# Patient Record
Sex: MALE | Race: WHITE | Employment: OTHER | URBAN - METROPOLITAN AREA
[De-identification: names, ages, dates, MRNs, and addresses within clinical notes are randomized per-mention and may not be internally consistent; named-entity substitution may affect disease eponyms.]

---

## 2017-01-06 ENCOUNTER — TELEPHONE (OUTPATIENT)
Dept: CARDIOLOGY | Facility: CLINIC | Age: 82
End: 2017-01-06

## 2017-01-06 NOTE — TELEPHONE ENCOUNTER
Pt called to get his echo results. It looks like  already reviewed results but no further recommendations were made. I reviewed results with pt. He would like to  paper copy at [place] office today. Message sent to BERLIN Braun in [place]. Pt would also like OV note and results sent to PMD clinic. Will send update to HIM. Alyce SLADE

## 2017-06-22 ENCOUNTER — OFFICE VISIT (OUTPATIENT)
Dept: OTHER | Facility: CLINIC | Age: 82
End: 2017-06-22
Attending: SURGERY
Payer: MEDICARE

## 2017-06-22 ENCOUNTER — HOSPITAL ENCOUNTER (OUTPATIENT)
Dept: ULTRASOUND IMAGING | Facility: CLINIC | Age: 82
Discharge: HOME | End: 2017-06-22
Attending: SURGERY | Admitting: SURGERY
Payer: MEDICARE

## 2017-06-22 VITALS — DIASTOLIC BLOOD PRESSURE: 73 MMHG | HEART RATE: 58 BPM | SYSTOLIC BLOOD PRESSURE: 143 MMHG

## 2017-06-22 DIAGNOSIS — I65.22 CAROTID STENOSIS, LEFT: Primary | ICD-10-CM

## 2017-06-22 DIAGNOSIS — I65.22 RECURRENT STENOSIS OF LEFT CAROTID ARTERY: Primary | ICD-10-CM

## 2017-06-22 DIAGNOSIS — I73.9 PAD (PERIPHERAL ARTERY DISEASE) (H): ICD-10-CM

## 2017-06-22 DIAGNOSIS — I65.23 CAROTID STENOSIS, BILATERAL: ICD-10-CM

## 2017-06-22 DIAGNOSIS — I65.22 LEFT CAROTID STENOSIS: ICD-10-CM

## 2017-06-22 DIAGNOSIS — I65.22 LEFT CAROTID STENOSIS: Primary | ICD-10-CM

## 2017-06-22 PROCEDURE — 93882 EXTRACRANIAL UNI/LTD STUDY: CPT | Mod: LT

## 2017-06-22 PROCEDURE — 99214 OFFICE O/P EST MOD 30 MIN: CPT | Mod: ZP | Performed by: SURGERY

## 2017-06-22 PROCEDURE — 99211 OFF/OP EST MAY X REQ PHY/QHP: CPT

## 2017-06-22 NOTE — PROGRESS NOTES
José Aguirre Return for routine follow-up.  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  I performed a left carotid enterectomy 1997 with a vein patch.  He has had no symptoms related to this.    In 2012 he had elevated velocity in the distal ICA.  We performed a CTA which revealed some dilatation of the vein patch for particularly near the distal endpoint but no stenosis.  Velocities normalized the following year but were again elevated last year.  Repeat CTA on 6/21/2016 again revealed a dilated vein patch but no critical stenosis.    He's had no problems over the last year.  He also has known PAD but is completely asymptomatic.  He is able to do all his normal activities.  He remains independent and active.      PMH: Medications: Plavix, aspirin, furosemide, Crestor, carvedilol            Medical: Hypertension well-controlled medications                           Mixed hyperlipidemia on Crestor.  2016 LDL= 38                           PAD                           Carotid stenosis.     Nonsmoker.  Lives independently.    Exam: Alert and appropriate.  Blood pressure 143/73.  Pulse 58  Normal affect.              Well-healed left carotid incision.  Left lower than right carotid bruit.              Hearing aid.              Chest= clear   Cardiovascular=RR              No palpable pedal pulses which is unchanged.  No ischemic changes nor                Ulcerations.                  Bedside Doppler reveals a posterior monophasic signal in the right dorsalis pedis artery-left anterior tibial artery-left posterior tibial artery which is  Unchanged from previous exams.        We reviewed the carotid duplex ultrasound performed today.  This reveals a worsening stenosis at the end of the vein patch with velocities now 468 cm/s with an elevated diastolic of 107 cm/s and a ratio of 6.24.  Also concerns is a thrombus at the ICA origin and carotid bulb that appears to have some motion on the  surface. The external carotid artery is occluded several centimeters from its origin but has a large collateral branch that is patent.        I also reviewed the last two CTAs showing the dilated patch.      Impression:  #1  I'm very concerned about the change in his carotid duplex ultrasound knowing that he has a dilated vein patch with some aneurysmal changes near the distal endpoint.  The stenosis to the IC appears to be worsening.  Also of concern is the  Thrombus/plaque at the carotid bulb and proximal ICA with some motion artifact on the surface.  With the higher outflow obstruction he's more likely developed thrombus at this area and now has an increased risk of embolization causing a stroke.  This is also his only patent internal carotid artery.  Due to these findings I would recommend that we perform a redo left CEA with intraoperative shunting and EEG monitoring.  Possible vein patch from the right greater saphenous vein would be necessary.  We'll have him hold his Plavix for several days prior to the procedure  And schedule this at his convenience in the relatively near future.                         #2  PAD.  No palpable pedal pulses but fairly adequate Doppler signals and no symptoms.  No intervention is indicated at this time      Khurram Bishop MD   Please route or send letter to:  Primary Care Provider (PCP)

## 2017-06-22 NOTE — NURSING NOTE
"Chief Complaint   Patient presents with     RECHECK     Left carotid US(9:45 VHC; 10:30 O) History of chronic occlusion of his right internal carotid artery.  I performed a left carotid endarterectomy in 1997; 1 year follow up to 6-16-16 appointment with Dr. Bishop       Initial /73 (BP Location: Left arm, Patient Position: Chair, Cuff Size: Adult Large)  Pulse 58 Estimated body mass index is 28.27 kg/(m^2) as calculated from the following:    Height as of 12/13/16: 5' 10\" (1.778 m).    Weight as of 12/13/16: 197 lb (89.4 kg).  Medication Reconciliation: complete     Face to face nursing time: 8 minutes    Jo Caballero MA     "

## 2017-06-22 NOTE — LETTER
[facility] [place] [address]  [place] [address]  Phone: [phone]  Fax: [phone]        2017    RE:  José Aguirre-:  35    José Aguirre returns for routine follow-up.  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  I performed a left carotid enterectomy  with a vein patch.  He has had no symptoms related to this.     In  he had elevated velocity in the distal ICA.  We performed a CTA which revealed some dilatation of the vein patch for particularly near the distal endpoint but no stenosis.  Velocities normalized the following year but were again elevated last year.  Repeat CTA on 2016 again revealed a dilated vein patch but no critical stenosis.     He's had no problems over the last year.  He also has known PAD but is completely asymptomatic.  He is able to do all his normal activities.  He remains independent and active.     PMH: Medications: Plavix, aspirin, furosemide, Crestor, carvedilol  Medical: Hypertension well-controlled medications  Mixed hyperlipidemia on Crestor.  2016 LDL= 38  PAD  Carotid stenosis.     Nonsmoker.  Lives independently.     Exam: Alert and appropriate.  Blood pressure 143/73.  Pulse 58  Normal affect.  Well-healed left carotid incision.  Left lower than right carotid bruit.  Hearing aid.  Chest= clear   Cardiovascular=RR  No palpable pedal pulses which is unchanged.  No ischemic changes nor ulcerations.      Bedside Doppler reveals a posterior monophasic signal in the right dorsalis pedis artery-left anterior tibial artery-left posterior tibial artery which is  Unchanged from previous exams.     We reviewed the carotid duplex ultrasound performed today.  This reveals a worsening stenosis at the end of the vein patch with velocities now 468 cm/s with an elevated diastolic of 107 cm/s and a ratio of 6.24.  Also concerns is a thrombus at the ICA origin and carotid bulb that appears to  have some motion on the surface. The external carotid artery is occluded several centimeters from its origin but has a large collateral branch that is patent.    I also reviewed the last two CTAs showing the dilated patch.     Impression:  #1  I'm very concerned about the change in his carotid duplex ultrasound knowing that he has a dilated vein patch with some aneurysmal changes near the distal endpoint.  The stenosis to the IC appears to be worsening.  Also of concern is the  Thrombus/plaque at the carotid bulb and proximal ICA with some motion artifact on the surface.  With the higher outflow obstruction he's more likely developed thrombus at this area and now has an increased risk of embolization causing a stroke.  This is also his only patent internal carotid artery.  Due to these findings I would recommend that we perform a redo left CEA with intraoperative shunting and EEG monitoring.  Possible vein patch from the right greater saphenous vein would be necessary.  We'll have him hold his Plavix for several days prior to the procedure  And schedule this at his convenience in the relatively near future.     #2  PAD.  No palpable pedal pulses but fairly adequate Doppler signals and no symptoms.  No intervention is indicated at this time     Khurram Bishop MD

## 2017-06-22 NOTE — MR AVS SNAPSHOT
"              After Visit Summary   6/22/2017    José Aguirre    MRN: [id number]           Patient Information     Date Of Birth          1935        Visit Information        Provider Department      6/22/2017 10:30 AM Khurram Bishop MD [facility]      Today's Diagnoses     Carotid stenosis, left    -  1    PAD (peripheral artery disease) (H)           Follow-ups after your visit        Your next 10 appointments already scheduled     Jul 11, 2017   Procedure with Khurram Bishop MD   [facility] (--)    [address]  [facility] [id number] [phone]              Future tests that were ordered for you today     Open Future Orders        Priority Expected Expires Ordered    US Carotid Left Routine 6/22/2017 6/23/2018 6/22/2017            Who to contact     If you have questions or need follow up information about today's clinic visit or your schedule please contact [facility] directly at [phone].  Normal or non-critical lab and imaging results will be communicated to you by MyChart, letter or phone within 4 business days after the clinic has received the results. If you do not hear from us within 7 days, please contact the clinic through [employer] or phone. If you have a critical or abnormal lab result, we will notify you by phone as soon as possible.  Submit refill requests through [employer] or call your pharmacy and they will forward the refill request to us. Please allow 3 business days for your refill to be completed.          Additional Information About Your Visit        [employer] Information     [employer] lets you send messages to your doctor, view your test results, renew your prescriptions, schedule appointments and more. To sign up, go to [email or web address] . Click on \"Log in\" on the left side of the screen, which will take you to the Welcome page. Then " "click on \"Sign up Now\" on the right side of the page.     You will be asked to enter the access code listed below, as well as some personal information. Please follow the directions to create your username and password.     Your access code is: [email or web address]  Expires: 2017  9:34 AM     Your access code will  in 90 days. If you need help or a new code, please call your [place] clinic or [phone].        Care EveryWhere ID     This is your Care EveryWhere ID. This could be used by other organizations to access your [place] medical records  [id number]        Your Vitals Were     Pulse                   58            Blood Pressure from Last 3 Encounters:   17 143/73   16 132/60   16 110/65    Weight from Last 3 Encounters:   16 197 lb (89.4 kg)   12/23/15 194 lb (88 kg)   05/15/15 196 lb (88.9 kg)              Today, you had the following     No orders found for display       Primary Care Provider Office Phone # Fax #    Inderjit Osbaldo Kenny [phone]       [facility] [address] [place] [address]        Equal Access to Services     FOREIGN SALDANA AH: Hadii aad ku hadasho Soomaali, waaxda luqadaha, qaybta kaalmada adeegyada, vito rubioin haykendalln richard herrmann . So [facility] [phone].    ATENCIÓN: Si habla español, tiene a spencer disposición servicios gratuitos de asistencia lingüística. Llame al [phone].    We comply with applicable federal civil rights laws and [place] laws. We do not discriminate on the basis of race, color, national origin, age, disability sex, sexual orientation or gender identity.            Thank you!     Thank you for choosing [facility] [place]  for your care. Our goal is always to provide you with excellent care. Hearing back from our patients is one way we can continue to improve our services. Please take a few minutes to complete the written survey that you may receive in the mail after your visit " with us. Thank you!             Your Updated Medication List - Protect others around you: Learn how to safely use, store and throw away your medicines at www.disposemymeds.org.          This list is accurate as of: 6/22/17  5:12 PM.  Always use your most recent med list.                   Brand Name Dispense Instructions for use Diagnosis    ASPIRIN PO      Take 325 mg by mouth daily.        CARVEDILOL PO      Take 25 mg by mouth 2 times daily (with meals).        ferrous sulfate 325 (65 FE) MG tablet    IRON     Take 325 mg by mouth At Bedtime.        FUROSEMIDE PO      Take 20 mg by mouth daily        PLAVIX PO      Take 75 mg by mouth daily        rosuvastatin 40 MG tablet    CRESTOR    30 tablet    Take 1 tablet (40 mg) by mouth daily    CAD (coronary artery disease)       triamcinolone 0.1 % cream    KENALOG     Apply topically 2 times daily        UNKNOWN TO PATIENT      Cream used to treat rashes he gets not sure of the name of the cream        vitamin D 2000 UNITS tablet     30 tablet    Take 1 tablet by mouth 2 times daily

## 2017-06-30 ENCOUNTER — TELEPHONE (OUTPATIENT)
Dept: CARDIOLOGY | Facility: CLINIC | Age: 82
End: 2017-06-30

## 2017-06-30 NOTE — TELEPHONE ENCOUNTER
I received a call from pt's PMD, . He said pt is scheduled for carotid endarterectomy surgery on 7/11 with  and he would like  to determine if pt safe to proceed from cardiac standpoint and whether any stress testing is needed prior to surgery. He said he feels pt is mild to moderate risk for surgery.  I told him that I could get pt in for an OV with  on 7/6 @ 1:15 for cardiac clearance and PMD was ok with that. I called and verified appointment time with pt's wife.

## 2017-07-06 ENCOUNTER — OFFICE VISIT (OUTPATIENT)
Dept: CARDIOLOGY | Facility: CLINIC | Age: 82
End: 2017-07-06
Payer: COMMERCIAL

## 2017-07-06 VITALS
HEIGHT: 68 IN | DIASTOLIC BLOOD PRESSURE: 62 MMHG | SYSTOLIC BLOOD PRESSURE: 132 MMHG | WEIGHT: 193.5 LBS | HEART RATE: 60 BPM | BODY MASS INDEX: 29.33 KG/M2

## 2017-07-06 DIAGNOSIS — I70.1 RENAL ARTERY STENOSIS, NATIVE (H): ICD-10-CM

## 2017-07-06 DIAGNOSIS — I25.10 CORONARY ARTERY DISEASE INVOLVING NATIVE CORONARY ARTERY OF NATIVE HEART WITHOUT ANGINA PECTORIS: Primary | ICD-10-CM

## 2017-07-06 DIAGNOSIS — Z95.1 HX OF CABG: ICD-10-CM

## 2017-07-06 DIAGNOSIS — I65.23 CAROTID STENOSIS, BILATERAL: ICD-10-CM

## 2017-07-06 DIAGNOSIS — I73.9 PERIPHERAL VASCULAR DISEASE (H): ICD-10-CM

## 2017-07-06 DIAGNOSIS — E78.5 HYPERLIPIDEMIA LDL GOAL <70: ICD-10-CM

## 2017-07-06 DIAGNOSIS — I10 ESSENTIAL HYPERTENSION: ICD-10-CM

## 2017-07-06 PROCEDURE — 99214 OFFICE O/P EST MOD 30 MIN: CPT | Mod: 25 | Performed by: INTERNAL MEDICINE

## 2017-07-06 PROCEDURE — 93000 ELECTROCARDIOGRAM COMPLETE: CPT | Performed by: INTERNAL MEDICINE

## 2017-07-06 RX ORDER — CARVEDILOL 25 MG/1
25 TABLET ORAL 2 TIMES DAILY WITH MEALS
COMMUNITY
End: 2019-09-22

## 2017-07-06 RX ORDER — CLOPIDOGREL BISULFATE 75 MG/1
75 TABLET ORAL DAILY
COMMUNITY
End: 2019-09-22

## 2017-07-06 RX ORDER — FUROSEMIDE 20 MG
80 TABLET ORAL 2 TIMES DAILY
COMMUNITY
End: 2018-07-13 | Stop reason: DRUGHIGH

## 2017-07-06 NOTE — PROGRESS NOTES
HPI and Plan:   This nice 81-year-old gentleman seen for preoperative cardiology management and evaluation prior to redo left carotid endarterectomy.  He has a cardiovascular history  for history of coronary artery disease requiring CABG around 2003, peripheral vascular disease with left lower extremity claudication, carotid artery disease with occluded right carotid and status post left CEA, renal artery atherosclerotic stenosis, status post renal stenting, dyslipidemia, and mixed hypertension including renal vascular hypertension, which was improved following renal artery stenting in 2013, and other peripheral vascular disease.      He now has recurrent left carotid severe stenosis and a chronically occluded right internal carotid and left external carotid arteries.    He has been stable last 6 months.  He has a steady moderate activity level and if he paces himself has no symptoms.  He cannot however, keep up with people due to bilateral calf claudication, left greater than right.  This occurs at about one block.  If he stops for 30 seconds he can then resume activity and this has not changed.     His symptoms prior to CABG or exertional dyspnea, dizziness, some chest discomfort.  He states he is not having these kinds of symptoms currently.  Echocardiogram in December 2016 showed normal ejection fraction and wall motion.  No significant valvular disease.  Normal RV size and function.    He gets occasional dyspnea on exertion but is more limited by claudication.  He denies chest discomfort with activity or other times, orthopnea, edema, palpitations, dizziness or syncope.  He has no focal neurologic symptoms, denies myalgias or muscle weakness.  We could not achieve adequate LDL lowering target with a total atorvastatin.  [facility] and this has significantly improved his LDL from above 100 down to 62 and significantly improve his HDL from the low 30s up to 42 in 2015.    however is trending back up last year.  [facility]     Exam-full exam below.  In summary:  Blood pressure today is 132/62.  Pulse is regular.    Lungs have diffusely decreased air movement with a few coarse crackles at both bases, right greater than left, unchanged from last visit.   No wheeze or increased effort.    Cardiac-regular rhythm,There is a soft systolic ejection murmur , difficult to sort out from a loud likely subclavian artery bruit on the left.  There is a left carotid bruit . Left radial pulses 1+ and delayed compared to the 2+ right radial pulse. No abdominal bruits.  Femoral pulses deep one plus left, 1-2+ right with right femoral bruit..  Left and right posterior tibial pulses 1+, dorsalis pedis not felt.  No edema.  No significant lower extremity skin changes.    Remainder of exam noted below     Impression/plan  1-coronary artery disease.  Currently without ischemic, heart failure, or arrhythmia symptoms.  Stress study 2011 showed no ischemia.  Ejection fraction 62%.  No significant valvular disease.   No history of recent acute coronary syndrome.    He can proceed with this particular surgery without further cardiology workup as his cardiac risk I think is as low as can be obtained at this time, and is only mildly increased due to his cardiac history.  He is stable from a cardiovascular standpoint.  Blood pressure currently well-controlled.  Recent renal function stable.  .  2-hypertension, essential plus renovascular-controlled  .  3-cerebrovascular disease.  Recurrent severe left internal carotid stenosis, chronic right internal carotid occlusion.  For left CEA as above    4-right renal artery stenosis history of stenting, recent ultrasounds showing increasing renal blood flow velocities on that side.   however, currently blood pressure  controlled.  Has atrophic left kidney.  This should be followed carefully for further progression and impact on blood pressure control and on  his renal function.  I discussed this with him previously.  He he will discuss this further with Dr. Kenny.    5- left subclavian stenosis.  He does not appear to have any subclavian steal or arm claudication with this however.  He has an LIMA graft that , but is not experiencing ischemic sounding symptoms at this time.  We'll continue to follow this clinically.  We'll reconsider stress study again next year given how this might impact his LIMA graft.        Orders Placed This Encounter   Procedures     EKG 12-lead complete w/read - Clinics (performed today)       Orders Placed This Encounter   Medications     clopidogrel (PLAVIX) 75 MG tablet     Sig: Take by mouth daily On hold starting 7/7/17     carvedilol (COREG) 25 MG tablet     Sig: Take 25 mg by mouth 2 times daily (with meals)     furosemide (LASIX) 20 MG tablet     Sig: Take 20 mg by mouth daily       Medications Discontinued During This Encounter   Medication Reason     CARVEDILOL PO Medication Reconciliation Clean Up     Clopidogrel Bisulfate (PLAVIX PO) Medication Reconciliation Clean Up     FUROSEMIDE PO Medication Reconciliation Clean Up     UNKNOWN TO PATIENT Medication Reconciliation Clean Up         Encounter Diagnoses   Name Primary?     Coronary artery disease involving native coronary artery of native heart without angina pectoris Yes     Hx of CABG      Peripheral vascular disease (H)      Carotid stenosis, bilateral      Hyperlipidemia LDL goal <70      Essential hypertension      Renal artery stenosis, native (H)        CURRENT MEDICATIONS:  Current Outpatient Prescriptions   Medication Sig Dispense Refill     clopidogrel (PLAVIX) 75 MG tablet Take by mouth daily On hold starting 7/7/17       carvedilol (COREG) 25 MG tablet Take 25 mg by mouth 2 times daily (with meals)       furosemide (LASIX) 20 MG tablet Take 20 mg by mouth daily       triamcinolone (KENALOG) 0.1 % cream Apply topically 2 times daily PRN       rosuvastatin (CRESTOR)  40 MG tablet Take 1 tablet (40 mg) by mouth daily 30 tablet 3     Cholecalciferol (VITAMIN D) 2000 UNITS tablet Take 1 tablet by mouth 2 times daily 30 tablet      ferrous sulfate 325 (65 FE) MG tablet Take 325 mg by mouth At Bedtime.       ASPIRIN PO Take 325 mg by mouth daily.       [DISCONTINUED] FUROSEMIDE PO Take 20 mg by mouth daily       [DISCONTINUED] Clopidogrel Bisulfate (PLAVIX PO) Take 75 mg by mouth daily         ALLERGIES   No Known Allergies    PAST MEDICAL HISTORY:  Past Medical History:   Diagnosis Date     Anemia      Atrophy of left kidney      CKD (chronic kidney disease), stage III      Claudication, intermittent (H)      Coronary artery disease 2003    CAB x 4     History of GI bleed 2012    Hemorrhagic gastritis     Hypercholesterolemia      Hypertension      Left carotid artery stenosis     S/P Carotid Endarterectomy left      PVD (peripheral vascular disease) (H)      Renal artery stenosis (H)     stent R       PAST SURGICAL HISTORY:  Past Surgical History:   Procedure Laterality Date     BYPASS GRAFT ARTERY CORONARY  06/2003    LIMA=LAD, SVG=Diag, SVG=OM2, SVG=PDA     CAROTID ENDARTERECTOMY Left      EYE SURGERY       HEART CATH LEFT HEART CATH  06/2003    Severe multivessel disease     RENAL ARTERY ANGIOGRAM Right 05/2013    with stenting     TONSILLECTOMY         FAMILY HISTORY:  Family History   Problem Relation Age of Onset     CEREBROVASCULAR DISEASE Mother 90     unknown type     CANCER Brother 70     Brain cancer        SOCIAL HISTORY:  Social History     Social History     Marital status:      Spouse name: N/A     Number of children: N/A     Years of education: N/A     Social History Main Topics     Smoking status: Former Smoker     Packs/day: 1.00     Years: 50.00     Types: Cigarettes     Quit date: 1/1/2001     Smokeless tobacco: Never Used     Alcohol use Yes      Comment: 4 drinks week     Drug use: No     Sexual activity: Not Asked     Other Topics Concern     Caffeine  "Concern Yes     4 - 7 cups (trying to cut down)     Special Diet Yes     eats healthy     Exercise Yes     walking     Social History Narrative       Review of Systems:  Skin:  Negative       Eyes:  Positive for glasses    ENT:  Positive for hearing loss    Respiratory:  Positive for dyspnea on exertion;cough     Cardiovascular:  Negative;palpitations;chest pain;cyanosis;syncope or near-syncope;fatigue;edema;exercise intolerance Positive for;lightheadedness;dizziness    Gastroenterology: Negative      Genitourinary:  Negative      Musculoskeletal:  Negative      Neurologic:  Negative      Psychiatric:  Negative      Heme/Lymph/Imm:  Negative      Endocrine:  Negative        Physical Exam:  Vitals: /62 (BP Location: Left arm, Cuff Size: Adult Large)  Pulse 60  Ht 1.727 m (5' 8\")  Wt 87.8 kg (193 lb 8 oz)  BMI 29.42 kg/m2    Constitutional:  cooperative, alert and oriented, well developed, well nourished, in no acute distress        Skin:  warm and dry to the touch, no apparent skin lesions or masses noted        Head:  normocephalic, no masses or lesions        Eyes:  pupils equal and round;sclera white;EOMS intact        ENT:  no pallor or cyanosis        Neck:  JVP normal bilateral carotid bruit;transmitted murmur (Left bruit >right, Left CEA scar, diminished right carotid pulse)      Chest:  healed median sternotomy scar diminished breath sounds bilaterally;coarse rales;basal rales (Left subclavian prominant bruit)        Cardiac: regular rhythm;normal S1 and S2;no S3 or S4   distant heart sounds     systolic ejection murmur;grade 1        Abdomen:  abdomen soft;BS normoactive;non-tender;no bruits obese      Vascular:     2+ 2+       0 1+ 1+ 1+       0 1+ right femoral bruit (+) left femoral bruit (-)  (left radial pulse delayed compared to right, 1+ left, 2+ right radial pulse)    Extremities and Back:  no edema;no deformities, clubbing, cyanosis, erythema observed              Neurological:  affect " appropriate, oriented to time, person and place;no gross motor deficits              CC  No referring provider defined for this encounter.

## 2017-07-06 NOTE — MR AVS SNAPSHOT
After Visit Summary   7/6/2017    José Aguirre    MRN: [id number]           Patient Information     Date Of Birth          1935        Visit Information        Provider Department      7/6/2017 1:15 PM Radhames Vargas MD [facility]        Today's Diagnoses     Coronary artery disease involving native coronary artery of native heart without angina pectoris    -  1    Hx of CABG        Peripheral vascular disease (H)        Carotid stenosis, bilateral        Hyperlipidemia LDL goal <70        Essential hypertension        Renal artery stenosis, native (H)           Follow-ups after your visit        Your next 10 appointments already scheduled     Jul 11, 2017   Procedure with Khurram Bishop MD   [facility] (--)    [address]  [facility] [id number] [phone]            Jul 11, 2017 11:00 AM CDT   [facility] OR with Khurram Bishop MD   [facility] ([facility])    [facility] ([facility])   [phone]              Who to contact     If you have questions or need follow up information about today's clinic visit or your schedule please contact [facility] directly at [phone].  Normal or non-critical lab and imaging results will be communicated to you by MyChart, letter or phone within 4 business days after the clinic has received the results. If you do not hear from us within 7 days, please contact the clinic through MyChart or phone. If you have a critical or abnormal lab result, we will notify you by phone as soon as possible.  Submit refill requests through [employer] or call your pharmacy and they will forward the refill request to us. Please allow 3 business days for your refill to be completed.          Additional Information About Your Visit       "  MyChart Information     [employer] lets you send messages to your doctor, view your test results, renew your prescriptions, schedule appointments and more. To sign up, go to [email or web address] . Click on \"Log in\" on the left side of the screen, which will take you to the Welcome page. Then click on \"Sign up Now\" on the right side of the page.     You will be asked to enter the access code listed below, as well as some personal information. Please follow the directions to create your username and password.     Your access code is: [email or web address]  Expires: 2017  9:34 AM     Your access code will  in 90 days. If you need help or a new code, please call your [facility] clinic or [phone].        Care EveryWhere ID     This is your Care EveryWhere ID. This could be used by other organizations to access your [facility] medical records  [id number]        Your Vitals Were     Pulse Height BMI (Body Mass Index)             60 1.727 m (5' 8\") 29.42 kg/m2          Blood Pressure from Last 3 Encounters:   17 132/62   17 143/73   16 132/60    Weight from Last 3 Encounters:   17 87.8 kg (193 lb 8 oz)   16 89.4 kg (197 lb)   12/23/15 88 kg (194 lb)              We Performed the Following     EKG 12-lead complete w/read - Clinics (performed today)        Primary Care Provider Office Phone # Fax #    Inderjit Osbaldo Kenny [phone]       [facility] [address] [place] [address]        Equal Access to Services     [facility]: Hadii tom anderson Sofrederic, waaxda luqadaha, qaybta kaalmada carissa, vito herrmann . So [facility] [phone].    ATENCIÓN: Si habla español, tiene a spencer disposición servicios gratuitos de asistencia lingüística. Llame al [phone].    We comply with applicable federal civil rights laws and [place] laws. We do not discriminate on the basis of race, color, national origin, age, disability sex, sexual " orientation or gender identity.            Thank you!     Thank you for choosing [facility] [place]  for your care. Our goal is always to provide you with excellent care. Hearing back from our patients is one way we can continue to improve our services. Please take a few minutes to complete the written survey that you may receive in the mail after your visit with us. Thank you!             Your Updated Medication List - Protect others around you: Learn how to safely use, store and throw away your medicines at www.disposemymeds.org.          This list is accurate as of: 7/6/17  2:00 PM.  Always use your most recent med list.                   Brand Name Dispense Instructions for use Diagnosis    ASPIRIN PO      Take 325 mg by mouth daily.        carvedilol 25 MG tablet    COREG     Take 25 mg by mouth 2 times daily (with meals)        clopidogrel 75 MG tablet    PLAVIX     Take by mouth daily On hold starting 7/7/17        ferrous sulfate 325 (65 FE) MG tablet    IRON     Take 325 mg by mouth At Bedtime.        furosemide 20 MG tablet    LASIX     Take 20 mg by mouth daily        rosuvastatin 40 MG tablet    CRESTOR    30 tablet    Take 1 tablet (40 mg) by mouth daily    CAD (coronary artery disease)       triamcinolone 0.1 % cream    KENALOG     Apply topically 2 times daily PRN        vitamin D 2000 UNITS tablet     30 tablet    Take 1 tablet by mouth 2 times daily

## 2017-07-06 NOTE — LETTER
7/6/2017    Inderjit Wang MD  [facility]   [address] [address]    RE: José Aguirre       Dear Colleague,    I had the pleasure of seeing José COLON Carla in the [facility].    HPI and Plan:   This nice 81-year-old gentleman seen for preoperative cardiology management and evaluation prior to redo left carotid endarterectomy.  He has a cardiovascular history  for history of coronary artery disease requiring CABG around 2003, peripheral vascular disease with left lower extremity claudication, carotid artery disease with occluded right carotid and status post left CEA, renal artery atherosclerotic stenosis, status post renal stenting, dyslipidemia, and mixed hypertension including renal vascular hypertension, which was improved following renal artery stenting in 2013, and other peripheral vascular disease.      He now has recurrent left carotid severe stenosis and a chronically occluded right internal carotid and left external carotid arteries.    He has been stable last 6 months.  He has a steady moderate activity level and if he paces himself has no symptoms.  He cannot however, keep up with people due to bilateral calf claudication, left greater than right.  This occurs at about one block.  If he stops for 30 seconds he can then resume activity and this has not changed.     His symptoms prior to CABG or exertional dyspnea, dizziness, some chest discomfort.  He states he is not having these kinds of symptoms currently.  Echocardiogram in December 2016 showed normal ejection fraction and wall motion.  No significant valvular disease.  Normal RV size and function.    He gets occasional dyspnea on exertion but is more limited by claudication.  He denies chest discomfort with activity or other times, orthopnea, edema, palpitations, dizziness or syncope.  He has no focal neurologic symptoms, denies myalgias or muscle weakness.  We could not achieve adequate LDL  lowering target with a total atorvastatin.  [facility] and this has significantly improved his LDL from above 100 down to 62 and significantly improve his HDL from the low 30s up to 42 in 2015.    however is trending back up last year.  [facility]     Exam-full exam below.  In summary:  Blood pressure today is 132/62.  Pulse is regular.    Lungs have diffusely decreased air movement with a few coarse crackles at both bases, right greater than left, unchanged from last visit.   No wheeze or increased effort.    Cardiac-regular rhythm,There is a soft systolic ejection murmur , difficult to sort out from a loud likely subclavian artery bruit on the left.  There is a left carotid bruit . Left radial pulses 1+ and delayed compared to the 2+ right radial pulse. No abdominal bruits.  Femoral pulses deep one plus left, 1-2+ right with right femoral bruit..  Left and right posterior tibial pulses 1+, dorsalis pedis not felt.  No edema.  No significant lower extremity skin changes.    Remainder of exam noted below     Impression/plan  1-coronary artery disease.  Currently without ischemic, heart failure, or arrhythmia symptoms.  Stress study 2011 showed no ischemia.  Ejection fraction 62%.  No significant valvular disease.   No history of recent acute coronary syndrome.    He can proceed with this particular surgery without further cardiology workup as his cardiac risk I think is as low as can be obtained at this time, and is only mildly increased due to his cardiac history.  He is stable from a cardiovascular standpoint.  Blood pressure currently well-controlled.  Recent renal function stable.  .  2-hypertension, essential plus renovascular-controlled  .  3-cerebrovascular disease.  Recurrent severe left internal carotid stenosis, chronic right internal carotid occlusion.  For left CEA as above    4-right renal artery stenosis history of stenting, recent ultrasounds  showing increasing renal blood flow velocities on that side.   however, currently blood pressure  controlled.  Has atrophic left kidney.  This should be followed carefully for further progression and impact on blood pressure control and on his renal function.  I discussed this with him previously.  He he will discuss this further with Dr. Kenny.    5- left subclavian stenosis.  He does not appear to have any subclavian steal or arm claudication with this however.  He has an LIMA graft that , but is not experiencing ischemic sounding symptoms at this time.  We'll continue to follow this clinically.  We'll reconsider stress study again next year given how this might impact his LIMA graft.        Orders Placed This Encounter   Procedures     EKG 12-lead complete w/read - Clinics (performed today)       Orders Placed This Encounter   Medications     clopidogrel (PLAVIX) 75 MG tablet     Sig: Take by mouth daily On hold starting 7/7/17     carvedilol (COREG) 25 MG tablet     Sig: Take 25 mg by mouth 2 times daily (with meals)     furosemide (LASIX) 20 MG tablet     Sig: Take 20 mg by mouth daily       Medications Discontinued During This Encounter   Medication Reason     CARVEDILOL PO Medication Reconciliation Clean Up     Clopidogrel Bisulfate (PLAVIX PO) Medication Reconciliation Clean Up     FUROSEMIDE PO Medication Reconciliation Clean Up     UNKNOWN TO PATIENT Medication Reconciliation Clean Up         Encounter Diagnoses   Name Primary?     Coronary artery disease involving native coronary artery of native heart without angina pectoris Yes     Hx of CABG      Peripheral vascular disease (H)      Carotid stenosis, bilateral      Hyperlipidemia LDL goal <70      Essential hypertension      Renal artery stenosis, native (H)        CURRENT MEDICATIONS:  Current Outpatient Prescriptions   Medication Sig Dispense Refill     clopidogrel (PLAVIX) 75 MG tablet Take by mouth daily On hold starting 7/7/17        carvedilol (COREG) 25 MG tablet Take 25 mg by mouth 2 times daily (with meals)       furosemide (LASIX) 20 MG tablet Take 20 mg by mouth daily       triamcinolone (KENALOG) 0.1 % cream Apply topically 2 times daily PRN       rosuvastatin (CRESTOR) 40 MG tablet Take 1 tablet (40 mg) by mouth daily 30 tablet 3     Cholecalciferol (VITAMIN D) 2000 UNITS tablet Take 1 tablet by mouth 2 times daily 30 tablet      ferrous sulfate 325 (65 FE) MG tablet Take 325 mg by mouth At Bedtime.       ASPIRIN PO Take 325 mg by mouth daily.       [DISCONTINUED] FUROSEMIDE PO Take 20 mg by mouth daily       [DISCONTINUED] Clopidogrel Bisulfate (PLAVIX PO) Take 75 mg by mouth daily         ALLERGIES   No Known Allergies    PAST MEDICAL HISTORY:  Past Medical History:   Diagnosis Date     Anemia      Atrophy of left kidney      CKD (chronic kidney disease), stage III      Claudication, intermittent (H)      Coronary artery disease 2003    CAB x 4     History of GI bleed 2012    Hemorrhagic gastritis     Hypercholesterolemia      Hypertension      Left carotid artery stenosis     S/P Carotid Endarterectomy left      PVD (peripheral vascular disease) (H)      Renal artery stenosis (H)     stent R       PAST SURGICAL HISTORY:  Past Surgical History:   Procedure Laterality Date     BYPASS GRAFT ARTERY CORONARY  06/2003    LIMA=LAD, SVG=Diag, SVG=OM2, SVG=PDA     CAROTID ENDARTERECTOMY Left      EYE SURGERY       HEART CATH LEFT HEART CATH  06/2003    Severe multivessel disease     RENAL ARTERY ANGIOGRAM Right 05/2013    with stenting     TONSILLECTOMY         FAMILY HISTORY:  Family History   Problem Relation Age of Onset     CEREBROVASCULAR DISEASE Mother 90     unknown type     CANCER Brother 70     Brain cancer        SOCIAL HISTORY:  Social History     Social History     Marital status:      Spouse name: N/A     Number of children: N/A     Years of education: N/A     Social History Main Topics     Smoking status: Former Smoker     " Packs/day: 1.00     Years: 50.00     Types: Cigarettes     Quit date: 1/1/2001     Smokeless tobacco: Never Used     Alcohol use Yes      Comment: 4 drinks week     Drug use: No     Sexual activity: Not Asked     Other Topics Concern     Caffeine Concern Yes     4 - 7 cups (trying to cut down)     Special Diet Yes     eats healthy     Exercise Yes     walking     Social History Narrative       Review of Systems:  Skin:  Negative       Eyes:  Positive for glasses    ENT:  Positive for hearing loss    Respiratory:  Positive for dyspnea on exertion;cough     Cardiovascular:  Negative;palpitations;chest pain;cyanosis;syncope or near-syncope;fatigue;edema;exercise intolerance Positive for;lightheadedness;dizziness    Gastroenterology: Negative      Genitourinary:  Negative      Musculoskeletal:  Negative      Neurologic:  Negative      Psychiatric:  Negative      Heme/Lymph/Imm:  Negative      Endocrine:  Negative        Physical Exam:  Vitals: /62 (BP Location: Left arm, Cuff Size: Adult Large)  Pulse 60  Ht 1.727 m (5' 8\")  Wt 87.8 kg (193 lb 8 oz)  BMI 29.42 kg/m2    Constitutional:  cooperative, alert and oriented, well developed, well nourished, in no acute distress        Skin:  warm and dry to the touch, no apparent skin lesions or masses noted        Head:  normocephalic, no masses or lesions        Eyes:  pupils equal and round;sclera white;EOMS intact        ENT:  no pallor or cyanosis        Neck:  JVP normal bilateral carotid bruit;transmitted murmur (Left bruit >right, Left CEA scar, diminished right carotid pulse)      Chest:  healed median sternotomy scar diminished breath sounds bilaterally;coarse rales;basal rales (Left subclavian prominant bruit)        Cardiac: regular rhythm;normal S1 and S2;no S3 or S4   distant heart sounds     systolic ejection murmur;grade 1        Abdomen:  abdomen soft;BS normoactive;non-tender;no bruits obese      Vascular:     2+ 2+       0 1+ 1+ 1+       0 1+ right " femoral bruit (+) left femoral bruit (-)  (left radial pulse delayed compared to right, 1+ left, 2+ right radial pulse)    Extremities and Back:  no edema;no deformities, clubbing, cyanosis, erythema observed              Neurological:  affect appropriate, oriented to time, person and place;no gross motor deficits        Thank you for allowing me to participate in the care of your patient.    Sincerely,     Radhames Vargas MD     [facility]

## 2017-07-10 ENCOUNTER — TELEPHONE (OUTPATIENT)
Dept: WOUND CARE | Facility: CLINIC | Age: 82
End: 2017-07-10

## 2017-07-10 NOTE — TELEPHONE ENCOUNTER
I called José Aguirre About his redo left CEA scheduled for tomorrow.  This was performed on 10/27/1999 with a vein patch.  He has a known right ICA asymptomatic occlusion.    We've noticed dilatation of his vein patch especially at the endpoint going into his ICA with elevated velocities and worsening stenosis.  I'm very concerned about this due to his contralateral occlusion.  Fortunately on review of his CTA from 2016 he has a relatively low  Carotid bifurcation  Making access and distal control somewhat easier.   Intraoperative shunting and EEG monitoring will be performed.    I called him today and he had no further questions about the upcoming surgery.  He did undergo a history and physical with his primary care physician and laboratory tests were drawn that are not yet available to me.  We will check these tomorrow to make sure were not repeating any previously done tests.  EKG has been forwarded to the hospital.      Khurram Bishop MD

## 2017-07-11 ENCOUNTER — HOSPITAL ENCOUNTER (INPATIENT)
Facility: CLINIC | Age: 82
LOS: 1 days | Discharge: HOME | DRG: 254 | End: 2017-07-12
Attending: SURGERY | Admitting: SURGERY
Payer: MEDICARE

## 2017-07-11 ENCOUNTER — ANESTHESIA EVENT (OUTPATIENT)
Dept: SURGERY | Facility: CLINIC | Age: 82
DRG: 254 | End: 2017-07-11
Payer: MEDICARE

## 2017-07-11 ENCOUNTER — SURGERY (OUTPATIENT)
Age: 82
End: 2017-07-11

## 2017-07-11 ENCOUNTER — APPOINTMENT (OUTPATIENT)
Dept: SURGERY | Facility: PHYSICIAN GROUP | Age: 82
End: 2017-07-11
Payer: COMMERCIAL

## 2017-07-11 ENCOUNTER — ANESTHESIA (OUTPATIENT)
Dept: SURGERY | Facility: CLINIC | Age: 82
DRG: 254 | End: 2017-07-11
Payer: MEDICARE

## 2017-07-11 DIAGNOSIS — I65.22 STENOSIS OF LEFT CAROTID ARTERY: Primary | ICD-10-CM

## 2017-07-11 PROBLEM — I65.29 CAROTID ARTERY STENOSIS: Status: ACTIVE | Noted: 2017-07-11

## 2017-07-11 LAB
ABO + RH BLD: NORMAL
ABO + RH BLD: NORMAL
ANION GAP SERPL CALCULATED.3IONS-SCNC: 8 MMOL/L (ref 3–14)
BLD GP AB SCN SERPL QL: NORMAL
BLD PROD TYP BPU: NORMAL
BLOOD BANK CMNT PATIENT-IMP: NORMAL
BUN SERPL-MCNC: 24 MG/DL (ref 7–30)
CALCIUM SERPL-MCNC: 9.1 MG/DL (ref 8.5–10.1)
CHLORIDE SERPL-SCNC: 106 MMOL/L (ref 94–109)
CHOLEST SERPL-MCNC: 140 MG/DL
CO2 SERPL-SCNC: 24 MMOL/L (ref 20–32)
CREAT SERPL-MCNC: 1.49 MG/DL (ref 0.66–1.25)
ERYTHROCYTE [DISTWIDTH] IN BLOOD BY AUTOMATED COUNT: 16.1 % (ref 10–15)
GFR SERPL CREATININE-BSD FRML MDRD: 45 ML/MIN/1.7M2
GLUCOSE BLDC GLUCOMTR-MCNC: 181 MG/DL (ref 70–99)
GLUCOSE SERPL-MCNC: 118 MG/DL (ref 70–99)
HBA1C MFR BLD: 6.6 % (ref 4.3–6)
HCT VFR BLD AUTO: 39.3 % (ref 40–53)
HDLC SERPL-MCNC: 41 MG/DL
HGB BLD-MCNC: 12.5 G/DL (ref 13.3–17.7)
LDLC SERPL CALC-MCNC: 71 MG/DL
MCH RBC QN AUTO: 29.3 PG (ref 26.5–33)
MCHC RBC AUTO-ENTMCNC: 31.8 G/DL (ref 31.5–36.5)
MCV RBC AUTO: 92 FL (ref 78–100)
NONHDLC SERPL-MCNC: 99 MG/DL
NUM BPU REQUESTED: 2
PLATELET # BLD AUTO: 143 10E9/L (ref 150–450)
POTASSIUM SERPL-SCNC: 4.3 MMOL/L (ref 3.4–5.3)
RBC # BLD AUTO: 4.27 10E12/L (ref 4.4–5.9)
SODIUM SERPL-SCNC: 138 MMOL/L (ref 133–144)
SPECIMEN EXP DATE BLD: NORMAL
TRIGL SERPL-MCNC: 140 MG/DL
WBC # BLD AUTO: 6.6 10E9/L (ref 4–11)

## 2017-07-11 PROCEDURE — S0020 INJECTION, BUPIVICAINE HYDRO: HCPCS | Performed by: SURGERY

## 2017-07-11 PROCEDURE — A9270 NON-COVERED ITEM OR SERVICE: HCPCS | Mod: GY | Performed by: SURGERY

## 2017-07-11 PROCEDURE — 83036 HEMOGLOBIN GLYCOSYLATED A1C: CPT | Performed by: SURGERY

## 2017-07-11 PROCEDURE — 35301 RECHANNELING OF ARTERY: CPT | Mod: LT | Performed by: SURGERY

## 2017-07-11 PROCEDURE — 86850 RBC ANTIBODY SCREEN: CPT | Performed by: ANESTHESIOLOGY

## 2017-07-11 PROCEDURE — 00000146 ZZHCL STATISTIC GLUCOSE BY METER IP

## 2017-07-11 PROCEDURE — 25000128 H RX IP 250 OP 636: Performed by: NURSE ANESTHETIST, CERTIFIED REGISTERED

## 2017-07-11 PROCEDURE — 27210794 ZZH OR GENERAL SUPPLY STERILE: Performed by: SURGERY

## 2017-07-11 PROCEDURE — 03UL07Z SUPPLEMENT LEFT INTERNAL CAROTID ARTERY WITH AUTOLOGOUS TISSUE SUBSTITUTE, OPEN APPROACH: ICD-10-PCS | Performed by: SURGERY

## 2017-07-11 PROCEDURE — 25000125 ZZHC RX 250: Performed by: NURSE ANESTHETIST, CERTIFIED REGISTERED

## 2017-07-11 PROCEDURE — 25000128 H RX IP 250 OP 636: Performed by: ANESTHESIOLOGY

## 2017-07-11 PROCEDURE — 40000061 ZZH STATISTIC EEG TIME EA 10 MIN

## 2017-07-11 PROCEDURE — 25000132 ZZH RX MED GY IP 250 OP 250 PS 637: Mod: GY | Performed by: SURGERY

## 2017-07-11 PROCEDURE — 71000015 ZZH RECOVERY PHASE 1 LEVEL 2 EA ADDTL HR: Performed by: SURGERY

## 2017-07-11 PROCEDURE — 85027 COMPLETE CBC AUTOMATED: CPT | Performed by: SURGERY

## 2017-07-11 PROCEDURE — 80048 BASIC METABOLIC PNL TOTAL CA: CPT | Performed by: SURGERY

## 2017-07-11 PROCEDURE — 86900 BLOOD TYPING SEROLOGIC ABO: CPT | Performed by: ANESTHESIOLOGY

## 2017-07-11 PROCEDURE — 25000132 ZZH RX MED GY IP 250 OP 250 PS 637: Mod: GY | Performed by: PHYSICIAN ASSISTANT

## 2017-07-11 PROCEDURE — 03PY07Z REMOVAL OF AUTOLOGOUS TISSUE SUBSTITUTE FROM UPPER ARTERY, OPEN APPROACH: ICD-10-PCS | Performed by: SURGERY

## 2017-07-11 PROCEDURE — 37000008 ZZH ANESTHESIA TECHNICAL FEE, 1ST 30 MIN: Performed by: SURGERY

## 2017-07-11 PROCEDURE — 0W9600Z DRAINAGE OF NECK WITH DRAINAGE DEVICE, OPEN APPROACH: ICD-10-PCS | Performed by: SURGERY

## 2017-07-11 PROCEDURE — 99223 1ST HOSP IP/OBS HIGH 75: CPT | Performed by: INTERNAL MEDICINE

## 2017-07-11 PROCEDURE — 36415 COLL VENOUS BLD VENIPUNCTURE: CPT | Performed by: SURGERY

## 2017-07-11 PROCEDURE — 40000885 ZZH STATISTIC STEP DOWN HRS EVENING

## 2017-07-11 PROCEDURE — 80061 LIPID PANEL: CPT | Performed by: SURGERY

## 2017-07-11 PROCEDURE — 36000093 ZZH SURGERY LEVEL 4 1ST 30 MIN: Performed by: SURGERY

## 2017-07-11 PROCEDURE — 25000125 ZZHC RX 250: Performed by: ANESTHESIOLOGY

## 2017-07-11 PROCEDURE — 71000014 ZZH RECOVERY PHASE 1 LEVEL 2 FIRST HR: Performed by: SURGERY

## 2017-07-11 PROCEDURE — 25000125 ZZHC RX 250: Performed by: SURGERY

## 2017-07-11 PROCEDURE — 99207 ZZC NON-BILLABLE SERV PER CHARTING: CPT | Performed by: PHYSICIAN ASSISTANT

## 2017-07-11 PROCEDURE — 37000009 ZZH ANESTHESIA TECHNICAL FEE, EACH ADDTL 15 MIN: Performed by: SURGERY

## 2017-07-11 PROCEDURE — 03CL0ZZ EXTIRPATION OF MATTER FROM LEFT INTERNAL CAROTID ARTERY, OPEN APPROACH: ICD-10-PCS | Performed by: SURGERY

## 2017-07-11 PROCEDURE — 86922 COMPATIBILITY TEST ANTIGLOB: CPT | Performed by: ANESTHESIOLOGY

## 2017-07-11 PROCEDURE — 25000566 ZZH SEVOFLURANE, EA 15 MIN: Performed by: SURGERY

## 2017-07-11 PROCEDURE — 03CJ0ZZ EXTIRPATION OF MATTER FROM LEFT COMMON CAROTID ARTERY, OPEN APPROACH: ICD-10-PCS | Performed by: SURGERY

## 2017-07-11 PROCEDURE — 86901 BLOOD TYPING SEROLOGIC RH(D): CPT | Performed by: ANESTHESIOLOGY

## 2017-07-11 PROCEDURE — 21400002 ZZH R&B CCU CICU CRITICAL

## 2017-07-11 PROCEDURE — 25000128 H RX IP 250 OP 636: Performed by: SURGERY

## 2017-07-11 PROCEDURE — 36000063 ZZH SURGERY LEVEL 4 EA 15 ADDTL MIN: Performed by: SURGERY

## 2017-07-11 PROCEDURE — 95955 EEG DURING SURGERY: CPT

## 2017-07-11 PROCEDURE — 35390 REOPERATION CAROTID ADD-ON: CPT | Performed by: SURGERY

## 2017-07-11 PROCEDURE — A9270 NON-COVERED ITEM OR SERVICE: HCPCS | Mod: GY | Performed by: PHYSICIAN ASSISTANT

## 2017-07-11 PROCEDURE — 03CN0ZZ EXTIRPATION OF MATTER FROM LEFT EXTERNAL CAROTID ARTERY, OPEN APPROACH: ICD-10-PCS | Performed by: SURGERY

## 2017-07-11 PROCEDURE — 40000169 ZZH STATISTIC PRE-PROCEDURE ASSESSMENT I: Performed by: SURGERY

## 2017-07-11 PROCEDURE — 03UJ07Z SUPPLEMENT LEFT COMMON CAROTID ARTERY WITH AUTOLOGOUS TISSUE SUBSTITUTE, OPEN APPROACH: ICD-10-PCS | Performed by: SURGERY

## 2017-07-11 PROCEDURE — 06BP0ZZ EXCISION OF RIGHT SAPHENOUS VEIN, OPEN APPROACH: ICD-10-PCS | Performed by: SURGERY

## 2017-07-11 RX ORDER — FERROUS SULFATE 325(65) MG
325 TABLET ORAL 2 TIMES DAILY
Status: DISCONTINUED | OUTPATIENT
Start: 2017-07-11 | End: 2017-07-12 | Stop reason: HOSPADM

## 2017-07-11 RX ORDER — HEPARIN SODIUM 5000 [USP'U]/.5ML
5000 INJECTION, SOLUTION INTRAVENOUS; SUBCUTANEOUS EVERY 8 HOURS
Status: DISCONTINUED | OUTPATIENT
Start: 2017-07-12 | End: 2017-07-12 | Stop reason: HOSPADM

## 2017-07-11 RX ORDER — PROPOFOL 10 MG/ML
INJECTION, EMULSION INTRAVENOUS PRN
Status: DISCONTINUED | OUTPATIENT
Start: 2017-07-11 | End: 2017-07-11

## 2017-07-11 RX ORDER — FENTANYL CITRATE 50 UG/ML
50-100 INJECTION, SOLUTION INTRAMUSCULAR; INTRAVENOUS EVERY 5 MIN PRN
Status: DISCONTINUED | OUTPATIENT
Start: 2017-07-11 | End: 2017-07-11 | Stop reason: HOSPADM

## 2017-07-11 RX ORDER — ONDANSETRON 2 MG/ML
INJECTION INTRAMUSCULAR; INTRAVENOUS PRN
Status: DISCONTINUED | OUTPATIENT
Start: 2017-07-11 | End: 2017-07-11

## 2017-07-11 RX ORDER — NEOSTIGMINE METHYLSULFATE 1 MG/ML
VIAL (ML) INJECTION PRN
Status: DISCONTINUED | OUTPATIENT
Start: 2017-07-11 | End: 2017-07-11

## 2017-07-11 RX ORDER — DEXAMETHASONE SODIUM PHOSPHATE 4 MG/ML
INJECTION, SOLUTION INTRA-ARTICULAR; INTRALESIONAL; INTRAMUSCULAR; INTRAVENOUS; SOFT TISSUE PRN
Status: DISCONTINUED | OUTPATIENT
Start: 2017-07-11 | End: 2017-07-11

## 2017-07-11 RX ORDER — CLOPIDOGREL BISULFATE 75 MG/1
75 TABLET ORAL DAILY
Status: DISCONTINUED | OUTPATIENT
Start: 2017-07-12 | End: 2017-07-12 | Stop reason: HOSPADM

## 2017-07-11 RX ORDER — SODIUM CHLORIDE, SODIUM LACTATE, POTASSIUM CHLORIDE, CALCIUM CHLORIDE 600; 310; 30; 20 MG/100ML; MG/100ML; MG/100ML; MG/100ML
INJECTION, SOLUTION INTRAVENOUS CONTINUOUS
Status: DISCONTINUED | OUTPATIENT
Start: 2017-07-11 | End: 2017-07-11 | Stop reason: HOSPADM

## 2017-07-11 RX ORDER — ALBUTEROL SULFATE 0.83 MG/ML
2.5 SOLUTION RESPIRATORY (INHALATION) EVERY 4 HOURS PRN
Status: DISCONTINUED | OUTPATIENT
Start: 2017-07-11 | End: 2017-07-11 | Stop reason: HOSPADM

## 2017-07-11 RX ORDER — FENTANYL CITRATE 50 UG/ML
INJECTION, SOLUTION INTRAMUSCULAR; INTRAVENOUS PRN
Status: DISCONTINUED | OUTPATIENT
Start: 2017-07-11 | End: 2017-07-11

## 2017-07-11 RX ORDER — CARVEDILOL 25 MG/1
25 TABLET ORAL 2 TIMES DAILY WITH MEALS
Status: DISCONTINUED | OUTPATIENT
Start: 2017-07-11 | End: 2017-07-12 | Stop reason: HOSPADM

## 2017-07-11 RX ORDER — CEFAZOLIN SODIUM 1 G/3ML
INJECTION, POWDER, FOR SOLUTION INTRAMUSCULAR; INTRAVENOUS PRN
Status: DISCONTINUED | OUTPATIENT
Start: 2017-07-11 | End: 2017-07-11

## 2017-07-11 RX ORDER — EPHEDRINE SULFATE 50 MG/ML
INJECTION, SOLUTION INTRAMUSCULAR; INTRAVENOUS; SUBCUTANEOUS PRN
Status: DISCONTINUED | OUTPATIENT
Start: 2017-07-11 | End: 2017-07-11

## 2017-07-11 RX ORDER — ASPIRIN 81 MG/1
81 TABLET ORAL DAILY
Status: DISCONTINUED | OUTPATIENT
Start: 2017-07-12 | End: 2017-07-12 | Stop reason: HOSPADM

## 2017-07-11 RX ORDER — LIDOCAINE HYDROCHLORIDE 20 MG/ML
INJECTION, SOLUTION INFILTRATION; PERINEURAL PRN
Status: DISCONTINUED | OUTPATIENT
Start: 2017-07-11 | End: 2017-07-11

## 2017-07-11 RX ORDER — ONDANSETRON 2 MG/ML
4 INJECTION INTRAMUSCULAR; INTRAVENOUS EVERY 6 HOURS PRN
Status: DISCONTINUED | OUTPATIENT
Start: 2017-07-11 | End: 2017-07-12 | Stop reason: HOSPADM

## 2017-07-11 RX ORDER — NALOXONE HYDROCHLORIDE 0.4 MG/ML
.1-.4 INJECTION, SOLUTION INTRAMUSCULAR; INTRAVENOUS; SUBCUTANEOUS
Status: DISCONTINUED | OUTPATIENT
Start: 2017-07-11 | End: 2017-07-12 | Stop reason: HOSPADM

## 2017-07-11 RX ORDER — PROCHLORPERAZINE MALEATE 5 MG
5 TABLET ORAL EVERY 6 HOURS PRN
Status: DISCONTINUED | OUTPATIENT
Start: 2017-07-11 | End: 2017-07-12 | Stop reason: HOSPADM

## 2017-07-11 RX ORDER — LABETALOL HYDROCHLORIDE 5 MG/ML
INJECTION, SOLUTION INTRAVENOUS PRN
Status: DISCONTINUED | OUTPATIENT
Start: 2017-07-11 | End: 2017-07-11

## 2017-07-11 RX ORDER — VECURONIUM BROMIDE 1 MG/ML
INJECTION, POWDER, LYOPHILIZED, FOR SOLUTION INTRAVENOUS PRN
Status: DISCONTINUED | OUTPATIENT
Start: 2017-07-11 | End: 2017-07-11

## 2017-07-11 RX ORDER — ACETAMINOPHEN 325 MG/1
650 TABLET ORAL EVERY 4 HOURS PRN
Status: DISCONTINUED | OUTPATIENT
Start: 2017-07-14 | End: 2017-07-12 | Stop reason: HOSPADM

## 2017-07-11 RX ORDER — BUPIVACAINE HYDROCHLORIDE 5 MG/ML
INJECTION, SOLUTION PERINEURAL PRN
Status: DISCONTINUED | OUTPATIENT
Start: 2017-07-11 | End: 2017-07-11 | Stop reason: HOSPADM

## 2017-07-11 RX ORDER — LIDOCAINE 40 MG/G
CREAM TOPICAL
Status: DISCONTINUED | OUTPATIENT
Start: 2017-07-11 | End: 2017-07-12 | Stop reason: HOSPADM

## 2017-07-11 RX ORDER — ONDANSETRON 2 MG/ML
4 INJECTION INTRAMUSCULAR; INTRAVENOUS EVERY 30 MIN PRN
Status: DISCONTINUED | OUTPATIENT
Start: 2017-07-11 | End: 2017-07-11 | Stop reason: HOSPADM

## 2017-07-11 RX ORDER — NICOTINE POLACRILEX 4 MG
15-30 LOZENGE BUCCAL
Status: DISCONTINUED | OUTPATIENT
Start: 2017-07-11 | End: 2017-07-12 | Stop reason: HOSPADM

## 2017-07-11 RX ORDER — FENTANYL CITRATE 0.05 MG/ML
25-50 INJECTION, SOLUTION INTRAMUSCULAR; INTRAVENOUS
Status: DISCONTINUED | OUTPATIENT
Start: 2017-07-11 | End: 2017-07-11 | Stop reason: HOSPADM

## 2017-07-11 RX ORDER — ASPIRIN 325 MG
325 TABLET ORAL DAILY
Status: DISCONTINUED | OUTPATIENT
Start: 2017-07-11 | End: 2017-07-11

## 2017-07-11 RX ORDER — DEXTROSE MONOHYDRATE 25 G/50ML
25-50 INJECTION, SOLUTION INTRAVENOUS
Status: DISCONTINUED | OUTPATIENT
Start: 2017-07-11 | End: 2017-07-12 | Stop reason: HOSPADM

## 2017-07-11 RX ORDER — HEPARIN SODIUM 1000 [USP'U]/ML
INJECTION, SOLUTION INTRAVENOUS; SUBCUTANEOUS PRN
Status: DISCONTINUED | OUTPATIENT
Start: 2017-07-11 | End: 2017-07-11

## 2017-07-11 RX ORDER — HYDRALAZINE HYDROCHLORIDE 20 MG/ML
2.5-5 INJECTION INTRAMUSCULAR; INTRAVENOUS EVERY 10 MIN PRN
Status: DISCONTINUED | OUTPATIENT
Start: 2017-07-11 | End: 2017-07-11 | Stop reason: HOSPADM

## 2017-07-11 RX ORDER — CEFAZOLIN SODIUM 2 G/100ML
2 INJECTION, SOLUTION INTRAVENOUS
Status: COMPLETED | OUTPATIENT
Start: 2017-07-11 | End: 2017-07-11

## 2017-07-11 RX ORDER — ASPIRIN 600 MG/1
600 SUPPOSITORY RECTAL ONCE
Status: COMPLETED | OUTPATIENT
Start: 2017-07-11 | End: 2017-07-11

## 2017-07-11 RX ORDER — GLYCOPYRROLATE 0.2 MG/ML
INJECTION, SOLUTION INTRAMUSCULAR; INTRAVENOUS PRN
Status: DISCONTINUED | OUTPATIENT
Start: 2017-07-11 | End: 2017-07-11

## 2017-07-11 RX ORDER — ONDANSETRON 4 MG/1
4 TABLET, ORALLY DISINTEGRATING ORAL EVERY 30 MIN PRN
Status: DISCONTINUED | OUTPATIENT
Start: 2017-07-11 | End: 2017-07-11 | Stop reason: HOSPADM

## 2017-07-11 RX ORDER — CEFAZOLIN SODIUM 2 G/100ML
2 INJECTION, SOLUTION INTRAVENOUS EVERY 8 HOURS
Status: COMPLETED | OUTPATIENT
Start: 2017-07-12 | End: 2017-07-12

## 2017-07-11 RX ORDER — MEPERIDINE HYDROCHLORIDE 25 MG/ML
12.5 INJECTION INTRAMUSCULAR; INTRAVENOUS; SUBCUTANEOUS EVERY 5 MIN PRN
Status: DISCONTINUED | OUTPATIENT
Start: 2017-07-11 | End: 2017-07-11 | Stop reason: HOSPADM

## 2017-07-11 RX ORDER — FUROSEMIDE 20 MG
20 TABLET ORAL DAILY
Status: DISCONTINUED | OUTPATIENT
Start: 2017-07-12 | End: 2017-07-12 | Stop reason: HOSPADM

## 2017-07-11 RX ORDER — ONDANSETRON 4 MG/1
4 TABLET, ORALLY DISINTEGRATING ORAL EVERY 6 HOURS PRN
Status: DISCONTINUED | OUTPATIENT
Start: 2017-07-11 | End: 2017-07-12 | Stop reason: HOSPADM

## 2017-07-11 RX ORDER — OXYCODONE HYDROCHLORIDE 5 MG/1
5 TABLET ORAL
Status: DISCONTINUED | OUTPATIENT
Start: 2017-07-11 | End: 2017-07-12 | Stop reason: HOSPADM

## 2017-07-11 RX ORDER — ROSUVASTATIN CALCIUM 20 MG/1
40 TABLET, COATED ORAL DAILY
Status: DISCONTINUED | OUTPATIENT
Start: 2017-07-12 | End: 2017-07-12 | Stop reason: HOSPADM

## 2017-07-11 RX ORDER — ACETAMINOPHEN 325 MG/1
975 TABLET ORAL EVERY 8 HOURS
Status: DISCONTINUED | OUTPATIENT
Start: 2017-07-11 | End: 2017-07-12 | Stop reason: HOSPADM

## 2017-07-11 RX ORDER — SODIUM CHLORIDE 9 MG/ML
INJECTION, SOLUTION INTRAVENOUS CONTINUOUS
Status: DISCONTINUED | OUTPATIENT
Start: 2017-07-11 | End: 2017-07-12 | Stop reason: HOSPADM

## 2017-07-11 RX ORDER — AMOXICILLIN 250 MG
1-2 CAPSULE ORAL 2 TIMES DAILY
Status: DISCONTINUED | OUTPATIENT
Start: 2017-07-11 | End: 2017-07-12 | Stop reason: HOSPADM

## 2017-07-11 RX ADMIN — CEFAZOLIN SODIUM 2 G: 2 INJECTION, SOLUTION INTRAVENOUS at 23:54

## 2017-07-11 RX ADMIN — FENTANYL CITRATE 50 MCG: 50 INJECTION, SOLUTION INTRAMUSCULAR; INTRAVENOUS at 13:40

## 2017-07-11 RX ADMIN — CEFAZOLIN SODIUM 2 G: 2 INJECTION, SOLUTION INTRAVENOUS at 13:28

## 2017-07-11 RX ADMIN — PHENYLEPHRINE HYDROCHLORIDE 50 MCG: 10 INJECTION, SOLUTION INTRAMUSCULAR; INTRAVENOUS; SUBCUTANEOUS at 15:33

## 2017-07-11 RX ADMIN — DEXAMETHASONE SODIUM PHOSPHATE 4 MG: 4 INJECTION, SOLUTION INTRA-ARTICULAR; INTRALESIONAL; INTRAMUSCULAR; INTRAVENOUS; SOFT TISSUE at 13:52

## 2017-07-11 RX ADMIN — DEXMEDETOMIDINE HYDROCHLORIDE 4 MCG: 100 INJECTION, SOLUTION INTRAVENOUS at 17:14

## 2017-07-11 RX ADMIN — VITAMIN D, TAB 1000IU (100/BT) 2000 UNITS: 25 TAB at 20:18

## 2017-07-11 RX ADMIN — PROPOFOL 150 MG: 10 INJECTION, EMULSION INTRAVENOUS at 13:22

## 2017-07-11 RX ADMIN — LABETALOL HYDROCHLORIDE 5 MG: 5 INJECTION, SOLUTION INTRAVENOUS at 17:08

## 2017-07-11 RX ADMIN — SODIUM CHLORIDE, POTASSIUM CHLORIDE, SODIUM LACTATE AND CALCIUM CHLORIDE: 600; 310; 30; 20 INJECTION, SOLUTION INTRAVENOUS at 17:17

## 2017-07-11 RX ADMIN — ONDANSETRON 4 MG: 2 INJECTION INTRAMUSCULAR; INTRAVENOUS at 16:54

## 2017-07-11 RX ADMIN — BUPIVACAINE HYDROCHLORIDE 30 ML: 5 INJECTION, SOLUTION PERINEURAL at 16:54

## 2017-07-11 RX ADMIN — VECURONIUM BROMIDE 2 MG: 1 INJECTION, POWDER, LYOPHILIZED, FOR SOLUTION INTRAVENOUS at 14:24

## 2017-07-11 RX ADMIN — Medication 5 MG: at 13:30

## 2017-07-11 RX ADMIN — ROCURONIUM BROMIDE 50 MG: 10 INJECTION INTRAVENOUS at 13:22

## 2017-07-11 RX ADMIN — CEFAZOLIN 1 G: 1 INJECTION, POWDER, FOR SOLUTION INTRAMUSCULAR; INTRAVENOUS at 15:27

## 2017-07-11 RX ADMIN — ASPIRIN 600 MG: 600 SUPPOSITORY RECTAL at 18:37

## 2017-07-11 RX ADMIN — PHENYLEPHRINE HYDROCHLORIDE 0.4 MCG/KG/MIN: 10 INJECTION, SOLUTION INTRAMUSCULAR; INTRAVENOUS; SUBCUTANEOUS at 13:30

## 2017-07-11 RX ADMIN — FERROUS SULFATE TAB 325 MG (65 MG ELEMENTAL FE) 325 MG: 325 (65 FE) TAB at 20:18

## 2017-07-11 RX ADMIN — ACETAMINOPHEN 975 MG: 325 TABLET, FILM COATED ORAL at 21:53

## 2017-07-11 RX ADMIN — CARVEDILOL 25 MG: 25 TABLET, FILM COATED ORAL at 20:18

## 2017-07-11 RX ADMIN — Medication 10 MG: at 13:48

## 2017-07-11 RX ADMIN — SODIUM CHLORIDE, POTASSIUM CHLORIDE, SODIUM LACTATE AND CALCIUM CHLORIDE: 600; 310; 30; 20 INJECTION, SOLUTION INTRAVENOUS at 12:33

## 2017-07-11 RX ADMIN — GLYCOPYRROLATE 0.3 MG: 0.2 INJECTION, SOLUTION INTRAMUSCULAR; INTRAVENOUS at 17:01

## 2017-07-11 RX ADMIN — LIDOCAINE HYDROCHLORIDE 1 ML: 10 INJECTION, SOLUTION EPIDURAL; INFILTRATION; INTRACAUDAL; PERINEURAL at 12:33

## 2017-07-11 RX ADMIN — FENTANYL CITRATE 150 MCG: 50 INJECTION, SOLUTION INTRAMUSCULAR; INTRAVENOUS at 13:22

## 2017-07-11 RX ADMIN — SODIUM CHLORIDE, POTASSIUM CHLORIDE, SODIUM LACTATE AND CALCIUM CHLORIDE: 600; 310; 30; 20 INJECTION, SOLUTION INTRAVENOUS at 14:32

## 2017-07-11 RX ADMIN — HEPARIN SODIUM 5000 UNITS: 1000 INJECTION, SOLUTION INTRAVENOUS; SUBCUTANEOUS at 14:30

## 2017-07-11 RX ADMIN — NEOSTIGMINE METHYLSULFATE 2.5 MG: 1 INJECTION INTRAMUSCULAR; INTRAVENOUS; SUBCUTANEOUS at 17:01

## 2017-07-11 RX ADMIN — Medication 5 MG: at 14:06

## 2017-07-11 RX ADMIN — SENNOSIDES AND DOCUSATE SODIUM 1 TABLET: 8.6; 5 TABLET ORAL at 20:18

## 2017-07-11 RX ADMIN — PHENYLEPHRINE HYDROCHLORIDE 100 MCG: 10 INJECTION, SOLUTION INTRAMUSCULAR; INTRAVENOUS; SUBCUTANEOUS at 13:30

## 2017-07-11 RX ADMIN — DEXMEDETOMIDINE HYDROCHLORIDE 0.4 MCG/KG/HR: 100 INJECTION, SOLUTION INTRAVENOUS at 13:45

## 2017-07-11 RX ADMIN — VECURONIUM BROMIDE 1 MG: 1 INJECTION, POWDER, LYOPHILIZED, FOR SOLUTION INTRAVENOUS at 15:30

## 2017-07-11 RX ADMIN — LIDOCAINE HYDROCHLORIDE 100 MG: 20 INJECTION, SOLUTION INFILTRATION; PERINEURAL at 13:22

## 2017-07-11 RX ADMIN — FENTANYL CITRATE 50 MCG: 50 INJECTION, SOLUTION INTRAMUSCULAR; INTRAVENOUS at 16:39

## 2017-07-11 RX ADMIN — VECURONIUM BROMIDE 1 MG: 1 INJECTION, POWDER, LYOPHILIZED, FOR SOLUTION INTRAVENOUS at 15:00

## 2017-07-11 NOTE — BRIEF OP NOTE
[facility] Brief Operative Note    Pre-operative diagnosis: LEFT CAROTID ARTERY STENOSIS   Post-operative diagnosis Same   Procedure: Procedure(s):  REDO LEFT CAROTID ENDARTERECTOMY WITH RIGHT ANKLE GREATER SAPHENOUS VEIN   - Wound Class: I-Clean   Surgeon(s): Surgeon(s) and Role:     * Khurram Bishop MD - Primary     * Angel Cruz MD - Vascular Surgery Fellow      * Skyla Olson MD - General Surgery Resident   Estimated blood loss: 50 mL    Specimens: * No specimens in log *   Findings: Dense adhesions between internal jugular vein and carotid artery. Bulbous aneurysmal dilatation of distal patch with redundancy at the superior aspect of the old arterioplasty site in the form of an S. Atherosclerotic disease noted. Endarterectomy performed and patched with distal GSV.  Patient moved all 4 extremities postoperatively.         Angel Cruz MD   Vascular Surgery Fellow   Pager: [phone]

## 2017-07-11 NOTE — ANESTHESIA PREPROCEDURE EVALUATION
Anesthesia Evaluation     . Pt has had prior anesthetic.     No history of anesthetic complications          ROS/MED HX    ENT/Pulmonary:      (-) sleep apnea and recent URI   Neurologic:       Cardiovascular:     (+) Dyslipidemia, hypertension-Peripheral Vascular Disease-- Carotid Stenosis, CAD, --. : . . . :. .       METS/Exercise Tolerance:     Hematologic:         Musculoskeletal:         GI/Hepatic:        (-) GERD   Renal/Genitourinary:     (+) chronic renal disease, type: CRI,       Endo:         Psychiatric:         Infectious Disease:         Malignancy:         Other:                     Physical Exam  Normal systems: cardiovascular and pulmonary    Airway   Mallampati: II  TM distance: >3 FB  Neck ROM: full    Dental   (+) lower dentures and upper dentures    Cardiovascular       Pulmonary    breath sounds clear to auscultation                    Anesthesia Plan      History & Physical Review  History and physical reviewed and following examination; no interval change.    ASA Status:  3 .    NPO Status:  > 8 hours    Plan for General and ETT with Intravenous induction. Maintenance will be Balanced.    PONV prophylaxis:  Ondansetron (or other 5HT-3) and Dexamethasone or Solumedrol  Additional equipment: Arterial Line precedex gtt      Postoperative Care  Postoperative pain management:  IV analgesics and Oral pain medications.      Consents  Anesthetic plan, risks, benefits and alternatives discussed with:  Patient..                      Procedure: Procedure(s):  ENDARTERECTOMY CAROTID  Preop diagnosis: LEFT CARODID ARTERY STENOSIS    No Known Allergies  Past Medical History:   Diagnosis Date     Anemia      Atrophy of left kidney      CKD (chronic kidney disease), stage III      Claudication, intermittent (H)      Coronary artery disease 2003    CAB x 4     History of GI bleed 2012    Hemorrhagic gastritis     Hypercholesterolemia      Hypertension      Left carotid artery stenosis     S/P Carotid  Endarterectomy left      PVD (peripheral vascular disease) (H)      Renal artery stenosis (H)     stent R     Past Surgical History:   Procedure Laterality Date     BYPASS GRAFT ARTERY CORONARY  06/2003    LIMA=LAD, SVG=Diag, SVG=OM2, SVG=PDA     CAROTID ENDARTERECTOMY Left      EYE SURGERY       HEART CATH LEFT HEART CATH  06/2003    Severe multivessel disease     RENAL ARTERY ANGIOGRAM Right 05/2013    with stenting     TONSILLECTOMY       Prior to Admission medications    Medication Sig Start Date End Date Taking? Authorizing Provider   OMEPRAZOLE PO Take 20 mg by mouth daily   Yes Reported, Patient   carvedilol (COREG) 25 MG tablet Take 25 mg by mouth 2 times daily (with meals)   Yes Reported, Patient   furosemide (LASIX) 20 MG tablet Take 20 mg by mouth daily   Yes Reported, Patient   triamcinolone (KENALOG) 0.1 % cream Apply topically 2 times daily as needed for irritation    Yes Reported, Patient   rosuvastatin (CRESTOR) 40 MG tablet Take 1 tablet (40 mg) by mouth daily 5/27/15  Yes Radhames Vargas MD   Cholecalciferol (VITAMIN D) 2000 UNITS tablet Take 2,000 Units by mouth 2 times daily  5/15/15  Yes Abstract, Provider   ferrous sulfate 325 (65 FE) MG tablet Take 325 mg by mouth 2 times daily    Yes Reported, Patient   ASPIRIN PO Take 325 mg by mouth daily.   Yes Reported, Patient   clopidogrel (PLAVIX) 75 MG tablet Take 75 mg by mouth daily On hold starting 7/7/17     Reported, Patient     Current Facility-Administered Medications Ordered in Epic   Medication Dose Route Frequency Last Rate Last Dose     ceFAZolin sodium-dextrose (ANCEF) infusion 2 g  2 g Intravenous Pre-Op/Pre-procedure x 1 dose         lidocaine 1 % 1 mL  1 mL Other Q1H PRN         sodium chloride (PF) 0.9% PF flush 3 mL  3 mL Intracatheter Q8H         lactated ringers infusion   Intravenous Continuous         No current [facility]-ordered outpatient prescriptions on file.     Wt Readings from Last 1 Encounters:   07/11/17 87.7 kg (193 lb  5.5 oz)     Temp Readings from Last 1 Encounters:   07/11/17 36.1  C (96.9  F)     BP Readings from Last 6 Encounters:   07/11/17 158/70   07/06/17 132/62   06/22/17 143/73   12/13/16 132/60   06/16/16 110/65   12/23/15 118/62     Pulse Readings from Last 4 Encounters:   07/06/17 60   06/22/17 58   12/13/16 53   06/16/16 53     Resp Readings from Last 1 Encounters:   07/11/17 20     SpO2 Readings from Last 1 Encounters:   07/11/17 96%     Recent Labs   Lab Test 08/17/16 07/20/16 01/11/16   NA  148*  140  141   POTASSIUM  4.7  4.5  5.0   CHLORIDE  106  108  109   CO2  24.0  22  23*   ANIONGAP   --   10  9   GLC  121*  111*  127*   BUN  25*  21  23   CR  1.8  1.60*  1.59*   YOON  9.8  9.5  9.6     Recent Labs   Lab Test 10/14/14  03/12/13   2205   WBC  8.5  5.5   HGB  14.4  11.8*   PLT  161  158     No results for input(s): INR in the last 70226 hours.    Invalid input(s): APTT   RECENT LABS:   ECG:   ECHO:   CXR:

## 2017-07-11 NOTE — ANESTHESIA PROCEDURE NOTES
ARTERIAL LINE PROCEDURE NOTE:   Pre-Procedure  Performed by EYAL DUMAS  Location: pre-op      Pre-Anesthestic Checklist: patient identified, IV checked, risks and benefits discussed, informed consent, monitors and equipment checked and pre-op evaluation    Timeout  Correct Patient: Yes   Correct Procedure: Yes   Correct Site: Yes   Correct Laterality: N/A   Correct Position: Yes   Site Marked: N/A   .   Procedure Documentation      supine  .Skin infiltrated with mL of 1% lidocaine. Injection technique: Seldinger Technique  .  .  Patient Prep;all elements of maximal sterile barrier technique followed, mask, hat, sterile gown, sterile gloves, draped, hand hygiene, chlorhexidine gluconate and isopropyl alcohol    Assessment/Narrative    Catheter: 20 gauge, 1.75 in/4.5 cm quick cath (integral wire)          Arterial waveform: Yes IBP within 10% of NIBP: Yes

## 2017-07-11 NOTE — PROGRESS NOTES
Admission medication history interview status for the 7/11/2017  admission is complete. See EPIC admission navigator for prior to admission medications     Medication history source reliability:Good    Medication history interview source(s):Patient    Medication history resources (including written lists, pill bottles, clinic record):Patient mailed in his medication list prior to surgery    Primary pharmacy.[facility]    Additional medication history information not noted on PTA med list :None    Time spent in this activity: 40 minutes    Prior to Admission medications    Medication Sig Last Dose Taking? Auth Provider   OMEPRAZOLE PO Take 20 mg by mouth daily 7/11/2017 at 0630 Yes Reported, Patient   carvedilol (COREG) 25 MG tablet Take 25 mg by mouth 2 times daily (with meals) 7/11/2017 at 0630 Yes Reported, Patient   furosemide (LASIX) 20 MG tablet Take 20 mg by mouth daily 7/11/2017 at 0630 Yes Reported, Patient   triamcinolone (KENALOG) 0.1 % cream Apply topically 2 times daily as needed for irritation  Past Month at PRN Yes Reported, Patient   rosuvastatin (CRESTOR) 40 MG tablet Take 1 tablet (40 mg) by mouth daily 7/11/2017 at 0630 Yes Radhames Vargas MD   Cholecalciferol (VITAMIN D) 2000 UNITS tablet Take 2,000 Units by mouth 2 times daily  7/11/2017 at 0630 Yes Abstract, Provider   ferrous sulfate 325 (65 FE) MG tablet Take 325 mg by mouth 2 times daily  7/11/2017 at 0630 Yes Reported, Patient   ASPIRIN PO Take 325 mg by mouth daily. 7/11/2017 at 0630 Yes Reported, Patient   clopidogrel (PLAVIX) 75 MG tablet Take 75 mg by mouth daily On hold starting 7/7/17 7/6/2017 at AM  Reported, Patient

## 2017-07-11 NOTE — ANESTHESIA CARE TRANSFER NOTE
Patient: José Aguirre    Procedure(s):  REDO LEFT CAROTID ENDARTERECTOMY WITH RIGHT ANKLE GREATER SAPHENOUS VEIN   - Wound Class: I-Clean    Diagnosis: LEFT CARODID ARTERY STENOSIS  Diagnosis Additional Information: No value filed.    Anesthesia Type:   General, ETT     Note:  Airway :Face Mask  Patient transferred to:PACU  Comments: Neuromuscular blockade reversed after TOF 4/4, spontaneous respirations, adequate tidal volumes, followed commands to voice, oropharynx suctioned with soft flexible catheter, extubated atraumatically, extubated with suction, airway patent after extubation.  Oxygen via facemask at 6 liters per minute to PACU. Oxygen tubing connected to wall O2 in PACU, SpO2, NiBP, and EKG monitors and alarms on and functioning, report on patient's clinical status given to PACU RN , RN questions answered.       Vitals: (Last set prior to Anesthesia Care Transfer)    CRNA VITALS  7/11/2017 1645 - 7/11/2017 1727      7/11/2017             Pulse: 54    ART BP: (!)  111/32    ART Mean: 58    SpO2: 93 %    Resp Rate (set): 10                Electronically Signed By: MARKUS Cook CRNA  July 11, 2017  5:27 PM

## 2017-07-11 NOTE — PROGRESS NOTES
Left carotid endarterectomy with EEG monitoring.  With Dr. Bishop.  # [id number].  No changes with clamping.  Pt was shunted.  Ankle-graft put in.  Pt woke up and moved all extremities at end.

## 2017-07-11 NOTE — DISCHARGE INSTRUCTIONS
Carotid Endartectomy  Post-Operative Instructions    Typical length of stay in the hospital is 1-2 days.  On occasion, an evening in the Intensive Care Unit may be required for blood pressure regulation.    Activity    Most people are able to resume normal activities 1-2 weeks after surgery.  The key is to gradually increase your level of activity as you are able.  It is not uncommon to feel easily fatigued - this will improve with time.      You should avoid heavy lifting more than 30 lbs for 1 week.      You may return to work when you feel able - typically 1-2 weeks after surgery, depending on the type of work you do.      You should not drive a car for 1 week after surgery.  In addition,  you can not be taking prescription strength pain medication and you must feel strong enough to drive safely.    Incision Care    You can shower or bathe 2 days after surgery - avoid rubbing and soaking your incision.      You may have strips of white tape called  steri-strips  across your incision; they should stay on for 5-7 days or until the ends start to curl up.  You can then remove the steri-strips, or we will remove them at your post-operative appointment.      Avoid shaving directly over the incision for 2-3 weeks.    Common Concerns    You may notice mild skin numbness on the side of your surgery in your neck, chin, face, and earlobe.  This is due to nerve  irritation  and will gradually resolve over the next several months.  Call our office if you experience any short-lasting episode of numbness or weakness affecting one side of your body.      Some bruising and firmness around you incision can be expected.  This will soften and resolve over the next few weeks.  You may notice that some discoloration spreads to an area lower than the incision, such as the shoulder, neck or upper chest.  Likewise, swelling can occur near the incision or below the chin.  Call our office if the swelling or bruising worsens, or if you have  difficulty swallowing.    Diet    You may resume a regular diet before you leave the hospital.  You may find that it is best to try smaller, more frequent meals until your appetite returns.    Medications    You may be started on a blood thinner after surgery - typically Aspirin and / or Plavix.      You may be given a prescription for pain medication after surgery.  If you need to have this refilled, please call your pharmacy where you had it filled.  They will then call us for approval.  Please do not call after hours for a refill on pain medication.    Post-Operative Appointments    You need to see your surgeon in the clinic approximately 1-2 weeks after surgery.  Post-operative appointment:   Date: _____________________________  Time: ____________________________  Dr. ______________________________      You will have an ultrasound test and evaluation with your surgeon 90 days (3 months) after surgery.      We will notify you by mail when you are due to schedule further ultrasound testing and evaluation; typically this is done annually.     When You Should Call Our Office    Body aches, chills or temperature greater than 101      Incision redness or drainage      Loss of vision in one eye      Loss of strength / weakness in one side of your body      Severe headache      Difficulty with speech      If you will be having an invasive procedure, such as a colonoscopy or dental work within one year of your surgery, you may need to take an antibiotic prior to the procedure if you had a Dacron patch with your surgery; please call us so we can assist you with this.    Call our main number, [phone], for assistance with any concerns or questions.     Revised 5/2014

## 2017-07-11 NOTE — IP AVS SNAPSHOT
MRN:[id number]                      After Visit Summary   7/11/2017    José Aguirre    MRN: [id number]           Thank you!     Thank you for choosing [facility] for your care. Our goal is always to provide you with excellent care. Hearing back from our patients is one way we can continue to improve our services. Please take a few minutes to complete the written survey that you may receive in the mail after you visit with us. Thank you!        Patient Information     Date Of Birth          1935        Designated Caregiver       Most Recent Value    Caregiver    Will someone help with your care after discharge? yes    Name of designated caregiver Dorie    Phone number of caregiver [phone]    Caregiver address [address] [phone]      About your hospital stay     You were admitted on:  July 11, 2017 You last received care in the:  [place] [address] [facility]    You were discharged on:  July 12, 2017        Reason for your hospital stay       You were hospitalized for a redo left carotid endarterectomy                  Who to Call     For medical emergencies, please call 911.  For non-urgent questions about your medical care, please call your primary care provider or clinic, [phone]  For questions related to your surgery, please call your surgery clinic        Attending Provider     Provider Khurram Jacob MD Surgery       Primary Care Provider Office Phone # Fax #    Inderjit Kenny [phone]      After Care Instructions     Activity       Your activity upon discharge: activity as tolerated, try not to put strain on your incisions            Discharge Instructions       Continue your home plavix for at least 1 year following this procedure.  Continue to take a daily aspirin.            Wound care and dressings       Instructions to care for your wound at home:   - Remove your bandages over  your incisions today or tomorrow.  - Leave your incisions open to air after removing the bandage.  - You may shower and allow soapy water to run over the incisions. Pat dry afterwards.   - Do NOT submerge the incisions in water for at least 2 weeks or until they are fully healed.  - Your skin is covered with steri-strips which should fall off in 7-10 days. If they are still on at 2 weeks, peel them off.   - Your stitches are below your skin and are absorbable.                  Follow-up Appointments     Follow-up and recommended labs and tests        Follow up with Dr. Bishop in the vascular clinic within 2 weeks.  Call sooner if you have any questions or concerns.    Follow up with primary care provider in next 2 weeks regarding new diagnosis of diabetes and considering starting Metformin 500 mg twice daily.                  Further instructions from your care team       Carotid Endartectomy  Post-Operative Instructions    Typical length of stay in the hospital is 1-2 days.  On occasion, an evening in the Intensive Care Unit may be required for blood pressure regulation.    Activity    Most people are able to resume normal activities 1-2 weeks after surgery.  The key is to gradually increase your level of activity as you are able.  It is not uncommon to feel easily fatigued - this will improve with time.      You should avoid heavy lifting more than 30 lbs for 1 week.      You may return to work when you feel able - typically 1-2 weeks after surgery, depending on the type of work you do.      You should not drive a car for 1 week after surgery.  In addition,  you can not be taking prescription strength pain medication and you must feel strong enough to drive safely.    Incision Care    You can shower or bathe 2 days after surgery - avoid rubbing and soaking your incision.      You may have strips of white tape called  steri-strips  across your incision; they should stay on for 5-7 days or until the ends start to  curl up.  You can then remove the steri-strips, or we will remove them at your post-operative appointment.      Avoid shaving directly over the incision for 2-3 weeks.    Common Concerns    You may notice mild skin numbness on the side of your surgery in your neck, chin, face, and earlobe.  This is due to nerve  irritation  and will gradually resolve over the next several months.  Call our office if you experience any short-lasting episode of numbness or weakness affecting one side of your body.      Some bruising and firmness around you incision can be expected.  This will soften and resolve over the next few weeks.  You may notice that some discoloration spreads to an area lower than the incision, such as the shoulder, neck or upper chest.  Likewise, swelling can occur near the incision or below the chin.  Call our office if the swelling or bruising worsens, or if you have difficulty swallowing.    Diet    You may resume a regular diet before you leave the hospital.  You may find that it is best to try smaller, more frequent meals until your appetite returns.    Medications    You may be started on a blood thinner after surgery - typically Aspirin and / or Plavix.      You may be given a prescription for pain medication after surgery.  If you need to have this refilled, please call your pharmacy where you had it filled.  They will then call us for approval.  Please do not call after hours for a refill on pain medication.    Post-Operative Appointments    You need to see your surgeon in the clinic approximately 1-2 weeks after surgery.  Post-operative appointment:   Date: _____________________________  Time: ____________________________  Dr. ______________________________      You will have an ultrasound test and evaluation with your surgeon 90 days (3 months) after surgery.      We will notify you by mail when you are due to schedule further ultrasound testing and evaluation; typically this is done annually.  "    When You Should Call Our Office    Body aches, chills or temperature greater than 101      Incision redness or drainage      Loss of vision in one eye      Loss of strength / weakness in one side of your body      Severe headache      Difficulty with speech      If you will be having an invasive procedure, such as a colonoscopy or dental work within one year of your surgery, you may need to take an antibiotic prior to the procedure if you had a Dacron patch with your surgery; please call us so we can assist you with this.    Call our main number, [phone], for assistance with any concerns or questions.     Revised 5/2014    Pending Results     No orders found for last 3 day(s).            Statement of Approval     Ordered          07/12/17 0836  I have reviewed and agree with all the recommendations and orders detailed in this document.  EFFECTIVE NOW     Approved and electronically signed by:  Khurram Bishop MD             Admission Information     Date & Time Provider Department Dept. Phone    7/11/2017 Khurram Bishop MD [place] [address] [facility] [phone]      Your Vitals Were     Blood Pressure Temperature Respirations Height Weight Pulse Oximetry    128/62 (BP Location: Right arm) 97.2  F (36.2  C) (Oral) 16 1.727 m (5' 7.99\") 83.7 kg (184 lb 8.4 oz) 92%    BMI (Body Mass Index)                   28.06 kg/m2           [facility] Information     [employer] lets you send messages to your doctor, view your test results, renew your prescriptions, schedule appointments and more. To sign up, go to [email or web address] . Click on \"Log in\" on the left side of the screen, which will take you to the Welcome page. Then click on \"Sign up Now\" on the right side of the page.     You will be asked to enter the access code listed below, as well as some personal information. Please follow the directions to create your username and password.     Your access code is: " [email or web address]  Expires: 2017  9:34 AM     Your access code will  in 90 days. If you need help or a new code, please call your [facility] clinic or [phone].        Care EveryWhere ID     This is your Care EveryWhere ID. This could be used by other organizations to access your [facility] medical records  [id number]        Equal Access to Services     FOREIGN SALDANA : Hadii aad ku hadasho Soomaali, waaxda luqadaha, qaybta kaalmada adeegyada, waxay joannein haykendalln richard efra laroz . So [facility] [phone].    ATENCIÓN: Si miguel angel brandbruno, tiene a spencer disposición servicios gratuitos de asistencia lingüística. Llame al [phone].    We comply with applicable federal civil rights laws and [place] laws. We do not discriminate on the basis of race, color, national origin, age, disability sex, sexual orientation or gender identity.               Review of your medicines      START taking        Dose / Directions    oxyCODONE 5 MG IR tablet   Commonly known as:  ROXICODONE        Dose:  5 mg   Take 1 tablet (5 mg) by mouth every 4 hours as needed for moderate to severe pain   Quantity:  15 tablet   Refills:  0       senna-docusate 8.6-50 MG per tablet   Commonly known as:  SENOKOT-S;PERICOLACE        Dose:  1 tablet   Take 1 tablet by mouth 2 times daily   Quantity:  30 tablet   Refills:  0         CONTINUE these medicines which have NOT CHANGED        Dose / Directions    ASPIRIN PO        Dose:  325 mg   Take 325 mg by mouth daily.   Refills:  0       carvedilol 25 MG tablet   Commonly known as:  COREG        Dose:  25 mg   Take 25 mg by mouth 2 times daily (with meals)   Refills:  0       clopidogrel 75 MG tablet   Commonly known as:  PLAVIX        Dose:  75 mg   Take 75 mg by mouth daily On hold starting 17   Refills:  0       ferrous sulfate 325 (65 FE) MG tablet   Commonly known as:  IRON        Dose:  325 mg   Take 325 mg by mouth 2 times daily   Refills:  0       furosemide 20 MG tablet   Commonly known  as:  LASIX        Dose:  20 mg   Take 20 mg by mouth daily   Refills:  0       OMEPRAZOLE PO        Dose:  20 mg   Take 20 mg by mouth daily   Refills:  0       rosuvastatin 40 MG tablet   Commonly known as:  CRESTOR   Used for:  CAD (coronary artery disease)        Dose:  40 mg   Take 1 tablet (40 mg) by mouth daily   Quantity:  30 tablet   Refills:  3       triamcinolone 0.1 % cream   Commonly known as:  KENALOG        Apply topically 2 times daily as needed for irritation   Refills:  0       vitamin D 2000 UNITS tablet        Dose:  2000 Units   Take 2,000 Units by mouth 2 times daily   Quantity:  30 tablet   Refills:  0            Where to get your medicines      These medications were sent to [facility] [place]  [place] - [address]  [address], [place] [address]     Phone:  [phone]     senna-docusate 8.6-50 MG per tablet         Some of these will need a paper prescription and others can be bought over the counter. Ask your nurse if you have questions.     Bring a paper prescription for each of these medications     oxyCODONE 5 MG IR tablet                Protect others around you: Learn how to safely use, store and throw away your medicines at www.disposemymeds.org.             Medication List: This is a list of all your medications and when to take them. Check marks below indicate your daily home schedule. Keep this list as a reference.      Medications           Morning Afternoon Evening Bedtime As Needed    ASPIRIN PO   Take 325 mg by mouth daily.   Last time this was given:  81 mg on 7/12/2017  8:51 AM                                   carvedilol 25 MG tablet   Commonly known as:  COREG   Take 25 mg by mouth 2 times daily (with meals)   Last time this was given:  25 mg on 7/11/2017  8:18 PM                                      clopidogrel 75 MG tablet   Commonly known as:  PLAVIX   Take 75 mg by mouth daily On hold starting 7/7/17   Last time this was given:  75 mg on  7/12/2017  8:51 AM                                   ferrous sulfate 325 (65 FE) MG tablet   Commonly known as:  IRON   Take 325 mg by mouth 2 times daily   Last time this was given:  325 mg on 7/12/2017  8:50 AM                                      furosemide 20 MG tablet   Commonly known as:  LASIX   Take 20 mg by mouth daily   Last time this was given:  20 mg on 7/12/2017  8:51 AM                                   OMEPRAZOLE PO   Take 20 mg by mouth daily   Last time this was given:  20 mg on 7/12/2017  8:50 AM                                   oxyCODONE 5 MG IR tablet   Commonly known as:  ROXICODONE   Take 1 tablet (5 mg) by mouth every 4 hours as needed for moderate to severe pain                                   rosuvastatin 40 MG tablet   Commonly known as:  CRESTOR   Take 1 tablet (40 mg) by mouth daily   Last time this was given:  40 mg on 7/12/2017  8:50 AM                                   senna-docusate 8.6-50 MG per tablet   Commonly known as:  SENOKOT-S;PERICOLACE   Take 1 tablet by mouth 2 times daily   Last time this was given:  1 tablet on 7/12/2017  8:51 AM                                      triamcinolone 0.1 % cream   Commonly known as:  KENALOG   Apply topically 2 times daily as needed for irritation                                vitamin D 2000 UNITS tablet   Take 2,000 Units by mouth 2 times daily   Last time this was given:  2,000 Units on 7/12/2017  8:50 AM

## 2017-07-11 NOTE — CONSULTS
[facility]    Vascular Medicine Consultation       Attestation: I have examined the patient independently of Ewa Lion PA-C and agree with the examination and plan as delineated below.    Primo Mcclelland MD      Date of Admission:  7/11/2017  Date of Consult (When I saw the patient): 07/11/17    Assessment & Plan   1. Asymptomatic carotid disease with prior left carotid endarterectomy now with worsening stenosis and aneurysmal changes    He is undergoing a redo left carotid endarterectomy with possible vein patching from right greater saphenous vein by Dr. Bishop. Post-operative cares per Dr. Bishop. He was on Plavix and aspirin prior to surgery, which can be resumed post-operatively.     2. Hyperlipidemia    His lipids today show an LDL of 71, HDL 41, triglycerides 140, and total cholesterol 140 while on crestor 40 mg daily. His lipids are close to goal (LDL < 70), but he is having progressive vascular disease. It has been recommended to him in the past to add Zetia, but he refuses to do so due to the financial cost.     3. Type 2 diabetes    It appears that this is a new diagnosis for him. His A1C this admission was noted to be 6.6%. His sugars will be monitored and he will be covered with sliding scale insulin as needed while in the hospital. He would benefit from starting metformin 500 mg BID. He should follow-up with his primary care provider upon discharge for additional diabetes education.     4. Hypertension    His blood pressure will be monitored closely and he will be continued on his prior to admission carvedilol and lasix.       Reason for Consult   Reason for consult: Asked by Dr. Bishop to evaluate vascular risk factors and assist with medical management in this 81 year old male with a prior history of a left carotid endarterectomy now with recurrent stenosis and aneurysmal changes undergoing a redo left carotid endarterectomy.     Primary Care Physician   Inderjit Roblero  [facility], [place]      History of Present Illness   José Aguirre is a 81 year old male former smoker with a history of hypertension, hyperlipidemia, chronic kidney disease, anemia, and prior carotid disease with previous left carotid endarterectomy who now has been found on surveillance imaging to have aneurysmal changes and recurrent stenosis at his previous carotid endarterectomy site. He remains neurologically intact and denies any stroke or stroke-like symptoms. It has been recommended by Dr. Bishop that he undergo a redo left carotid endarterectomy, for which he presents today. He is followed closely by Dr. Vargas from a Cardiology perspective and [facility]     Past Medical History   Past Medical History:   Diagnosis Date     Anemia      Atrophy of left kidney      CKD (chronic kidney disease), stage III      Claudication, intermittent (H)      Coronary artery disease 2003    CAB x 4     History of GI bleed 2012    Hemorrhagic gastritis     Hypercholesterolemia      Hypertension      Left carotid artery stenosis     S/P Carotid Endarterectomy left      PVD (peripheral vascular disease) (H)      Renal artery stenosis (H)     stent R       Past Surgical History   Past Surgical History:   Procedure Laterality Date     BYPASS GRAFT ARTERY CORONARY  06/2003    LIMA=LAD, SVG=Diag, SVG=OM2, SVG=PDA     CAROTID ENDARTERECTOMY Left      EYE SURGERY       HEART CATH LEFT HEART CATH  06/2003    Severe multivessel disease     RENAL ARTERY ANGIOGRAM Right 05/2013    with stenting     TONSILLECTOMY         Prior to Admission Medications   Prior to Admission Medications   Prescriptions Last Dose Informant Patient Reported? Taking?   ASPIRIN PO 7/11/2017 at 0630 Self Yes Yes   Sig: Take 325 mg by mouth daily.   Cholecalciferol (VITAMIN D) 2000 UNITS tablet 7/11/2017 at 0630 Self Yes Yes   Sig: Take 2,000 Units by mouth 2 times daily    OMEPRAZOLE PO 7/11/2017 at 0630 Self Yes Yes   Sig: Take 20 mg by  mouth daily   carvedilol (COREG) 25 MG tablet 7/11/2017 at 0630 Self Yes Yes   Sig: Take 25 mg by mouth 2 times daily (with meals)   clopidogrel (PLAVIX) 75 MG tablet 7/6/2017 at AM Self Yes No   Sig: Take 75 mg by mouth daily On hold starting 7/7/17    ferrous sulfate 325 (65 FE) MG tablet 7/11/2017 at 0630 Self Yes Yes   Sig: Take 325 mg by mouth 2 times daily    furosemide (LASIX) 20 MG tablet 7/11/2017 at 0630 Self Yes Yes   Sig: Take 20 mg by mouth daily   rosuvastatin (CRESTOR) 40 MG tablet 7/11/2017 at 0630 Self No Yes   Sig: Take 1 tablet (40 mg) by mouth daily   triamcinolone (KENALOG) 0.1 % cream Past Month at PRN Self Yes Yes   Sig: Apply topically 2 times daily as needed for irritation       Facility-Administered Medications: None     Allergies   No Known Allergies    Social History   José Aguirre  reports that he quit smoking about 16 years ago. His smoking use included Cigarettes. He has a 50.00 pack-year smoking history. He has never used smokeless tobacco. He reports that he drinks alcohol. He reports that he does not use illicit drugs.    Family History   Family History   Problem Relation Age of Onset     CEREBROVASCULAR DISEASE Mother 90     unknown type     CANCER Brother 70     Brain cancer        Review of Systems   The 10 point Review of Systems is negative other than noted in the HPI or here.     Physical Exam   Temp: 96.9  F (36.1  C)   BP: 158/70   Heart Rate: 50 Resp: 20 SpO2: 96 % O2 Device: None (Room air)    Vital Signs with Ranges  Temp:  [96.9  F (36.1  C)] 96.9  F (36.1  C)  Heart Rate:  [50] 50  Resp:  [20] 20  BP: (158)/(70) 158/70  SpO2:  [96 %] 96 %  193 lbs 5.49 oz    Constitutional: awake, alert, cooperative, no apparent distress, and appears stated age  Eyes: Lids and lashes normal, pupils equal, round and reactive to light, extra ocular muscles intact, sclera clear, conjunctiva normal  ENT: normocepalic, without obvious abnormality, oropharynx pink and moist  Hematologic /  Lymphatic: no lymphadenopathy  Respiratory: No increased work of breathing, good air exchange, clear to auscultation bilaterally, no crackles or wheezing  Cardiovascular: regular rate and rhythm, normal S1 and S2 and no murmur noted  GI: Normal bowel sounds, soft, non-distended, non-tender  Skin: no redness, warmth, or swelling, no rashes and no lesions  Musculoskeletal: There is no redness, warmth, or swelling of the joints.  Full range of motion noted.  Motor strength is 5 out of 5 all extremities bilaterally.  Tone is normal.  Neurologic: Awake, alert, oriented to name, place and time.  Cranial nerves II-XII are grossly intact.  Motor is 5 out of 5 bilaterally.    Neuropsychiatric:  Normal affect, memory, insight.      Data   Most Recent 3 CBC's:  Recent Labs   Lab Test  07/11/17   1228 10/14/14  03/12/13   2205   WBC  6.6  8.5  5.5   HGB  12.5*  14.4  11.8*   MCV  92  95  92   PLT  143*  161  158     Most Recent 3 BMP's:  Recent Labs   Lab Test  07/11/17   1228 08/17/16 07/20/16 01/11/16   NA  138  148*  140  141   POTASSIUM  4.3  4.7  4.5  5.0   CHLORIDE  106  106  108  109   CO2  24  24.0  22  23*   BUN  24  25*  21  23   CR  1.49*  1.8  1.60*  1.59*   ANIONGAP  8   --   10  9   YOON  9.1  9.8  9.5  9.6   GLC  118*  121*  111*  127*     Most Recent Cholesterol Panel:  Recent Labs   Lab Test  07/11/17   1228   CHOL  140   LDL  71   HDL  41   TRIG  140     Most Recent Hemoglobin A1c:  Recent Labs   Lab Test  07/11/17   1228   A1C  6.6*

## 2017-07-11 NOTE — IP AVS SNAPSHOT
[facility]    [address] [place] [address]    Phone:  [phone]                                       After Visit Summary   7/11/2017    José Aguirre    MRN: [id number]           After Visit Summary Signature Page     I have received my discharge instructions, and my questions have been answered. I have discussed any challenges I see with this plan with the nurse or doctor.    ..........................................................................................................................................  Patient/Patient Representative Signature      ..........................................................................................................................................  Patient Representative Print Name and Relationship to Patient    ..................................................               ................................................  Date                                            Time    ..........................................................................................................................................  Reviewed by Signature/Title    ...................................................              ..............................................  Date                                                            Time

## 2017-07-12 VITALS
OXYGEN SATURATION: 92 % | SYSTOLIC BLOOD PRESSURE: 128 MMHG | RESPIRATION RATE: 16 BRPM | TEMPERATURE: 97.2 F | WEIGHT: 184.53 LBS | BODY MASS INDEX: 27.97 KG/M2 | DIASTOLIC BLOOD PRESSURE: 62 MMHG | HEIGHT: 68 IN

## 2017-07-12 LAB
BASOPHILS # BLD AUTO: 0 10E9/L (ref 0–0.2)
BASOPHILS NFR BLD AUTO: 0.1 %
DIFFERENTIAL METHOD BLD: ABNORMAL
EOSINOPHIL # BLD AUTO: 0 10E9/L (ref 0–0.7)
EOSINOPHIL NFR BLD AUTO: 0.1 %
ERYTHROCYTE [DISTWIDTH] IN BLOOD BY AUTOMATED COUNT: 15.6 % (ref 10–15)
GLUCOSE BLDC GLUCOMTR-MCNC: 126 MG/DL (ref 70–99)
GLUCOSE SERPL-MCNC: 115 MG/DL (ref 70–99)
HCT VFR BLD AUTO: 36.7 % (ref 40–53)
HGB BLD-MCNC: 11.8 G/DL (ref 13.3–17.7)
IMM GRANULOCYTES # BLD: 0 10E9/L (ref 0–0.4)
IMM GRANULOCYTES NFR BLD: 0.1 %
INR PPP: 1.25 (ref 0.86–1.14)
LYMPHOCYTES # BLD AUTO: 0.8 10E9/L (ref 0.8–5.3)
LYMPHOCYTES NFR BLD AUTO: 7.7 %
MCH RBC QN AUTO: 29.9 PG (ref 26.5–33)
MCHC RBC AUTO-ENTMCNC: 32.2 G/DL (ref 31.5–36.5)
MCV RBC AUTO: 93 FL (ref 78–100)
MONOCYTES # BLD AUTO: 1.1 10E9/L (ref 0–1.3)
MONOCYTES NFR BLD AUTO: 11.2 %
NEUTROPHILS # BLD AUTO: 7.9 10E9/L (ref 1.6–8.3)
NEUTROPHILS NFR BLD AUTO: 80.8 %
NRBC # BLD AUTO: 0 10*3/UL
NRBC BLD AUTO-RTO: 0 /100
PLATELET # BLD AUTO: 152 10E9/L (ref 150–450)
POTASSIUM SERPL-SCNC: 4.3 MMOL/L (ref 3.4–5.3)
RBC # BLD AUTO: 3.95 10E12/L (ref 4.4–5.9)
WBC # BLD AUTO: 9.8 10E9/L (ref 4–11)

## 2017-07-12 PROCEDURE — 36415 COLL VENOUS BLD VENIPUNCTURE: CPT | Performed by: SURGERY

## 2017-07-12 PROCEDURE — 40000884 ZZH STATISTIC STEP DOWN HRS NIGHT

## 2017-07-12 PROCEDURE — 85049 AUTOMATED PLATELET COUNT: CPT | Performed by: SURGERY

## 2017-07-12 PROCEDURE — 85610 PROTHROMBIN TIME: CPT | Performed by: SURGERY

## 2017-07-12 PROCEDURE — 85025 COMPLETE CBC W/AUTO DIFF WBC: CPT | Performed by: SURGERY

## 2017-07-12 PROCEDURE — 84132 ASSAY OF SERUM POTASSIUM: CPT | Performed by: SURGERY

## 2017-07-12 PROCEDURE — 82947 ASSAY GLUCOSE BLOOD QUANT: CPT | Performed by: SURGERY

## 2017-07-12 PROCEDURE — 25000132 ZZH RX MED GY IP 250 OP 250 PS 637: Mod: GY | Performed by: SURGERY

## 2017-07-12 PROCEDURE — 00000146 ZZHCL STATISTIC GLUCOSE BY METER IP

## 2017-07-12 PROCEDURE — A9270 NON-COVERED ITEM OR SERVICE: HCPCS | Mod: GY | Performed by: PHYSICIAN ASSISTANT

## 2017-07-12 PROCEDURE — A9270 NON-COVERED ITEM OR SERVICE: HCPCS | Mod: GY | Performed by: SURGERY

## 2017-07-12 PROCEDURE — 25000128 H RX IP 250 OP 636: Performed by: SURGERY

## 2017-07-12 PROCEDURE — 25000132 ZZH RX MED GY IP 250 OP 250 PS 637: Mod: GY | Performed by: PHYSICIAN ASSISTANT

## 2017-07-12 PROCEDURE — 99233 SBSQ HOSP IP/OBS HIGH 50: CPT | Performed by: INTERNAL MEDICINE

## 2017-07-12 RX ORDER — OXYCODONE HYDROCHLORIDE 5 MG/1
5 TABLET ORAL EVERY 4 HOURS PRN
Qty: 15 TABLET | Refills: 0 | Status: SHIPPED | OUTPATIENT
Start: 2017-07-12 | End: 2017-07-27

## 2017-07-12 RX ORDER — AMOXICILLIN 250 MG
1 CAPSULE ORAL 2 TIMES DAILY
Qty: 30 TABLET | Refills: 0 | Status: SHIPPED | OUTPATIENT
Start: 2017-07-12 | End: 2017-12-01

## 2017-07-12 RX ADMIN — ROSUVASTATIN CALCIUM 40 MG: 20 TABLET, FILM COATED ORAL at 08:50

## 2017-07-12 RX ADMIN — SENNOSIDES AND DOCUSATE SODIUM 1 TABLET: 8.6; 5 TABLET ORAL at 08:51

## 2017-07-12 RX ADMIN — FERROUS SULFATE TAB 325 MG (65 MG ELEMENTAL FE) 325 MG: 325 (65 FE) TAB at 08:50

## 2017-07-12 RX ADMIN — HEPARIN SODIUM 5000 UNITS: 5000 INJECTION, SOLUTION INTRAVENOUS; SUBCUTANEOUS at 12:29

## 2017-07-12 RX ADMIN — VITAMIN D, TAB 1000IU (100/BT) 2000 UNITS: 25 TAB at 08:50

## 2017-07-12 RX ADMIN — CLOPIDOGREL BISULFATE 75 MG: 75 TABLET, FILM COATED ORAL at 08:51

## 2017-07-12 RX ADMIN — FUROSEMIDE 20 MG: 20 TABLET ORAL at 08:51

## 2017-07-12 RX ADMIN — CEFAZOLIN SODIUM 2 G: 2 INJECTION, SOLUTION INTRAVENOUS at 08:50

## 2017-07-12 RX ADMIN — OMEPRAZOLE 20 MG: 20 CAPSULE, DELAYED RELEASE ORAL at 08:50

## 2017-07-12 RX ADMIN — ACETAMINOPHEN 975 MG: 325 TABLET, FILM COATED ORAL at 06:27

## 2017-07-12 RX ADMIN — ASPIRIN 81 MG: 81 TABLET, COATED ORAL at 08:51

## 2017-07-12 NOTE — CONSULTS
HOSPITALIST CONSULT CHART CHECK:    Hospitalist service was consulted for cross coverage only. We will peripherally follow and chart check throughout the week and assume care over the weekend for Vascular Medicine if they are not rounding.      This was discussed with Dr. Jung who is in agreement.  Please see ASSESSMENT AND PLAN below.      Assessment & Plan   José Aguirre is a 81 year old male who was admitted on 7/11/2017.    Asymptomatic carotid artery disease with prior left carotid endarterectomy now with worsening stenosis and aneurysmal changes:  Planned redo left carotid endarterectomy (7/11) with possible vein patching from right greater saphenous vein by Dr. Bishop.   -Post-operative cares per Dr. Bishop.   -PTA Plavix and aspirin, which can be resumed post-operatively.      CAD of native vessel and native heart s/p 4 vessel CABG with associated HTN and HLP:  Lipids with an LDL of 71, HDL 41, triglycerides 140, and total cholesterol 140. His lipids are close to goal (LDL < 70), but he is having progressive vascular disease. - -Resume PTA crestor 40 mg daily.    -It has been recommended to him in the past to add Zetia, but he refuses to do so due to the financial cost.   -Resume PTA carvedilol and lasix.      Type 2 diabetes:  New diagnosis with an A1c of 6.6%.   -Sliding scale insulin and Accuchecks QID.    -At discharge start metformin 500 mg BID.    -Follow up with PCP for DM education.    -Moderate Carb/Cardiac Diet.       CKD stage 3:  Creatinine baseline at 1.4-1.6.  -Monitor.      GERD:  -Resume PTA omeprazole.      DVT Prophylaxis: Anti-embolism stockings.    Code Status: Full Code      Zack Tracy PA-C  [phone]

## 2017-07-12 NOTE — ANESTHESIA POSTPROCEDURE EVALUATION
Patient: José Aguirre    Procedure(s):  REDO LEFT CAROTID ENDARTERECTOMY WITH RIGHT ANKLE GREATER SAPHENOUS VEIN   - Wound Class: I-Clean    Diagnosis:LEFT CARODID ARTERY STENOSIS  Diagnosis Additional Information: No value filed.    Anesthesia Type:  General, ETT    Note:  Anesthesia Post Evaluation    Patient location during evaluation: PACU  Patient participation: Able to fully participate in evaluation  Level of consciousness: awake and alert  Pain management: adequate  Airway patency: patent  Cardiovascular status: acceptable  Respiratory status: acceptable  Hydration status: acceptable  PONV: none and controlled     Anesthetic complications: None          Last vitals:  Vitals:    07/11/17 1840 07/11/17 1900 07/11/17 1910   BP:  124/53    Resp: 9 9 16   Temp:      SpO2: 90% 98% 98%         Electronically Signed By: Enrique Sarabia MD  July 11, 2017  7:31 PM

## 2017-07-12 NOTE — DISCHARGE SUMMARY
Physician Discharge Summary     Patient ID:  José Aguirre  [id number]  81 year old  1935    Admit date: 7/11/2017    Discharge date and time: 7/12/2017     Admitting Physician: Khurram Bishop MD     Discharge Physician: Dr. Khurram Bishop    Admission Diagnoses: LEFT CAROTID ARTERY STENOSIS    Discharge Diagnoses: Same    Admission Condition: good    Discharged Condition: good    Indication for Admission: Mr Aguirre was admitted following his redo left carotid endarterectomy with right saphenous vein patch for post op monitoring.    Hospital Course: The procedure went very well and the patient was moving all extremities and following commands at the end of the case. A drain was placed during the procedure and the output from this tapered off significantly overnight. On POD1, his drain was removed. His pain was well controlled, he was tolerating a regular diet, and he was ambulating independently. He was discharged to home.     Consults: none    Significant Diagnostic Studies: None    Treatments: surgery: left carotid endarterectomy with right saphenous vein patch    Discharge Exam:  General: Adult man in NAD  B/P: 152/63, T: 97.6, P: Data Unavailable, R: 18  HEENT: Left neck incision with bandage, c/d/i. Drain with minimal serosanguinous output.   CV: Extremities WWP, RRR  Resp: Breathing comfortably on RA  Abd: Nondistended.   Extremities: RLE bandage c/d/i  Neuro: A&O x 3. Moving all extremities to command. Strength 5/5 in bilateral upper and lower extremities. Face symmetric  Psych: Pleasant, affect appropriate    Disposition: home    Patient Instructions:   Current Discharge Medication List      START taking these medications    Details   oxyCODONE (ROXICODONE) 5 MG IR tablet Take 1 tablet (5 mg) by mouth every 4 hours as needed for moderate to severe pain  Qty: 15 tablet, Refills: 0    Associated Diagnoses: Stenosis of left carotid artery      senna-docusate (SENOKOT-S;PERICOLACE) 8.6-50 MG per tablet  Take 1 tablet by mouth 2 times daily  Qty: 30 tablet, Refills: 0    Comments: Use if constipated  Associated Diagnoses: Stenosis of left carotid artery         CONTINUE these medications which have NOT CHANGED    Details   OMEPRAZOLE PO Take 20 mg by mouth daily      carvedilol (COREG) 25 MG tablet Take 25 mg by mouth 2 times daily (with meals)      furosemide (LASIX) 20 MG tablet Take 20 mg by mouth daily      triamcinolone (KENALOG) 0.1 % cream Apply topically 2 times daily as needed for irritation       rosuvastatin (CRESTOR) 40 MG tablet Take 1 tablet (40 mg) by mouth daily  Qty: 30 tablet, Refills: 3    Associated Diagnoses: CAD (coronary artery disease)      Cholecalciferol (VITAMIN D) 2000 UNITS tablet Take 2,000 Units by mouth 2 times daily   Qty: 30 tablet      ferrous sulfate 325 (65 FE) MG tablet Take 325 mg by mouth 2 times daily       ASPIRIN PO Take 325 mg by mouth daily.      clopidogrel (PLAVIX) 75 MG tablet Take 75 mg by mouth daily On hold starting 7/7/17            Activity: activity as tolerated  Diet: cardiac diet  Wound Care: keep wound clean and dry    Follow-up with Dr. Bishop in 2 weeks.    Signed:  Skyla Olson  7/12/2017  7:22 AM    STAFF:  As above.  Doing very well.  Home this AM on ASA/Plavix   RTO 2 weeks.    Khurram Bishop MD

## 2017-07-12 NOTE — PLAN OF CARE
Problem: Goal Outcome Summary  Goal: Goal Outcome Summary  Outcome: Improving  VSS, denies pain, incisions WNL, ambulating and tolerating meals, neuro's intact, patient discharged home with wife, follow up instructions reviewed, rx given to the patient, verbalized understanding of teaching, UAD book given and reviewed.

## 2017-07-12 NOTE — PLAN OF CARE
Problem: Goal Outcome Summary  Goal: Goal Outcome Summary  Outcome: Improving  VSS, neuros intact, right ankle dressing CDI, pedal pulses +1 with doppler, pt pulses +2 with doppler, neck dressing scant amount of dried drainage-unchanged, plan to discharge tomorrow

## 2017-07-12 NOTE — PROGRESS NOTES
"Vascular Surgery Postoperative Note       Patient seen and evaluated at bedside. No complaints. Pain is well controlled. No perceived motor or sensory deficits.     /56  Temp 97.4  F (36.3  C) (Oral)  Resp 20  Ht 5' 7.99\"  Wt 193 lb 5.5 oz  SpO2 95%  BMI 29.4 kg/m2      Intake/Output Summary (Last 24 hours) at 07/11/17 2055  Last data filed at 07/11/17 2010   Gross per 24 hour   Intake             2500 ml   Output               70 ml   Net             2430 ml       General:  male resting supine NAD  HEENT: Dressing intact to the left neck. No strike through observed. VARGHESE drain is intact with ~20cc of serosanguinous drainge.   Cor : RRR  Pulm: Unlabored breathing  LE: Warm and well perfused. Dressing over the right ankle.   Neuro: CNII-XII intact. No deviation of tongue on protrusion. Symmetrical shoulder shrug. Strength 5/5. No gross motor or sensory deficits.     Assessment: Mr. Aguirre is an 80 yo male s/p Re-do Left carotid Endarterectomy for asymptomatic carotid stenosis    Plan:     1. Diet as tolerated  2. Please record drain output  3. Discontinue silva in AM  4. Possible discharge tomorrow.    Angel Cruz MD   Vascular Surgery Fellow  Pager: [phone]    "

## 2017-07-12 NOTE — PLAN OF CARE
Problem: Goal Outcome Summary  Goal: Goal Outcome Summary  Outcome: No Change  VSS, afebrile. O2 93% on 2L NC. Denies pain. LS clear, IS up to 1500. BS hypo and denies flatus. Dressings to left neck and right ankle CDI. VARGHESE with minimal bloody drainage. Pulses strong with doppler. Mcclelland patent with good uop, removed this morning and DTV. Patient self  caths at home. Neuro's intact. Tele-SB with 1 AVB and BBB.

## 2017-07-12 NOTE — PROGRESS NOTES
Vascular Surgery Progress Note:  POD#1 Re-Do Left CEA    S: Up in chair.  No pain.  Very comfortable    O:   Vitals:  BP  Min: 114/54  Max: 158/70  Temp  Av.5  F (36.4  C)  Min: 96.9  F (36.1  C)  Max: 98  F (36.7  C)  I/O last 3 completed shifts:  In: 3195 [P.O.:920; I.V.:2275]  Out:  [Urine:1950; Drains:42; Blood:50]    Physical Exam: Wd=A   CN XII and Neuro=A                   LDL=71 on statin (at goal)      Assessment/Plan: Doing very well.  Home today on Baby ASA/Plavix.  RTO 2 weeks.  Instructed.    Wm. Dario MD

## 2017-07-12 NOTE — PROCEDURES
CAROTID ENDARTERECTOMY ENCEPHALOGRAPHIC MONITORING       SURGEON:  Khurram Bishop MD       [facility] EEG # [id number] CAROTID      NAME: José Aguirre      Surgery  performed on 07/11/17 for left carotid stenosis.         The baseline wake recording and anesthetic patterns were symmetric.  There was no change with occlusion or following restoration of flow.   Shunt was used during the surgery. No EEG changes were seen.        IMPRESSION:  The study indicates that collateral flow was adequate during the acute period of occlusion.                 SAM VAZQUEZ MD, PHD, [facility]          VASCULAR:  AS per Dr Vazquez.     Khurram Bishop MD

## 2017-07-12 NOTE — PROGRESS NOTES
[facility]    Vascular Medicine Progress Note    Attestation: I have examined the patient independently of Ewa Lion PA-C and agree with the examination and plan as delineated below.    Primo Mcclelland MD      Date of Service (when I saw the patient): 07/12/2017     Assessment & Plan      1. Asymptomatic carotid disease with prior left carotid endarterectomy now with worsening stenosis and aneurysmal changes s/p redo left carotid endarterectomy 7/11/17     Assessment: Doing well. Pain controlled. Neurologically intact. Surgical c/d/i.   Plan: Continue ASA/Plavix. Discharge to home today. Follow up with Dr. Bishop in 2 weeks.      2. Hyperlipidemia, goal LDL < 70     Assessment:  LDL 71 this admission  Plan: Recommended in past and again that he initiate Zetia in addition to his crestor 40 mg daily, but he refuses to do so due to the financial cost.      3. Type 2 diabetes, newly diagnosed     Assessment: A1c 6.6% this admission. Sugars ok, as high as 181 last night.  Plan: SSI while in hospital. Advised to follow-up with PCP upon discharge to consider initiating metformin.      4. Hypertension    Assessment: BP decently controlled.   Plan: Continue prior to admission carvedilol and lasix.           Interval History   Doing well. Pain controlled. Stas removed. Straight caths at home.     Physical Exam   Temp: 97.6  F (36.4  C) Temp src: Oral BP: 152/63   Heart Rate: 64 Resp: 18 SpO2: 94 % O2 Device: Nasal cannula Oxygen Delivery: 2 LPM  Vitals:    07/11/17 1200 07/12/17 0600   Weight: 87.7 kg (193 lb 5.5 oz) 83.7 kg (184 lb 8.4 oz)     Vital Signs with Ranges  Temp:  [96.9  F (36.1  C)-98  F (36.7  C)] 97.6  F (36.4  C)  Heart Rate:  [46-67] 64  Resp:  [8-20] 18  BP: (114-158)/(52-70) 152/63  MAP:  [44 mmHg-73 mmHg] 73 mmHg  Arterial Line BP: ()/(27-49) 125/49  SpO2:  [89 %-99 %] 94 %  I/O last 3 completed shifts:  In: 3195 [P.O.:920; I.V.:2275]  Out: 2042 [Urine:1950; Drains:42;  Blood:50]    Constitutional: Awake, alert, cooperative, no apparent distress, and appears stated age.  Eyes: Lids and lashes normal, sclera clear, conjunctiva normal.  ENT: Normocephalic, without obvious abnormality, atraumatic, oral pharynx with moist mucus membranes  Respiratory: No increased work of breathing, good air exchange, clear to auscultation bilaterally, no crackles or wheezing.  Cardiovascular: Regular rate and rhythm, normal S1 and S2, no S3 or S4, and no murmur noted.  GI: Normal bowel sounds, soft, non-distended, non-tender  Skin: No bruising or bleeding, normal skin color, texture, turgor, no redness, warmth, or swelling, no rashes, surgical site c/d/i.   Musculoskeletal: There is no redness, warmth, or swelling of the joints.    Neurologic: Awake, alert, oriented to name, place and time.  Cranial nerves II-XII are grossly intact.    Neuropsychiatric: Calm, normal eye contact, alert, normal affect, oriented to self, place, time and situation, memory for past and recent events intact and thought process normal.    Medications     nitroPRUsside (NIPRIDE) IV infusion ADULT/PEDS GREATER than or EQUAL to 45 kg std conc       NaCl         carvedilol  25 mg Oral BID w/meals     cholecalciferol  2,000 Units Oral BID     ferrous sulfate  325 mg Oral BID     furosemide  20 mg Oral Daily     omeprazole (priLOSEC) CR capsule 20 mg  20 mg Oral Daily     rosuvastatin  40 mg Oral Daily     insulin aspart  1-7 Units Subcutaneous TID AC     insulin aspart  1-5 Units Subcutaneous At Bedtime     clopidogrel  75 mg Oral Daily     sodium chloride (PF)  3 mL Intracatheter Q8H     ceFAZolin  2 g Intravenous Q8H     aspirin EC  81 mg Oral Daily     heparin  5,000 Units Subcutaneous Q8H     acetaminophen  975 mg Oral Q8H     senna-docusate  1-2 tablet Oral BID       Data     Recent Labs  Lab 07/12/17  0708 07/11/17  1228   WBC 9.8 6.6   HGB 11.8* 12.5*   MCV 93 92    143*   INR 1.25*  --    NA  --  138   POTASSIUM  4.3 4.3   CHLORIDE  --  106   CO2  --  24   BUN  --  24   CR  --  1.49*   ANIONGAP  --  8   YOON  --  9.1   * 118*     No results found for this or any previous visit (from the past 24 hour(s)).

## 2017-07-15 LAB
BLD PROD TYP BPU: NORMAL
BLD PROD TYP BPU: NORMAL
BLD UNIT ID BPU: 0
BLD UNIT ID BPU: 0
BLOOD PRODUCT CODE: NORMAL
BLOOD PRODUCT CODE: NORMAL
BPU ID: NORMAL
BPU ID: NORMAL
TRANSFUSION STATUS PATIENT QL: NORMAL

## 2017-07-15 NOTE — OP NOTE
Surgeon / Clinician: Khurram Bishop MD    Preoperative diagnoses:  Recurrent stenosis left carotid status post carotid enterectomy with greater saphenous vein patch angioplasty.    Postoperative diagnoses:  Recurrent stenosis left carotid status post carotid enterectomy with greater saphenous vein patch angioplasty.    Procedure performed:    1.  Redo left carotid endarterectomy.     A.Excision of old dilated vein patch angioplasty.     b.Redo thromboendarterectomy.     c.Greater saphenous vein patch angioplasty.  2.  Anderson right ankle greater saphenous vein.  3.  Intraoperative shunting and EEG monitoring (D-10).    Operating surgeon:  Khurram Bishop MD    First assistant:  Angel Cruz DO (vascular fellow)    :  Dr. Skyla Olson ( [facility] Surgery Resident)    Anesthesia:  General.    Indications:  Ancef 2 grams IV.    An 81-year-old patient who had undergone a left carotid endarterectomy with greater saphenous vein patch angioplasty in 1999 with a known right internal carotid occlusion.  He has remained completely asymptomatic.  Over the last several years, we have noted dilatation of the distal portion of the vein patch with an increasing stenosis into the native distal ICA.  This has been documented by CTA last year.  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  Due to the worsening stenosis on recent duplex, we felt a redo carotid endarterectomy was indicated now.  The patient has an adequate right ankle greater saphenous vein.    Operative procedure:  The patient was brought to the operating room and induced under general anesthesia without difficulty.  Blood pressure was monitored via a radial arterial line.  Rolled towel was placed in shoulders.  The left neck area was prepped and draped in a sterile fashion as was the right ankle region for a vein patch.  Timeout was called and the sites were identified.    VASCULAR EXPOSURE:   The patient has a very  low bifurcation on CTA.  The previous incision was all opened and extended down toward the ear.  Dissection was carried out of the platysmas. We identified the axilla jugular vein and this was dissected free and preserved.  The sternocleidomastoid muscle was redissected free and retracted laterally without division.  We then dissected down to the carotid artery.  The external carotid artery had significant scar tissue laterally, and this was a mobilized preserving all branches including the superior thyroid artery. We dissected down to the common carotid and identified our vein patch.  As expected, the internal jugular vein was quite adherent to this and this was cautiously dissected free.  Several small branches were ligated with 6-0 Proline suture.  We then cautiously dissected distally onto the internal carotid artery.  This was slightly tortuous.  I remeasured at least 4 mm in diameter and it was completely free of disease distal to out vein patch and vessel loops were placed..  We could see that a vein patch had now become aneurysmal at a valve sinus distally near the end of the patch causing the bulbous changed noted on the CTA and explained the turbulent flow.  There appeared to be some well organized old thrombus intralumenally.  We dissected down in the common carotid artery before the patch and placed vessel loops.     CAROTID ENDARTERECTOMY:  5000 units of venous heparin given.  After 5 minutes, the vessels sequentially tightened with a stable EEG being noted.  An 11 blade was used to enter the patch and this was a standard carotid length.  We did find some very organized thrombus along with atheromatous debris on the wall.  This was well adherent.  The orifice going into the internal carotid artery was slightly stenotic but had no loose debris.  We extended our arteriotomy onto the distal ICA  for 1 cm at which point it was completely free of disease.  A pre heparinized Sundt shunt was inserted and  secured with the vessel loops proximally distal and stable EEG.      REDO ENDARTERECTOMY: We were able to reenter a deep medial plane.  An endarterectomy was performed all along the entire old endarterectomy patch area and extended slightly onto the native distal ICA where the end point was made with a Erin blade with an excellent transition which was tacked down with several 7-0 Prolene sutures.  He essentially had occlusion at the origin of the external carotid artery and this was recanalized with our deep endarterectomy with good backbleeding being noted.  All loose medial fibers were removed.    EXCISION OLD DILATED PATCH:  We then excised the entire dilated vein patch down to the original suture line.  We had a relatively smooth new endarterectomy plane on the common and internal carotid arteries.    GREATER SAPHENOUS VEIN PATCH: We previously prepped a right ankle and marked the vein with a duplex ultrasound.  Incision was made over this.  Dissection was carried down to identify a smaller approximately 3 mm greater saphenous vein but free of disease with no side branches.  This was ligated proximally distally with real silk suture and transected gently dilated with 0.5% Marcaine.  The vein was spatulated.  There were no valve leaflets.  This was left in the normal direction of flow.  This was then sewn to our arteriotomy of running 6-0 Proline suture and loop magnification.  Prior to complete closure, the shunt was removed and the vessels flushed and blood flow resumed to the ICA.   We had a strong distal pulse.  Good hemostasis was noted on the patch which measured 6 cm in length with a maximum of 5 mm.  We did have some oozing from the incision.  Surgicel was placed over the vein patch and artery.  The neck was closed in layers with interrupted running 3-0 Vicryl suture.  The mandibular continuous nerve which had not originally been transected but transected for a more proximal exposure.  This was  reanastomosis of a 7-0Prolene suture.  The skin was closed with 4-0 Monocryl in a subcuticular fashion.  Due to some mild generalized oozing, we did leave a # 15 round Kermit-Summers drain over the arteriotomy site and brought out distal neck incision secured with 3-0 silk suture.  The wound was infiltrated with 0.5 Marcaine. Toradol was not used due to his renal insufficiency.  Steri-Strips and gauze dressing applied.    The patient awoke upon the operating table, was extubated and returned to the recovery room in satisfactory condition.  Needle and sponge count correct x2.  Complications none.  Estimated blood loss 50 mL.  The operative procedure was more difficult due to the re-do nature and expected scar tissue and the necessity of completely redoing the endarterectomy excising the old vein patch that had thrombus on this and extending their distal end point.    Operative time has been 3 hours and 15 minutes.        Khurram Bishop MD    D:  07/11/2017 18:12 T:  07/14/2017 17:42  Document:  [id number] WO\CLOVER\KO

## 2017-07-27 ENCOUNTER — OFFICE VISIT (OUTPATIENT)
Dept: OTHER | Facility: CLINIC | Age: 82
End: 2017-07-27
Attending: SURGERY
Payer: MEDICARE

## 2017-07-27 VITALS
SYSTOLIC BLOOD PRESSURE: 107 MMHG | RESPIRATION RATE: 16 BRPM | WEIGHT: 190 LBS | BODY MASS INDEX: 28.9 KG/M2 | HEART RATE: 55 BPM | DIASTOLIC BLOOD PRESSURE: 62 MMHG | TEMPERATURE: 97.6 F | OXYGEN SATURATION: 93 %

## 2017-07-27 DIAGNOSIS — I73.9 PERIPHERAL VASCULAR DISEASE (H): ICD-10-CM

## 2017-07-27 DIAGNOSIS — Z98.890 STATUS POST CAROTID ENDARTERECTOMY: Primary | ICD-10-CM

## 2017-07-27 DIAGNOSIS — I65.22 LEFT CAROTID ARTERY STENOSIS: ICD-10-CM

## 2017-07-27 PROCEDURE — 99024 POSTOP FOLLOW-UP VISIT: CPT | Mod: ZP | Performed by: SURGERY

## 2017-07-27 PROCEDURE — 99211 OFF/OP EST MAY X REQ PHY/QHP: CPT

## 2017-07-27 NOTE — NURSING NOTE
"Chief Complaint   Patient presents with     RECHECK     1st Post op. s/p redo left carotid endarterectomy 7/11/17       Initial /62 (BP Location: Left arm, Patient Position: Sitting, Cuff Size: Adult Regular)  Pulse 55  Temp 97.6  F (36.4  C)  Resp 16  Wt 190 lb (86.2 kg)  SpO2 93%  BMI 28.9 kg/m2 Estimated body mass index is 28.9 kg/(m^2) as calculated from the following:    Height as of 7/11/17: 5' 7.99\" (1.727 m).    Weight as of this encounter: 190 lb (86.2 kg).  Medication Reconciliation: complete     Face to face nursing time: 5 minutes    Alyssia Mello MA        "

## 2017-07-27 NOTE — MR AVS SNAPSHOT
"              After Visit Summary   2017    José Aguirre    MRN: [id number]           Patient Information     Date Of Birth          1935        Visit Information        Provider Department      2017 2:30 PM Khurram Bishop MD [facility]      Today's Diagnoses     Status post carotid endarterectomy    -  1       Follow-ups after your visit        Who to contact     If you have questions or need follow up information about today's clinic visit or your schedule please contact [facility] directly at [phone].  Normal or non-critical lab and imaging results will be communicated to you by MyChart, letter or phone within 4 business days after the clinic has received the results. If you do not hear from us within 7 days, please contact the clinic through [employer] or phone. If you have a critical or abnormal lab result, we will notify you by phone as soon as possible.  Submit refill requests through [employer] or call your pharmacy and they will forward the refill request to us. Please allow 3 business days for your refill to be completed.          Additional Information About Your Visit        MyChart Information     [employer] lets you send messages to your doctor, view your test results, renew your prescriptions, schedule appointments and more. To sign up, go to [email or web address] . Click on \"Log in\" on the left side of the screen, which will take you to the Welcome page. Then click on \"Sign up Now\" on the right side of the page.     You will be asked to enter the access code listed below, as well as some personal information. Please follow the directions to create your username and password.     Your access code is: [email or web address]  Expires: 2017  9:34 AM     Your access code will  in 90 days. If you need help or a new code, please call your [place] clinic or [phone].        Care EveryWhere " ID     This is your Care EveryWhere ID. This could be used by other organizations to access your [place] medical records  [id number]        Your Vitals Were     Pulse Temperature Respirations Pulse Oximetry BMI (Body Mass Index)       55 97.6  F (36.4  C) 16 93% 28.9 kg/m2        Blood Pressure from Last 3 Encounters:   07/27/17 107/62   07/12/17 128/62   07/06/17 132/62    Weight from Last 3 Encounters:   07/27/17 190 lb (86.2 kg)   07/12/17 184 lb 8.4 oz (83.7 kg)   07/06/17 193 lb 8 oz (87.8 kg)              Today, you had the following     No orders found for display         Today's Medication Changes          These changes are accurate as of: 7/27/17  2:38 PM.  If you have any questions, ask your nurse or doctor.               Stop taking these medicines if you haven't already. Please contact your care team if you have questions.     oxyCODONE 5 MG IR tablet   Commonly known as:  ROXICODONE   Stopped by:  Khurram Bishop MD                    Primary Care Provider Office Phone # Fax #    Inderjit Osbaldo Kenny [phone]       [facility] [address] [place] [address]        Equal Access to Services     [facility] AH: Hadii tom ku hadasho Soomaali, waaxda luqadaha, qaybta kaalmada adeegyada, vito thrasher. So [facility] [phone].    ATENCIÓN: Si habla español, tiene a spencer disposición servicios gratuitos de asistencia lingüística. Llame al [phone].    We comply with applicable federal civil rights laws and [place] laws. We do not discriminate on the basis of race, color, national origin, age, disability sex, sexual orientation or gender identity.            Thank you!     Thank you for choosing [facility] [place]  for your care. Our goal is always to provide you with excellent care. Hearing back from our patients is one way we can continue to improve our services. Please take a few minutes to complete the written survey that  you may receive in the mail after your visit with us. Thank you!             Your Updated Medication List - Protect others around you: Learn how to safely use, store and throw away your medicines at www.disposemymeds.org.          This list is accurate as of: 7/27/17  2:38 PM.  Always use your most recent med list.                   Brand Name Dispense Instructions for use Diagnosis    ASPIRIN PO      Take 325 mg by mouth daily.        carvedilol 25 MG tablet    COREG     Take 25 mg by mouth 2 times daily (with meals)        clopidogrel 75 MG tablet    PLAVIX     Take 75 mg by mouth daily On hold starting 7/7/17        ferrous sulfate 325 (65 FE) MG tablet    IRON     Take 325 mg by mouth 2 times daily        furosemide 20 MG tablet    LASIX     Take 20 mg by mouth daily        OMEPRAZOLE PO      Take 20 mg by mouth daily        rosuvastatin 40 MG tablet    CRESTOR    30 tablet    Take 1 tablet (40 mg) by mouth daily    CAD (coronary artery disease)       senna-docusate 8.6-50 MG per tablet    SENOKOT-S;PERICOLACE    30 tablet    Take 1 tablet by mouth 2 times daily    Stenosis of left carotid artery       triamcinolone 0.1 % cream    KENALOG     Apply topically 2 times daily as needed for irritation        vitamin D 2000 UNITS tablet     30 tablet    Take 2,000 Units by mouth 2 times daily

## 2017-07-27 NOTE — LETTER
[facility]  [address]  [place] [address]  Phone: [phone]  Fax: [phone]        2017    [facility]    RE:  José Aguirre-: 35     José Aguirre and his wife return to see me in the office today.  He had undergone a left CEA with vein patch almost 20 years ago.  We are noted a progressive dilatation of the distal vein patch and narrowing of the outflow native internal carotid artery.  He has known occlusion of the right internal carotid artery and because of this we were much more concerned.     We decided for reexploration and redo of the left CEA which was performed on 2017.  He did indeed have dilatation of the distal vein patch most likely from a valve sinus.  We corrected this and extended the patch onto the native ICA for additional centimeter using the right ankle greater saphenous vein.  Did very well perioperatively with no neurological problems.  His surgical incision is healing well with some mild mandibular numbness.  He is having no complaints with the right ankle harvest site.     He has been diagnosed as being a prediabetic.  His hemoglobin A1c= 6.6 upon admission which is slightly elevated.  His highest blood sugar was medially postoperative 181 with the others being less than 120.  My vascular medicine associate Dr. Mcclelland had recommend that he consider starting metformin.     His LDL was at goal at 71 on his statin.   Nonsmoker.     He been on chronic aspirin and Plavix and this was started post operatively.  He was having some calf discomfort on the right side with walking and reports this is much better following surgery (Known right leg PAD with Doppler signals but no palpable pulses bilaterally).      Exam: Alert and appropriate.  Blood pressure 107/62.  Pulse 55.   Well-healing left carotid incision right ankle incision.   Normal neurological exam.     Impression:  Doing well following redo left  CEA.  Repeat duplex ultrasound will be performed in three months.     Dr. Kenny will decide whether metformin is appropriate for this patient.     Khurram Bishop MD

## 2017-07-27 NOTE — PROGRESS NOTES
[place] [facility]    José Aguirre and his wife return to see me in the office today.  He had undergone a left CEA with vein patch almost 20 years ago.  We are noted a progressive dilatation of the distal vein patch and narrowing of the outflow native internal carotid artery.  He has known occlusion of the right internal carotid artery and because of this we were much more concerned.    We decided for reexploration and redo of the left CEA which was performed on 7/11/2017.  He did indeed have dilatation of the distal vein patch most likely from a valve sinus.  We corrected this and extended the patch onto the native ICA for additional centimeter using the right ankle greater saphenous vein.  Did very well perioperatively with no neurological problems.  His surgical incision is healing well with some mild mandibular numbness.  He is having no complaints with the right ankle harvest site.      He has been diagnosed as being a prediabetic.  His hemoglobin A1c= 6.6 upon admission which is slightly elevated.  His highest blood sugar was medially postoperative 181 with the others being less than 120.  My vascular medicine associate Dr. Mcclelland had recommend that he consider starting metformin.    His LDL was at goal at 71 on his statin.   Nonsmoker.    He been on chronic aspirin and Plavix and this was started post operatively.  He was having some calf discomfort on the right side with walking and reports this is much better following surgery (Known right leg PAD with Doppler signals but no palpable pulses bilaterally).      Exam: Alert and appropriate.  Blood pressure 107/62.  Pulse 55.              Well-healing left carotid incision right ankle incision.               Normal neurological exam.      Impression:  Doing well following redo left CEA.  Repeat duplex ultrasound will be performed in three months.          Dr. Kenny will decide whether metformin is appropriate for this patient.       Khurram  Jens Bishop MD       Please route or send letter to:  Primary Care Provider (PCP)

## 2017-11-28 ENCOUNTER — TELEPHONE (OUTPATIENT)
Dept: CARDIOLOGY | Facility: CLINIC | Age: 82
End: 2017-11-28

## 2017-11-28 NOTE — TELEPHONE ENCOUNTER
Received call from BERLIN Ibanez at the [place] office and she was wondering about this pt's upcoming appt on 12/1 and if it is needed since the pt last saw Dr. Vargas on 7/6 for cardiac clearance. Pt usually has annual OVs with Dr. Vargas.  Called pt and asked how he was feeling, pt reports that he feels good and does not have any SOB or chest pain. Explained that the reason this writer was calling was because of the appt scheduled for this Friday and wondering if the pt feels it is necessary. Pt reports that since he lives really close to the clinic, he feels comfortable keeping the appt with Dr. Vargas and making sure everything seems okay. Pt read back his appt time to this writer and said he would be there.

## 2017-12-01 ENCOUNTER — OFFICE VISIT (OUTPATIENT)
Dept: CARDIOLOGY | Facility: CLINIC | Age: 82
End: 2017-12-01
Payer: COMMERCIAL

## 2017-12-01 VITALS
WEIGHT: 190.1 LBS | BODY MASS INDEX: 28.81 KG/M2 | DIASTOLIC BLOOD PRESSURE: 56 MMHG | HEART RATE: 60 BPM | HEIGHT: 68 IN | SYSTOLIC BLOOD PRESSURE: 122 MMHG

## 2017-12-01 DIAGNOSIS — I25.10 CORONARY ARTERY DISEASE INVOLVING NATIVE CORONARY ARTERY OF NATIVE HEART WITHOUT ANGINA PECTORIS: Primary | ICD-10-CM

## 2017-12-01 DIAGNOSIS — N18.30 CKD (CHRONIC KIDNEY DISEASE), STAGE III (H): ICD-10-CM

## 2017-12-01 DIAGNOSIS — I73.9 CLAUDICATION OF BOTH LOWER EXTREMITIES (H): ICD-10-CM

## 2017-12-01 DIAGNOSIS — I73.9 PERIPHERAL VASCULAR DISEASE (H): ICD-10-CM

## 2017-12-01 DIAGNOSIS — E78.5 HYPERLIPIDEMIA LDL GOAL <70: ICD-10-CM

## 2017-12-01 PROCEDURE — 99214 OFFICE O/P EST MOD 30 MIN: CPT | Performed by: INTERNAL MEDICINE

## 2017-12-01 NOTE — LETTER
12/1/2017    Inderjit Wang MD  [facility]   [address] [address]      RE: José Aguirre       Dear Colleague,    I had the pleasure of seeing José Ballesterosclaudio in the [facility].    HPI and Plan:   This nice 82-year-old gentleman seen   in follow-up of a complex  cardiovascular history  of coronary artery disease requiring CABG around 2003, peripheral vascular disease with bilateral lower extremity claudication (left greater than right), carotid artery disease with occluded right carotid and status post left CEA, renal artery atherosclerotic stenosis, status post renal stenting, dyslipidemia,  mixed hypertension including renal vascular hypertension, which was improved following renal artery stenting in 2013, and other peripheral vascular disease involving left subclavian artery.      He had left carotid endarterectomy 20 years ago.  He has totally occluded right internal carotid.  He developed restenosis in his left side and underwent redo patch angioplasty this summer which he tolerated well with good recovery and without cardiac issues.  He has resumed his normal activities.  His he has remained stable with controlled blood pressure.  If he walks briskly he has to stop one block because of claudication.  If he walks slowly became go between a half a mile and a mild.  This pattern has not changed or worsened.  He has no rest pain.  He is limited by this is not having any chest discomfort with activity or at other times and no limiting dyspnea on exertion  Usual shortness of breath or other unusual dyspnea.     His ischemic symptoms prior to CABG were exertional dyspnea, dizziness, some chest discomfort.  He states he is not having these kinds of symptoms currently.  Echocardiogram in December 2016 showed normal ejection fraction and wall motion.  No significant valvular disease.  Normal RV size and function.    He gets occasional dyspnea on exertion but is  more limited by claudication, no change in this pattern.  He denies  orthopnea, edema, palpitations, dizziness or syncope.  He has no focal neurologic symptoms, denies myalgias or muscle weakness.  We could not achieve adequate LDL lowering target with 80 mg of atorvastatin.  He was therefore switched to rosuvastatin and this has significantly improved his LDL from above 100 down to 62-70 and significantly improve his HDL from the low 30s up to 42 in 2015.     Hemoglobin A1c was elevated earlier this year.  He brings in labs from September showing a normal hemoglobin A1c of 5.5%.  Recent labs show stable renal function.  Ultrasounds have shown increase in velocities in the stented right renal artery, he is due for follow-up ultrasound this month with his nephrologist.  Blood pressure has remained controlled on his current regimen.     Over the last few years he was able to lose 20 pounds.  This has helped a lot.  I recommended he continue this lifestyle and try to lose another 10 pounds this year.      Exam-full exam below.  In summary:  Blood pressure 122/56.  Pulse is regular.    Lungs have diffusely decreased air movement , are clear.   No wheeze or increased effort.    Cardiac-regular rhythm,There is a soft systolic ejection murmur , difficult to sort out from a loud likely subclavian artery bruit on the left.  There is a left carotid bruit versus transmitted subclavian murmur, probably the latter . Left radial pulses 1+ and delayed compared to the 2+ right radial pulse. No abdominal bruits.  Femoral pulses deep one plus left, 1-2+ right with right femoral bruit..  Left and right posterior tibial pulses trace, dorsalis pedis not felt.  No edema.  No significant lower extremity skin changes.    Remainder of exam noted below      Impression/plan  1-coronary artery disease.  Currently without ischemic, heart failure, or arrhythmia symptoms.  Stress study 2011 showed no ischemia.  Ejection fraction 62%.  No  significant valvular disease.  Overall risk factor intervention reasonable.  .  2-hypertension, essential plus renovascular-controlled  .  3-cerebrovascular disease.  Recurrent severe left internal carotid stenosis, chronic right internal carotid occlusion.  Stable post redo left CEA     4-right renal artery stenosis and history of stenting, recent ultrasounds showing increasing renal blood flow velocities on that side.   however, currently blood pressure  controlled and renal function has remained stable this year.  Has atrophic left kidney.  This should be followed carefully for further progression and impact on blood pressure control and on his renal function.     5- left subclavian stenosis.  He does not appear to have any subclavian steal or arm claudication with this, however.  He has an LIMA graft that , but is not experiencing ischemic sounding symptoms at this time.  We'll continue to follow this clinically.  We'll reconsider stress study again next year given how this might impact his LIMA graft.    I have recommended continue his current regimen and reassessment in a year.      Orders Placed This Encounter   Procedures     Follow-Up with Cardiologist       No orders of the defined types were placed in this encounter.      Medications Discontinued During This Encounter   Medication Reason     senna-docusate (SENOKOT-S;PERICOLACE) 8.6-50 MG per tablet Stopped by Patient         Encounter Diagnoses   Name Primary?     Coronary artery disease involving native coronary artery of native heart without angina pectoris Yes     Peripheral vascular disease      Hyperlipidemia LDL goal <70      Claudication of both lower extremities (H)      CKD (chronic kidney disease), stage III        CURRENT MEDICATIONS:  Current Outpatient Prescriptions   Medication Sig Dispense Refill     OMEPRAZOLE PO Take 20 mg by mouth daily       clopidogrel (PLAVIX) 75 MG tablet Take 75 mg by mouth daily On hold starting 7/7/17         carvedilol (COREG) 25 MG tablet Take 25 mg by mouth 2 times daily (with meals)       furosemide (LASIX) 20 MG tablet Take 20 mg by mouth daily       triamcinolone (KENALOG) 0.1 % cream Apply topically 2 times daily as needed for irritation        rosuvastatin (CRESTOR) 40 MG tablet Take 1 tablet (40 mg) by mouth daily 30 tablet 3     Cholecalciferol (VITAMIN D) 2000 UNITS tablet Take 2,000 Units by mouth 2 times daily  30 tablet      ferrous sulfate 325 (65 FE) MG tablet Take 325 mg by mouth 2 times daily        ASPIRIN PO Take 325 mg by mouth daily.         ALLERGIES   No Known Allergies    PAST MEDICAL HISTORY:  Past Medical History:   Diagnosis Date     Anemia      Atrophy of left kidney      CKD (chronic kidney disease), stage III      Claudication, intermittent (H)      Coronary artery disease 2003    CAB x 4     History of GI bleed 2012    Hemorrhagic gastritis     Hypercholesterolemia      Hypertension      Left carotid artery stenosis     S/P Carotid Endarterectomy left      PVD (peripheral vascular disease) (H)      Renal artery stenosis (H)     stent R       PAST SURGICAL HISTORY:  Past Surgical History:   Procedure Laterality Date     BYPASS GRAFT ARTERY CORONARY  06/2003    LIMA=LAD, SVG=Diag, SVG=OM2, SVG=PDA     CAROTID ENDARTERECTOMY Left      ENDARTERECTOMY CAROTID Left 7/11/2017    Procedure: ENDARTERECTOMY CAROTID;  REDO LEFT CAROTID ENDARTERECTOMY WITH RIGHT ANKLE GREATER SAPHENOUS VEIN  ;  Surgeon: Khurram Bishop MD;  Location:  OR     EYE SURGERY       HEART CATH LEFT HEART CATH  06/2003    Severe multivessel disease     RENAL ARTERY ANGIOGRAM Right 05/2013    with stenting     TONSILLECTOMY         FAMILY HISTORY:  Family History   Problem Relation Age of Onset     CEREBROVASCULAR DISEASE Mother 90     unknown type     CANCER Brother 70     Brain cancer      Dementia Sister        SOCIAL HISTORY:  Social History     Social History     Marital status:      Spouse name: N/A      "Number of children: N/A     Years of education: N/A     Social History Main Topics     Smoking status: Former Smoker     Packs/day: 1.00     Years: 50.00     Types: Cigarettes     Quit date: 1/1/2001     Smokeless tobacco: Never Used     Alcohol use Yes      Comment: 4 drinks week     Drug use: No     Sexual activity: Not Asked     Other Topics Concern     Caffeine Concern Yes     4 - 7 cups (trying to cut down)     Special Diet Yes     eats healthy     Exercise Yes     walking     Social History Narrative       Review of Systems:  Skin:  Negative       Eyes:  Positive for glasses    ENT:  Positive for hearing loss;epistaxis hearing aids -  nose bleed approx 2 weeks ago - lasted an hour - never came back   Respiratory:  Positive for cough;dyspnea on exertion     Cardiovascular:    Positive for;lightheadedness    Gastroenterology: Negative      Genitourinary:  Negative      Musculoskeletal:  Negative      Neurologic:  Negative      Psychiatric:  Negative      Heme/Lymph/Imm:  Negative      Endocrine:  Negative        Physical Exam:  Vitals: /56  Pulse 60  Ht 1.727 m (5' 8\")  Wt 86.2 kg (190 lb 1.6 oz)  BMI 28.9 kg/m2    Constitutional:  cooperative, alert and oriented, well developed, well nourished, in no acute distress        Skin:  warm and dry to the touch, no apparent skin lesions or masses noted          Head:  normocephalic, no masses or lesions        Eyes:  pupils equal and round;sclera white;EOMS intact        Lymph:No Cervical lymphadenopathy present     ENT:  no pallor or cyanosis hearing aide(s) present      Neck:  JVP normal transmitted murmur (Left bruit >right, Left CEA scar, diminished right carotid pulse)      Respiratory:  healed median sternotomy scar;clear to auscultation;normal respiratory excursion diminished breath sounds bilaterally (Left subclavian prominant bruit)       Cardiac: regular rhythm;normal S1 and S2;no S3 or S4   distant heart sounds     systolic ejection murmur;grade " 1;RUSB (vs transmitted subclavian bruit)          1+ 2+       0  (trace) 1+ 1+ (delayed relative to right radial)       0  (trace) right femoral bruit (+) left femoral bruit (-)  (left radial pulse delayed compared to right, 1+ left, 2+ right radial pulse)    GI:  abdomen soft;BS normoactive;non-tender;no bruits obese      Extremities and Muscular Skeletal:  no edema;no deformities, clubbing, cyanosis, erythema observed              Neurological:  no gross motor deficits;affect appropriate        Psych:  Alert and Oriented x 3;affect appropriate, oriented to time, person and place      Thank you for allowing me to participate in the care of your patient.    Sincerely,     Radhames Vargas MD     [facility]

## 2017-12-01 NOTE — PROGRESS NOTES
HPI and Plan:   This nice 82-year-old gentleman seen  in follow-up of a complex  cardiovascular history  of coronary artery disease requiring CABG around 2003, peripheral vascular disease with bilateral lower extremity claudication (left greater than right), carotid artery disease with occluded right carotid and status post left CEA, renal artery atherosclerotic stenosis, status post renal stenting, dyslipidemia,  mixed hypertension including renal vascular hypertension, which was improved following renal artery stenting in 2013, and other peripheral vascular disease involving left subclavian artery.      He had left carotid endarterectomy 20 years ago.  He has totally occluded right internal carotid.  He developed restenosis in his left side and underwent redo patch angioplasty this summer which he tolerated well with good recovery and without cardiac issues.  He has resumed his normal activities.  His he has remained stable with controlled blood pressure.  If he walks briskly he has to stop one block because of claudication.  If he walks slowly became go between a half a mile and a mild.  This pattern has not changed or worsened.  He has no rest pain.  He is limited by this is not having any chest discomfort with activity or at other times and no limiting dyspnea on exertion  Usual shortness of breath or other unusual dyspnea.     His ischemic symptoms prior to CABG were exertional dyspnea, dizziness, some chest discomfort.  He states he is not having these kinds of symptoms currently.  Echocardiogram in December 2016 showed normal ejection fraction and wall motion.  No significant valvular disease.  Normal RV size and function.    He gets occasional dyspnea on exertion but is more limited by claudication, no change in this pattern.  He denies  orthopnea, edema, palpitations, dizziness or syncope.  He has no focal neurologic symptoms, denies myalgias or muscle weakness.  We could not achieve adequate LDL lowering  target with 80 mg of atorvastatin.  He was therefore switched to rosuvastatin and this has significantly improved his LDL from above 100 down to 62-70 and significantly improve his HDL from the low 30s up to 42 in 2015.     Hemoglobin A1c was elevated earlier this year.  He brings in labs from September showing a normal hemoglobin A1c of 5.5%.  Recent labs show stable renal function.  Ultrasounds have shown increase in velocities in the stented right renal artery, he is due for follow-up ultrasound this month with his nephrologist.  Blood pressure has remained controlled on his current regimen.     Over the last few years he was able to lose 20 pounds.  This has helped a lot.  I recommended he continue this lifestyle and try to lose another 10 pounds this year.      Exam-full exam below.  In summary:  Blood pressure 122/56.  Pulse is regular.    Lungs have diffusely decreased air movement , are clear.   No wheeze or increased effort.    Cardiac-regular rhythm,There is a soft systolic ejection murmur , difficult to sort out from a loud likely subclavian artery bruit on the left.  There is a left carotid bruit versus transmitted subclavian murmur, probably the latter . Left radial pulses 1+ and delayed compared to the 2+ right radial pulse. No abdominal bruits.  Femoral pulses deep one plus left, 1-2+ right with right femoral bruit..  Left and right posterior tibial pulses trace, dorsalis pedis not felt.  No edema.  No significant lower extremity skin changes.    Remainder of exam noted below      Impression/plan  1-coronary artery disease.  Currently without ischemic, heart failure, or arrhythmia symptoms.  Stress study 2011 showed no ischemia.  Ejection fraction 62%.  No significant valvular disease.  Overall risk factor intervention reasonable.  .  2-hypertension, essential plus renovascular-controlled  .  3-cerebrovascular disease.  Recurrent severe left internal carotid stenosis, chronic right internal carotid  occlusion.  Stable post redo left CEA     4-right renal artery stenosis and history of stenting, recent ultrasounds showing increasing renal blood flow velocities on that side.   however, currently blood pressure  controlled and renal function has remained stable this year.  Has atrophic left kidney.  This should be followed carefully for further progression and impact on blood pressure control and on his renal function.     5- left subclavian stenosis.  He does not appear to have any subclavian steal or arm claudication with this, however.  He has an LIMA graft that , but is not experiencing ischemic sounding symptoms at this time.  We'll continue to follow this clinically.  We'll reconsider stress study again next year given how this might impact his LIMA graft.    I have recommended continue his current regimen and reassessment in a year.      Orders Placed This Encounter   Procedures     Follow-Up with Cardiologist       No orders of the defined types were placed in this encounter.      Medications Discontinued During This Encounter   Medication Reason     senna-docusate (SENOKOT-S;PERICOLACE) 8.6-50 MG per tablet Stopped by Patient         Encounter Diagnoses   Name Primary?     Coronary artery disease involving native coronary artery of native heart without angina pectoris Yes     Peripheral vascular disease      Hyperlipidemia LDL goal <70      Claudication of both lower extremities (H)      CKD (chronic kidney disease), stage III        CURRENT MEDICATIONS:  Current Outpatient Prescriptions   Medication Sig Dispense Refill     OMEPRAZOLE PO Take 20 mg by mouth daily       clopidogrel (PLAVIX) 75 MG tablet Take 75 mg by mouth daily On hold starting 7/7/17        carvedilol (COREG) 25 MG tablet Take 25 mg by mouth 2 times daily (with meals)       furosemide (LASIX) 20 MG tablet Take 20 mg by mouth daily       triamcinolone (KENALOG) 0.1 % cream Apply topically 2 times daily as needed for irritation         rosuvastatin (CRESTOR) 40 MG tablet Take 1 tablet (40 mg) by mouth daily 30 tablet 3     Cholecalciferol (VITAMIN D) 2000 UNITS tablet Take 2,000 Units by mouth 2 times daily  30 tablet      ferrous sulfate 325 (65 FE) MG tablet Take 325 mg by mouth 2 times daily        ASPIRIN PO Take 325 mg by mouth daily.         ALLERGIES   No Known Allergies    PAST MEDICAL HISTORY:  Past Medical History:   Diagnosis Date     Anemia      Atrophy of left kidney      CKD (chronic kidney disease), stage III      Claudication, intermittent (H)      Coronary artery disease 2003    CAB x 4     History of GI bleed 2012    Hemorrhagic gastritis     Hypercholesterolemia      Hypertension      Left carotid artery stenosis     S/P Carotid Endarterectomy left      PVD (peripheral vascular disease) (H)      Renal artery stenosis (H)     stent R       PAST SURGICAL HISTORY:  Past Surgical History:   Procedure Laterality Date     BYPASS GRAFT ARTERY CORONARY  06/2003    LIMA=LAD, SVG=Diag, SVG=OM2, SVG=PDA     CAROTID ENDARTERECTOMY Left      ENDARTERECTOMY CAROTID Left 7/11/2017    Procedure: ENDARTERECTOMY CAROTID;  REDO LEFT CAROTID ENDARTERECTOMY WITH RIGHT ANKLE GREATER SAPHENOUS VEIN  ;  Surgeon: Khurram Bishop MD;  Location: [facility] OR     EYE SURGERY       HEART CATH LEFT HEART CATH  06/2003    Severe multivessel disease     RENAL ARTERY ANGIOGRAM Right 05/2013    with stenting     TONSILLECTOMY         FAMILY HISTORY:  Family History   Problem Relation Age of Onset     CEREBROVASCULAR DISEASE Mother 90     unknown type     CANCER Brother 70     Brain cancer      Dementia Sister        SOCIAL HISTORY:  Social History     Social History     Marital status:      Spouse name: N/A     Number of children: N/A     Years of education: N/A     Social History Main Topics     Smoking status: Former Smoker     Packs/day: 1.00     Years: 50.00     Types: Cigarettes     Quit date: 1/1/2001     Smokeless tobacco: Never Used     Alcohol  "use Yes      Comment: 4 drinks week     Drug use: No     Sexual activity: Not Asked     Other Topics Concern     Caffeine Concern Yes     4 - 7 cups (trying to cut down)     Special Diet Yes     eats healthy     Exercise Yes     walking     Social History Narrative       Review of Systems:  Skin:  Negative       Eyes:  Positive for glasses    ENT:  Positive for hearing loss;epistaxis hearing aids -  nose bleed approx 2 weeks ago - lasted an hour - never came back   Respiratory:  Positive for cough;dyspnea on exertion     Cardiovascular:    Positive for;lightheadedness    Gastroenterology: Negative      Genitourinary:  Negative      Musculoskeletal:  Negative      Neurologic:  Negative      Psychiatric:  Negative      Heme/Lymph/Imm:  Negative      Endocrine:  Negative        Physical Exam:  Vitals: /56  Pulse 60  Ht 1.727 m (5' 8\")  Wt 86.2 kg (190 lb 1.6 oz)  BMI 28.9 kg/m2    Constitutional:  cooperative, alert and oriented, well developed, well nourished, in no acute distress        Skin:  warm and dry to the touch, no apparent skin lesions or masses noted          Head:  normocephalic, no masses or lesions        Eyes:  pupils equal and round;sclera white;EOMS intact        Lymph:No Cervical lymphadenopathy present     ENT:  no pallor or cyanosis hearing aide(s) present      Neck:  JVP normal transmitted murmur (Left bruit >right, Left CEA scar, diminished right carotid pulse)      Respiratory:  healed median sternotomy scar;clear to auscultation;normal respiratory excursion diminished breath sounds bilaterally (Left subclavian prominant bruit)       Cardiac: regular rhythm;normal S1 and S2;no S3 or S4   distant heart sounds     systolic ejection murmur;grade 1;RUSB (vs transmitted subclavian bruit)          1+ 2+       0  (trace) 1+ 1+ (delayed relative to right radial)       0  (trace) right femoral bruit (+) left femoral bruit (-)  (left radial pulse delayed compared to right, 1+ left, 2+ right " radial pulse)    GI:  abdomen soft;BS normoactive;non-tender;no bruits obese      Extremities and Muscular Skeletal:  no edema;no deformities, clubbing, cyanosis, erythema observed              Neurological:  no gross motor deficits;affect appropriate        Psych:  Alert and Oriented x 3;affect appropriate, oriented to time, person and place        CC  No referring provider defined for this encounter.

## 2018-06-19 ENCOUNTER — TELEPHONE (OUTPATIENT)
Dept: CARDIOLOGY | Facility: CLINIC | Age: 83
End: 2018-06-19

## 2018-06-19 ENCOUNTER — OFFICE VISIT (OUTPATIENT)
Dept: CARDIOLOGY | Facility: CLINIC | Age: 83
End: 2018-06-19
Payer: COMMERCIAL

## 2018-06-19 VITALS
HEIGHT: 68 IN | WEIGHT: 180.6 LBS | SYSTOLIC BLOOD PRESSURE: 126 MMHG | DIASTOLIC BLOOD PRESSURE: 74 MMHG | HEART RATE: 63 BPM | BODY MASS INDEX: 27.37 KG/M2

## 2018-06-19 DIAGNOSIS — I50.31 DIASTOLIC CHF, ACUTE (H): Primary | ICD-10-CM

## 2018-06-19 DIAGNOSIS — N18.30 CKD (CHRONIC KIDNEY DISEASE), STAGE III (H): ICD-10-CM

## 2018-06-19 DIAGNOSIS — I10 BENIGN ESSENTIAL HYPERTENSION: Primary | ICD-10-CM

## 2018-06-19 DIAGNOSIS — I25.10 CORONARY ARTERY DISEASE INVOLVING NATIVE CORONARY ARTERY OF NATIVE HEART WITHOUT ANGINA PECTORIS: ICD-10-CM

## 2018-06-19 DIAGNOSIS — N18.9 ACUTE ON CHRONIC RENAL INSUFFICIENCY: ICD-10-CM

## 2018-06-19 DIAGNOSIS — N28.9 ACUTE ON CHRONIC RENAL INSUFFICIENCY: ICD-10-CM

## 2018-06-19 DIAGNOSIS — I50.31 DIASTOLIC CHF, ACUTE (H): ICD-10-CM

## 2018-06-19 DIAGNOSIS — E87.6 HYPOKALEMIA: ICD-10-CM

## 2018-06-19 LAB
ANION GAP SERPL CALCULATED.3IONS-SCNC: 13.2 MMOL/L (ref 6–17)
BUN SERPL-MCNC: 27 MG/DL (ref 7–30)
CALCIUM SERPL-MCNC: 9.3 MG/DL (ref 8.5–10.5)
CHLORIDE SERPL-SCNC: 97 MMOL/L (ref 98–107)
CO2 SERPL-SCNC: 28 MMOL/L (ref 23–29)
CREAT SERPL-MCNC: 1.61 MG/DL (ref 0.7–1.3)
GFR SERPL CREATININE-BSD FRML MDRD: 41 ML/MIN/1.7M2
GLUCOSE SERPL-MCNC: 132 MG/DL (ref 70–105)
POTASSIUM SERPL-SCNC: 3.2 MMOL/L (ref 3.5–5.1)
SODIUM SERPL-SCNC: 135 MMOL/L (ref 136–145)

## 2018-06-19 PROCEDURE — 80048 BASIC METABOLIC PNL TOTAL CA: CPT | Performed by: INTERNAL MEDICINE

## 2018-06-19 PROCEDURE — 99214 OFFICE O/P EST MOD 30 MIN: CPT | Performed by: INTERNAL MEDICINE

## 2018-06-19 PROCEDURE — 36415 COLL VENOUS BLD VENIPUNCTURE: CPT | Performed by: INTERNAL MEDICINE

## 2018-06-19 RX ORDER — ALBUTEROL SULFATE 90 UG/1
2 AEROSOL, METERED RESPIRATORY (INHALATION) EVERY 6 HOURS PRN
COMMUNITY
End: 2019-10-09

## 2018-06-19 NOTE — LETTER
6/19/2018    Inderjit Wang MD  [facility] [address] [address]    RE: José Aguirre       Dear Colleague,    I had the pleasure of seeing José COLON Carla in the [facility].    HPI and Plan:   This nice 82-year-old gentleman seen  in follow-up after discharge from [facility] with acute diastolic heart failure and COPD exacerbation at the beginning of this month.  Details of that hospitalization and lab results were reviewed in care everywhere.    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     He states he is at the In the summer and was eating a very high salt diet all day long and developed progressive dyspnea on exertion.  He was unaware of weight gain but when he presented to the hospital he was found to be hypoxic with an elevated BNP and wheezing.  He states his weight on admission were 198 pounds and he was diuresed down to 180 pounds.  He had acute on chronic renal insufficiency.  He was wheezing and hypoxic.  He was also treated with prednisone and inhalers. Diuresis and COPD treatment resolved all of his dyspnea on exertion.  It also resolved the mild edema he had.  He is now following a low-sodium diet carefully.  He is weighing himself every day and states weights have not gone up in the last 2 weeks since discharge and he is 177-1/2 pounds at home.  He is 180.5  pounds on our scales today.  He is no longer having dyspnea on exertion.  He can walk moderate pace for a block without symptoms.  He is not having orthopnea, PND, edema.  He does get claudication after a block or so.  He is not having focal neurologic symptoms.  He did not have any of his previous ischemic symptoms with this event and has no now.  He was discharged according to the discharge summary on 40 mg of Lasix twice daily.  He had already been on that prior to admission so he is actually been taking 80 mg twice daily because he understood the instructions to be at the discharge dose to his regular dose.  After today he will be out of his additional furosemide and was planning on going back to his scheduled 40 mg twice daily.  A basic metabolic panel from about 2 weeks ago showed a normal potassium and his renal function bit above his usual baseline.  However he had presented with acute on chronic renal insufficiency with creatinines above 1.9.  An echocardiogram with this admission reported normal left ventricular systolic function and normal wall motion, right ventricular enlargement though is reported to severe with moderate reduced RV function and mildly increased RVSP at 32+ RAP.  His last echo here in 2016 which reported normal RV size and function.      He had left carotid endarterectomy 20 years ago.  He has totally occluded right internal carotid.  He developed restenosis in his left side and underwent redo patch angioplasty this summer which he tolerated well with good recovery and without cardiac issues.  He has resumed his normal activities.  His he has remained stable with controlled blood pressure.  If he walks briskly he has to stop one block because of claudication.  If he walks slowly became go between a half a mile and a mild.  This pattern has not changed or worsened.  He has no rest pain.        We could not achieve adequate LDL lowering target with 80 mg of atorvastatin.  He was therefore  switched to rosuvastatin and this has significantly improved his LDL from above 100 down to 62-70 and significantly improve his HDL from the low 30s up to 42 in 2015.        Exam-  In summary:  Blood pressure 12 126/74 56.  Pulse is regular.    Lungs have diffusely decreased air movement , there are slight mild basilar fine crackles which are different from last December.   No wheeze or increased effort.    Cardiac-regular rhythm,There is a soft systolic ejection murmur , difficult to sort out from a loud likely subclavian artery bruit on the left.  There is a left carotid bruit versus transmitted subclavian murmur, probably the latter .   Right carotid pulses markedly diminished.  Left radial pulses 1+ and delayed compared to the 2+ right radial pulse. No abdominal bruits.  Femoral pulses deep one plus left, 1-2+ right with right femoral bruit..  Left  posterior tibial pulses trace-1+, dorsalis pedis not felt. Right posterior tibial and dorsalis pedis not felt.  No edema.  No significant lower extremity skin changes.    Remainder of exam noted below      Impression/plan    1-acute diastolic congestive heart failure episode.  Probably precipitated some COPD.  Improved with profound diuresis.  He now understands sodium restricted diet and daily weights.  I have asked him to watch very carefully as he goes back to only 40 mg twice daily of furosemide to make sure his weight does not go up.  If he goes up to 180 pounds at home he is to call us.  I will have him back in core clinic in 1 month with a basic metabolic panel.  In addition since she has been on this much higher dose of furosemide the last 2 weeks I will check a basic metabolic panel today to check his creatinine and potassium and sodium.    2-new right ventricular dysfunction.  Possibly related to this acute episode.  He does not have evidence of cor pulmonale today.  After he has been stabilized for a month or 2 I would like to recheck his echo to reassess  his RV.  He did not have significant pulmonary hypertension by the outside echo.  He did not have severe TR.  I am not sure why his RV was so dilated and hypokinetic compared to previous normal RV function.  If it remains this abnormal we may need to do a right heart cath    3-coronary artery disease.  Currently without ischemic, heart failure, or arrhythmia symptoms.  Stress study 2011 showed no ischemia.  Ejection fraction 62%.  No significant valvular disease.  Overall risk factor intervention reasonable.  .  4-hypertension, essential plus renovascular-controlled       Plan-basic metabolic panel today.  Follow-up in core clinic in 1 month with a repeat basic metabolic panel.  Will reassess when to do an echo following that.    Orders Placed This Encounter   Procedures     Basic metabolic panel     Follow-Up with [facility] - BAR visit       Orders Placed This Encounter   Medications     umeclidinium-vilanterol (ANORO ELLIPTA) 62.5-25 MCG/INH oral inhaler     Sig: Inhale 1 puff into the lungs daily     albuterol (PROAIR HFA/PROVENTIL HFA/VENTOLIN HFA) 108 (90 Base) MCG/ACT Inhaler     Sig: Inhale 2 puffs into the lungs every 6 hours as needed for shortness of breath / dyspnea or wheezing       There are no discontinued medications.      Encounter Diagnoses   Name Primary?     Diastolic CHF, acute (H) Yes     Coronary artery disease involving native coronary artery of native heart without angina pectoris      CKD (chronic kidney disease), stage III      Acute on chronic renal insufficiency        CURRENT MEDICATIONS:  Current Outpatient Prescriptions   Medication Sig Dispense Refill     albuterol (PROAIR HFA/PROVENTIL HFA/VENTOLIN HFA) 108 (90 Base) MCG/ACT Inhaler Inhale 2 puffs into the lungs every 6 hours as needed for shortness of breath / dyspnea or wheezing       ASPIRIN PO Take 81 mg by mouth daily        carvedilol (COREG) 25 MG tablet Take 25 mg by mouth 2 times daily (with meals)       Cholecalciferol  (VITAMIN D) 2000 UNITS tablet Take 2,000 Units by mouth 2 times daily  30 tablet      clopidogrel (PLAVIX) 75 MG tablet Take 75 mg by mouth daily On hold starting 7/7/17        ferrous sulfate 325 (65 FE) MG tablet Take 325 mg by mouth 2 times daily        furosemide (LASIX) 20 MG tablet Take 80 mg by mouth 2 times daily        OMEPRAZOLE PO Take 20 mg by mouth daily       rosuvastatin (CRESTOR) 40 MG tablet Take 1 tablet (40 mg) by mouth daily 30 tablet 3     triamcinolone (KENALOG) 0.1 % cream Apply topically 2 times daily as needed for irritation        umeclidinium-vilanterol (ANORO ELLIPTA) 62.5-25 MCG/INH oral inhaler Inhale 1 puff into the lungs daily         ALLERGIES   No Known Allergies    PAST MEDICAL HISTORY:  Past Medical History:   Diagnosis Date     Anemia      Atrophy of left kidney      CKD (chronic kidney disease), stage III      Claudication, intermittent (H)      Coronary artery disease 2003    CAB x 4     History of GI bleed 2012    Hemorrhagic gastritis     Hypercholesterolemia      Hypertension      Left carotid artery stenosis     S/P Carotid Endarterectomy left      PVD (peripheral vascular disease) (H)      Renal artery stenosis (H)     stent R       PAST SURGICAL HISTORY:  Past Surgical History:   Procedure Laterality Date     BYPASS GRAFT ARTERY CORONARY  06/2003    LIMA=LAD, SVG=Diag, SVG=OM2, SVG=PDA     CAROTID ENDARTERECTOMY Left      ENDARTERECTOMY CAROTID Left 7/11/2017    Procedure: ENDARTERECTOMY CAROTID;  REDO LEFT CAROTID ENDARTERECTOMY WITH RIGHT ANKLE GREATER SAPHENOUS VEIN  ;  Surgeon: Khurram Bishop MD;  Location:  OR     EYE SURGERY       HEART CATH LEFT HEART CATH  06/2003    Severe multivessel disease     RENAL ARTERY ANGIOGRAM Right 05/2013    with stenting     TONSILLECTOMY         FAMILY HISTORY:  Family History   Problem Relation Age of Onset     Cerebrovascular Disease Mother 90     unknown type     Cancer Brother 70     Brain cancer      Dementia Sister   "      SOCIAL HISTORY:  Social History     Social History     Marital status:      Spouse name: N/A     Number of children: N/A     Years of education: N/A     Social History Main Topics     Smoking status: Former Smoker     Packs/day: 1.00     Years: 50.00     Types: Cigarettes     Quit date: 1/1/2001     Smokeless tobacco: Never Used     Alcohol use Yes      Comment: 4 drinks week     Drug use: No     Sexual activity: Not Asked     Other Topics Concern     Caffeine Concern Yes     4 - 7 cups (trying to cut down)     Special Diet Yes     eats healthy     Exercise Yes     walking     Social History Narrative       Review of Systems:  Skin:  Negative       Eyes:  Positive for glasses    ENT:  Positive for hearing loss  pt has hearing aids blilaterally.  Respiratory:  Positive for dyspnea on exertion pt reports when walking too fast for too long.   Cardiovascular:  Negative;palpitations;chest pain;edema;lightheadedness;dizziness Positive for;fatigue;exercise intolerance pt reports it is better than 2-3 wks ago; Pt reports sx's on occ.  Gastroenterology: Negative heartburn;reflux    Genitourinary:  Negative      Musculoskeletal:  Negative      Neurologic:  Negative      Psychiatric:  Negative      Heme/Lymph/Imm:  Negative      Endocrine:  Negative        Physical Exam:  Vitals: /74  Pulse 63  Ht 1.727 m (5' 8\")  Wt 81.9 kg (180 lb 9.6 oz)  BMI 27.46 kg/m2    Constitutional:  cooperative, alert and oriented, well developed, well nourished, in no acute distress        Skin:  warm and dry to the touch, no apparent skin lesions or masses noted          Head:  normocephalic, no masses or lesions        Eyes:  pupils equal and round;sclera white;EOMS intact        Lymph:      ENT:  no pallor or cyanosis hearing aide(s) present      Neck:  JVP normal transmitted murmur (Left bruit >right, Left CEA scar, diminished right carotid pulse) diminished right carotid pulse    Respiratory:  healed median sternotomy " scar;normal respiratory excursion diminished breath sounds bilaterally;fine rales (Left subclavian prominant bruit)  bibasilar fine crackles    Cardiac: regular rhythm;normal S1 and S2;no S3 or S4   distant heart sounds     systolic ejection murmur;RUSB;grade 2 (vs transmitted subclavian bruit)                    0;right dorsalis pedis artery 0;right posterior tibial artery (trace)   1+;left radial artery (delayed relative to right radial)       0 1+ (trace) right femoral bruit (+) left femoral bruit (-)  (left radial pulse delayed compared to right, 1+ left, 2+ right radial pulse)    GI:  abdomen soft;BS normoactive;non-tender;no bruits obese      Extremities and Muscular Skeletal:  no edema;no deformities, clubbing, cyanosis, erythema observed              Neurological:  no gross motor deficits;affect appropriate        Psych:  affect appropriate, oriented to time, person and place        Thank you for allowing me to participate in the care of your patient.    Sincerely,     Radhames Vargas MD     [facility]

## 2018-06-19 NOTE — TELEPHONE ENCOUNTER
BMP completed after visit today with Dr. Vargas, please see results below. Will route to Dr. Vargas for recommendations.     Component      Latest Ref Rng & Units 6/19/2018   Sodium      136 - 145 mmol/L 135 (L)   Potassium      3.5 - 5.1 mmol/L 3.2 (L)   Chloride      98 - 107 mmol/L 97 (L)   Carbon Dioxide      23 - 29 mmol/L 28   Anion Gap      6 - 17 mmol/L 13.2   Glucose      70 - 105 mg/dL 132 (H)   Urea Nitrogen      7 - 30 mg/dL 27   Creatinine      0.70 - 1.30 mg/dL 1.61 (H)   GFR Estimate      >60 mL/min/1.7m2 41 (L)   GFR Estimate If Black      >60 mL/min/1.7m2 50 (L)   Calcium      8.5 - 10.5 mg/dL 9.3

## 2018-06-19 NOTE — PROGRESS NOTES
HPI and Plan:   This nice 82-year-old gentleman seen  in follow-up after discharge from [facility] with acute diastolic heart failure and COPD exacerbation at the beginning of this month.  Details of that hospitalization and lab results were reviewed in care everywhere.    He has a history of a complex  cardiovascular including history  of   -coronary artery disease requiring CABG around 2003,   -peripheral vascular disease with bilateral lower extremity claudication (left greater than right),   -carotid artery disease with occluded right carotid and status post left CEA,   -renal artery atherosclerotic stenosis, status post renal stenting,  - dyslipidemia,    -mixed hypertension including renal vascular hypertension, which was improved following renal artery stenting in 2013  - other peripheral vascular disease involving left subclavian artery  -Chronic renal insufficiency, stage III.     He states he is at the In the summer and was eating a very high salt diet all day long and developed progressive dyspnea on exertion.  He was unaware of weight gain but when he presented to the hospital he was found to be hypoxic with an elevated BNP and wheezing.  He states his weight on admission were 198 pounds and he was diuresed down to 180 pounds.  He had acute on chronic renal insufficiency.  He was wheezing and hypoxic.  He was also treated with prednisone and inhalers. Diuresis and COPD treatment resolved all of his dyspnea on exertion.  It also resolved the mild edema he had.  He is now following a low-sodium diet carefully.  He is weighing himself every day and states weights have not gone up in the last 2 weeks since discharge and he is 177-1/2 pounds at home.  He is 180.5 pounds on our scales today.  He is no longer having dyspnea on exertion.  He can walk moderate pace for a block without symptoms.  He is not having orthopnea, PND, edema.  He does get claudication after a block or so.  He is not having  focal neurologic symptoms.  He did not have any of his previous ischemic symptoms with this event and has no now.  He was discharged according to the discharge summary on 40 mg of Lasix twice daily.  He had already been on that prior to admission so he is actually been taking 80 mg twice daily because he understood the instructions to be at the discharge dose to his regular dose.  After today he will be out of his additional furosemide and was planning on going back to his scheduled 40 mg twice daily.  A basic metabolic panel from about 2 weeks ago showed a normal potassium and his renal function bit above his usual baseline.  However he had presented with acute on chronic renal insufficiency with creatinines above 1.9.  An echocardiogram with this admission reported normal left ventricular systolic function and normal wall motion, right ventricular enlargement though is reported to severe with moderate reduced RV function and mildly increased RVSP at 32+ RAP.  His last echo here in 2016 which reported normal RV size and function.      He had left carotid endarterectomy 20 years ago.  He has totally occluded right internal carotid.  He developed restenosis in his left side and underwent redo patch angioplasty this summer which he tolerated well with good recovery and without cardiac issues.  He has resumed his normal activities.  His he has remained stable with controlled blood pressure.  If he walks briskly he has to stop one block because of claudication.  If he walks slowly became go between a half a mile and a mild.  This pattern has not changed or worsened.  He has no rest pain.        We could not achieve adequate LDL lowering target with 80 mg of atorvastatin.  He was therefore switched to rosuvastatin and this has significantly improved his LDL from above 100 down to 62-70 and significantly improve his HDL from the low 30s up to 42 in 2015.        Exam-  In summary:  Blood pressure 12 126/74 56.  Pulse is  regular.    Lungs have diffusely decreased air movement , there are slight mild basilar fine crackles which are different from last December.   No wheeze or increased effort.    Cardiac-regular rhythm,There is a soft systolic ejection murmur , difficult to sort out from a loud likely subclavian artery bruit on the left.  There is a left carotid bruit versus transmitted subclavian murmur, probably the latter .  Right carotid pulses markedly diminished.  Left radial pulses 1+ and delayed compared to the 2+ right radial pulse. No abdominal bruits.  Femoral pulses deep one plus left, 1-2+ right with right femoral bruit..  Left  posterior tibial pulses trace-1+, dorsalis pedis not felt. Right posterior tibial and dorsalis pedis not felt.  No edema.  No significant lower extremity skin changes.    Remainder of exam noted below      Impression/plan    1-acute diastolic congestive heart failure episode.  Probably precipitated some COPD.  Improved with profound diuresis.  He now understands sodium restricted diet and daily weights.  I have asked him to watch very carefully as he goes back to only 40 mg twice daily of furosemide to make sure his weight does not go up.  If he goes up to 180 pounds at home he is to call us.  I will have him back in core clinic in 1 month with a basic metabolic panel.  In addition since she has been on this much higher dose of furosemide the last 2 weeks I will check a basic metabolic panel today to check his creatinine and potassium and sodium.    2-new right ventricular dysfunction.  Possibly related to this acute episode.  He does not have evidence of cor pulmonale today.  After he has been stabilized for a month or 2 I would like to recheck his echo to reassess his RV.  He did not have significant pulmonary hypertension by the outside echo.  He did not have severe TR.  I am not sure why his RV was so dilated and hypokinetic compared to previous normal RV function.  If it remains this  abnormal we may need to do a right heart cath    3-coronary artery disease.  Currently without ischemic, heart failure, or arrhythmia symptoms.  Stress study 2011 showed no ischemia.  Ejection fraction 62%.  No significant valvular disease.  Overall risk factor intervention reasonable.  .  4-hypertension, essential plus renovascular-controlled       Plan-basic metabolic panel today.  Follow-up in core clinic in 1 month with a repeat basic metabolic panel.  Will reassess when to do an echo following that.    Orders Placed This Encounter   Procedures     Basic metabolic panel     Follow-Up with [facility] - BAR visit       Orders Placed This Encounter   Medications     umeclidinium-vilanterol (ANORO ELLIPTA) 62.5-25 MCG/INH oral inhaler     Sig: Inhale 1 puff into the lungs daily     albuterol (PROAIR HFA/PROVENTIL HFA/VENTOLIN HFA) 108 (90 Base) MCG/ACT Inhaler     Sig: Inhale 2 puffs into the lungs every 6 hours as needed for shortness of breath / dyspnea or wheezing       There are no discontinued medications.      Encounter Diagnoses   Name Primary?     Diastolic CHF, acute (H) Yes     Coronary artery disease involving native coronary artery of native heart without angina pectoris      CKD (chronic kidney disease), stage III      Acute on chronic renal insufficiency        CURRENT MEDICATIONS:  Current Outpatient Prescriptions   Medication Sig Dispense Refill     albuterol (PROAIR HFA/PROVENTIL HFA/VENTOLIN HFA) 108 (90 Base) MCG/ACT Inhaler Inhale 2 puffs into the lungs every 6 hours as needed for shortness of breath / dyspnea or wheezing       ASPIRIN PO Take 81 mg by mouth daily        carvedilol (COREG) 25 MG tablet Take 25 mg by mouth 2 times daily (with meals)       Cholecalciferol (VITAMIN D) 2000 UNITS tablet Take 2,000 Units by mouth 2 times daily  30 tablet      clopidogrel (PLAVIX) 75 MG tablet Take 75 mg by mouth daily On hold starting 7/7/17        ferrous sulfate 325 (65 FE) MG tablet Take 325 mg by  mouth 2 times daily        furosemide (LASIX) 20 MG tablet Take 80 mg by mouth 2 times daily        OMEPRAZOLE PO Take 20 mg by mouth daily       rosuvastatin (CRESTOR) 40 MG tablet Take 1 tablet (40 mg) by mouth daily 30 tablet 3     triamcinolone (KENALOG) 0.1 % cream Apply topically 2 times daily as needed for irritation        umeclidinium-vilanterol (ANORO ELLIPTA) 62.5-25 MCG/INH oral inhaler Inhale 1 puff into the lungs daily         ALLERGIES   No Known Allergies    PAST MEDICAL HISTORY:  Past Medical History:   Diagnosis Date     Anemia      Atrophy of left kidney      CKD (chronic kidney disease), stage III      Claudication, intermittent (H)      Coronary artery disease 2003    CAB x 4     History of GI bleed 2012    Hemorrhagic gastritis     Hypercholesterolemia      Hypertension      Left carotid artery stenosis     S/P Carotid Endarterectomy left      PVD (peripheral vascular disease) (H)      Renal artery stenosis (H)     stent R       PAST SURGICAL HISTORY:  Past Surgical History:   Procedure Laterality Date     BYPASS GRAFT ARTERY CORONARY  06/2003    LIMA=LAD, SVG=Diag, SVG=OM2, SVG=PDA     CAROTID ENDARTERECTOMY Left      ENDARTERECTOMY CAROTID Left 7/11/2017    Procedure: ENDARTERECTOMY CAROTID;  REDO LEFT CAROTID ENDARTERECTOMY WITH RIGHT ANKLE GREATER SAPHENOUS VEIN  ;  Surgeon: Khurram Bishop MD;  Location: [facility] OR     EYE SURGERY       HEART CATH LEFT HEART CATH  06/2003    Severe multivessel disease     RENAL ARTERY ANGIOGRAM Right 05/2013    with stenting     TONSILLECTOMY         FAMILY HISTORY:  Family History   Problem Relation Age of Onset     Cerebrovascular Disease Mother 90     unknown type     Cancer Brother 70     Brain cancer      Dementia Sister        SOCIAL HISTORY:  Social History     Social History     Marital status:      Spouse name: N/A     Number of children: N/A     Years of education: N/A     Social History Main Topics     Smoking status: Former Smoker     " Packs/day: 1.00     Years: 50.00     Types: Cigarettes     Quit date: 1/1/2001     Smokeless tobacco: Never Used     Alcohol use Yes      Comment: 4 drinks week     Drug use: No     Sexual activity: Not Asked     Other Topics Concern     Caffeine Concern Yes     4 - 7 cups (trying to cut down)     Special Diet Yes     eats healthy     Exercise Yes     walking     Social History Narrative       Review of Systems:  Skin:  Negative       Eyes:  Positive for glasses    ENT:  Positive for hearing loss  pt has hearing aids blilaterally.  Respiratory:  Positive for dyspnea on exertion pt reports when walking too fast for too long.   Cardiovascular:  Negative;palpitations;chest pain;edema;lightheadedness;dizziness Positive for;fatigue;exercise intolerance pt reports it is better than 2-3 wks ago; Pt reports sx's on occ.  Gastroenterology: Negative heartburn;reflux    Genitourinary:  Negative      Musculoskeletal:  Negative      Neurologic:  Negative      Psychiatric:  Negative      Heme/Lymph/Imm:  Negative      Endocrine:  Negative        Physical Exam:  Vitals: /74  Pulse 63  Ht 1.727 m (5' 8\")  Wt 81.9 kg (180 lb 9.6 oz)  BMI 27.46 kg/m2    Constitutional:  cooperative, alert and oriented, well developed, well nourished, in no acute distress        Skin:  warm and dry to the touch, no apparent skin lesions or masses noted          Head:  normocephalic, no masses or lesions        Eyes:  pupils equal and round;sclera white;EOMS intact        Lymph:      ENT:  no pallor or cyanosis hearing aide(s) present      Neck:  JVP normal transmitted murmur (Left bruit >right, Left CEA scar, diminished right carotid pulse) diminished right carotid pulse    Respiratory:  healed median sternotomy scar;normal respiratory excursion diminished breath sounds bilaterally;fine rales (Left subclavian prominant bruit)  bibasilar fine crackles    Cardiac: regular rhythm;normal S1 and S2;no S3 or S4   distant heart sounds     systolic " ejection murmur;RUSB;grade 2 (vs transmitted subclavian bruit)                    0;right dorsalis pedis artery 0;right posterior tibial artery (trace)   1+;left radial artery (delayed relative to right radial)       0 1+ (trace) right femoral bruit (+) left femoral bruit (-)  (left radial pulse delayed compared to right, 1+ left, 2+ right radial pulse)    GI:  abdomen soft;BS normoactive;non-tender;no bruits obese      Extremities and Muscular Skeletal:  no edema;no deformities, clubbing, cyanosis, erythema observed              Neurological:  no gross motor deficits;affect appropriate        Psych:  affect appropriate, oriented to time, person and place        CC  Inderjit Kenny MD  [facility]  [address] [address]

## 2018-06-19 NOTE — MR AVS SNAPSHOT
"              After Visit Summary   6/19/2018    José Aguirre    MRN: [id number]           Patient Information     Date Of Birth          1935        Visit Information        Provider Department      6/19/2018 11:45 AM Radhames Vargas MD [facility]        Today's Diagnoses     Diastolic CHF, acute (H)    -  1    Coronary artery disease involving native coronary artery of native heart without angina pectoris        CKD (chronic kidney disease), stage III        Acute on chronic renal insufficiency           Follow-ups after your visit        Additional Services     Follow-Up with [facility] - BAR visit                 Future tests that were ordered for you today     Open Future Orders        Priority Expected Expires Ordered    Basic metabolic panel Routine 6/19/2018 6/19/2019 6/19/2018    Follow-Up with [facility] - BAR visit Routine 7/17/2018 6/19/2019 6/19/2018            Who to contact     If you have questions or need follow up information about today's clinic visit or your schedule please contact [facility] directly at [phone].  Normal or non-critical lab and imaging results will be communicated to you by [employer]ient[employer], letter or phone within 4 business days after the clinic has received the results. If you do not hear from us within 7 days, please contact the clinic through Super[employer]t or phone. If you have a critical or abnormal lab result, we will notify you by phone as soon as possible.  Submit refill requests through DataProm or call your pharmacy and they will forward the refill request to us. Please allow 3 business days for your refill to be completed.          Additional Information About Your Visit        Anal[employer] Information     DataProm lets you send messages to your doctor, view your test results, renew your prescriptions, schedule appointments and more. To sign up, go to [email or web address] . Click on \"Log " "in\" on the left side of the screen, which will take you to the Welcome page. Then click on \"Sign up Now\" on the right side of the page.     You will be asked to enter the access code listed below, as well as some personal information. Please follow the directions to create your username and password.     Your access code is: [email or web address]  Expires: 2018 12:40 PM     Your access code will  in 90 days. If you need help or a new code, please call your [place] clinic or [phone].        Care EveryWhere ID     This is your Care EveryWhere ID. This could be used by other organizations to access your [place] medical records  [id number]        Your Vitals Were     Pulse Height BMI (Body Mass Index)             63 1.727 m (5' 8\") 27.46 kg/m2          Blood Pressure from Last 3 Encounters:   18 126/74   17 122/56   17 107/62    Weight from Last 3 Encounters:   18 81.9 kg (180 lb 9.6 oz)   17 86.2 kg (190 lb 1.6 oz)   17 86.2 kg (190 lb)               Primary Care Provider Office Phone # Fax #    [place] Osbaldo Kenny [phone]       [facility] [address] [place] [address]        Equal Access to Services     [facility] CenterBONIFACIO AH: Hadii aad ku hadasho Soomaali, waaxda luqadaha, qaybta kaalmada adeegyada, vito harris haybrendan herrmann . So [facility] [phone].    ATENCIÓN: Si habla español, tiene a spencer disposición servicios gratuitos de asistencia lingüística. Radha al [phone].    We comply with applicable federal civil rights laws and [place] laws. We do not discriminate on the basis of race, color, national origin, age, disability, sex, sexual orientation, or gender identity.            Thank you!     Thank you for choosing [facility]  for your care. Our goal is always to provide you with excellent care. Hearing back from our patients is one way we can continue to improve our services. Please " take a few minutes to complete the written survey that you may receive in the mail after your visit with us. Thank you!             Your Updated Medication List - Protect others around you: Learn how to safely use, store and throw away your medicines at www.disposemymeds.org.          This list is accurate as of 6/19/18 12:40 PM.  Always use your most recent med list.                   Brand Name Dispense Instructions for use Diagnosis    albuterol 108 (90 Base) MCG/ACT Inhaler    PROAIR HFA/PROVENTIL HFA/VENTOLIN HFA     Inhale 2 puffs into the lungs every 6 hours as needed for shortness of breath / dyspnea or wheezing        ASPIRIN PO      Take 81 mg by mouth daily        carvedilol 25 MG tablet    COREG     Take 25 mg by mouth 2 times daily (with meals)        clopidogrel 75 MG tablet    PLAVIX     Take 75 mg by mouth daily On hold starting 7/7/17        ferrous sulfate 325 (65 Fe) MG tablet    IRON     Take 325 mg by mouth 2 times daily        furosemide 20 MG tablet    LASIX     Take 80 mg by mouth 2 times daily        OMEPRAZOLE PO      Take 20 mg by mouth daily        rosuvastatin 40 MG tablet    CRESTOR    30 tablet    Take 1 tablet (40 mg) by mouth daily    CAD (coronary artery disease)       triamcinolone 0.1 % cream    KENALOG     Apply topically 2 times daily as needed for irritation        umeclidinium-vilanterol 62.5-25 MCG/INH oral inhaler    ANORO ELLIPTA     Inhale 1 puff into the lungs daily        vitamin D 2000 units tablet     30 tablet    Take 2,000 Units by mouth 2 times daily

## 2018-06-20 NOTE — TELEPHONE ENCOUNTER
Creat around baseline.  K+ has gone further down. Needs KCL supplementation- 40 meq daily (or in divided doses) for 5 days then 20 mequ daily. F/u BMP 1 week after starting KCl

## 2018-06-22 DIAGNOSIS — I10 BENIGN ESSENTIAL HYPERTENSION: ICD-10-CM

## 2018-06-22 DIAGNOSIS — E87.6 HYPOKALEMIA: ICD-10-CM

## 2018-06-22 RX ORDER — POTASSIUM CHLORIDE 1500 MG/1
TABLET, EXTENDED RELEASE ORAL
Qty: 90 TABLET | Refills: 3 | Status: SHIPPED | OUTPATIENT
Start: 2018-06-22 | End: 2019-09-22

## 2018-06-22 RX ORDER — POTASSIUM CHLORIDE 1500 MG/1
TABLET, EXTENDED RELEASE ORAL
Qty: 90 TABLET | Refills: 3 | Status: SHIPPED | OUTPATIENT
Start: 2018-06-22 | End: 2018-06-22

## 2018-06-22 NOTE — TELEPHONE ENCOUNTER
Called and spoke with pt. Communicated lab results and Dr. Vargas's recommendations to start potassium supplement 40 meq x 5 days, then 20 meq daily. Rx sent to pt's preferred pharmacy. Advised pt will need labs drawn one week after starting KCL. Order placed. Phone disconnected, attempted to call pt back x 2. Left detailed VM stating rx for KCL sent to pharmacy, pt to call scheduling to set up lab appointment 1 week after starting the KCL. Left phone number for both scheduling and Team 1 if pt has further questions.

## 2018-06-28 ENCOUNTER — TELEPHONE (OUTPATIENT)
Dept: CARDIOLOGY | Facility: CLINIC | Age: 83
End: 2018-06-28

## 2018-06-28 DIAGNOSIS — I10 BENIGN ESSENTIAL HYPERTENSION: ICD-10-CM

## 2018-06-28 DIAGNOSIS — E87.6 HYPOKALEMIA: ICD-10-CM

## 2018-06-28 LAB
ANION GAP SERPL CALCULATED.3IONS-SCNC: 15.5 MMOL/L (ref 6–17)
BUN SERPL-MCNC: 15 MG/DL (ref 7–30)
CALCIUM SERPL-MCNC: 9.4 MG/DL (ref 8.5–10.5)
CHLORIDE SERPL-SCNC: 105 MMOL/L (ref 98–107)
CO2 SERPL-SCNC: 21 MMOL/L (ref 23–29)
CREAT SERPL-MCNC: 1.63 MG/DL (ref 0.7–1.3)
GFR SERPL CREATININE-BSD FRML MDRD: 41 ML/MIN/1.7M2
GLUCOSE SERPL-MCNC: 124 MG/DL (ref 70–105)
POTASSIUM SERPL-SCNC: 4.5 MMOL/L (ref 3.5–5.1)
SODIUM SERPL-SCNC: 137 MMOL/L (ref 136–145)

## 2018-06-28 PROCEDURE — 80048 BASIC METABOLIC PNL TOTAL CA: CPT | Performed by: INTERNAL MEDICINE

## 2018-06-28 PROCEDURE — 36415 COLL VENOUS BLD VENIPUNCTURE: CPT | Performed by: INTERNAL MEDICINE

## 2018-06-28 NOTE — TELEPHONE ENCOUNTER
BMP results noted. Will route to Dr. Vargas for update. Pt has [facility] appt with HÉCTOR Quevedo on 7/13/18.     Component      Latest Ref Rng & Units 6/28/2018   Sodium      136 - 145 mmol/L 137   Potassium      3.5 - 5.1 mmol/L 4.5   Chloride      98 - 107 mmol/L 105   Carbon Dioxide      23 - 29 mmol/L 21 (L)   Anion Gap      6 - 17 mmol/L 15.5   Glucose      70 - 105 mg/dL 124 (H)   Urea Nitrogen      7 - 30 mg/dL 15   Creatinine      0.70 - 1.30 mg/dL 1.63 (H)   GFR Estimate      >60 mL/min/1.7m2 41 (L)   GFR Estimate If Black      >60 mL/min/1.7m2 49 (L)   Calcium      8.5 - 10.5 mg/dL 9.4

## 2018-07-05 NOTE — TELEPHONE ENCOUNTER
Received call from pt wanting to know results of BMP. Called pt back and left detailed message regarding results. Explained that if he would like to discuss them further then he can call back otherwise the results will be reviewed at [facility] on 7/13/18. Left Team 1's call back number.

## 2018-07-09 ENCOUNTER — TELEPHONE (OUTPATIENT)
Dept: CARDIOLOGY | Facility: CLINIC | Age: 83
End: 2018-07-09

## 2018-07-09 DIAGNOSIS — I50.9 CHF (CONGESTIVE HEART FAILURE) (H): Primary | ICD-10-CM

## 2018-07-12 ENCOUNTER — TRANSFERRED RECORDS (OUTPATIENT)
Dept: HEALTH INFORMATION MANAGEMENT | Facility: CLINIC | Age: 83
End: 2018-07-12

## 2018-07-13 ENCOUNTER — OFFICE VISIT (OUTPATIENT)
Dept: CARDIOLOGY | Facility: CLINIC | Age: 83
End: 2018-07-13
Attending: INTERNAL MEDICINE
Payer: COMMERCIAL

## 2018-07-13 VITALS
WEIGHT: 175.9 LBS | HEIGHT: 68 IN | DIASTOLIC BLOOD PRESSURE: 62 MMHG | SYSTOLIC BLOOD PRESSURE: 120 MMHG | OXYGEN SATURATION: 93 % | BODY MASS INDEX: 26.66 KG/M2 | HEART RATE: 60 BPM

## 2018-07-13 DIAGNOSIS — I50.31 DIASTOLIC CHF, ACUTE (H): Primary | ICD-10-CM

## 2018-07-13 DIAGNOSIS — I50.31 ACUTE DIASTOLIC CONGESTIVE HEART FAILURE (H): Primary | ICD-10-CM

## 2018-07-13 DIAGNOSIS — I50.9 CHF (CONGESTIVE HEART FAILURE) (H): ICD-10-CM

## 2018-07-13 DIAGNOSIS — I25.10 CORONARY ARTERY DISEASE INVOLVING NATIVE CORONARY ARTERY OF NATIVE HEART WITHOUT ANGINA PECTORIS: ICD-10-CM

## 2018-07-13 DIAGNOSIS — I10 ESSENTIAL HYPERTENSION: ICD-10-CM

## 2018-07-13 LAB
ANION GAP SERPL CALCULATED.3IONS-SCNC: 7 MMOL/L (ref 3–14)
BUN SERPL-MCNC: 20 MG/DL (ref 7–30)
CALCIUM SERPL-MCNC: 8.8 MG/DL (ref 8.5–10.1)
CHLORIDE SERPL-SCNC: 108 MMOL/L (ref 94–109)
CO2 SERPL-SCNC: 22 MMOL/L (ref 20–32)
CREAT SERPL-MCNC: 1.33 MG/DL (ref 0.66–1.25)
GFR SERPL CREATININE-BSD FRML MDRD: 51 ML/MIN/1.7M2
GLUCOSE SERPL-MCNC: 112 MG/DL (ref 70–99)
POTASSIUM SERPL-SCNC: 4.4 MMOL/L (ref 3.4–5.3)
SODIUM SERPL-SCNC: 137 MMOL/L (ref 133–144)

## 2018-07-13 PROCEDURE — 80048 BASIC METABOLIC PNL TOTAL CA: CPT | Performed by: INTERNAL MEDICINE

## 2018-07-13 PROCEDURE — 99214 OFFICE O/P EST MOD 30 MIN: CPT | Performed by: PHYSICIAN ASSISTANT

## 2018-07-13 PROCEDURE — 36415 COLL VENOUS BLD VENIPUNCTURE: CPT | Performed by: INTERNAL MEDICINE

## 2018-07-13 RX ORDER — FUROSEMIDE 40 MG
40 TABLET ORAL 2 TIMES DAILY
Qty: 180 TABLET | Refills: 3 | Status: SHIPPED | OUTPATIENT
Start: 2018-07-13 | End: 2018-07-16

## 2018-07-13 RX ORDER — FUROSEMIDE 40 MG
40 TABLET ORAL 2 TIMES DAILY
Qty: 30 TABLET | COMMUNITY
Start: 2018-07-13 | End: 2018-07-13

## 2018-07-13 NOTE — MR AVS SNAPSHOT
"              After Visit Summary   7/13/2018    Roc Aguirre    MRN: [id number]           Patient Information     Date Of Birth          1935        Visit Information        Provider Department      7/13/2018 9:30 AM Ilene Vale PA [facility]        Today's Diagnoses     Diastolic CHF, acute (H)          Care Instructions    Visit Summary:    Today we discussed:   No medication changes today.     We will call your \"goal weight\" 173-176. If you get up 178-180 call us     Call the C.O.R.E. nurse for any questions or concerns:  [phone]      Test results:   Results for ROC AGUIRRE (MRN [id number]) as of 7/13/2018 09:56   Ref. Range 6/19/2018 13:03 6/28/2018 09:35 7/13/2018 08:07   Sodium Latest Ref Range: 133 - 144 mmol/L 135 (L) 137 137   Potassium Latest Ref Range: 3.4 - 5.3 mmol/L 3.2 (L) 4.5 4.4   Chloride Latest Ref Range: 94 - 109 mmol/L 97 (L) 105 108   Carbon Dioxide Latest Ref Range: 20 - 32 mmol/L 28 21 (L) 22   Urea Nitrogen Latest Ref Range: 7 - 30 mg/dL 27 15 20   Creatinine Latest Ref Range: 0.66 - 1.25 mg/dL 1.61 (H) 1.63 (H) 1.33 (H)   GFR Estimate Latest Ref Range: >60 mL/min/1.7m2 41 (L) 41 (L) 51 (L)   GFR Estimate If Black Latest Ref Range: >60 mL/min/1.7m2 50 (L) 49 (L) 62   Calcium Latest Ref Range: 8.5 - 10.1 mg/dL 9.3 9.4 8.8   Anion Gap Latest Ref Range: 3 - 14 mmol/L 13.2 15.5 7       Follow up:   In 6 weeks with Ilene in the [facility] clinic.                   Follow-ups after your visit        Additional Services     Follow-Up with [facility] - BAR visit                 Your next 10 appointments already scheduled     Aug 31, 2018  9:00 AM CDT   LAB with RU LAB   [facility] ([facility])    [address] [address] [address] [phone]           Please do not eat 10-12 hours before your appointment if you are coming in fasting for labs on lipids, cholesterol, or " "glucose (sugar). This does not apply to pregnant women. Water, hot tea and black coffee (with nothing added) are okay. Do not drink other fluids, diet soda or chew gum.            Aug 31, 2018 10:10 AM CDT   Core Return with HÉCTOR Guerra   [facility] ([facility])    [address] [facility] [address]  [facility] [address]   [phone]              Future tests that were ordered for you today     Open Future Orders        Priority Expected Expires Ordered    Follow-Up with [facility] - BAR visit Routine 8/24/2018 7/13/2019 7/13/2018    Basic metabolic panel Routine 8/24/2018 7/13/2019 7/13/2018            Who to contact     If you have questions or need follow up information about today's clinic visit or your schedule please contact [facility] directly at [phone].  Normal or non-critical lab and imaging results will be communicated to you by [employer], letter or phone within 4 business days after the clinic has received the results. If you do not hear from us within 7 days, please contact the clinic through [employer] or phone. If you have a critical or abnormal lab result, we will notify you by phone as soon as possible.  Submit refill requests through "[employer] or call your pharmacy and they will forward the refill request to us. Please allow 3 business days for your refill to be completed.          Additional Information About Your Visit        "[employer] Information     "[employer] lets you send messages to your doctor, view your test results, renew your prescriptions, schedule appointments and more. To sign up, go to [email or web address] . Click on \"Log in\" on the left side of the screen, which will take you to the Welcome page. Then click on \"Sign up Now\" on the right side of the page.     You will be asked to enter the access code listed below, as well as some personal information. Please follow the directions to " "create your username and password.     Your access code is: [email or web address]  Expires: 2018 12:40 PM     Your access code will  in 90 days. If you need help or a new code, please call your [place] clinic or [phone].        Care EveryWhere ID     This is your Care EveryWhere ID. This could be used by other organizations to access your [place] medical records  [id number]        Your Vitals Were     Pulse Height Pulse Oximetry BMI (Body Mass Index)          60 1.727 m (5' 8\") 93% 26.75 kg/m2         Blood Pressure from Last 3 Encounters:   18 120/62   18 126/74   17 122/56    Weight from Last 3 Encounters:   18 79.8 kg (175 lb 14.4 oz)   18 81.9 kg (180 lb 9.6 oz)   17 86.2 kg (190 lb 1.6 oz)              We Performed the Following     Follow-Up with [facility] - BAR visit          Today's Medication Changes          These changes are accurate as of 18 10:21 AM.  If you have any questions, ask your nurse or doctor.               These medicines have changed or have updated prescriptions.        Dose/Directions    LASIX 40 MG tablet   This may have changed:  Another medication with the same name was removed. Continue taking this medication, and follow the directions you see here.   Generic drug:  furosemide   Changed by:  Ilene Vale PA        Dose:  40 mg   Take 1 tablet (40 mg) by mouth 2 times daily   Quantity:  30 tablet   Refills:  0       potassium chloride SA 20 MEQ CR tablet   Commonly known as:  K-DUR/KLOR-CON M   This may have changed:    - how much to take  - when to take this  - additional instructions   Used for:  Benign essential hypertension, Hypokalemia        Take 2 tablets (40 mEq) by mouth daily for 5 days, then take 1 tablet (20 mEq) daily   Quantity:  90 tablet   Refills:  3                Primary Care Provider Office Phone # Fax #    Inderjit Kenny [phone]       [place] [id number] " [address]        Equal Access to Services     [facility]: Hadii tom reardon viviennekulwinder Kirillali, waehsanda luqadaha, qaybta kalilyvito purcell. So [facility] [phone].    ATENCIÓN: Si habla español, tiene a spencer disposición servicios gratuitos de asistencia lingüística. Kyleame al [phone].    We comply with applicable federal civil rights laws and [place] laws. We do not discriminate on the basis of race, color, national origin, age, disability, sex, sexual orientation, or gender identity.            Thank you!     Thank you for choosing [facility]  for your care. Our goal is always to provide you with excellent care. Hearing back from our patients is one way we can continue to improve our services. Please take a few minutes to complete the written survey that you may receive in the mail after your visit with us. Thank you!             Your Updated Medication List - Protect others around you: Learn how to safely use, store and throw away your medicines at www.disposemymeds.org.          This list is accurate as of 7/13/18 10:21 AM.  Always use your most recent med list.                   Brand Name Dispense Instructions for use Diagnosis    albuterol 108 (90 Base) MCG/ACT Inhaler    PROAIR HFA/PROVENTIL HFA/VENTOLIN HFA     Inhale 2 puffs into the lungs every 6 hours as needed for shortness of breath / dyspnea or wheezing        ASPIRIN PO      Take 81 mg by mouth daily        carvedilol 25 MG tablet    COREG     Take 25 mg by mouth 2 times daily (with meals)        clopidogrel 75 MG tablet    PLAVIX     Take 75 mg by mouth daily        ferrous sulfate 325 (65 Fe) MG tablet    IRON     Take 325 mg by mouth 2 times daily        LASIX 40 MG tablet   Generic drug:  furosemide     30 tablet    Take 1 tablet (40 mg) by mouth 2 times daily        OMEPRAZOLE PO      Take 20 mg by mouth daily        potassium chloride SA 20 MEQ CR tablet     K-DUR/KLOR-CON M    90 tablet    Take 2 tablets (40 mEq) by mouth daily for 5 days, then take 1 tablet (20 mEq) daily    Benign essential hypertension, Hypokalemia       rosuvastatin 40 MG tablet    CRESTOR    30 tablet    Take 1 tablet (40 mg) by mouth daily    CAD (coronary artery disease)       triamcinolone 0.1 % cream    KENALOG     Apply topically 2 times daily as needed for irritation        umeclidinium-vilanterol 62.5-25 MCG/INH oral inhaler    ANORO ELLIPTA     Inhale 1 puff into the lungs daily        vitamin D 2000 units tablet     30 tablet    Take 2,000 Units by mouth 2 times daily

## 2018-07-13 NOTE — LETTER
7/13/2018    Inderjit Wang MD  [facility] [address] [address]    RE: José Aguirre       Dear Colleague,    I had the pleasure of seeing José Aguirre in the [facility].      Cardiology Progress Note    Date of Service: 07/13/2018      Reason for visit: [facility] clinic enrollment, acute on chronic diastolic heart failure.    Primary cardiologist: Dr. Radhames Vargas      HPI:  Mr. Aguirre is a very pleasant 82 year old male with a complex PMHx including COPD, peripheral vascular disease and carotid artery disease s/p CEA, hyperlipidemia, prior GI bleed, hypertension and chronic kidney disease and KARLY s/p stent placement, and coronary artery disease s/p CABG 2003. He is well known to Dr. Vargas. He was recently hospitalized at [facility] in June with acute diastolic heart failure and COPD exacerbation. He admitted to eating a high salt diet and was found to be volume overloaded on evaluation. Reportedly, he was admitted at 198 pounds and he was diuresed down to 180 pounds. His stay was complicated by acute on chronic renal insufficiency. He improved after aggressive diuresis and treatment with steroids for his COPD. An echocardiogram with that admit reported normal left ventricular systolic function and normal wall motion, right ventricular enlargement and reported moderate reduced RV function and mildly increased RVSP at 32+ RAP.  His last echo here in 2016 had reported normal RV size and function and preserved EF 55-60%. When he returned to see Dr. Vargas in mid June, he was doing well, following a low sodium diet, and his CRAEN had resolved.  He was instructed to call if his weight went back up beyond 180 lbs and it was requested he be followed in the [facility] clinic. He is here for our first visit today.    Since last visit, Mr. Aguirre continues to feel well. He is being very strict about his sodium intake, and he has continued to lose weight, down to 175 lbs  "today (173 lbs home weight). He has no leg edema and denies any CRANE.   There was some confusion regarding his diuretic dosing, but Mr. Aguirre tells me he has continued on 40mg twice daily of Lasix since discharge. He is taking his daily weights at home faithfully. He has followed up with pulmonary as instructed. It is noted that he had a chest CT which showed pleural plaques consistent with asbestos exposure, and a pulmonary nodule, but no significant asbestosis or fibrotic disease was identified. He tells me his lung function continues to be suboptimal but he is being placed on a new inhaler, though cannot recall the name. His BP and HR today are very well controlled, and his labs show an overall favorable trend in his renal function. Electrolytes are within normal limits.       ASSESSMENT/PLAN:    1. Acute on chronic diastolic heart failure.   --Recent episode of acute diastolic heart failure, in the setting of a COPD exacerbation. He appears euvolemic on exam today, and has made significant improvements with strict adherence to a low salt diet. As his renal function is at baseline, will continue current dose of lasix at 40mg twice daily and current KDur dosing. [Addendum: Pt called back after our visit and confirmed he is taking 20mg BID at home. We will continue this. Chart updated.]   --He will continue to weigh himself daily at home. For now, we will set his goal weight at 173-176. We talked about establishing a \"dry weight\" in the future which is likely where he is at currently.    --He was noted to have RV dysfunction on his echocardiogram with his recent hospital stay. Plan is to repeat this in a few months after we ensure euvolemia. He did not previously have evidence of pulmonary hypertension. If his RV remains abnormal will consider RHC.     2. Coronary artery disease.   --S/P CABG in 2003 (LIMA=LAD, SVG=Diag, SVG=OM2, SVG=PDA).   --He remains free of angina or evidence of ischemia on exam. Last stress " study 2011 showed no ischemia.   --Continue  ASA 81mg daily.   --Continue rosuvastatin 40mg daily. Last LDL July 2017 was 71.     3. Hypertension.   --Continue carvedilol 25mg BID without change. BP well controlled today.    --History of prior renovascular issues, s/p stent for KARLY. He is on plavix 75mg daily.         Follow up plan: With Ilene Vale PA-C in 6 weeks, and plan to return to see Dr. Vargas in 3-4 months with repeat echocardiogram.       Orders this Visit:  Orders Placed This Encounter   Procedures     Basic metabolic panel     Follow-Up with [facility] - BAR visit     Echocardiogram     Orders Placed This Encounter   Medications     DISCONTD: furosemide (LASIX) 40 MG tablet     Sig: Take 1 tablet (40 mg) by mouth 2 times daily     Dispense:  30 tablet     Medications Discontinued During This Encounter   Medication Reason     furosemide (LASIX) 20 MG tablet Dose adjustment           CURRENT MEDICATIONS:  Current Outpatient Prescriptions   Medication Sig Dispense Refill     albuterol (PROAIR HFA/PROVENTIL HFA/VENTOLIN HFA) 108 (90 Base) MCG/ACT Inhaler Inhale 2 puffs into the lungs every 6 hours as needed for shortness of breath / dyspnea or wheezing       ASPIRIN PO Take 81 mg by mouth daily        carvedilol (COREG) 25 MG tablet Take 25 mg by mouth 2 times daily (with meals)       Cholecalciferol (VITAMIN D) 2000 UNITS tablet Take 2,000 Units by mouth 2 times daily  30 tablet      clopidogrel (PLAVIX) 75 MG tablet Take 75 mg by mouth daily        ferrous sulfate 325 (65 FE) MG tablet Take 325 mg by mouth 2 times daily        OMEPRAZOLE PO Take 20 mg by mouth daily       potassium chloride SA (K-DUR/KLOR-CON M) 20 MEQ CR tablet Take 2 tablets (40 mEq) by mouth daily for 5 days, then take 1 tablet (20 mEq) daily (Patient taking differently: 20 mEq daily ) 90 tablet 3     rosuvastatin (CRESTOR) 40 MG tablet Take 1 tablet (40 mg) by mouth daily 30 tablet 3     triamcinolone (KENALOG) 0.1 % cream Apply  topically 2 times daily as needed for irritation        umeclidinium-vilanterol (ANORO ELLIPTA) 62.5-25 MCG/INH oral inhaler Inhale 1 puff into the lungs daily       [DISCONTINUED] furosemide (LASIX) 40 MG tablet Take 1 tablet (40 mg) by mouth 2 times daily 30 tablet      furosemide (LASIX) 40 MG tablet Take 1 tablet (40 mg) by mouth 2 times daily 180 tablet 3     [DISCONTINUED] furosemide (LASIX) 20 MG tablet Take 80 mg by mouth 2 times daily          ALLERGIES   No Known Allergies    PAST MEDICAL HISTORY:  Past Medical History:   Diagnosis Date     Anemia      Atrophy of left kidney      CKD (chronic kidney disease), stage III      Claudication, intermittent (H)      Coronary artery disease 2003    CAB x 4     History of GI bleed 2012    Hemorrhagic gastritis     Hypercholesterolemia      Hypertension      Left carotid artery stenosis     S/P Carotid Endarterectomy left      PVD (peripheral vascular disease) (H)      Renal artery stenosis (H)     stent R       PAST SURGICAL HISTORY:  Past Surgical History:   Procedure Laterality Date     BYPASS GRAFT ARTERY CORONARY  06/2003    LIMA=LAD, SVG=Diag, SVG=OM2, SVG=PDA     CAROTID ENDARTERECTOMY Left      ENDARTERECTOMY CAROTID Left 7/11/2017    Procedure: ENDARTERECTOMY CAROTID;  REDO LEFT CAROTID ENDARTERECTOMY WITH RIGHT ANKLE GREATER SAPHENOUS VEIN  ;  Surgeon: Khurram Bishop MD;  Location: [facility] OR     EYE SURGERY       HEART CATH LEFT HEART CATH  06/2003    Severe multivessel disease     RENAL ARTERY ANGIOGRAM Right 05/2013    with stenting     TONSILLECTOMY         FAMILY HISTORY:  Family History   Problem Relation Age of Onset     Cerebrovascular Disease Mother 90     unknown type     Cancer Brother 70     Brain cancer      Dementia Sister      Arthritis Brother        SOCIAL HISTORY:  Social History     Social History     Marital status:      Spouse name: N/A     Number of children: N/A     Years of education: N/A     Social History Main Topics  "    Smoking status: Former Smoker     Packs/day: 1.00     Years: 50.00     Types: Cigarettes     Quit date: 1/1/2001     Smokeless tobacco: Never Used     Alcohol use Yes      Comment: 4 drinks week     Drug use: No     Sexual activity: Not Asked     Other Topics Concern     Caffeine Concern Yes     4 - 7 cups (trying to cut down)     Special Diet Yes     eats healthy     Exercise Yes     walking     Social History Narrative       Review of Systems:  Cardiovascular: negative for chest pain, palpitations, orthopnea, LE edema  Constitutional: negative for chills, sweats, fevers   Resp: Negative for dyspnea at rest, dyspnea on exertion, pos known chronic lung disease  HEENT: Negative for new visual changes, frequent headaches  Gastrointestinal: negative for abdominal pain, diarrhea, nausea, vomiting  Hematologic/lymphatic: negative for current systemic anticoagulation, hx of blood clots  Musculoskeletal: negative for new back pain, joint pain  Neurological: negative for focal weakness, LOC, seizures, syncope, neg dizziness.       Physical Exam:  Vitals: /62 (BP Location: Right arm, Patient Position: Sitting, Cuff Size: Adult Regular)  Pulse 60  Ht 1.727 m (5' 8\")  Wt 79.8 kg (175 lb 14.4 oz)  SpO2 93%  BMI 26.75 kg/m2   Wt Readings from Last 4 Encounters:   07/13/18 79.8 kg (175 lb 14.4 oz)   06/19/18 81.9 kg (180 lb 9.6 oz)   12/01/17 86.2 kg (190 lb 1.6 oz)   07/27/17 86.2 kg (190 lb)       GEN:  In general, this is a well nourished  male in no acute distress on room air.  Patient ambulatory. Accompanied by his wife today.  HEENT:  Pupils equal, round. Sclerae nonicteric.   NECK: Supple, no masses appreciated. Trachea midline. No JVD.  C/V:  Regular rate and rhythm, no murmur, rub or gallop.   RESP: Respirations are unlabored. No use of accessory muscles. Clear to auscultation bilaterally without wheezing, rales, or rhonchi.  GI: Abdomen soft, nontender, nondistended.   EXTREM: No LE edema. No " cyanosis or clubbing.  NEURO: Alert and oriented, cooperative. Gait not formally assessed. No obvious focal deficits.   PSYCH: Normal affect.  SKIN: Warm and dry. No rashes or petechiae appreciated.       Recent Lab Results:  LIPID RESULTS:  Lab Results   Component Value Date    CHOL 140 07/11/2017    HDL 41 07/11/2017    LDL 71 07/11/2017    TRIG 140 07/11/2017       BMP RESULTS:  Lab Results   Component Value Date     07/13/2018    POTASSIUM 4.4 07/13/2018    CHLORIDE 108 07/13/2018    CO2 22 07/13/2018    ANIONGAP 7 07/13/2018     (H) 07/13/2018    BUN 20 07/13/2018    CR 1.33 (H) 07/13/2018    GFRESTIMATED 51 (L) 07/13/2018    GFRESTBLACK 62 07/13/2018    YOON 8.8 07/13/2018          Ilene Vale PA-C  [facility]  Pager [phone]      Thank you for allowing me to participate in the care of your patient.      Sincerely,     HÉCTOR Guerra     [facility]    cc:   Radhames Vargas MD  [address]  [place] [address]

## 2018-07-13 NOTE — LETTER
7/13/2018    Inderjit Wang MD  [facility] [address] [address]    RE: José Aguirre       Dear Colleague,    I had the pleasure of seeing José Aguirre in the [facility].      Cardiology Progress Note    Date of Service: 07/13/2018      Reason for visit: [facility] clinic enrollment, acute on chronic diastolic heart failure.    Primary cardiologist: Dr. Radhames Vargas      HPI:  Mr. Aguirre is a very pleasant 82 year old male with a complex PMHx including COPD, peripheral vascular disease and carotid artery disease s/p CEA, hyperlipidemia, prior GI bleed, hypertension and chronic kidney disease and KARLY s/p stent placement, and coronary artery disease s/p CABG 2003. He is well known to Dr. Vargas. He was recently hospitalized at [facility] in June with acute diastolic heart failure and COPD exacerbation. He admitted to eating a high salt diet and was found to be volume overloaded on evaluation. Reportedly, he was admitted at 198 pounds and he was diuresed down to 180 pounds. His stay was complicated by acute on chronic renal insufficiency. He improved after aggressive diuresis and treatment with steroids for his COPD. An echocardiogram with that admit reported normal left ventricular systolic function and normal wall motion, right ventricular enlargement and reported moderate reduced RV function and mildly increased RVSP at 32+ RAP.  His last echo here in 2016 had reported normal RV size and function and preserved EF 55-60%. When he returned to see Dr. Vargas in mid June, he was doing well, following a low sodium diet, and his CRANE had resolved.  He was instructed to call if his weight went back up beyond 180 lbs and it was requested he be followed in the [facility] clinic. He is here for our first visit today.    Since last visit, Mr. Aguirre continues to feel well. He is being very strict about his sodium intake, and he has continued to lose weight, down to 175 lbs  "today (173 lbs home weight). He has no leg edema and denies any CRANE.   There was some confusion regarding his diuretic dosing, but Mr. Aguirre tells me he has continued on 40mg twice daily of Lasix since discharge. He is taking his daily weights at home faithfully. He has followed up with pulmonary as instructed. It is noted that he had a chest CT which showed pleural plaques consistent with asbestos exposure, and a pulmonary nodule, but no significant asbestosis or fibrotic disease was identified. He tells me his lung function continues to be suboptimal but he is being placed on a new inhaler, though cannot recall the name. His BP and HR today are very well controlled, and his labs show an overall favorable trend in his renal function. Electrolytes are within normal limits.       ASSESSMENT/PLAN:    1. Acute on chronic diastolic heart failure.   --Recent episode of acute diastolic heart failure, in the setting of a COPD exacerbation. He appears euvolemic on exam today, and has made significant improvements with strict adherence to a low salt diet. As his renal function is at baseline, will continue current dose of lasix at 40mg twice daily and current KDur dosing. [Addendum: Pt called back after our visit and confirmed he is taking 20mg BID at home. We will continue this. Chart updated.]   --He will continue to weigh himself daily at home. For now, we will set his goal weight at 173-176. We talked about establishing a \"dry weight\" in the future which is likely where he is at currently.    --He was noted to have RV dysfunction on his echocardiogram with his recent hospital stay. Plan is to repeat this in a few months after we ensure euvolemia. He did not previously have evidence of pulmonary hypertension. If his RV remains abnormal will consider RHC.     2. Coronary artery disease.   --S/P CABG in 2003 (LIMA=LAD, SVG=Diag, SVG=OM2, SVG=PDA).   --He remains free of angina or evidence of ischemia on exam. Last stress " study 2011 showed no ischemia.   --Continue  ASA 81mg daily.   --Continue rosuvastatin 40mg daily. Last LDL July 2017 was 71.     3. Hypertension.   --Continue carvedilol 25mg BID without change. BP well controlled today.    --History of prior renovascular issues, s/p stent for KARLY. He is on plavix 75mg daily.         Follow up plan: With Ilene Vale PA-C in 6 weeks, and plan to return to see Dr. Vargas in 3-4 months with repeat echocardiogram.       Orders this Visit:  Orders Placed This Encounter   Procedures     Basic metabolic panel     Follow-Up with [facility] - BAR visit     Echocardiogram     Orders Placed This Encounter   Medications     DISCONTD: furosemide (LASIX) 40 MG tablet     Sig: Take 1 tablet (40 mg) by mouth 2 times daily     Dispense:  30 tablet     Medications Discontinued During This Encounter   Medication Reason     furosemide (LASIX) 20 MG tablet Dose adjustment           CURRENT MEDICATIONS:  Current Outpatient Prescriptions   Medication Sig Dispense Refill     albuterol (PROAIR HFA/PROVENTIL HFA/VENTOLIN HFA) 108 (90 Base) MCG/ACT Inhaler Inhale 2 puffs into the lungs every 6 hours as needed for shortness of breath / dyspnea or wheezing       ASPIRIN PO Take 81 mg by mouth daily        carvedilol (COREG) 25 MG tablet Take 25 mg by mouth 2 times daily (with meals)       Cholecalciferol (VITAMIN D) 2000 UNITS tablet Take 2,000 Units by mouth 2 times daily  30 tablet      clopidogrel (PLAVIX) 75 MG tablet Take 75 mg by mouth daily        ferrous sulfate 325 (65 FE) MG tablet Take 325 mg by mouth 2 times daily        OMEPRAZOLE PO Take 20 mg by mouth daily       potassium chloride SA (K-DUR/KLOR-CON M) 20 MEQ CR tablet Take 2 tablets (40 mEq) by mouth daily for 5 days, then take 1 tablet (20 mEq) daily (Patient taking differently: 20 mEq daily ) 90 tablet 3     rosuvastatin (CRESTOR) 40 MG tablet Take 1 tablet (40 mg) by mouth daily 30 tablet 3     triamcinolone (KENALOG) 0.1 % cream Apply  topically 2 times daily as needed for irritation        umeclidinium-vilanterol (ANORO ELLIPTA) 62.5-25 MCG/INH oral inhaler Inhale 1 puff into the lungs daily       [DISCONTINUED] furosemide (LASIX) 40 MG tablet Take 1 tablet (40 mg) by mouth 2 times daily 30 tablet      furosemide (LASIX) 40 MG tablet Take 1 tablet (40 mg) by mouth 2 times daily 180 tablet 3     [DISCONTINUED] furosemide (LASIX) 20 MG tablet Take 80 mg by mouth 2 times daily          ALLERGIES   No Known Allergies    PAST MEDICAL HISTORY:  Past Medical History:   Diagnosis Date     Anemia      Atrophy of left kidney      CKD (chronic kidney disease), stage III      Claudication, intermittent (H)      Coronary artery disease 2003    CAB x 4     History of GI bleed 2012    Hemorrhagic gastritis     Hypercholesterolemia      Hypertension      Left carotid artery stenosis     S/P Carotid Endarterectomy left      PVD (peripheral vascular disease) (H)      Renal artery stenosis (H)     stent R       PAST SURGICAL HISTORY:  Past Surgical History:   Procedure Laterality Date     BYPASS GRAFT ARTERY CORONARY  06/2003    LIMA=LAD, SVG=Diag, SVG=OM2, SVG=PDA     CAROTID ENDARTERECTOMY Left      ENDARTERECTOMY CAROTID Left 7/11/2017    Procedure: ENDARTERECTOMY CAROTID;  REDO LEFT CAROTID ENDARTERECTOMY WITH RIGHT ANKLE GREATER SAPHENOUS VEIN  ;  Surgeon: Khurram Bishop MD;  Location: [facility] OR     EYE SURGERY       HEART CATH LEFT HEART CATH  06/2003    Severe multivessel disease     RENAL ARTERY ANGIOGRAM Right 05/2013    with stenting     TONSILLECTOMY         FAMILY HISTORY:  Family History   Problem Relation Age of Onset     Cerebrovascular Disease Mother 90     unknown type     Cancer Brother 70     Brain cancer      Dementia Sister      Arthritis Brother        SOCIAL HISTORY:  Social History     Social History     Marital status:      Spouse name: N/A     Number of children: N/A     Years of education: N/A     Social History Main Topics  "    Smoking status: Former Smoker     Packs/day: 1.00     Years: 50.00     Types: Cigarettes     Quit date: 1/1/2001     Smokeless tobacco: Never Used     Alcohol use Yes      Comment: 4 drinks week     Drug use: No     Sexual activity: Not Asked     Other Topics Concern     Caffeine Concern Yes     4 - 7 cups (trying to cut down)     Special Diet Yes     eats healthy     Exercise Yes     walking     Social History Narrative       Review of Systems:  Cardiovascular: negative for chest pain, palpitations, orthopnea, LE edema  Constitutional: negative for chills, sweats, fevers   Resp: Negative for dyspnea at rest, dyspnea on exertion, pos known chronic lung disease  HEENT: Negative for new visual changes, frequent headaches  Gastrointestinal: negative for abdominal pain, diarrhea, nausea, vomiting  Hematologic/lymphatic: negative for current systemic anticoagulation, hx of blood clots  Musculoskeletal: negative for new back pain, joint pain  Neurological: negative for focal weakness, LOC, seizures, syncope, neg dizziness.       Physical Exam:  Vitals: /62 (BP Location: Right arm, Patient Position: Sitting, Cuff Size: Adult Regular)  Pulse 60  Ht 1.727 m (5' 8\")  Wt 79.8 kg (175 lb 14.4 oz)  SpO2 93%  BMI 26.75 kg/m2   Wt Readings from Last 4 Encounters:   07/13/18 79.8 kg (175 lb 14.4 oz)   06/19/18 81.9 kg (180 lb 9.6 oz)   12/01/17 86.2 kg (190 lb 1.6 oz)   07/27/17 86.2 kg (190 lb)       GEN:  In general, this is a well nourished  male in no acute distress on room air.  Patient ambulatory. Accompanied by his wife today.  HEENT:  Pupils equal, round. Sclerae nonicteric.   NECK: Supple, no masses appreciated. Trachea midline. No JVD.  C/V:  Regular rate and rhythm, no murmur, rub or gallop.   RESP: Respirations are unlabored. No use of accessory muscles. Clear to auscultation bilaterally without wheezing, rales, or rhonchi.  GI: Abdomen soft, nontender, nondistended.   EXTREM: No LE edema. No " cyanosis or clubbing.  NEURO: Alert and oriented, cooperative. Gait not formally assessed. No obvious focal deficits.   PSYCH: Normal affect.  SKIN: Warm and dry. No rashes or petechiae appreciated.       Recent Lab Results:  LIPID RESULTS:  Lab Results   Component Value Date    CHOL 140 07/11/2017    HDL 41 07/11/2017    LDL 71 07/11/2017    TRIG 140 07/11/2017       BMP RESULTS:  Lab Results   Component Value Date     07/13/2018    POTASSIUM 4.4 07/13/2018    CHLORIDE 108 07/13/2018    CO2 22 07/13/2018    ANIONGAP 7 07/13/2018     (H) 07/13/2018    BUN 20 07/13/2018    CR 1.33 (H) 07/13/2018    GFRESTIMATED 51 (L) 07/13/2018    GFRESTBLACK 62 07/13/2018    YOON 8.8 07/13/2018          Ilene Vale PA-C  [facility]  Pager [phone]      Thank you for allowing me to participate in the care of your patient.    Sincerely,     HÉCTOR Guerra     [facility]

## 2018-07-13 NOTE — PATIENT INSTRUCTIONS
"Visit Summary:    Today we discussed:   No medication changes today.     We will call your \"goal weight\" 173-176. If you get up 178-180 call us     Call the C.O.R.E. nurse for any questions or concerns:  [phone]      Test results:   Results for ROC TAYLOR (MRN [id number]) as of 7/13/2018 09:56   Ref. Range 6/19/2018 13:03 6/28/2018 09:35 7/13/2018 08:07   Sodium Latest Ref Range: 133 - 144 mmol/L 135 (L) 137 137   Potassium Latest Ref Range: 3.4 - 5.3 mmol/L 3.2 (L) 4.5 4.4   Chloride Latest Ref Range: 94 - 109 mmol/L 97 (L) 105 108   Carbon Dioxide Latest Ref Range: 20 - 32 mmol/L 28 21 (L) 22   Urea Nitrogen Latest Ref Range: 7 - 30 mg/dL 27 15 20   Creatinine Latest Ref Range: 0.66 - 1.25 mg/dL 1.61 (H) 1.63 (H) 1.33 (H)   GFR Estimate Latest Ref Range: >60 mL/min/1.7m2 41 (L) 41 (L) 51 (L)   GFR Estimate If Black Latest Ref Range: >60 mL/min/1.7m2 50 (L) 49 (L) 62   Calcium Latest Ref Range: 8.5 - 10.1 mg/dL 9.3 9.4 8.8   Anion Gap Latest Ref Range: 3 - 14 mmol/L 13.2 15.5 7       Follow up:   In 6 weeks with Ilene in the [facility] clinic.           "

## 2018-07-13 NOTE — PROGRESS NOTES
Cardiology Progress Note    Date of Service: 07/13/2018      Reason for visit: [facility] clinic enrollment, acute on chronic diastolic heart failure.    Primary cardiologist: Dr. Radhames Vargas      HPI:  Mr. Aguirre is a very pleasant 82 year old male with a complex PMHx including COPD, peripheral vascular disease and carotid artery disease s/p CEA, hyperlipidemia, prior GI bleed, hypertension and chronic kidney disease and KARLY s/p stent placement, and coronary artery disease s/p CABG 2003. He is well known to Dr. Vargas. He was recently hospitalized at [facility] in June with acute diastolic heart failure and COPD exacerbation. He admitted to eating a high salt diet and was found to be volume overloaded on evaluation. Reportedly, he was admitted at 198 pounds and he was diuresed down to 180 pounds. His stay was complicated by acute on chronic renal insufficiency. He improved after aggressive diuresis and treatment with steroids for his COPD. An echocardiogram with that admit reported normal left ventricular systolic function and normal wall motion, right ventricular enlargement and reported moderate reduced RV function and mildly increased RVSP at 32+ RAP.  His last echo here in 2016 had reported normal RV size and function and preserved EF 55-60%. When he returned to see Dr. Vargas in mid June, he was doing well, following a low sodium diet, and his CRANE had resolved.  He was instructed to call if his weight went back up beyond 180 lbs and it was requested he be followed in the [facility] clinic. He is here for our first visit today.    Since last visit, Mr. Aguirre continues to feel well. He is being very strict about his sodium intake, and he has continued to lose weight, down to 175 lbs today (173 lbs home weight). He has no leg edema and denies any CRANE.   There was some confusion regarding his diuretic dosing, but Mr. Aguirre tells me he has continued on 40mg twice daily of Lasix since discharge. He is taking his daily  "weights at home faithfully. He has followed up with pulmonary as instructed. It is noted that he had a chest CT which showed pleural plaques consistent with asbestos exposure, and a pulmonary nodule, but no significant asbestosis or fibrotic disease was identified. He tells me his lung function continues to be suboptimal but he is being placed on a new inhaler, though cannot recall the name. His BP and HR today are very well controlled, and his labs show an overall favorable trend in his renal function. Electrolytes are within normal limits.       ASSESSMENT/PLAN:    1. Acute on chronic diastolic heart failure.   --Recent episode of acute diastolic heart failure, in the setting of a COPD exacerbation. He appears euvolemic on exam today, and has made significant improvements with strict adherence to a low salt diet. As his renal function is at baseline, will continue current dose of lasix at 40mg twice daily and current KDur dosing. [Addendum: Pt called back after our visit and confirmed he is taking 20mg BID at home. We will continue this. Chart updated.]   --He will continue to weigh himself daily at home. For now, we will set his goal weight at 173-176. We talked about establishing a \"dry weight\" in the future which is likely where he is at currently.    --He was noted to have RV dysfunction on his echocardiogram with his recent hospital stay. Plan is to repeat this in a few months after we ensure euvolemia. He did not previously have evidence of pulmonary hypertension. If his RV remains abnormal will consider RHC.     2. Coronary artery disease.   --S/P CABG in 2003 (LIMA=LAD, SVG=Diag, SVG=OM2, SVG=PDA).   --He remains free of angina or evidence of ischemia on exam. Last stress study 2011 showed no ischemia.   --Continue  ASA 81mg daily.   --Continue rosuvastatin 40mg daily. Last LDL July 2017 was 71.     3. Hypertension.   --Continue carvedilol 25mg BID without change. BP well controlled today.    --History of " prior renovascular issues, s/p stent for KARLY. He is on plavix 75mg daily.         Follow up plan: With Ilene Vale PA-C in 6 weeks, and plan to return to see Dr. Vargas in 3-4 months with repeat echocardiogram.       Orders this Visit:  Orders Placed This Encounter   Procedures     Basic metabolic panel     Follow-Up with [facility] - BAR visit     Echocardiogram     Orders Placed This Encounter   Medications     DISCONTD: furosemide (LASIX) 40 MG tablet     Sig: Take 1 tablet (40 mg) by mouth 2 times daily     Dispense:  30 tablet     Medications Discontinued During This Encounter   Medication Reason     furosemide (LASIX) 20 MG tablet Dose adjustment           CURRENT MEDICATIONS:  Current Outpatient Prescriptions   Medication Sig Dispense Refill     albuterol (PROAIR HFA/PROVENTIL HFA/VENTOLIN HFA) 108 (90 Base) MCG/ACT Inhaler Inhale 2 puffs into the lungs every 6 hours as needed for shortness of breath / dyspnea or wheezing       ASPIRIN PO Take 81 mg by mouth daily        carvedilol (COREG) 25 MG tablet Take 25 mg by mouth 2 times daily (with meals)       Cholecalciferol (VITAMIN D) 2000 UNITS tablet Take 2,000 Units by mouth 2 times daily  30 tablet      clopidogrel (PLAVIX) 75 MG tablet Take 75 mg by mouth daily        ferrous sulfate 325 (65 FE) MG tablet Take 325 mg by mouth 2 times daily        OMEPRAZOLE PO Take 20 mg by mouth daily       potassium chloride SA (K-DUR/KLOR-CON M) 20 MEQ CR tablet Take 2 tablets (40 mEq) by mouth daily for 5 days, then take 1 tablet (20 mEq) daily (Patient taking differently: 20 mEq daily ) 90 tablet 3     rosuvastatin (CRESTOR) 40 MG tablet Take 1 tablet (40 mg) by mouth daily 30 tablet 3     triamcinolone (KENALOG) 0.1 % cream Apply topically 2 times daily as needed for irritation        umeclidinium-vilanterol (ANORO ELLIPTA) 62.5-25 MCG/INH oral inhaler Inhale 1 puff into the lungs daily       [DISCONTINUED] furosemide (LASIX) 40 MG tablet Take 1 tablet (40 mg) by  mouth 2 times daily 30 tablet      furosemide (LASIX) 40 MG tablet Take 1 tablet (40 mg) by mouth 2 times daily 180 tablet 3     [DISCONTINUED] furosemide (LASIX) 20 MG tablet Take 80 mg by mouth 2 times daily          ALLERGIES   No Known Allergies    PAST MEDICAL HISTORY:  Past Medical History:   Diagnosis Date     Anemia      Atrophy of left kidney      CKD (chronic kidney disease), stage III      Claudication, intermittent (H)      Coronary artery disease 2003    CAB x 4     History of GI bleed 2012    Hemorrhagic gastritis     Hypercholesterolemia      Hypertension      Left carotid artery stenosis     S/P Carotid Endarterectomy left      PVD (peripheral vascular disease) (H)      Renal artery stenosis (H)     stent R       PAST SURGICAL HISTORY:  Past Surgical History:   Procedure Laterality Date     BYPASS GRAFT ARTERY CORONARY  06/2003    LIMA=LAD, SVG=Diag, SVG=OM2, SVG=PDA     CAROTID ENDARTERECTOMY Left      ENDARTERECTOMY CAROTID Left 7/11/2017    Procedure: ENDARTERECTOMY CAROTID;  REDO LEFT CAROTID ENDARTERECTOMY WITH RIGHT ANKLE GREATER SAPHENOUS VEIN  ;  Surgeon: Khurram Bishop MD;  Location: [facility] OR     EYE SURGERY       HEART CATH LEFT HEART CATH  06/2003    Severe multivessel disease     RENAL ARTERY ANGIOGRAM Right 05/2013    with stenting     TONSILLECTOMY         FAMILY HISTORY:  Family History   Problem Relation Age of Onset     Cerebrovascular Disease Mother 90     unknown type     Cancer Brother 70     Brain cancer      Dementia Sister      Arthritis Brother        SOCIAL HISTORY:  Social History     Social History     Marital status:      Spouse name: N/A     Number of children: N/A     Years of education: N/A     Social History Main Topics     Smoking status: Former Smoker     Packs/day: 1.00     Years: 50.00     Types: Cigarettes     Quit date: 1/1/2001     Smokeless tobacco: Never Used     Alcohol use Yes      Comment: 4 drinks week     Drug use: No     Sexual activity:  "Not Asked     Other Topics Concern     Caffeine Concern Yes     4 - 7 cups (trying to cut down)     Special Diet Yes     eats healthy     Exercise Yes     walking     Social History Narrative       Review of Systems:  Cardiovascular: negative for chest pain, palpitations, orthopnea, LE edema  Constitutional: negative for chills, sweats, fevers   Resp: Negative for dyspnea at rest, dyspnea on exertion, pos known chronic lung disease  HEENT: Negative for new visual changes, frequent headaches  Gastrointestinal: negative for abdominal pain, diarrhea, nausea, vomiting  Hematologic/lymphatic: negative for current systemic anticoagulation, hx of blood clots  Musculoskeletal: negative for new back pain, joint pain  Neurological: negative for focal weakness, LOC, seizures, syncope, neg dizziness.       Physical Exam:  Vitals: /62 (BP Location: Right arm, Patient Position: Sitting, Cuff Size: Adult Regular)  Pulse 60  Ht 1.727 m (5' 8\")  Wt 79.8 kg (175 lb 14.4 oz)  SpO2 93%  BMI 26.75 kg/m2   Wt Readings from Last 4 Encounters:   07/13/18 79.8 kg (175 lb 14.4 oz)   06/19/18 81.9 kg (180 lb 9.6 oz)   12/01/17 86.2 kg (190 lb 1.6 oz)   07/27/17 86.2 kg (190 lb)       GEN:  In general, this is a well nourished  male in no acute distress on room air.  Patient ambulatory. Accompanied by his wife today.  HEENT:  Pupils equal, round. Sclerae nonicteric.   NECK: Supple, no masses appreciated. Trachea midline. No JVD.  C/V:  Regular rate and rhythm, no murmur, rub or gallop.   RESP: Respirations are unlabored. No use of accessory muscles. Clear to auscultation bilaterally without wheezing, rales, or rhonchi.  GI: Abdomen soft, nontender, nondistended.   EXTREM: No LE edema. No cyanosis or clubbing.  NEURO: Alert and oriented, cooperative. Gait not formally assessed. No obvious focal deficits.   PSYCH: Normal affect.  SKIN: Warm and dry. No rashes or petechiae appreciated.       Recent Lab Results:  LIPID " RESULTS:  Lab Results   Component Value Date    CHOL 140 07/11/2017    HDL 41 07/11/2017    LDL 71 07/11/2017    TRIG 140 07/11/2017       BMP RESULTS:  Lab Results   Component Value Date     07/13/2018    POTASSIUM 4.4 07/13/2018    CHLORIDE 108 07/13/2018    CO2 22 07/13/2018    ANIONGAP 7 07/13/2018     (H) 07/13/2018    BUN 20 07/13/2018    CR 1.33 (H) 07/13/2018    GFRESTIMATED 51 (L) 07/13/2018    GFRESTBLACK 62 07/13/2018    YOON 8.8 07/13/2018          Ilene Vale PA-C  [facility]  Pager [phone]

## 2018-07-16 DIAGNOSIS — I50.31 ACUTE DIASTOLIC CONGESTIVE HEART FAILURE (H): ICD-10-CM

## 2018-07-16 RX ORDER — FUROSEMIDE 40 MG
20 TABLET ORAL 2 TIMES DAILY
Qty: 60 TABLET | Refills: 3 | Status: SHIPPED | OUTPATIENT
Start: 2018-07-16 | End: 2019-09-22

## 2018-08-31 ENCOUNTER — OFFICE VISIT (OUTPATIENT)
Dept: CARDIOLOGY | Facility: CLINIC | Age: 83
End: 2018-08-31
Attending: PHYSICIAN ASSISTANT
Payer: COMMERCIAL

## 2018-08-31 VITALS
SYSTOLIC BLOOD PRESSURE: 112 MMHG | DIASTOLIC BLOOD PRESSURE: 50 MMHG | BODY MASS INDEX: 26.52 KG/M2 | WEIGHT: 175 LBS | HEIGHT: 68 IN | OXYGEN SATURATION: 93 % | HEART RATE: 58 BPM

## 2018-08-31 DIAGNOSIS — I50.31 DIASTOLIC CHF, ACUTE (H): ICD-10-CM

## 2018-08-31 DIAGNOSIS — I25.10 CORONARY ARTERY DISEASE INVOLVING NATIVE CORONARY ARTERY OF NATIVE HEART WITHOUT ANGINA PECTORIS: Primary | ICD-10-CM

## 2018-08-31 LAB
ANION GAP SERPL CALCULATED.3IONS-SCNC: 7 MMOL/L (ref 3–14)
BUN SERPL-MCNC: 28 MG/DL (ref 7–30)
CALCIUM SERPL-MCNC: 9 MG/DL (ref 8.5–10.1)
CHLORIDE SERPL-SCNC: 111 MMOL/L (ref 94–109)
CO2 SERPL-SCNC: 22 MMOL/L (ref 20–32)
CREAT SERPL-MCNC: 1.42 MG/DL (ref 0.66–1.25)
GFR SERPL CREATININE-BSD FRML MDRD: 48 ML/MIN/1.7M2
GLUCOSE SERPL-MCNC: 124 MG/DL (ref 70–99)
POTASSIUM SERPL-SCNC: 4.2 MMOL/L (ref 3.4–5.3)
SODIUM SERPL-SCNC: 140 MMOL/L (ref 133–144)

## 2018-08-31 PROCEDURE — 99214 OFFICE O/P EST MOD 30 MIN: CPT | Performed by: PHYSICIAN ASSISTANT

## 2018-08-31 PROCEDURE — 80048 BASIC METABOLIC PNL TOTAL CA: CPT | Performed by: INTERNAL MEDICINE

## 2018-08-31 PROCEDURE — 36415 COLL VENOUS BLD VENIPUNCTURE: CPT | Performed by: INTERNAL MEDICINE

## 2018-08-31 NOTE — LETTER
8/31/2018    Inderjit Wang MD  [facility] [address] [address]    RE: José Ballesterosclaudio       Dear Colleague,    I had the pleasure of seeing José Aguirre in the [facility].      Cardiology Progress Note    Date of Service: 08/31/2018      Reason for visit: CORE clinic follow up, chronic diastolic heart failure.    Primary cardiologist: Dr. Radhames Vargas      HPI:  Mr. Aguirre is a very pleasant 82 year old male with a complex PMHx including COPD, peripheral vascular disease and carotid artery disease s/p CEA, hyperlipidemia, prior GI bleed, hypertension and chronic kidney disease with KARLY s/p stent placement, and coronary artery disease s/p CABG 2003. He is well known to Dr. Vargas. He was recently hospitalized at [facility] in June with acute diastolic heart failure and COPD exacerbation. He admitted to eating a high salt diet and was found to be volume overloaded on evaluation. Reportedly, he was admitted at 198 pounds and he was diuresed down to 180 pounds. His stay was complicated by acute on chronic renal insufficiency. He improved after aggressive diuresis and treatment with steroids for his COPD. An echocardiogram with that admit reported normal left ventricular systolic function and normal wall motion, right ventricular enlargement and reported moderate reduced RV function and mildly increased RVSP at 32+ RAP.  His last echo here in 2016 had reported normal RV size and function and preserved EF 55-60%. When he returned to see Dr. Vargas in mid June, he was doing well, following a low sodium diet, and his CRANE had significantly improved.      I then saw him for enrollment in our CORE clinic in July. He continued to do well, and was being very strict about his sodium intake. He was down to 175 lbs on our scale, and had no recurrence of edema. We clarified after his visit that he was on 20mg BID of lasix. It was noted he had a chest CT which showed  pleural plaques consistent with asbestos exposure, and a pulmonary nodule, and he was to follow with pulmonary medicine. He appeared to be euvolemic, and no changes were made at that time. He's back today for routine follow up.    Since last visit, he says he continues to do well. He is very strict with sodium intake and his weight is down now to 170 lbs on his home scale. He denies any leg swelling or orthopnea. His BP is excellent today and says it runs similarly at home. He has occasional CRANE still but, thinks the new inhalers he got recently may be helping. He is following with pulmonary; I reviewed the [facility] note and assessment is that he has profound emphysema in addition to some asbestosis and potentially fibrotic lung disease. He has plans to begin pulmonary rehab in the next few weeks.       ASSESSMENT/PLAN:    1. Acute on chronic diastolic heart failure.   --Recent episode of acute diastolic heart failure, in the setting of a COPD exacerbation. He continues to appear euvolemic on exam today, and has made significant improvements with strict adherence to a low salt diet. As his renal function has remained stable, will continue current dose of lasix at 20mg twice daily and current KDur dosing. He will continue to weigh himself daily at home.     --He was noted to have RV dysfunction on his echocardiogram with his recent hospital stay. Now that he is euvolemic, will will plan repeat echocardiogram prior to his next visit with Dr. Vargas.    --His intermittent CRANE and hypoxemia are almost certainly related to his lung disease as he appears stable from a cardiac standpoint currently. He plans to start pulmonary rehab in the near future. If his RV pressures remain elevated on repeat echo, suspect this would be secondary to him pulmonary issues.    2. Coronary artery disease.   --S/P CABG in 2003 (LIMA=LAD, SVG=Diag, SVG=OM2, SVG=PDA).   --He remains free of angina or evidence of ischemia on exam. Last  stress study 2011 showed no ischemia.   --Continue  ASA 81mg daily.   --Continue rosuvastatin 40mg daily. Last LDL ([facility]) May 2018 was within goal at 66.    3. Hypertension.   --Well controlled again today. Continue carvedilol 25mg BID.    --History of prior renovascular issues, s/p stent for KARLY. He is on plavix 75mg daily.       Follow up plan: With Dr. Vargas in 2 months with repeat echocardiogram.       Orders this Visit:  Orders Placed This Encounter   Procedures     Follow-Up with Cardiologist     Orders Placed This Encounter   Medications     mometasone (ASMANEX) 220 MCG/INH Inhaler     Sig: Inhale 2 puffs into the lungs every evening     tiotropium-olodaterol 2.5-2.5 MCG/ACT AERS     Sig: Inhale 1 puff into the lungs daily     There are no discontinued medications.        CURRENT MEDICATIONS:  Current Outpatient Prescriptions   Medication Sig Dispense Refill     albuterol (PROAIR HFA/PROVENTIL HFA/VENTOLIN HFA) 108 (90 Base) MCG/ACT Inhaler Inhale 2 puffs into the lungs every 6 hours as needed for shortness of breath / dyspnea or wheezing       ASPIRIN PO Take 81 mg by mouth daily        carvedilol (COREG) 25 MG tablet Take 25 mg by mouth 2 times daily (with meals)       Cholecalciferol (VITAMIN D) 2000 UNITS tablet Take 2,000 Units by mouth 2 times daily  30 tablet      clopidogrel (PLAVIX) 75 MG tablet Take 75 mg by mouth daily        ferrous sulfate 325 (65 FE) MG tablet Take 325 mg by mouth 2 times daily        furosemide (LASIX) 40 MG tablet Take 0.5 tablets (20 mg) by mouth 2 times daily 60 tablet 3     mometasone (ASMANEX) 220 MCG/INH Inhaler Inhale 2 puffs into the lungs every evening       OMEPRAZOLE PO Take 20 mg by mouth daily       potassium chloride SA (K-DUR/KLOR-CON M) 20 MEQ CR tablet Take 2 tablets (40 mEq) by mouth daily for 5 days, then take 1 tablet (20 mEq) daily (Patient taking differently: 20 mEq daily ) 90 tablet 3     rosuvastatin (CRESTOR) 40 MG tablet Take 1 tablet (40  mg) by mouth daily 30 tablet 3     tiotropium-olodaterol 2.5-2.5 MCG/ACT AERS Inhale 1 puff into the lungs daily       triamcinolone (KENALOG) 0.1 % cream Apply topically 2 times daily as needed for irritation        umeclidinium-vilanterol (ANORO ELLIPTA) 62.5-25 MCG/INH oral inhaler Inhale 1 puff into the lungs daily         ALLERGIES   No Known Allergies    PAST MEDICAL HISTORY:  Past Medical History:   Diagnosis Date     Anemia      Atrophy of left kidney      CKD (chronic kidney disease), stage III      Claudication, intermittent (H)      Coronary artery disease 2003    CAB x 4     History of GI bleed 2012    Hemorrhagic gastritis     Hypercholesterolemia      Hypertension      Left carotid artery stenosis     S/P Carotid Endarterectomy left      PVD (peripheral vascular disease) (H)      Renal artery stenosis (H)     stent R       PAST SURGICAL HISTORY:  Past Surgical History:   Procedure Laterality Date     BYPASS GRAFT ARTERY CORONARY  06/2003    LIMA=LAD, SVG=Diag, SVG=OM2, SVG=PDA     CAROTID ENDARTERECTOMY Left      ENDARTERECTOMY CAROTID Left 7/11/2017    Procedure: ENDARTERECTOMY CAROTID;  REDO LEFT CAROTID ENDARTERECTOMY WITH RIGHT ANKLE GREATER SAPHENOUS VEIN  ;  Surgeon: Khurram Bishop MD;  Location: [facility] OR     EYE SURGERY       HEART CATH LEFT HEART CATH  06/2003    Severe multivessel disease     RENAL ARTERY ANGIOGRAM Right 05/2013    with stenting     TONSILLECTOMY         FAMILY HISTORY:  Family History   Problem Relation Age of Onset     Cerebrovascular Disease Mother 90     unknown type     Cancer Brother 70     Brain cancer      Dementia Sister      Arthritis Brother        SOCIAL HISTORY:  Social History     Social History     Marital status:      Spouse name: N/A     Number of children: N/A     Years of education: N/A     Social History Main Topics     Smoking status: Former Smoker     Packs/day: 1.00     Years: 50.00     Types: Cigarettes     Quit date: 1/1/2001      "Smokeless tobacco: Never Used     Alcohol use Yes      Comment: 4 drinks week     Drug use: No     Sexual activity: Not Asked     Other Topics Concern     Caffeine Concern Yes     4 - 7 cups (trying to cut down)     Special Diet Yes     eats healthy     Exercise Yes     walking     Social History Narrative       Review of Systems:  Cardiovascular: negative for chest pain, palpitations, orthopnea, LE edema  Constitutional: negative for chills, sweats, fevers   Resp: Negative for dyspnea at rest, occasional dyspnea on exertion, pos known chronic lung disease  HEENT: Negative for new visual changes, frequent headaches  Gastrointestinal: negative for abdominal pain, diarrhea, nausea, vomiting  Hematologic/lymphatic: negative for current systemic anticoagulation, hx of blood clots  Musculoskeletal: negative for new back pain, joint pain  Neurological: negative for focal weakness, LOC, seizures, syncope, neg dizziness.       Physical Exam:  Vitals: /50 (BP Location: Right arm, Patient Position: Chair, Cuff Size: Adult Regular)  Pulse 58  Ht 1.727 m (5' 8\")  Wt 79.4 kg (175 lb)  SpO2 93%  BMI 26.61 kg/m2   Wt Readings from Last 4 Encounters:   08/31/18 79.4 kg (175 lb)   07/13/18 79.8 kg (175 lb 14.4 oz)   06/19/18 81.9 kg (180 lb 9.6 oz)   12/01/17 86.2 kg (190 lb 1.6 oz)       GEN:  In general, this is a well nourished  male in no acute distress on room air.  Patient ambulatory. Accompanied by his wife today.  HEENT:  Pupils equal, round. Sclerae nonicteric.   NECK: Supple, no masses appreciated. Trachea midline. No JVD.  C/V:  Regular rate and rhythm, no murmur, rub or gallop.   RESP: Respirations are unlabored. No use of accessory muscles. Clear to auscultation bilaterally without wheezing, rales, or rhonchi.  GI: Abdomen soft, nontender, nondistended.   EXTREM: No LE edema. No cyanosis or clubbing.  NEURO: Alert and oriented, cooperative. Gait not formally assessed. No obvious focal deficits. "   PSYCH: Normal affect.  SKIN: Warm and dry. No rashes or petechiae appreciated.       Recent Lab Results:          BMP RESULTS:  Lab Results   Component Value Date     08/31/2018    POTASSIUM 4.2 08/31/2018    CHLORIDE 111 (H) 08/31/2018    CO2 22 08/31/2018    ANIONGAP 7 08/31/2018     (H) 08/31/2018    BUN 28 08/31/2018    CR 1.42 (H) 08/31/2018    GFRESTIMATED 48 (L) 08/31/2018    GFRESTBLACK 58 (L) 08/31/2018    YOON 9.0 08/31/2018          Ilene Vale PA-C  [facility]  Pager [phone]      Thank you for allowing me to participate in the care of your patient.    Sincerely,     HÉCTOR Guerra     [facility]

## 2018-08-31 NOTE — PATIENT INSTRUCTIONS
Call the C.O.R.E. nurse for any questions or concerns:  [phone]    1.  Medication changes from today:  NONE    2. Follow up plan:  With Dr. Vargas in a month after your echocardiogram.     3.  Weigh yourself daily and write it down.    4.  Call CORE nurse if your weight is up more than 2 pounds in one day or 5 pounds in one week.    5.  Call CORE nurse if you feel more short of breath, have more abdominal bloating, or leg swelling.    6.  Continue low sodium diet (less than 2000 mg daily). If you eat less salt, you will retain less fluid.    7.  Alcohol can weaken your heart further. You should avoid alcohol or limit its use to special times, such as a holiday or birthday.     8.  Do NOT take Aleve (naproxen) or Advil (ibuprofen) without talking to your doctor first.     9.  Lab Results:     Component      Latest Ref Rng & Units 8/31/2018   Sodium      133 - 144 mmol/L 140   Potassium      3.4 - 5.3 mmol/L 4.2   Chloride      94 - 109 mmol/L 111 (H)   Carbon Dioxide      20 - 32 mmol/L 22   Anion Gap      3 - 14 mmol/L 7   Glucose      70 - 99 mg/dL 124 (H)   Urea Nitrogen      7 - 30 mg/dL 28   Creatinine      0.66 - 1.25 mg/dL 1.42 (H)   GFR Estimate      >60 mL/min/1.7m2 48 (L)   GFR Estimate If Black      >60 mL/min/1.7m2 58 (L)   Calcium      8.5 - 10.1 mg/dL 9.0     [facility] Clinic: Cardiomyopathy, Optimization, Rehabilitation, Education  The CORE Clinic is a heart failure specialty clinic within the [facility] where you will work with your cardiologist, nurse practitioners, physician assistants and registered nurses who specialize in heart failure care. They are dedicated to helping patients with heart failure to carefully adjust medications, receive education, and learn who and when to call if symptoms develop. They specialize in helping you better understand your condition, slow the progression of your disease, improve the length and quality of your life, help you detect  future heart problems before they become life threatening, and avoid hospitalizations.

## 2018-08-31 NOTE — PROGRESS NOTES
Cardiology Progress Note    Date of Service: 08/31/2018      Reason for visit: CORE clinic follow up, chronic diastolic heart failure.    Primary cardiologist: Dr. Radhames Vargas      HPI:  Mr. Aguirre is a very pleasant 82 year old male with a complex PMHx including COPD, peripheral vascular disease and carotid artery disease s/p CEA, hyperlipidemia, prior GI bleed, hypertension and chronic kidney disease with KARLY s/p stent placement, and coronary artery disease s/p CABG 2003. He is well known to Dr. Vargas. He was recently hospitalized at [facility] in June with acute diastolic heart failure and COPD exacerbation. He admitted to eating a high salt diet and was found to be volume overloaded on evaluation. Reportedly, he was admitted at 198 pounds and he was diuresed down to 180 pounds. His stay was complicated by acute on chronic renal insufficiency. He improved after aggressive diuresis and treatment with steroids for his COPD. An echocardiogram with that admit reported normal left ventricular systolic function and normal wall motion, right ventricular enlargement and reported moderate reduced RV function and mildly increased RVSP at 32+ RAP.  His last echo here in 2016 had reported normal RV size and function and preserved EF 55-60%. When he returned to see Dr. Vargas in mid June, he was doing well, following a low sodium diet, and his CRANE had significantly improved.      I then saw him for enrollment in our CORE clinic in July. He continued to do well, and was being very strict about his sodium intake. He was down to 175 lbs on our scale, and had no recurrence of edema. We clarified after his visit that he was on 20mg BID of lasix. It was noted he had a chest CT which showed pleural plaques consistent with asbestos exposure, and a pulmonary nodule, and he was to follow with pulmonary medicine. He appeared to be euvolemic, and no changes were made at that time. He's back today for routine follow  up.    Since last visit, he says he continues to do well. He is very strict with sodium intake and his weight is down now to 170 lbs on his home scale. He denies any leg swelling or orthopnea. His BP is excellent today and says it runs similarly at home. He has occasional CRANE still but, thinks the new inhalers he got recently may be helping. He is following with pulmonary; I reviewed the [facility] note and assessment is that he has profound emphysema in addition to some asbestosis and potentially fibrotic lung disease. He has plans to begin pulmonary rehab in the next few weeks.       ASSESSMENT/PLAN:    1. Acute on chronic diastolic heart failure.   --Recent episode of acute diastolic heart failure, in the setting of a COPD exacerbation. He continues to appear euvolemic on exam today, and has made significant improvements with strict adherence to a low salt diet. As his renal function has remained stable, will continue current dose of lasix at 20mg twice daily and current KDur dosing. He will continue to weigh himself daily at home.     --He was noted to have RV dysfunction on his echocardiogram with his recent hospital stay. Now that he is euvolemic, will will plan repeat echocardiogram prior to his next visit with Dr. Vargas.    --His intermittent CRANE and hypoxemia are almost certainly related to his lung disease as he appears stable from a cardiac standpoint currently. He plans to start pulmonary rehab in the near future. If his RV pressures remain elevated on repeat echo, suspect this would be secondary to him pulmonary issues.    2. Coronary artery disease.   --S/P CABG in 2003 (LIMA=LAD, SVG=Diag, SVG=OM2, SVG=PDA).   --He remains free of angina or evidence of ischemia on exam. Last stress study 2011 showed no ischemia.   --Continue  ASA 81mg daily.   --Continue rosuvastatin 40mg daily. Last LDL ([facility]) May 2018 was within goal at 66.    3. Hypertension.   --Well controlled again today. Continue  carvedilol 25mg BID.    --History of prior renovascular issues, s/p stent for KARLY. He is on plavix 75mg daily.       Follow up plan: With Dr. Vargas in 2 months with repeat echocardiogram.       Orders this Visit:  Orders Placed This Encounter   Procedures     Follow-Up with Cardiologist     Orders Placed This Encounter   Medications     mometasone (ASMANEX) 220 MCG/INH Inhaler     Sig: Inhale 2 puffs into the lungs every evening     tiotropium-olodaterol 2.5-2.5 MCG/ACT AERS     Sig: Inhale 1 puff into the lungs daily     There are no discontinued medications.        CURRENT MEDICATIONS:  Current Outpatient Prescriptions   Medication Sig Dispense Refill     albuterol (PROAIR HFA/PROVENTIL HFA/VENTOLIN HFA) 108 (90 Base) MCG/ACT Inhaler Inhale 2 puffs into the lungs every 6 hours as needed for shortness of breath / dyspnea or wheezing       ASPIRIN PO Take 81 mg by mouth daily        carvedilol (COREG) 25 MG tablet Take 25 mg by mouth 2 times daily (with meals)       Cholecalciferol (VITAMIN D) 2000 UNITS tablet Take 2,000 Units by mouth 2 times daily  30 tablet      clopidogrel (PLAVIX) 75 MG tablet Take 75 mg by mouth daily        ferrous sulfate 325 (65 FE) MG tablet Take 325 mg by mouth 2 times daily        furosemide (LASIX) 40 MG tablet Take 0.5 tablets (20 mg) by mouth 2 times daily 60 tablet 3     mometasone (ASMANEX) 220 MCG/INH Inhaler Inhale 2 puffs into the lungs every evening       OMEPRAZOLE PO Take 20 mg by mouth daily       potassium chloride SA (K-DUR/KLOR-CON M) 20 MEQ CR tablet Take 2 tablets (40 mEq) by mouth daily for 5 days, then take 1 tablet (20 mEq) daily (Patient taking differently: 20 mEq daily ) 90 tablet 3     rosuvastatin (CRESTOR) 40 MG tablet Take 1 tablet (40 mg) by mouth daily 30 tablet 3     tiotropium-olodaterol 2.5-2.5 MCG/ACT AERS Inhale 1 puff into the lungs daily       triamcinolone (KENALOG) 0.1 % cream Apply topically 2 times daily as needed for irritation         umeclidinium-vilanterol (ANORO ELLIPTA) 62.5-25 MCG/INH oral inhaler Inhale 1 puff into the lungs daily         ALLERGIES   No Known Allergies    PAST MEDICAL HISTORY:  Past Medical History:   Diagnosis Date     Anemia      Atrophy of left kidney      CKD (chronic kidney disease), stage III      Claudication, intermittent (H)      Coronary artery disease 2003    CAB x 4     History of GI bleed 2012    Hemorrhagic gastritis     Hypercholesterolemia      Hypertension      Left carotid artery stenosis     S/P Carotid Endarterectomy left      PVD (peripheral vascular disease) (H)      Renal artery stenosis (H)     stent R       PAST SURGICAL HISTORY:  Past Surgical History:   Procedure Laterality Date     BYPASS GRAFT ARTERY CORONARY  06/2003    LIMA=LAD, SVG=Diag, SVG=OM2, SVG=PDA     CAROTID ENDARTERECTOMY Left      ENDARTERECTOMY CAROTID Left 7/11/2017    Procedure: ENDARTERECTOMY CAROTID;  REDO LEFT CAROTID ENDARTERECTOMY WITH RIGHT ANKLE GREATER SAPHENOUS VEIN  ;  Surgeon: Khurram Bishop MD;  Location:  OR     EYE SURGERY       HEART CATH LEFT HEART CATH  06/2003    Severe multivessel disease     RENAL ARTERY ANGIOGRAM Right 05/2013    with stenting     TONSILLECTOMY         FAMILY HISTORY:  Family History   Problem Relation Age of Onset     Cerebrovascular Disease Mother 90     unknown type     Cancer Brother 70     Brain cancer      Dementia Sister      Arthritis Brother        SOCIAL HISTORY:  Social History     Social History     Marital status:      Spouse name: N/A     Number of children: N/A     Years of education: N/A     Social History Main Topics     Smoking status: Former Smoker     Packs/day: 1.00     Years: 50.00     Types: Cigarettes     Quit date: 1/1/2001     Smokeless tobacco: Never Used     Alcohol use Yes      Comment: 4 drinks week     Drug use: No     Sexual activity: Not Asked     Other Topics Concern     Caffeine Concern Yes     4 - 7 cups (trying to cut down)     Special  "Diet Yes     eats healthy     Exercise Yes     walking     Social History Narrative       Review of Systems:  Cardiovascular: negative for chest pain, palpitations, orthopnea, LE edema  Constitutional: negative for chills, sweats, fevers   Resp: Negative for dyspnea at rest, occasional dyspnea on exertion, pos known chronic lung disease  HEENT: Negative for new visual changes, frequent headaches  Gastrointestinal: negative for abdominal pain, diarrhea, nausea, vomiting  Hematologic/lymphatic: negative for current systemic anticoagulation, hx of blood clots  Musculoskeletal: negative for new back pain, joint pain  Neurological: negative for focal weakness, LOC, seizures, syncope, neg dizziness.       Physical Exam:  Vitals: /50 (BP Location: Right arm, Patient Position: Chair, Cuff Size: Adult Regular)  Pulse 58  Ht 1.727 m (5' 8\")  Wt 79.4 kg (175 lb)  SpO2 93%  BMI 26.61 kg/m2   Wt Readings from Last 4 Encounters:   08/31/18 79.4 kg (175 lb)   07/13/18 79.8 kg (175 lb 14.4 oz)   06/19/18 81.9 kg (180 lb 9.6 oz)   12/01/17 86.2 kg (190 lb 1.6 oz)       GEN:  In general, this is a well nourished  male in no acute distress on room air.  Patient ambulatory. Accompanied by his wife today.  HEENT:  Pupils equal, round. Sclerae nonicteric.   NECK: Supple, no masses appreciated. Trachea midline. No JVD.  C/V:  Regular rate and rhythm, no murmur, rub or gallop.   RESP: Respirations are unlabored. No use of accessory muscles. Clear to auscultation bilaterally without wheezing, rales, or rhonchi.  GI: Abdomen soft, nontender, nondistended.   EXTREM: No LE edema. No cyanosis or clubbing.  NEURO: Alert and oriented, cooperative. Gait not formally assessed. No obvious focal deficits.   PSYCH: Normal affect.  SKIN: Warm and dry. No rashes or petechiae appreciated.       Recent Lab Results:          BMP RESULTS:  Lab Results   Component Value Date     08/31/2018    POTASSIUM 4.2 08/31/2018    CHLORIDE 111 " (H) 08/31/2018    CO2 22 08/31/2018    ANIONGAP 7 08/31/2018     (H) 08/31/2018    BUN 28 08/31/2018    CR 1.42 (H) 08/31/2018    GFRESTIMATED 48 (L) 08/31/2018    GFRESTBLACK 58 (L) 08/31/2018    YOON 9.0 08/31/2018          Ilene Vale PA-C  [facility]  Pager [phone]

## 2018-08-31 NOTE — MR AVS SNAPSHOT
After Visit Summary   8/31/2018    José Aguirre    MRN: [id number]           Patient Information     Date Of Birth          1935        Visit Information        Provider Department      8/31/2018 10:10 AM Ilene Vale PA [facility]        Today's Diagnoses     Coronary artery disease involving native coronary artery of native heart without angina pectoris    -  1    Diastolic CHF, acute (H)          Care Instructions    Call the C.O.R.E. nurse for any questions or concerns:  [phone]    1.  Medication changes from today:  NONE    2. Follow up plan:  With Dr. Vargas in a month after your echocardiogram.     3.  Weigh yourself daily and write it down.    4.  Call CORE nurse if your weight is up more than 2 pounds in one day or 5 pounds in one week.    5.  Call CORE nurse if you feel more short of breath, have more abdominal bloating, or leg swelling.    6.  Continue low sodium diet (less than 2000 mg daily). If you eat less salt, you will retain less fluid.    7.  Alcohol can weaken your heart further. You should avoid alcohol or limit its use to special times, such as a holiday or birthday.     8.  Do NOT take Aleve (naproxen) or Advil (ibuprofen) without talking to your doctor first.     9.  Lab Results:     Component      Latest Ref Rng & Units 8/31/2018   Sodium      133 - 144 mmol/L 140   Potassium      3.4 - 5.3 mmol/L 4.2   Chloride      94 - 109 mmol/L 111 (H)   Carbon Dioxide      20 - 32 mmol/L 22   Anion Gap      3 - 14 mmol/L 7   Glucose      70 - 99 mg/dL 124 (H)   Urea Nitrogen      7 - 30 mg/dL 28   Creatinine      0.66 - 1.25 mg/dL 1.42 (H)   GFR Estimate      >60 mL/min/1.7m2 48 (L)   GFR Estimate If Black      >60 mL/min/1.7m2 58 (L)   Calcium      8.5 - 10.1 mg/dL 9.0     [facility]: Cardiomyopathy, Optimization, Rehabilitation, Education  The [facility] is a heart failure specialty clinic within the [facility] where you will work with your cardiologist, nurse practitioners, physician assistants and registered nurses who specialize in heart failure care. They are dedicated to helping patients with heart failure to carefully adjust medications, receive education, and learn who and when to call if symptoms develop. They specialize in helping you better understand your condition, slow the progression of your disease, improve the length and quality of your life, help you detect future heart problems before they become life threatening, and avoid hospitalizations.            Follow-ups after your visit        Additional Services     Follow-Up with Cardiologist                 Your next 10 appointments already scheduled     Sep 27, 2018  8:30 AM CDT   Ech Complete with [address] ([facility])    [address]  [facility] [address] [phone]           1.  Please bring or wear a comfortable two-piece outfit. 2.  You may eat, drink and take your normal medicines. 3.  For any questions that cannot be answered, please contact the ordering physician 4.  Please do not wear perfumes or scented lotions on the day of your exam. ***Please check-in at the [place] located in [address] in the [facility] building. When you are finished registering, please go to [address] and have a seat. The technician will call your name for the test.            Oct 03, 2018  9:00 AM CDT   Core MD Return with Radhames Vargas MD   [facility] ([facility])    [address]  [facility] [id number] [phone]              Future tests that were ordered for you today     Open Future Orders        Priority Expected Expires Ordered    Follow-Up with Cardiologist Routine 9/30/2018 8/31/2019 8/31/2018            Who to contact     If you have questions or  "need follow up information about today's clinic visit or your schedule please contact [facility] directly at [phone].  Normal or non-critical lab and imaging results will be communicated to you by MyChart, letter or phone within 4 business days after the clinic has received the results. If you do not hear from us within 7 days, please contact the clinic through MyChart or phone. If you have a critical or abnormal lab result, we will notify you by phone as soon as possible.  Submit refill requests through [employer] or call your pharmacy and they will forward the refill request to us. Please allow 3 business days for your refill to be completed.          Additional Information About Your Visit        Care EveryWhere ID     This is your Care EveryWhere ID. This could be used by other organizations to access your [place] medical records  [id number]        Your Vitals Were     Pulse Height Pulse Oximetry BMI (Body Mass Index)          58 1.727 m (5' 8\") 93% 26.61 kg/m2         Blood Pressure from Last 3 Encounters:   08/31/18 112/50   07/13/18 120/62   06/19/18 126/74    Weight from Last 3 Encounters:   08/31/18 79.4 kg (175 lb)   07/13/18 79.8 kg (175 lb 14.4 oz)   06/19/18 81.9 kg (180 lb 9.6 oz)              We Performed the Following     Follow-Up with [facility] Clinic - BAR visit          Today's Medication Changes          These changes are accurate as of 8/31/18 11:01 AM.  If you have any questions, ask your nurse or doctor.               These medicines have changed or have updated prescriptions.        Dose/Directions    potassium chloride SA 20 MEQ CR tablet   Commonly known as:  K-DUR/KLOR-CON M   This may have changed:    - how much to take  - when to take this  - additional instructions   Used for:  Benign essential hypertension, Hypokalemia        Take 2 tablets (40 mEq) by mouth daily for 5 days, then take 1 tablet (20 mEq) daily   Quantity:  90 tablet "   Refills:  3                Primary Care Provider Office Phone # Fax #    Inderjit Kenny [phone]       [facility] [address] [place] [address]        Equal Access to Services     FOREIGN SALDANA : Hadgisselle tom reardon monica Jalloh, waaxda luqadaha, qaybta kaalmada adeshonnada, vito arango yaodayan kraus alize thrasher. So [facility] [phone].    ATENCIÓN: Si habla español, tiene a spencer disposición servicios gratuitos de asistencia lingüística. Llame al [phone].    We comply with applicable federal civil rights laws and [place] laws. We do not discriminate on the basis of race, color, national origin, age, disability, sex, sexual orientation, or gender identity.            Thank you!     Thank you for choosing [facility]  for your care. Our goal is always to provide you with excellent care. Hearing back from our patients is one way we can continue to improve our services. Please take a few minutes to complete the written survey that you may receive in the mail after your visit with us. Thank you!             Your Updated Medication List - Protect others around you: Learn how to safely use, store and throw away your medicines at www.disposemymeds.org.          This list is accurate as of 8/31/18 11:01 AM.  Always use your most recent med list.                   Brand Name Dispense Instructions for use Diagnosis    albuterol 108 (90 Base) MCG/ACT inhaler    PROAIR HFA/PROVENTIL HFA/VENTOLIN HFA     Inhale 2 puffs into the lungs every 6 hours as needed for shortness of breath / dyspnea or wheezing        ASPIRIN PO      Take 81 mg by mouth daily        carvedilol 25 MG tablet    COREG     Take 25 mg by mouth 2 times daily (with meals)        clopidogrel 75 MG tablet    PLAVIX     Take 75 mg by mouth daily        ferrous sulfate 325 (65 Fe) MG tablet    IRON     Take 325 mg by mouth 2 times daily        furosemide 40 MG tablet    LASIX     60 tablet    Take 0.5 tablets (20 mg) by mouth 2 times daily    Acute diastolic congestive heart failure (H)       mometasone 220 MCG/INH Inhaler    ASMANEX     Inhale 2 puffs into the lungs every evening        OMEPRAZOLE PO      Take 20 mg by mouth daily        potassium chloride SA 20 MEQ CR tablet    K-DUR/KLOR-CON M    90 tablet    Take 2 tablets (40 mEq) by mouth daily for 5 days, then take 1 tablet (20 mEq) daily    Benign essential hypertension, Hypokalemia       rosuvastatin 40 MG tablet    CRESTOR    30 tablet    Take 1 tablet (40 mg) by mouth daily    CAD (coronary artery disease)       tiotropium-olodaterol 2.5-2.5 MCG/ACT Aers      Inhale 1 puff into the lungs daily        triamcinolone 0.1 % cream    KENALOG     Apply topically 2 times daily as needed for irritation        umeclidinium-vilanterol 62.5-25 MCG/INH oral inhaler    ANORO ELLIPTA     Inhale 1 puff into the lungs daily        vitamin D 2000 units tablet     30 tablet    Take 2,000 Units by mouth 2 times daily

## 2018-08-31 NOTE — LETTER
8/31/2018    Inderjit Wang MD  [facility] [address] [address]    RE: José Ballesterosclaudio       Dear Colleague,    I had the pleasure of seeing José Aguirre in the [facility].      Cardiology Progress Note    Date of Service: 08/31/2018      Reason for visit: CORE clinic follow up, chronic diastolic heart failure.    Primary cardiologist: Dr. Radhames Vargas      HPI:  Mr. Aguirre is a very pleasant 82 year old male with a complex PMHx including COPD, peripheral vascular disease and carotid artery disease s/p CEA, hyperlipidemia, prior GI bleed, hypertension and chronic kidney disease with KARLY s/p stent placement, and coronary artery disease s/p CABG 2003. He is well known to Dr. Vargas. He was recently hospitalized at [facility] in June with acute diastolic heart failure and COPD exacerbation. He admitted to eating a high salt diet and was found to be volume overloaded on evaluation. Reportedly, he was admitted at 198 pounds and he was diuresed down to 180 pounds. His stay was complicated by acute on chronic renal insufficiency. He improved after aggressive diuresis and treatment with steroids for his COPD. An echocardiogram with that admit reported normal left ventricular systolic function and normal wall motion, right ventricular enlargement and reported moderate reduced RV function and mildly increased RVSP at 32+ RAP.  His last echo here in 2016 had reported normal RV size and function and preserved EF 55-60%. When he returned to see Dr. Vargas in mid June, he was doing well, following a low sodium diet, and his CRANE had significantly improved.      I then saw him for enrollment in our CORE clinic in July. He continued to do well, and was being very strict about his sodium intake. He was down to 175 lbs on our scale, and had no recurrence of edema. We clarified after his visit that he was on 20mg BID of lasix. It was noted he had a chest CT which showed  pleural plaques consistent with asbestos exposure, and a pulmonary nodule, and he was to follow with pulmonary medicine. He appeared to be euvolemic, and no changes were made at that time. He's back today for routine follow up.    Since last visit, he says he continues to do well. He is very strict with sodium intake and his weight is down now to 170 lbs on his home scale. He denies any leg swelling or orthopnea. His BP is excellent today and says it runs similarly at home. He has occasional CRANE still but, thinks the new inhalers he got recently may be helping. He is following with pulmonary; I reviewed the [facility] note and assessment is that he has profound emphysema in addition to some asbestosis and potentially fibrotic lung disease. He has plans to begin pulmonary rehab in the next few weeks.       ASSESSMENT/PLAN:    1. Acute on chronic diastolic heart failure.   --Recent episode of acute diastolic heart failure, in the setting of a COPD exacerbation. He continues to appear euvolemic on exam today, and has made significant improvements with strict adherence to a low salt diet. As his renal function has remained stable, will continue current dose of lasix at 20mg twice daily and current KDur dosing. He will continue to weigh himself daily at home.     --He was noted to have RV dysfunction on his echocardiogram with his recent hospital stay. Now that he is euvolemic, will will plan repeat echocardiogram prior to his next visit with Dr. Vargas.    --His intermittent CRANE and hypoxemia are almost certainly related to his lung disease as he appears stable from a cardiac standpoint currently. He plans to start pulmonary rehab in the near future. If his RV pressures remain elevated on repeat echo, suspect this would be secondary to him pulmonary issues.    2. Coronary artery disease.   --S/P CABG in 2003 (LIMA=LAD, SVG=Diag, SVG=OM2, SVG=PDA).   --He remains free of angina or evidence of ischemia on exam. Last  stress study 2011 showed no ischemia.   --Continue  ASA 81mg daily.   --Continue rosuvastatin 40mg daily. Last LDL ([facility]) May 2018 was within goal at 66.    3. Hypertension.   --Well controlled again today. Continue carvedilol 25mg BID.    --History of prior renovascular issues, s/p stent for KARLY. He is on plavix 75mg daily.       Follow up plan: With Dr. Vargas in 2 months with repeat echocardiogram.       Orders this Visit:  Orders Placed This Encounter   Procedures     Follow-Up with Cardiologist     Orders Placed This Encounter   Medications     mometasone (ASMANEX) 220 MCG/INH Inhaler     Sig: Inhale 2 puffs into the lungs every evening     tiotropium-olodaterol 2.5-2.5 MCG/ACT AERS     Sig: Inhale 1 puff into the lungs daily     There are no discontinued medications.        CURRENT MEDICATIONS:  Current Outpatient Prescriptions   Medication Sig Dispense Refill     albuterol (PROAIR HFA/PROVENTIL HFA/VENTOLIN HFA) 108 (90 Base) MCG/ACT Inhaler Inhale 2 puffs into the lungs every 6 hours as needed for shortness of breath / dyspnea or wheezing       ASPIRIN PO Take 81 mg by mouth daily        carvedilol (COREG) 25 MG tablet Take 25 mg by mouth 2 times daily (with meals)       Cholecalciferol (VITAMIN D) 2000 UNITS tablet Take 2,000 Units by mouth 2 times daily  30 tablet      clopidogrel (PLAVIX) 75 MG tablet Take 75 mg by mouth daily        ferrous sulfate 325 (65 FE) MG tablet Take 325 mg by mouth 2 times daily        furosemide (LASIX) 40 MG tablet Take 0.5 tablets (20 mg) by mouth 2 times daily 60 tablet 3     mometasone (ASMANEX) 220 MCG/INH Inhaler Inhale 2 puffs into the lungs every evening       OMEPRAZOLE PO Take 20 mg by mouth daily       potassium chloride SA (K-DUR/KLOR-CON M) 20 MEQ CR tablet Take 2 tablets (40 mEq) by mouth daily for 5 days, then take 1 tablet (20 mEq) daily (Patient taking differently: 20 mEq daily ) 90 tablet 3     rosuvastatin (CRESTOR) 40 MG tablet Take 1 tablet (40  mg) by mouth daily 30 tablet 3     tiotropium-olodaterol 2.5-2.5 MCG/ACT AERS Inhale 1 puff into the lungs daily       triamcinolone (KENALOG) 0.1 % cream Apply topically 2 times daily as needed for irritation        umeclidinium-vilanterol (ANORO ELLIPTA) 62.5-25 MCG/INH oral inhaler Inhale 1 puff into the lungs daily         ALLERGIES   No Known Allergies    PAST MEDICAL HISTORY:  Past Medical History:   Diagnosis Date     Anemia      Atrophy of left kidney      CKD (chronic kidney disease), stage III      Claudication, intermittent (H)      Coronary artery disease 2003    CAB x 4     History of GI bleed 2012    Hemorrhagic gastritis     Hypercholesterolemia      Hypertension      Left carotid artery stenosis     S/P Carotid Endarterectomy left      PVD (peripheral vascular disease) (H)      Renal artery stenosis (H)     stent R       PAST SURGICAL HISTORY:  Past Surgical History:   Procedure Laterality Date     BYPASS GRAFT ARTERY CORONARY  06/2003    LIMA=LAD, SVG=Diag, SVG=OM2, SVG=PDA     CAROTID ENDARTERECTOMY Left      ENDARTERECTOMY CAROTID Left 7/11/2017    Procedure: ENDARTERECTOMY CAROTID;  REDO LEFT CAROTID ENDARTERECTOMY WITH RIGHT ANKLE GREATER SAPHENOUS VEIN  ;  Surgeon: Khurram Bishop MD;  Location: [facility] OR     EYE SURGERY       HEART CATH LEFT HEART CATH  06/2003    Severe multivessel disease     RENAL ARTERY ANGIOGRAM Right 05/2013    with stenting     TONSILLECTOMY         FAMILY HISTORY:  Family History   Problem Relation Age of Onset     Cerebrovascular Disease Mother 90     unknown type     Cancer Brother 70     Brain cancer      Dementia Sister      Arthritis Brother        SOCIAL HISTORY:  Social History     Social History     Marital status:      Spouse name: N/A     Number of children: N/A     Years of education: N/A     Social History Main Topics     Smoking status: Former Smoker     Packs/day: 1.00     Years: 50.00     Types: Cigarettes     Quit date: 1/1/2001      "Smokeless tobacco: Never Used     Alcohol use Yes      Comment: 4 drinks week     Drug use: No     Sexual activity: Not Asked     Other Topics Concern     Caffeine Concern Yes     4 - 7 cups (trying to cut down)     Special Diet Yes     eats healthy     Exercise Yes     walking     Social History Narrative       Review of Systems:  Cardiovascular: negative for chest pain, palpitations, orthopnea, LE edema  Constitutional: negative for chills, sweats, fevers   Resp: Negative for dyspnea at rest, occasional dyspnea on exertion, pos known chronic lung disease  HEENT: Negative for new visual changes, frequent headaches  Gastrointestinal: negative for abdominal pain, diarrhea, nausea, vomiting  Hematologic/lymphatic: negative for current systemic anticoagulation, hx of blood clots  Musculoskeletal: negative for new back pain, joint pain  Neurological: negative for focal weakness, LOC, seizures, syncope, neg dizziness.       Physical Exam:  Vitals: /50 (BP Location: Right arm, Patient Position: Chair, Cuff Size: Adult Regular)  Pulse 58  Ht 1.727 m (5' 8\")  Wt 79.4 kg (175 lb)  SpO2 93%  BMI 26.61 kg/m2   Wt Readings from Last 4 Encounters:   08/31/18 79.4 kg (175 lb)   07/13/18 79.8 kg (175 lb 14.4 oz)   06/19/18 81.9 kg (180 lb 9.6 oz)   12/01/17 86.2 kg (190 lb 1.6 oz)       GEN:  In general, this is a well nourished  male in no acute distress on room air.  Patient ambulatory. Accompanied by his wife today.  HEENT:  Pupils equal, round. Sclerae nonicteric.   NECK: Supple, no masses appreciated. Trachea midline. No JVD.  C/V:  Regular rate and rhythm, no murmur, rub or gallop.   RESP: Respirations are unlabored. No use of accessory muscles. Clear to auscultation bilaterally without wheezing, rales, or rhonchi.  GI: Abdomen soft, nontender, nondistended.   EXTREM: No LE edema. No cyanosis or clubbing.  NEURO: Alert and oriented, cooperative. Gait not formally assessed. No obvious focal deficits. "   PSYCH: Normal affect.  SKIN: Warm and dry. No rashes or petechiae appreciated.       Recent Lab Results:          BMP RESULTS:  Lab Results   Component Value Date     08/31/2018    POTASSIUM 4.2 08/31/2018    CHLORIDE 111 (H) 08/31/2018    CO2 22 08/31/2018    ANIONGAP 7 08/31/2018     (H) 08/31/2018    BUN 28 08/31/2018    CR 1.42 (H) 08/31/2018    GFRESTIMATED 48 (L) 08/31/2018    GFRESTBLACK 58 (L) 08/31/2018    YOON 9.0 08/31/2018          AUREA GuerraC  [facility]  Pager [phone]      Thank you for allowing me to participate in the care of your patient.      Sincerely,     HÉCTOR Guerra     [facility]    cc:   HÉCTOR Guerra  [facility]  [address] [place] [id number]

## 2018-09-27 ENCOUNTER — HOSPITAL ENCOUNTER (OUTPATIENT)
Dept: CARDIOLOGY | Facility: CLINIC | Age: 83
Discharge: HOME | End: 2018-09-27
Attending: PHYSICIAN ASSISTANT | Admitting: PHYSICIAN ASSISTANT
Payer: MEDICARE

## 2018-09-27 DIAGNOSIS — I50.31 DIASTOLIC CHF, ACUTE (H): ICD-10-CM

## 2018-09-27 PROCEDURE — 93306 TTE W/DOPPLER COMPLETE: CPT

## 2018-09-27 PROCEDURE — 93306 TTE W/DOPPLER COMPLETE: CPT | Mod: 26 | Performed by: INTERNAL MEDICINE

## 2018-10-03 ENCOUNTER — OFFICE VISIT (OUTPATIENT)
Dept: CARDIOLOGY | Facility: CLINIC | Age: 83
End: 2018-10-03
Attending: PHYSICIAN ASSISTANT
Payer: COMMERCIAL

## 2018-10-03 VITALS
HEART RATE: 56 BPM | BODY MASS INDEX: 25.61 KG/M2 | SYSTOLIC BLOOD PRESSURE: 92 MMHG | WEIGHT: 169 LBS | HEIGHT: 68 IN | DIASTOLIC BLOOD PRESSURE: 47 MMHG

## 2018-10-03 DIAGNOSIS — I25.10 CORONARY ARTERY DISEASE INVOLVING NATIVE CORONARY ARTERY OF NATIVE HEART WITHOUT ANGINA PECTORIS: ICD-10-CM

## 2018-10-03 DIAGNOSIS — I73.9 PERIPHERAL VASCULAR DISEASE (H): ICD-10-CM

## 2018-10-03 DIAGNOSIS — I73.9 CLAUDICATION OF BOTH LOWER EXTREMITIES (H): ICD-10-CM

## 2018-10-03 DIAGNOSIS — I50.32 CHRONIC DIASTOLIC CONGESTIVE HEART FAILURE (H): Primary | ICD-10-CM

## 2018-10-03 DIAGNOSIS — N18.30 CKD (CHRONIC KIDNEY DISEASE), STAGE III (H): ICD-10-CM

## 2018-10-03 PROCEDURE — 99214 OFFICE O/P EST MOD 30 MIN: CPT | Performed by: INTERNAL MEDICINE

## 2018-10-03 NOTE — LETTER
10/3/2018    Inderjit Wang MD  [facility] [address] [address]    RE: José Aguirre       Dear Colleague,    I had the pleasure of seeing José Ballesterosclaudio in the [facility].    HPI and Plan:   This nice 82-year-old gentleman seen  in follow-up of a severe episode of acute diastolic heart failure and COPD exacerbation in June 2018.He has a history of a complex  cardiovascular including history  of   -coronary artery disease requiring CABG around 2003,   -peripheral vascular disease with bilateral lower extremity claudication (left greater than right),   -carotid artery disease with occluded right carotid and status post left CEA,   -renal artery atherosclerotic stenosis, status post renal stenting,  - dyslipidemia,    -mixed hypertension including renal vascular hypertension, which was improved following renal artery stenting in 2013  - other peripheral vascular disease involving left subclavian artery  -Chronic renal insufficiency, stage III.      Since his June episode he has been very cautious about sodium intake.  He is lost a little bit more weight.  He is in pulmonary rehab because of his severe COPD.  He has not had any of the previous significant shortness of breath he had with his combined COPD and cor pulmonale and diastolic heart failure event in June.  He is not noticing significant orthopnea and has no PND or edema.  Weight is now 169 pounds at home and has been stable over the last 3 months.  He is without orthostasis, palpitations, syncope or near syncope, chest discomfort with activity or at other times or any symptoms like his previous ischemic symptoms.  His activity is limited both by his COPD which is oxygen dependent, and by his lower extremity claudication fatigue although that is more prominent in his quads than in his calves.  He is not having any resting ischemic limb pain or elevation of ischemic limb pain.  He has had no focal  neurologic symptoms.  He continues on dual antiplatelet therapy and is not having any bleeding issues.    His inhalers have been switched because he could not afford his previous one.  He notes that 1 of them does not seem to be able to work, his evening inhaler.  I have asked him to follow-up with Dr. Fountain in pulmonary to reassess this.    This last summer summer he was eating a very high salt diet all day long and developed progressive dyspnea on exertion.  He was unaware of weight gain but when he presented to the hospital he was found to be hypoxic with an elevated BNP and wheezing.  He states his weight on admission were 198 pounds and he was diuresed down to 180 pounds.  He had acute on chronic renal insufficiency.  He was wheezing and hypoxic.  He was also treated with prednisone and inhalers. Diuresis and COPD treatment resolved all of his dyspnea on exertion.  It also resolved the mild edema he had.  Echo report from that admission stated there was severe RV dilatation and moderate or greater RV dysfunction.    He had a follow-up echo to this time.  I personally reviewed this.  Left ventricular size and function is normal.  Right ventricular size may be mildly increased.  I think right ventricular function is at lower limits of normal.  This represents a significant improvement.  Estimated PA pressures are 40+ RAP, with normal IVC size and respiratory motion collapse consistent with normal CVP.  He has no JVD or HJR or    He had left carotid endarterectomy 20 years ago.  He has totally occluded right internal carotid.  He developed restenosis in his left side and underwent redo patch angioplasty in 2017 which he tolerated well with good recovery and without cardiac issues.          We could not achieve adequate LDL lowering target with 80 mg of atorvastatin.  He was therefore switched to rosuvastatin and this has significantly improved his LDL from above 100 down to 62-70 and significantly improve his HDL  from the low 30s up to 42 in 2015.        Exam-  In summary:  Blood pressure 98/50, pulse 56 pulse is regular.    Lungs have diffusely decreased air movement , no wheeze or crackle or increased effort  Cardiac-regular rhythm,There is a soft systolic ejection murmur , difficult to sort out from a loud likely subclavian artery bruit on the left.  There is a left carotid bruit versus transmitted subclavian murmur, probably the latter .  Right carotid pulses markedly diminished.  Left radial pulses 1+ and delayed compared to the 2+ right radial pulse. No abdominal bruits.  .  No significant lower extremity skin changes.  No edema  Remainder of exam noted below      Impression/plan     1-recent acute diastolic congestive heart failure episode.     Now stable with management of chronic diastolic heart failure.  Probably precipitated some COPD.  Improved with profound diuresis.  He now understands sodium restricted diet and daily weights.  We will continue current management and current goal weight of 170-175     2-new right ventricular dysfunction at the time of his COPD and diastolic heart failure event.     By echo there is been substantial recovery of his right ventricular function.  Left ventricular function remains normal.     3-coronary artery disease.  Currently without ischemic, heart failure, or arrhythmia symptoms.  Stress study 2011 showed no ischemia.  Ejection fraction 62%.  No significant valvular disease.  Overall risk factor intervention reasonable.  .  4-hypertension, essential plus renovascular-controlled    5-severe COPD.  Appears relatively stable though oxygen dependent.  He has concerns about his inhaler medications and their function.  I have asked him to follow-up with pulmonary regarding that.    Since he has been stable for 3 months from a cardiovascular standpoint, I will ask him to come back to heart failure clinic in 4 months for follow-up. dictation    Orders Placed This Encounter    Procedures     Follow-Up with [facility] - [facility] visit       No orders of the defined types were placed in this encounter.      There are no discontinued medications.      Encounter Diagnoses   Name Primary?     Chronic diastolic congestive heart failure (H) Yes     Coronary artery disease involving native coronary artery of native heart without angina pectoris      Peripheral vascular disease (H)      CKD (chronic kidney disease), stage III (H)      Claudication of both lower extremities (H)        CURRENT MEDICATIONS:  Current Outpatient Prescriptions   Medication Sig Dispense Refill     albuterol (PROAIR HFA/PROVENTIL HFA/VENTOLIN HFA) 108 (90 Base) MCG/ACT Inhaler Inhale 2 puffs into the lungs every 6 hours as needed for shortness of breath / dyspnea or wheezing       ASPIRIN PO Take 81 mg by mouth daily        carvedilol (COREG) 25 MG tablet Take 25 mg by mouth 2 times daily (with meals)       Cholecalciferol (VITAMIN D) 2000 UNITS tablet Take 2,000 Units by mouth 2 times daily  30 tablet      clopidogrel (PLAVIX) 75 MG tablet Take 75 mg by mouth daily        ferrous sulfate 325 (65 FE) MG tablet Take 325 mg by mouth 2 times daily        furosemide (LASIX) 40 MG tablet Take 0.5 tablets (20 mg) by mouth 2 times daily 60 tablet 3     mometasone (ASMANEX) 220 MCG/INH Inhaler Inhale 2 puffs into the lungs every evening       OMEPRAZOLE PO Take 20 mg by mouth daily       potassium chloride SA (K-DUR/KLOR-CON M) 20 MEQ CR tablet Take 2 tablets (40 mEq) by mouth daily for 5 days, then take 1 tablet (20 mEq) daily (Patient taking differently: 20 mEq daily ) 90 tablet 3     rosuvastatin (CRESTOR) 40 MG tablet Take 1 tablet (40 mg) by mouth daily 30 tablet 3     tiotropium-olodaterol 2.5-2.5 MCG/ACT AERS Inhale 1 puff into the lungs daily       triamcinolone (KENALOG) 0.1 % cream Apply topically 2 times daily as needed for irritation        umeclidinium-vilanterol (ANORO ELLIPTA) 62.5-25 MCG/INH oral inhaler Inhale 1  puff into the lungs daily         ALLERGIES   No Known Allergies    PAST MEDICAL HISTORY:  Past Medical History:   Diagnosis Date     Anemia      Atrophy of left kidney      CKD (chronic kidney disease), stage III (H)      Claudication, intermittent (H)      Coronary artery disease 2003    CAB x 4     History of GI bleed 2012    Hemorrhagic gastritis     Hypercholesterolemia      Hypertension      Left carotid artery stenosis     S/P Carotid Endarterectomy left      PVD (peripheral vascular disease) (H)      Renal artery stenosis (H)     stent R       PAST SURGICAL HISTORY:  Past Surgical History:   Procedure Laterality Date     BYPASS GRAFT ARTERY CORONARY  06/2003    LIMA=LAD, SVG=Diag, SVG=OM2, SVG=PDA     CAROTID ENDARTERECTOMY Left      ENDARTERECTOMY CAROTID Left 7/11/2017    Procedure: ENDARTERECTOMY CAROTID;  REDO LEFT CAROTID ENDARTERECTOMY WITH RIGHT ANKLE GREATER SAPHENOUS VEIN  ;  Surgeon: Khurram Bishop MD;  Location: [facility] OR     EYE SURGERY       HEART CATH LEFT HEART CATH  06/2003    Severe multivessel disease     RENAL ARTERY ANGIOGRAM Right 05/2013    with stenting     TONSILLECTOMY         FAMILY HISTORY:  Family History   Problem Relation Age of Onset     Cerebrovascular Disease Mother 90     unknown type     Cancer Brother 70     Brain cancer      Dementia Sister      Arthritis Brother        SOCIAL HISTORY:  Social History     Social History     Marital status:      Spouse name: N/A     Number of children: N/A     Years of education: N/A     Social History Main Topics     Smoking status: Former Smoker     Packs/day: 1.00     Years: 50.00     Types: Cigarettes     Quit date: 1/1/2001     Smokeless tobacco: Never Used     Alcohol use Yes      Comment: 4 drinks week     Drug use: No     Sexual activity: Not Asked     Other Topics Concern     Caffeine Concern Yes     4 - 7 cups (trying to cut down)     Special Diet Yes     eats healthy     Exercise Yes     walking     Social History  "Narrative       Review of Systems:  Skin:  Negative       Eyes:  Positive for glasses    ENT:  Positive for hearing loss  pt has hearing aids bilaterally  Respiratory:  Positive for dyspnea on exertion using oxygen PRN   Cardiovascular:    Positive for;lightheadedness    Gastroenterology: Positive for heartburn;reflux under control with medication  Genitourinary:  Negative      Musculoskeletal:  Negative      Neurologic:  Negative      Psychiatric:  Negative      Heme/Lymph/Imm:  Negative      Endocrine:  Negative        Physical Exam:  Vitals: BP 92/47 (BP Location: Right arm, Patient Position: Chair, Cuff Size: Adult Regular)  Pulse 56  Ht 1.727 m (5' 8\")  Wt 76.7 kg (169 lb)  BMI 25.7 kg/m2    Constitutional:  cooperative, alert and oriented, well developed, well nourished, in no acute distress        Skin:  warm and dry to the touch, no apparent skin lesions or masses noted          Head:  normocephalic, no masses or lesions        Eyes:  pupils equal and round;sclera white;EOMS intact        Lymph:      ENT:  no pallor or cyanosis hearing aide(s) present      Neck:  JVP normal (no HJR. No Kussmauls) transmitted murmur (Left bruit >right, Left CEA scar, diminished right carotid pulse) diminished right carotid pulse    Respiratory:  healed median sternotomy scar;normal respiratory excursion (no wheeze/crackles) diminished breath sounds bilaterally (Left subclavian prominant bruit)       Cardiac: regular rhythm;normal S1 and S2;no S3 or S4   distant heart sounds     systolic ejection murmur;RUSB;grade 1 (vs transmitted subclavian bruit)            2+          (trace)   1+;left radial artery (delayed relative to right radial)                (left radial pulse delayed compared to right, 1+ left, 2+ right radial pulse)    GI:  abdomen soft;BS normoactive;non-tender obese      Extremities and Muscular Skeletal:  no edema;no deformities, clubbing, cyanosis, erythema observed              Neurological:  no gross " motor deficits;affect appropriate        Psych:  affect appropriate, oriented to time, person and place      Thank you for allowing me to participate in the care of your patient.    Sincerely,     Radhames Vargas MD     [facility]

## 2018-10-03 NOTE — MR AVS SNAPSHOT
After Visit Summary   10/3/2018    José Aguirre    MRN: [id number]           Patient Information     Date Of Birth          1935        Visit Information        Provider Department      10/3/2018 9:00 AM Radhames Vargas MD [facility]        Today's Diagnoses     Chronic diastolic congestive heart failure (H)    -  1    Coronary artery disease involving native coronary artery of native heart without angina pectoris        Peripheral vascular disease (H)        CKD (chronic kidney disease), stage III (H)        Claudication of both lower extremities (H)           Follow-ups after your visit        Additional Services     Follow-Up with [facility] - [facility] visit                 Future tests that were ordered for you today     Open Future Orders        Priority Expected Expires Ordered    Follow-Up with [facility] - BAR visit Routine 2/1/2019 10/3/2019 10/3/2018            Who to contact     If you have questions or need follow up information about today's clinic visit or your schedule please contact [facility] directly at [phone].  Normal or non-critical lab and imaging results will be communicated to you by MyChart, letter or phone within 4 business days after the clinic has received the results. If you do not hear from us within 7 days, please contact the clinic through [employer] or phone. If you have a critical or abnormal lab result, we will notify you by phone as soon as possible.  Submit refill requests through [employer] or call your pharmacy and they will forward the refill request to us. Please allow 3 business days for your refill to be completed.          Additional Information About Your Visit        Care EveryWhere ID     This is your Care EveryWhere ID. This could be used by other organizations to access your [facility] medical records  [id number]        Your Vitals Were     Pulse Height BMI  "(Body Mass Index)             56 1.727 m (5' 8\") 25.7 kg/m2          Blood Pressure from Last 3 Encounters:   10/03/18 92/47   08/31/18 112/50   07/13/18 120/62    Weight from Last 3 Encounters:   10/03/18 76.7 kg (169 lb)   08/31/18 79.4 kg (175 lb)   07/13/18 79.8 kg (175 lb 14.4 oz)              We Performed the Following     Follow-Up with Cardiologist          Today's Medication Changes          These changes are accurate as of 10/3/18  9:54 AM.  If you have any questions, ask your nurse or doctor.               These medicines have changed or have updated prescriptions.        Dose/Directions    potassium chloride SA 20 MEQ CR tablet   Commonly known as:  K-DUR/KLOR-CON M   This may have changed:    - how much to take  - when to take this  - additional instructions   Used for:  Benign essential hypertension, Hypokalemia        Take 2 tablets (40 mEq) by mouth daily for 5 days, then take 1 tablet (20 mEq) daily   Quantity:  90 tablet   Refills:  3                Primary Care Provider Office Phone # Fax #    Inderjit Osbaldo Kenny [phone]       [facility] [address] [place] [address]        Equal Access to Services     FOREIGN SALDANA AH: Hadii tom reardon hadpavano Soelizabethali, waaxda luqadaha, qaybta kaalmada adeegyada, vito thrasher. So [facility] [phone].    ATENCIÓN: Si habla español, tiene a spencer disposición servicios gratuitos de asistencia lingüística. Llame al [phone].    We comply with applicable federal civil rights laws and [place] laws. We do not discriminate on the basis of race, color, national origin, age, disability, sex, sexual orientation, or gender identity.            Thank you!     Thank you for choosing [facility]  for your care. Our goal is always to provide you with excellent care. Hearing back from our patients is one way we can continue to improve our services. Please take a few minutes to " complete the written survey that you may receive in the mail after your visit with us. Thank you!             Your Updated Medication List - Protect others around you: Learn how to safely use, store and throw away your medicines at www.disposemSoompieds.org.          This list is accurate as of 10/3/18  9:54 AM.  Always use your most recent med list.                   Brand Name Dispense Instructions for use Diagnosis    albuterol 108 (90 Base) MCG/ACT inhaler    PROAIR HFA/PROVENTIL HFA/VENTOLIN HFA     Inhale 2 puffs into the lungs every 6 hours as needed for shortness of breath / dyspnea or wheezing        ASPIRIN PO      Take 81 mg by mouth daily        carvedilol 25 MG tablet    COREG     Take 25 mg by mouth 2 times daily (with meals)        clopidogrel 75 MG tablet    PLAVIX     Take 75 mg by mouth daily        ferrous sulfate 325 (65 Fe) MG tablet    IRON     Take 325 mg by mouth 2 times daily        furosemide 40 MG tablet    LASIX    60 tablet    Take 0.5 tablets (20 mg) by mouth 2 times daily    Acute diastolic congestive heart failure (H)       mometasone 220 MCG/INH Inhaler    ASMANEX     Inhale 2 puffs into the lungs every evening        OMEPRAZOLE PO      Take 20 mg by mouth daily        potassium chloride SA 20 MEQ CR tablet    K-DUR/KLOR-CON M    90 tablet    Take 2 tablets (40 mEq) by mouth daily for 5 days, then take 1 tablet (20 mEq) daily    Benign essential hypertension, Hypokalemia       rosuvastatin 40 MG tablet    CRESTOR    30 tablet    Take 1 tablet (40 mg) by mouth daily    CAD (coronary artery disease)       tiotropium-olodaterol 2.5-2.5 MCG/ACT Aers      Inhale 1 puff into the lungs daily        triamcinolone 0.1 % cream    KENALOG     Apply topically 2 times daily as needed for irritation        umeclidinium-vilanterol 62.5-25 MCG/INH oral inhaler    ANORO ELLIPTA     Inhale 1 puff into the lungs daily        vitamin D 2000 units tablet     30 tablet    Take 2,000 Units by mouth 2 times  daily

## 2018-10-03 NOTE — PROGRESS NOTES
HPI and Plan:   This nice 82-year-old gentleman seen  in follow-up of a severe episode of acute diastolic heart failure and COPD exacerbation in June 2018.He has a history of a complex  cardiovascular including history  of   -coronary artery disease requiring CABG around 2003,   -peripheral vascular disease with bilateral lower extremity claudication (left greater than right),   -carotid artery disease with occluded right carotid and status post left CEA,   -renal artery atherosclerotic stenosis, status post renal stenting,  - dyslipidemia,    -mixed hypertension including renal vascular hypertension, which was improved following renal artery stenting in 2013  - other peripheral vascular disease involving left subclavian artery  -Chronic renal insufficiency, stage III.      Since his June episode he has been very cautious about sodium intake.  He is lost a little bit more weight.  He is in pulmonary rehab because of his severe COPD.  He has not had any of the previous significant shortness of breath he had with his combined COPD and cor pulmonale and diastolic heart failure event in June.  He is not noticing significant orthopnea and has no PND or edema.  Weight is now 169 pounds at home and has been stable over the last 3 months.  He is without orthostasis, palpitations, syncope or near syncope, chest discomfort with activity or at other times or any symptoms like his previous ischemic symptoms.  His activity is limited both by his COPD which is oxygen dependent, and by his lower extremity claudication fatigue although that is more prominent in his quads than in his calves.  He is not having any resting ischemic limb pain or elevation of ischemic limb pain.  He has had no focal neurologic symptoms.  He continues on dual antiplatelet therapy and is not having any bleeding issues.    His inhalers have been switched because he could not afford his previous one.  He notes that 1 of them does not seem to be able to  work, his evening inhaler.  I have asked him to follow-up with Dr. Fountain in pulmonary to reassess this.    This last summer summer he was eating a very high salt diet all day long and developed progressive dyspnea on exertion.  He was unaware of weight gain but when he presented to the hospital he was found to be hypoxic with an elevated BNP and wheezing.  He states his weight on admission were 198 pounds and he was diuresed down to 180 pounds.  He had acute on chronic renal insufficiency.  He was wheezing and hypoxic.  He was also treated with prednisone and inhalers. Diuresis and COPD treatment resolved all of his dyspnea on exertion.  It also resolved the mild edema he had.  Echo report from that admission stated there was severe RV dilatation and moderate or greater RV dysfunction.    He had a follow-up echo to this time.  I personally reviewed this.  Left ventricular size and function is normal.  Right ventricular size may be mildly increased.  I think right ventricular function is at lower limits of normal.  This represents a significant improvement.  Estimated PA pressures are 40+ RAP, with normal IVC size and respiratory motion collapse consistent with normal CVP.  He has no JVD or HJR or    He had left carotid endarterectomy 20 years ago.  He has totally occluded right internal carotid.  He developed restenosis in his left side and underwent redo patch angioplasty in 2017 which he tolerated well with good recovery and without cardiac issues.          We could not achieve adequate LDL lowering target with 80 mg of atorvastatin.  He was therefore switched to rosuvastatin and this has significantly improved his LDL from above 100 down to 62-70 and significantly improve his HDL from the low 30s up to 42 in 2015.        Exam-  In summary:  Blood pressure 98/50, pulse 56 pulse is regular.    Lungs have diffusely decreased air movement , no wheeze or crackle or increased effort  Cardiac-regular rhythm,There is a  soft systolic ejection murmur , difficult to sort out from a loud likely subclavian artery bruit on the left.  There is a left carotid bruit versus transmitted subclavian murmur, probably the latter .  Right carotid pulses markedly diminished.  Left radial pulses 1+ and delayed compared to the 2+ right radial pulse. No abdominal bruits.  .  No significant lower extremity skin changes.  No edema  Remainder of exam noted below      Impression/plan     1-recent acute diastolic congestive heart failure episode.    Now stable with management of chronic diastolic heart failure.  Probably precipitated some COPD.  Improved with profound diuresis.  He now understands sodium restricted diet and daily weights.  We will continue current management and current goal weight of 170-175     2-new right ventricular dysfunction at the time of his COPD and diastolic heart failure event.    By echo there is been substantial recovery of his right ventricular function.  Left ventricular function remains normal.     3-coronary artery disease.  Currently without ischemic, heart failure, or arrhythmia symptoms.  Stress study 2011 showed no ischemia.  Ejection fraction 62%.  No significant valvular disease.  Overall risk factor intervention reasonable.  .  4-hypertension, essential plus renovascular-controlled    5-severe COPD.  Appears relatively stable though oxygen dependent.  He has concerns about his inhaler medications and their function.  I have asked him to follow-up with pulmonary regarding that.    Since he has been stable for 3 months from a cardiovascular standpoint, I will ask him to come back to heart failure clinic in 4 months for follow-up. dictation    Orders Placed This Encounter   Procedures     Follow-Up with [facility] - BAR visit       No orders of the defined types were placed in this encounter.      There are no discontinued medications.      Encounter Diagnoses   Name Primary?     Chronic diastolic congestive heart  failure (H) Yes     Coronary artery disease involving native coronary artery of native heart without angina pectoris      Peripheral vascular disease (H)      CKD (chronic kidney disease), stage III (H)      Claudication of both lower extremities (H)        CURRENT MEDICATIONS:  Current Outpatient Prescriptions   Medication Sig Dispense Refill     albuterol (PROAIR HFA/PROVENTIL HFA/VENTOLIN HFA) 108 (90 Base) MCG/ACT Inhaler Inhale 2 puffs into the lungs every 6 hours as needed for shortness of breath / dyspnea or wheezing       ASPIRIN PO Take 81 mg by mouth daily        carvedilol (COREG) 25 MG tablet Take 25 mg by mouth 2 times daily (with meals)       Cholecalciferol (VITAMIN D) 2000 UNITS tablet Take 2,000 Units by mouth 2 times daily  30 tablet      clopidogrel (PLAVIX) 75 MG tablet Take 75 mg by mouth daily        ferrous sulfate 325 (65 FE) MG tablet Take 325 mg by mouth 2 times daily        furosemide (LASIX) 40 MG tablet Take 0.5 tablets (20 mg) by mouth 2 times daily 60 tablet 3     mometasone (ASMANEX) 220 MCG/INH Inhaler Inhale 2 puffs into the lungs every evening       OMEPRAZOLE PO Take 20 mg by mouth daily       potassium chloride SA (K-DUR/KLOR-CON M) 20 MEQ CR tablet Take 2 tablets (40 mEq) by mouth daily for 5 days, then take 1 tablet (20 mEq) daily (Patient taking differently: 20 mEq daily ) 90 tablet 3     rosuvastatin (CRESTOR) 40 MG tablet Take 1 tablet (40 mg) by mouth daily 30 tablet 3     tiotropium-olodaterol 2.5-2.5 MCG/ACT AERS Inhale 1 puff into the lungs daily       triamcinolone (KENALOG) 0.1 % cream Apply topically 2 times daily as needed for irritation        umeclidinium-vilanterol (ANORO ELLIPTA) 62.5-25 MCG/INH oral inhaler Inhale 1 puff into the lungs daily         ALLERGIES   No Known Allergies    PAST MEDICAL HISTORY:  Past Medical History:   Diagnosis Date     Anemia      Atrophy of left kidney      CKD (chronic kidney disease), stage III (H)      Claudication, intermittent  (H)      Coronary artery disease 2003    CAB x 4     History of GI bleed 2012    Hemorrhagic gastritis     Hypercholesterolemia      Hypertension      Left carotid artery stenosis     S/P Carotid Endarterectomy left      PVD (peripheral vascular disease) (H)      Renal artery stenosis (H)     stent R       PAST SURGICAL HISTORY:  Past Surgical History:   Procedure Laterality Date     BYPASS GRAFT ARTERY CORONARY  06/2003    LIMA=LAD, SVG=Diag, SVG=OM2, SVG=PDA     CAROTID ENDARTERECTOMY Left      ENDARTERECTOMY CAROTID Left 7/11/2017    Procedure: ENDARTERECTOMY CAROTID;  REDO LEFT CAROTID ENDARTERECTOMY WITH RIGHT ANKLE GREATER SAPHENOUS VEIN  ;  Surgeon: Khurram Bishop MD;  Location: [facility]     EYE SURGERY       HEART CATH LEFT HEART CATH  06/2003    Severe multivessel disease     RENAL ARTERY ANGIOGRAM Right 05/2013    with stenting     TONSILLECTOMY         FAMILY HISTORY:  Family History   Problem Relation Age of Onset     Cerebrovascular Disease Mother 90     unknown type     Cancer Brother 70     Brain cancer      Dementia Sister      Arthritis Brother        SOCIAL HISTORY:  Social History     Social History     Marital status:      Spouse name: N/A     Number of children: N/A     Years of education: N/A     Social History Main Topics     Smoking status: Former Smoker     Packs/day: 1.00     Years: 50.00     Types: Cigarettes     Quit date: 1/1/2001     Smokeless tobacco: Never Used     Alcohol use Yes      Comment: 4 drinks week     Drug use: No     Sexual activity: Not Asked     Other Topics Concern     Caffeine Concern Yes     4 - 7 cups (trying to cut down)     Special Diet Yes     eats healthy     Exercise Yes     walking     Social History Narrative       Review of Systems:  Skin:  Negative       Eyes:  Positive for glasses    ENT:  Positive for hearing loss  pt has hearing aids bilaterally  Respiratory:  Positive for dyspnea on exertion using oxygen PRN   Cardiovascular:    Positive  "for;lightheadedness    Gastroenterology: Positive for heartburn;reflux under control with medication  Genitourinary:  Negative      Musculoskeletal:  Negative      Neurologic:  Negative      Psychiatric:  Negative      Heme/Lymph/Imm:  Negative      Endocrine:  Negative        Physical Exam:  Vitals: BP 92/47 (BP Location: Right arm, Patient Position: Chair, Cuff Size: Adult Regular)  Pulse 56  Ht 1.727 m (5' 8\")  Wt 76.7 kg (169 lb)  BMI 25.7 kg/m2    Constitutional:  cooperative, alert and oriented, well developed, well nourished, in no acute distress        Skin:  warm and dry to the touch, no apparent skin lesions or masses noted          Head:  normocephalic, no masses or lesions        Eyes:  pupils equal and round;sclera white;EOMS intact        Lymph:      ENT:  no pallor or cyanosis hearing aide(s) present      Neck:  JVP normal (no HJR. No Kussmauls) transmitted murmur (Left bruit >right, Left CEA scar, diminished right carotid pulse) diminished right carotid pulse    Respiratory:  healed median sternotomy scar;normal respiratory excursion (no wheeze/crackles) diminished breath sounds bilaterally (Left subclavian prominant bruit)       Cardiac: regular rhythm;normal S1 and S2;no S3 or S4   distant heart sounds     systolic ejection murmur;RUSB;grade 1 (vs transmitted subclavian bruit)            2+          (trace)   1+;left radial artery (delayed relative to right radial)                (left radial pulse delayed compared to right, 1+ left, 2+ right radial pulse)    GI:  abdomen soft;BS normoactive;non-tender obese      Extremities and Muscular Skeletal:  no edema;no deformities, clubbing, cyanosis, erythema observed              Neurological:  no gross motor deficits;affect appropriate        Psych:  affect appropriate, oriented to time, person and place        CC  HÉCTOR Guerra  [facility]  [address] [place] [address]              "

## 2018-10-03 NOTE — LETTER
10/3/2018    Inderjit Wang MD  [facility] [address] [address]    RE: José Aguirre       Dear Colleague,    I had the pleasure of seeing José Ballesterosclaudio in the [facility].    HPI and Plan:   This nice 82-year-old gentleman seen  in follow-up of a severe episode of acute diastolic heart failure and COPD exacerbation in June 2018.He has a history of a complex  cardiovascular including history  of   -coronary artery disease requiring CABG around 2003,   -peripheral vascular disease with bilateral lower extremity claudication (left greater than right),   -carotid artery disease with occluded right carotid and status post left CEA,   -renal artery atherosclerotic stenosis, status post renal stenting,  - dyslipidemia,    -mixed hypertension including renal vascular hypertension, which was improved following renal artery stenting in 2013  - other peripheral vascular disease involving left subclavian artery  -Chronic renal insufficiency, stage III.      Since his June episode he has been very cautious about sodium intake.  He is lost a little bit more weight.  He is in pulmonary rehab because of his severe COPD.  He has not had any of the previous significant shortness of breath he had with his combined COPD and cor pulmonale and diastolic heart failure event in June.  He is not noticing significant orthopnea and has no PND or edema.  Weight is now 169 pounds at home and has been stable over the last 3 months.  He is without orthostasis, palpitations, syncope or near syncope, chest discomfort with activity or at other times or any symptoms like his previous ischemic symptoms.  His activity is limited both by his COPD which is oxygen dependent, and by his lower extremity claudication fatigue although that is more prominent in his quads than in his calves.  He is not having any resting ischemic limb pain or elevation of ischemic limb pain.  He has had no focal  neurologic symptoms.  He continues on dual antiplatelet therapy and is not having any bleeding issues.    His inhalers have been switched because he could not afford his previous one.  He notes that 1 of them does not seem to be able to work, his evening inhaler.  I have asked him to follow-up with Dr. Fountain in pulmonary to reassess this.    This last summer summer he was eating a very high salt diet all day long and developed progressive dyspnea on exertion.  He was unaware of weight gain but when he presented to the hospital he was found to be hypoxic with an elevated BNP and wheezing.  He states his weight on admission were 198 pounds and he was diuresed down to 180 pounds.  He had acute on chronic renal insufficiency.  He was wheezing and hypoxic.  He was also treated with prednisone and inhalers. Diuresis and COPD treatment resolved all of his dyspnea on exertion.  It also resolved the mild edema he had.  Echo report from that admission stated there was severe RV dilatation and moderate or greater RV dysfunction.    He had a follow-up echo to this time.  I personally reviewed this.  Left ventricular size and function is normal.  Right ventricular size may be mildly increased.  I think right ventricular function is at lower limits of normal.  This represents a significant improvement.  Estimated PA pressures are 40+ RAP, with normal IVC size and respiratory motion collapse consistent with normal CVP.  He has no JVD or HJR or    He had left carotid endarterectomy 20 years ago.  He has totally occluded right internal carotid.  He developed restenosis in his left side and underwent redo patch angioplasty in 2017 which he tolerated well with good recovery and without cardiac issues.          We could not achieve adequate LDL lowering target with 80 mg of atorvastatin.  He was therefore switched to rosuvastatin and this has significantly improved his LDL from above 100 down to 62-70 and significantly improve his HDL  from the low 30s up to 42 in 2015.        Exam-  In summary:  Blood pressure 98/50, pulse 56 pulse is regular.    Lungs have diffusely decreased air movement , no wheeze or crackle or increased effort  Cardiac-regular rhythm,There is a soft systolic ejection murmur , difficult to sort out from a loud likely subclavian artery bruit on the left.  There is a left carotid bruit versus transmitted subclavian murmur, probably the latter .  Right carotid pulses markedly diminished.  Left radial pulses 1+ and delayed compared to the 2+ right radial pulse. No abdominal bruits.  .  No significant lower extremity skin changes.  No edema  Remainder of exam noted below      Impression/plan     1-recent acute diastolic congestive heart failure episode.     Now stable with management of chronic diastolic heart failure.  Probably precipitated some COPD.  Improved with profound diuresis.  He now understands sodium restricted diet and daily weights.  We will continue current management and current goal weight of 170-175     2-new right ventricular dysfunction at the time of his COPD and diastolic heart failure event.     By echo there is been substantial recovery of his right ventricular function.  Left ventricular function remains normal.     3-coronary artery disease.  Currently without ischemic, heart failure, or arrhythmia symptoms.  Stress study 2011 showed no ischemia.  Ejection fraction 62%.  No significant valvular disease.  Overall risk factor intervention reasonable.  .  4-hypertension, essential plus renovascular-controlled    5-severe COPD.  Appears relatively stable though oxygen dependent.  He has concerns about his inhaler medications and their function.  I have asked him to follow-up with pulmonary regarding that.    Since he has been stable for 3 months from a cardiovascular standpoint, I will ask him to come back to heart failure clinic in 4 months for follow-up. dictation    Orders Placed This Encounter    Procedures     Follow[facility]Up with [facility] - BAR visit       No orders of the defined types were placed in this encounter.      There are no discontinued medications.      Encounter Diagnoses   Name Primary?     Chronic diastolic congestive heart failure (H) Yes     Coronary artery disease involving native coronary artery of native heart without angina pectoris      Peripheral vascular disease (H)      CKD (chronic kidney disease), stage III (H)      Claudication of both lower extremities (H)        CURRENT MEDICATIONS:  Current Outpatient Prescriptions   Medication Sig Dispense Refill     albuterol (PROAIR HFA/PROVENTIL HFA/VENTOLIN HFA) 108 (90 Base) MCG/ACT Inhaler Inhale 2 puffs into the lungs every 6 hours as needed for shortness of breath / dyspnea or wheezing       ASPIRIN PO Take 81 mg by mouth daily        carvedilol (COREG) 25 MG tablet Take 25 mg by mouth 2 times daily (with meals)       Cholecalciferol (VITAMIN D) 2000 UNITS tablet Take 2,000 Units by mouth 2 times daily  30 tablet      clopidogrel (PLAVIX) 75 MG tablet Take 75 mg by mouth daily        ferrous sulfate 325 (65 FE) MG tablet Take 325 mg by mouth 2 times daily        furosemide (LASIX) 40 MG tablet Take 0.5 tablets (20 mg) by mouth 2 times daily 60 tablet 3     mometasone (ASMANEX) 220 MCG/INH Inhaler Inhale 2 puffs into the lungs every evening       OMEPRAZOLE PO Take 20 mg by mouth daily       potassium chloride SA (K-DUR/KLOR-CON M) 20 MEQ CR tablet Take 2 tablets (40 mEq) by mouth daily for 5 days, then take 1 tablet (20 mEq) daily (Patient taking differently: 20 mEq daily ) 90 tablet 3     rosuvastatin (CRESTOR) 40 MG tablet Take 1 tablet (40 mg) by mouth daily 30 tablet 3     tiotropium-olodaterol 2.5-2.5 MCG/ACT AERS Inhale 1 puff into the lungs daily       triamcinolone (KENALOG) 0.1 % cream Apply topically 2 times daily as needed for irritation        umeclidinium-vilanterol (ANORO ELLIPTA) 62.5-25 MCG/INH oral inhaler Inhale 1  puff into the lungs daily         ALLERGIES   No Known Allergies    PAST MEDICAL HISTORY:  Past Medical History:   Diagnosis Date     Anemia      Atrophy of left kidney      CKD (chronic kidney disease), stage III (H)      Claudication, intermittent (H)      Coronary artery disease 2003    CAB x 4     History of GI bleed 2012    Hemorrhagic gastritis     Hypercholesterolemia      Hypertension      Left carotid artery stenosis     S/P Carotid Endarterectomy left      PVD (peripheral vascular disease) (H)      Renal artery stenosis (H)     stent R       PAST SURGICAL HISTORY:  Past Surgical History:   Procedure Laterality Date     BYPASS GRAFT ARTERY CORONARY  06/2003    LIMA=LAD, SVG=Diag, SVG=OM2, SVG=PDA     CAROTID ENDARTERECTOMY Left      ENDARTERECTOMY CAROTID Left 7/11/2017    Procedure: ENDARTERECTOMY CAROTID;  REDO LEFT CAROTID ENDARTERECTOMY WITH RIGHT ANKLE GREATER SAPHENOUS VEIN  ;  Surgeon: Khurram Bishop MD;  Location: [facility]     EYE SURGERY       HEART CATH LEFT HEART CATH  06/2003    Severe multivessel disease     RENAL ARTERY ANGIOGRAM Right 05/2013    with stenting     TONSILLECTOMY         FAMILY HISTORY:  Family History   Problem Relation Age of Onset     Cerebrovascular Disease Mother 90     unknown type     Cancer Brother 70     Brain cancer      Dementia Sister      Arthritis Brother        SOCIAL HISTORY:  Social History     Social History     Marital status:      Spouse name: N/A     Number of children: N/A     Years of education: N/A     Social History Main Topics     Smoking status: Former Smoker     Packs/day: 1.00     Years: 50.00     Types: Cigarettes     Quit date: 1/1/2001     Smokeless tobacco: Never Used     Alcohol use Yes      Comment: 4 drinks week     Drug use: No     Sexual activity: Not Asked     Other Topics Concern     Caffeine Concern Yes     4 - 7 cups (trying to cut down)     Special Diet Yes     eats healthy     Exercise Yes     walking     Social History  "Narrative       Review of Systems:  Skin:  Negative       Eyes:  Positive for glasses    ENT:  Positive for hearing loss  pt has hearing aids bilaterally  Respiratory:  Positive for dyspnea on exertion using oxygen PRN   Cardiovascular:    Positive for;lightheadedness    Gastroenterology: Positive for heartburn;reflux under control with medication  Genitourinary:  Negative      Musculoskeletal:  Negative      Neurologic:  Negative      Psychiatric:  Negative      Heme/Lymph/Imm:  Negative      Endocrine:  Negative        Physical Exam:  Vitals: BP 92/47 (BP Location: Right arm, Patient Position: Chair, Cuff Size: Adult Regular)  Pulse 56  Ht 1.727 m (5' 8\")  Wt 76.7 kg (169 lb)  BMI 25.7 kg/m2    Constitutional:  cooperative, alert and oriented, well developed, well nourished, in no acute distress        Skin:  warm and dry to the touch, no apparent skin lesions or masses noted          Head:  normocephalic, no masses or lesions        Eyes:  pupils equal and round;sclera white;EOMS intact        Lymph:      ENT:  no pallor or cyanosis hearing aide(s) present      Neck:  JVP normal (no HJR. No Kussmauls) transmitted murmur (Left bruit >right, Left CEA scar, diminished right carotid pulse) diminished right carotid pulse    Respiratory:  healed median sternotomy scar;normal respiratory excursion (no wheeze/crackles) diminished breath sounds bilaterally (Left subclavian prominant bruit)       Cardiac: regular rhythm;normal S1 and S2;no S3 or S4   distant heart sounds     systolic ejection murmur;RUSB;grade 1 (vs transmitted subclavian bruit)            2+          (trace)   1+;left radial artery (delayed relative to right radial)                (left radial pulse delayed compared to right, 1+ left, 2+ right radial pulse)    GI:  abdomen soft;BS normoactive;non-tender obese      Extremities and Muscular Skeletal:  no edema;no deformities, clubbing, cyanosis, erythema observed              Neurological:  no gross " motor deficits;affect appropriate        Psych:  affect appropriate, oriented to time, person and place        CC  HÉCTOR Guerra  [address] [address]                Thank you for allowing me to participate in the care of your patient.      Sincerely,     Radhames Vargas MD     [facility]    cc:   HÉCTOR Guerra  [place] [phone]

## 2019-01-01 ENCOUNTER — TELEPHONE (OUTPATIENT)
Dept: RESPIRATORY THERAPY | Facility: CLINIC | Age: 84
End: 2019-01-01

## 2019-01-01 ENCOUNTER — TELEPHONE (OUTPATIENT)
Dept: CARDIOLOGY | Facility: CLINIC | Age: 84
End: 2019-01-01

## 2019-01-01 ENCOUNTER — ANCILLARY PROCEDURE (OUTPATIENT)
Dept: CARDIOLOGY | Facility: CLINIC | Age: 84
End: 2019-01-01
Attending: NURSE PRACTITIONER
Payer: MEDICARE

## 2019-01-01 ENCOUNTER — HEALTH MAINTENANCE LETTER (OUTPATIENT)
Age: 84
End: 2019-01-01

## 2019-01-01 ENCOUNTER — MYC MEDICAL ADVICE (OUTPATIENT)
Dept: PULMONOLOGY | Facility: CLINIC | Age: 84
End: 2019-01-01

## 2019-01-01 ENCOUNTER — TELEPHONE (OUTPATIENT)
Dept: PULMONOLOGY | Facility: CLINIC | Age: 84
End: 2019-01-01

## 2019-01-01 ENCOUNTER — DOCUMENTATION ONLY (OUTPATIENT)
Dept: CARE COORDINATION | Facility: CLINIC | Age: 84
End: 2019-01-01

## 2019-01-01 DIAGNOSIS — I50.32 CHRONIC DIASTOLIC CONGESTIVE HEART FAILURE (H): Primary | ICD-10-CM

## 2019-01-01 DIAGNOSIS — I25.10 CORONARY ARTERY DISEASE INVOLVING NATIVE CORONARY ARTERY OF NATIVE HEART WITHOUT ANGINA PECTORIS: Primary | ICD-10-CM

## 2019-01-01 DIAGNOSIS — Z95.810 S/P ICD (INTERNAL CARDIAC DEFIBRILLATOR) PROCEDURE: ICD-10-CM

## 2019-01-01 LAB
MDC_IDC_LEAD_IMPLANT_DT: NORMAL
MDC_IDC_LEAD_LOCATION: NORMAL
MDC_IDC_LEAD_LOCATION_DETAIL_1: NORMAL
MDC_IDC_LEAD_MFG: NORMAL
MDC_IDC_LEAD_MODEL: NORMAL
MDC_IDC_LEAD_SERIAL: NORMAL
MDC_IDC_MSMT_BATTERY_REMAINING_PERCENTAGE: 100 %
MDC_IDC_MSMT_BATTERY_RRT_TRIGGER: NORMAL
MDC_IDC_MSMT_BATTERY_STATUS: NORMAL
MDC_IDC_MSMT_BATTERY_VOLTAGE: 3.11 V
MDC_IDC_MSMT_LEADCHNL_RA_LEAD_CHANNEL_STATUS: NORMAL
MDC_IDC_MSMT_LEADCHNL_RV_IMPEDANCE_VALUE: 634 OHM
MDC_IDC_MSMT_LEADCHNL_RV_LEAD_CHANNEL_STATUS: NORMAL
MDC_IDC_MSMT_LEADCHNL_RV_PACING_THRESHOLD_AMPLITUDE: 0.4 V
MDC_IDC_MSMT_LEADCHNL_RV_PACING_THRESHOLD_PULSEWIDTH: 0.4 MS
MDC_IDC_PG_IMPLANT_DTM: NORMAL
MDC_IDC_PG_MFG: NORMAL
MDC_IDC_PG_MODEL: NORMAL
MDC_IDC_PG_SERIAL: NORMAL
MDC_IDC_PG_TYPE: NORMAL
MDC_IDC_SESS_CLINIC_NAME: NORMAL
MDC_IDC_SESS_DTM: NORMAL
MDC_IDC_SESS_REPROGRAMMED: NO
MDC_IDC_SESS_TYPE: NORMAL
MDC_IDC_SET_BRADY_AT_MODE_SWITCH_MODE: NORMAL
MDC_IDC_SET_BRADY_AT_MODE_SWITCH_RATE: 160 {BEATS}/MIN
MDC_IDC_SET_BRADY_LOWRATE: 50 {BEATS}/MIN
MDC_IDC_SET_BRADY_MAX_SENSOR_RATE: 120 {BEATS}/MIN
MDC_IDC_SET_BRADY_MAX_TRACKING_RATE: 130 {BEATS}/MIN
MDC_IDC_SET_BRADY_MODE: NORMAL
MDC_IDC_SET_BRADY_SAV_DELAY_HIGH: 230 MS
MDC_IDC_SET_BRADY_SAV_DELAY_LOW: 260 MS
MDC_IDC_SET_LEADCHNL_RA_SENSING_POLARITY: NORMAL
MDC_IDC_SET_LEADCHNL_RA_SENSING_SENSITIVITY: 0.4 MV
MDC_IDC_SET_LEADCHNL_RV_PACING_AMPLITUDE: 1.5 V
MDC_IDC_SET_LEADCHNL_RV_PACING_POLARITY: NORMAL
MDC_IDC_SET_LEADCHNL_RV_PACING_PULSEWIDTH: 0.4 MS
MDC_IDC_SET_LEADCHNL_RV_SENSING_ADAPTATION_MODE: NORMAL
MDC_IDC_SET_LEADCHNL_RV_SENSING_POLARITY: NORMAL
MDC_IDC_SET_LEADCHNL_RV_SENSING_SENSITIVITY: 0.8 MV
MDC_IDC_SET_ZONE_DETECTION_BEATS_DENOMINATOR: 40 {BEATS}
MDC_IDC_SET_ZONE_DETECTION_BEATS_NUMERATOR: 30 {BEATS}
MDC_IDC_SET_ZONE_DETECTION_INTERVAL: 250 MS
MDC_IDC_SET_ZONE_DETECTION_INTERVAL: 300 MS
MDC_IDC_SET_ZONE_TYPE: NORMAL
MDC_IDC_SET_ZONE_TYPE: NORMAL
MDC_IDC_STAT_AT_BURDEN_PERCENT: 97 %
MDC_IDC_STAT_AT_DTM_END: NORMAL
MDC_IDC_STAT_AT_DTM_START: NORMAL
MDC_IDC_STAT_AT_MODE_SW_COUNT_PER_DAY: 739
MDC_IDC_STAT_BRADY_AS_VP_PERCENT: 74 %
MDC_IDC_STAT_BRADY_AS_VS_PERCENT: 26 %
MDC_IDC_STAT_BRADY_DTM_END: NORMAL
MDC_IDC_STAT_BRADY_DTM_START: NORMAL
MDC_IDC_STAT_BRADY_RV_PERCENT_PACED: 24 %
MDC_IDC_STAT_CRT_DTM_END: NORMAL
MDC_IDC_STAT_CRT_DTM_START: NORMAL
MDC_IDC_STAT_EPISODE_TOTAL_COUNT: 0
MDC_IDC_STAT_EPISODE_TOTAL_COUNT: 2
MDC_IDC_STAT_EPISODE_TOTAL_COUNT_DTM_END: NORMAL
MDC_IDC_STAT_EPISODE_TOTAL_COUNT_DTM_START: NORMAL
MDC_IDC_STAT_EPISODE_TYPE: NORMAL
MDC_IDC_STAT_TACHYTHERAPY_ATP_DELIVERED_RECENT: 0
MDC_IDC_STAT_TACHYTHERAPY_ATP_DELIVERED_TOTAL: 0
MDC_IDC_STAT_TACHYTHERAPY_RECENT_DTM_END: NORMAL
MDC_IDC_STAT_TACHYTHERAPY_RECENT_DTM_START: NORMAL
MDC_IDC_STAT_TACHYTHERAPY_SHOCKS_ABORTED_RECENT: 0
MDC_IDC_STAT_TACHYTHERAPY_SHOCKS_ABORTED_TOTAL: 0
MDC_IDC_STAT_TACHYTHERAPY_SHOCKS_DELIVERED_RECENT: 0
MDC_IDC_STAT_TACHYTHERAPY_SHOCKS_DELIVERED_TOTAL: 0
MDC_IDC_STAT_TACHYTHERAPY_TOTAL_DTM_END: NORMAL
MDC_IDC_STAT_TACHYTHERAPY_TOTAL_DTM_START: NORMAL

## 2019-01-01 PROCEDURE — 93295 DEV INTERROG REMOTE 1/2/MLT: CPT | Mod: ZP | Performed by: INTERNAL MEDICINE

## 2019-01-01 PROCEDURE — 93296 REM INTERROG EVL PM/IDS: CPT | Mod: ZF

## 2019-08-27 ENCOUNTER — HOSPITAL ENCOUNTER (INPATIENT)
Facility: CLINIC | Age: 84
LOS: 23 days | Discharge: NURSING, REHAB OR LONG-TERM CARE | DRG: 224 | End: 2019-09-19
Admitting: INTERNAL MEDICINE
Payer: MEDICARE

## 2019-08-27 ENCOUNTER — HOSPITAL ENCOUNTER (EMERGENCY)
Facility: CLINIC | Age: 84
Discharge: HOME | DRG: 224 | End: 2019-08-28
Attending: INTERNAL MEDICINE | Admitting: INTERNAL MEDICINE
Payer: MEDICARE

## 2019-08-27 ENCOUNTER — APPOINTMENT (OUTPATIENT)
Dept: CT IMAGING | Facility: CLINIC | Age: 84
DRG: 224 | End: 2019-08-27
Attending: INTERNAL MEDICINE
Payer: MEDICARE

## 2019-08-27 VITALS
SYSTOLIC BLOOD PRESSURE: 68 MMHG | BODY MASS INDEX: 26.82 KG/M2 | HEART RATE: 89 BPM | RESPIRATION RATE: 42 BRPM | OXYGEN SATURATION: 85 % | DIASTOLIC BLOOD PRESSURE: 47 MMHG | WEIGHT: 176.37 LBS

## 2019-08-27 DIAGNOSIS — I46.9 CARDIAC ARREST (H): ICD-10-CM

## 2019-08-27 DIAGNOSIS — I21.02 ST ELEVATION MYOCARDIAL INFARCTION INVOLVING LEFT ANTERIOR DESCENDING (LAD) CORONARY ARTERY (H): Primary | ICD-10-CM

## 2019-08-27 DIAGNOSIS — J41.0 SIMPLE CHRONIC BRONCHITIS (H): Chronic | ICD-10-CM

## 2019-08-27 DIAGNOSIS — J44.9 CHRONIC OBSTRUCTIVE PULMONARY DISEASE, UNSPECIFIED COPD TYPE (H): ICD-10-CM

## 2019-08-27 LAB
FIBRINOGEN PPP-MCNC: 424 MG/DL (ref 200–420)
KCT BLD-ACNC: 219 SEC (ref 75–150)
LACTATE BLD-SCNC: 7.2 MMOL/L (ref 0.7–2)
MAGNESIUM SERPL-MCNC: 2.3 MG/DL (ref 1.6–2.3)
MRSA DNA SPEC QL NAA+PROBE: NEGATIVE
RADIOLOGIST FLAGS: ABNORMAL
SPECIMEN SOURCE: NORMAL

## 2019-08-27 PROCEDURE — 86923 COMPATIBILITY TEST ELECTRIC: CPT | Performed by: STUDENT IN AN ORGANIZED HEALTH CARE EDUCATION/TRAINING PROGRAM

## 2019-08-27 PROCEDURE — 92920 PRQ TRLUML C ANGIOP 1ART&/BR: CPT | Performed by: INTERNAL MEDICINE

## 2019-08-27 PROCEDURE — 25000128 H RX IP 250 OP 636: Performed by: INTERNAL MEDICINE

## 2019-08-27 PROCEDURE — 93005 ELECTROCARDIOGRAM TRACING: CPT

## 2019-08-27 PROCEDURE — 31500 INSERT EMERGENCY AIRWAY: CPT | Performed by: INTERNAL MEDICINE

## 2019-08-27 PROCEDURE — 87641 MR-STAPH DNA AMP PROBE: CPT | Performed by: STUDENT IN AN ORGANIZED HEALTH CARE EDUCATION/TRAINING PROGRAM

## 2019-08-27 PROCEDURE — 25000128 H RX IP 250 OP 636: Performed by: STUDENT IN AN ORGANIZED HEALTH CARE EDUCATION/TRAINING PROGRAM

## 2019-08-27 PROCEDURE — 25000132 ZZH RX MED GY IP 250 OP 250 PS 637: Performed by: INTERNAL MEDICINE

## 2019-08-27 PROCEDURE — 93010 ELECTROCARDIOGRAM REPORT: CPT | Mod: 59 | Performed by: INTERNAL MEDICINE

## 2019-08-27 PROCEDURE — 40000275 ZZH STATISTIC RCP TIME EA 10 MIN

## 2019-08-27 PROCEDURE — C1894 INTRO/SHEATH, NON-LASER: HCPCS | Performed by: INTERNAL MEDICINE

## 2019-08-27 PROCEDURE — 40000281 ZZH STATISTIC TRANSPORT TIME EA 15 MIN

## 2019-08-27 PROCEDURE — 25800030 ZZH RX IP 258 OP 636

## 2019-08-27 PROCEDURE — 37799 UNLISTED PX VASCULAR SURGERY: CPT | Performed by: INTERNAL MEDICINE

## 2019-08-27 PROCEDURE — 80048 BASIC METABOLIC PNL TOTAL CA: CPT | Performed by: STUDENT IN AN ORGANIZED HEALTH CARE EDUCATION/TRAINING PROGRAM

## 2019-08-27 PROCEDURE — 27210794 ZZH OR GENERAL SUPPLY STERILE: Performed by: INTERNAL MEDICINE

## 2019-08-27 PROCEDURE — 99152 MOD SED SAME PHYS/QHP 5/>YRS: CPT | Performed by: INTERNAL MEDICINE

## 2019-08-27 PROCEDURE — 83605 ASSAY OF LACTIC ACID: CPT | Performed by: INTERNAL MEDICINE

## 2019-08-27 PROCEDURE — B2131ZZ FLUOROSCOPY OF MULTIPLE CORONARY ARTERY BYPASS GRAFTS USING LOW OSMOLAR CONTRAST: ICD-10-PCS | Performed by: INTERNAL MEDICINE

## 2019-08-27 PROCEDURE — C1769 GUIDE WIRE: HCPCS | Performed by: INTERNAL MEDICINE

## 2019-08-27 PROCEDURE — 027035Z DILATION OF CORONARY ARTERY, ONE ARTERY WITH TWO DRUG-ELUTING INTRALUMINAL DEVICES, PERCUTANEOUS APPROACH: ICD-10-PCS | Performed by: INTERNAL MEDICINE

## 2019-08-27 PROCEDURE — 31500 INSERT EMERGENCY AIRWAY: CPT | Mod: Z6 | Performed by: INTERNAL MEDICINE

## 2019-08-27 PROCEDURE — 83735 ASSAY OF MAGNESIUM: CPT | Performed by: STUDENT IN AN ORGANIZED HEALTH CARE EDUCATION/TRAINING PROGRAM

## 2019-08-27 PROCEDURE — 87040 BLOOD CULTURE FOR BACTERIA: CPT | Performed by: STUDENT IN AN ORGANIZED HEALTH CARE EDUCATION/TRAINING PROGRAM

## 2019-08-27 PROCEDURE — C1887 CATHETER, GUIDING: HCPCS | Performed by: INTERNAL MEDICINE

## 2019-08-27 PROCEDURE — 25000125 ZZHC RX 250

## 2019-08-27 PROCEDURE — 85027 COMPLETE CBC AUTOMATED: CPT | Performed by: STUDENT IN AN ORGANIZED HEALTH CARE EDUCATION/TRAINING PROGRAM

## 2019-08-27 PROCEDURE — 92920 PRQ TRLUML C ANGIOP 1ART&/BR: CPT | Mod: 59 | Performed by: INTERNAL MEDICINE

## 2019-08-27 PROCEDURE — 85610 PROTHROMBIN TIME: CPT | Performed by: INTERNAL MEDICINE

## 2019-08-27 PROCEDURE — 92928 PRQ TCAT PLMT NTRAC ST 1 LES: CPT | Mod: LD | Performed by: INTERNAL MEDICINE

## 2019-08-27 PROCEDURE — C1874 STENT, COATED/COV W/DEL SYS: HCPCS | Performed by: INTERNAL MEDICINE

## 2019-08-27 PROCEDURE — 99291 CRITICAL CARE FIRST HOUR: CPT | Mod: 25 | Performed by: INTERNAL MEDICINE

## 2019-08-27 PROCEDURE — 25800030 ZZH RX IP 258 OP 636: Performed by: INTERNAL MEDICINE

## 2019-08-27 PROCEDURE — 82803 BLOOD GASES ANY COMBINATION: CPT | Performed by: STUDENT IN AN ORGANIZED HEALTH CARE EDUCATION/TRAINING PROGRAM

## 2019-08-27 PROCEDURE — 80061 LIPID PANEL: CPT | Performed by: STUDENT IN AN ORGANIZED HEALTH CARE EDUCATION/TRAINING PROGRAM

## 2019-08-27 PROCEDURE — 85730 THROMBOPLASTIN TIME PARTIAL: CPT | Performed by: INTERNAL MEDICINE

## 2019-08-27 PROCEDURE — C1751 CATH, INF, PER/CENT/MIDLINE: HCPCS | Performed by: INTERNAL MEDICINE

## 2019-08-27 PROCEDURE — 86901 BLOOD TYPING SEROLOGIC RH(D): CPT | Performed by: STUDENT IN AN ORGANIZED HEALTH CARE EDUCATION/TRAINING PROGRAM

## 2019-08-27 PROCEDURE — C9600 PERC DRUG-EL COR STENT SING: HCPCS | Mod: LD | Performed by: INTERNAL MEDICINE

## 2019-08-27 PROCEDURE — 85347 COAGULATION TIME ACTIVATED: CPT

## 2019-08-27 PROCEDURE — 93455 CORONARY ART/GRFT ANGIO S&I: CPT | Performed by: INTERNAL MEDICINE

## 2019-08-27 PROCEDURE — 99153 MOD SED SAME PHYS/QHP EA: CPT | Performed by: INTERNAL MEDICINE

## 2019-08-27 PROCEDURE — 86850 RBC ANTIBODY SCREEN: CPT | Performed by: STUDENT IN AN ORGANIZED HEALTH CARE EDUCATION/TRAINING PROGRAM

## 2019-08-27 PROCEDURE — 84100 ASSAY OF PHOSPHORUS: CPT | Performed by: STUDENT IN AN ORGANIZED HEALTH CARE EDUCATION/TRAINING PROGRAM

## 2019-08-27 PROCEDURE — 36592 COLLECT BLOOD FROM PICC: CPT | Performed by: STUDENT IN AN ORGANIZED HEALTH CARE EDUCATION/TRAINING PROGRAM

## 2019-08-27 PROCEDURE — 87640 STAPH A DNA AMP PROBE: CPT | Performed by: STUDENT IN AN ORGANIZED HEALTH CARE EDUCATION/TRAINING PROGRAM

## 2019-08-27 PROCEDURE — 99285 EMERGENCY DEPT VISIT HI MDM: CPT | Mod: 25 | Performed by: INTERNAL MEDICINE

## 2019-08-27 PROCEDURE — 25000128 H RX IP 250 OP 636

## 2019-08-27 PROCEDURE — C1725 CATH, TRANSLUMIN NON-LASER: HCPCS | Performed by: INTERNAL MEDICINE

## 2019-08-27 PROCEDURE — 25000125 ZZHC RX 250: Performed by: INTERNAL MEDICINE

## 2019-08-27 PROCEDURE — 36620 INSERTION CATHETER ARTERY: CPT | Performed by: INTERNAL MEDICINE

## 2019-08-27 PROCEDURE — 74176 CT ABD & PELVIS W/O CONTRAST: CPT

## 2019-08-27 PROCEDURE — 5A1945Z RESPIRATORY VENTILATION, 24-96 CONSECUTIVE HOURS: ICD-10-PCS | Performed by: INTERNAL MEDICINE

## 2019-08-27 PROCEDURE — 86900 BLOOD TYPING SEROLOGIC ABO: CPT | Performed by: STUDENT IN AN ORGANIZED HEALTH CARE EDUCATION/TRAINING PROGRAM

## 2019-08-27 PROCEDURE — 20000004 ZZH R&B ICU UMMC

## 2019-08-27 PROCEDURE — B2111ZZ FLUOROSCOPY OF MULTIPLE CORONARY ARTERIES USING LOW OSMOLAR CONTRAST: ICD-10-PCS | Performed by: INTERNAL MEDICINE

## 2019-08-27 PROCEDURE — 70450 CT HEAD/BRAIN W/O DYE: CPT

## 2019-08-27 PROCEDURE — 93455 CORONARY ART/GRFT ANGIO S&I: CPT | Mod: 26 | Performed by: INTERNAL MEDICINE

## 2019-08-27 PROCEDURE — 85384 FIBRINOGEN ACTIVITY: CPT | Performed by: INTERNAL MEDICINE

## 2019-08-27 PROCEDURE — 94002 VENT MGMT INPAT INIT DAY: CPT

## 2019-08-27 DEVICE — STENT SYNERGY DRUG ELUTING 4.00X12MM  H7493926012400: Type: IMPLANTABLE DEVICE | Status: FUNCTIONAL

## 2019-08-27 DEVICE — STENT SYNERGY DRUG ELUTING 3.50X08MM  H7493926008350: Type: IMPLANTABLE DEVICE | Status: FUNCTIONAL

## 2019-08-27 RX ORDER — PROPOFOL 10 MG/ML
INJECTION, EMULSION INTRAVENOUS
Status: DISCONTINUED
Start: 2019-08-27 | End: 2019-08-28 | Stop reason: HOSPADM

## 2019-08-27 RX ORDER — MIDAZOLAM (PF) 1 MG/ML IN 0.9 % SODIUM CHLORIDE INTRAVENOUS SOLUTION
1-8 CONTINUOUS
Status: DISCONTINUED | OUTPATIENT
Start: 2019-08-27 | End: 2019-08-29

## 2019-08-27 RX ORDER — FENTANYL CITRATE 50 UG/ML
50 INJECTION, SOLUTION INTRAMUSCULAR; INTRAVENOUS EVERY 30 MIN PRN
Status: CANCELLED | OUTPATIENT
Start: 2019-08-27

## 2019-08-27 RX ORDER — FLUMAZENIL 0.1 MG/ML
0.2 INJECTION, SOLUTION INTRAVENOUS
Status: ACTIVE | OUTPATIENT
Start: 2019-08-27 | End: 2019-08-28

## 2019-08-27 RX ORDER — VECURONIUM BROMIDE 1 MG/ML
0.05 INJECTION, POWDER, LYOPHILIZED, FOR SOLUTION INTRAVENOUS EVERY 30 MIN PRN
Status: DISCONTINUED | OUTPATIENT
Start: 2019-08-27 | End: 2019-08-31

## 2019-08-27 RX ORDER — ASPIRIN 325 MG
TABLET ORAL
Status: DISCONTINUED | OUTPATIENT
Start: 2019-08-27 | End: 2019-08-27 | Stop reason: HOSPADM

## 2019-08-27 RX ORDER — NITROGLYCERIN 0.4 MG/1
0.4 TABLET SUBLINGUAL EVERY 5 MIN PRN
Status: DISCONTINUED | OUTPATIENT
Start: 2019-08-27 | End: 2019-09-19 | Stop reason: HOSPADM

## 2019-08-27 RX ORDER — NALOXONE HYDROCHLORIDE 0.4 MG/ML
.1-.4 INJECTION, SOLUTION INTRAMUSCULAR; INTRAVENOUS; SUBCUTANEOUS
Status: DISCONTINUED | OUTPATIENT
Start: 2019-08-27 | End: 2019-08-27

## 2019-08-27 RX ORDER — METOPROLOL TARTRATE 1 MG/ML
5 INJECTION, SOLUTION INTRAVENOUS EVERY 4 HOURS PRN
Status: DISCONTINUED | OUTPATIENT
Start: 2019-08-27 | End: 2019-09-09

## 2019-08-27 RX ORDER — MEPERIDINE HYDROCHLORIDE 25 MG/ML
25-50 INJECTION INTRAMUSCULAR; INTRAVENOUS; SUBCUTANEOUS
Status: CANCELLED | OUTPATIENT
Start: 2019-08-27 | End: 2019-08-28

## 2019-08-27 RX ORDER — NOREPINEPHRINE BITARTRATE 0.06 MG/ML
.03-.4 INJECTION, SOLUTION INTRAVENOUS CONTINUOUS PRN
Status: CANCELLED | OUTPATIENT
Start: 2019-08-27

## 2019-08-27 RX ORDER — LIDOCAINE 40 MG/G
CREAM TOPICAL
Status: CANCELLED | OUTPATIENT
Start: 2019-08-27

## 2019-08-27 RX ORDER — ARGATROBAN 1 MG/ML
150 INJECTION, SOLUTION INTRAVENOUS
Status: DISCONTINUED | OUTPATIENT
Start: 2019-08-27 | End: 2019-08-28 | Stop reason: HOSPADM

## 2019-08-27 RX ORDER — SODIUM CHLORIDE 9 MG/ML
INJECTION, SOLUTION INTRAVENOUS CONTINUOUS
Status: ACTIVE | OUTPATIENT
Start: 2019-08-27 | End: 2019-08-28

## 2019-08-27 RX ORDER — PIPERACILLIN SODIUM, TAZOBACTAM SODIUM 3; .375 G/15ML; G/15ML
3.38 INJECTION, POWDER, LYOPHILIZED, FOR SOLUTION INTRAVENOUS EVERY 6 HOURS
Status: DISCONTINUED | OUTPATIENT
Start: 2019-08-27 | End: 2019-08-28

## 2019-08-27 RX ORDER — NALOXONE HYDROCHLORIDE 0.4 MG/ML
.2-.4 INJECTION, SOLUTION INTRAMUSCULAR; INTRAVENOUS; SUBCUTANEOUS
Status: ACTIVE | OUTPATIENT
Start: 2019-08-27 | End: 2019-08-28

## 2019-08-27 RX ORDER — PIPERACILLIN SODIUM, TAZOBACTAM SODIUM 3; .375 G/15ML; G/15ML
3.38 INJECTION, POWDER, LYOPHILIZED, FOR SOLUTION INTRAVENOUS EVERY 6 HOURS
Status: CANCELLED | OUTPATIENT
Start: 2019-08-27 | End: 2019-09-01

## 2019-08-27 RX ORDER — LIDOCAINE HYDROCHLORIDE ANHYDROUS AND DEXTROSE MONOHYDRATE .8; 5 G/100ML; G/100ML
1-4 INJECTION, SOLUTION INTRAVENOUS CONTINUOUS
Status: DISCONTINUED | OUTPATIENT
Start: 2019-08-27 | End: 2019-08-28 | Stop reason: HOSPADM

## 2019-08-27 RX ORDER — ATORVASTATIN CALCIUM 80 MG/1
80 TABLET, FILM COATED ORAL DAILY
Status: DISCONTINUED | OUTPATIENT
Start: 2019-08-28 | End: 2019-08-28

## 2019-08-27 RX ORDER — MEPERIDINE HYDROCHLORIDE 25 MG/ML
25-50 INJECTION INTRAMUSCULAR; INTRAVENOUS; SUBCUTANEOUS
Status: DISPENSED | OUTPATIENT
Start: 2019-08-27 | End: 2019-08-28

## 2019-08-27 RX ORDER — HEPARIN SODIUM (PORCINE) LOCK FLUSH IV SOLN 100 UNIT/ML 100 UNIT/ML
5-10 SOLUTION INTRAVENOUS EVERY 30 MIN PRN
Status: CANCELLED | OUTPATIENT
Start: 2019-08-27

## 2019-08-27 RX ORDER — CLOPIDOGREL BISULFATE 75 MG/1
TABLET ORAL
Status: DISCONTINUED | OUTPATIENT
Start: 2019-08-27 | End: 2019-08-27 | Stop reason: HOSPADM

## 2019-08-27 RX ORDER — CLOPIDOGREL BISULFATE 75 MG/1
75 TABLET ORAL DAILY
Status: DISCONTINUED | OUTPATIENT
Start: 2019-08-28 | End: 2019-09-19 | Stop reason: HOSPADM

## 2019-08-27 RX ORDER — ASPIRIN 81 MG/1
81 TABLET, CHEWABLE ORAL DAILY
Status: CANCELLED | OUTPATIENT
Start: 2019-08-28

## 2019-08-27 RX ORDER — MIDAZOLAM (PF) 1 MG/ML IN 0.9 % SODIUM CHLORIDE INTRAVENOUS SOLUTION
1-8 CONTINUOUS
Status: CANCELLED | OUTPATIENT
Start: 2019-08-27

## 2019-08-27 RX ORDER — NALOXONE HYDROCHLORIDE 0.4 MG/ML
.1-.4 INJECTION, SOLUTION INTRAMUSCULAR; INTRAVENOUS; SUBCUTANEOUS
Status: DISCONTINUED | OUTPATIENT
Start: 2019-08-27 | End: 2019-09-08

## 2019-08-27 RX ORDER — FENTANYL CITRATE 50 UG/ML
50 INJECTION, SOLUTION INTRAMUSCULAR; INTRAVENOUS EVERY 30 MIN PRN
Status: DISCONTINUED | OUTPATIENT
Start: 2019-08-27 | End: 2019-09-08

## 2019-08-27 RX ORDER — VECURONIUM BROMIDE 1 MG/ML
0.1 INJECTION, POWDER, LYOPHILIZED, FOR SOLUTION INTRAVENOUS
Status: DISCONTINUED | OUTPATIENT
Start: 2019-08-27 | End: 2019-08-31

## 2019-08-27 RX ORDER — ARGATROBAN 1 MG/ML
350 INJECTION, SOLUTION INTRAVENOUS
Status: DISCONTINUED | OUTPATIENT
Start: 2019-08-27 | End: 2019-08-28 | Stop reason: HOSPADM

## 2019-08-27 RX ORDER — FENTANYL CITRATE 50 UG/ML
25-50 INJECTION, SOLUTION INTRAMUSCULAR; INTRAVENOUS
Status: ACTIVE | OUTPATIENT
Start: 2019-08-27 | End: 2019-08-28

## 2019-08-27 RX ORDER — IPRATROPIUM BROMIDE AND ALBUTEROL SULFATE 2.5; .5 MG/3ML; MG/3ML
3 SOLUTION RESPIRATORY (INHALATION) EVERY 4 HOURS
Status: CANCELLED | OUTPATIENT
Start: 2019-08-27

## 2019-08-27 RX ORDER — MIDAZOLAM (PF) 1 MG/ML IN 0.9 % SODIUM CHLORIDE INTRAVENOUS SOLUTION
CONTINUOUS PRN
Status: COMPLETED | OUTPATIENT
Start: 2019-08-27 | End: 2019-08-27

## 2019-08-27 RX ORDER — MAGNESIUM SULFATE HEPTAHYDRATE 40 MG/ML
4 INJECTION, SOLUTION INTRAVENOUS EVERY 4 HOURS PRN
Status: CANCELLED | OUTPATIENT
Start: 2019-08-27

## 2019-08-27 RX ORDER — HEPARIN SODIUM 1000 [USP'U]/ML
INJECTION, SOLUTION INTRAVENOUS; SUBCUTANEOUS
Status: DISCONTINUED | OUTPATIENT
Start: 2019-08-27 | End: 2019-08-27 | Stop reason: HOSPADM

## 2019-08-27 RX ORDER — HEPARIN SODIUM 10000 [USP'U]/100ML
0-3500 INJECTION, SOLUTION INTRAVENOUS CONTINUOUS
Status: DISCONTINUED | OUTPATIENT
Start: 2019-08-27 | End: 2019-08-27

## 2019-08-27 RX ORDER — ASPIRIN 81 MG/1
81 TABLET, CHEWABLE ORAL DAILY
Status: DISCONTINUED | OUTPATIENT
Start: 2019-08-28 | End: 2019-09-19 | Stop reason: HOSPADM

## 2019-08-27 RX ORDER — FENTANYL CITRATE 50 UG/ML
INJECTION, SOLUTION INTRAMUSCULAR; INTRAVENOUS
Status: DISCONTINUED | OUTPATIENT
Start: 2019-08-27 | End: 2019-08-27 | Stop reason: HOSPADM

## 2019-08-27 RX ORDER — NOREPINEPHRINE BITARTRATE 0.06 MG/ML
INJECTION, SOLUTION INTRAVENOUS
Status: COMPLETED
Start: 2019-08-27 | End: 2019-08-27

## 2019-08-27 RX ORDER — PROPOFOL 10 MG/ML
5-75 INJECTION, EMULSION INTRAVENOUS CONTINUOUS
Status: DISCONTINUED | OUTPATIENT
Start: 2019-08-27 | End: 2019-08-28

## 2019-08-27 RX ORDER — NALOXONE HYDROCHLORIDE 0.4 MG/ML
.1-.4 INJECTION, SOLUTION INTRAMUSCULAR; INTRAVENOUS; SUBCUTANEOUS
Status: CANCELLED | OUTPATIENT
Start: 2019-08-27

## 2019-08-27 RX ORDER — NICOTINE POLACRILEX 4 MG
15-30 LOZENGE BUCCAL
Status: DISCONTINUED | OUTPATIENT
Start: 2019-08-27 | End: 2019-09-19 | Stop reason: HOSPADM

## 2019-08-27 RX ORDER — HEPARIN SODIUM 10000 [USP'U]/100ML
100-1000 INJECTION, SOLUTION INTRAVENOUS CONTINUOUS PRN
Status: DISCONTINUED | OUTPATIENT
Start: 2019-08-27 | End: 2019-08-28 | Stop reason: HOSPADM

## 2019-08-27 RX ORDER — NOREPINEPHRINE BITARTRATE 0.06 MG/ML
0.03-0.4 INJECTION, SOLUTION INTRAVENOUS CONTINUOUS
Status: DISCONTINUED | OUTPATIENT
Start: 2019-08-27 | End: 2019-09-01

## 2019-08-27 RX ORDER — POTASSIUM CHLORIDE 29.8 MG/ML
20 INJECTION INTRAVENOUS
Status: CANCELLED | OUTPATIENT
Start: 2019-08-27

## 2019-08-27 RX ORDER — IOPAMIDOL 755 MG/ML
INJECTION, SOLUTION INTRAVASCULAR
Status: DISCONTINUED | OUTPATIENT
Start: 2019-08-27 | End: 2019-08-27 | Stop reason: HOSPADM

## 2019-08-27 RX ORDER — DEXTROSE MONOHYDRATE 25 G/50ML
25-50 INJECTION, SOLUTION INTRAVENOUS
Status: DISCONTINUED | OUTPATIENT
Start: 2019-08-27 | End: 2019-09-19 | Stop reason: HOSPADM

## 2019-08-27 RX ORDER — ATROPINE SULFATE 0.1 MG/ML
0.5 INJECTION INTRAVENOUS EVERY 5 MIN PRN
Status: ACTIVE | OUTPATIENT
Start: 2019-08-27 | End: 2019-08-28

## 2019-08-27 RX ADMIN — FENTANYL CITRATE 50 MCG/HR: 50 INJECTION INTRAVENOUS at 22:21

## 2019-08-27 RX ADMIN — SODIUM CHLORIDE 300 ML: 9 INJECTION, SOLUTION INTRAVENOUS at 21:42

## 2019-08-27 RX ADMIN — Medication 0.03 MCG/KG/MIN: at 23:03

## 2019-08-27 RX ADMIN — PIPERACILLIN SODIUM AND TAZOBACTAM SODIUM 3.38 G: 3; .375 INJECTION, POWDER, LYOPHILIZED, FOR SOLUTION INTRAVENOUS at 22:36

## 2019-08-27 RX ADMIN — PROPOFOL 30 MCG/KG/MIN: 10 INJECTION, EMULSION INTRAVENOUS at 21:22

## 2019-08-27 RX ADMIN — VANCOMYCIN HYDROCHLORIDE 1750 MG: 10 INJECTION, POWDER, LYOPHILIZED, FOR SOLUTION INTRAVENOUS at 23:13

## 2019-08-27 RX ADMIN — Medication: at 21:42

## 2019-08-27 RX ADMIN — SODIUM CHLORIDE: 9 INJECTION, SOLUTION INTRAVENOUS at 21:08

## 2019-08-27 NOTE — Clinical Note
The first balloon was inserted into the left anterior descending and ostium left anterior descending.Max pressure = 14 rommel. Total duration = 10 seconds.

## 2019-08-27 NOTE — Clinical Note
The first balloon was inserted into the circumflex and ostium circumflex.Max pressure = 12 rommel. Total duration = 8 seconds.

## 2019-08-27 NOTE — Clinical Note
The first balloon was inserted into the circumflex and ostium circumflex.Max pressure = 14 rommel. Total duration = 6 seconds.

## 2019-08-27 NOTE — Clinical Note
Potential access sites were evaluated for patency using ultrasound.   The right femoral artery was selected. Access was obtained under with Fluoroscopic guidance using a standard 18 guage needle with direct visualization of needle entry.

## 2019-08-27 NOTE — ED PROVIDER NOTES
[place] EMERGENCY DEPARTMENT ([facility])  8/27/19    History     Chief Complaint   Patient presents with     Cardiac Arrest     History was provided by the patient's friend and EMS.       José Aguirre is a 83 year old male with a history of coronary artery disease status post carotid enterectomy with greater saphenous vein patch angioplasty (7/17/2017), quadruple bypass grafting to LAD chronic kidney disease stage III, diastolic heart failure who presents to the emergency department BIBA for evaluation of cardiac arrest.  [place] cardiac [place].  CPR was administered immediately on scene.  Patient was administered between 1 and 3 shocks until the rhythm was found.  Patient did have a pulse in route to the ED.  Blood pressure reading was 108/58 according to EMS.  Patient was given 2.5 Versed and 1 dose of epinephrine.  Ventilation was assisted in route to ED.  IO was placed right tibia.  Patient arrived in the ED at 6:20 PM.    Past Medical History:   Diagnosis Date     Anemia      Atrophy of left kidney      CKD (chronic kidney disease), stage III (H)      Claudication, intermittent (H)      Coronary artery disease 2003    CAB x 4     History of GI bleed 2012    Hemorrhagic gastritis     Hypercholesterolemia      Hypertension      Left carotid artery stenosis     S/P Carotid Endarterectomy left      PVD (peripheral vascular disease) (H)      Renal artery stenosis (H)     stent R       Past Surgical History:   Procedure Laterality Date     BYPASS GRAFT ARTERY CORONARY  06/2003    LIMA=LAD, SVG=Diag, SVG=OM2, SVG=PDA     CAROTID ENDARTERECTOMY Left      ENDARTERECTOMY CAROTID Left 7/11/2017    Procedure: ENDARTERECTOMY CAROTID;  REDO LEFT CAROTID ENDARTERECTOMY WITH RIGHT ANKLE GREATER SAPHENOUS VEIN  ;  Surgeon: Khurram Bishop MD;  Location:  OR     EYE SURGERY       HEART CATH LEFT HEART CATH  06/2003    Severe multivessel disease      RENAL ARTERY ANGIOGRAM Right 2013    with stenting     TONSILLECTOMY         Family History   Problem Relation Age of Onset     Cerebrovascular Disease Mother 90        unknown type     Cancer Brother 70        Brain cancer      Dementia Sister      Arthritis Brother        Social History     Tobacco Use     Smoking status: Former Smoker     Packs/day: 1.00     Years: 50.00     Pack years: 50.00     Types: Cigarettes     Last attempt to quit: 2001     Years since quittin.6     Smokeless tobacco: Never Used   Substance Use Topics     Alcohol use: Yes     Comment: 4 drinks week      No Known Allergies    Current Facility-Administered Medications   Medication     0.9% sodium chloride BOLUS     argatroban (ACOVA) bolus from infusion pump 12,000 mcg     argatroban (ACOVA) bolus from infusion pump 28,000 mcg     argatroban (ACOVA) infusion 250 mg/250 mL     bivalirudin (ANGIOMAX) 250 mg in sodium chloride 0.9 % 50 mL infusion     bivalirudin (ANGIOMAX) bolus from infusion pump 24 mg     bivalirudin (ANGIOMAX) bolus from infusion pump 60 mg     heparin  drip 25,000 units in 0.45% NaCl 250 mL (see additional administration details for dose)     lactated ringers BOLUS 1,000 mL     lidocaine 2 gm in D5W 250 mL (ADULT STD)     Current Outpatient Medications   Medication     albuterol (PROAIR HFA/PROVENTIL HFA/VENTOLIN HFA) 108 (90 Base) MCG/ACT Inhaler     ASPIRIN PO     carvedilol (COREG) 25 MG tablet     Cholecalciferol (VITAMIN D) 2000 UNITS tablet     clopidogrel (PLAVIX) 75 MG tablet     ferrous sulfate 325 (65 FE) MG tablet     furosemide (LASIX) 40 MG tablet     mometasone (ASMANEX) 220 MCG/INH Inhaler     OMEPRAZOLE PO     potassium chloride SA (K-DUR/KLOR-CON M) 20 MEQ CR tablet     rosuvastatin (CRESTOR) 40 MG tablet     tiotropium-olodaterol 2.5-2.5 MCG/ACT AERS     triamcinolone (KENALOG) 0.1 % cream     umeclidinium-vilanterol (ANORO ELLIPTA) 62.5-25 MCG/INH oral inhaler       I have reviewed the  Medications, Allergies, Past Medical and Surgical History, and Social History in the Epic system.    Review of Systems   Unable to perform ROS: Patient unresponsive       Physical Exam   BP: 103/64  Pulse: 89  Heart Rate: 75  Resp: (!) 40  Weight: 80 kg (176 lb 5.9 oz)  SpO2: (!) 71 %      Physical Exam   Constitutional: No distress.   HENT:   Head: Atraumatic.   Mouth/Throat: Oropharynx is clear and moist.   igel in place for airway-ventialted by medics   Eyes: Pupils are equal, round, and reactive to light. No scleral icterus.   Neck: Neck supple. No JVD present.   Cardiovascular: Normal rate, regular rhythm, normal heart sounds and intact distal pulses. Exam reveals no friction rub.   No murmur heard.  Pulmonary/Chest: Effort normal and breath sounds normal. No stridor. No respiratory distress. He has no wheezes. He has no rales. He exhibits no tenderness.   Abdominal: Soft. Bowel sounds are normal. He exhibits no distension and no mass. There is no tenderness. There is no rebound and no guarding. No hernia.   Musculoskeletal: He exhibits no edema or tenderness.   Neurological: He is unresponsive. GCS eye subscore is 1. GCS verbal subscore is 1. GCS motor subscore is 1.   Skin: Skin is warm. No rash noted. He is not diaphoretic.       ED Course   Patient arrived in the ED at 6:20 PM.    6:25 PM - Patient not responding to commands  6:28 PM - Cardiac ultrasound performed. Good cardiac perfusion  6:29 PM - Nasal cannula placed with high flow  6:30 PM - 20 etomidate and 80 jonathan given for intubation  6:31 PM - pH: 7.1, CO2: 55.9  6:32 PM - color change noted  6:33 PM - BP: 68/47  6:38 PM - 1 dose of epinephrine given  6:39 PM - patient transported to cath lab     [facility]    Intubation  Date/Time: 8/27/2019 7:34 PM  Performed by: Aidan Espinal MD  Authorized by: Aidan Espinal MD     ED EVALUATION:      Provisional Diagnosis: cardiac arrest    Difficult Airway?: No    ASA  Class: Class 5- Severe systemic disease with imminent risk of death    NPO Status: not NPO, emergent situation  UNIVERSAL PROTOCOL   Site Marked: NA  Prior Images Obtained and Reviewed:  NA  Required items: Required blood products, implants, devices and special equipment available    Patient identity confirmed:  Arm band and hospital-assigned identification number  Patient was reevaluated immediately before administering moderate or deep sedation or anesthesia  Confirmation Checklist:  Patient's identity using two indicators  Time out: Immediately prior to the procedure a time out was called    Preparation: Patient was prepped and draped in usual sterile fashion      PRE-PROCEDURE DETAILS     Patient status:  Unresponsive    Paralytics:  Rocuronium      PROCEDURE DETAILS     Preoxygenation:  Nasal cannula    CPR in progress: no      Intubation method:  Oral    Oral intubation technique:  Video-assisted    Laryngoscope blade:  Mac 4    Tube size (mm):  7.5    Tube type:  Cuffed    Number of attempts:  1    Ventilation between attempts: no      Cricoid pressure: no      Tube visualized through cords: yes      PLACEMENT ASSESSMENT     ETT to lip:  25    Tube secured with:  ETT kelly    Breath sounds:  Equal    Placement verification: ETCO2 detector    PROCEDURE   Patient Tolerance:  Patient tolerated the procedure well with no immediate complications    Time of Sedation in Minutes by Physician:  15               Critical Care time:  was 30 minutes for this patient excluding procedures.  Results for orders placed or performed during the hospital encounter of 08/27/19 (from the past 24 hour(s))   Lactic acid     Status: Abnormal    Collection Time: 08/27/19  6:28 PM   Result Value Ref Range    Lactic Acid 7.2 (HH) 0.7 - 2.0 mmol/L           Labs Ordered and Resulted from Time of ED Arrival Up to the Time of Departure from the ED   LACTIC ACID WHOLE BLOOD - Abnormal; Notable for the following components:       Result  Value    Lactic Acid 7.2 (*)     All other components within normal limits   ISTAT ACT NURSING POCT   BISPECTRAL INDEX SCORE MONITORING            Assessments & Plan (with Medical Decision Making)  S/p cardiac arrest[place], AED detected shockable rhythm and given shock ?2-3 with ROSC but remain unresponsive, given epi 1 mg and versed 2.5 mg per IO-on right tibia, initial 's and POCUS with squeeze on subxiphoid view, ET intubation done without complications but O2 sat remained 80's before and after, lactate 7.2, Cath lab activated and ready, SBP down to 50-60's-another epi 1 mg iv given then transported to Cath lab.       I have reviewed the nursing notes.    I have reviewed the findings, diagnosis, plan and need for follow up with the patient.    New Prescriptions    No medications on file       Final diagnoses:   Cardiac arrest (H)     ISanto, am serving as a trained medical scribe to document services personally performed by Aidan Espinal MD, based on the provider's statements to me.      IAidan MD, was physically present and have reviewed and verified the accuracy of this note documented by Santo Rodriguez.     8/27/2019   [facility], [place], EMERGENCY DEPARTMENT     Aidan Espinal MD  08/27/19 1939

## 2019-08-27 NOTE — ED TRIAGE NOTES
[facility] with witnessed cardic arrest. Per EMS, patient was shocked x3 with rosc.

## 2019-08-27 NOTE — Clinical Note
HISTORY OF PRESENT ILLNESS:    Jael Hall is a 54 y.o. female, , No LMP recorded. Patient is postmenopausal.,  presents for pre-op for HSC/D&C    Patient noted to have thickened endometrium on ultrasound.  Attempt at EMB x 2 unsuccessful due to stenotic cervix.      10/17 CT:  Reproductive organs: The uterus is prominent for patient age.  There is a 3.3 cm hypodense lesion in the LEFT pelvis along the LEFT wall of the uterus possibly originating from the LEFT adnexa or uterus, consider obtaining pelvic ultrasound for further characterization.      U/S:  Uterus:  Size: 7.3 x 5.0 x 5.3 cm.  Masses: 2.3 cm subserosal fibroid along the posterior uterine body.  Endometrium: Mildly thickened in this post menopausal patient, measuring 6 mm.  Right ovary:  Not able to be delineated.   Left ovary:  Size: 4.2 x 3.2 x 2.8 cm.  Appearance: Normal.  Vascular Flow: 3.3 cm simple cyst.  Free Fluid:  None.    Past Medical History:   Diagnosis Date    Anemia     Arrhythmia     Bradycardia     Disorder of kidney and ureter     Hyperlipidemia     Hypertension     Obesity     Preeclampsia        Past Surgical History:   Procedure Laterality Date     SECTION      x1    COLONOSCOPY N/A 2018    Procedure: COLONOSCOPY;  Surgeon: Pankaj Hinojosa MD;  [facility] [place] [address]);  Service: Endoscopy;  Laterality: N/A;  dialysis pt/labs prior to colon/MS    TUBAL LIGATION      Vascular access surgeries      x5       MEDICATIONS AND ALLERGIES:      Current Outpatient Prescriptions:     ACCU-CHEK KRISTI PLUS TEST STRP Strp, TEST QID, Disp: , Rfl: 6    ACCU-CHEK SOFTCLIX LANCETS Misc, U QID, Disp: , Rfl: 2    ALCOHOL ANTISEPTIC PADS (SURE COMFORT ALCOHOL PREP PADS TOP), , Disp: , Rfl:     amlodipine (NORVASC) 10 MG tablet, Take 10 mg by mouth once daily., Disp: , Rfl:     aspirin 325 MG tablet, Take 325 mg by mouth once daily., Disp: , Rfl:     blood sugar diagnostic Strp, 1 strip., Disp: , Rfl:      No sedation give at this point due to tenuous resp status "calcium acetate (PHOSLO) 667 mg capsule, , Disp: , Rfl:     carvedilol (COREG) 3.125 MG tablet, Take 3.125 mg by mouth 2 (two) times daily with meals., Disp: , Rfl:     fish oil-omega-3 fatty acids 300-1,000 mg capsule, Take 1 g by mouth once daily., Disp: , Rfl:     furosemide (LASIX) 40 MG tablet, Take 40 mg by mouth once daily., Disp: , Rfl:     gabapentin (NEURONTIN) 100 MG capsule, Take 100 mg by mouth 3 (three) times daily., Disp: , Rfl:     hydrocodone-acetaminophen 5-325mg (NORCO) 5-325 mg per tablet, , Disp: , Rfl:     insulin glargine (LANTUS) 100 unit/mL injection, Inject 40 Units into the skin., Disp: , Rfl:     LANTUS SOLOSTAR 100 unit/mL (3 mL) InPn pen, INJECT 40 UNITS SC QHS WITH DOSE ADJUSTMENT UP OR DOWN BY 2 UNITS Q 3 DAYS. TTD 50 UNITS, Disp: , Rfl: 1    lisinopril 10 MG tablet, Take 10 mg by mouth once daily., Disp: , Rfl:     losartan (COZAAR) 25 MG tablet, , Disp: , Rfl:     NOVOLOG FLEXPEN 100 unit/mL InPn pen, INJECT 5 UNITS SC D WITH DINNER, Disp: , Rfl: 1    SURE COMFORT PEN NEEDLE 30 gauge x 5/16" Ndle, U BID, Disp: , Rfl: 5    TRUEPLUS LANCETS 30 gauge Misc, U QID TO CHECK BLOOD SUGAR, Disp: , Rfl: 5    miSOPROStol (CYTOTEC) 200 MCG Tab, Take one tablet by mouth night before procedure and one tablet by mouth morning of procedure., Disp: 2 tablet, Rfl: 0    Review of patient's allergies indicates:  No Known Allergies    Family History   Problem Relation Age of Onset    Cancer Mother     Ovarian cancer Mother     Cancer Sister     Ovarian cancer Sister     Lung cancer Sister     Diabetes Brother     Cancer Sister     Ovarian cancer Sister     Diabetes Sister     Breast cancer Neg Hx     Colon cancer Neg Hx        Social History     Social History    Marital status: Single     Spouse name: N/A    Number of children: N/A    Years of education: N/A     Occupational History    Not on file.     Social History Main Topics    Smoking status: Never Smoker    Smokeless " "tobacco: Never Used    Alcohol use No    Drug use: No    Sexual activity: Yes     Partners: Male     Birth control/ protection: Post-menopausal     Other Topics Concern    Not on file     Social History Narrative    No narrative on file       ROS:  GENERAL: No weight changes. No swelling. No fatigue. No fever.  CARDIOVASCULAR: No chest pain. No shortness of breath. No leg cramps.   NEUROLOGICAL: No headaches. No vision changes.  BREASTS: No pain. No lumps. No discharge.  ABDOMEN: No pain. No nausea. No vomiting. No diarrhea. No constipation.  REPRODUCTIVE: No abnormal bleeding.   VULVA: No pain. No lesions. No itching.  VAGINA: No relaxation. No itching. No odor. No discharge. No lesions.  URINARY: No incontinence. No nocturia. No frequency. No dysuria.    /70   Ht 5' 4" (1.626 m)   Wt 84.2 kg (185 lb 10 oz)   BMI 31.86 kg/m²     PE:  APPEARANCE: Well nourished, well developed, in no acute distress.  ABDOMEN: Soft. No tenderness or masses. No hepatosplenomegaly. No hernias.  BREASTS, FUNDOSCOPIC, RECTAL DEFERRED  PELVIC: External female genitalia without lesions.  Female hair distribution. Adequate perineal body, Normal urethral meatus. Vagina atrophic without lesions or discharge.  No significant cystocele or rectocele present. Cervix pink without lesions, discharge or tenderness. Uterus is normal size, regular, mobile and nontender. Adnexa without masses or tenderness.  EXTREMITIES: No edema      DIAGNOSIS & PLAN  1. Thickened endometrium  Place in Outpatient    Vital signs    Up ad xiang    POCT glucose    Notify physician if BS > 180 for hysterectomy patients    Notify Physician/Vital Signs Parameters Urine output less than 0.5mL/kg/hr (with indwelling catheter) or 30 mL/hr (without indwelling catheter) or blood glucose greater than 200 mg/dL    Notify physician    Notify Physician - Potential Need of Opioid Reversal    Chlorohexidine Gluconate Bath    Diet NPO    Early ambulation    Place SCOTT hose "    Place sequential compression device   2. Uterine leiomyoma, unspecified location     3. Cervical stenosis (uterine cervix)           COUNSELING:  Patient counseled for HSC/D&C.  Counseled on operative risks of infection, bleeding, organ injury, and anesthesia complications.  She desires to proceed.

## 2019-08-27 NOTE — LETTER
Health Information Management Services               Recipient:  [facility] - Admissions        Sender:  CHRISTIANO Paz [phone]        Date: September 19, 2019  Patient Name:  José Aguirre  Routing Message:    Please assess for placement. Ready for discharge. Is a . On 6-8L O2.        The documents accompanying this notice contain confidential information belonging to the sender.  This information is intended only for the use of the individual or entity named above.  The authorized recipient of this information is prohibited from disclosing this information to any other party and is required to destroy the information after its stated need has been fulfilled, unless otherwise required by state law.      If you are not the intended recipient, you are hereby notified that any disclosure, copy, distribution or action taken in reliance on the contents of these documents is strictly prohibited.  If you have received this document in error, please return it by fax to [phone] with a note on the cover sheet explaining why you are returning it (e.g. not your patient, not your provider, etc.).  If you need further assistance, please call [place] Centralized Transcription at [phone].  Documents may also be returned by mail to [place], , [facility] [address], [place], [place] [address].

## 2019-08-27 NOTE — Clinical Note
Stent deployed in the ostium left anterior descending. Max pressure = 16 rommel. Total duration = 30 seconds.

## 2019-08-27 NOTE — Clinical Note
O2 sats maintaining 85-89 % on FiO2 35 %, MD informed. Patient tolerating procedure without IV sedation

## 2019-08-27 NOTE — Clinical Note
Single balloon inflation. Max pressure = 16 rommel. Total duration = 12 seconds.     Max pressure = 14 rommel. Total duration = 8 seconds.   Max pressure = 14 rommel. Total duration = 8 seconds.

## 2019-08-28 ENCOUNTER — APPOINTMENT (OUTPATIENT)
Dept: CARDIOLOGY | Facility: CLINIC | Age: 84
DRG: 224 | End: 2019-08-28
Attending: INTERNAL MEDICINE
Payer: MEDICARE

## 2019-08-28 ENCOUNTER — ALLIED HEALTH/NURSE VISIT (OUTPATIENT)
Dept: NEUROLOGY | Facility: CLINIC | Age: 84
DRG: 224 | End: 2019-08-28
Attending: PSYCHIATRY & NEUROLOGY
Payer: MEDICARE

## 2019-08-28 ENCOUNTER — APPOINTMENT (OUTPATIENT)
Dept: GENERAL RADIOLOGY | Facility: CLINIC | Age: 84
DRG: 224 | End: 2019-08-28
Payer: MEDICARE

## 2019-08-28 DIAGNOSIS — I46.9 CARDIAC ARREST (H): Primary | ICD-10-CM

## 2019-08-28 LAB
ALBUMIN SERPL-MCNC: 2.4 G/DL (ref 3.4–5)
ALBUMIN SERPL-MCNC: 2.4 G/DL (ref 3.4–5)
ALP SERPL-CCNC: 57 U/L (ref 40–150)
ALP SERPL-CCNC: 65 U/L (ref 40–150)
ALT SERPL W P-5'-P-CCNC: 62 U/L (ref 0–70)
ALT SERPL W P-5'-P-CCNC: 69 U/L (ref 0–70)
ANGLE RATE OF CLOT STRENGTH: 76.2 DEGREES (ref 53–72)
ANION GAP SERPL CALCULATED.3IONS-SCNC: 10 MMOL/L (ref 3–14)
ANION GAP SERPL CALCULATED.3IONS-SCNC: 11 MMOL/L (ref 3–14)
ANION GAP SERPL CALCULATED.3IONS-SCNC: 9 MMOL/L (ref 3–14)
ANION GAP SERPL CALCULATED.3IONS-SCNC: 9 MMOL/L (ref 3–14)
APTT PPP: 34 SEC (ref 22–37)
APTT PPP: 59 SEC (ref 22–37)
APTT PPP: >240 SEC (ref 22–37)
AST SERPL W P-5'-P-CCNC: 65 U/L (ref 0–45)
AST SERPL W P-5'-P-CCNC: 78 U/L (ref 0–45)
BASE DEFICIT BLDA-SCNC: 10 MMOL/L
BASE DEFICIT BLDA-SCNC: 2.9 MMOL/L
BASE DEFICIT BLDA-SCNC: 3 MMOL/L
BASE DEFICIT BLDA-SCNC: 4.3 MMOL/L
BASE DEFICIT BLDA-SCNC: 6.6 MMOL/L
BASE DEFICIT BLDA-SCNC: 7.1 MMOL/L
BASE DEFICIT BLDA-SCNC: 7.2 MMOL/L
BASE DEFICIT BLDA-SCNC: 7.2 MMOL/L
BASE DEFICIT BLDA-SCNC: 9.9 MMOL/L
BILIRUB DIRECT SERPL-MCNC: 0.2 MG/DL (ref 0–0.2)
BILIRUB SERPL-MCNC: 0.8 MG/DL (ref 0.2–1.3)
BILIRUB SERPL-MCNC: 1.1 MG/DL (ref 0.2–1.3)
BUN SERPL-MCNC: 31 MG/DL (ref 7–30)
BUN SERPL-MCNC: 33 MG/DL (ref 7–30)
BUN SERPL-MCNC: 34 MG/DL (ref 7–30)
BUN SERPL-MCNC: 35 MG/DL (ref 7–30)
CALCIUM SERPL-MCNC: 7.4 MG/DL (ref 8.5–10.1)
CALCIUM SERPL-MCNC: 7.5 MG/DL (ref 8.5–10.1)
CALCIUM SERPL-MCNC: 7.6 MG/DL (ref 8.5–10.1)
CALCIUM SERPL-MCNC: 7.6 MG/DL (ref 8.5–10.1)
CHLORIDE SERPL-SCNC: 106 MMOL/L (ref 94–109)
CHLORIDE SERPL-SCNC: 115 MMOL/L (ref 94–109)
CHLORIDE SERPL-SCNC: 115 MMOL/L (ref 94–109)
CHLORIDE SERPL-SCNC: 117 MMOL/L (ref 94–109)
CHOLEST SERPL-MCNC: 119 MG/DL
CI HYPERCOAGULATION INDEX: 3.9 RATIO (ref 0–3)
CO2 SERPL-SCNC: 20 MMOL/L (ref 20–32)
CO2 SERPL-SCNC: 20 MMOL/L (ref 20–32)
CO2 SERPL-SCNC: 21 MMOL/L (ref 20–32)
CO2 SERPL-SCNC: 25 MMOL/L (ref 20–32)
CREAT SERPL-MCNC: 1.71 MG/DL (ref 0.66–1.25)
CREAT SERPL-MCNC: 1.83 MG/DL (ref 0.66–1.25)
CREAT SERPL-MCNC: 1.84 MG/DL (ref 0.66–1.25)
CREAT SERPL-MCNC: 1.9 MG/DL (ref 0.66–1.25)
CRP SERPL-MCNC: 13 MG/L (ref 0–8)
ERYTHROCYTE [DISTWIDTH] IN BLOOD BY AUTOMATED COUNT: 16.1 % (ref 10–15)
ERYTHROCYTE [DISTWIDTH] IN BLOOD BY AUTOMATED COUNT: 16.3 % (ref 10–15)
ERYTHROCYTE [DISTWIDTH] IN BLOOD BY AUTOMATED COUNT: 16.3 % (ref 10–15)
ERYTHROCYTE [SEDIMENTATION RATE] IN BLOOD BY WESTERGREN METHOD: 26 MM/H (ref 0–20)
FIBRINOGEN PPP-MCNC: 395 MG/DL (ref 200–420)
FIBRINOGEN PPP-MCNC: 427 MG/DL (ref 200–420)
G ACTUAL CLOT STRENGTH: 14.4 KD/SC (ref 4.5–11)
GFR SERPL CREATININE-BSD FRML MDRD: 32 ML/MIN/{1.73_M2}
GFR SERPL CREATININE-BSD FRML MDRD: 33 ML/MIN/{1.73_M2}
GFR SERPL CREATININE-BSD FRML MDRD: ABNORMAL ML/MIN/{1.73_M2}
GFR SERPL CREATININE-BSD FRML MDRD: ABNORMAL ML/MIN/{1.73_M2}
GLUCOSE BLDC GLUCOMTR-MCNC: 125 MG/DL (ref 70–99)
GLUCOSE BLDC GLUCOMTR-MCNC: 134 MG/DL (ref 70–99)
GLUCOSE BLDC GLUCOMTR-MCNC: 136 MG/DL (ref 70–99)
GLUCOSE BLDC GLUCOMTR-MCNC: 139 MG/DL (ref 70–99)
GLUCOSE BLDC GLUCOMTR-MCNC: 145 MG/DL (ref 70–99)
GLUCOSE BLDC GLUCOMTR-MCNC: 145 MG/DL (ref 70–99)
GLUCOSE BLDC GLUCOMTR-MCNC: 152 MG/DL (ref 70–99)
GLUCOSE SERPL-MCNC: 123 MG/DL (ref 70–99)
GLUCOSE SERPL-MCNC: 133 MG/DL (ref 70–99)
GLUCOSE SERPL-MCNC: 135 MG/DL (ref 70–99)
GLUCOSE SERPL-MCNC: 204 MG/DL (ref 70–99)
HBA1C MFR BLD: 5.9 % (ref 0–5.6)
HCO3 BLD-SCNC: 18 MMOL/L (ref 21–28)
HCO3 BLD-SCNC: 18 MMOL/L (ref 21–28)
HCO3 BLD-SCNC: 20 MMOL/L (ref 21–28)
HCO3 BLD-SCNC: 22 MMOL/L (ref 21–28)
HCO3 BLD-SCNC: 23 MMOL/L (ref 21–28)
HCO3 BLD-SCNC: 23 MMOL/L (ref 21–28)
HCT VFR BLD AUTO: 29.1 % (ref 40–53)
HCT VFR BLD AUTO: 32.5 % (ref 40–53)
HCT VFR BLD AUTO: 40.3 % (ref 40–53)
HDLC SERPL-MCNC: 35 MG/DL
HGB BLD-MCNC: 11.9 G/DL (ref 13.3–17.7)
HGB BLD-MCNC: 8.9 G/DL (ref 13.3–17.7)
HGB BLD-MCNC: 9.9 G/DL (ref 13.3–17.7)
INR PPP: 1.46 (ref 0.86–1.14)
INR PPP: 1.55 (ref 0.86–1.14)
INR PPP: 1.75 (ref 0.86–1.14)
K TIME TO SPEC CLOT STRENGTH: 0.9 MINUTE (ref 1–3)
LACTATE BLD-SCNC: 1.3 MMOL/L (ref 0.7–2)
LACTATE BLD-SCNC: 1.3 MMOL/L (ref 0.7–2)
LACTATE BLD-SCNC: 1.8 MMOL/L (ref 0.7–2)
LACTATE BLD-SCNC: 1.8 MMOL/L (ref 0.7–2)
LDLC SERPL CALC-MCNC: 69 MG/DL
LY30 LYSIS AT 30 MINUTES: 0 % (ref 0–8)
LY60 LYSIS AT 60 MINUTES: 0 % (ref 0–15)
MA MAXIMUM CLOT STRENGTH: 74.3 MM (ref 50–70)
MAGNESIUM SERPL-MCNC: 2.4 MG/DL (ref 1.6–2.3)
MAGNESIUM SERPL-MCNC: 2.5 MG/DL (ref 1.6–2.3)
MCH RBC QN AUTO: 30.6 PG (ref 26.5–33)
MCH RBC QN AUTO: 30.9 PG (ref 26.5–33)
MCH RBC QN AUTO: 31.3 PG (ref 26.5–33)
MCHC RBC AUTO-ENTMCNC: 29.5 G/DL (ref 31.5–36.5)
MCHC RBC AUTO-ENTMCNC: 30.5 G/DL (ref 31.5–36.5)
MCHC RBC AUTO-ENTMCNC: 30.6 G/DL (ref 31.5–36.5)
MCV RBC AUTO: 101 FL (ref 78–100)
MCV RBC AUTO: 103 FL (ref 78–100)
MCV RBC AUTO: 104 FL (ref 78–100)
NONHDLC SERPL-MCNC: 84 MG/DL
O2/TOTAL GAS SETTING VFR VENT: 100 %
O2/TOTAL GAS SETTING VFR VENT: 80 %
O2/TOTAL GAS SETTING VFR VENT: 90 %
O2/TOTAL GAS SETTING VFR VENT: ABNORMAL %
OXYHGB MFR BLD: 93 % (ref 92–100)
OXYHGB MFR BLD: 93 % (ref 92–100)
OXYHGB MFR BLD: 95 % (ref 92–100)
OXYHGB MFR BLD: 96 % (ref 92–100)
OXYHGB MFR BLD: 97 % (ref 92–100)
PCO2 BLD: 42 MM HG (ref 35–45)
PCO2 BLD: 43 MM HG (ref 35–45)
PCO2 BLD: 43 MM HG (ref 35–45)
PCO2 BLD: 44 MM HG (ref 35–45)
PCO2 BLD: 45 MM HG (ref 35–45)
PCO2 BLD: 47 MM HG (ref 35–45)
PCO2 BLD: 48 MM HG (ref 35–45)
PCO2 BLD: 49 MM HG (ref 35–45)
PCO2 BLD: 50 MM HG (ref 35–45)
PH BLD: 7.18 PH (ref 7.35–7.45)
PH BLD: 7.2 PH (ref 7.35–7.45)
PH BLD: 7.22 PH (ref 7.35–7.45)
PH BLD: 7.24 PH (ref 7.35–7.45)
PH BLD: 7.26 PH (ref 7.35–7.45)
PH BLD: 7.27 PH (ref 7.35–7.45)
PH BLD: 7.28 PH (ref 7.35–7.45)
PH BLD: 7.33 PH (ref 7.35–7.45)
PH BLD: 7.34 PH (ref 7.35–7.45)
PHOSPHATE SERPL-MCNC: 5.1 MG/DL (ref 2.5–4.5)
PHOSPHATE SERPL-MCNC: 5.8 MG/DL (ref 2.5–4.5)
PHOSPHATE SERPL-MCNC: 6.3 MG/DL (ref 2.5–4.5)
PLATELET # BLD AUTO: 129 10E9/L (ref 150–450)
PLATELET # BLD AUTO: 197 10E9/L (ref 150–450)
PLATELET # BLD AUTO: 211 10E9/L (ref 150–450)
PO2 BLD: 115 MM HG (ref 80–105)
PO2 BLD: 129 MM HG (ref 80–105)
PO2 BLD: 63 MM HG (ref 80–105)
PO2 BLD: 70 MM HG (ref 80–105)
PO2 BLD: 76 MM HG (ref 80–105)
PO2 BLD: 79 MM HG (ref 80–105)
PO2 BLD: 81 MM HG (ref 80–105)
PO2 BLD: 83 MM HG (ref 80–105)
PO2 BLD: 86 MM HG (ref 80–105)
POTASSIUM SERPL-SCNC: 4 MMOL/L (ref 3.4–5.3)
POTASSIUM SERPL-SCNC: 4.1 MMOL/L (ref 3.4–5.3)
POTASSIUM SERPL-SCNC: 4.2 MMOL/L (ref 3.4–5.3)
POTASSIUM SERPL-SCNC: 4.2 MMOL/L (ref 3.4–5.3)
POTASSIUM SERPL-SCNC: 4.4 MMOL/L (ref 3.4–5.3)
PROT SERPL-MCNC: 5.5 G/DL (ref 6.8–8.8)
PROT SERPL-MCNC: 5.8 G/DL (ref 6.8–8.8)
R TIME UNTIL CLOT FORMS: 4.3 MINUTE (ref 5–10)
RBC # BLD AUTO: 2.88 10E12/L (ref 4.4–5.9)
RBC # BLD AUTO: 3.16 10E12/L (ref 4.4–5.9)
RBC # BLD AUTO: 3.89 10E12/L (ref 4.4–5.9)
SODIUM SERPL-SCNC: 137 MMOL/L (ref 133–144)
SODIUM SERPL-SCNC: 145 MMOL/L (ref 133–144)
SODIUM SERPL-SCNC: 146 MMOL/L (ref 133–144)
SODIUM SERPL-SCNC: 149 MMOL/L (ref 133–144)
TRIGL SERPL-MCNC: 72 MG/DL
WBC # BLD AUTO: 21.6 10E9/L (ref 4–11)
WBC # BLD AUTO: 22.9 10E9/L (ref 4–11)
WBC # BLD AUTO: 27.4 10E9/L (ref 4–11)

## 2019-08-28 PROCEDURE — 80048 BASIC METABOLIC PNL TOTAL CA: CPT | Performed by: STUDENT IN AN ORGANIZED HEALTH CARE EDUCATION/TRAINING PROGRAM

## 2019-08-28 PROCEDURE — 86140 C-REACTIVE PROTEIN: CPT

## 2019-08-28 PROCEDURE — 85610 PROTHROMBIN TIME: CPT

## 2019-08-28 PROCEDURE — 85652 RBC SED RATE AUTOMATED: CPT

## 2019-08-28 PROCEDURE — 85730 THROMBOPLASTIN TIME PARTIAL: CPT

## 2019-08-28 PROCEDURE — 85396 CLOTTING ASSAY WHOLE BLOOD: CPT | Performed by: STUDENT IN AN ORGANIZED HEALTH CARE EDUCATION/TRAINING PROGRAM

## 2019-08-28 PROCEDURE — 99291 CRITICAL CARE FIRST HOUR: CPT | Mod: 25 | Performed by: INTERNAL MEDICINE

## 2019-08-28 PROCEDURE — 25000132 ZZH RX MED GY IP 250 OP 250 PS 637: Performed by: INTERNAL MEDICINE

## 2019-08-28 PROCEDURE — 85384 FIBRINOGEN ACTIVITY: CPT | Performed by: INTERNAL MEDICINE

## 2019-08-28 PROCEDURE — 40000264 ECHOCARDIOGRAM COMPLETE

## 2019-08-28 PROCEDURE — 85384 FIBRINOGEN ACTIVITY: CPT

## 2019-08-28 PROCEDURE — 25000125 ZZHC RX 250

## 2019-08-28 PROCEDURE — 83605 ASSAY OF LACTIC ACID: CPT | Performed by: STUDENT IN AN ORGANIZED HEALTH CARE EDUCATION/TRAINING PROGRAM

## 2019-08-28 PROCEDURE — 25000128 H RX IP 250 OP 636: Performed by: STUDENT IN AN ORGANIZED HEALTH CARE EDUCATION/TRAINING PROGRAM

## 2019-08-28 PROCEDURE — 83735 ASSAY OF MAGNESIUM: CPT | Performed by: INTERNAL MEDICINE

## 2019-08-28 PROCEDURE — 82803 BLOOD GASES ANY COMBINATION: CPT | Performed by: STUDENT IN AN ORGANIZED HEALTH CARE EDUCATION/TRAINING PROGRAM

## 2019-08-28 PROCEDURE — 94640 AIRWAY INHALATION TREATMENT: CPT

## 2019-08-28 PROCEDURE — 25000125 ZZHC RX 250: Performed by: STUDENT IN AN ORGANIZED HEALTH CARE EDUCATION/TRAINING PROGRAM

## 2019-08-28 PROCEDURE — 85610 PROTHROMBIN TIME: CPT | Performed by: INTERNAL MEDICINE

## 2019-08-28 PROCEDURE — 94003 VENT MGMT INPAT SUBQ DAY: CPT

## 2019-08-28 PROCEDURE — 40000014 ZZH STATISTIC ARTERIAL MONITORING DAILY

## 2019-08-28 PROCEDURE — 84100 ASSAY OF PHOSPHORUS: CPT | Performed by: STUDENT IN AN ORGANIZED HEALTH CARE EDUCATION/TRAINING PROGRAM

## 2019-08-28 PROCEDURE — 25000128 H RX IP 250 OP 636

## 2019-08-28 PROCEDURE — 20000004 ZZH R&B ICU UMMC

## 2019-08-28 PROCEDURE — 80048 BASIC METABOLIC PNL TOTAL CA: CPT | Performed by: INTERNAL MEDICINE

## 2019-08-28 PROCEDURE — 25800030 ZZH RX IP 258 OP 636: Performed by: STUDENT IN AN ORGANIZED HEALTH CARE EDUCATION/TRAINING PROGRAM

## 2019-08-28 PROCEDURE — 93005 ELECTROCARDIOGRAM TRACING: CPT

## 2019-08-28 PROCEDURE — 84100 ASSAY OF PHOSPHORUS: CPT | Performed by: INTERNAL MEDICINE

## 2019-08-28 PROCEDURE — 40000275 ZZH STATISTIC RCP TIME EA 10 MIN

## 2019-08-28 PROCEDURE — 80053 COMPREHEN METABOLIC PANEL: CPT | Performed by: STUDENT IN AN ORGANIZED HEALTH CARE EDUCATION/TRAINING PROGRAM

## 2019-08-28 PROCEDURE — 84132 ASSAY OF SERUM POTASSIUM: CPT | Performed by: INTERNAL MEDICINE

## 2019-08-28 PROCEDURE — 94640 AIRWAY INHALATION TREATMENT: CPT | Mod: 76

## 2019-08-28 PROCEDURE — 25500064 ZZH RX 255 OP 636: Performed by: INTERNAL MEDICINE

## 2019-08-28 PROCEDURE — 83735 ASSAY OF MAGNESIUM: CPT | Performed by: STUDENT IN AN ORGANIZED HEALTH CARE EDUCATION/TRAINING PROGRAM

## 2019-08-28 PROCEDURE — 83036 HEMOGLOBIN GLYCOSYLATED A1C: CPT

## 2019-08-28 PROCEDURE — 83605 ASSAY OF LACTIC ACID: CPT

## 2019-08-28 PROCEDURE — 93010 ELECTROCARDIOGRAM REPORT: CPT | Performed by: INTERNAL MEDICINE

## 2019-08-28 PROCEDURE — 85730 THROMBOPLASTIN TIME PARTIAL: CPT | Performed by: INTERNAL MEDICINE

## 2019-08-28 PROCEDURE — 40000986 XR CHEST PORT 1 VW

## 2019-08-28 PROCEDURE — 85027 COMPLETE CBC AUTOMATED: CPT | Performed by: STUDENT IN AN ORGANIZED HEALTH CARE EDUCATION/TRAINING PROGRAM

## 2019-08-28 PROCEDURE — 85027 COMPLETE CBC AUTOMATED: CPT

## 2019-08-28 PROCEDURE — 25000128 H RX IP 250 OP 636: Performed by: INTERNAL MEDICINE

## 2019-08-28 PROCEDURE — 82805 BLOOD GASES W/O2 SATURATION: CPT

## 2019-08-28 PROCEDURE — 95951 ZZHC EEG VIDEO < 12 HR: CPT | Mod: 52,ZF

## 2019-08-28 PROCEDURE — 93306 TTE W/DOPPLER COMPLETE: CPT | Mod: 26 | Performed by: INTERNAL MEDICINE

## 2019-08-28 PROCEDURE — 00000146 ZZHCL STATISTIC GLUCOSE BY METER IP

## 2019-08-28 PROCEDURE — 80076 HEPATIC FUNCTION PANEL: CPT

## 2019-08-28 PROCEDURE — 80048 BASIC METABOLIC PNL TOTAL CA: CPT

## 2019-08-28 RX ORDER — PIPERACILLIN SODIUM, TAZOBACTAM SODIUM 2; .25 G/10ML; G/10ML
2.25 INJECTION, POWDER, LYOPHILIZED, FOR SOLUTION INTRAVENOUS EVERY 6 HOURS
Status: COMPLETED | OUTPATIENT
Start: 2019-08-28 | End: 2019-09-02

## 2019-08-28 RX ORDER — DEXTROSE MONOHYDRATE 50 MG/ML
INJECTION, SOLUTION INTRAVENOUS CONTINUOUS
Status: DISCONTINUED | OUTPATIENT
Start: 2019-08-28 | End: 2019-08-28

## 2019-08-28 RX ORDER — IPRATROPIUM BROMIDE AND ALBUTEROL SULFATE 2.5; .5 MG/3ML; MG/3ML
3 SOLUTION RESPIRATORY (INHALATION)
Status: DISCONTINUED | OUTPATIENT
Start: 2019-08-28 | End: 2019-09-04

## 2019-08-28 RX ORDER — DEXTROSE MONOHYDRATE 50 MG/ML
INJECTION, SOLUTION INTRAVENOUS CONTINUOUS
Status: ACTIVE | OUTPATIENT
Start: 2019-08-28 | End: 2019-08-28

## 2019-08-28 RX ADMIN — Medication 0.22 MCG/KG/MIN: at 11:24

## 2019-08-28 RX ADMIN — MEPERIDINE HYDROCHLORIDE 25 MG: 25 INJECTION INTRAMUSCULAR; INTRAVENOUS; SUBCUTANEOUS at 04:01

## 2019-08-28 RX ADMIN — Medication 50 MEQ: at 00:39

## 2019-08-28 RX ADMIN — SODIUM CHLORIDE 500 ML: 9 INJECTION, SOLUTION INTRAVENOUS at 03:24

## 2019-08-28 RX ADMIN — Medication 50 MEQ: at 12:52

## 2019-08-28 RX ADMIN — Medication: at 04:50

## 2019-08-28 RX ADMIN — HUMAN INSULIN 2.5 UNITS/HR: 100 INJECTION, SOLUTION SUBCUTANEOUS at 00:39

## 2019-08-28 RX ADMIN — MIDAZOLAM 6 MG/HR: 5 INJECTION INTRAMUSCULAR; INTRAVENOUS at 08:54

## 2019-08-28 RX ADMIN — PIPERACILLIN SODIUM AND TAZOBACTAM SODIUM 2.25 G: 2; .25 INJECTION, POWDER, LYOPHILIZED, FOR SOLUTION INTRAVENOUS at 15:47

## 2019-08-28 RX ADMIN — ASPIRIN 81 MG CHEWABLE TABLET 81 MG: 81 TABLET CHEWABLE at 08:38

## 2019-08-28 RX ADMIN — SODIUM BICARBONATE 50 MEQ: 84 INJECTION, SOLUTION INTRAVENOUS at 15:45

## 2019-08-28 RX ADMIN — ATORVASTATIN CALCIUM 80 MG: 80 TABLET, FILM COATED ORAL at 08:38

## 2019-08-28 RX ADMIN — DEXTROSE MONOHYDRATE: 50 INJECTION, SOLUTION INTRAVENOUS at 08:38

## 2019-08-28 RX ADMIN — HUMAN ALBUMIN MICROSPHERES AND PERFLUTREN 5 ML: 10; .22 INJECTION, SOLUTION INTRAVENOUS at 08:45

## 2019-08-28 RX ADMIN — CLOPIDOGREL BISULFATE 75 MG: 75 TABLET, FILM COATED ORAL at 08:38

## 2019-08-28 RX ADMIN — IPRATROPIUM BROMIDE AND ALBUTEROL SULFATE 3 ML: .5; 3 SOLUTION RESPIRATORY (INHALATION) at 11:51

## 2019-08-28 RX ADMIN — FENTANYL CITRATE 100 MCG/HR: 50 INJECTION INTRAVENOUS at 17:57

## 2019-08-28 RX ADMIN — SODIUM BICARBONATE 50 MEQ: 84 INJECTION, SOLUTION INTRAVENOUS at 12:52

## 2019-08-28 RX ADMIN — VASOPRESSIN 2 UNITS/HR: 20 INJECTION INTRAVENOUS at 12:45

## 2019-08-28 RX ADMIN — PIPERACILLIN SODIUM AND TAZOBACTAM SODIUM 2.25 G: 2; .25 INJECTION, POWDER, LYOPHILIZED, FOR SOLUTION INTRAVENOUS at 22:23

## 2019-08-28 RX ADMIN — IPRATROPIUM BROMIDE AND ALBUTEROL SULFATE 3 ML: .5; 3 SOLUTION RESPIRATORY (INHALATION) at 16:26

## 2019-08-28 RX ADMIN — PIPERACILLIN SODIUM AND TAZOBACTAM SODIUM 2.25 G: 2; .25 INJECTION, POWDER, LYOPHILIZED, FOR SOLUTION INTRAVENOUS at 10:04

## 2019-08-28 RX ADMIN — Medication 50 MEQ: at 15:45

## 2019-08-28 RX ADMIN — MEPERIDINE HYDROCHLORIDE 25 MG: 25 INJECTION INTRAMUSCULAR; INTRAVENOUS; SUBCUTANEOUS at 06:57

## 2019-08-28 RX ADMIN — PIPERACILLIN SODIUM AND TAZOBACTAM SODIUM 3.38 G: 3; .375 INJECTION, POWDER, LYOPHILIZED, FOR SOLUTION INTRAVENOUS at 04:02

## 2019-08-28 RX ADMIN — IPRATROPIUM BROMIDE AND ALBUTEROL SULFATE 3 ML: .5; 3 SOLUTION RESPIRATORY (INHALATION) at 19:55

## 2019-08-28 RX ADMIN — SODIUM BICARBONATE 50 MEQ: 84 INJECTION, SOLUTION INTRAVENOUS at 00:39

## 2019-08-28 ASSESSMENT — ACTIVITIES OF DAILY LIVING (ADL)
ADLS_ACUITY_SCORE: 26
ADLS_ACUITY_SCORE: 25
ADLS_ACUITY_SCORE: 23

## 2019-08-28 NOTE — PLAN OF CARE
SR with frequent PVCs. MAP controlled >65. Started vasopressin this afternoon while weaning down levophed as tolerated to target vasoplegia. R pedal pulse feeble, intermittently assessed with doppler, CSI aware. AC per vent order at 100% FiO2. Overbreathing vent. Versed and fentanyl for sedation. Hypothermic treatment stable at goal of 34 degrees celsius. Re-warm to start at 10pm tonight. EEG placed at 3pm. 1 amp bicarb given, and D5 infusion for acidosis/hypernatremia. 25 ml/hr urine output. No BM. OG to LCS.   PIV, R femoral sheath for A line, R groin thermistor, R I/O.

## 2019-08-28 NOTE — PROGRESS NOTES
Admitted/transferred from: Cath lab  Reason for admission/transfer: VT/VF arrest  Patient status upon admission/transfer: Hemodynamically stable  Interventions: Transferred to bed, hooked up to monitors. Started thermoguard with target temp 34C.  Plan: Plan to cool to 34C. Monitor hemodynamics and labs over NOC.  2 RN skin assessment: completed by BERLIN Klein  Result of skin assessment and interventions/actions: Abrasion and bruising to R forehead and R elbow. Skin tear to R elbow.  Height, weight, drug calc weight: done  Patient belongings: Sent home with family. Hearing aids and glasses in safe.

## 2019-08-28 NOTE — PROGRESS NOTES
Cardiology - CSI Progress Note    Assessment & Plan   83 year old male who was admitted on 8/27/2019 with a cardiac arrest. 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     Neurology: Intubated, sedated, paralyzed. Cooled to 34 degrees.  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   - will attempt to decrease sedation slightly    Cardiovascular / Hemodynamics: Refractory VF arrest s/p  CONCETTA to LM-> LAD, Lcx.  LA 7  TTE: pending today  EKG: NSR RBBB   --wean pressors/inotropes as able  --echo today  --continue ASA 81mg and plavix 75 mg PO QD  --hold temp at 34 degrees for 24hrs  --hold lipitor for now given likely hepatic injury during arrest  --hold ACE/ARB for now given likely reduced renal fxn after arrest  --holding beta blocker given shock   Pulmonary:  history of COPD on 2L of O2 with exertion.   Vent Settings: Ventilation Mode: CMV/AC  (Continuous Mandatory Ventilation/ Assist Control)  FiO2 (%): 100 %  Rate Set (breaths/minute): 18 breaths/min  Tidal Volume Set (mL): 500 mL  PEEP (cm H2O): 8 cmH2O  Oxygen Concentration (%): 100 %  Resp: 18    ETT 2 cm above stacey.  Now weaning vent requirements.  CXR: Lines in stable position.  --wean vent as able  --daily CXR  Chest CT0- diffuse ground glass opacities bilateral pleural plaques and RLL cavitation with possible emphysema and fibrosis  --Q2h ABGs for now  --on scheduled duonebs QID   GI and Nutrition: No known medical hx.  --monitor BID LFTs  --NPO while cooled - nutrition consult pending  feeding tube placement once he is warmed   --bowel regimen - on hold for now due to hypothermia  --GI Prophylaxis: PPI    Renal, Fluid and Electrolytes: Upon arrival to CICU, UOP is >50 ml/hr, dropping off this am will Monitor creatinine and UOP  --maintain K>3 and Mg>2    Infectious Disease: No signs of infection. Leukocytosis c/w arrest. Blood cultures collected.   --vancomycin/zosyn x5 days for arrest  --daily blood cultures  --monitor for signs of infection given cooling, lines, and leukocytosis   Hematology and Oncology: ASA/plavix for CONCETTA.   --SQH TID for ppx   Endocrinology: Diabetic with an A1c of 6.6 in 2017- was considered pre diabetic and this has since resolved A1c on admission 5.9  No known medical history. BG elevated.  --insulin gtt    Lines:  Thermoguard August 27 2019  L arterial femoral line August 27 2019  ETT August 27, 2019  Mcclelland catheter August 27, 2019  Restraint: needed    Current lines are required for patient management       Family update by me today: Yes      Code Status: Full     The pt was discussed and evaluated with Dr. Radha MD, attending physician, who agrees with the assessment and plan above.     Jenni Loera    Interval History     Was dyssynchronous with the ventilator, sedation was increased PaO2 has improved   Given 500 ml fluid with no response in blood pressure suspect his hypotension is in the setting of vasoplegia post arrest and increased sedation on NE with vasopressin ordered not yet started      Physical Exam   Temp: 93.6  F (34.2  C) Temp src: Esophageal     Heart Rate: 71 Resp: 18 SpO2: 100 % O2 Device: Mechanical Ventilator    Vitals:    08/27/19 2111   Weight: 78.8 kg (173 lb 11.6 oz)     Vital Signs with Ranges  Temp:  [91.6  F (33.1  C)-96.1  F (35.6  C)] 93.6  F (34.2  C)  Heart Rate:  [67-87] 71  Resp:  [18-24] 18  MAP:  [43 mmHg-115 mmHg] 64 mmHg  Arterial Line BP: ()/(33-83) 87/43  FiO2 (%):  [100 %] 100 %  SpO2:  [85 %-100 %] 100  "%  I/O last 3 completed shifts:  In: 1782.96 [I.V.:1252.96; NG/GT:30; IV Piggyback:500]  Out: 1752 [Urine:1002; Emesis/NG output:750]    Heart Rate: 71, Temperature 93.6  F (34.2  C), temperature source Esophageal, resp. rate 18, height 1.727 m (5' 7.99\"), weight 78.8 kg (173 lb 11.6 oz), SpO2 100 %.  173 lbs 11.56 oz  GEN:  Intubated sedated intermittent jerking towards his midline   CV:  Regular rate and rhythm, no murmur or JVD.  S1 + S2 noted, no S3 or S4.  LUNGS:  Mechanically ventilated with decreased breath sounds   ABD:  Active bowel sounds, soft, non-tender/non-distended.  No rebound/guarding/rigidity.  EXT:  No edema or cyanosis.      Medications     IV fluid REPLACEMENT ONLY       D5W 50 mL/hr at 08/28/19 0838     fentaNYL 100 mcg/hr (08/28/19 0726)     insulin (regular) 0.5 Units/hr (08/28/19 0758)     - MEDICATION INSTRUCTIONS -       - MEDICATION INSTRUCTIONS -       midazolam 6 mg/hr (08/28/19 0727)     norepinephrine 0.24 mcg/kg/min (08/28/19 0727)     Percutaneous Coronary Intervention orders placed (this is information for BPA alerting)       ACE/ARB/ARNI NOT PRESCRIBED       BETA BLOCKER NOT PRESCRIBED       vasopressin (PITRESSIN) infusion ADULT (40 mL)         aspirin  81 mg Oral Daily     atorvastatin  80 mg Oral Daily     clopidogrel  75 mg Oral Daily     piperacillin-tazobactam  2.25 g Intravenous Q6H     propofol         sodium chloride (PF)  10 mL Intravenous Once     vancomycin place kelly - receiving intermittent dosing  1 each Does not apply See Admin Instructions       Data   Recent Labs   Lab 08/28/19  0755 08/28/19  0413 08/27/19 2053   WBC  --  27.4* 22.9*   HGB  --  9.9* 11.9*   MCV  --  103* 104*   PLT  --  211 197   INR  --  1.55* 1.75*   NA  --  146* 137   POTASSIUM 4.2 4.1 4.4   CHLORIDE  --  117* 106   CO2  --  20 20   BUN  --  33* 31*   CR  --  1.84* 1.71*   ANIONGAP  --  9 11   YOON  --  7.6* 7.5*   GLC  --  123* 204*   ALBUMIN  --  2.4*  --    PROTTOTAL  --  5.8*  --  "   BILITOTAL  --  1.1  --    ALKPHOS  --  65  --    ALT  --  69  --    AST  --  78*  --        Recent Results (from the past 24 hour(s))   CT Chest Abdomen Pelvis w/o Contrast   Result Value    Radiologist flags Possible pericardial hemorrhage. (Urgent)    Narrative    EXAMINATION: CT CHEST ABDOMEN PELVIS W/O CONTRAST, 8/27/2019 8:48 PM    TECHNIQUE:  Helical CT images from the lung apices through the  symphysis pubis were obtained without contrast.  Coronal and sagittal  reformatted images were generated at a workstation for further  assessment.    COMPARISON: No prior imaging available.    HISTORY: Acidosis    FINDINGS:  Limited evaluation secondary to the lack of intravenous contrast.    Lines and tubes: Endotracheal tube with the tip in the mid thoracic  trachea. Right femoral approach venous line with the tip in the lower  right atrium. Right femoral approach arterial line with the tip at the  level of the aortic bifurcation. Mcclelland catheter. Gastric tube with the  distal end and sidehole in the stomach.    Lungs and pleural: Small bilateral pleural effusions. Right greater  than left consolidative opacities in the lower lobes with areas of  possible cavitation in the right lower lobe. Diffuse reticular and  cystic changes of the upper lungs. Diffuse ground grass opacities  throughout the remainder of the lungs. Severe centrilobular and  emphysematous changes. Bilateral pleural plaques. Endobronchial fluid  in the right lower lobe.  Heart and mediastinum: Heart is mildly enlarged. Ill-defined  hyperdense fluid accumulation along the anterior heart border which  measures up to 14 mm in diameter. Severe coronary artery  calcifications. Postsurgical changes of CABG.  Thoracic vasculature: Limited assessment on this noncontrast exam. The  thoracic aorta is normal in caliber. The pulmonary is dilated  measuring 3 cm. Severe calcifications of the thoracic vasculature  involving the aorta, great vessel origins.  Lymph  nodes: Multiple subcentimeter mediastinal lymph nodes. No  axillary or hilar adenopathy.  Thyroid: 7 mm hypodense nodule in the right lobe of the thyroid.    Liver: No suspicious liver lesions. Patchy areas hypodensity  suggestive of steatosis.  Gallbladder: Dense wall calcification of the gallbladder. No  intraluminal stones.  Spleen: No suspicious splenic lesions.  Pancreas: Severe atrophy and fatty infiltration. No focal mass.  Adrenal glands: No adrenal nodules.   Kidneys: Severe atrophy of the left kidney. Mild atrophy on the right.  Contrast excretion in the kidneys from prior contrast study. Multiple  subcentimeter hypodensities in the right kidney that are too small to  characterize. No hydronephrosis.  Bladder / Pelvic organs: The bladder is partially distended with a  Mcclelland catheter in place. The catheter is projecting against the  anterior wall, no overt evidence of perforation. Contrast excretion  noted in the posterior bladder. Anteriorly there is a small volume of  air. Bladder wall appears moderately irregular and thickened, however  this may be due to decompressed state.  Bowel: No abnormally distended large or small bowel. Elongated  fat-containing structure in the duodenum, potentially an intraluminal  lipoma. The appendix is unremarkable. Diverticulosis without evidence  of acute diverticulitis.  Lymph nodes: No retroperitoneal, mesenteric, or pelvic  lymphadenopathy.  Peritoneum / Retroperitoneum: No free fluid or air within the abdomen.  Abdominal vasculature: Limited assessment on this noncontrast exam. No  abdominal aortic aneurysm. Extensive vascular calcifications of the  abdominal aorta and the mesenteric and iliac branches.    Bones and soft tissues: Degenerative changes of the spine hips and  shoulders. Right anterior rib fractures at the fourth through seventh  ribs. Displaced left rib fractures at the third, fourth, fifth, sixth,  seventh, eighth ribs anteriorly with associated chest  wall hematoma.  Intact median sternotomy wires. Right inguinal fat stranding related  to arterial and venous catheter placement. Hyperattenuating focus in  the lateral left gluteal musculature (series 4 image 217).      Impression    IMPRESSION:   1. Diffuse groundglass opacities with right greater than left  consolidative opacities in the lower lobes that may represent a  combination of pulmonary edema, pulmonary hemorrhage, and/or  infection/aspiration. Possible cavitation in the right lower lobe,  raising concern for necrotizing pneumonia, though this appearance may  be due to underlying emphysema and fibrosis.  2. Bilateral pleural plaques, suggesting prior asbestos exposure.  3. Small volume hyperdense fluid along the anterior border of the  heart. This could represent retrosternal hematoma related to  cardiopulmonary resuscitation versus, less likely, pericardial  hemorrhage.  4. Severe coronary artery disease and atherosclerotic disease of the  thoracoabdominal vasculature.  5. Severely atrophic left kidney.  6. Nondisplaced right anterior rib fractures at the fourth through  seventh ribs.  7. Displaced left anterior rib fractures at the third, fourth, fifth,  sixth, seventh and eighth ribs with associated hyperattenuating chest  wall hematoma. Unable to exclude active extravasation given contrast  timing.  8. Thickened and irregular appearance of the bladder. Finding may be  artifactual due to decompressed state, however cystitis or urothelial  neoplasm cannot be excluded. Attention on follow-up.  9. Hyperattenuating focus in the lateral left gluteal musculature,  likely a small intramuscular hematoma.    [Urgent Result: Possible pericardial hemorrhage.]    Finding was identified on 8/27/2019 9:56 PM.     Bogdan Hairston MD was contacted by Dr. Marilu Turcios DO (Radiology  R3) at 8/27/2019 10:16 PM and verbalized understanding of the urgent  finding.     I have personally reviewed the examination and  initial interpretation  and I agree with the findings.    JOSIE COOK, DO   CT Head w/o Contrast    Narrative    CT HEAD W/O CONTRAST 8/27/2019 8:50 PM    Provided History: None provided.  ICD-10:    Comparison: None available.    Technique: Using multidetector thin collimation helical acquisition  technique, axial, coronal and sagittal CT images from the skull base  to the vertex were obtained without intravenous contrast.     Findings:    Diffuse increased density of the intracranial vasculature attributed  to contrast administration for recent catheter angiography. No  definite intracranial hemorrhage. No mass effect or midline shift. The  ventricles are proportionate to the cerebral sulci. Small old right  parietal cortical infarct. Also old lacunar infarct in the left  caudate head. No definite acute loss of gray-white differentiation.  The basal cisterns are patent. Bilateral basal ganglia and dentate  nucleus mineralization.    Mild paranasal sinus mucosal thickening. The mastoid air cells are  clear. Right parietal scalp edema.       Impression    Impression:   1. No acute intracranial pathology.  2. Small old infarcts.    TEJAL GILLESPIE MD   XR Chest Port 1 View    Impression    Impression:   1. Endotracheal tube with the tip in the mid thoracic trachea.  Additional postsurgical changes and support devices as detailed above.  2. Right greater than left consolidative opacities which may represent  atelectasis versus atelectasis versus infection or pulmonary  hemorrhage.  3. diffuse interstitial opacities, likely representing chronic lung  disease with possible superimposed pulmonary edema.  4. Right greater than left pleural effusions.     Critical Care Services Progress Note:     José Aguirre remains critically ill with acute coronary syndrome, cardiac arrhythmia, hypotension, severe respiratory distress and shock     I personally examined and evaluated the patient today.   The patient s prognosis today  is grave  I have evaluated all laboratory values and imaging studies from the past 24 hours.  Key findings and decisions made today included : VT/VF arrest, ROSC archived in the filed.    Was dyssynchronous with the ventilator, sedation was increased PaO2 has improved   Given 500 ml fluid with no response in blood pressure suspect his hypotension is in the setting of vasoplegia post arrest and increased sedation on NE with vasopressin ordered not yet started      I personally managed the ventilator, sedation, pain control and analgesia, metabolic abnormalities, antibiotic therapy, nutritional status and vasoactive medications.   Consults ongoing and ordered are none  Procedures that will happen today are: none  All treatments were placed at my direction.  I formulated today s plan with the house staff team or resident(s) and agree with the findings and plan in the associated note.       The above plans and care have been discussed with family and all questions and concerns were addressed.     I spent a total of 60 minutes (excluding procedure time) personally providing and directing critical care services at the bedside and on the critical care unit for José Aguirre.        Andrew Garcia MD

## 2019-08-28 NOTE — PLAN OF CARE
Neuro- Unresponsive. Pupils 2mm, fixed. Flexion to painful stimuli; arms remain flexed at rest. Midazolam for sedation, fentanyl for pain control.  CV- SR w/ RBBB. MAP goal >65. Norepi titrated for same. Notified Dr. Hairston of increasing dose of same; order received for vasopressin if needed in addition to norepi. Vasopressin available at bedside if needed.  Pulm- CMV. Noted abdominal breathing most of shift. ABGs monitored and corrected as ordered; 1 amp bicarb given over NOC. LS coarse/diminished.  GI- OG with moderate OP. No audible BS. Insulin gtt titrated for BG.  - UOP adequate upon arrival, but has downtrended over NOC. 500mL NS bolus given for same with minimal improvement. Dr. Hairston aware of low UOP; continuing to monitor.  Skin- Turned [id number] with wedge system. Monitoring pre-existing wounds.  See flow sheets for further interventions and assessments. Continuing to monitor labs and hemodynamics closely. Reporting abnormals to CSI team.    Problem: Pain (Acute Coronary Syndrome)  Goal: Absence of Cardiac-Related Pain  Outcome: No Change     Problem: Adult Inpatient Plan of Care  Goal: Plan of Care Review  Outcome: Declining  Goal: Patient-Specific Goal (Individualization)  Outcome: Declining  Goal: Absence of Hospital-Acquired Illness or Injury  Outcome: Declining  Goal: Optimal Comfort and Wellbeing  Outcome: Declining  Goal: Readiness for Transition of Care  Outcome: Declining  Goal: Rounds/Family Conference  Outcome: Declining     Problem: Adjustment to Illness (Acute Coronary Syndrome)  Goal: Optimal Adaptation to Illness  Outcome: Declining     Problem: Arrhythmia (Acute Coronary Syndrome)  Goal: Normalized Cardiac Rhythm  Outcome: Declining     Problem: Hemodynamic Instability (Acute Coronary Syndrome)  Goal: Effective Cardiac Pump Function  Outcome: Declining     Problem: Tissue Perfusion (Acute Coronary Syndrome)  Goal: Adequate Tissue Perfusion  Outcome: Declining

## 2019-08-28 NOTE — PHARMACY-VANCOMYCIN DOSING SERVICE
Pharmacy Vancomycin Initial Note  Date of Service 2019  Patient's  1900  89 year old, adult    Indication: Sepsis and Ventilator-Associated Pneumonia    Current estimated CrCl = 37mL/min; 82yo male    Creatinine for last 3 days  2019:  8:53 PM Creatinine 1.71 mg/dL    Recent Vancomycin Level(s) for last 3 days  No results found for requested labs within last 72 hours.      Vancomycin IV Administrations (past 72 hours)      No vancomycin orders with administrations in past 72 hours.                Nephrotoxins and other renal medications (From now, onward)    Start     Dose/Rate Route Frequency Ordered Stop    19  piperacillin-tazobactam (ZOSYN) 3.375 g vial to attach to  mL bag      3.375 g  over 30 Minutes Intravenous EVERY 6 HOURS 19  vancomycin (VANCOCIN) 1,750 mg in sodium chloride 0.9 % 500 mL intermittent infusion      1,750 mg  over 2 Hours Intravenous ONCE 19  vancomycin place kelly - receiving intermittent dosing      1 each Does not apply SEE ADMIN INSTRUCTIONS 19            Contrast Orders - past 72 hours (72h ago, onward)    Start     Dose/Rate Route Frequency Ordered Stop    19  iopamidol (ISOVUE-370) solution  Status:  Discontinued        ONCE PRN 19                Plan:  1.  Start intermittent dosing given SrCr.  Give vancomycin  1750 mg IV x1 (22mg/kg using ABW = 78.8kg).  Renal function to be re evaluated in AM to determine subsequent dosing.   2.  Goal Trough Level: 15-20 mg/L   3.  Pharmacy will check trough levels as appropriate in 1-3 Days.    4. Serum creatinine levels will be ordered daily for the first week of therapy and at least twice weekly for subsequent weeks.    5. [place] method utilized to dose vancomycin therapy: Method 2    Shani Doran, PharmD

## 2019-08-28 NOTE — H&P
[facility]  Interventional Cardiology History & Physical  August 27, 2019          H&P:   José Aguirre is a 83 year old male who was admitted on 8/27/2019 with a cardiac arrest. He has a history of PVD with renal artery stenosis s/p stenting in 2013,occluded R carotid artery and s/p Left carotid endarterectomy, L subclavian stenosis CAD s/p CABG with L-LAD, S-D1 and OM2,PDA), hyperlipidemia, COPD on 2L of O2 with exertion who was at the [facility] today when he collapsed. The arrest was witnessed he received bystander CPR by the fire department who found him with a shockable rhythm he received one shock and one dose of epinephrine and transferred him here. On arrival he had a pulse was hypotensive with MAP's in the 50's but was moving  he was intubated and received one dose of epinephrine.  ECG per EMS and in the ED shows afib with RBBB and ST depression in the lateral leads with some TWI in the anterior leads.     Per his family he has had another episode of a cardiac arrest in 2018 no angiogram was done he had ROSC and good recovery and per report had no further work up, other than this another syncopal event both attributed to dehydration.     Witnessed arest (y/n):witnessed  Home or public location (y/n):public  Bystander CPR (y/n):yes  Initial rhythm:VF  Did they have intermittent ROSC (y/n): yes  # of shocks: 1  Epi: 1  mg  Amio: none  Bicarb:  none  EtCO2 en route:   O2 sat en route:80's    In the ED he was given another amp of epi, 20 of etomidate and rocuronium for intubation.     Initial rhythm in the cath lab: afib  First ABG: pH 7.1, LA 7     In cath lab, patient recieved CPI to LM->pLAD and PCI to ost LCx. Upon arrival to CICU, patient intubated and sedated and not following commands.           Medications:     Per OSH  Asa  correg 25mg BID  plavix  Lasix 80mg d  Rosuvastatin 40mg d    Past Medical History:   Diagnosis Date     Anemia      Atrophy of left kidney      CKD  (chronic kidney disease), stage III (H)      Claudication, intermittent (H)      Coronary artery disease 2003    CAB x 4     History of GI bleed 2012    Hemorrhagic gastritis     Hypercholesterolemia      Hypertension      Left carotid artery stenosis     S/P Carotid Endarterectomy left      PVD (peripheral vascular disease) (H)      Renal artery stenosis (H)     stent R       Family History   Problem Relation Age of Onset     Cerebrovascular Disease Mother 90        unknown type     Cancer Brother 70        Brain cancer      Dementia Sister      Arthritis Brother      Social History     Socioeconomic History     Marital status:      Spouse name: Not on file     Number of children: Not on file     Years of education: Not on file     Highest education level: Not on file   Occupational History     Not on file   Social Needs     Financial resource strain: Not on file     Food insecurity:     Worry: Not on file     Inability: Not on file     Transportation needs:     Medical: Not on file     Non-medical: Not on file   Tobacco Use     Smoking status: Former Smoker     Packs/day: 1.00     Years: 50.00     Pack years: 50.00     Types: Cigarettes     Last attempt to quit: 2001     Years since quittin.6     Smokeless tobacco: Never Used   Substance and Sexual Activity     Alcohol use: Yes     Comment: 4 drinks week     Drug use: No     Sexual activity: Not on file   Lifestyle     Physical activity:     Days per week: Not on file     Minutes per session: Not on file     Stress: Not on file   Relationships     Social connections:     Talks on phone: Not on file     Gets together: Not on file     Attends Orthodox service: Not on file     Active member of club or organization: Not on file     Attends meetings of clubs or organizations: Not on file     Relationship status: Not on file     Intimate partner violence:     Fear of current or ex partner: Not on file     Emotionally abused: Not on file     Physically  abused: Not on file     Forced sexual activity: Not on file   Other Topics Concern      Service Not Asked     Blood Transfusions Not Asked     Caffeine Concern Yes     Comment: 4 - 7 cups (trying to cut down)     Occupational Exposure Not Asked     Hobby Hazards Not Asked     Sleep Concern Not Asked     Stress Concern Not Asked     Weight Concern Not Asked     Special Diet Yes     Comment: eats healthy     Back Care Not Asked     Exercise Yes     Comment: walking     Bike Helmet Not Asked     Seat Belt Not Asked     Self-Exams Not Asked     Parent/sibling w/ CABG, MI or angioplasty before 65F 55M? Not Asked   Social History Narrative     Not on file            Review of Systems:   Unable to obtain 2/2 intubatuion            Physical Exam:   @Vitals: BP (!) 68/47   Pulse 89   Resp (!) 42   Wt 80 kg (176 lb 5.9 oz)   SpO2 (!) 85%   BMI 26.82 kg/m    BMI= Body mass index is 26.82 kg/m .     GENERAL APPEARANCE: Intubated, sedated. NAD.HEENT: JVP 7. No icterus, PERRL 2 mm, ETT in place, OG tube in place  CARDIOVASCULAR: regular rate and rhythm, normal S1 and S2, no S3 or S4 and no murmur, click or rub. Normal PMI. Pulses dopplerable.  RESP: Coarse bilaterally. Mechanical ventilation.    GASTRO: Soft, bowel sounds hypoactive but present  GENITOURINARY: Mcclelland in place.  EXTREMITIES: Cool, 1+ edema, pulses dopplered, as above. VA ECMO cannulas in right groin, ThermoGard in place  NEURO: Sedated and intubated, pupils non-reactive  INTEGUMENTARY: No rashes. Cannula/Line sites CDI  LINES/TUBES/DRAINS: (noted below) V-A ECMO Cannulas R groin, arterial sheath and ThermoGard in L groin. R radial Arterial line. ETT. OG. Mcclelland Catheter.    Vent Settings:  Resp: (!) 42 SpO2: (!) 85 % O2 Device: Other (Comments)      Arterial Blood Gas: No lab results found in last 7 days.  Vitals:    08/27/19 1824   Weight: 80 kg (176 lb 5.9 oz)   No intake/output data recorded.  No lab results found in last 7 days.  No components found  for: URINE   No lab results found in last 7 days.     No data recorded   No lab results found in last 7 days.    Invalid input(s): NEUT, LYM, EOS  No lab results found in last 7 days.  No lab results found in last 7 days.    Lines:           Data:   All lab results reviewed    No results found for this or any previous visit (from the past 24 hour(s)).           Assessment and Plan:   83 year old male who was admitted on 8/27/2019 with a cardiac arrest. 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    Neurology: Intubated, sedated, paralyzed. Cooled to 34 degrees.  --continue versed pushes, fentanyl ggt, - RASS goal -2 to -3    Cardiovascular / Hemodynamics: Refractory VF arrest s/p  CONCETTA to LM-> LAD, Lcx.  LA 7  TTE: ordered for the am  EKG: AF RBBB   --wean pressors/inotropes as able  --echo tomorrow on 8/27  --continue ASA 81mg and plavix 75 mg PO QD  --hold temp at 34 degrees for 12 hours  --hold lipitor for now given likely hepatic injury during arrest  --hold ACE/ARB for now given likely reduced renal fxn after arrest  --holding beta blocker given shock   Pulmonary:   Vent Settings:  ETT 2 cm above stacey.  Now weaning vent requirements.  CXR: Lines in stable position.  --wean vent as able  --daily CXR  Chest CT  --Q2h ABGs for now  --consider scheduled duonebs if signs of lung dz, currently PRN     GI and Nutrition: No known medical hx.  --monitor BID LFTs  --NPO while cooled - nutrition consult pending feeding tube placement once he is warmed   --bowel regimen - on hold for now due to hypothermia  --GI Prophylaxis: PPI    Renal, Fluid and Electrolytes: Upon arrival to CICU, UOP is  >50 ml/hr  --Monitor creatinine  --monitor urine output  --maintain K>3 and Mg>2    Infectious Disease: No signs of infection. Leukocytosis c/w arrest. Blood cultures collected.   --vancomycin/zosyn x5 days for arrest  --daily blood cultures  --monitor for signs of infection given cooling, lines, and leukocytosis   Hematology and Oncology: ASA/plavix for CONCETTA.   --SQH TID for ppx   Endocrinology: Diabetic with an A1c of 6.6 in 2017  No known medical history. BG elevated.  --insulin gtt PRN   Lines:   ETT August 27, 2019  Mcclelland catheter August 27, 2019  Restraint: needed    Current lines are required for patient management       Family update by me today: Yes     Code Status:    The pt was discussed and evaluated with Dr. Radha MD, attending physician, who agrees with the assessment and plan above.     Critical Care Services Progress Note:     José Aguirre remains critically ill with acute coronary syndrome, cardiac arrhythmia, severe respiratory distress, hypoxic respiratory failure and shock     I personally examined and evaluated the patient today.   The patient s prognosis today is grave  I have evaluated all laboratory values and imaging studies from the past 24 hours.  Key findings and decisions made today included out of hospital cardiac arrest, had intermittent ROSC.   I personally managed the ventilator, sedation, pain control and analgesia, metabolic abnormalities, antibiotic therapy, nutritional status and vasoactive medications.   Consults ongoing and ordered are none  Procedures that will happen today are: transfusion  All treatments were placed at my direction.  I formulated today s plan with the house staff team or resident(s) and agree with the findings and plan in the associated note.       The above plans and care have been discussed with family and all questions and concerns were addressed.     I spent a total of 60 minutes (excluding procedure time) personally providing and directing critical  care services at the bedside and on the critical care unit for José Aguirre.        Andrew Garcia MD

## 2019-08-28 NOTE — CONSULTS
Neuroscience Intensive Care Consult    HPI: History obtained by chart review due to coma  Mr Aguirre is an 82yo gentleman with known history of CAD s/p CABG, COPD, hyperlipidemia, R carotid occlusion, and history of left CEA who is currently admitted to the ICU for ongoing management of post cardiac arrest cares.  He is currently being cooled per protocol.  [place]  Fortunately, bystanders started CPR and an AED was placed, which indicated a shockable rhythm and a shock was given as well as 1 dose of epinephrine.  He arrived to the hospital with a pulse and was shortly thereafter intubated.  He reportedly had a previous episode of cardiac arrest in 2018 with good recovery following.  He is currently intubated, sedated on a midazolam and cooled.    ROS: Not obtained due to condition    Past Medical/Surgical History:  Coronary artery disease status post CABG  Hyperlipidemia  COPD  Right carotid artery occlusion  Left carotid endarterectomy  Cardiac arrest    Family History:  Unable to obtain due to condition    Social History:  Unable to obtain due to condition    Objective Data:    24 Hour Vital Signs Summary:  Temperatures:  Current - Temp: 93.6  F (34.2  C); Max - Temp  Av.3  F (34.1  C)  Min: 91.6  F (33.1  C)  Max: 96.1  F (35.6  C)  Respiration range: Resp  Av.6  Min: 18  Max: 24  Pulse range: No data recorded  Blood pressure range: No data recorded.  ; No data recorded.    Pulse oximetry range: SpO2  Av.6 %  Min: 85 %  Max: 100 %    Ventilator Settings  Ventilation Mode: CMV/AC  (Continuous Mandatory Ventilation/ Assist Control)  FiO2 (%): 100 %  Rate Set (breaths/minute): 18 breaths/min  Tidal Volume Set (mL): 500 mL  PEEP (cm H2O): 8 cmH2O  Oxygen Concentration (%): 100 %  Resp: 18        Intake/Output Summary (Last 24 hours) at 2019 0820  Last data filed at 2019 0800  Gross per 24 hour   Intake 1838.16 ml   Output 1766 ml   Net 72.16 ml  "      Arterial Line BP: ()/(33-83) 87/43  MAP:  [43 mmHg-115 mmHg] 64 mmHg    Current Medications:    aspirin  81 mg Oral Daily     atorvastatin  80 mg Oral Daily     clopidogrel  75 mg Oral Daily     piperacillin-tazobactam  2.25 g Intravenous Q6H     propofol         vancomycin place kelly - receiving intermittent dosing  1 each Does not apply See Admin Instructions       PRN Medications:  sodium chloride 0.9%, artificial tears, atropine, IV fluid REPLACEMENT ONLY, glucose **OR** dextrose **OR** glucagon, fentaNYL, fentaNYL, flumazenil, - MEDICATION INSTRUCTIONS -, - MEDICATION INSTRUCTIONS -, meperidine, metoprolol, midazolam, midazolam, naloxone, naloxone, nitroGLYcerin, Percutaneous Coronary Intervention orders placed (this is information for BPA alerting), ACE/ARB/ARNI NOT PRESCRIBED, BETA BLOCKER NOT PRESCRIBED, vecuronium, vecuronium    Infusions:    IV fluid REPLACEMENT ONLY       D5W       fentaNYL 100 mcg/hr (08/28/19 0726)     insulin (regular) 0.5 Units/hr (08/28/19 0758)     - MEDICATION INSTRUCTIONS -       - MEDICATION INSTRUCTIONS -       midazolam 6 mg/hr (08/28/19 0727)     norepinephrine 0.24 mcg/kg/min (08/28/19 0727)     Percutaneous Coronary Intervention orders placed (this is information for BPA alerting)       ACE/ARB/ARNI NOT PRESCRIBED       BETA BLOCKER NOT PRESCRIBED       vasopressin (PITRESSIN) infusion ADULT (40 mL)         No Known Allergies    Physical Examination:  Temp 93.6  F (34.2  C) (Esophageal)   Resp 18   Ht 1.727 m (5' 7.99\")   Wt 78.8 kg (173 lb 11.6 oz)   SpO2 100%   BMI 26.42 kg/m      EXAM: Examined on 6 mg/h of midazolam and 100 mcg/kg/min of fentanyl  On exam, he makes no spontaneous movements or eye-opening.  He does not open his eyes or move his limbs in response to noxious stimuli.  Pupils are 2 mm and react to light.  No eye movements with oculocephalic maneuver.  No blink to threat on either side.  No corneal response.  No clear facial asymmetry.  " "Weak cough with deep suctioning.  No spontaneous limb movements or response to noxious stimuli    Labs/Studies:  Recent Labs   Lab Test 08/28/19 0413 08/27/19 2053   * 137   POTASSIUM 4.1 4.4   CHLORIDE 117* 106   CO2 20 20   ANIONGAP 9 11   * 204*   BUN 33* 31*   CR 1.84* 1.71*   YOON 7.6* 7.5*   WBC 27.4* 22.9*   RBC 3.16* 3.89*   HGB 9.9* 11.9*    197       Recent Labs   Lab Test 08/28/19 0413 08/27/19 2053   INR 1.55* 1.75*   PTT 59* >240*       Hemoglobin A1C   Date Value Ref Range Status   08/28/2019 5.9 (H) 0 - 5.6 % Final     Comment:     Normal <5.7% Prediabetes 5.7-6.4%  Diabetes 6.5% or higher - adopted from ADA   consensus guidelines.  QA FLAGS AND/OR RANGES MODIFIED BY DEMOGRAPHIC UPDATE ON 08/28 AT 0646       Lab Results   Component Value Date    CHOL 119 08/27/2019     Lab Results   Component Value Date    HDL 35 08/27/2019     Lab Results   Component Value Date    LDL 69 08/27/2019     Lab Results   Component Value Date    TRIG 72 08/27/2019     No results found for: CHOLHDLRATIO    Recent Labs   Lab 08/28/19  0353 08/28/19  0158 08/27/19  2329 08/27/19 2054   PH 7.22* 7.24* 7.18* 7.20*   PCO2 50* 48* 49* 45   PO2 83 70* 63* 76*   HCO3 20* 20* 18* 18*   O2PER 100 100 100 100         Imaging: Results copied directly to ensure accuracy  CTH:  \"Impression:   1. No acute intracranial pathology.  2. 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  He is currently being cooled until 2200 tonight at which point rewarming will begin.  Intracranial imaging showed no evidence of an acute process such as hydrocephalus or intracranial hemorrhage.  We recommend monitoring with continuous video EEG well cooled and during the rewarming process.  Limitation of sedation as able and safe is recommended to permit adequate neurological examination.  Pending improvement in his neurologic exam, additional imaging with " "MRI may be indicated to establish the presence/absence of hypoxic/anoxic injury.    Recommendations:  - Continuous video EEG  -Limit sedation and CNS acting medications as safely able to permit neurologic examination  -We will continue to follow    This patient was seen and discussed with attending neurointensivist, Dr Mario Reyes, DO  PGY5 Neurocritical Care Fellow  08/28/2019  Text Page (8am-5pm)  To page stroke neurology after hours or on a subsequent day, click here: [facility]  Choose \"On Call\" tab at top, then search dropdown box for \"Neurology Adult\" & press Enter, look for Neuro ICU/Stroke  "

## 2019-08-28 NOTE — PROGRESS NOTES
EEG CLINICAL NEUROPHYSIOLOGY PRELIMINARY REPORT    First several hours of video-EEG reviewed. Hypothermic. Midazoalm 6 mg/hour, fentanyl 100 micrograms/hr. Discontinuous low to moderate amplitude EEG. Unreactive. No periodic activity, epileptiform discharges, or seizures.    Thus far study consistent with moderate to severe diffuse encephalopathy. This may in part be related to hypothermia and anesthetic drips employed. Thus far no seizures. Please note that both midazolam and hypothermia have antiepileptic effects so seizures could potentially emerge as these therapies are tapered and discontinued. Will continue video-EEG monitoring. Full report to follow.    Alexander Estrada MD  Pager [phone]

## 2019-08-29 ENCOUNTER — APPOINTMENT (OUTPATIENT)
Dept: GENERAL RADIOLOGY | Facility: CLINIC | Age: 84
DRG: 224 | End: 2019-08-29
Attending: STUDENT IN AN ORGANIZED HEALTH CARE EDUCATION/TRAINING PROGRAM
Payer: MEDICARE

## 2019-08-29 ENCOUNTER — ALLIED HEALTH/NURSE VISIT (OUTPATIENT)
Dept: NEUROLOGY | Facility: CLINIC | Age: 84
DRG: 224 | End: 2019-08-29
Attending: PSYCHIATRY & NEUROLOGY
Payer: MEDICARE

## 2019-08-29 DIAGNOSIS — I46.9 CARDIAC ARREST (H): Primary | ICD-10-CM

## 2019-08-29 LAB
ALBUMIN SERPL-MCNC: 2.3 G/DL (ref 3.4–5)
ALBUMIN SERPL-MCNC: 2.4 G/DL (ref 3.4–5)
ALBUMIN UR-MCNC: 10 MG/DL
ALP SERPL-CCNC: 54 U/L (ref 40–150)
ALP SERPL-CCNC: 56 U/L (ref 40–150)
ALT SERPL W P-5'-P-CCNC: 54 U/L (ref 0–70)
ALT SERPL W P-5'-P-CCNC: 60 U/L (ref 0–70)
ANION GAP SERPL CALCULATED.3IONS-SCNC: 10 MMOL/L (ref 3–14)
ANION GAP SERPL CALCULATED.3IONS-SCNC: 12 MMOL/L (ref 3–14)
ANION GAP SERPL CALCULATED.3IONS-SCNC: 6 MMOL/L (ref 3–14)
ANION GAP SERPL CALCULATED.3IONS-SCNC: 8 MMOL/L (ref 3–14)
ANION GAP SERPL CALCULATED.3IONS-SCNC: 8 MMOL/L (ref 3–14)
ANION GAP SERPL CALCULATED.3IONS-SCNC: 9 MMOL/L (ref 3–14)
APPEARANCE UR: ABNORMAL
AST SERPL W P-5'-P-CCNC: 67 U/L (ref 0–45)
AST SERPL W P-5'-P-CCNC: 99 U/L (ref 0–45)
BASE DEFICIT BLDA-SCNC: 1.8 MMOL/L
BASE DEFICIT BLDA-SCNC: 2.4 MMOL/L
BASE DEFICIT BLDA-SCNC: 2.5 MMOL/L
BASE DEFICIT BLDA-SCNC: 2.6 MMOL/L
BASE DEFICIT BLDA-SCNC: 2.6 MMOL/L
BILIRUB SERPL-MCNC: 0.8 MG/DL (ref 0.2–1.3)
BILIRUB SERPL-MCNC: 0.8 MG/DL (ref 0.2–1.3)
BILIRUB UR QL STRIP: NEGATIVE
BUN SERPL-MCNC: 34 MG/DL (ref 7–30)
BUN SERPL-MCNC: 35 MG/DL (ref 7–30)
BUN SERPL-MCNC: 36 MG/DL (ref 7–30)
BUN SERPL-MCNC: 36 MG/DL (ref 7–30)
CALCIUM SERPL-MCNC: 7.2 MG/DL (ref 8.5–10.1)
CALCIUM SERPL-MCNC: 7.4 MG/DL (ref 8.5–10.1)
CALCIUM SERPL-MCNC: 7.4 MG/DL (ref 8.5–10.1)
CALCIUM SERPL-MCNC: 7.5 MG/DL (ref 8.5–10.1)
CALCIUM SERPL-MCNC: 7.5 MG/DL (ref 8.5–10.1)
CALCIUM SERPL-MCNC: 7.6 MG/DL (ref 8.5–10.1)
CHLORIDE SERPL-SCNC: 114 MMOL/L (ref 94–109)
CHLORIDE SERPL-SCNC: 114 MMOL/L (ref 94–109)
CHLORIDE SERPL-SCNC: 115 MMOL/L (ref 94–109)
CHLORIDE SERPL-SCNC: 115 MMOL/L (ref 94–109)
CHLORIDE SERPL-SCNC: 117 MMOL/L (ref 94–109)
CHLORIDE SERPL-SCNC: 117 MMOL/L (ref 94–109)
CO2 SERPL-SCNC: 21 MMOL/L (ref 20–32)
CO2 SERPL-SCNC: 22 MMOL/L (ref 20–32)
CO2 SERPL-SCNC: 24 MMOL/L (ref 20–32)
CO2 SERPL-SCNC: 24 MMOL/L (ref 20–32)
COLOR UR AUTO: YELLOW
CREAT SERPL-MCNC: 1.7 MG/DL (ref 0.66–1.25)
CREAT SERPL-MCNC: 1.78 MG/DL (ref 0.66–1.25)
CREAT SERPL-MCNC: 1.78 MG/DL (ref 0.66–1.25)
CREAT SERPL-MCNC: 1.83 MG/DL (ref 0.66–1.25)
CREAT SERPL-MCNC: 1.84 MG/DL (ref 0.66–1.25)
CREAT SERPL-MCNC: 1.88 MG/DL (ref 0.66–1.25)
ERYTHROCYTE [DISTWIDTH] IN BLOOD BY AUTOMATED COUNT: 16.5 % (ref 10–15)
ERYTHROCYTE [DISTWIDTH] IN BLOOD BY AUTOMATED COUNT: 17.1 % (ref 10–15)
GFR SERPL CREATININE-BSD FRML MDRD: 32 ML/MIN/{1.73_M2}
GFR SERPL CREATININE-BSD FRML MDRD: 33 ML/MIN/{1.73_M2}
GFR SERPL CREATININE-BSD FRML MDRD: 33 ML/MIN/{1.73_M2}
GFR SERPL CREATININE-BSD FRML MDRD: 34 ML/MIN/{1.73_M2}
GFR SERPL CREATININE-BSD FRML MDRD: 34 ML/MIN/{1.73_M2}
GFR SERPL CREATININE-BSD FRML MDRD: 36 ML/MIN/{1.73_M2}
GLUCOSE BLDC GLUCOMTR-MCNC: 130 MG/DL (ref 70–99)
GLUCOSE BLDC GLUCOMTR-MCNC: 132 MG/DL (ref 70–99)
GLUCOSE BLDC GLUCOMTR-MCNC: 134 MG/DL (ref 70–99)
GLUCOSE BLDC GLUCOMTR-MCNC: 136 MG/DL (ref 70–99)
GLUCOSE BLDC GLUCOMTR-MCNC: 137 MG/DL (ref 70–99)
GLUCOSE BLDC GLUCOMTR-MCNC: 139 MG/DL (ref 70–99)
GLUCOSE BLDC GLUCOMTR-MCNC: 142 MG/DL (ref 70–99)
GLUCOSE BLDC GLUCOMTR-MCNC: 144 MG/DL (ref 70–99)
GLUCOSE BLDC GLUCOMTR-MCNC: 145 MG/DL (ref 70–99)
GLUCOSE BLDC GLUCOMTR-MCNC: 148 MG/DL (ref 70–99)
GLUCOSE BLDC GLUCOMTR-MCNC: 152 MG/DL (ref 70–99)
GLUCOSE BLDC GLUCOMTR-MCNC: 160 MG/DL (ref 70–99)
GLUCOSE BLDC GLUCOMTR-MCNC: 168 MG/DL (ref 70–99)
GLUCOSE BLDC GLUCOMTR-MCNC: 169 MG/DL (ref 70–99)
GLUCOSE SERPL-MCNC: 123 MG/DL (ref 70–99)
GLUCOSE SERPL-MCNC: 135 MG/DL (ref 70–99)
GLUCOSE SERPL-MCNC: 140 MG/DL (ref 70–99)
GLUCOSE SERPL-MCNC: 149 MG/DL (ref 70–99)
GLUCOSE SERPL-MCNC: 157 MG/DL (ref 70–99)
GLUCOSE SERPL-MCNC: 165 MG/DL (ref 70–99)
GLUCOSE UR STRIP-MCNC: NEGATIVE MG/DL
GRAM STN SPEC: NORMAL
GRAM STN SPEC: NORMAL
HCO3 BLD-SCNC: 22 MMOL/L (ref 21–28)
HCT VFR BLD AUTO: 25.4 % (ref 40–53)
HCT VFR BLD AUTO: 26.3 % (ref 40–53)
HGB BLD-MCNC: 7.1 G/DL (ref 13.3–17.7)
HGB BLD-MCNC: 7.7 G/DL (ref 13.3–17.7)
HGB BLD-MCNC: 8.1 G/DL (ref 13.3–17.7)
HGB UR QL STRIP: ABNORMAL
INTERPRETATION ECG - MUSE: NORMAL
INTERPRETATION ECG - MUSE: NORMAL
KETONES UR STRIP-MCNC: NEGATIVE MG/DL
LACTATE BLD-SCNC: 1.4 MMOL/L (ref 0.7–2)
LACTATE BLD-SCNC: 2.1 MMOL/L (ref 0.7–2)
LEUKOCYTE ESTERASE UR QL STRIP: ABNORMAL
MAGNESIUM SERPL-MCNC: 2.2 MG/DL (ref 1.6–2.3)
MAGNESIUM SERPL-MCNC: 2.3 MG/DL (ref 1.6–2.3)
MCH RBC QN AUTO: 30.7 PG (ref 26.5–33)
MCH RBC QN AUTO: 30.7 PG (ref 26.5–33)
MCHC RBC AUTO-ENTMCNC: 30.3 G/DL (ref 31.5–36.5)
MCHC RBC AUTO-ENTMCNC: 30.8 G/DL (ref 31.5–36.5)
MCV RBC AUTO: 100 FL (ref 78–100)
MCV RBC AUTO: 101 FL (ref 78–100)
MUCOUS THREADS #/AREA URNS LPF: PRESENT /LPF
NITRATE UR QL: NEGATIVE
O2/TOTAL GAS SETTING VFR VENT: 50 %
O2/TOTAL GAS SETTING VFR VENT: 60 %
O2/TOTAL GAS SETTING VFR VENT: 80 %
OXYHGB MFR BLD: 91 % (ref 92–100)
OXYHGB MFR BLD: 94 % (ref 92–100)
PCO2 BLD: 31 MM HG (ref 35–45)
PCO2 BLD: 34 MM HG (ref 35–45)
PCO2 BLD: 34 MM HG (ref 35–45)
PCO2 BLD: 35 MM HG (ref 35–45)
PCO2 BLD: 35 MM HG (ref 35–45)
PH BLD: 7.4 PH (ref 7.35–7.45)
PH BLD: 7.41 PH (ref 7.35–7.45)
PH BLD: 7.45 PH (ref 7.35–7.45)
PH UR STRIP: 5.5 PH (ref 5–7)
PHOSPHATE SERPL-MCNC: 4.4 MG/DL (ref 2.5–4.5)
PHOSPHATE SERPL-MCNC: 4.8 MG/DL (ref 2.5–4.5)
PHOSPHATE SERPL-MCNC: 4.9 MG/DL (ref 2.5–4.5)
PHOSPHATE SERPL-MCNC: 5 MG/DL (ref 2.5–4.5)
PHOSPHATE SERPL-MCNC: 5.4 MG/DL (ref 2.5–4.5)
PHOSPHATE SERPL-MCNC: 5.5 MG/DL (ref 2.5–4.5)
PLATELET # BLD AUTO: 104 10E9/L (ref 150–450)
PLATELET # BLD AUTO: 104 10E9/L (ref 150–450)
PLATELET INHIBITION WITH AA: 54 %
PLATELET INHIBITION WITH ADP: 40 %
PO2 BLD: 156 MM HG (ref 80–105)
PO2 BLD: 64 MM HG (ref 80–105)
PO2 BLD: 68 MM HG (ref 80–105)
PO2 BLD: 80 MM HG (ref 80–105)
PO2 BLD: 86 MM HG (ref 80–105)
POTASSIUM SERPL-SCNC: 3.3 MMOL/L (ref 3.4–5.3)
POTASSIUM SERPL-SCNC: 3.9 MMOL/L (ref 3.4–5.3)
POTASSIUM SERPL-SCNC: 4 MMOL/L (ref 3.4–5.3)
POTASSIUM SERPL-SCNC: 4.1 MMOL/L (ref 3.4–5.3)
POTASSIUM SERPL-SCNC: 4.3 MMOL/L (ref 3.4–5.3)
POTASSIUM SERPL-SCNC: 4.5 MMOL/L (ref 3.4–5.3)
PROT SERPL-MCNC: 5.4 G/DL (ref 6.8–8.8)
PROT SERPL-MCNC: 5.8 G/DL (ref 6.8–8.8)
RBC # BLD AUTO: 2.51 10E12/L (ref 4.4–5.9)
RBC # BLD AUTO: 2.64 10E12/L (ref 4.4–5.9)
RBC #/AREA URNS AUTO: 0 /HPF (ref 0–2)
SODIUM SERPL-SCNC: 146 MMOL/L (ref 133–144)
SODIUM SERPL-SCNC: 146 MMOL/L (ref 133–144)
SODIUM SERPL-SCNC: 147 MMOL/L (ref 133–144)
SODIUM SERPL-SCNC: 148 MMOL/L (ref 133–144)
SOURCE: ABNORMAL
SP GR UR STRIP: 1.03 (ref 1–1.03)
SPECIMEN SOURCE: NORMAL
UROBILINOGEN UR STRIP-MCNC: NORMAL MG/DL (ref 0–2)
VANCOMYCIN SERPL-MCNC: 12.5 MG/L
WBC # BLD AUTO: 15.3 10E9/L (ref 4–11)
WBC # BLD AUTO: 16.2 10E9/L (ref 4–11)
WBC #/AREA URNS AUTO: 14 /HPF (ref 0–5)

## 2019-08-29 PROCEDURE — 80053 COMPREHEN METABOLIC PANEL: CPT

## 2019-08-29 PROCEDURE — 40000141 ZZH STATISTIC PERIPHERAL IV START W/O US GUIDANCE

## 2019-08-29 PROCEDURE — 82805 BLOOD GASES W/O2 SATURATION: CPT

## 2019-08-29 PROCEDURE — 87205 SMEAR GRAM STAIN: CPT | Performed by: STUDENT IN AN ORGANIZED HEALTH CARE EDUCATION/TRAINING PROGRAM

## 2019-08-29 PROCEDURE — 80048 BASIC METABOLIC PNL TOTAL CA: CPT | Performed by: STUDENT IN AN ORGANIZED HEALTH CARE EDUCATION/TRAINING PROGRAM

## 2019-08-29 PROCEDURE — 25800030 ZZH RX IP 258 OP 636: Performed by: STUDENT IN AN ORGANIZED HEALTH CARE EDUCATION/TRAINING PROGRAM

## 2019-08-29 PROCEDURE — 71045 X-RAY EXAM CHEST 1 VIEW: CPT

## 2019-08-29 PROCEDURE — 81001 URINALYSIS AUTO W/SCOPE: CPT | Performed by: STUDENT IN AN ORGANIZED HEALTH CARE EDUCATION/TRAINING PROGRAM

## 2019-08-29 PROCEDURE — 27211389 ZZ H KIT, 5 FR DL BIOFLO OPEN ENDED PICC

## 2019-08-29 PROCEDURE — 25000125 ZZHC RX 250: Performed by: STUDENT IN AN ORGANIZED HEALTH CARE EDUCATION/TRAINING PROGRAM

## 2019-08-29 PROCEDURE — 27211417 ZZ H KIT, VPS RHYTHM STYLET

## 2019-08-29 PROCEDURE — 85027 COMPLETE CBC AUTOMATED: CPT

## 2019-08-29 PROCEDURE — C9113 INJ PANTOPRAZOLE SODIUM, VIA: HCPCS | Performed by: STUDENT IN AN ORGANIZED HEALTH CARE EDUCATION/TRAINING PROGRAM

## 2019-08-29 PROCEDURE — 94640 AIRWAY INHALATION TREATMENT: CPT | Mod: 76

## 2019-08-29 PROCEDURE — 93005 ELECTROCARDIOGRAM TRACING: CPT

## 2019-08-29 PROCEDURE — 25000128 H RX IP 250 OP 636: Performed by: INTERNAL MEDICINE

## 2019-08-29 PROCEDURE — 84100 ASSAY OF PHOSPHORUS: CPT | Performed by: STUDENT IN AN ORGANIZED HEALTH CARE EDUCATION/TRAINING PROGRAM

## 2019-08-29 PROCEDURE — 87106 FUNGI IDENTIFICATION YEAST: CPT | Performed by: STUDENT IN AN ORGANIZED HEALTH CARE EDUCATION/TRAINING PROGRAM

## 2019-08-29 PROCEDURE — 99291 CRITICAL CARE FIRST HOUR: CPT | Mod: GC | Performed by: INTERNAL MEDICINE

## 2019-08-29 PROCEDURE — 40000014 ZZH STATISTIC ARTERIAL MONITORING DAILY

## 2019-08-29 PROCEDURE — 00000146 ZZHCL STATISTIC GLUCOSE BY METER IP

## 2019-08-29 PROCEDURE — 74018 RADEX ABDOMEN 1 VIEW: CPT

## 2019-08-29 PROCEDURE — 87186 SC STD MICRODIL/AGAR DIL: CPT | Performed by: STUDENT IN AN ORGANIZED HEALTH CARE EDUCATION/TRAINING PROGRAM

## 2019-08-29 PROCEDURE — 80202 ASSAY OF VANCOMYCIN: CPT

## 2019-08-29 PROCEDURE — 44500 INTRO GASTROINTESTINAL TUBE: CPT

## 2019-08-29 PROCEDURE — 40000986 XR CHEST PORT 1 VW

## 2019-08-29 PROCEDURE — 20000004 ZZH R&B ICU UMMC

## 2019-08-29 PROCEDURE — 25000128 H RX IP 250 OP 636: Performed by: STUDENT IN AN ORGANIZED HEALTH CARE EDUCATION/TRAINING PROGRAM

## 2019-08-29 PROCEDURE — 87088 URINE BACTERIA CULTURE: CPT | Performed by: STUDENT IN AN ORGANIZED HEALTH CARE EDUCATION/TRAINING PROGRAM

## 2019-08-29 PROCEDURE — 27210432 ZZH NUTRITION PRODUCT RENAL BASIC LITER

## 2019-08-29 PROCEDURE — 82803 BLOOD GASES ANY COMBINATION: CPT | Performed by: STUDENT IN AN ORGANIZED HEALTH CARE EDUCATION/TRAINING PROGRAM

## 2019-08-29 PROCEDURE — 40000275 ZZH STATISTIC RCP TIME EA 10 MIN

## 2019-08-29 PROCEDURE — 85027 COMPLETE CBC AUTOMATED: CPT | Performed by: STUDENT IN AN ORGANIZED HEALTH CARE EDUCATION/TRAINING PROGRAM

## 2019-08-29 PROCEDURE — 25800030 ZZH RX IP 258 OP 636

## 2019-08-29 PROCEDURE — 87070 CULTURE OTHR SPECIMN AEROBIC: CPT | Performed by: STUDENT IN AN ORGANIZED HEALTH CARE EDUCATION/TRAINING PROGRAM

## 2019-08-29 PROCEDURE — 83605 ASSAY OF LACTIC ACID: CPT | Performed by: STUDENT IN AN ORGANIZED HEALTH CARE EDUCATION/TRAINING PROGRAM

## 2019-08-29 PROCEDURE — 25000128 H RX IP 250 OP 636

## 2019-08-29 PROCEDURE — 83735 ASSAY OF MAGNESIUM: CPT | Performed by: STUDENT IN AN ORGANIZED HEALTH CARE EDUCATION/TRAINING PROGRAM

## 2019-08-29 PROCEDURE — 83735 ASSAY OF MAGNESIUM: CPT

## 2019-08-29 PROCEDURE — 87040 BLOOD CULTURE FOR BACTERIA: CPT

## 2019-08-29 PROCEDURE — 94003 VENT MGMT INPAT SUBQ DAY: CPT

## 2019-08-29 PROCEDURE — 25000132 ZZH RX MED GY IP 250 OP 250 PS 637

## 2019-08-29 PROCEDURE — 93010 ELECTROCARDIOGRAM REPORT: CPT | Performed by: INTERNAL MEDICINE

## 2019-08-29 PROCEDURE — 25000132 ZZH RX MED GY IP 250 OP 250 PS 637: Mod: GY | Performed by: INTERNAL MEDICINE

## 2019-08-29 PROCEDURE — 94640 AIRWAY INHALATION TREATMENT: CPT

## 2019-08-29 PROCEDURE — 36415 COLL VENOUS BLD VENIPUNCTURE: CPT

## 2019-08-29 PROCEDURE — 80053 COMPREHEN METABOLIC PANEL: CPT | Performed by: STUDENT IN AN ORGANIZED HEALTH CARE EDUCATION/TRAINING PROGRAM

## 2019-08-29 PROCEDURE — 84100 ASSAY OF PHOSPHORUS: CPT

## 2019-08-29 PROCEDURE — 40000982 ZZH STATISTICAL HAIKU-CANTO PHOTO

## 2019-08-29 PROCEDURE — 87086 URINE CULTURE/COLONY COUNT: CPT | Performed by: STUDENT IN AN ORGANIZED HEALTH CARE EDUCATION/TRAINING PROGRAM

## 2019-08-29 PROCEDURE — 36569 INSJ PICC 5 YR+ W/O IMAGING: CPT

## 2019-08-29 PROCEDURE — 82810 BLOOD GASES O2 SAT ONLY: CPT | Performed by: STUDENT IN AN ORGANIZED HEALTH CARE EDUCATION/TRAINING PROGRAM

## 2019-08-29 PROCEDURE — 85018 HEMOGLOBIN: CPT | Performed by: STUDENT IN AN ORGANIZED HEALTH CARE EDUCATION/TRAINING PROGRAM

## 2019-08-29 PROCEDURE — 95951 ZZHC EEG VIDEO EACH 24 HR: CPT | Mod: ZF

## 2019-08-29 RX ORDER — AMOXICILLIN 250 MG
1 CAPSULE ORAL AT BEDTIME
Status: DISCONTINUED | OUTPATIENT
Start: 2019-08-29 | End: 2019-09-19 | Stop reason: HOSPADM

## 2019-08-29 RX ORDER — AMINO AC/PROTEIN HYDR/WHEY PRO 10G-100/30
1 LIQUID (ML) ORAL DAILY
Status: DISCONTINUED | OUTPATIENT
Start: 2019-08-29 | End: 2019-09-11

## 2019-08-29 RX ORDER — LIDOCAINE HYDROCHLORIDE 20 MG/ML
5 SOLUTION OROPHARYNGEAL ONCE
Status: COMPLETED | OUTPATIENT
Start: 2019-08-29 | End: 2019-08-29

## 2019-08-29 RX ORDER — HEPARIN SODIUM 5000 [USP'U]/.5ML
5000 INJECTION, SOLUTION INTRAVENOUS; SUBCUTANEOUS EVERY 12 HOURS
Status: DISCONTINUED | OUTPATIENT
Start: 2019-08-29 | End: 2019-08-29

## 2019-08-29 RX ORDER — LIDOCAINE 40 MG/G
CREAM TOPICAL
Status: DISCONTINUED | OUTPATIENT
Start: 2019-08-29 | End: 2019-09-10

## 2019-08-29 RX ORDER — HEPARIN SODIUM,PORCINE 10 UNIT/ML
2-5 VIAL (ML) INTRAVENOUS
Status: COMPLETED | OUTPATIENT
Start: 2019-08-29 | End: 2019-09-09

## 2019-08-29 RX ORDER — FUROSEMIDE 10 MG/ML
40 INJECTION INTRAMUSCULAR; INTRAVENOUS ONCE
Status: COMPLETED | OUTPATIENT
Start: 2019-08-29 | End: 2019-08-29

## 2019-08-29 RX ORDER — DEXMEDETOMIDINE HYDROCHLORIDE 4 UG/ML
0.2-0.7 INJECTION, SOLUTION INTRAVENOUS CONTINUOUS
Status: DISCONTINUED | OUTPATIENT
Start: 2019-08-29 | End: 2019-09-02

## 2019-08-29 RX ORDER — POTASSIUM CHLORIDE 1500 MG/1
60 TABLET, EXTENDED RELEASE ORAL ONCE
Status: DISCONTINUED | OUTPATIENT
Start: 2019-08-29 | End: 2019-08-29

## 2019-08-29 RX ORDER — HEPARIN SODIUM,PORCINE 10 UNIT/ML
5-10 VIAL (ML) INTRAVENOUS
Status: DISCONTINUED | OUTPATIENT
Start: 2019-08-29 | End: 2019-09-19 | Stop reason: HOSPADM

## 2019-08-29 RX ORDER — HEPARIN SODIUM,PORCINE 10 UNIT/ML
5-10 VIAL (ML) INTRAVENOUS EVERY 24 HOURS
Status: DISCONTINUED | OUTPATIENT
Start: 2019-08-29 | End: 2019-09-19 | Stop reason: HOSPADM

## 2019-08-29 RX ORDER — HEPARIN SODIUM 5000 [USP'U]/.5ML
5000 INJECTION, SOLUTION INTRAVENOUS; SUBCUTANEOUS EVERY 8 HOURS SCHEDULED
Status: DISCONTINUED | OUTPATIENT
Start: 2019-08-29 | End: 2019-08-31

## 2019-08-29 RX ORDER — MIDAZOLAM (PF) 1 MG/ML IN 0.9 % SODIUM CHLORIDE INTRAVENOUS SOLUTION
1-8 CONTINUOUS
Status: DISCONTINUED | OUTPATIENT
Start: 2019-08-29 | End: 2019-09-02

## 2019-08-29 RX ORDER — POTASSIUM CHLORIDE 1.5 G/1.58G
60 POWDER, FOR SOLUTION ORAL ONCE
Status: COMPLETED | OUTPATIENT
Start: 2019-08-29 | End: 2019-08-29

## 2019-08-29 RX ADMIN — PIPERACILLIN SODIUM AND TAZOBACTAM SODIUM 2.25 G: 2; .25 INJECTION, POWDER, LYOPHILIZED, FOR SOLUTION INTRAVENOUS at 03:52

## 2019-08-29 RX ADMIN — HEPARIN SODIUM 5000 UNITS: 5000 INJECTION, SOLUTION INTRAVENOUS; SUBCUTANEOUS at 08:38

## 2019-08-29 RX ADMIN — CLOPIDOGREL BISULFATE 75 MG: 75 TABLET, FILM COATED ORAL at 08:22

## 2019-08-29 RX ADMIN — FUROSEMIDE 40 MG: 10 INJECTION, SOLUTION INTRAVENOUS at 09:16

## 2019-08-29 RX ADMIN — PIPERACILLIN SODIUM AND TAZOBACTAM SODIUM 2.25 G: 2; .25 INJECTION, POWDER, LYOPHILIZED, FOR SOLUTION INTRAVENOUS at 10:31

## 2019-08-29 RX ADMIN — IPRATROPIUM BROMIDE AND ALBUTEROL SULFATE 3 ML: .5; 3 SOLUTION RESPIRATORY (INHALATION) at 12:46

## 2019-08-29 RX ADMIN — VASOPRESSIN 2 UNITS/HR: 20 INJECTION INTRAVENOUS at 01:07

## 2019-08-29 RX ADMIN — SODIUM CHLORIDE, PRESERVATIVE FREE 10 ML: 5 INJECTION INTRAVENOUS at 12:34

## 2019-08-29 RX ADMIN — HEPARIN SODIUM 5000 UNITS: 5000 INJECTION, SOLUTION INTRAVENOUS; SUBCUTANEOUS at 14:54

## 2019-08-29 RX ADMIN — IPRATROPIUM BROMIDE AND ALBUTEROL SULFATE 3 ML: .5; 3 SOLUTION RESPIRATORY (INHALATION) at 20:04

## 2019-08-29 RX ADMIN — DEXMEDETOMIDINE 0.2 MCG/KG/HR: 100 INJECTION, SOLUTION, CONCENTRATE INTRAVENOUS at 16:10

## 2019-08-29 RX ADMIN — FENTANYL CITRATE 50 MCG/HR: 50 INJECTION INTRAVENOUS at 12:00

## 2019-08-29 RX ADMIN — MIDAZOLAM 4 MG/HR: 5 INJECTION INTRAMUSCULAR; INTRAVENOUS at 01:03

## 2019-08-29 RX ADMIN — IPRATROPIUM BROMIDE AND ALBUTEROL SULFATE 3 ML: .5; 3 SOLUTION RESPIRATORY (INHALATION) at 15:52

## 2019-08-29 RX ADMIN — HUMAN INSULIN 0.5 UNITS/HR: 100 INJECTION, SOLUTION SUBCUTANEOUS at 11:36

## 2019-08-29 RX ADMIN — SODIUM CHLORIDE 500 ML: 9 INJECTION, SOLUTION INTRAVENOUS at 23:23

## 2019-08-29 RX ADMIN — POTASSIUM CHLORIDE 60 MEQ: 1.5 POWDER, FOR SOLUTION ORAL at 06:28

## 2019-08-29 RX ADMIN — VASOPRESSIN 3.5 UNITS/HR: 20 INJECTION INTRAVENOUS at 22:40

## 2019-08-29 RX ADMIN — MIDAZOLAM 2 MG/HR: 5 INJECTION INTRAMUSCULAR; INTRAVENOUS at 12:00

## 2019-08-29 RX ADMIN — LIDOCAINE HYDROCHLORIDE 1 ML: 10 INJECTION, SOLUTION EPIDURAL; INFILTRATION; INTRACAUDAL; PERINEURAL at 09:23

## 2019-08-29 RX ADMIN — PIPERACILLIN SODIUM AND TAZOBACTAM SODIUM 2.25 G: 2; .25 INJECTION, POWDER, LYOPHILIZED, FOR SOLUTION INTRAVENOUS at 16:54

## 2019-08-29 RX ADMIN — PIPERACILLIN SODIUM AND TAZOBACTAM SODIUM 2.25 G: 2; .25 INJECTION, POWDER, LYOPHILIZED, FOR SOLUTION INTRAVENOUS at 22:06

## 2019-08-29 RX ADMIN — LIDOCAINE HYDROCHLORIDE 5 ML: 20 SOLUTION ORAL; TOPICAL at 13:33

## 2019-08-29 RX ADMIN — PANTOPRAZOLE SODIUM 40 MG: 40 INJECTION, POWDER, FOR SOLUTION INTRAVENOUS at 08:22

## 2019-08-29 RX ADMIN — IPRATROPIUM BROMIDE AND ALBUTEROL SULFATE 3 ML: .5; 3 SOLUTION RESPIRATORY (INHALATION) at 07:35

## 2019-08-29 RX ADMIN — VANCOMYCIN HYDROCHLORIDE 1500 MG: 10 INJECTION, POWDER, LYOPHILIZED, FOR SOLUTION INTRAVENOUS at 08:21

## 2019-08-29 RX ADMIN — MIDAZOLAM 1 MG: 1 INJECTION INTRAMUSCULAR; INTRAVENOUS at 09:51

## 2019-08-29 RX ADMIN — SODIUM CHLORIDE 500 ML: 9 INJECTION, SOLUTION INTRAVENOUS at 16:02

## 2019-08-29 RX ADMIN — ASPIRIN 81 MG CHEWABLE TABLET 81 MG: 81 TABLET CHEWABLE at 08:22

## 2019-08-29 RX ADMIN — FENTANYL CITRATE 50 MCG: 50 INJECTION, SOLUTION INTRAMUSCULAR; INTRAVENOUS at 09:45

## 2019-08-29 ASSESSMENT — ACTIVITIES OF DAILY LIVING (ADL)
ADLS_ACUITY_SCORE: 25

## 2019-08-29 ASSESSMENT — MIFFLIN-ST. JEOR: SCORE: 1454.37

## 2019-08-29 NOTE — PLAN OF CARE
Pt remains intubated and sedated on precedex and fent. Restless when sedation lowered and intermittently following some commands. Afebrile. HR sinus, 70-80s. MAPs 55-85. Vaso and Levo titrated as needed. 500cc bolus of NS given. Vent settings: CMV/60%/18/8. TF started today; goal rate of 50cc/hr. EEG still on. Pt received a PICC today and art line in the right radial artery. Both sheaths were pulled from the right groin. Site soft, CDI. Family updated by MD. Will continue to monitor and update as needed, Refer to flowsheets for further documentation.

## 2019-08-29 NOTE — PROCEDURES
Small Bowel Feeding Tube Placement Assessment  Reason for Feeding Tube Placement: Team request for post-pyloric feeding tube   Cortrak Start Time: 1130   Cortrak End Time: 1155   Medicine Delivered During Procedure: Lidocaine gel   Placement Successful: Presume post-pyloric (pending AXR confirmation).     Procedure Complications: None   Final Placement Antony at exit of nare 88 cm  Face to Face time with patient: 25 min     Muna Molina RD, LD  Pager: [phone]

## 2019-08-29 NOTE — PROCEDURES
- Procedure- arterial line insertion  - Indication- hypotension  - Diagnosis - hypotension     Prior to the start of the procedure and with procedural staff participation, I verbally confirmed the patient s identity using two indicators, relevant allergies, that the procedure was appropriate and matched the consent or emergent situation, and that the correct equipment/implants were available. Immediately prior to starting the procedure I conducted the Time Out with the procedural staff and re-confirmed the patient s name, procedure, and site/side.      Using sterile technique the R radial artery was prepped and plain Lidocaine 1% plain was used as local anesthetic. Under ultrasound imaging using a modified Seldinger technique the right radial artery was accessed with a micropuncture needle and a radial line was placed. The procedure was well tolerated.        No complications    Dr Garcia was present throughout all the procedure    Jenni Loera MD  Cardiology fellow

## 2019-08-29 NOTE — PROGRESS NOTES
"CLINICAL NUTRITION SERVICES - ASSESSMENT NOTE     Nutrition Prescription    Malnutrition Status:    Non-severe malnutrition in the context of chronic illness     Interventions by Registered Dietitian (RD):  - Once post-pyloric FT position verified, start Nepro @ 10 ml/hr. Adv by 10 ml q8h to goal @ 50 ml/hr + Prosource (1 pkt/day)   - Patency H2O flushes (30 ml q4h) ordered   - Certavite daily ordered     This regimen will provide 2200 kcals (28 kcal/kg), 108 g PRO (1.4 g/kg), 876 ml free H2O, 193 g CHO and 15 g Fiber daily. This meets 100% of minimum kcal and protein needs.     Future Recommendations:  If appropriate for non-renal formula, recommend Nutren 1.5 @ 60 mL/hr + Prosource (1 pkt/day) to provide 2200 kcals (28 kcal/kg/day), 108 g PRO (1.4 g/kg), 1094 mL H2O, 253 g CHO and no fiber daily.       REASON FOR ASSESSMENT  José Aguirre is a 83 year old male assessed for Provider Order - RD to Order TF per Medical Nutrition Therapy Protocol    NUTRITION HISTORY  Assess nutrition history as able. Pt intubated/sedated, now rewarmed.     CURRENT NUTRITION ORDERS  Diet: NPO x 2 days     LABS  Labs reviewed  K 4.5  Cr 1.78   Phos 5.4     MEDICATIONS  Medications reviewed  Bowel regimen     ANTHROPOMETRICS  Height: 172.7 cm (5' 7.992\")  Most Recent Weight: 78.5 kg (173 lb 1 oz)    IBW: 70 kg  BMI: Normal BMI  Weight History:   Wt Readings from Last 10 Encounters:   08/29/19 78.5 kg (173 lb 1 oz)     Dosing Weight: 79 kg (actual, based on lowest wt this admit, 78.5 kg on 8/29)     ASSESSED NUTRITION NEEDS  Estimated Energy Needs: 1975 - 2370 kcals/day (25 - 30 kcals/kg)  Justification: Maintenance  Estimated Protein Needs: 103 - 142 grams protein/day (1.3 - 1.8 grams of pro/kg)  Justification: Increased needs  Estimated Fluid Needs: 1975 - 2370 mL/day (1 mL/kcal)   Justification: Maintenance    PHYSICAL FINDINGS  See malnutrition section below.    MALNUTRITION  % Intake: Unable to assess  % Weight Loss: Unable to " assess  Subcutaneous Fat Loss: Facial region, Upper arm and Lower arm: Mild   Muscle Loss: Facial & jaw region, Upper arm, Lower arm, Upper leg, Patellar region and Posterior calf: At least moderate, suspect some degree of sarcopenia   Fluid Accumulation/Edema: None noted  Malnutrition Diagnosis: Non-severe malnutrition in the context of chronic illness     NUTRITION DIAGNOSIS  Inadequate protein-energy intake related to NPO while intubated, without nutrition support as evidenced by no nutrition x 2 days       INTERVENTIONS  Implementation  Collaboration with other providers - Discussed nutrition plan   Enteral Nutrition - Initiate     Goals  Total avg nutritional intake to meet a minimum of 25 kcal/kg and 1.3 g PRO/kg daily (per dosing wt 79 kg).     Monitoring/Evaluation  Progress toward goals will be monitored and evaluated per protocol.    Muna Molina RD, LD  Pager: [phone]

## 2019-08-29 NOTE — PROGRESS NOTES
Cardiology - [facility] Progress Note    Assessment & Plan   83 year old male who was admitted on 8/27/2019 with a cardiac arrest. 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    Plan for today  - wean sedation  - remove groin lines  - PICC line and radial a line  - wean off pressors   - wean      Neurology: Intubated, sedated, paralyzed. Cooled to 34 degrees.  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   --cooled down to 34 degrees now euthermic  - wean down sedation after groin sheath is pulled   Cardiovascular / Hemodynamics: Refractory VF arrest s/p  CONCETTA to LM-> LAD, Lcx.  LA 7-> 1.3  TTE: EF 60-70%, RV mildly reduced function no significant valvular disease  EKG: NSR RBBB   --on minimal vaso today will wean off  --continue ASA 81mg and plavix 75 mg PO QD  --hold lipitor for now given likely hepatic injury during arrest  --hold ACE/ARB for now given likely reduced renal fxn after arrest  --holding beta blocker given shock   Pulmonary:  history of COPD on 2L of O2 with exertion.   Vent Settings: Ventilation Mode: CMV/AC  (Continuous Mandatory Ventilation/ Assist Control)  FiO2 (%): 60 %  Rate Set (breaths/minute): 23 breaths/min  Tidal Volume Set (mL): 550 mL  PEEP (cm H2O): 8 cmH2O  Oxygen Concentration (%): 60 %  Resp: 23    ETT 2 cm above stacey.  Now weaning vent requirements.  CXR: Lines in stable position.  --wean vent as able  --daily CXR  Chest CT0- diffuse ground glass opacities bilateral pleural plaques and RLL cavitation with possible  emphysema and fibrosis  --Q4h ABGs for now  --on scheduled duonebs QID   GI and Nutrition: No known medical hx.  --monitor BID LFTs  --nutrition to place feeding tube today   --bowel regimen -colace  --GI Prophylaxis: PPI    Renal, Fluid and Electrolytes: Upon arrival to CICU, UOP is >50 ml/hr, dropping off this am will Monitor creatinine and UOP  --maintain K>3 and Mg>2    Infectious Disease: No signs of infection. Leukocytosis c/w arrest. Blood cultures collected.   --vancomycin/zosyn x5 days for arrest  --daily blood cultures  --monitor for signs of infection given cooling, lines, and leukocytosis   Hematology and Oncology: ASA/plavix for CONCETTA.   --SQH TID for ppx   Endocrinology: Diabetic with an A1c of 6.6 in 2017- was considered pre diabetic and this has since resolved A1c on admission 5.9  No known medical history. BG elevated.  --insulin gtt    Lines:  Thermoguard August 27 2019- to remove today  L arterial femoral line August 27 2019- removing today  ETT August 27, 2019  Mcclelland catheter August 27, 2019  Restraint: needed    Current lines are required for patient management       Family update by me today: Yes      Code Status: Full     The pt was discussed and evaluated with Dr. Radha MD, attending physician, who agrees with the assessment and plan above.     Jenni Loera    Interval History     Had some issues with oxygenation and acidotic yesterday, overnight stable on 80% Fio2 weaned down to 50% which he didn't tolerate up to 60% this am  Pressors weaned down to minimal vaso off NE   Rewarmed       Physical Exam   Temp: 98.4  F (36.9  C) Temp src: Esophageal     Heart Rate: 83 Resp: 23 SpO2: 97 % O2 Device: Mechanical Ventilator    Vitals:    08/27/19 2111 08/29/19 0000   Weight: 78.8 kg (173 lb 11.6 oz) 78.5 kg (173 lb 1 oz)     Vital Signs with Ranges  Temp:  [91.6  F (33.1  C)-98.4  F (36.9  C)] 98.4  F (36.9  C)  Heart Rate:  [60-84] 83  Resp:  [18-29] 23  MAP:  [51 mmHg-148 mmHg]  "67 mmHg  Arterial Line BP: ()/(23-67) 101/49  FiO2 (%):  [50 %-100 %] 60 %  SpO2:  [88 %-100 %] 97 %  I/O last 3 completed shifts:  In: 2305.14 [I.V.:2075.14; NG/GT:230]  Out: 1309 [Urine:739; Emesis/NG output:570]    Heart Rate: 83, Temperature 98.4  F (36.9  C), resp. rate 23, height 1.727 m (5' 7.99\"), weight 78.5 kg (173 lb 1 oz), SpO2 97 %.  173 lbs .98 oz  GEN:  Intubated sedated intermittent jerking towards his midline   CV:  Regular rate and rhythm, no murmur or JVD.  S1 + S2 noted, no S3 or S4.  LUNGS:  Mechanically ventilated with decreased breath sounds   ABD:  Active bowel sounds, soft, non-tender/non-distended.  No rebound/guarding/rigidity.  EXT:  No edema or cyanosis. Minimal doppler PT on the R slightly improved from yesterday      Medications     IV fluid REPLACEMENT ONLY       fentaNYL 25 mcg/hr (08/29/19 0700)     insulin (regular) 1 Units/hr (08/29/19 0700)     - MEDICATION INSTRUCTIONS -       - MEDICATION INSTRUCTIONS -       midazolam 1 mg/hr (08/29/19 0700)     norepinephrine Stopped (08/29/19 0530)     Percutaneous Coronary Intervention orders placed (this is information for BPA alerting)       ACE/ARB/ARNI NOT PRESCRIBED       BETA BLOCKER NOT PRESCRIBED       vasopressin (PITRESSIN) infusion ADULT (40 mL) 0.5 Units/hr (08/29/19 0700)       aspirin  81 mg Oral Daily     clopidogrel  75 mg Oral Daily     ipratropium - albuterol 0.5 mg/2.5 mg/3 mL  3 mL Nebulization 4x daily     pantoprazole (PROTONIX) IV  40 mg Intravenous Daily with breakfast     piperacillin-tazobactam  2.25 g Intravenous Q6H     vancomycin (VANCOCIN) IV  1,500 mg Intravenous Once     vancomycin place kelly - receiving intermittent dosing  1 each Does not apply See Admin Instructions       Data   Recent Labs   Lab 08/29/19  0400 08/28/19  2350 08/28/19  1617 08/28/19  1357  08/28/19  0413  08/27/19 2053   WBC 15.3*  --  21.6*  --   --  27.4*  --  22.9*   HGB 8.1*  --  8.9*  --   --  9.9*  --  11.9*     --  " 101*  --   --  103*  --  104*   *  --  129*  --   --  211  --  197   INR  --   --   --  1.46*  --  1.55*  --  1.75*   * 146* 149*  --    < > 146*  --  137   POTASSIUM 3.3* 3.9 4.0  --    < > 4.1  --  4.4   CHLORIDE 114* 114* 115*  --    < > 117*  --  106   CO2 21 24 25  --    < > 20  --  20   BUN 34* 34* 34*  --    < > 33*  --  31*   CR 1.78* 1.70* 1.83*  --    < > 1.84*  --  1.71*   ANIONGAP 12 8 10  --    < > 9  --  11   YOON 7.6* 7.4* 7.4*  --    < > 7.6*  --  7.5*   * 123* 133*  --    < > 123*  --  204*   ALBUMIN 2.3*  --  2.4*  --   --  2.4*   < >  --    PROTTOTAL 5.4*  --  5.5*  --   --  5.8*   < >  --    BILITOTAL 0.8  --  0.8  --   --  1.1   < >  --    ALKPHOS 56  --  57  --   --  65   < >  --    ALT 54  --  62  --   --  69   < >  --    AST 67*  --  65*  --   --  78*   < >  --     < > = values in this interval not displayed.       No results found for this or any previous visit (from the past 24 hour(s)).      Critical Care Services Progress Note:     José Aguirre remains critically ill with acute coronary syndrome, cardiac arrhythmia, severe respiratory distress and shock     I personally examined and evaluated the patient today.   The patient s prognosis today is grave  I have evaluated all laboratory values and imaging studies from the past 24 hours.  Key findings and decisions made today included   Plan for today  - wean sedation  - remove groin lines  - PICC line and radial a line  - wean off pressors   - wean   I personally managed the ventilator, sedation, pain control and analgesia, metabolic abnormalities, nutritional status and vasoactive medications.   Consults ongoing and ordered are none  Procedures that will happen today are: none  All treatments were placed at my direction.  I formulated today s plan with the house staff team or resident(s) and agree with the findings and plan in the associated note.       The above plans and care have been discussed with no one and all  questions and concerns were addressed.     I spent a total of 30 minutes (excluding procedure time) personally providing and directing critical care services at the bedside and on the critical care unit for José Aguirre.        Andrew Garcia MD

## 2019-08-29 NOTE — PROGRESS NOTES
EEG CLINICAL NEUROPHYSIOLOGY PRELIMINARY REPORT    Video-EEG through 0600 today reviewed. Midazolam and fenantyl reduced. Warmed overnite. Continues with moderate to severe diffuse encephalopathy. No epileptiform discharges or seizures as midazolam reduced and hypothermia lifted. Will continue video-EEG monitoring. Full report to follow.    Alexander Estrada MD  Pager [phone]

## 2019-08-29 NOTE — PLAN OF CARE
Neuro- Opening eyes to voice this AM. Weaning down versed/fentanyl; tolerating same well. Spontaneously moves extremities x4. PERRL, sluggish.  CV- SR w/ BBB, 70s. Norepi and vaso for MAP>65. Able to titrate off norepi and coming down on vaso this AM.   Pulm- Remains on vent, CMV settings. Weaning down O2 as saturations tolerate.  GI- Hypoactive BS. Insulin titrated for BG.   - Adequate UOP per silva.   Skin- Repositioning A8wseav. Tolerating same.   See flow sheets for further interventions and assessments. Continuing to monitor.       Problem: Adult Inpatient Plan of Care  Goal: Plan of Care Review  Outcome: Improving  Goal: Patient-Specific Goal (Individualization)  Outcome: Improving  Goal: Absence of Hospital-Acquired Illness or Injury  Outcome: Improving  Goal: Optimal Comfort and Wellbeing  Outcome: Improving  Goal: Readiness for Transition of Care  Outcome: Improving  Goal: Rounds/Family Conference  Outcome: Improving     Problem: Adjustment to Illness (Acute Coronary Syndrome)  Goal: Optimal Adaptation to Illness  Outcome: Improving     Problem: Arrhythmia (Acute Coronary Syndrome)  Goal: Normalized Cardiac Rhythm  Outcome: Improving     Problem: Pain (Acute Coronary Syndrome)  Goal: Absence of Cardiac-Related Pain  Outcome: Improving     Problem: Hemodynamic Instability (Acute Coronary Syndrome)  Goal: Effective Cardiac Pump Function  Outcome: Improving     Problem: Tissue Perfusion (Acute Coronary Syndrome)  Goal: Adequate Tissue Perfusion  Outcome: Improving

## 2019-08-29 NOTE — PHARMACY-VANCOMYCIN DOSING SERVICE
Pharmacy Vancomycin Note  Date of Service 2019  Patient's  1935   83 year old, male    Indication: Sepsis and Ventilator-Associated Pneumonia  Goal Trough Level: 15-20 mg/L  Day of Therapy: 2  Current Vancomycin regimen:  intermittent based on levels    Current estimated CrCl = Estimated Creatinine Clearance: 34.9 mL/min (A) (based on SCr of 1.78 mg/dL (H)).    Creatinine for last 3 days  2019:  8:53 PM Creatinine 1.71 mg/dL  2019:  4:13 AM Creatinine 1.84 mg/dL; 11:57 AM Creatinine 1.90 mg/dL;  4:17 PM Creatinine 1.83 mg/dL; 11:50 PM Creatinine 1.70 mg/dL  2019:  4:00 AM Creatinine 1.78 mg/dL    Recent Vancomycin Levels (past 3 days)  2019:  4:00 AM Vancomycin Level 12.5 mg/L    Vancomycin IV Administrations (past 72 hours)                   vancomycin (VANCOCIN) 1,750 mg in sodium chloride 0.9 % 500 mL intermittent infusion (mg) 1,750 mg New Bag 19 2313                Nephrotoxins and other renal medications (From now, onward)    Start     Dose/Rate Route Frequency Ordered Stop    19 0700  vancomycin 1500 mg in 0.9% NaCl 250 ml intermittent infusion 1,500 mg      1,500 mg  over 90 Minutes Intravenous ONCE 19 0656      19 1000  piperacillin-tazobactam (ZOSYN) 2.25 g vial to attach to  ml bag      2.25 g  over 30 Minutes Intravenous EVERY 6 HOURS 19 0705      19 0515  vasopressin (VASOSTRICT) 40 Units in D5W 40 mL infusion      0.5-4 Units/hr  0.5-4 mL/hr  Intravenous CONTINUOUS 19 0504      19 2300  norepinephrine (LEVOPHED) 16 mg in  mL infusion      0.03-0.4 mcg/kg/min × 78.8 kg (Dosing Weight)  2.2-29.6 mL/hr  Intravenous CONTINUOUS 19 22519  vancomycin place kelly - receiving intermittent dosing      1 each Does not apply SEE ADMIN INSTRUCTIONS 19               Contrast Orders - past 72 hours (72h ago, onward)    Start     Dose/Rate Route Frequency Ordered Stop    19 2152   perflutren diluted 1mL to 2mL with saline (OPTISON) diluted injection 5 mL      5 mL Intravenous ONCE 08/28/19 0832 08/28/19 0845    08/27/19 2001  iopamidol (ISOVUE-370) solution  Status:  Discontinued        ONCE PRN 08/27/19 2002 08/27/19 2046          Interpretation of levels and current regimen:  Random level is slighlty Subtherapeutic    Has serum creatinine changed > 50% in last 72 hours: No    Urine output:  diminished urine output    Renal Function: stable but poor    Plan:  1.  Re-dose 1500 mg IV once (~20 mg/kg)  2.  Pharmacy will check trough levels as appropriate in 1-3 Days.    3. Serum creatinine levels will be ordered daily for the first week of therapy and at least twice weekly for subsequent weeks.      Breanna Daly RPH        .

## 2019-08-29 NOTE — PROCEDURES
Bridle Placement:   Reason for bridle placement: Securement of feeding tube    Medicine delivered during procedure: lidocaine gel   Procedure: Successful  Location of top of clip on FT: @ 89 cm marker   Condition of nose/skin at time of bridle placement: Unremarkable   Face to Face time with patient: < 5 minutes.    Muna Molina RD, LD  Pager: [phone]

## 2019-08-29 NOTE — PHARMACY-MEDICATION REGIMEN REVIEW
Pharmacy Tube Feeding Consult    Medication reviewed for administration by feeding tube and for potential food/drug interactions.    Recommendation: No changes are needed at this time.     Pharmacy will continue to follow as new medications are ordered.    Shona Poon, PharmD  August 29, 2019

## 2019-08-30 ENCOUNTER — APPOINTMENT (OUTPATIENT)
Dept: ULTRASOUND IMAGING | Facility: CLINIC | Age: 84
DRG: 224 | End: 2019-08-30
Attending: STUDENT IN AN ORGANIZED HEALTH CARE EDUCATION/TRAINING PROGRAM
Payer: MEDICARE

## 2019-08-30 ENCOUNTER — APPOINTMENT (OUTPATIENT)
Dept: GENERAL RADIOLOGY | Facility: CLINIC | Age: 84
DRG: 224 | End: 2019-08-30
Attending: STUDENT IN AN ORGANIZED HEALTH CARE EDUCATION/TRAINING PROGRAM
Payer: MEDICARE

## 2019-08-30 ENCOUNTER — ALLIED HEALTH/NURSE VISIT (OUTPATIENT)
Dept: NEUROLOGY | Facility: CLINIC | Age: 84
DRG: 224 | End: 2019-08-30
Attending: PSYCHIATRY & NEUROLOGY
Payer: MEDICARE

## 2019-08-30 DIAGNOSIS — I46.9 CARDIAC ARREST (H): Primary | ICD-10-CM

## 2019-08-30 LAB
ABO + RH BLD: NORMAL
ABO + RH BLD: NORMAL
ALBUMIN SERPL-MCNC: 2.2 G/DL (ref 3.4–5)
ALBUMIN SERPL-MCNC: 2.2 G/DL (ref 3.4–5)
ALP SERPL-CCNC: 56 U/L (ref 40–150)
ALP SERPL-CCNC: 60 U/L (ref 40–150)
ALT SERPL W P-5'-P-CCNC: 52 U/L (ref 0–70)
ALT SERPL W P-5'-P-CCNC: 53 U/L (ref 0–70)
ANION GAP SERPL CALCULATED.3IONS-SCNC: 6 MMOL/L (ref 3–14)
ANION GAP SERPL CALCULATED.3IONS-SCNC: 7 MMOL/L (ref 3–14)
ANION GAP SERPL CALCULATED.3IONS-SCNC: 8 MMOL/L (ref 3–14)
AST SERPL W P-5'-P-CCNC: 86 U/L (ref 0–45)
AST SERPL W P-5'-P-CCNC: 89 U/L (ref 0–45)
BASE DEFICIT BLDA-SCNC: 1.5 MMOL/L
BASE DEFICIT BLDA-SCNC: 1.9 MMOL/L
BASE DEFICIT BLDA-SCNC: 2.4 MMOL/L
BASE DEFICIT BLDA-SCNC: 2.5 MMOL/L
BILIRUB SERPL-MCNC: 0.6 MG/DL (ref 0.2–1.3)
BILIRUB SERPL-MCNC: 1 MG/DL (ref 0.2–1.3)
BLD GP AB SCN SERPL QL: NORMAL
BLD PROD TYP BPU: NORMAL
BLOOD BANK CMNT PATIENT-IMP: NORMAL
BUN SERPL-MCNC: 34 MG/DL (ref 7–30)
BUN SERPL-MCNC: 35 MG/DL (ref 7–30)
BUN SERPL-MCNC: 36 MG/DL (ref 7–30)
BUN SERPL-MCNC: 36 MG/DL (ref 7–30)
BUN SERPL-MCNC: 37 MG/DL (ref 7–30)
BUN SERPL-MCNC: 37 MG/DL (ref 7–30)
CALCIUM SERPL-MCNC: 7.1 MG/DL (ref 8.5–10.1)
CALCIUM SERPL-MCNC: 7.5 MG/DL (ref 8.5–10.1)
CALCIUM SERPL-MCNC: 7.6 MG/DL (ref 8.5–10.1)
CALCIUM SERPL-MCNC: 7.8 MG/DL (ref 8.5–10.1)
CALCIUM SERPL-MCNC: 7.9 MG/DL (ref 8.5–10.1)
CALCIUM SERPL-MCNC: 8 MG/DL (ref 8.5–10.1)
CHLORIDE SERPL-SCNC: 116 MMOL/L (ref 94–109)
CHLORIDE SERPL-SCNC: 119 MMOL/L (ref 94–109)
CHLORIDE SERPL-SCNC: 120 MMOL/L (ref 94–109)
CHLORIDE SERPL-SCNC: 120 MMOL/L (ref 94–109)
CO2 SERPL-SCNC: 22 MMOL/L (ref 20–32)
CO2 SERPL-SCNC: 23 MMOL/L (ref 20–32)
CO2 SERPL-SCNC: 23 MMOL/L (ref 20–32)
CORTIS SERPL-MCNC: 32 UG/DL (ref 4–22)
CREAT SERPL-MCNC: 1.35 MG/DL (ref 0.66–1.25)
CREAT SERPL-MCNC: 1.44 MG/DL (ref 0.66–1.25)
CREAT SERPL-MCNC: 1.53 MG/DL (ref 0.66–1.25)
CREAT SERPL-MCNC: 1.65 MG/DL (ref 0.66–1.25)
CREAT SERPL-MCNC: 1.71 MG/DL (ref 0.66–1.25)
CREAT SERPL-MCNC: 1.72 MG/DL (ref 0.66–1.25)
ERYTHROCYTE [DISTWIDTH] IN BLOOD BY AUTOMATED COUNT: 17.4 % (ref 10–15)
ERYTHROCYTE [DISTWIDTH] IN BLOOD BY AUTOMATED COUNT: 19.9 % (ref 10–15)
ERYTHROCYTE [DISTWIDTH] IN BLOOD BY AUTOMATED COUNT: 20 % (ref 10–15)
GFR SERPL CREATININE-BSD FRML MDRD: 36 ML/MIN/{1.73_M2}
GFR SERPL CREATININE-BSD FRML MDRD: 36 ML/MIN/{1.73_M2}
GFR SERPL CREATININE-BSD FRML MDRD: 38 ML/MIN/{1.73_M2}
GFR SERPL CREATININE-BSD FRML MDRD: 41 ML/MIN/{1.73_M2}
GFR SERPL CREATININE-BSD FRML MDRD: 44 ML/MIN/{1.73_M2}
GFR SERPL CREATININE-BSD FRML MDRD: 48 ML/MIN/{1.73_M2}
GLUCOSE BLDC GLUCOMTR-MCNC: 146 MG/DL (ref 70–99)
GLUCOSE BLDC GLUCOMTR-MCNC: 148 MG/DL (ref 70–99)
GLUCOSE BLDC GLUCOMTR-MCNC: 152 MG/DL (ref 70–99)
GLUCOSE BLDC GLUCOMTR-MCNC: 154 MG/DL (ref 70–99)
GLUCOSE BLDC GLUCOMTR-MCNC: 162 MG/DL (ref 70–99)
GLUCOSE BLDC GLUCOMTR-MCNC: 163 MG/DL (ref 70–99)
GLUCOSE BLDC GLUCOMTR-MCNC: 167 MG/DL (ref 70–99)
GLUCOSE BLDC GLUCOMTR-MCNC: 168 MG/DL (ref 70–99)
GLUCOSE BLDC GLUCOMTR-MCNC: 168 MG/DL (ref 70–99)
GLUCOSE BLDC GLUCOMTR-MCNC: 183 MG/DL (ref 70–99)
GLUCOSE BLDC GLUCOMTR-MCNC: 193 MG/DL (ref 70–99)
GLUCOSE BLDC GLUCOMTR-MCNC: 193 MG/DL (ref 70–99)
GLUCOSE SERPL-MCNC: 166 MG/DL (ref 70–99)
GLUCOSE SERPL-MCNC: 173 MG/DL (ref 70–99)
GLUCOSE SERPL-MCNC: 175 MG/DL (ref 70–99)
GLUCOSE SERPL-MCNC: 199 MG/DL (ref 70–99)
HCO3 BLD-SCNC: 22 MMOL/L (ref 21–28)
HCO3 BLD-SCNC: 23 MMOL/L (ref 21–28)
HCT VFR BLD AUTO: 24.1 % (ref 40–53)
HCT VFR BLD AUTO: 26.4 % (ref 40–53)
HCT VFR BLD AUTO: 26.8 % (ref 40–53)
HGB BLD-MCNC: 6.8 G/DL (ref 13.3–17.7)
HGB BLD-MCNC: 7.1 G/DL (ref 13.3–17.7)
HGB BLD-MCNC: 7.8 G/DL (ref 13.3–17.7)
HGB BLD-MCNC: 8 G/DL (ref 13.3–17.7)
LACTATE BLD-SCNC: 1.4 MMOL/L (ref 0.7–2)
LACTATE BLD-SCNC: 1.5 MMOL/L (ref 0.7–2)
MAGNESIUM SERPL-MCNC: 2.4 MG/DL (ref 1.6–2.3)
MAGNESIUM SERPL-MCNC: 2.4 MG/DL (ref 1.6–2.3)
MAGNESIUM SERPL-MCNC: 2.5 MG/DL (ref 1.6–2.3)
MAGNESIUM SERPL-MCNC: 2.5 MG/DL (ref 1.6–2.3)
MAGNESIUM SERPL-MCNC: 2.6 MG/DL (ref 1.6–2.3)
MAGNESIUM SERPL-MCNC: 2.6 MG/DL (ref 1.6–2.3)
MCH RBC QN AUTO: 29.8 PG (ref 26.5–33)
MCH RBC QN AUTO: 30 PG (ref 26.5–33)
MCH RBC QN AUTO: 30.6 PG (ref 26.5–33)
MCHC RBC AUTO-ENTMCNC: 29.5 G/DL (ref 31.5–36.5)
MCHC RBC AUTO-ENTMCNC: 29.5 G/DL (ref 31.5–36.5)
MCHC RBC AUTO-ENTMCNC: 29.9 G/DL (ref 31.5–36.5)
MCV RBC AUTO: 100 FL (ref 78–100)
MCV RBC AUTO: 101 FL (ref 78–100)
MCV RBC AUTO: 104 FL (ref 78–100)
NUM BPU REQUESTED: 1
O2/TOTAL GAS SETTING VFR VENT: 50 %
O2/TOTAL GAS SETTING VFR VENT: 60 %
PCO2 BLD: 33 MM HG (ref 35–45)
PCO2 BLD: 34 MM HG (ref 35–45)
PCO2 BLD: 35 MM HG (ref 35–45)
PH BLD: 7.41 PH (ref 7.35–7.45)
PH BLD: 7.42 PH (ref 7.35–7.45)
PH BLD: 7.42 PH (ref 7.35–7.45)
PH BLD: 7.43 PH (ref 7.35–7.45)
PH BLD: 7.43 PH (ref 7.35–7.45)
PHOSPHATE SERPL-MCNC: 2.5 MG/DL (ref 2.5–4.5)
PHOSPHATE SERPL-MCNC: 2.8 MG/DL (ref 2.5–4.5)
PHOSPHATE SERPL-MCNC: 3 MG/DL (ref 2.5–4.5)
PHOSPHATE SERPL-MCNC: 3.3 MG/DL (ref 2.5–4.5)
PHOSPHATE SERPL-MCNC: 3.5 MG/DL (ref 2.5–4.5)
PHOSPHATE SERPL-MCNC: 3.8 MG/DL (ref 2.5–4.5)
PLATELET # BLD AUTO: 104 10E9/L (ref 150–450)
PLATELET # BLD AUTO: 72 10E9/L (ref 150–450)
PLATELET # BLD AUTO: 80 10E9/L (ref 150–450)
PO2 BLD: 66 MM HG (ref 80–105)
PO2 BLD: 71 MM HG (ref 80–105)
PO2 BLD: 73 MM HG (ref 80–105)
PO2 BLD: 76 MM HG (ref 80–105)
PO2 BLD: 80 MM HG (ref 80–105)
POTASSIUM SERPL-SCNC: 3.9 MMOL/L (ref 3.4–5.3)
POTASSIUM SERPL-SCNC: 4 MMOL/L (ref 3.4–5.3)
POTASSIUM SERPL-SCNC: 4.6 MMOL/L (ref 3.4–5.3)
PROT SERPL-MCNC: 5.5 G/DL (ref 6.8–8.8)
PROT SERPL-MCNC: 5.9 G/DL (ref 6.8–8.8)
RBC # BLD AUTO: 2.32 10E12/L (ref 4.4–5.9)
RBC # BLD AUTO: 2.62 10E12/L (ref 4.4–5.9)
RBC # BLD AUTO: 2.67 10E12/L (ref 4.4–5.9)
SODIUM SERPL-SCNC: 146 MMOL/L (ref 133–144)
SODIUM SERPL-SCNC: 148 MMOL/L (ref 133–144)
SODIUM SERPL-SCNC: 149 MMOL/L (ref 133–144)
SPECIMEN EXP DATE BLD: NORMAL
VANCOMYCIN SERPL-MCNC: 10.1 MG/L
WBC # BLD AUTO: 14.6 10E9/L (ref 4–11)
WBC # BLD AUTO: 9 10E9/L (ref 4–11)
WBC # BLD AUTO: 9.4 10E9/L (ref 4–11)

## 2019-08-30 PROCEDURE — 85027 COMPLETE CBC AUTOMATED: CPT | Performed by: STUDENT IN AN ORGANIZED HEALTH CARE EDUCATION/TRAINING PROGRAM

## 2019-08-30 PROCEDURE — 99291 CRITICAL CARE FIRST HOUR: CPT | Mod: GC | Performed by: INTERNAL MEDICINE

## 2019-08-30 PROCEDURE — 80048 BASIC METABOLIC PNL TOTAL CA: CPT | Performed by: STUDENT IN AN ORGANIZED HEALTH CARE EDUCATION/TRAINING PROGRAM

## 2019-08-30 PROCEDURE — 82803 BLOOD GASES ANY COMBINATION: CPT

## 2019-08-30 PROCEDURE — 94003 VENT MGMT INPAT SUBQ DAY: CPT

## 2019-08-30 PROCEDURE — 27210432 ZZH NUTRITION PRODUCT RENAL BASIC LITER

## 2019-08-30 PROCEDURE — P9016 RBC LEUKOCYTES REDUCED: HCPCS | Performed by: STUDENT IN AN ORGANIZED HEALTH CARE EDUCATION/TRAINING PROGRAM

## 2019-08-30 PROCEDURE — 00000146 ZZHCL STATISTIC GLUCOSE BY METER IP

## 2019-08-30 PROCEDURE — 25000125 ZZHC RX 250: Performed by: STUDENT IN AN ORGANIZED HEALTH CARE EDUCATION/TRAINING PROGRAM

## 2019-08-30 PROCEDURE — 84100 ASSAY OF PHOSPHORUS: CPT | Performed by: STUDENT IN AN ORGANIZED HEALTH CARE EDUCATION/TRAINING PROGRAM

## 2019-08-30 PROCEDURE — 25000128 H RX IP 250 OP 636: Performed by: INTERNAL MEDICINE

## 2019-08-30 PROCEDURE — 95951 ZZHC EEG VIDEO < 12 HR: CPT | Mod: 52,ZF

## 2019-08-30 PROCEDURE — 94640 AIRWAY INHALATION TREATMENT: CPT | Mod: 76

## 2019-08-30 PROCEDURE — 83735 ASSAY OF MAGNESIUM: CPT | Performed by: STUDENT IN AN ORGANIZED HEALTH CARE EDUCATION/TRAINING PROGRAM

## 2019-08-30 PROCEDURE — 20000004 ZZH R&B ICU UMMC

## 2019-08-30 PROCEDURE — 93926 LOWER EXTREMITY STUDY: CPT | Mod: RT

## 2019-08-30 PROCEDURE — 80202 ASSAY OF VANCOMYCIN: CPT | Performed by: STUDENT IN AN ORGANIZED HEALTH CARE EDUCATION/TRAINING PROGRAM

## 2019-08-30 PROCEDURE — 94640 AIRWAY INHALATION TREATMENT: CPT

## 2019-08-30 PROCEDURE — 71045 X-RAY EXAM CHEST 1 VIEW: CPT

## 2019-08-30 PROCEDURE — 25000128 H RX IP 250 OP 636: Performed by: STUDENT IN AN ORGANIZED HEALTH CARE EDUCATION/TRAINING PROGRAM

## 2019-08-30 PROCEDURE — 40000275 ZZH STATISTIC RCP TIME EA 10 MIN

## 2019-08-30 PROCEDURE — C9113 INJ PANTOPRAZOLE SODIUM, VIA: HCPCS | Performed by: STUDENT IN AN ORGANIZED HEALTH CARE EDUCATION/TRAINING PROGRAM

## 2019-08-30 PROCEDURE — 80053 COMPREHEN METABOLIC PANEL: CPT | Performed by: STUDENT IN AN ORGANIZED HEALTH CARE EDUCATION/TRAINING PROGRAM

## 2019-08-30 PROCEDURE — 25800030 ZZH RX IP 258 OP 636: Performed by: STUDENT IN AN ORGANIZED HEALTH CARE EDUCATION/TRAINING PROGRAM

## 2019-08-30 PROCEDURE — 82803 BLOOD GASES ANY COMBINATION: CPT | Performed by: STUDENT IN AN ORGANIZED HEALTH CARE EDUCATION/TRAINING PROGRAM

## 2019-08-30 PROCEDURE — 85018 HEMOGLOBIN: CPT | Performed by: STUDENT IN AN ORGANIZED HEALTH CARE EDUCATION/TRAINING PROGRAM

## 2019-08-30 PROCEDURE — 25000132 ZZH RX MED GY IP 250 OP 250 PS 637: Mod: GY | Performed by: INTERNAL MEDICINE

## 2019-08-30 PROCEDURE — 25000128 H RX IP 250 OP 636

## 2019-08-30 PROCEDURE — 83605 ASSAY OF LACTIC ACID: CPT | Performed by: STUDENT IN AN ORGANIZED HEALTH CARE EDUCATION/TRAINING PROGRAM

## 2019-08-30 PROCEDURE — 82533 TOTAL CORTISOL: CPT | Performed by: STUDENT IN AN ORGANIZED HEALTH CARE EDUCATION/TRAINING PROGRAM

## 2019-08-30 PROCEDURE — 40000014 ZZH STATISTIC ARTERIAL MONITORING DAILY

## 2019-08-30 PROCEDURE — 25000132 ZZH RX MED GY IP 250 OP 250 PS 637: Mod: GY | Performed by: STUDENT IN AN ORGANIZED HEALTH CARE EDUCATION/TRAINING PROGRAM

## 2019-08-30 RX ORDER — POTASSIUM CHLORIDE 750 MG/1
20-40 TABLET, EXTENDED RELEASE ORAL
Status: DISCONTINUED | OUTPATIENT
Start: 2019-08-30 | End: 2019-09-19 | Stop reason: HOSPADM

## 2019-08-30 RX ORDER — POTASSIUM CHLORIDE 29.8 MG/ML
20 INJECTION INTRAVENOUS
Status: DISCONTINUED | OUTPATIENT
Start: 2019-08-30 | End: 2019-09-19 | Stop reason: HOSPADM

## 2019-08-30 RX ORDER — MAGNESIUM SULFATE HEPTAHYDRATE 40 MG/ML
4 INJECTION, SOLUTION INTRAVENOUS EVERY 4 HOURS PRN
Status: DISCONTINUED | OUTPATIENT
Start: 2019-08-30 | End: 2019-09-19 | Stop reason: HOSPADM

## 2019-08-30 RX ORDER — POTASSIUM CL/LIDO/0.9 % NACL 10MEQ/0.1L
10 INTRAVENOUS SOLUTION, PIGGYBACK (ML) INTRAVENOUS
Status: DISCONTINUED | OUTPATIENT
Start: 2019-08-30 | End: 2019-09-19 | Stop reason: HOSPADM

## 2019-08-30 RX ORDER — POTASSIUM CHLORIDE 1.5 G/1.58G
20-40 POWDER, FOR SOLUTION ORAL
Status: DISCONTINUED | OUTPATIENT
Start: 2019-08-30 | End: 2019-09-19 | Stop reason: HOSPADM

## 2019-08-30 RX ORDER — POTASSIUM CHLORIDE 7.45 MG/ML
10 INJECTION INTRAVENOUS
Status: DISCONTINUED | OUTPATIENT
Start: 2019-08-30 | End: 2019-09-19 | Stop reason: HOSPADM

## 2019-08-30 RX ADMIN — ASPIRIN 81 MG CHEWABLE TABLET 81 MG: 81 TABLET CHEWABLE at 07:48

## 2019-08-30 RX ADMIN — FENTANYL CITRATE 50 MCG: 50 INJECTION, SOLUTION INTRAMUSCULAR; INTRAVENOUS at 14:50

## 2019-08-30 RX ADMIN — HYDROCORTISONE SODIUM SUCCINATE 50 MG: 100 INJECTION, POWDER, FOR SOLUTION INTRAMUSCULAR; INTRAVENOUS at 14:23

## 2019-08-30 RX ADMIN — IPRATROPIUM BROMIDE AND ALBUTEROL SULFATE 3 ML: .5; 3 SOLUTION RESPIRATORY (INHALATION) at 08:06

## 2019-08-30 RX ADMIN — HEPARIN SODIUM 5000 UNITS: 5000 INJECTION, SOLUTION INTRAVENOUS; SUBCUTANEOUS at 16:16

## 2019-08-30 RX ADMIN — HYDROCORTISONE SODIUM SUCCINATE 50 MG: 100 INJECTION, POWDER, FOR SOLUTION INTRAMUSCULAR; INTRAVENOUS at 18:41

## 2019-08-30 RX ADMIN — POTASSIUM PHOSPHATE, MONOBASIC AND POTASSIUM PHOSPHATE, DIBASIC 10 MMOL: 224; 236 INJECTION, SOLUTION INTRAVENOUS at 17:39

## 2019-08-30 RX ADMIN — PIPERACILLIN SODIUM AND TAZOBACTAM SODIUM 2.25 G: 2; .25 INJECTION, POWDER, LYOPHILIZED, FOR SOLUTION INTRAVENOUS at 16:17

## 2019-08-30 RX ADMIN — HUMAN INSULIN 2 UNITS/HR: 100 INJECTION, SOLUTION SUBCUTANEOUS at 17:07

## 2019-08-30 RX ADMIN — FENTANYL CITRATE 25 MCG: 50 INJECTION, SOLUTION INTRAMUSCULAR; INTRAVENOUS at 10:41

## 2019-08-30 RX ADMIN — PANTOPRAZOLE SODIUM 40 MG: 40 INJECTION, POWDER, FOR SOLUTION INTRAVENOUS at 07:49

## 2019-08-30 RX ADMIN — IPRATROPIUM BROMIDE AND ALBUTEROL SULFATE 3 ML: .5; 3 SOLUTION RESPIRATORY (INHALATION) at 11:36

## 2019-08-30 RX ADMIN — VASOPRESSIN 2.5 UNITS/HR: 20 INJECTION INTRAVENOUS at 10:31

## 2019-08-30 RX ADMIN — DEXMEDETOMIDINE 0.5 MCG/KG/HR: 100 INJECTION, SOLUTION, CONCENTRATE INTRAVENOUS at 12:02

## 2019-08-30 RX ADMIN — PIPERACILLIN SODIUM AND TAZOBACTAM SODIUM 2.25 G: 2; .25 INJECTION, POWDER, LYOPHILIZED, FOR SOLUTION INTRAVENOUS at 21:19

## 2019-08-30 RX ADMIN — HEPARIN SODIUM 5000 UNITS: 5000 INJECTION, SOLUTION INTRAVENOUS; SUBCUTANEOUS at 07:49

## 2019-08-30 RX ADMIN — HUMAN INSULIN 2 UNITS/HR: 100 INJECTION, SOLUTION SUBCUTANEOUS at 15:11

## 2019-08-30 RX ADMIN — CLOPIDOGREL BISULFATE 75 MG: 75 TABLET, FILM COATED ORAL at 07:49

## 2019-08-30 RX ADMIN — FENTANYL CITRATE 50 MCG/HR: 50 INJECTION INTRAVENOUS at 18:57

## 2019-08-30 RX ADMIN — PIPERACILLIN SODIUM AND TAZOBACTAM SODIUM 2.25 G: 2; .25 INJECTION, POWDER, LYOPHILIZED, FOR SOLUTION INTRAVENOUS at 10:26

## 2019-08-30 RX ADMIN — POTASSIUM CHLORIDE 20 MEQ: 1.5 POWDER, FOR SOLUTION ORAL at 11:44

## 2019-08-30 RX ADMIN — MULTIVIT AND MINERALS-FERROUS GLUCONATE 9 MG IRON/15 ML ORAL LIQUID 15 ML: at 07:48

## 2019-08-30 RX ADMIN — IPRATROPIUM BROMIDE AND ALBUTEROL SULFATE 3 ML: .5; 3 SOLUTION RESPIRATORY (INHALATION) at 15:47

## 2019-08-30 RX ADMIN — SENNOSIDES AND DOCUSATE SODIUM 1 TABLET: 8.6; 5 TABLET ORAL at 21:19

## 2019-08-30 RX ADMIN — HEPARIN SODIUM 5000 UNITS: 5000 INJECTION, SOLUTION INTRAVENOUS; SUBCUTANEOUS at 00:09

## 2019-08-30 RX ADMIN — POTASSIUM CHLORIDE 20 MEQ: 1.5 POWDER, FOR SOLUTION ORAL at 20:19

## 2019-08-30 RX ADMIN — FENTANYL CITRATE 50 MCG: 50 INJECTION, SOLUTION INTRAMUSCULAR; INTRAVENOUS at 12:23

## 2019-08-30 RX ADMIN — FENTANYL CITRATE 50 MCG: 50 INJECTION, SOLUTION INTRAMUSCULAR; INTRAVENOUS at 08:45

## 2019-08-30 RX ADMIN — HYDROCORTISONE SODIUM SUCCINATE 50 MG: 100 INJECTION, POWDER, FOR SOLUTION INTRAMUSCULAR; INTRAVENOUS at 23:59

## 2019-08-30 RX ADMIN — PIPERACILLIN SODIUM AND TAZOBACTAM SODIUM 2.25 G: 2; .25 INJECTION, POWDER, LYOPHILIZED, FOR SOLUTION INTRAVENOUS at 05:33

## 2019-08-30 RX ADMIN — Medication 1 PACKET: at 07:49

## 2019-08-30 RX ADMIN — FENTANYL CITRATE 50 MCG: 50 INJECTION, SOLUTION INTRAMUSCULAR; INTRAVENOUS at 13:14

## 2019-08-30 RX ADMIN — HEPARIN SODIUM 5000 UNITS: 5000 INJECTION, SOLUTION INTRAVENOUS; SUBCUTANEOUS at 23:59

## 2019-08-30 RX ADMIN — VASOPRESSIN 1 UNITS/HR: 20 INJECTION INTRAVENOUS at 19:54

## 2019-08-30 RX ADMIN — IPRATROPIUM BROMIDE AND ALBUTEROL SULFATE 3 ML: .5; 3 SOLUTION RESPIRATORY (INHALATION) at 19:44

## 2019-08-30 RX ADMIN — FENTANYL CITRATE 25 MCG: 50 INJECTION, SOLUTION INTRAMUSCULAR; INTRAVENOUS at 08:15

## 2019-08-30 RX ADMIN — DEXMEDETOMIDINE 0.7 MCG/KG/HR: 100 INJECTION, SOLUTION, CONCENTRATE INTRAVENOUS at 19:53

## 2019-08-30 ASSESSMENT — ACTIVITIES OF DAILY LIVING (ADL)
ADLS_ACUITY_SCORE: 25
ADLS_ACUITY_SCORE: 23
ADLS_ACUITY_SCORE: 25
ADLS_ACUITY_SCORE: 23
ADLS_ACUITY_SCORE: 23
ADLS_ACUITY_SCORE: 25

## 2019-08-30 NOTE — PROGRESS NOTES
Cardiology - [facility] Progress Note    Assessment & Plan   83 year old male who was admitted on 8/27/2019 with a cardiac arrest. 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    Plan for today  - wean sedation, decreasing fentanyl on precedex now  - 1u PRBC to keep hgb ~8 since dropping and on pressors  - wean off pressors   - follow micro, continue abx, if persistently requiring pressors will start stress dose steroids       Neurology: Intubated, sedated, paralyzed. Cooled to 34 degrees.  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   --cooled down to 34 degrees now euthermic   Cardiovascular / Hemodynamics: Refractory VF arrest s/p  CONCETTA to LM-> LAD, Lcx.  LA 7-> 1.3  TTE: EF 60-70%, RV mildly reduced function no significant valvular disease  EKG: NSR RBBB   --on vaso and NE with very labile bp today will wean off  --continue ASA 81mg and plavix 75 mg PO QD  --hold lipitor for now given likely hepatic injury during arrest  --hold ACE/ARB for now given likely reduced renal fxn after arrest  --holding beta blocker given shock   Pulmonary:  history of COPD on 2L of O2 with exertion.   Vent Settings: Ventilation Mode: CMV/AC  (Continuous Mandatory Ventilation/ Assist Control)  FiO2 (%): 60 %  Rate Set (breaths/minute): 18 breaths/min  Tidal Volume Set (mL): 500 mL  PEEP (cm H2O): 8 cmH2O  Oxygen Concentration (%): 60 %  Resp: 27    ETT 2 cm above stacey.  Now weaning vent requirements.  CXR: Lines in stable position.  --wean vent as able  --daily CXR  Chest CT0- diffuse  ground glass opacities bilateral pleural plaques and RLL cavitation with possible emphysema and fibrosis  --Q4h ABGs for now  --on scheduled duonebs QID   GI and Nutrition: No known medical hx.  --monitor BID LFTs  --postpyloric feeding  --bowel regimen -colace  --GI Prophylaxis: PPI    Renal, Fluid and Electrolytes: SCr 1.7, stable  Monitor creatinine and UOP  --maintain K>3 and Mg>2    Infectious Disease: No signs of infection. Leukocytosis c/w arrest. Blood cultures collected.   --vancomycin/zosyn x5 days for arrest  --follow blood cultures- pan cultured yesterday and no growth to date   Hematology and Oncology: ASA/plavix for CONCETTA.   --SQH TID for ppx   Endocrinology: Diabetic with an A1c of 6.6 in 2017- was considered pre diabetic and this has since resolved A1c on admission 5.9  No known medical history. BG elevated.  --insulin gtt    Lines: PICC line RUE August 29 2019  R radial arterial line August 29,2019  ETT August 27, 2019  Mcclelland catheter August 27, 2019  Restraint: needed    Current lines are required for patient management       Family update by me today: Yes      Code Status: Full     The pt was discussed and evaluated with Dr. Radha MD, attending physician, who agrees with the assessment and plan above.     Jenni Loera    Interval History     Labile bp yesterday intermittent up and down on pressors, no real response after one L of fluid   LA up to 2.1 has since normalized  Changed fentanyl and versed to precedex    Ventilation Mode: CMV/AC  (Continuous Mandatory Ventilation/ Assist Control)  FiO2 (%): 60 %  Rate Set (breaths/minute): 18 breaths/min  Tidal Volume Set (mL): 500 mL  PEEP (cm H2O): 8 cmH2O  Oxygen Concentration (%): 50 %  Resp: 27        Physical Exam   Temp: 99.3  F (37.4  C) Temp src: Esophageal BP: (!) 71/30 Pulse: 84 Heart Rate: 75 Resp: 27 SpO2: 95 % O2 Device: Mechanical Ventilator    Vitals:    08/27/19 2111 08/29/19 0000   Weight: 78.8 kg (173 lb 11.6 oz) 78.5  "kg (173 lb 1 oz)     Vital Signs with Ranges  Temp:  [96.3  F (35.7  C)-99.9  F (37.7  C)] 99.3  F (37.4  C)  Pulse:  [84-92] 84  Heart Rate:  [68-96] 75  Resp:  [20-29] 27  BP: (71-99)/(30-52) 71/30  MAP:  [57 mmHg-272 mmHg] 80 mmHg  Arterial Line BP: ()/() 129/51  FiO2 (%):  [60 %-80 %] 60 %  SpO2:  [91 %-100 %] 95 %  I/O last 3 completed shifts:  In: 2319.08 [I.V.:999.08; NG/GT:150; IV Piggyback:1000]  Out: 1209 [Urine:1209]    Heart Rate: 75, Blood pressure (!) 71/30, pulse 84, temperature 99.3  F (37.4  C), resp. rate 27, height 1.727 m (5' 7.99\"), weight 78.5 kg (173 lb 1 oz), SpO2 95 %.  173 lbs .98 oz  GEN:  Intubated sedated intermittent jerking towards his midline   CV:  Regular rate and rhythm, no murmur or JVD.  S1 + S2 noted, no S3 or S4.  LUNGS:  Mechanically ventilated with decreased breath sounds   ABD:  Active bowel sounds, soft, non-tender/non-distended.  No rebound/guarding/rigidity.  EXT:  No edema or cyanosis.     Medications     dexmedetomidine 0.2 mcg/kg/hr (08/30/19 0747)     IV fluid REPLACEMENT ONLY       IV fluid REPLACEMENT ONLY       fentaNYL 25 mcg/hr (08/30/19 0745)     insulin (regular) 1 Units/hr (08/30/19 0748)     - MEDICATION INSTRUCTIONS -       - MEDICATION INSTRUCTIONS -       midazolam Stopped (08/29/19 1600)     norepinephrine 0.05 mcg/kg/min (08/30/19 0744)     Percutaneous Coronary Intervention orders placed (this is information for BPA alerting)       ACE/ARB/ARNI NOT PRESCRIBED       BETA BLOCKER NOT PRESCRIBED       vasopressin (PITRESSIN) infusion ADULT (40 mL) 2 Units/hr (08/30/19 0748)       aspirin  81 mg Oral Daily     clopidogrel  75 mg Oral Daily     heparin lock flush  5-10 mL Intracatheter Q24H     heparin  5,000 Units Subcutaneous Q8H NATE     ipratropium - albuterol 0.5 mg/2.5 mg/3 mL  3 mL Nebulization 4x daily     multivitamins w/minerals  15 mL Per Feeding Tube Daily     pantoprazole (PROTONIX) IV  40 mg Intravenous Daily with breakfast     " piperacillin-tazobactam  2.25 g Intravenous Q6H     protein modular  1 packet Per Feeding Tube Daily     senna-docusate  1 tablet Oral or Feeding Tube At Bedtime     vancomycin place kelly - receiving intermittent dosing  1 each Does not apply See Admin Instructions       Data   Recent Labs   Lab 08/30/19  0358 08/30/19  0008 08/29/19  1921 08/29/19  1539  08/29/19  0400  08/28/19  1357  08/28/19  0413 08/27/19 2053   WBC 14.6*  --   --  16.2*  --  15.3*   < >  --   --  27.4* 22.9*   HGB 7.1* 6.8* 7.1* 7.7*  --  8.1*   < >  --   --  9.9* 11.9*   *  --   --  101*  --  100   < >  --   --  103* 104*   *  --   --  104*  --  104*   < >  --   --  211 197   INR  --   --   --   --   --   --   --  1.46*  --  1.55* 1.75*   * 146* 148* 146*   < > 147*   < >  --    < > 146* 137   POTASSIUM 4.0 4.0 4.0 4.3   < > 3.3*   < >  --    < > 4.1 4.4   CHLORIDE 119* 116* 117* 115*   < > 114*   < >  --    < > 117* 106   CO2 22 23 22 22   < > 21   < >  --    < > 20 20   BUN 35* 36* 36* 35*   < > 34*   < >  --    < > 33* 31*   CR 1.72* 1.71* 1.84* 1.83*   < > 1.78*   < >  --    < > 1.84* 1.71*   ANIONGAP 8 6 8 9   < > 12   < >  --    < > 9 11   YOON 7.6* 7.1* 7.2* 7.4*   < > 7.6*   < >  --    < > 7.6* 7.5*   * 173* 165* 157*   < > 140*   < >  --    < > 123* 204*   ALBUMIN 2.2*  --   --  2.4*  --  2.3*   < >  --   --  2.4*  --    PROTTOTAL 5.5*  --   --  5.8*  --  5.4*   < >  --   --  5.8*  --    BILITOTAL 0.6  --   --  0.8  --  0.8   < >  --   --  1.1  --    ALKPHOS 56  --   --  54  --  56   < >  --   --  65  --    ALT 52  --   --  60  --  54   < >  --   --  69  --    AST 86*  --   --  99*  --  67*   < >  --   --  78*  --     < > = values in this interval not displayed.       Recent Results (from the past 24 hour(s))   XR Chest Port 1 View    Narrative    Exam: XR CHEST PORT 1 VW, 8/29/2019 10:29 AM    Indication: picc    Comparison: Earlier same day    Findings:   Single AP view of the chest. Stable positioning  of enteric and  endotracheal tubes. Right upper extremity PICC has been placed, with  tip projecting over the low SVC. Esophageal temperature probe in the  upper esophagus. Intact median sternotomy wires.    The trachea is midline. The cardiac mediastinal silhouette is stable.  No appreciable pneumothorax. Small left pleural effusion with  overlying basilar opacities. No significant change in patchy bibasilar  opacities, left greater than right. Continued diffuse interstitial  opacities. The visualized upper abdomen is unremarkable. No acute  osseous lesion.       Impression    Impression: Right upper extremity PICC tip projects over the low SVC,  otherwise stable chest.    I have personally reviewed the examination and initial interpretation  and I agree with the findings.    SARAY ABEL MD   XR Abdomen Port 1 View    Narrative    Exam: XR ABDOMEN PORT 1 VW, 8/29/2019 3:10 PM    Indication: Tube feed    Comparison: 8/29/2019, 8/27/2019    Findings:   A single supine AP view of the abdomen was obtained. The gastric tube  tip and sidehole project over the stomach with the sidehole just  beyond the gastroesophageal junction. The feeding tube tip projects  over the fourth portion of the duodenum. Right renal artery stent  projects over the L1 vertebral body. A right femoral catheter tip  projects over the low right atrium. An additional right femoral  catheter tip projects over the L5 vertebral body. Nonobstructive bowel  gas pattern. Left basilar opacities and left pleural calcified  plaques. Stable fractured inferior sternotomy wire.      Impression    Impression:   The feeding tube tip projects over the fourth portion of the duodenum.    I have personally reviewed the examination and initial interpretation  and I agree with the findings.    FARIBA MACKEY MD   XR Chest Port 1 View    Narrative    Exam: XR CHEST PORT 1 VW, 8/30/2019 12:37 AM    Indication: intubated    Comparison: 8/29/2014    Findings:   AP  view of the chest. Endotracheal tube with the tip in the mid  thoracic trachea. Partially visualized enteric tubes with the distal  end extending beyond the field-of-view. Right approach PICC line with  the tip at the level of the mid thoracic SVC. Esophageal temperature  probe. Unchanged sternotomy wires.    Stable enlargement of the cardiac silhouette. Vascular calcifications  of the thoracic aorta similar to prior exam. Mixed interstitial and  bibasilar predominant airspace opacities similar to prior. Small  bilateral pleural effusions, unchanged. No pneumothorax. Pleural  mineralizations. No acute findings in the upper abdomen. Documented  rib fractures better visualized on prior CT.      Impression    Impression:   1. Endotracheal tube with the tip in the mid thoracic trachea.  Additional lines and support devices as detailed above.  2. Mixed interstitial and patchy basilar airspace opacity similar to  prior exam.  3. Unchanged pleural effusions.    I have personally reviewed the examination and initial interpretation  and I agree with the findings.    JOSIE COOK, DO     Critical Care Services Progress Note:     José Aguirre remains critically ill with acute coronary syndrome, cardiac arrhythmia, severe respiratory distress and shock     I personally examined and evaluated the patient today.   The patient s prognosis today is grave  I have evaluated all laboratory values and imaging studies from the past 24 hours.  Key findings and decisions made today included   wean sedation, decreasing fentanyl on precedex now  - 1u PRBC to keep hgb ~8 since dropping and on pressors  - wean off pressors   - follow micro, continue abx, if persistently requiring pressors will start stress dose steroids    I personally managed the ventilator, sedation, pain control and analgesia, metabolic abnormalities, antibiotic therapy, nutritional status and vasoactive medications.   Consults ongoing and ordered are none  Procedures  that will happen today are: none  All treatments were placed at my direction.  I formulated today s plan with the house staff team or resident(s) and agree with the findings and plan in the associated note.       The above plans and care have been discussed with no one and all questions and concerns were addressed.     I spent a total of 40 minutes (excluding procedure time) personally providing and directing critical care services at the bedside and on the critical care unit for José Aguirre.        Andrew Garcia MD

## 2019-08-30 NOTE — PLAN OF CARE
Condition stable, weaning pressors, up to chair x1, titrating sedation for effect    Neuro: sedated, PARRY, follows commands, JESÚS  CV: NSR, titrating pressors to keep MAP>65, 1+ pulses upper, doppler lower  Pulm: LS clear upper, coarse lower, CMV 18/500/5/50, continuing pressure support trial  GI: OG to LIS, TF at 40 ml/hr, hypoactive bowel sounds, no stool.  : voiding with silva  Skin: WDL  Pain: controlled with fentanyl gtt and boluses    Continue to monitor, wean pressors and pressure support as tolerated

## 2019-08-30 NOTE — PROCEDURES
EEG #-1      DATE OF RECORDING/SERVICE DATE:  2019      TYPE OF STUDY:  Inpatient video EEG monitoring.      SOURCE FILE DURATION:  9 hours, 14 minutes, 31 seconds.      HISTORY:  Day 1 of video-EEG monitoring in [place] Aguirre, an 83-year-old who is status post cardiac arrest.  He is comatose and intubated in ICU.  He is hypothermic throughout the study.  He was being treated with fentanyl 100 mcg per hour and midazolam 6 mg per hour.      TECHNICAL SUMMARY:  EEG is recorded from scalp electrodes placed according to the 10-20 international system.  Additional electrodes were utilized for referencing, artifact detection, and recording from other cerebral regions.  Video was continuously recorded.  Video was reviewed for clinical correlation and to assist with EEG interpretation      FINDINGS:  Bifrontal theta predominates with occasional delta.  No normal background.  Most activity has amplitudes of 10-20 microvolts.  Stimulation is performed utilizing ICU protocol at about 3:00 p.m. and does not induce any changes.  However, during care subsequently, somewhat more delta slowing with somewhat higher amplitude is noted.  EEG is largely continuous.  Periodic discharges were not seen.      ICTAL:  No electrographic seizures.      IMPRESSION:  Abnormal, consistent with moderate to severe diffuse encephalopathy.  There is some EEG reactivity.  Findings may in part be related to anesthetic drips employed and to hypothermia.  Epileptiform activity or seizures not seen.  It should be noted that both hypothermia and midazolam have antiepileptic effects and that seizures could emerge as these treatment modalities are tapered.         MK HARDING MD             D: 2019   T: 2019   MT: MAURICIO      Name:     ROC AGUIRRE   MRN:      [id number]        Account:        [id number]   :      1935           Procedure Date: 2019      Document: [id number]

## 2019-08-30 NOTE — PROGRESS NOTES
EEG CLINICAL NEUROPHYSIOLOGY PRELIMINARY REPORT    Video-EEG through 0600 today reviewed. Euthermic, this morning reduced to low dose fentanyl. Background has become more continuous and diffuse alpha has disappeared following reduction of sedative drips. Diffuse delta predominates. Generalized periodic discharges have appeared and recur intermittently, usually no faster than 1 Hz. More extensive delta and runs of generalized periodic discharges are precipitated by stimulation. No electrographic seizures.    There have been some changes since reduction of sedative drips. EEG remains reactive but reactivity is abnormal. Overall study continues consistent with moderate to severe diffuse encephalopathy. No electrographic seizures. Full report to follow.    Alexander Estrada MD  Pager [phone]

## 2019-08-30 NOTE — PLAN OF CARE
Neuro: Not following commands. JAMIN. Will open eyes and look at you when calling out name. Moving all four extremities.     CV: SR 70s-80s. T max 99.9f. Vasoactive gtts to maintain MAP above 65. Titrated up and down. 500 ml NS bolus given with no effect on titrating medications down.     Resp: Vent. Lungs coarse. No productive sputum.    GI: TF advanced per order. Tolerating well.    : good UO.

## 2019-08-31 ENCOUNTER — APPOINTMENT (OUTPATIENT)
Dept: GENERAL RADIOLOGY | Facility: CLINIC | Age: 84
DRG: 224 | End: 2019-08-31
Attending: STUDENT IN AN ORGANIZED HEALTH CARE EDUCATION/TRAINING PROGRAM
Payer: MEDICARE

## 2019-08-31 ENCOUNTER — APPOINTMENT (OUTPATIENT)
Dept: GENERAL RADIOLOGY | Facility: CLINIC | Age: 84
DRG: 224 | End: 2019-08-31
Payer: MEDICARE

## 2019-08-31 ENCOUNTER — APPOINTMENT (OUTPATIENT)
Dept: CT IMAGING | Facility: CLINIC | Age: 84
DRG: 224 | End: 2019-08-31
Attending: STUDENT IN AN ORGANIZED HEALTH CARE EDUCATION/TRAINING PROGRAM
Payer: MEDICARE

## 2019-08-31 LAB
ALBUMIN SERPL-MCNC: 2.2 G/DL (ref 3.4–5)
ALBUMIN SERPL-MCNC: 2.4 G/DL (ref 3.4–5)
ALP SERPL-CCNC: 59 U/L (ref 40–150)
ALP SERPL-CCNC: 61 U/L (ref 40–150)
ALT SERPL W P-5'-P-CCNC: 46 U/L (ref 0–70)
ALT SERPL W P-5'-P-CCNC: 48 U/L (ref 0–70)
ANION GAP SERPL CALCULATED.3IONS-SCNC: 7 MMOL/L (ref 3–14)
ANION GAP SERPL CALCULATED.3IONS-SCNC: 7 MMOL/L (ref 3–14)
AST SERPL W P-5'-P-CCNC: 76 U/L (ref 0–45)
AST SERPL W P-5'-P-CCNC: 76 U/L (ref 0–45)
BACTERIA SPEC CULT: ABNORMAL
BACTERIA SPEC CULT: ABNORMAL
BASE DEFICIT BLDA-SCNC: 1.7 MMOL/L
BASE DEFICIT BLDA-SCNC: 1.9 MMOL/L
BASE DEFICIT BLDA-SCNC: 2.3 MMOL/L
BASE DEFICIT BLDA-SCNC: 2.9 MMOL/L
BILIRUB SERPL-MCNC: 0.6 MG/DL (ref 0.2–1.3)
BILIRUB SERPL-MCNC: 0.8 MG/DL (ref 0.2–1.3)
BLD PROD TYP BPU: NORMAL
BLD UNIT ID BPU: 0
BLOOD PRODUCT CODE: NORMAL
BPU ID: NORMAL
BUN SERPL-MCNC: 31 MG/DL (ref 7–30)
BUN SERPL-MCNC: 34 MG/DL (ref 7–30)
CALCIUM SERPL-MCNC: 8.2 MG/DL (ref 8.5–10.1)
CALCIUM SERPL-MCNC: 8.5 MG/DL (ref 8.5–10.1)
CHLORIDE SERPL-SCNC: 119 MMOL/L (ref 94–109)
CHLORIDE SERPL-SCNC: 122 MMOL/L (ref 94–109)
CO2 SERPL-SCNC: 20 MMOL/L (ref 20–32)
CO2 SERPL-SCNC: 22 MMOL/L (ref 20–32)
CREAT SERPL-MCNC: 1.22 MG/DL (ref 0.66–1.25)
CREAT SERPL-MCNC: 1.31 MG/DL (ref 0.66–1.25)
ERYTHROCYTE [DISTWIDTH] IN BLOOD BY AUTOMATED COUNT: 19.8 % (ref 10–15)
ERYTHROCYTE [DISTWIDTH] IN BLOOD BY AUTOMATED COUNT: 19.9 % (ref 10–15)
ERYTHROCYTE [DISTWIDTH] IN BLOOD BY AUTOMATED COUNT: 20 % (ref 10–15)
GFR SERPL CREATININE-BSD FRML MDRD: 50 ML/MIN/{1.73_M2}
GFR SERPL CREATININE-BSD FRML MDRD: 54 ML/MIN/{1.73_M2}
GLUCOSE BLDC GLUCOMTR-MCNC: 108 MG/DL (ref 70–99)
GLUCOSE BLDC GLUCOMTR-MCNC: 123 MG/DL (ref 70–99)
GLUCOSE BLDC GLUCOMTR-MCNC: 126 MG/DL (ref 70–99)
GLUCOSE BLDC GLUCOMTR-MCNC: 129 MG/DL (ref 70–99)
GLUCOSE BLDC GLUCOMTR-MCNC: 135 MG/DL (ref 70–99)
GLUCOSE BLDC GLUCOMTR-MCNC: 136 MG/DL (ref 70–99)
GLUCOSE BLDC GLUCOMTR-MCNC: 137 MG/DL (ref 70–99)
GLUCOSE BLDC GLUCOMTR-MCNC: 138 MG/DL (ref 70–99)
GLUCOSE BLDC GLUCOMTR-MCNC: 140 MG/DL (ref 70–99)
GLUCOSE BLDC GLUCOMTR-MCNC: 142 MG/DL (ref 70–99)
GLUCOSE BLDC GLUCOMTR-MCNC: 146 MG/DL (ref 70–99)
GLUCOSE BLDC GLUCOMTR-MCNC: 147 MG/DL (ref 70–99)
GLUCOSE BLDC GLUCOMTR-MCNC: 151 MG/DL (ref 70–99)
GLUCOSE BLDC GLUCOMTR-MCNC: 160 MG/DL (ref 70–99)
GLUCOSE SERPL-MCNC: 127 MG/DL (ref 70–99)
GLUCOSE SERPL-MCNC: 152 MG/DL (ref 70–99)
HCO3 BLD-SCNC: 21 MMOL/L (ref 21–28)
HCO3 BLD-SCNC: 22 MMOL/L (ref 21–28)
HCT VFR BLD AUTO: 25.3 % (ref 40–53)
HCT VFR BLD AUTO: 25.3 % (ref 40–53)
HCT VFR BLD AUTO: 28.9 % (ref 40–53)
HGB BLD-MCNC: 7.6 G/DL (ref 13.3–17.7)
HGB BLD-MCNC: 7.8 G/DL (ref 13.3–17.7)
HGB BLD-MCNC: 8.5 G/DL (ref 13.3–17.7)
INR PPP: 1.4 (ref 0.86–1.14)
INTERPRETATION ECG - MUSE: NORMAL
MAGNESIUM SERPL-MCNC: 2.4 MG/DL (ref 1.6–2.3)
MAGNESIUM SERPL-MCNC: 2.6 MG/DL (ref 1.6–2.3)
MCH RBC QN AUTO: 29.7 PG (ref 26.5–33)
MCH RBC QN AUTO: 29.8 PG (ref 26.5–33)
MCH RBC QN AUTO: 30.7 PG (ref 26.5–33)
MCHC RBC AUTO-ENTMCNC: 29.4 G/DL (ref 31.5–36.5)
MCHC RBC AUTO-ENTMCNC: 30 G/DL (ref 31.5–36.5)
MCHC RBC AUTO-ENTMCNC: 30.8 G/DL (ref 31.5–36.5)
MCV RBC AUTO: 100 FL (ref 78–100)
MCV RBC AUTO: 101 FL (ref 78–100)
MCV RBC AUTO: 99 FL (ref 78–100)
O2/TOTAL GAS SETTING VFR VENT: 100 %
O2/TOTAL GAS SETTING VFR VENT: 50 %
O2/TOTAL GAS SETTING VFR VENT: 60 %
O2/TOTAL GAS SETTING VFR VENT: 90 %
OXYHGB MFR BLD: 95 % (ref 92–100)
OXYHGB MFR BLD: 98 % (ref 92–100)
PCO2 BLD: 30 MM HG (ref 35–45)
PCO2 BLD: 31 MM HG (ref 35–45)
PCO2 BLD: 33 MM HG (ref 35–45)
PCO2 BLD: 34 MM HG (ref 35–45)
PCO2 BLD: 34 MM HG (ref 35–45)
PCO2 BLD: 36 MM HG (ref 35–45)
PF4 HEPARIN CMPLX AB SER QL: NEGATIVE
PH BLD: 7.39 PH (ref 7.35–7.45)
PH BLD: 7.42 PH (ref 7.35–7.45)
PH BLD: 7.42 PH (ref 7.35–7.45)
PH BLD: 7.43 PH (ref 7.35–7.45)
PH BLD: 7.44 PH (ref 7.35–7.45)
PH BLD: 7.46 PH (ref 7.35–7.45)
PHOSPHATE SERPL-MCNC: 2.5 MG/DL (ref 2.5–4.5)
PHOSPHATE SERPL-MCNC: 2.6 MG/DL (ref 2.5–4.5)
PLATELET # BLD AUTO: 68 10E9/L (ref 150–450)
PLATELET # BLD AUTO: 69 10E9/L (ref 150–450)
PLATELET # BLD AUTO: 78 10E9/L (ref 150–450)
PO2 BLD: 157 MM HG (ref 80–105)
PO2 BLD: 280 MM HG (ref 80–105)
PO2 BLD: 58 MM HG (ref 80–105)
PO2 BLD: 60 MM HG (ref 80–105)
PO2 BLD: 61 MM HG (ref 80–105)
PO2 BLD: 88 MM HG (ref 80–105)
POTASSIUM SERPL-SCNC: 3.6 MMOL/L (ref 3.4–5.3)
POTASSIUM SERPL-SCNC: 3.9 MMOL/L (ref 3.4–5.3)
PROT SERPL-MCNC: 5.8 G/DL (ref 6.8–8.8)
PROT SERPL-MCNC: 6.4 G/DL (ref 6.8–8.8)
RBC # BLD AUTO: 2.54 10E12/L (ref 4.4–5.9)
RBC # BLD AUTO: 2.56 10E12/L (ref 4.4–5.9)
RBC # BLD AUTO: 2.85 10E12/L (ref 4.4–5.9)
SODIUM SERPL-SCNC: 147 MMOL/L (ref 133–144)
SODIUM SERPL-SCNC: 151 MMOL/L (ref 133–144)
SPECIMEN SOURCE: ABNORMAL
SPECIMEN SOURCE: ABNORMAL
TRANSFUSION STATUS PATIENT QL: NORMAL
TRANSFUSION STATUS PATIENT QL: NORMAL
WBC # BLD AUTO: 10.1 10E9/L (ref 4–11)
WBC # BLD AUTO: 14.5 10E9/L (ref 4–11)
WBC # BLD AUTO: 9.4 10E9/L (ref 4–11)

## 2019-08-31 PROCEDURE — 25800030 ZZH RX IP 258 OP 636

## 2019-08-31 PROCEDURE — 85027 COMPLETE CBC AUTOMATED: CPT

## 2019-08-31 PROCEDURE — 84100 ASSAY OF PHOSPHORUS: CPT | Performed by: STUDENT IN AN ORGANIZED HEALTH CARE EDUCATION/TRAINING PROGRAM

## 2019-08-31 PROCEDURE — 25800030 ZZH RX IP 258 OP 636: Performed by: STUDENT IN AN ORGANIZED HEALTH CARE EDUCATION/TRAINING PROGRAM

## 2019-08-31 PROCEDURE — 20000004 ZZH R&B ICU UMMC

## 2019-08-31 PROCEDURE — 25000132 ZZH RX MED GY IP 250 OP 250 PS 637: Mod: GY

## 2019-08-31 PROCEDURE — 25000125 ZZHC RX 250: Performed by: STUDENT IN AN ORGANIZED HEALTH CARE EDUCATION/TRAINING PROGRAM

## 2019-08-31 PROCEDURE — 82805 BLOOD GASES W/O2 SATURATION: CPT

## 2019-08-31 PROCEDURE — 94660 CPAP INITIATION&MGMT: CPT

## 2019-08-31 PROCEDURE — 80053 COMPREHEN METABOLIC PANEL: CPT | Performed by: STUDENT IN AN ORGANIZED HEALTH CARE EDUCATION/TRAINING PROGRAM

## 2019-08-31 PROCEDURE — 27210432 ZZH NUTRITION PRODUCT RENAL BASIC LITER

## 2019-08-31 PROCEDURE — 71045 X-RAY EXAM CHEST 1 VIEW: CPT

## 2019-08-31 PROCEDURE — 94640 AIRWAY INHALATION TREATMENT: CPT

## 2019-08-31 PROCEDURE — 94003 VENT MGMT INPAT SUBQ DAY: CPT

## 2019-08-31 PROCEDURE — 86022 PLATELET ANTIBODIES: CPT | Performed by: STUDENT IN AN ORGANIZED HEALTH CARE EDUCATION/TRAINING PROGRAM

## 2019-08-31 PROCEDURE — 85027 COMPLETE CBC AUTOMATED: CPT | Performed by: STUDENT IN AN ORGANIZED HEALTH CARE EDUCATION/TRAINING PROGRAM

## 2019-08-31 PROCEDURE — 83735 ASSAY OF MAGNESIUM: CPT | Performed by: STUDENT IN AN ORGANIZED HEALTH CARE EDUCATION/TRAINING PROGRAM

## 2019-08-31 PROCEDURE — 94640 AIRWAY INHALATION TREATMENT: CPT | Mod: 76

## 2019-08-31 PROCEDURE — 25000132 ZZH RX MED GY IP 250 OP 250 PS 637: Mod: GY | Performed by: INTERNAL MEDICINE

## 2019-08-31 PROCEDURE — 74018 RADEX ABDOMEN 1 VIEW: CPT

## 2019-08-31 PROCEDURE — 25000128 H RX IP 250 OP 636

## 2019-08-31 PROCEDURE — 40000014 ZZH STATISTIC ARTERIAL MONITORING DAILY

## 2019-08-31 PROCEDURE — 40000275 ZZH STATISTIC RCP TIME EA 10 MIN

## 2019-08-31 PROCEDURE — 00000146 ZZHCL STATISTIC GLUCOSE BY METER IP

## 2019-08-31 PROCEDURE — 25000132 ZZH RX MED GY IP 250 OP 250 PS 637: Mod: GY | Performed by: STUDENT IN AN ORGANIZED HEALTH CARE EDUCATION/TRAINING PROGRAM

## 2019-08-31 PROCEDURE — 25000128 H RX IP 250 OP 636: Performed by: STUDENT IN AN ORGANIZED HEALTH CARE EDUCATION/TRAINING PROGRAM

## 2019-08-31 PROCEDURE — 99291 CRITICAL CARE FIRST HOUR: CPT | Mod: GC | Performed by: INTERNAL MEDICINE

## 2019-08-31 PROCEDURE — 74176 CT ABD & PELVIS W/O CONTRAST: CPT

## 2019-08-31 PROCEDURE — 40000281 ZZH STATISTIC TRANSPORT TIME EA 15 MIN

## 2019-08-31 PROCEDURE — 40000982 ZZH STATISTICAL HAIKU-CANTO PHOTO

## 2019-08-31 PROCEDURE — 85610 PROTHROMBIN TIME: CPT | Performed by: STUDENT IN AN ORGANIZED HEALTH CARE EDUCATION/TRAINING PROGRAM

## 2019-08-31 PROCEDURE — 82803 BLOOD GASES ANY COMBINATION: CPT

## 2019-08-31 RX ORDER — HYDRALAZINE HYDROCHLORIDE 20 MG/ML
10 INJECTION INTRAMUSCULAR; INTRAVENOUS EVERY 6 HOURS PRN
Status: DISCONTINUED | OUTPATIENT
Start: 2019-08-31 | End: 2019-09-02

## 2019-08-31 RX ORDER — HYDRALAZINE HYDROCHLORIDE 20 MG/ML
INJECTION INTRAMUSCULAR; INTRAVENOUS
Status: DISCONTINUED
Start: 2019-08-31 | End: 2019-09-01 | Stop reason: HOSPADM

## 2019-08-31 RX ORDER — HYDRALAZINE HYDROCHLORIDE 10 MG/1
10 TABLET, FILM COATED ORAL 4 TIMES DAILY
Status: DISCONTINUED | OUTPATIENT
Start: 2019-08-31 | End: 2019-08-31

## 2019-08-31 RX ORDER — HYDRALAZINE HYDROCHLORIDE 25 MG/1
25 TABLET, FILM COATED ORAL 4 TIMES DAILY
Status: DISCONTINUED | OUTPATIENT
Start: 2019-08-31 | End: 2019-09-01

## 2019-08-31 RX ORDER — ISOSORBIDE DINITRATE 20 MG/1
20 TABLET ORAL 3 TIMES DAILY
Status: DISCONTINUED | OUTPATIENT
Start: 2019-08-31 | End: 2019-09-02

## 2019-08-31 RX ADMIN — PIPERACILLIN SODIUM AND TAZOBACTAM SODIUM 2.25 G: 2; .25 INJECTION, POWDER, LYOPHILIZED, FOR SOLUTION INTRAVENOUS at 10:30

## 2019-08-31 RX ADMIN — ISOSORBIDE DINITRATE 20 MG: 20 TABLET ORAL at 19:50

## 2019-08-31 RX ADMIN — Medication 1 PACKET: at 08:54

## 2019-08-31 RX ADMIN — HYDROCORTISONE SODIUM SUCCINATE 50 MG: 100 INJECTION, POWDER, FOR SOLUTION INTRAMUSCULAR; INTRAVENOUS at 23:46

## 2019-08-31 RX ADMIN — HYDRALAZINE HYDROCHLORIDE 25 MG: 25 TABLET ORAL at 18:14

## 2019-08-31 RX ADMIN — VANCOMYCIN HYDROCHLORIDE 1500 MG: 10 INJECTION, POWDER, LYOPHILIZED, FOR SOLUTION INTRAVENOUS at 08:52

## 2019-08-31 RX ADMIN — ASPIRIN 81 MG CHEWABLE TABLET 81 MG: 81 TABLET CHEWABLE at 08:52

## 2019-08-31 RX ADMIN — IPRATROPIUM BROMIDE AND ALBUTEROL SULFATE 3 ML: .5; 3 SOLUTION RESPIRATORY (INHALATION) at 08:01

## 2019-08-31 RX ADMIN — PIPERACILLIN SODIUM AND TAZOBACTAM SODIUM 2.25 G: 2; .25 INJECTION, POWDER, LYOPHILIZED, FOR SOLUTION INTRAVENOUS at 03:15

## 2019-08-31 RX ADMIN — PIPERACILLIN SODIUM AND TAZOBACTAM SODIUM 2.25 G: 2; .25 INJECTION, POWDER, LYOPHILIZED, FOR SOLUTION INTRAVENOUS at 21:26

## 2019-08-31 RX ADMIN — POTASSIUM PHOSPHATE, MONOBASIC AND POTASSIUM PHOSPHATE, DIBASIC 10 MMOL: 224; 236 INJECTION, SOLUTION INTRAVENOUS at 20:02

## 2019-08-31 RX ADMIN — MULTIVIT AND MINERALS-FERROUS GLUCONATE 9 MG IRON/15 ML ORAL LIQUID 15 ML: at 08:52

## 2019-08-31 RX ADMIN — IPRATROPIUM BROMIDE AND ALBUTEROL SULFATE 3 ML: .5; 3 SOLUTION RESPIRATORY (INHALATION) at 15:59

## 2019-08-31 RX ADMIN — POTASSIUM CHLORIDE 20 MEQ: 1.5 POWDER, FOR SOLUTION ORAL at 18:14

## 2019-08-31 RX ADMIN — HYDRALAZINE HYDROCHLORIDE 10 MG: 20 INJECTION INTRAMUSCULAR; INTRAVENOUS at 13:49

## 2019-08-31 RX ADMIN — POTASSIUM CHLORIDE 20 MEQ: 29.8 INJECTION, SOLUTION INTRAVENOUS at 04:17

## 2019-08-31 RX ADMIN — HYDROCORTISONE SODIUM SUCCINATE 50 MG: 100 INJECTION, POWDER, FOR SOLUTION INTRAMUSCULAR; INTRAVENOUS at 05:27

## 2019-08-31 RX ADMIN — CLOPIDOGREL BISULFATE 75 MG: 75 TABLET, FILM COATED ORAL at 08:52

## 2019-08-31 RX ADMIN — HYDROCORTISONE SODIUM SUCCINATE 50 MG: 100 INJECTION, POWDER, FOR SOLUTION INTRAMUSCULAR; INTRAVENOUS at 12:00

## 2019-08-31 RX ADMIN — IPRATROPIUM BROMIDE AND ALBUTEROL SULFATE 3 ML: .5; 3 SOLUTION RESPIRATORY (INHALATION) at 12:14

## 2019-08-31 RX ADMIN — Medication 40 MG: at 08:54

## 2019-08-31 RX ADMIN — HUMAN INSULIN 3 UNITS/HR: 100 INJECTION, SOLUTION SUBCUTANEOUS at 11:53

## 2019-08-31 RX ADMIN — DEXMEDETOMIDINE 0.7 MCG/KG/HR: 100 INJECTION, SOLUTION, CONCENTRATE INTRAVENOUS at 04:18

## 2019-08-31 RX ADMIN — PIPERACILLIN SODIUM AND TAZOBACTAM SODIUM 2.25 G: 2; .25 INJECTION, POWDER, LYOPHILIZED, FOR SOLUTION INTRAVENOUS at 16:17

## 2019-08-31 RX ADMIN — HYDROCORTISONE SODIUM SUCCINATE 50 MG: 100 INJECTION, POWDER, FOR SOLUTION INTRAMUSCULAR; INTRAVENOUS at 17:47

## 2019-08-31 RX ADMIN — HYDRALAZINE HYDROCHLORIDE 25 MG: 25 TABLET ORAL at 21:26

## 2019-08-31 RX ADMIN — IPRATROPIUM BROMIDE AND ALBUTEROL SULFATE 3 ML: .5; 3 SOLUTION RESPIRATORY (INHALATION) at 20:13

## 2019-08-31 ASSESSMENT — ACTIVITIES OF DAILY LIVING (ADL)
ADLS_ACUITY_SCORE: 23

## 2019-08-31 ASSESSMENT — MIFFLIN-ST. JEOR: SCORE: 1491.37

## 2019-08-31 NOTE — PLAN OF CARE
Neuro - pupils equal/reactive, moves all extremities, follows commands, gets restless periodically, precedex gtt at 0.7, fentanyl gtt at 100, afebrile.  Cardiac - SR 60-80, K, Phos replaced, Vaso weaned off, MAPs greater than 65.  Respiratory - Lungs clear/diminished, Vent: CMV, RR 18, , PEEP 5, FiO2 50%.  GI - TF at goal 50ml/hr though NJ. No BM.   - Mcclelland with 30-60ml/hr output.  Skin - Bruising throughout, right elbow skin tare, right groin bruising (ultrasound done).  Family - Updated daughter.  Plan - Continue to monitor and notify MD of questions or concerns.

## 2019-08-31 NOTE — PROGRESS NOTES
RT NOTE/ EXTUBATION    Pt weaned with a PS 7/5,50% FIO2 for 5 1/2 hourS without any complication, then extubated per MD order to BIPAP AT 13:25.    BBS clear  , SATs 99% on 60% FIO2, HR 95. Pt is tolerating and comfortable with a current BIPAP setting. Will continue monitor.

## 2019-08-31 NOTE — PHARMACY-VANCOMYCIN DOSING SERVICE
Pharmacy Vancomycin Note  Date of Service 2019  Patient's  1935   83 year old, male    Indication: Sepsis and Ventilator-Associated Pneumonia  Goal Trough Level: 15-20 mg/L  Day of Therapy: 3  Current Vancomycin regimen:  Intermittent dosing    Current estimated CrCl = Estimated Creatinine Clearance: 44.7 mL/min (A) (based on SCr of 1.31 mg/dL (H)).    Creatinine for last 3 days  2019: 11:57 AM Creatinine 1.90 mg/dL;  4:17 PM Creatinine 1.83 mg/dL; 11:50 PM Creatinine 1.70 mg/dL  2019:  4:00 AM Creatinine 1.78 mg/dL;  8:03 AM Creatinine 1.78 mg/dL; 11:21 AM Creatinine 1.88 mg/dL;  3:39 PM Creatinine 1.83 mg/dL;  7:21 PM Creatinine 1.84 mg/dL  2019: 12:08 AM Creatinine 1.71 mg/dL;  3:58 AM Creatinine 1.72 mg/dL;  8:39 AM Creatinine 1.65 mg/dL; 11:59 AM Creatinine 1.53 mg/dL;  4:16 PM Creatinine 1.44 mg/dL;  7:43 PM Creatinine 1.35 mg/dL  2019:  3:30 AM Creatinine 1.31 mg/dL    Recent Vancomycin Levels (past 3 days)  2019:  4:00 AM Vancomycin Level 12.5 mg/L  2019:  7:43 PM Vancomycin Level 10.1 mg/L    Vancomycin IV Administrations (past 72 hours)                   vancomycin 1500 mg in 0.9% NaCl 250 ml intermittent infusion 1,500 mg (mg) 1,500 mg Given 19 0821                Nephrotoxins and other renal medications (From now, onward)    Start     Dose/Rate Route Frequency Ordered Stop    19 0815  vancomycin 1500 mg in 0.9% NaCl 250 ml intermittent infusion 1,500 mg      1,500 mg  over 90 Minutes Intravenous ONCE 19 0802      19 1000  piperacillin-tazobactam (ZOSYN) 2.25 g vial to attach to  ml bag      2.25 g  over 30 Minutes Intravenous EVERY 6 HOURS 19 0705      19 0515  vasopressin (VASOSTRICT) 40 Units in D5W 40 mL infusion      0.5-4 Units/hr  0.5-4 mL/hr  Intravenous CONTINUOUS 19 0504      19 2300  norepinephrine (LEVOPHED) 16 mg in  mL infusion      0.03-0.4 mcg/kg/min × 78.8 kg (Dosing  Weight)  2.2-29.6 mL/hr  Intravenous CONTINUOUS 08/27/19 2257      08/27/19 2159  vancomycin place kelly - receiving intermittent dosing      1 each Does not apply SEE ADMIN INSTRUCTIONS 08/27/19 2159               Contrast Orders - past 72 hours (72h ago, onward)    Start     Dose/Rate Route Frequency Ordered Stop    08/28/19 0845  perflutren diluted 1mL to 2mL with saline (OPTISON) diluted injection 5 mL      5 mL Intravenous ONCE 08/28/19 0832 08/28/19 0845          Interpretation of levels and current regimen:  ~36 hour level is  Subtherapeutic (10.1)   Has serum creatinine changed > 50% in last 72 hours: No but improving    Urine output:  good urine output    Renal Function: Improving    Plan:  1.  Will give 1500 mg IV once. Will continue with intermittent dosing for now. Will re-dose when level <20 mg/L.  2.  Pharmacy will check trough levels as appropriate in 1-3 Days.    3. Serum creatinine levels will be ordered daily for the first week of therapy and at least twice weekly for subsequent weeks.      Johnson Degroot, PharmD        .

## 2019-08-31 NOTE — PLAN OF CARE
Condition improving, weaned off gtts, extubated to BiPAP, hydralazine for HTN    Neuro: alert, follows commands, intermittent confusion, MD notified, BRINDA ESPINOSA  CV: NSR, sinus-tach, palpable pulses, S1S2, hydralazine for HTN  Pulm: LS clear, diminished in bases, saturating well  GI: TF at goal, stool x1, passing flatus  : voiding with silva, adequate output  Skin: WDL  Pain: WDL    Continue to monitor

## 2019-08-31 NOTE — PROGRESS NOTES
Cardiology - [facility] Progress Note    Assessment & Plan   83 year old male who was admitted on 8/27/2019 with a cardiac arrest. 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    Plan for today  - wean sedation, decreasing fentanyl on precedex now  - Pressure support trial to extubate  - plts dropping sent HIT screen   - continue high dose steroids   - trend hgb and consider non con ct if it keeps dropping     Neurology: Intubated, sedated, paralyzed. Cooled to 34 degrees.  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   --cooled down to 34 degrees now euthermic   Cardiovascular / Hemodynamics: Refractory VF arrest s/p  CONCETTA to LM-> LAD, Lcx.  LA 7-> 1.3  TTE: EF 60-70%, RV mildly reduced function no significant valvular disease  EKG: NSR RBBB   --off pressors 8/30pm  --continue ASA 81mg and plavix 75 mg PO QD  --hold lipitor for now given likely hepatic injury during arrest  --hold ACE/ARB for now given likely reduced renal fxn after arrest  --holding beta blocker given shock   Pulmonary:  history of COPD on 2L of O2 with exertion.   Vent Settings: Ventilation Mode: CMV/AC  (Continuous Mandatory Ventilation/ Assist Control)  FiO2 (%): (S) 50 %  Rate Set (breaths/minute): 18 breaths/min  Tidal Volume Set (mL): 500 mL  PEEP (cm H2O): 5 cmH2O  Pressure Support (cm H2O): 7 cmH2O  Oxygen Concentration (%): 40 %  Resp: 23    ETT 2 cm above stacey.  Now weaning vent requirements.  CXR: Lines in stable position.  --wean vent as able  --daily CXR  Chest CT- diffuse ground glass  opacities bilateral pleural plaques and RLL cavitation with possible emphysema and fibrosis  --Q4h ABGs for now  --on scheduled duonebs QID   GI and Nutrition: No known medical hx.  --monitor BID LFTs  --postpyloric feeding  --bowel regimen -colace  --GI Prophylaxis: PPI    Renal, Fluid and Electrolytes: SCr 1.7, stable  Monitor creatinine and UOP  --maintain K>3 and Mg>2    Infectious Disease: No signs of infection. Leukocytosis c/w arrest. Blood cultures collected.   --vancomycin/zosyn x5 days for arrest  --follow blood cultures- pan cultured yesterday and no growth to date  -stress dose hydrocortisone 50mg q8h started 8/30- now off pressors    Hematology and Oncology: ASA/plavix for CONCETTA.   --SQH TID for ppx- on hold given decrease in plts   Endocrinology: Diabetic with an A1c of 6.6 in 2017- was considered pre diabetic and this has since resolved A1c on admission 5.9  No known medical history. BG elevated.  --insulin gtt    Lines: PICC line RUE August 29 2019  R radial arterial line August 29,2019  ETT August 27, 2019  Mcclelland catheter August 27, 2019  Restraint: needed    Current lines are required for patient management       Family update by me today: Yes      Code Status: Full     The pt was discussed and evaluated with Dr. Radha MD, attending physician, who agrees with the assessment and plan above.     Jenni Loera    Interval History     Off pressors   S/p 1u PRBC's yesterday  hgb desiree appropriately is trending down slowly with decrease in plts  Groin Ultrasound  1. No evidence of dissection or aneurysm.  2. Diffusely monophasic waveforms in the lower extremities bilaterally  suggestive of proximal stenosis.  3. Abrupt change in velocity suggestive of focal stenosis at the  midportion of the right external iliac artery.      Ventilation Mode: CMV/AC  (Continuous Mandatory Ventilation/ Assist Control)  FiO2 (%): (S) 50 %  Rate Set (breaths/minute): 18 breaths/min  Tidal Volume Set (mL):  "500 mL  PEEP (cm H2O): 5 cmH2O  Pressure Support (cm H2O): 7 cmH2O  Oxygen Concentration (%): 40 %  Resp: 23    PH 7.39/gbP889 Co2 36 [id number]    Physical Exam   Temp: 98.1  F (36.7  C) Temp src: Esophageal BP: 115/44   Heart Rate: 73 Resp: 23 SpO2: 92 % O2 Device: Mechanical Ventilator    Vitals:    08/27/19 2111 08/29/19 0000 08/31/19 0400   Weight: 78.8 kg (173 lb 11.6 oz) 78.5 kg (173 lb 1 oz) 82.2 kg (181 lb 3.5 oz)     Vital Signs with Ranges  Temp:  [97.3  F (36.3  C)-99.3  F (37.4  C)] 98.1  F (36.7  C)  Heart Rate:  [67-92] 73  Resp:  [20-27] 23  BP: (115)/(44) 115/44  MAP:  [55 mmHg-87 mmHg] 71 mmHg  Arterial Line BP: ()/(32-57) 116/44  FiO2 (%):  [40 %-60 %] 50 %  SpO2:  [89 %-100 %] 92 %  I/O last 3 completed shifts:  In: 2788.34 [I.V.:1198.34; NG/GT:440]  Out: 1327 [Urine:1252; Emesis/NG output:75]    Heart Rate: 73, Blood pressure 115/44, pulse 84, temperature 98.1  F (36.7  C), resp. rate 23, height 1.727 m (5' 7.99\"), weight 82.2 kg (181 lb 3.5 oz), SpO2 92 %.  181 lbs 3.49 oz  GEN:  Intubated sedated intermittent jerking towards his midline   CV:  Regular rate and rhythm, no murmur or JVD.  S1 + S2 noted, no S3 or S4.  LUNGS:  Mechanically ventilated with decreased breath sounds   ABD:  Active bowel sounds, soft, non-tender/non-distended.  No rebound/guarding/rigidity.  EXT:  No edema or cyanosis.     Medications     dexmedetomidine 0.7 mcg/kg/hr (08/31/19 0700)     IV fluid REPLACEMENT ONLY       IV fluid REPLACEMENT ONLY       fentaNYL 50 mcg/hr (08/31/19 0700)     insulin (regular) 3 Units/hr (08/31/19 0700)     - MEDICATION INSTRUCTIONS -       - MEDICATION INSTRUCTIONS -       midazolam Stopped (08/29/19 1600)     norepinephrine Stopped (08/30/19 1249)     Percutaneous Coronary Intervention orders placed (this is information for BPA alerting)       ACE/ARB/ARNI NOT PRESCRIBED       BETA BLOCKER NOT PRESCRIBED       vasopressin (PITRESSIN) infusion ADULT (40 mL) Stopped (08/31/19 0036)       " aspirin  81 mg Oral Daily     clopidogrel  75 mg Oral Daily     heparin lock flush  5-10 mL Intracatheter Q24H     hydrocortisone sodium succinate PF  50 mg Intravenous Q6H NATE     ipratropium - albuterol 0.5 mg/2.5 mg/3 mL  3 mL Nebulization 4x daily     multivitamins w/minerals  15 mL Per Feeding Tube Daily     pantoprazole  40 mg Oral or Feeding Tube Daily     piperacillin-tazobactam  2.25 g Intravenous Q6H     protein modular  1 packet Per Feeding Tube Daily     senna-docusate  1 tablet Oral or Feeding Tube At Bedtime     vancomycin place kelly - receiving intermittent dosing  1 each Does not apply See Admin Instructions       Data   Recent Labs   Lab 08/31/19  0330 08/30/19  1943 08/30/19  1616  08/28/19  1357  08/28/19  0413   WBC 9.4 9.0 9.4   < >  --   --  27.4*   HGB 7.6* 7.8* 8.0*   < >  --   --  9.9*   MCV 99 101* 100   < >  --   --  103*   PLT 68* 72* 80*   < >  --   --  211   INR 1.40*  --   --   --  1.46*  --  1.55*   * 148* 148*   < >  --    < > 146*   POTASSIUM 3.9 4.0 4.0   < >  --    < > 4.1   CHLORIDE 119* 119* 120*   < >  --    < > 117*   CO2 20 23 22   < >  --    < > 20   BUN 34* 36* 34*   < >  --    < > 33*   CR 1.31* 1.35* 1.44*   < >  --    < > 1.84*   ANIONGAP 7 6 6   < >  --    < > 9   YONO 8.2* 8.0* 7.8*   < >  --    < > 7.6*   * 199* 166*   < >  --    < > 123*   ALBUMIN 2.2*  --  2.2*   < >  --   --  2.4*   PROTTOTAL 5.8*  --  5.9*   < >  --   --  5.8*   BILITOTAL 0.6  --  1.0   < >  --   --  1.1   ALKPHOS 59  --  60   < >  --   --  65   ALT 46  --  53   < >  --   --  69   AST 76*  --  89*   < >  --   --  78*    < > = values in this interval not displayed.       Recent Results (from the past 24 hour(s))   US Lower Extremity Arterial rt    Impression    Impression:   1. No evidence of dissection or aneurysm.  2. Diffusely monophasic waveforms in the lower extremities bilaterally  suggestive of proximal stenosis.  3. Abrupt change in velocity suggestive of focal stenosis at  the  midportion of the right external iliac artery.     XR Chest Port 1 View    Impression    IMPRESSION:   1. Slightly increased left upper lung and right lower lung patchy  opacities, may represent worsening pulmonary edema versus infection.  2. Small left and trace right pleural effusions.  3. Stable cardiomegaly.     Critical Care Services Progress Note:     José Aguirre remains critically ill with acute coronary syndrome, cardiac arrhythmia, severe respiratory distress and shock     I personally examined and evaluated the patient today.   The patient s prognosis today is grave  I have evaluated all laboratory values and imaging studies from the past 24 hours.  Key findings and decisions made today included   - wean sedation, decreasing fentanyl on precedex now  - Pressure support trial to extubate  - plts dropping sent HIT screen   - continue high dose steroids   - trend hgb and consider non con ct if it keeps dropping  I personally managed the ventilator, sedation, pain control and analgesia, metabolic abnormalities, antibiotic therapy, nutritional status and vasoactive medications.   Consults ongoing and ordered are none  Procedures that will happen today are: none  All treatments were placed at my direction.  I formulated today s plan with the house staff team or resident(s) and agree with the findings and plan in the associated note.       The above plans and care have been discussed with no one and all questions and concerns were addressed.     I spent a total of 60 minutes (excluding procedure time) personally providing and directing critical care services at the bedside and on the critical care unit for José Aguirre.        Andrew Garcia MD

## 2019-08-31 NOTE — PROCEDURES
EEG #-2       DATE OF RECORDING/SERVICE DATE:  08/29/2019       SOURCE FILE DURATION:  23 hours 50 minutes 7 seconds.      TYPE OF STUDY:  Inpatient video EEG monitoring.       HISTORY:  Day #2 of video-EEG monitoring in José Merlos, an 83-year-old status post cardiac arrest.  He is comatose and intubated in ICU.  During this study, he was euthermic during the great majority of this study and being treated with fentanyl 50 mcg per hour.  Midazolam was discontinued at approximately 6:00 p.m.      TECHNICAL SUMMARY:  EEG is recorded from scalp electrodes placed according to the 10-20 international system.  Additional electrodes were utilized for referencing, artifact detection, and recording from other cerebral regions.  Video was continuously recorded.  Video was reviewed for clinical correlation and to assist with EEG interpretation.      FINDINGS:  At study onset, runs of generalized theta with a frontal predominance are noted.  There are occasional desynchronizations lasting up to several seconds.  Superimposed irregular diffuse delta.  Stimulation is performed utilizing ICU protocol at around a.m.  This induces irregular delta and a few generalized sharp waves.  As study continues, irregular delta becomes more persistent, especially at around 8:00 p.m.  Infrequent generalized sharp waves are seen.  EEG does remain reactive to staff cares, etc., but the reactivity is abnormal with paroxysmal irregular delta and a few sharp waves.      ICTAL:  No electrographic seizures.      IMPRESSION:  Abnormal.  There are some changes as the patient is warmed and sedative drips are reduced.  Overall, however, study continues consistent with moderate to severe diffuse encephalopathy.  EEG reactivity is present, but the reactivity is abnormal.  Generalized periodic sharp waves are seen, but these are thought to be an expression of the patient's encephalopathy rather than an indication of seizure tendency.  Electrographic  seizures are not seen at any point.         MK HARDING MD             D: 2019   T: 2019   MT: JOSE GUADALUPE      Name:     ROC TAYLOR   MRN:      [id number]        Account:        [id number]   :      1935           Procedure Date: 2019      Document: [id number]

## 2019-08-31 NOTE — PROCEDURES
EEG #-3     TYPE OF STUDY: Inpatient video-EEG monitoring    DATE OF RECORDING/SERVICE DATE:  08/30/2019       SOURCE FILE DURATION:  11 hours 18 minutes 47 seconds.      HISTORY:  Day #3 of the video EEG monitoring  monitoring in José Aguirre, an 83-year-old status post cardiac arrest.  He is comatose and intubated and in ICU.  During this study, he was euthermic.  He was being treated with fentanyl 25 to 50 mcg per hour and dexmedetomidine 0.5 to 0.7 mcg/kg per hour.  In the last 4 hours of the study fentanyl was increased to 100 mcg per hour.  Midazolam was not used.      TECHNICAL SUMMARY:  EEG is recorded from scalp electrodes placed according to the 10-20 international system.  Additional electrodes were utilized for referencing, artifact detection, and recording from other cerebral regions.  Video was continuously recorded.  Video was reviewed for clinical correlation and to assist with EEG interpretation.      FINDINGS:  Largely continuous irregular delta.  Some superimposed alpha.  Amplitudes are moderate.  As study continues, generalized periodic discharges become more persistent.  These take various forms, sometimes having the appearance of triphasic waves, sometimes being frontally predominant, sometimes being posteriorly predominant.  Recurrence rate was typically 1.7 to 1.9 Hz.  These were generally not continuous, but would wax and wane.  Stimulation was performed using ICU protocol at about 8:40 a.m.  This seemed to transiently increase the persistence of generalized periodic sharp waves, but this did not continue for very long      ICTAL ABNORMALITIES:  No electrographic seizures.      IMPRESSION:  Abnormal.  There were some changes from yesterday's study in that EEG was more continuous and the generalized periodic discharges were more frequent.  The periodic discharges did not take on the commonly accepted features of electrographic seizures.  Overall, study remains consistent with moderate to  severe diffuse encephalopathy.  Seizures were not recorded in this study.         MK HARDING MD             D: 2019   T: 2019   MT: CRISTO      Name:     ROC TAYLOR   MRN:      [id number]        Account:        [id number]   :      1935           Procedure Date: 2019      Document: [id number]

## 2019-09-01 ENCOUNTER — APPOINTMENT (OUTPATIENT)
Dept: GENERAL RADIOLOGY | Facility: CLINIC | Age: 84
DRG: 224 | End: 2019-09-01
Attending: STUDENT IN AN ORGANIZED HEALTH CARE EDUCATION/TRAINING PROGRAM
Payer: MEDICARE

## 2019-09-01 LAB
ALBUMIN SERPL-MCNC: 2.2 G/DL (ref 3.4–5)
ALBUMIN SERPL-MCNC: 2.3 G/DL (ref 3.4–5)
ALP SERPL-CCNC: 57 U/L (ref 40–150)
ALP SERPL-CCNC: 57 U/L (ref 40–150)
ALT SERPL W P-5'-P-CCNC: 45 U/L (ref 0–70)
ALT SERPL W P-5'-P-CCNC: 49 U/L (ref 0–70)
ANION GAP SERPL CALCULATED.3IONS-SCNC: 4 MMOL/L (ref 3–14)
ANION GAP SERPL CALCULATED.3IONS-SCNC: 6 MMOL/L (ref 3–14)
AST SERPL W P-5'-P-CCNC: 63 U/L (ref 0–45)
AST SERPL W P-5'-P-CCNC: 70 U/L (ref 0–45)
BASE DEFICIT BLDA-SCNC: 1.4 MMOL/L
BILIRUB SERPL-MCNC: 0.6 MG/DL (ref 0.2–1.3)
BILIRUB SERPL-MCNC: 0.8 MG/DL (ref 0.2–1.3)
BUN SERPL-MCNC: 30 MG/DL (ref 7–30)
BUN SERPL-MCNC: 36 MG/DL (ref 7–30)
CALCIUM SERPL-MCNC: 8.3 MG/DL (ref 8.5–10.1)
CALCIUM SERPL-MCNC: 8.4 MG/DL (ref 8.5–10.1)
CHLORIDE SERPL-SCNC: 118 MMOL/L (ref 94–109)
CHLORIDE SERPL-SCNC: 123 MMOL/L (ref 94–109)
CO2 SERPL-SCNC: 24 MMOL/L (ref 20–32)
CO2 SERPL-SCNC: 26 MMOL/L (ref 20–32)
CREAT SERPL-MCNC: 1.12 MG/DL (ref 0.66–1.25)
CREAT SERPL-MCNC: 1.14 MG/DL (ref 0.66–1.25)
ERYTHROCYTE [DISTWIDTH] IN BLOOD BY AUTOMATED COUNT: 19.5 % (ref 10–15)
ERYTHROCYTE [DISTWIDTH] IN BLOOD BY AUTOMATED COUNT: 19.5 % (ref 10–15)
GFR SERPL CREATININE-BSD FRML MDRD: 59 ML/MIN/{1.73_M2}
GFR SERPL CREATININE-BSD FRML MDRD: 60 ML/MIN/{1.73_M2}
GLUCOSE BLDC GLUCOMTR-MCNC: 118 MG/DL (ref 70–99)
GLUCOSE BLDC GLUCOMTR-MCNC: 126 MG/DL (ref 70–99)
GLUCOSE BLDC GLUCOMTR-MCNC: 129 MG/DL (ref 70–99)
GLUCOSE BLDC GLUCOMTR-MCNC: 144 MG/DL (ref 70–99)
GLUCOSE BLDC GLUCOMTR-MCNC: 157 MG/DL (ref 70–99)
GLUCOSE SERPL-MCNC: 154 MG/DL (ref 70–99)
GLUCOSE SERPL-MCNC: 176 MG/DL (ref 70–99)
HCO3 BLD-SCNC: 22 MMOL/L (ref 21–28)
HCT VFR BLD AUTO: 25.7 % (ref 40–53)
HCT VFR BLD AUTO: 26.2 % (ref 40–53)
HGB BLD-MCNC: 7.8 G/DL (ref 13.3–17.7)
HGB BLD-MCNC: 7.9 G/DL (ref 13.3–17.7)
MAGNESIUM SERPL-MCNC: 2.3 MG/DL (ref 1.6–2.3)
MAGNESIUM SERPL-MCNC: 2.4 MG/DL (ref 1.6–2.3)
MCH RBC QN AUTO: 30.3 PG (ref 26.5–33)
MCH RBC QN AUTO: 30.4 PG (ref 26.5–33)
MCHC RBC AUTO-ENTMCNC: 30.2 G/DL (ref 31.5–36.5)
MCHC RBC AUTO-ENTMCNC: 30.4 G/DL (ref 31.5–36.5)
MCV RBC AUTO: 100 FL (ref 78–100)
MCV RBC AUTO: 100 FL (ref 78–100)
O2/TOTAL GAS SETTING VFR VENT: 60 %
OXYHGB MFR BLD: 95 % (ref 92–100)
PCO2 BLD: 31 MM HG (ref 35–45)
PH BLD: 7.46 PH (ref 7.35–7.45)
PHOSPHATE SERPL-MCNC: 2.4 MG/DL (ref 2.5–4.5)
PHOSPHATE SERPL-MCNC: 2.6 MG/DL (ref 2.5–4.5)
PLATELET # BLD AUTO: 67 10E9/L (ref 150–450)
PLATELET # BLD AUTO: 74 10E9/L (ref 150–450)
PO2 BLD: 82 MM HG (ref 80–105)
POTASSIUM SERPL-SCNC: 3 MMOL/L (ref 3.4–5.3)
POTASSIUM SERPL-SCNC: 3.5 MMOL/L (ref 3.4–5.3)
PROT SERPL-MCNC: 5.9 G/DL (ref 6.8–8.8)
PROT SERPL-MCNC: 6.2 G/DL (ref 6.8–8.8)
RBC # BLD AUTO: 2.57 10E12/L (ref 4.4–5.9)
RBC # BLD AUTO: 2.61 10E12/L (ref 4.4–5.9)
SODIUM SERPL-SCNC: 150 MMOL/L (ref 133–144)
SODIUM SERPL-SCNC: 151 MMOL/L (ref 133–144)
WBC # BLD AUTO: 13.2 10E9/L (ref 4–11)
WBC # BLD AUTO: 14.8 10E9/L (ref 4–11)

## 2019-09-01 PROCEDURE — 25000132 ZZH RX MED GY IP 250 OP 250 PS 637: Mod: GY | Performed by: INTERNAL MEDICINE

## 2019-09-01 PROCEDURE — 99291 CRITICAL CARE FIRST HOUR: CPT | Mod: GC | Performed by: INTERNAL MEDICINE

## 2019-09-01 PROCEDURE — 25000128 H RX IP 250 OP 636: Performed by: STUDENT IN AN ORGANIZED HEALTH CARE EDUCATION/TRAINING PROGRAM

## 2019-09-01 PROCEDURE — 25000125 ZZHC RX 250: Performed by: STUDENT IN AN ORGANIZED HEALTH CARE EDUCATION/TRAINING PROGRAM

## 2019-09-01 PROCEDURE — 25000132 ZZH RX MED GY IP 250 OP 250 PS 637: Mod: GY | Performed by: STUDENT IN AN ORGANIZED HEALTH CARE EDUCATION/TRAINING PROGRAM

## 2019-09-01 PROCEDURE — 25000132 ZZH RX MED GY IP 250 OP 250 PS 637: Mod: GY

## 2019-09-01 PROCEDURE — 25000128 H RX IP 250 OP 636

## 2019-09-01 PROCEDURE — 83735 ASSAY OF MAGNESIUM: CPT | Performed by: STUDENT IN AN ORGANIZED HEALTH CARE EDUCATION/TRAINING PROGRAM

## 2019-09-01 PROCEDURE — 94640 AIRWAY INHALATION TREATMENT: CPT | Mod: 76

## 2019-09-01 PROCEDURE — 25800030 ZZH RX IP 258 OP 636

## 2019-09-01 PROCEDURE — 84100 ASSAY OF PHOSPHORUS: CPT | Performed by: STUDENT IN AN ORGANIZED HEALTH CARE EDUCATION/TRAINING PROGRAM

## 2019-09-01 PROCEDURE — 82805 BLOOD GASES W/O2 SATURATION: CPT

## 2019-09-01 PROCEDURE — 71045 X-RAY EXAM CHEST 1 VIEW: CPT

## 2019-09-01 PROCEDURE — 25800030 ZZH RX IP 258 OP 636: Performed by: STUDENT IN AN ORGANIZED HEALTH CARE EDUCATION/TRAINING PROGRAM

## 2019-09-01 PROCEDURE — 40000275 ZZH STATISTIC RCP TIME EA 10 MIN

## 2019-09-01 PROCEDURE — 20000004 ZZH R&B ICU UMMC

## 2019-09-01 PROCEDURE — 27210432 ZZH NUTRITION PRODUCT RENAL BASIC LITER

## 2019-09-01 PROCEDURE — 80053 COMPREHEN METABOLIC PANEL: CPT | Performed by: STUDENT IN AN ORGANIZED HEALTH CARE EDUCATION/TRAINING PROGRAM

## 2019-09-01 PROCEDURE — 00000146 ZZHCL STATISTIC GLUCOSE BY METER IP

## 2019-09-01 PROCEDURE — 40000014 ZZH STATISTIC ARTERIAL MONITORING DAILY

## 2019-09-01 PROCEDURE — 94640 AIRWAY INHALATION TREATMENT: CPT

## 2019-09-01 PROCEDURE — 85027 COMPLETE CBC AUTOMATED: CPT | Performed by: STUDENT IN AN ORGANIZED HEALTH CARE EDUCATION/TRAINING PROGRAM

## 2019-09-01 PROCEDURE — 94660 CPAP INITIATION&MGMT: CPT

## 2019-09-01 RX ORDER — HEPARIN SODIUM 5000 [USP'U]/.5ML
5000 INJECTION, SOLUTION INTRAVENOUS; SUBCUTANEOUS EVERY 8 HOURS
Status: DISCONTINUED | OUTPATIENT
Start: 2019-09-01 | End: 2019-09-02

## 2019-09-01 RX ORDER — PREDNISONE 20 MG/1
40 TABLET ORAL DAILY
Status: DISCONTINUED | OUTPATIENT
Start: 2019-09-02 | End: 2019-09-06

## 2019-09-01 RX ORDER — FUROSEMIDE 10 MG/ML
60 INJECTION INTRAMUSCULAR; INTRAVENOUS ONCE
Status: COMPLETED | OUTPATIENT
Start: 2019-09-01 | End: 2019-09-01

## 2019-09-01 RX ORDER — HYDRALAZINE HYDROCHLORIDE 25 MG/1
50 TABLET, FILM COATED ORAL 4 TIMES DAILY
Status: DISCONTINUED | OUTPATIENT
Start: 2019-09-01 | End: 2019-09-02

## 2019-09-01 RX ADMIN — PIPERACILLIN SODIUM AND TAZOBACTAM SODIUM 2.25 G: 2; .25 INJECTION, POWDER, LYOPHILIZED, FOR SOLUTION INTRAVENOUS at 10:14

## 2019-09-01 RX ADMIN — FUROSEMIDE 60 MG: 10 INJECTION, SOLUTION INTRAVENOUS at 08:30

## 2019-09-01 RX ADMIN — Medication 1 PACKET: at 08:31

## 2019-09-01 RX ADMIN — HYDROCORTISONE SODIUM SUCCINATE 50 MG: 100 INJECTION, POWDER, FOR SOLUTION INTRAMUSCULAR; INTRAVENOUS at 12:58

## 2019-09-01 RX ADMIN — IPRATROPIUM BROMIDE AND ALBUTEROL SULFATE 3 ML: .5; 3 SOLUTION RESPIRATORY (INHALATION) at 08:16

## 2019-09-01 RX ADMIN — ISOSORBIDE DINITRATE 20 MG: 20 TABLET ORAL at 20:41

## 2019-09-01 RX ADMIN — HYDRALAZINE HYDROCHLORIDE 50 MG: 25 TABLET ORAL at 20:41

## 2019-09-01 RX ADMIN — HYDRALAZINE HYDROCHLORIDE 50 MG: 25 TABLET ORAL at 08:30

## 2019-09-01 RX ADMIN — POTASSIUM CHLORIDE 40 MEQ: 1.5 POWDER, FOR SOLUTION ORAL at 20:42

## 2019-09-01 RX ADMIN — HYDRALAZINE HYDROCHLORIDE 10 MG: 20 INJECTION INTRAMUSCULAR; INTRAVENOUS at 06:32

## 2019-09-01 RX ADMIN — CLOPIDOGREL BISULFATE 75 MG: 75 TABLET, FILM COATED ORAL at 08:30

## 2019-09-01 RX ADMIN — PIPERACILLIN SODIUM AND TAZOBACTAM SODIUM 2.25 G: 2; .25 INJECTION, POWDER, LYOPHILIZED, FOR SOLUTION INTRAVENOUS at 03:27

## 2019-09-01 RX ADMIN — HYDRALAZINE HYDROCHLORIDE 10 MG: 20 INJECTION INTRAMUSCULAR; INTRAVENOUS at 00:39

## 2019-09-01 RX ADMIN — VANCOMYCIN HYDROCHLORIDE 1500 MG: 10 INJECTION, POWDER, LYOPHILIZED, FOR SOLUTION INTRAVENOUS at 08:54

## 2019-09-01 RX ADMIN — POTASSIUM CHLORIDE 20 MEQ: 1.5 POWDER, FOR SOLUTION ORAL at 04:28

## 2019-09-01 RX ADMIN — HYDRALAZINE HYDROCHLORIDE 50 MG: 25 TABLET ORAL at 16:24

## 2019-09-01 RX ADMIN — ASPIRIN 81 MG CHEWABLE TABLET 81 MG: 81 TABLET CHEWABLE at 08:30

## 2019-09-01 RX ADMIN — MULTIVIT AND MINERALS-FERROUS GLUCONATE 9 MG IRON/15 ML ORAL LIQUID 15 ML: at 08:30

## 2019-09-01 RX ADMIN — PIPERACILLIN SODIUM AND TAZOBACTAM SODIUM 2.25 G: 2; .25 INJECTION, POWDER, LYOPHILIZED, FOR SOLUTION INTRAVENOUS at 23:13

## 2019-09-01 RX ADMIN — Medication 40 MG: at 08:31

## 2019-09-01 RX ADMIN — POTASSIUM CHLORIDE 20 MEQ: 1.5 POWDER, FOR SOLUTION ORAL at 23:13

## 2019-09-01 RX ADMIN — IPRATROPIUM BROMIDE AND ALBUTEROL SULFATE 3 ML: .5; 3 SOLUTION RESPIRATORY (INHALATION) at 11:29

## 2019-09-01 RX ADMIN — HYDROCORTISONE SODIUM SUCCINATE 50 MG: 100 INJECTION, POWDER, FOR SOLUTION INTRAMUSCULAR; INTRAVENOUS at 05:44

## 2019-09-01 RX ADMIN — IPRATROPIUM BROMIDE AND ALBUTEROL SULFATE 3 ML: .5; 3 SOLUTION RESPIRATORY (INHALATION) at 21:12

## 2019-09-01 RX ADMIN — ISOSORBIDE DINITRATE 20 MG: 20 TABLET ORAL at 08:30

## 2019-09-01 RX ADMIN — PIPERACILLIN SODIUM AND TAZOBACTAM SODIUM 2.25 G: 2; .25 INJECTION, POWDER, LYOPHILIZED, FOR SOLUTION INTRAVENOUS at 16:23

## 2019-09-01 RX ADMIN — ISOSORBIDE DINITRATE 20 MG: 20 TABLET ORAL at 14:50

## 2019-09-01 RX ADMIN — IPRATROPIUM BROMIDE AND ALBUTEROL SULFATE 3 ML: .5; 3 SOLUTION RESPIRATORY (INHALATION) at 16:12

## 2019-09-01 RX ADMIN — POTASSIUM PHOSPHATE, MONOBASIC AND POTASSIUM PHOSPHATE, DIBASIC 15 MMOL: 224; 236 INJECTION, SOLUTION INTRAVENOUS at 04:50

## 2019-09-01 RX ADMIN — HYDRALAZINE HYDROCHLORIDE 50 MG: 25 TABLET ORAL at 12:58

## 2019-09-01 ASSESSMENT — ACTIVITIES OF DAILY LIVING (ADL)
ADLS_ACUITY_SCORE: 23
ADLS_ACUITY_SCORE: 22

## 2019-09-01 ASSESSMENT — MIFFLIN-ST. JEOR: SCORE: 1487.37

## 2019-09-01 NOTE — PROGRESS NOTES
Cardiology - [facility] Progress Note    Assessment & Plan   83 year old male who was admitted on 8/27/2019 with a cardiac arrest. 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    Plan for today  -continue support with bipap  - switch hydrocortisone to prednisone for COPD exacerbation  - continue abx  - uptitrate antihypertensives  - monitor hemoglobin     Neurology: Initially Cooled to 34 degrees.  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   - extubated 8/30 with good neurologic function post extubation, some dysphagia  -NPO until speech assessment    Cardiovascular / Hemodynamics: Refractory VF arrest s/p  CONCETTA to LM-> LAD, Lcx.  LA 7-> 1.3  TTE: EF 60-70%, RV mildly reduced function no significant valvular disease  EKG: NSR RBBB   --off pressors 8/30pm  --continue ASA 81mg and plavix 75 mg PO QD  --hold lipitor for now given likely hepatic injury during arrest  --hold ACE/ARB for now given likely reduced renal fxn after arrest- will change hydral isordil for this once more stable   Pulmonary:  history of COPD on 2L of O2 with exertion.   Vent Settings: Ventilation Mode: CPAP/PS  (Continuous positive airway pressure with Pressure Support)  FiO2 (%): 50 %  Rate Set (breaths/minute): 18 breaths/min  Tidal Volume Set (mL): 500 mL  PEEP (cm H2O): 5 cmH2O  Pressure Support (cm H2O): 7 cmH2O  Oxygen Concentration (%): 50 %  Resp: 22  CXR: Lines in stable position.  --wean vent as able  --daily CXR  Chest CT- diffuse ground glass opacities bilateral pleural plaques and RLL cavitation with possible emphysema and  fibrosis  --Q4h ABGs for now  --on scheduled duonebs QID  - prednisone for COPD exacerbation  - continue abx    GI and Nutrition: No known medical hx.  --monitor BID LFTs  --postpyloric feeding  --bowel regimen -colace  --GI Prophylaxis: PPI    Renal, Fluid and Electrolytes: SCr 1.7, stable  Monitor creatinine and UOP  --maintain K>3 and Mg>2    Infectious Disease: No signs of infection. Leukocytosis c/w arrest. Blood cultures collected.   --vancomycin/zosyn x5 days for arrest  --follow blood cultures- pan cultured yesterday and no growth to date  -stress dose hydrocortisone 50mg q8h started 8/30- 9/1- transitioned to PO pred    Hematology and Oncology: ASA/plavix for CONCETTA.   - left chest wall hematoma likely source of bleeding  --SQH TID for ppx- on hold given decrease in plts- CONSIDER RESTARTING IN THE AM if hgb stable   Endocrinology: Diabetic with an A1c of 6.6 in 2017- was considered pre diabetic and this has since resolved A1c on admission 5.9  No known medical history. BG elevated.  --insulin gtt   -- non severe malnutrition- holding oral feeding given possible aspiration    Lines: PICC line RUE August 29 2019  R radial arterial line August 29,2019  Mcclelland catheter August 27, 2019  Current lines are required for patient management       Family update by me today: Yes      Code Status: Full     The pt was discussed and evaluated with Dr. Radha MD, attending physician, who agrees with the assessment and plan above.     Jenni Loera    Interval History     Off pressors   On BiPAP slowly weaning down settings   CT scan showed left chest wall hematoma    Ventilation Mode: CPAP/PS  (Continuous positive airway pressure with Pressure Support)  FiO2 (%): 50 %  Rate Set (breaths/minute): 18 breaths/min  Tidal Volume Set (mL): 500 mL  PEEP (cm H2O): 5 cmH2O  Pressure Support (cm H2O): 7 cmH2O  Oxygen Concentration (%): 50 %  Resp: 22    PH 7.39/jhZ627 Co2 36 [id number]    Physical Exam   Temp: 97.9  F  "(36.6  C) Temp src: Axillary BP: 101/60   Heart Rate: 97 Resp: 22 SpO2: 92 % O2 Device: BiPAP/CPAP    Vitals:    08/29/19 0000 08/31/19 0400 09/01/19 0000   Weight: 78.5 kg (173 lb 1 oz) 82.2 kg (181 lb 3.5 oz) 81.8 kg (180 lb 5.4 oz)     Vital Signs with Ranges  Temp:  [97.9  F (36.6  C)-99.9  F (37.7  C)] 97.9  F (36.6  C)  Heart Rate:  [] 97  Resp:  [18-28] 22  BP: (101)/(60) 101/60  MAP:  [64 mmHg-118 mmHg] 77 mmHg  Arterial Line BP: (109-179)/(37-79) 137/48  FiO2 (%):  [50 %-90 %] 50 %  SpO2:  [76 %-100 %] 92 %  I/O last 3 completed shifts:  In: 2709.23 [I.V.:1339.23; NG/GT:420]  Out: 1510 [Urine:1285; Emesis/NG output:225]    Heart Rate: 97, Blood pressure 101/60, pulse 84, temperature 97.9  F (36.6  C), temperature source Axillary, resp. rate 22, height 1.727 m (5' 7.99\"), weight 81.8 kg (180 lb 5.4 oz), SpO2 92 %.  180 lbs 5.38 oz  GEN:  Alert on BiPAP in no distress  CV:  Regular rate and rhythm, no murmur or JVD.  S1 + S2 noted, no S3 or S4.  LUNGS:  Mechanically ventilated with decreased breath sounds   ABD:  Active bowel sounds, soft, non-tender/non-distended.  No rebound/guarding/rigidity.  EXT:  No edema or cyanosis.     Medications     dexmedetomidine Stopped (08/31/19 1232)     IV fluid REPLACEMENT ONLY       IV fluid REPLACEMENT ONLY       fentaNYL Stopped (08/31/19 1233)     - MEDICATION INSTRUCTIONS -       - MEDICATION INSTRUCTIONS -       midazolam Stopped (08/29/19 1600)     norepinephrine Stopped (08/30/19 1249)     Percutaneous Coronary Intervention orders placed (this is information for BPA alerting)       ACE/ARB/ARNI NOT PRESCRIBED       BETA BLOCKER NOT PRESCRIBED       vasopressin (PITRESSIN) infusion ADULT (40 mL) Stopped (08/31/19 0036)       aspirin  81 mg Oral Daily     clopidogrel  75 mg Oral Daily     heparin lock flush  5-10 mL Intracatheter Q24H     hydrALAZINE  50 mg Oral 4x Daily     hydrocortisone sodium succinate PF  50 mg Intravenous Q8H     insulin aspart  1-7 Units " Subcutaneous TID AC     insulin aspart  1-5 Units Subcutaneous At Bedtime     ipratropium - albuterol 0.5 mg/2.5 mg/3 mL  3 mL Nebulization 4x daily     isosorbide dinitrate  20 mg Oral TID     multivitamins w/minerals  15 mL Per Feeding Tube Daily     pantoprazole  40 mg Oral or Feeding Tube Daily     piperacillin-tazobactam  2.25 g Intravenous Q6H     protein modular  1 packet Per Feeding Tube Daily     senna-docusate  1 tablet Oral or Feeding Tube At Bedtime     vancomycin (VANCOCIN) IV  1,500 mg Intravenous Q24H       Data   Recent Labs   Lab 09/01/19  0328 08/31/19  1622 08/31/19  0934 08/31/19  0330  08/28/19  1357  08/28/19  0413   WBC 13.2* 14.5* 10.1 9.4   < >  --   --  27.4*   HGB 7.8* 8.5* 7.8* 7.6*   < >  --   --  9.9*    101* 100 99   < >  --   --  103*   PLT 67* 78* 69* 68*   < >  --   --  211   INR  --   --   --  1.40*  --  1.46*  --  1.55*   * 151*  --  147*   < >  --    < > 146*   POTASSIUM 3.5 3.6  --  3.9   < >  --    < > 4.1   CHLORIDE 123* 122*  --  119*   < >  --    < > 117*   CO2 24 22  --  20   < >  --    < > 20   BUN 30 31*  --  34*   < >  --    < > 33*   CR 1.12 1.22  --  1.31*   < >  --    < > 1.84*   ANIONGAP 4 7  --  7   < >  --    < > 9   YOON 8.4* 8.5  --  8.2*   < >  --    < > 7.6*   * 127*  --  152*   < >  --    < > 123*   ALBUMIN 2.2* 2.4*  --  2.2*   < >  --   --  2.4*   PROTTOTAL 5.9* 6.4*  --  5.8*   < >  --   --  5.8*   BILITOTAL 0.6 0.8  --  0.6   < >  --   --  1.1   ALKPHOS 57 61  --  59   < >  --   --  65   ALT 45 48  --  46   < >  --   --  69   AST 70* 76*  --  76*   < >  --   --  78*    < > = values in this interval not displayed.       Recent Results (from the past 24 hour(s))   XR Abdomen Port 1 View    Narrative    XR ABDOMEN PORT 1 VW8/31/2019 2:23 PM    INDICATION: POST EXTUBATION PPFT PLACEMENT    COMPARISON:  CT 8/31/2019, same-day chest x-ray    FINDINGS: AP view of the abdomen. Feeding tube projects with tip in  the third part of the duodenum.  Nonobstructive bowel gas pattern. No  free air identified. No pneumatosis or portal venous air. Left-sided  pleural effusion. Support devices with the chest in similar position.  Left-sided pleural effusion and patchy airspace opacities.      Impression    IMPRESSION:   1. Feeding tube projects with tip in adequate position.  2. Left-sided pleural effusion with  basilar atelectasis versus  consolidation.    I have personally reviewed the examination and initial interpretation  and I agree with the findings.    YAJAIRA WALLS MD   XR Chest Port 1 View    Narrative    Exam: XR CHEST PORT 1 VW, 9/1/2019 8:28 AM    Indication: shortness of breath post extubation    Comparison: 8/31/2019    Findings:   Right PICC tip projects over the mid SVC. Enteric tube courses below  the field of view. Endotracheal tube has been removed. Stable  cardiomegaly. Shifting bilateral mixed interstitial/airspace  opacities. Small bilateral pleural effusions with bibasilar opacities  are unchanged. No pneumothorax.      Impression    Impression:   1. Shifting bilateral mixed interstitial/airspace opacities are  favored to represent pulmonary edema.  2. Cardiomegaly with unchanged small bilateral pleural effusions and  bibasilar opacities, atelectasis versus consolidation.    LENNOX KOTHARI MD         Critical Care Services Progress Note:     José Aguirre remains critically ill with cardiac arrhythmia, severe respiratory distress and shock     I personally examined and evaluated the patient today.   The patient s prognosis today is grave  I have evaluated all laboratory values and imaging studies from the past 24 hours.  Key findings and decisions made today included   -continue support with bipap  - switch hydrocortisone to prednisone for COPD exacerbation  - continue abx  - uptitrate antihypertensives  - monitor hemoglobin  I personally managed the sedation, pain control and analgesia, metabolic abnormalities, nutritional status and  non-invasive positive pressure ventilator.   Consults ongoing and ordered are none  Procedures that will happen today are: none  All treatments were placed at my direction.  I formulated today s plan with the house staff team or resident(s) and agree with the findings and plan in the associated note.       The above plans and care have been discussed with family and all questions and concerns were addressed.     I spent a total of 45 minutes (excluding procedure time) personally providing and directing critical care services at the bedside and on the critical care unit for José Aguirre.        Andrew Garcia MD

## 2019-09-01 NOTE — PLAN OF CARE
Acute events 5085-1387  : none    Neuro: Confused but reorientable. Oriented to self and partially to location/situation. No complaints of pain. Alert and watching TV most of the day.     Cardiac: Sinus rhythm with bundle branch block and inverted ST in lead V. PVCs occasionally.  BPM. Blood pressure 110-140s systolic. Afebrile.     Pulm: Lung sounds coarse with rhonchi and expiratory wheezing. Nebulized treatments given. Productive cough, fair effort, with thick, red-streaked sputum. BiPAP dependent, though was able to wean down to 10/5 and 45% FiO2. Oxygen desaturates significantly with BiPAP mask off for >30 seconds. Significant dyspnea and hypoxia with exertion.     Renal: Large urine output via silva catheter. Lasix x1. Potassium and phosphorus replaced. Sodium elevated.    Endo/GI: Blood glucose 140-150s. Unable to tolerate oral intake due to respiratory status and high risk for aspiration. Tube feeds via NJ at 50mL/hr. Increased free water flush this afternoon for high sodium levels. Liquid bowel movement today.    Integu: Diffuse bruising, especially on L side from cardiac resuscitation. Abrasions and bruising on all extremities and face.     Musc: Up with 2 assist and gait belt, significant dyspnea with exertion. PT/OT consulted for tomorrow.    Lines/Devices: PICC double lumen, PIV, radial arterial line, silva catheter    Plan:  Wean BiPAP and increase mobility.

## 2019-09-01 NOTE — PLAN OF CARE
Neuro - Pt intermittently confused to time and situation, reoriented, afebrile, denies pain.  Cardiac - SR/ST, K, Phos replaced. -170, Hydralazine given.  Respiratory - Pt on BIPAP 60% FiO2, coughing up thick red-streaked secretions.  GI - TF at goal 50ml/hr through NJ, free water flushes 60ml/4hr.   - Mcclelland with 40-50ml/hr output.  Skin - Bruised throughout, right elbow skin tare.  Family - Updated sister.  Plan - Continue to monitor and notify MD of questions or concerns.

## 2019-09-01 NOTE — PHARMACY-VANCOMYCIN DOSING SERVICE
Pharmacy [facility] Dose Change Per Policy - vancomycin  Original Dose Ordered: intermittent dosing  Dose Changed To: 1500 mg IV q24 hours  This dose change was based on the pharmacist's assessment of this patient's age, weight, concurrent drug therapy, treatment goals, whether patient's creatinine clearance adequately indicates renal function (factoring in age, muscle mass, fluid and clinical status), and, if applicable, prior pharmacokinetic data.      Estimated Creatinine Clearance: 57.8 mL/min (based on SCr of 1.12 mg/dL).  Will continue to follow and modify dosage according to levels, organ function and clinical condition    Johnson Degroot, PharmD

## 2019-09-01 NOTE — SIGNIFICANT EVENT
SPIRITUAL HEALTH SERVICES  SPIRITUAL ASSESSMENT Progress Note  [facility] ([place]) 4E     REFERRAL SOURCE: Length of stay    Pt was sitting in his chair with oxygen mask on which made it difficult for the pt to speak and for me hear him. I introduced myself/spiritual health services. The pt accepted my offer to anoint him. We prayed and I anointed him.    PATIENT ANOINTED by Father Cam Case 9/1/2019     PLAN: Will  Visit weekly while on 4th.     Cam Case M.Div.     Pager [phone]

## 2019-09-02 ENCOUNTER — APPOINTMENT (OUTPATIENT)
Dept: SPEECH THERAPY | Facility: CLINIC | Age: 84
DRG: 224 | End: 2019-09-02
Payer: MEDICARE

## 2019-09-02 ENCOUNTER — APPOINTMENT (OUTPATIENT)
Dept: PHYSICAL THERAPY | Facility: CLINIC | Age: 84
DRG: 224 | End: 2019-09-02
Attending: STUDENT IN AN ORGANIZED HEALTH CARE EDUCATION/TRAINING PROGRAM
Payer: MEDICARE

## 2019-09-02 LAB
ALBUMIN SERPL-MCNC: 2.2 G/DL (ref 3.4–5)
ALBUMIN SERPL-MCNC: 2.3 G/DL (ref 3.4–5)
ALP SERPL-CCNC: 51 U/L (ref 40–150)
ALP SERPL-CCNC: 57 U/L (ref 40–150)
ALT SERPL W P-5'-P-CCNC: 40 U/L (ref 0–70)
ALT SERPL W P-5'-P-CCNC: 46 U/L (ref 0–70)
ANION GAP SERPL CALCULATED.3IONS-SCNC: 9 MMOL/L (ref 3–14)
AST SERPL W P-5'-P-CCNC: 50 U/L (ref 0–45)
AST SERPL W P-5'-P-CCNC: 52 U/L (ref 0–45)
BACTERIA SPEC CULT: NO GROWTH
BACTERIA SPEC CULT: NO GROWTH
BASE DEFICIT BLDA-SCNC: 0.4 MMOL/L
BASE EXCESS BLDA CALC-SCNC: 0.8 MMOL/L
BASE EXCESS BLDA CALC-SCNC: 1.7 MMOL/L
BILIRUB SERPL-MCNC: 0.7 MG/DL (ref 0.2–1.3)
BILIRUB SERPL-MCNC: 0.9 MG/DL (ref 0.2–1.3)
BUN SERPL-MCNC: 41 MG/DL (ref 7–30)
BUN SERPL-MCNC: 43 MG/DL (ref 7–30)
BUN SERPL-MCNC: 44 MG/DL (ref 7–30)
CALCIUM SERPL-MCNC: 7.9 MG/DL (ref 8.5–10.1)
CALCIUM SERPL-MCNC: 8.2 MG/DL (ref 8.5–10.1)
CALCIUM SERPL-MCNC: 8.6 MG/DL (ref 8.5–10.1)
CHLORIDE SERPL-SCNC: 115 MMOL/L (ref 94–109)
CHLORIDE SERPL-SCNC: 119 MMOL/L (ref 94–109)
CHLORIDE SERPL-SCNC: 120 MMOL/L (ref 94–109)
CO2 SERPL-SCNC: 22 MMOL/L (ref 20–32)
CO2 SERPL-SCNC: 24 MMOL/L (ref 20–32)
CO2 SERPL-SCNC: 25 MMOL/L (ref 20–32)
CREAT SERPL-MCNC: 1.08 MG/DL (ref 0.66–1.25)
CREAT SERPL-MCNC: 1.09 MG/DL (ref 0.66–1.25)
CREAT SERPL-MCNC: 1.15 MG/DL (ref 0.66–1.25)
ERYTHROCYTE [DISTWIDTH] IN BLOOD BY AUTOMATED COUNT: 19.1 % (ref 10–15)
ERYTHROCYTE [DISTWIDTH] IN BLOOD BY AUTOMATED COUNT: 19.3 % (ref 10–15)
GFR SERPL CREATININE-BSD FRML MDRD: 58 ML/MIN/{1.73_M2}
GFR SERPL CREATININE-BSD FRML MDRD: 62 ML/MIN/{1.73_M2}
GFR SERPL CREATININE-BSD FRML MDRD: 63 ML/MIN/{1.73_M2}
GLUCOSE BLDC GLUCOMTR-MCNC: 132 MG/DL (ref 70–99)
GLUCOSE BLDC GLUCOMTR-MCNC: 132 MG/DL (ref 70–99)
GLUCOSE BLDC GLUCOMTR-MCNC: 146 MG/DL (ref 70–99)
GLUCOSE BLDC GLUCOMTR-MCNC: 165 MG/DL (ref 70–99)
GLUCOSE BLDC GLUCOMTR-MCNC: 188 MG/DL (ref 70–99)
GLUCOSE SERPL-MCNC: 146 MG/DL (ref 70–99)
GLUCOSE SERPL-MCNC: 147 MG/DL (ref 70–99)
GLUCOSE SERPL-MCNC: 191 MG/DL (ref 70–99)
HCO3 BLD-SCNC: 22 MMOL/L (ref 21–28)
HCO3 BLD-SCNC: 24 MMOL/L (ref 21–28)
HCO3 BLD-SCNC: 24 MMOL/L (ref 21–28)
HCT VFR BLD AUTO: 25.6 % (ref 40–53)
HCT VFR BLD AUTO: 25.7 % (ref 40–53)
HGB BLD-MCNC: 7.4 G/DL (ref 13.3–17.7)
HGB BLD-MCNC: 7.8 G/DL (ref 13.3–17.7)
Lab: NORMAL
Lab: NORMAL
MAGNESIUM SERPL-MCNC: 2 MG/DL (ref 1.6–2.3)
MAGNESIUM SERPL-MCNC: 2.3 MG/DL (ref 1.6–2.3)
MCH RBC QN AUTO: 29.5 PG (ref 26.5–33)
MCH RBC QN AUTO: 30.2 PG (ref 26.5–33)
MCHC RBC AUTO-ENTMCNC: 28.9 G/DL (ref 31.5–36.5)
MCHC RBC AUTO-ENTMCNC: 30.4 G/DL (ref 31.5–36.5)
MCV RBC AUTO: 100 FL (ref 78–100)
MCV RBC AUTO: 102 FL (ref 78–100)
O2/TOTAL GAS SETTING VFR VENT: 100 %
O2/TOTAL GAS SETTING VFR VENT: 50 %
O2/TOTAL GAS SETTING VFR VENT: 80 %
PCO2 BLD: 28 MM HG (ref 35–45)
PCO2 BLD: 28 MM HG (ref 35–45)
PCO2 BLD: 31 MM HG (ref 35–45)
PH BLD: 7.5 PH (ref 7.35–7.45)
PH BLD: 7.51 PH (ref 7.35–7.45)
PH BLD: 7.54 PH (ref 7.35–7.45)
PHOSPHATE SERPL-MCNC: 2.2 MG/DL (ref 2.5–4.5)
PHOSPHATE SERPL-MCNC: 2.9 MG/DL (ref 2.5–4.5)
PLATELET # BLD AUTO: 100 10E9/L (ref 150–450)
PLATELET # BLD AUTO: 69 10E9/L (ref 150–450)
PO2 BLD: 109 MM HG (ref 80–105)
PO2 BLD: 50 MM HG (ref 80–105)
PO2 BLD: 72 MM HG (ref 80–105)
POTASSIUM SERPL-SCNC: 3.2 MMOL/L (ref 3.4–5.3)
POTASSIUM SERPL-SCNC: 3.8 MMOL/L (ref 3.4–5.3)
POTASSIUM SERPL-SCNC: 3.8 MMOL/L (ref 3.4–5.3)
POTASSIUM SERPL-SCNC: 4 MMOL/L (ref 3.4–5.3)
PROT SERPL-MCNC: 5.8 G/DL (ref 6.8–8.8)
PROT SERPL-MCNC: 6.2 G/DL (ref 6.8–8.8)
RBC # BLD AUTO: 2.51 10E12/L (ref 4.4–5.9)
RBC # BLD AUTO: 2.58 10E12/L (ref 4.4–5.9)
SODIUM SERPL-SCNC: 147 MMOL/L (ref 133–144)
SODIUM SERPL-SCNC: 149 MMOL/L (ref 133–144)
SODIUM SERPL-SCNC: 150 MMOL/L (ref 133–144)
SODIUM SERPL-SCNC: 152 MMOL/L (ref 133–144)
SODIUM SERPL-SCNC: 153 MMOL/L (ref 133–144)
SPECIMEN SOURCE: NORMAL
SPECIMEN SOURCE: NORMAL
WBC # BLD AUTO: 10.6 10E9/L (ref 4–11)
WBC # BLD AUTO: 17.7 10E9/L (ref 4–11)

## 2019-09-02 PROCEDURE — 25800030 ZZH RX IP 258 OP 636: Performed by: STUDENT IN AN ORGANIZED HEALTH CARE EDUCATION/TRAINING PROGRAM

## 2019-09-02 PROCEDURE — 99291 CRITICAL CARE FIRST HOUR: CPT | Mod: GC | Performed by: INTERNAL MEDICINE

## 2019-09-02 PROCEDURE — 94667 MNPJ CHEST WALL 1ST: CPT

## 2019-09-02 PROCEDURE — 84295 ASSAY OF SERUM SODIUM: CPT | Performed by: STUDENT IN AN ORGANIZED HEALTH CARE EDUCATION/TRAINING PROGRAM

## 2019-09-02 PROCEDURE — 84132 ASSAY OF SERUM POTASSIUM: CPT | Performed by: STUDENT IN AN ORGANIZED HEALTH CARE EDUCATION/TRAINING PROGRAM

## 2019-09-02 PROCEDURE — 25000132 ZZH RX MED GY IP 250 OP 250 PS 637: Mod: GY | Performed by: STUDENT IN AN ORGANIZED HEALTH CARE EDUCATION/TRAINING PROGRAM

## 2019-09-02 PROCEDURE — 25000128 H RX IP 250 OP 636: Performed by: STUDENT IN AN ORGANIZED HEALTH CARE EDUCATION/TRAINING PROGRAM

## 2019-09-02 PROCEDURE — 97530 THERAPEUTIC ACTIVITIES: CPT | Mod: GP

## 2019-09-02 PROCEDURE — 97162 PT EVAL MOD COMPLEX 30 MIN: CPT | Mod: GP

## 2019-09-02 PROCEDURE — 92610 EVALUATE SWALLOWING FUNCTION: CPT | Mod: GN

## 2019-09-02 PROCEDURE — 84100 ASSAY OF PHOSPHORUS: CPT | Performed by: STUDENT IN AN ORGANIZED HEALTH CARE EDUCATION/TRAINING PROGRAM

## 2019-09-02 PROCEDURE — 25000125 ZZHC RX 250: Performed by: STUDENT IN AN ORGANIZED HEALTH CARE EDUCATION/TRAINING PROGRAM

## 2019-09-02 PROCEDURE — 40000014 ZZH STATISTIC ARTERIAL MONITORING DAILY

## 2019-09-02 PROCEDURE — 94640 AIRWAY INHALATION TREATMENT: CPT

## 2019-09-02 PROCEDURE — 25000131 ZZH RX MED GY IP 250 OP 636 PS 637: Mod: GY | Performed by: STUDENT IN AN ORGANIZED HEALTH CARE EDUCATION/TRAINING PROGRAM

## 2019-09-02 PROCEDURE — 85027 COMPLETE CBC AUTOMATED: CPT | Performed by: STUDENT IN AN ORGANIZED HEALTH CARE EDUCATION/TRAINING PROGRAM

## 2019-09-02 PROCEDURE — 80048 BASIC METABOLIC PNL TOTAL CA: CPT

## 2019-09-02 PROCEDURE — 25000132 ZZH RX MED GY IP 250 OP 250 PS 637: Mod: GY | Performed by: NURSE PRACTITIONER

## 2019-09-02 PROCEDURE — 80053 COMPREHEN METABOLIC PANEL: CPT | Performed by: STUDENT IN AN ORGANIZED HEALTH CARE EDUCATION/TRAINING PROGRAM

## 2019-09-02 PROCEDURE — 25000128 H RX IP 250 OP 636

## 2019-09-02 PROCEDURE — 00000146 ZZHCL STATISTIC GLUCOSE BY METER IP

## 2019-09-02 PROCEDURE — 25000132 ZZH RX MED GY IP 250 OP 250 PS 637: Mod: GY | Performed by: INTERNAL MEDICINE

## 2019-09-02 PROCEDURE — 82803 BLOOD GASES ANY COMBINATION: CPT

## 2019-09-02 PROCEDURE — 94660 CPAP INITIATION&MGMT: CPT

## 2019-09-02 PROCEDURE — 25000132 ZZH RX MED GY IP 250 OP 250 PS 637: Mod: GY

## 2019-09-02 PROCEDURE — 93010 ELECTROCARDIOGRAM REPORT: CPT | Performed by: INTERNAL MEDICINE

## 2019-09-02 PROCEDURE — 92526 ORAL FUNCTION THERAPY: CPT | Mod: GN

## 2019-09-02 PROCEDURE — 83735 ASSAY OF MAGNESIUM: CPT | Performed by: STUDENT IN AN ORGANIZED HEALTH CARE EDUCATION/TRAINING PROGRAM

## 2019-09-02 PROCEDURE — 82803 BLOOD GASES ANY COMBINATION: CPT | Performed by: STUDENT IN AN ORGANIZED HEALTH CARE EDUCATION/TRAINING PROGRAM

## 2019-09-02 PROCEDURE — 25800030 ZZH RX IP 258 OP 636

## 2019-09-02 PROCEDURE — 94640 AIRWAY INHALATION TREATMENT: CPT | Mod: 76

## 2019-09-02 PROCEDURE — 93005 ELECTROCARDIOGRAM TRACING: CPT

## 2019-09-02 PROCEDURE — 40000275 ZZH STATISTIC RCP TIME EA 10 MIN

## 2019-09-02 PROCEDURE — 20000004 ZZH R&B ICU UMMC

## 2019-09-02 RX ORDER — HYDRALAZINE HYDROCHLORIDE 25 MG/1
50 TABLET, FILM COATED ORAL 4 TIMES DAILY
Status: DISCONTINUED | OUTPATIENT
Start: 2019-09-02 | End: 2019-09-03

## 2019-09-02 RX ORDER — FUROSEMIDE 10 MG/ML
60 INJECTION INTRAMUSCULAR; INTRAVENOUS ONCE
Status: COMPLETED | OUTPATIENT
Start: 2019-09-02 | End: 2019-09-02

## 2019-09-02 RX ORDER — HYDRALAZINE HYDROCHLORIDE 25 MG/1
75 TABLET, FILM COATED ORAL 4 TIMES DAILY
Status: DISCONTINUED | OUTPATIENT
Start: 2019-09-02 | End: 2019-09-02

## 2019-09-02 RX ORDER — ISOSORBIDE DINITRATE 20 MG/1
40 TABLET ORAL 3 TIMES DAILY
Status: DISCONTINUED | OUTPATIENT
Start: 2019-09-02 | End: 2019-09-03

## 2019-09-02 RX ORDER — ACETYLCYSTEINE 200 MG/ML
2 SOLUTION ORAL; RESPIRATORY (INHALATION)
Status: DISCONTINUED | OUTPATIENT
Start: 2019-09-02 | End: 2019-09-12

## 2019-09-02 RX ORDER — HYDRALAZINE HYDROCHLORIDE 20 MG/ML
10 INJECTION INTRAMUSCULAR; INTRAVENOUS EVERY 4 HOURS PRN
Status: DISCONTINUED | OUTPATIENT
Start: 2019-09-02 | End: 2019-09-19 | Stop reason: HOSPADM

## 2019-09-02 RX ADMIN — Medication 2 G: at 17:59

## 2019-09-02 RX ADMIN — VANCOMYCIN HYDROCHLORIDE 1500 MG: 10 INJECTION, POWDER, LYOPHILIZED, FOR SOLUTION INTRAVENOUS at 09:04

## 2019-09-02 RX ADMIN — ISOSORBIDE DINITRATE 40 MG: 20 TABLET ORAL at 20:05

## 2019-09-02 RX ADMIN — ACETYLCYSTEINE 2 ML: 200 SOLUTION ORAL; RESPIRATORY (INHALATION) at 20:38

## 2019-09-02 RX ADMIN — Medication 1 PACKET: at 08:01

## 2019-09-02 RX ADMIN — POTASSIUM CHLORIDE 20 MEQ: 1.5 POWDER, FOR SOLUTION ORAL at 06:44

## 2019-09-02 RX ADMIN — ASPIRIN 81 MG CHEWABLE TABLET 81 MG: 81 TABLET CHEWABLE at 07:50

## 2019-09-02 RX ADMIN — ISOSORBIDE DINITRATE 20 MG: 20 TABLET ORAL at 07:50

## 2019-09-02 RX ADMIN — ISOSORBIDE DINITRATE 40 MG: 20 TABLET ORAL at 14:17

## 2019-09-02 RX ADMIN — POTASSIUM CHLORIDE 20 MEQ: 1.5 POWDER, FOR SOLUTION ORAL at 12:47

## 2019-09-02 RX ADMIN — MULTIVIT AND MINERALS-FERROUS GLUCONATE 9 MG IRON/15 ML ORAL LIQUID 15 ML: at 07:50

## 2019-09-02 RX ADMIN — IPRATROPIUM BROMIDE AND ALBUTEROL SULFATE 3 ML: .5; 3 SOLUTION RESPIRATORY (INHALATION) at 07:55

## 2019-09-02 RX ADMIN — HYDRALAZINE HYDROCHLORIDE 10 MG: 20 INJECTION INTRAMUSCULAR; INTRAVENOUS at 09:28

## 2019-09-02 RX ADMIN — HYDRALAZINE HYDROCHLORIDE 50 MG: 25 TABLET ORAL at 20:05

## 2019-09-02 RX ADMIN — IPRATROPIUM BROMIDE AND ALBUTEROL SULFATE 3 ML: .5; 3 SOLUTION RESPIRATORY (INHALATION) at 16:40

## 2019-09-02 RX ADMIN — IPRATROPIUM BROMIDE AND ALBUTEROL SULFATE 3 ML: .5; 3 SOLUTION RESPIRATORY (INHALATION) at 20:38

## 2019-09-02 RX ADMIN — IPRATROPIUM BROMIDE AND ALBUTEROL SULFATE 3 ML: .5; 3 SOLUTION RESPIRATORY (INHALATION) at 13:35

## 2019-09-02 RX ADMIN — Medication 3 MG: at 00:23

## 2019-09-02 RX ADMIN — ACETYLCYSTEINE 2 ML: 200 SOLUTION ORAL; RESPIRATORY (INHALATION) at 16:40

## 2019-09-02 RX ADMIN — SENNOSIDES AND DOCUSATE SODIUM 1 TABLET: 8.6; 5 TABLET ORAL at 21:40

## 2019-09-02 RX ADMIN — FUROSEMIDE 60 MG: 10 INJECTION, SOLUTION INTRAVENOUS at 09:01

## 2019-09-02 RX ADMIN — PREDNISONE 40 MG: 20 TABLET ORAL at 07:50

## 2019-09-02 RX ADMIN — Medication 40 MG: at 08:01

## 2019-09-02 RX ADMIN — POTASSIUM CHLORIDE 40 MEQ: 1.5 POWDER, FOR SOLUTION ORAL at 05:04

## 2019-09-02 RX ADMIN — HYDRALAZINE HYDROCHLORIDE 50 MG: 25 TABLET ORAL at 07:50

## 2019-09-02 RX ADMIN — POTASSIUM PHOSPHATE, MONOBASIC AND POTASSIUM PHOSPHATE, DIBASIC 15 MMOL: 224; 236 INJECTION, SOLUTION INTRAVENOUS at 06:40

## 2019-09-02 RX ADMIN — PIPERACILLIN SODIUM AND TAZOBACTAM SODIUM 2.25 G: 2; .25 INJECTION, POWDER, LYOPHILIZED, FOR SOLUTION INTRAVENOUS at 04:12

## 2019-09-02 RX ADMIN — POTASSIUM CHLORIDE 20 MEQ: 1.5 POWDER, FOR SOLUTION ORAL at 09:04

## 2019-09-02 RX ADMIN — CLOPIDOGREL BISULFATE 75 MG: 75 TABLET, FILM COATED ORAL at 07:50

## 2019-09-02 RX ADMIN — POTASSIUM CHLORIDE 20 MEQ: 29.8 INJECTION, SOLUTION INTRAVENOUS at 17:53

## 2019-09-02 ASSESSMENT — ACTIVITIES OF DAILY LIVING (ADL)
ADLS_ACUITY_SCORE: 22
ADLS_ACUITY_SCORE: 16
ADLS_ACUITY_SCORE: 20
ADLS_ACUITY_SCORE: 22

## 2019-09-02 NOTE — PLAN OF CARE
NEURO: Pt is alert and follows commands. Appears to be disoriented to time and situation intermittently but is able to report why he is in the hospital. Denies numbness and tingling.   Afebrile this shift.  CV: Sinus rhythm HR 70-80's. Occasional PVC's. BP stable.   Potassium of 3.2 replaced. Phos of 2.2 replaced.  Heparin verbal order to hold from Maite DUVAL overnight. Day team to reevaluate morning Hgb and Plt.  PULM: Pt is on BiPAP. LS rhonchi/exp wheezes. Coughing scant amount of clear secretions.   GI: NPO.TF @ goal. 120mL FWF q4h. Sodium elevated. Loose BM on eves, passing gas.  : Mcclelland output roughly 50-70cc/hr.   SKIN: Bruising generalized. Right elbow skin tear. Blanchable coccyx area. Repositioning q2h, pt does also reposition himself at times.   Up with assist of 2 and gait belt.     PLAN: Trend labs. Monitor sodium level. Update MD with any changes.

## 2019-09-02 NOTE — PLAN OF CARE
OT 4E: Cx today, pt with poor activity tolerance and tachypnea with activity per PT. PT is following for CR, OT will hold today and attempt tomorrow.

## 2019-09-02 NOTE — PLAN OF CARE
PT 4E / Discharge Planner PT   Patient plan for discharge: not discussed today  Current status: PT evaluation completed. Patient completes supine rolling and supine>sit with mod A, sit<>stand and pivots to chair with min Ax2. Tachypneic at rest and with activity RR 30s-40s. SpO2>90% on BIPAP FiO2 45-60%, HR 90s with PVCs, Hypertensive -160s.   Barriers to return to prior living situation: acute medical needs, deconditioning, level of assist  Recommendations for discharge: rehab  Rationale for recommendations: Patient is below baseline for mobility, will require continued skilled therapy to progress strength, balance, activity tolerance and functional independence.        Entered by: Yakov Schaeffer 09/02/2019 10:51 AM

## 2019-09-02 NOTE — PROGRESS NOTES
"   09/02/19 1556   General Information   Onset Date 08/27/19   Start of Care Date 09/02/19   Referring Physician Jenni Smith MD   Patient Profile Review/OT: Additional Occupational Profile Info See Profile for full history and prior level of function   Patient/Family Goals Statement Pt wants to be able to eat and drink   Swallowing Evaluation Bedside swallow evaluation   Behaviorial Observations WFL (within functional limits)   Mode of current nutrition NPO   Respiratory Status O2 Supply   Type of O2 supply   (high flow nasal cannula: 30L @ 80% FiO2)   Comments Orders received for swallow evaluation. Pt admitted on 8/27/2019 with a cardiac arrest. 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 Pt reports breathing feels \"really great today\" despite ongoing need for significant O2 support   Clinical Swallow Evaluation   Oral Musculature generally intact   Structural Abnormalities none present   Dentition   (has dentures, but not in place for evaluation)   Mucosal Quality good   Mandibular Strength and Mobility intact   Oral Labial Strength and Mobility WFL   Lingual Strength and Mobility WFL   Velar Elevation intact   Buccal Strength and Mobility intact   Laryngeal Function Cough;Throat clear;Swallow;Voicing initiated;Dry swallow palpated   Additional Documentation Yes   Swallow Eval   Feeding Assistance frequent cues/help required   Clinical Swallow Eval: Thin Liquid Texture Trial   Mode of Presentation, Thin Liquids spoon;fed by clinician   Volume of Liquid or Food Presented ice chips x3   Oral Phase of Swallow Poor AP movement   Pharyngeal Phase of Swallow " impaired;repeated swallows;wet vocal quality after swallow  (reduced laryngeal elevation)   Diagnostic Statement concern for elevated aspiraiton risk with thin liquids   Clinical Swallow Eval: Nectar Thick Liquid Texture Trial   Mode of Presentation, Nectar spoon;straw;cup;self-fed;fed by clinician   Volume of Nectar Presented 3oz   Oral Phase, Nectar WFL   Pharyngeal Phase, Nectar intact  (no s/sx of aspiration observed)   Diagnostic Statement swallow appears functional for nectar-thick liquids   Clinical Swallow Eval: Puree Solid Texture Trial   Mode of Presentation, Puree spoon;fed by clinician   Volume of Puree Presented tsp bites x7   Oral Phase, Puree WFL   Pharyngeal Phase, Puree intact  (no s/sx of aspiration observed)   Diagnostic Statement swallow appears functional for puree textures   Clinical Swallow Eval: Semisolid Texture Trial   Diagnostic Statement advanced textures not presented this date due to respiratory status and edentulous status   Swallow Compensations   Swallow Compensations Pacing;Reduce amounts   Esophageal Phase of Swallow   Patient reports or presents with symptoms of esophageal dysphagia No   General Therapy Interventions   Planned Therapy Interventions Dysphagia Treatment   Dysphagia treatment Modified diet education;Instruction of safe swallow strategies   Swallow Eval: Clinical Impressions   Skilled Criteria for Therapy Intervention Skilled criteria met.  Treatment indicated.   Functional Assessment Scale (FAS) 4   Treatment Diagnosis mild-moderate dysphagia   Diet texture recommendations Nectar thick liquids;Full liquid   Recommended Feeding/Eating Techniques alternate between small bites and sips of food/liquid;maintain upright posture during/after eating for 30 mins;small sips/bites   Demonstrates Need for Referral to Another Service occupational therapy;physical therapy   Therapy Frequency 5x/week   Predicted Duration of Therapy Intervention (days/wks) 3 weeks   Anticipated  Discharge Disposition inpatient rehabilitation facility   Risks and Benefits of Treatment have been explained. Yes   Patient, family and/or staff in agreement with Plan of Care Yes   Clinical Impression Comments Clinical swallow evaluation completed per MD order. Pt presents with mild-moderate oral-pharyngeal dysphagia in the setting of respiratory compromise. Reduced bolus control and concern for elevated aspiration risk with ice chip trials. No s/sx of airway compromise with nectar-thick liquids or purees this date. Recommend cautiously initiate nectar-thick full liquid diet as tolerated with supervision/assist. Pt to sit completely upright for all PO intake and take small bites/sips. ST to follow closely to ensure consistent PO tolerance and to assist with further diet advance as appropriate   Total Evaluation Time   Total Evaluation Time (Minutes) 11

## 2019-09-02 NOTE — PROGRESS NOTES
Cardiology - CSI Progress Note    Assessment & Plan   83 year old male who was admitted on 8/27/2019 with a cardiac arrest. 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    Plan for today  -transition to high flow O2 in the day follow ABG, CPAP at night  - continue prednisone for COPD exacerbation  - continue abx  - chest physiotherapy for mobilization of secretions  -      Neurology: Initially Cooled to 34 degrees.  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   - extubated 8/30 with good neurologic function post extubation, some dysphagia  -NPO until speech assessment    Cardiovascular / Hemodynamics: Refractory VF arrest s/p  CONCETTA to LM-> LAD, Lcx.  LA 7-> 1.3  TTE: EF 60-70%, RV mildly reduced function no significant valvular disease  EKG: NSR RBBB   --off pressors 8/30pm  --continue ASA 81mg and plavix 75 mg PO QD  --hold lipitor for now given likely hepatic injury during arrest  --hold ACE/ARB for now given likely reduced renal fxn after arrest- will change hydral isordil for this once more stable   Pulmonary:  history of COPD on 2L of O2 with exertion.   Vent Settings: FiO2 (%): 100 %  Resp: 20  CXR: Lines in stable position.  --wean vent as able  --daily CXR  Chest CT- diffuse ground glass opacities bilateral pleural plaques and RLL cavitation with possible emphysema and fibrosis  --Q4h ABGs for now  --on scheduled duonebs QID  - prednisone for COPD exacerbation  - continue abx    GI and Nutrition: No known medical hx.  --monitor BID LFTs  --postpyloric feeding  --bowel regimen -colace  --GI Prophylaxis:  PPI    Renal, Fluid and Electrolytes: SCr 1.7, stable  Monitor creatinine and UOP  --maintain K>3 and Mg>2    Infectious Disease: No signs of infection. Leukocytosis c/w arrest. Blood cultures collected.   --vancomycin/zosyn x5 days for arrest  --follow blood cultures- pan cultured yesterday and no growth to date  -stress dose hydrocortisone 50mg q8h started 8/30- 9/1- transitioned to PO pred    Hematology and Oncology: ASA/plavix for CONCETTA.   - left chest wall hematoma likely source of bleeding  --SQH TID for ppx- on hold given decrease in plts-  restared heparin 9/1   Endocrinology: Diabetic with an A1c of 6.6 in 2017- was considered pre diabetic and this has since resolved A1c on admission 5.9  No known medical history. BG elevated.  --insulin gtt - medium intensity sliding scale  -- non severe malnutrition- holding oral feeding given possible aspiration    Lines: PICC line RUE August 29 2019  R radial arterial line August 29,2019  Mcclelland catheter August 27, 2019  Current lines are required for patient management       Family update by me today: Yes      Code Status: Full     The pt was discussed and evaluated with Dr. Radha MD, attending physician, who agrees with the assessment and plan above.     Jenni Loera    Interval History     No acute events overnight  Feels better     FiO2 (%): 100 %  Resp: 20        Physical Exam   Temp: 98.3  F (36.8  C) Temp src: Oral BP: 100/60   Heart Rate: 99 Resp: 20 SpO2: 90 % O2 Device: High Flow Nasal Cannula (HFNC) Oxygen Delivery: Other (Comments)(20 lpm)  Vitals:    08/29/19 0000 08/31/19 0400 09/01/19 0000   Weight: 78.5 kg (173 lb 1 oz) 82.2 kg (181 lb 3.5 oz) 81.8 kg (180 lb 5.4 oz)     Vital Signs with Ranges  Temp:  [98.3  F (36.8  C)-99.1  F (37.3  C)] 98.3  F (36.8  C)  Heart Rate:  [] 99  Resp:  [20-32] 20  BP: (100-133)/(52-68) 100/60  MAP:  [60 mmHg-137 mmHg] 74 mmHg  Arterial Line BP: (107-257)/() 131/52  FiO2 (%):  [45 %-100 %] 100  "%  SpO2:  [84 %-100 %] 90 %  I/O last 3 completed shifts:  In: 3438 [I.V.:938; NG/GT:1400]  Out: 3425 [Urine:3425]    Heart Rate: 99, Blood pressure 100/60, pulse 84, temperature 98.3  F (36.8  C), temperature source Oral, resp. rate 20, height 1.727 m (5' 7.99\"), weight 81.8 kg (180 lb 5.4 oz), SpO2 90 %.  180 lbs 5.38 oz  GEN:  Alert on BiPAP in no distress  CV:  Regular rate and rhythm, no murmur or JVD.  S1 + S2 noted, no S3 or S4.  LUNGS:  Coarse breath sounds bilaterally with copious secretions.   ABD:  Active bowel sounds, soft, non-tender/non-distended.  No rebound/guarding/rigidity.  EXT:  No edema or cyanosis.     Medications     IV fluid REPLACEMENT ONLY       IV fluid REPLACEMENT ONLY       - MEDICATION INSTRUCTIONS -       - MEDICATION INSTRUCTIONS -       Percutaneous Coronary Intervention orders placed (this is information for BPA alerting)       ACE/ARB/ARNI NOT PRESCRIBED       BETA BLOCKER NOT PRESCRIBED         aspirin  81 mg Oral Daily     clopidogrel  75 mg Oral Daily     heparin lock flush  5-10 mL Intracatheter Q24H     hydrALAZINE  75 mg Oral 4x Daily     insulin aspart  1-7 Units Subcutaneous TID AC     insulin aspart  1-5 Units Subcutaneous At Bedtime     ipratropium - albuterol 0.5 mg/2.5 mg/3 mL  3 mL Nebulization 4x daily     isosorbide dinitrate  40 mg Oral TID     multivitamins w/minerals  15 mL Per Feeding Tube Daily     pantoprazole  40 mg Oral or Feeding Tube Daily     predniSONE  40 mg Oral Daily     protein modular  1 packet Per Feeding Tube Daily     senna-docusate  1 tablet Oral or Feeding Tube At Bedtime     vancomycin (VANCOCIN) IV  1,500 mg Intravenous Q24H       Data   Recent Labs   Lab 09/02/19  1109 09/02/19  0751 09/02/19  0402 09/01/19  1626 09/01/19  0328  08/31/19  0330  08/28/19  1357  08/28/19  0413   WBC  --   --  10.6 14.8* 13.2*   < > 9.4   < >  --   --  27.4*   HGB  --   --  7.4* 7.9* 7.8*   < > 7.6*   < >  --   --  9.9*   MCV  --   --  102* 100 100   < > 99   < " >  --   --  103*   PLT  --   --  69* 74* 67*   < > 68*   < >  --   --  211   INR  --   --   --   --   --   --  1.40*  --  1.46*  --  1.55*   * 153* 152* 151* 150*   < > 147*   < >  --    < > 146*   POTASSIUM 3.8 4.0 3.2* 3.0* 3.5   < > 3.9   < >  --    < > 4.1   CHLORIDE 119*  --  120* 118* 123*   < > 119*   < >  --    < > 117*   CO2 22  --  24 26 24   < > 20   < >  --    < > 20   BUN 41*  --  43* 36* 30   < > 34*   < >  --    < > 33*   CR 1.09  --  1.15 1.14 1.12   < > 1.31*   < >  --    < > 1.84*   ANIONGAP 9  --  9 6 4   < > 7   < >  --    < > 9   YOON 7.9*  --  8.6 8.3* 8.4*   < > 8.2*   < >  --    < > 7.6*   *  --  147* 154* 176*   < > 152*   < >  --    < > 123*   ALBUMIN  --   --  2.2* 2.3* 2.2*   < > 2.2*   < >  --   --  2.4*   PROTTOTAL  --   --  5.8* 6.2* 5.9*   < > 5.8*   < >  --   --  5.8*   BILITOTAL  --   --  0.7 0.8 0.6   < > 0.6   < >  --   --  1.1   ALKPHOS  --   --  51 57 57   < > 59   < >  --   --  65   ALT  --   --  40 49 45   < > 46   < >  --   --  69   AST  --   --  52* 63* 70*   < > 76*   < >  --   --  78*    < > = values in this interval not displayed.       No results found for this or any previous visit (from the past 24 hour(s)).      Critical Care Services Progress Note:     José Aguirre remains critically ill with cardiac arrhythmia, hypotension, mental status changes and shock     I personally examined and evaluated the patient today.   The patient s prognosis today is grave  I have evaluated all laboratory values and imaging studies from the past 24 hours.  Key findings and decisions made today included   -transition to high flow O2 in the day follow ABG, CPAP at night  - continue prednisone for COPD exacerbation  - continue abx  - chest physiotherapy for mobilization of secretions  -     I personally managed the sedation, pain control and analgesia, metabolic abnormalities, antibiotic therapy, nutritional status and non-invasive positive pressure ventilator.   Consults  ongoing and ordered are none  Procedures that will happen today are: none  All treatments were placed at my direction.  I formulated today s plan with the house staff team or resident(s) and agree with the findings and plan in the associated note.       The above plans and care have been discussed with family and all questions and concerns were addressed.     I spent a total of 45 minutes (excluding procedure time) personally providing and directing critical care services at the bedside and on the critical care unit for José Aguirre.        Andrew Garcia MD

## 2019-09-02 NOTE — PLAN OF CARE
Discharge Planner SLP   Patient plan for discharge: unknown  Current status: Clinical swallow evaluation completed per MD order. Pt presents with mild-moderate oral-pharyngeal dysphagia in the setting of respiratory compromise. Reduced bolus control and concern for elevated aspiration risk with ice chip trials. No s/sx of airway compromise with nectar-thick liquids or purees this date. Recommend cautiously initiate nectar-thick full liquid diet as tolerated with supervision/assist. Pt to sit completely upright for all PO intake and take small bites/sips. ST to follow closely to ensure consistent PO tolerance and to assist with further diet advance as appropriate  Barriers to return to prior living situation: dysphagia; respiratory status; deconditioning  Recommendations for discharge: inpatient rehab  Rationale for recommendations: Pt is below baseline function       Entered by: Roxi Aguilar 09/02/2019 4:05 PM

## 2019-09-03 ENCOUNTER — APPOINTMENT (OUTPATIENT)
Dept: GENERAL RADIOLOGY | Facility: CLINIC | Age: 84
DRG: 224 | End: 2019-09-03
Attending: STUDENT IN AN ORGANIZED HEALTH CARE EDUCATION/TRAINING PROGRAM
Payer: MEDICARE

## 2019-09-03 ENCOUNTER — APPOINTMENT (OUTPATIENT)
Dept: PHYSICAL THERAPY | Facility: CLINIC | Age: 84
DRG: 224 | End: 2019-09-03
Payer: MEDICARE

## 2019-09-03 LAB
ALBUMIN SERPL-MCNC: 2.1 G/DL (ref 3.4–5)
ALBUMIN SERPL-MCNC: 2.2 G/DL (ref 3.4–5)
ALP SERPL-CCNC: 48 U/L (ref 40–150)
ALP SERPL-CCNC: 49 U/L (ref 40–150)
ALT SERPL W P-5'-P-CCNC: 38 U/L (ref 0–70)
ALT SERPL W P-5'-P-CCNC: 38 U/L (ref 0–70)
ANION GAP SERPL CALCULATED.3IONS-SCNC: 10 MMOL/L (ref 3–14)
ANION GAP SERPL CALCULATED.3IONS-SCNC: 9 MMOL/L (ref 3–14)
AST SERPL W P-5'-P-CCNC: 39 U/L (ref 0–45)
AST SERPL W P-5'-P-CCNC: 42 U/L (ref 0–45)
BASE DEFICIT BLDA-SCNC: 0.4 MMOL/L
BASE DEFICIT BLDA-SCNC: 0.5 MMOL/L
BASE EXCESS BLDA CALC-SCNC: 0.4 MMOL/L
BASE EXCESS BLDA CALC-SCNC: 0.7 MMOL/L
BILIRUB SERPL-MCNC: 0.6 MG/DL (ref 0.2–1.3)
BILIRUB SERPL-MCNC: 0.7 MG/DL (ref 0.2–1.3)
BUN SERPL-MCNC: 40 MG/DL (ref 7–30)
BUN SERPL-MCNC: 42 MG/DL (ref 7–30)
CALCIUM SERPL-MCNC: 8 MG/DL (ref 8.5–10.1)
CALCIUM SERPL-MCNC: 8.2 MG/DL (ref 8.5–10.1)
CHLORIDE SERPL-SCNC: 109 MMOL/L (ref 94–109)
CHLORIDE SERPL-SCNC: 113 MMOL/L (ref 94–109)
CO2 SERPL-SCNC: 22 MMOL/L (ref 20–32)
CO2 SERPL-SCNC: 23 MMOL/L (ref 20–32)
CREAT SERPL-MCNC: 1.01 MG/DL (ref 0.66–1.25)
CREAT SERPL-MCNC: 1.01 MG/DL (ref 0.66–1.25)
ERYTHROCYTE [DISTWIDTH] IN BLOOD BY AUTOMATED COUNT: 19 % (ref 10–15)
ERYTHROCYTE [DISTWIDTH] IN BLOOD BY AUTOMATED COUNT: 19.2 % (ref 10–15)
GFR SERPL CREATININE-BSD FRML MDRD: 68 ML/MIN/{1.73_M2}
GFR SERPL CREATININE-BSD FRML MDRD: 68 ML/MIN/{1.73_M2}
GLUCOSE BLDC GLUCOMTR-MCNC: 135 MG/DL (ref 70–99)
GLUCOSE BLDC GLUCOMTR-MCNC: 162 MG/DL (ref 70–99)
GLUCOSE BLDC GLUCOMTR-MCNC: 188 MG/DL (ref 70–99)
GLUCOSE BLDC GLUCOMTR-MCNC: 204 MG/DL (ref 70–99)
GLUCOSE SERPL-MCNC: 148 MG/DL (ref 70–99)
GLUCOSE SERPL-MCNC: 214 MG/DL (ref 70–99)
GRAM STN SPEC: NORMAL
HCO3 BLD-SCNC: 22 MMOL/L (ref 21–28)
HCO3 BLD-SCNC: 22 MMOL/L (ref 21–28)
HCO3 BLD-SCNC: 23 MMOL/L (ref 21–28)
HCO3 BLD-SCNC: 24 MMOL/L (ref 21–28)
HCT VFR BLD AUTO: 23.5 % (ref 40–53)
HCT VFR BLD AUTO: 23.6 % (ref 40–53)
HGB BLD-MCNC: 7 G/DL (ref 13.3–17.7)
HGB BLD-MCNC: 7.1 G/DL (ref 13.3–17.7)
INTERPRETATION ECG - MUSE: NORMAL
Lab: NORMAL
MAGNESIUM SERPL-MCNC: 2.1 MG/DL (ref 1.6–2.3)
MAGNESIUM SERPL-MCNC: 2.3 MG/DL (ref 1.6–2.3)
MCH RBC QN AUTO: 30 PG (ref 26.5–33)
MCH RBC QN AUTO: 30.5 PG (ref 26.5–33)
MCHC RBC AUTO-ENTMCNC: 29.7 G/DL (ref 31.5–36.5)
MCHC RBC AUTO-ENTMCNC: 30.2 G/DL (ref 31.5–36.5)
MCV RBC AUTO: 101 FL (ref 78–100)
MCV RBC AUTO: 101 FL (ref 78–100)
O2/TOTAL GAS SETTING VFR VENT: 80 %
O2/TOTAL GAS SETTING VFR VENT: 90 %
PCO2 BLD: 26 MM HG (ref 35–45)
PCO2 BLD: 27 MM HG (ref 35–45)
PCO2 BLD: 30 MM HG (ref 35–45)
PCO2 BLD: 31 MM HG (ref 35–45)
PH BLD: 7.49 PH (ref 7.35–7.45)
PH BLD: 7.5 PH (ref 7.35–7.45)
PH BLD: 7.52 PH (ref 7.35–7.45)
PH BLD: 7.53 PH (ref 7.35–7.45)
PHOSPHATE SERPL-MCNC: 3 MG/DL (ref 2.5–4.5)
PHOSPHATE SERPL-MCNC: 3.3 MG/DL (ref 2.5–4.5)
PLATELET # BLD AUTO: 103 10E9/L (ref 150–450)
PLATELET # BLD AUTO: 83 10E9/L (ref 150–450)
PO2 BLD: 169 MM HG (ref 80–105)
PO2 BLD: 57 MM HG (ref 80–105)
PO2 BLD: 59 MM HG (ref 80–105)
PO2 BLD: 59 MM HG (ref 80–105)
POTASSIUM SERPL-SCNC: 3.3 MMOL/L (ref 3.4–5.3)
POTASSIUM SERPL-SCNC: 3.8 MMOL/L (ref 3.4–5.3)
POTASSIUM SERPL-SCNC: 4.1 MMOL/L (ref 3.4–5.3)
PROT SERPL-MCNC: 5.4 G/DL (ref 6.8–8.8)
PROT SERPL-MCNC: 5.7 G/DL (ref 6.8–8.8)
RBC # BLD AUTO: 2.33 10E12/L (ref 4.4–5.9)
RBC # BLD AUTO: 2.33 10E12/L (ref 4.4–5.9)
SODIUM SERPL-SCNC: 141 MMOL/L (ref 133–144)
SODIUM SERPL-SCNC: 146 MMOL/L (ref 133–144)
SODIUM SERPL-SCNC: 146 MMOL/L (ref 133–144)
SPECIMEN SOURCE: NORMAL
WBC # BLD AUTO: 15.7 10E9/L (ref 4–11)
WBC # BLD AUTO: 18 10E9/L (ref 4–11)

## 2019-09-03 PROCEDURE — 94660 CPAP INITIATION&MGMT: CPT

## 2019-09-03 PROCEDURE — 86850 RBC ANTIBODY SCREEN: CPT

## 2019-09-03 PROCEDURE — 20000004 ZZH R&B ICU UMMC

## 2019-09-03 PROCEDURE — 25000125 ZZHC RX 250

## 2019-09-03 PROCEDURE — 40000047 ZZH STATISTIC CTO2 CONT OXYGEN TECH TIME EA 90 MIN

## 2019-09-03 PROCEDURE — 93005 ELECTROCARDIOGRAM TRACING: CPT

## 2019-09-03 PROCEDURE — 82803 BLOOD GASES ANY COMBINATION: CPT | Performed by: STUDENT IN AN ORGANIZED HEALTH CARE EDUCATION/TRAINING PROGRAM

## 2019-09-03 PROCEDURE — 25000132 ZZH RX MED GY IP 250 OP 250 PS 637: Mod: GY | Performed by: STUDENT IN AN ORGANIZED HEALTH CARE EDUCATION/TRAINING PROGRAM

## 2019-09-03 PROCEDURE — 25000131 ZZH RX MED GY IP 250 OP 636 PS 637: Mod: GY | Performed by: STUDENT IN AN ORGANIZED HEALTH CARE EDUCATION/TRAINING PROGRAM

## 2019-09-03 PROCEDURE — 94668 MNPJ CHEST WALL SBSQ: CPT

## 2019-09-03 PROCEDURE — 71045 X-RAY EXAM CHEST 1 VIEW: CPT

## 2019-09-03 PROCEDURE — 25000132 ZZH RX MED GY IP 250 OP 250 PS 637: Mod: GY | Performed by: NURSE PRACTITIONER

## 2019-09-03 PROCEDURE — 99291 CRITICAL CARE FIRST HOUR: CPT | Mod: GC | Performed by: INTERNAL MEDICINE

## 2019-09-03 PROCEDURE — 25000125 ZZHC RX 250: Performed by: STUDENT IN AN ORGANIZED HEALTH CARE EDUCATION/TRAINING PROGRAM

## 2019-09-03 PROCEDURE — 25000128 H RX IP 250 OP 636: Performed by: STUDENT IN AN ORGANIZED HEALTH CARE EDUCATION/TRAINING PROGRAM

## 2019-09-03 PROCEDURE — 80053 COMPREHEN METABOLIC PANEL: CPT | Performed by: STUDENT IN AN ORGANIZED HEALTH CARE EDUCATION/TRAINING PROGRAM

## 2019-09-03 PROCEDURE — 84132 ASSAY OF SERUM POTASSIUM: CPT

## 2019-09-03 PROCEDURE — 87205 SMEAR GRAM STAIN: CPT | Performed by: STUDENT IN AN ORGANIZED HEALTH CARE EDUCATION/TRAINING PROGRAM

## 2019-09-03 PROCEDURE — 87070 CULTURE OTHR SPECIMN AEROBIC: CPT | Performed by: STUDENT IN AN ORGANIZED HEALTH CARE EDUCATION/TRAINING PROGRAM

## 2019-09-03 PROCEDURE — 93010 ELECTROCARDIOGRAM REPORT: CPT | Performed by: INTERNAL MEDICINE

## 2019-09-03 PROCEDURE — 99221 1ST HOSP IP/OBS SF/LOW 40: CPT | Mod: 25 | Performed by: INTERNAL MEDICINE

## 2019-09-03 PROCEDURE — 86901 BLOOD TYPING SEROLOGIC RH(D): CPT

## 2019-09-03 PROCEDURE — 83735 ASSAY OF MAGNESIUM: CPT | Performed by: STUDENT IN AN ORGANIZED HEALTH CARE EDUCATION/TRAINING PROGRAM

## 2019-09-03 PROCEDURE — 86900 BLOOD TYPING SEROLOGIC ABO: CPT

## 2019-09-03 PROCEDURE — G0463 HOSPITAL OUTPT CLINIC VISIT: HCPCS

## 2019-09-03 PROCEDURE — 27210432 ZZH NUTRITION PRODUCT RENAL BASIC LITER

## 2019-09-03 PROCEDURE — 84100 ASSAY OF PHOSPHORUS: CPT | Performed by: STUDENT IN AN ORGANIZED HEALTH CARE EDUCATION/TRAINING PROGRAM

## 2019-09-03 PROCEDURE — 25000132 ZZH RX MED GY IP 250 OP 250 PS 637: Mod: GY

## 2019-09-03 PROCEDURE — 40000014 ZZH STATISTIC ARTERIAL MONITORING DAILY

## 2019-09-03 PROCEDURE — 86923 COMPATIBILITY TEST ELECTRIC: CPT

## 2019-09-03 PROCEDURE — 97530 THERAPEUTIC ACTIVITIES: CPT | Mod: GP | Performed by: REHABILITATION PRACTITIONER

## 2019-09-03 PROCEDURE — 97110 THERAPEUTIC EXERCISES: CPT | Mod: GP | Performed by: REHABILITATION PRACTITIONER

## 2019-09-03 PROCEDURE — 94640 AIRWAY INHALATION TREATMENT: CPT | Mod: 76

## 2019-09-03 PROCEDURE — 85027 COMPLETE CBC AUTOMATED: CPT | Performed by: STUDENT IN AN ORGANIZED HEALTH CARE EDUCATION/TRAINING PROGRAM

## 2019-09-03 PROCEDURE — 25000132 ZZH RX MED GY IP 250 OP 250 PS 637: Mod: GY | Performed by: INTERNAL MEDICINE

## 2019-09-03 PROCEDURE — 40000275 ZZH STATISTIC RCP TIME EA 10 MIN

## 2019-09-03 PROCEDURE — 94640 AIRWAY INHALATION TREATMENT: CPT

## 2019-09-03 PROCEDURE — 00000146 ZZHCL STATISTIC GLUCOSE BY METER IP

## 2019-09-03 RX ORDER — HYDRALAZINE HYDROCHLORIDE 25 MG/1
25 TABLET, FILM COATED ORAL 4 TIMES DAILY
Status: DISCONTINUED | OUTPATIENT
Start: 2019-09-03 | End: 2019-09-03

## 2019-09-03 RX ORDER — ISOSORBIDE DINITRATE 20 MG/1
20 TABLET ORAL 3 TIMES DAILY
Status: DISCONTINUED | OUTPATIENT
Start: 2019-09-03 | End: 2019-09-05

## 2019-09-03 RX ORDER — ALBUTEROL SULFATE 0.83 MG/ML
SOLUTION RESPIRATORY (INHALATION)
Status: COMPLETED
Start: 2019-09-03 | End: 2019-09-03

## 2019-09-03 RX ORDER — HYDRALAZINE HYDROCHLORIDE 25 MG/1
25 TABLET, FILM COATED ORAL EVERY 8 HOURS SCHEDULED
Status: DISCONTINUED | OUTPATIENT
Start: 2019-09-03 | End: 2019-09-06

## 2019-09-03 RX ORDER — CEFTRIAXONE 2 G/1
2 INJECTION, POWDER, FOR SOLUTION INTRAMUSCULAR; INTRAVENOUS EVERY 24 HOURS
Status: COMPLETED | OUTPATIENT
Start: 2019-09-03 | End: 2019-09-09

## 2019-09-03 RX ORDER — FUROSEMIDE 10 MG/ML
60 INJECTION INTRAMUSCULAR; INTRAVENOUS ONCE
Status: COMPLETED | OUTPATIENT
Start: 2019-09-03 | End: 2019-09-03

## 2019-09-03 RX ADMIN — HYDRALAZINE HYDROCHLORIDE 25 MG: 25 TABLET ORAL at 22:35

## 2019-09-03 RX ADMIN — IPRATROPIUM BROMIDE AND ALBUTEROL SULFATE 3 ML: .5; 3 SOLUTION RESPIRATORY (INHALATION) at 20:59

## 2019-09-03 RX ADMIN — ACETYLCYSTEINE 2 ML: 200 SOLUTION ORAL; RESPIRATORY (INHALATION) at 16:15

## 2019-09-03 RX ADMIN — ISOSORBIDE DINITRATE 40 MG: 20 TABLET ORAL at 15:07

## 2019-09-03 RX ADMIN — HYDRALAZINE HYDROCHLORIDE 25 MG: 25 TABLET ORAL at 17:47

## 2019-09-03 RX ADMIN — Medication 40 MG: at 09:02

## 2019-09-03 RX ADMIN — SODIUM CHLORIDE 500 ML: 9 INJECTION, SOLUTION INTRAVENOUS at 15:40

## 2019-09-03 RX ADMIN — ACETYLCYSTEINE 2 ML: 200 SOLUTION ORAL; RESPIRATORY (INHALATION) at 05:20

## 2019-09-03 RX ADMIN — CEFTRIAXONE SODIUM 2 G: 2 INJECTION, POWDER, FOR SOLUTION INTRAMUSCULAR; INTRAVENOUS at 10:32

## 2019-09-03 RX ADMIN — Medication 10 MG: at 00:00

## 2019-09-03 RX ADMIN — POTASSIUM CHLORIDE 20 MEQ: 29.8 INJECTION, SOLUTION INTRAVENOUS at 06:19

## 2019-09-03 RX ADMIN — MULTIVIT AND MINERALS-FERROUS GLUCONATE 9 MG IRON/15 ML ORAL LIQUID 15 ML: at 08:52

## 2019-09-03 RX ADMIN — POTASSIUM CHLORIDE 20 MEQ: 29.8 INJECTION, SOLUTION INTRAVENOUS at 09:49

## 2019-09-03 RX ADMIN — ALBUTEROL SULFATE 2.5 MG: 2.5 SOLUTION RESPIRATORY (INHALATION) at 05:20

## 2019-09-03 RX ADMIN — ASPIRIN 81 MG CHEWABLE TABLET 81 MG: 81 TABLET CHEWABLE at 08:52

## 2019-09-03 RX ADMIN — IPRATROPIUM BROMIDE AND ALBUTEROL SULFATE 3 ML: .5; 3 SOLUTION RESPIRATORY (INHALATION) at 12:38

## 2019-09-03 RX ADMIN — ACETYLCYSTEINE 2 ML: 200 SOLUTION ORAL; RESPIRATORY (INHALATION) at 00:47

## 2019-09-03 RX ADMIN — PREDNISONE 40 MG: 20 TABLET ORAL at 08:52

## 2019-09-03 RX ADMIN — Medication 1 PACKET: at 09:02

## 2019-09-03 RX ADMIN — IPRATROPIUM BROMIDE AND ALBUTEROL SULFATE 3 ML: .5; 3 SOLUTION RESPIRATORY (INHALATION) at 16:15

## 2019-09-03 RX ADMIN — IPRATROPIUM BROMIDE AND ALBUTEROL SULFATE 3 ML: .5; 3 SOLUTION RESPIRATORY (INHALATION) at 07:59

## 2019-09-03 RX ADMIN — FUROSEMIDE 60 MG: 10 INJECTION, SOLUTION INTRAVENOUS at 09:50

## 2019-09-03 RX ADMIN — POTASSIUM CHLORIDE 20 MEQ: 29.8 INJECTION, SOLUTION INTRAVENOUS at 05:10

## 2019-09-03 RX ADMIN — ISOSORBIDE DINITRATE 40 MG: 20 TABLET ORAL at 08:52

## 2019-09-03 RX ADMIN — HYDRALAZINE HYDROCHLORIDE 50 MG: 25 TABLET ORAL at 08:52

## 2019-09-03 RX ADMIN — ALBUTEROL SULFATE 2.5 MG: 2.5 SOLUTION RESPIRATORY (INHALATION) at 00:47

## 2019-09-03 RX ADMIN — POTASSIUM CHLORIDE 20 MEQ: 29.8 INJECTION, SOLUTION INTRAVENOUS at 10:54

## 2019-09-03 RX ADMIN — ACETYLCYSTEINE 2 ML: 200 SOLUTION ORAL; RESPIRATORY (INHALATION) at 12:38

## 2019-09-03 RX ADMIN — ACETYLCYSTEINE 2 ML: 200 SOLUTION ORAL; RESPIRATORY (INHALATION) at 07:59

## 2019-09-03 RX ADMIN — CLOPIDOGREL BISULFATE 75 MG: 75 TABLET, FILM COATED ORAL at 08:52

## 2019-09-03 RX ADMIN — ACETYLCYSTEINE 2 ML: 200 SOLUTION ORAL; RESPIRATORY (INHALATION) at 20:59

## 2019-09-03 RX ADMIN — HYDRALAZINE HYDROCHLORIDE 50 MG: 25 TABLET ORAL at 13:19

## 2019-09-03 RX ADMIN — ISOSORBIDE DINITRATE 20 MG: 20 TABLET ORAL at 20:05

## 2019-09-03 ASSESSMENT — ACTIVITIES OF DAILY LIVING (ADL)
ADLS_ACUITY_SCORE: 16

## 2019-09-03 NOTE — PROGRESS NOTES
09/02/19 0900   Quick Adds   Type of Visit Initial PT Evaluation   Living Environment   Lives With spouse   Living Arrangements condominium  (co-op)   Home Accessibility no concerns  (elevator)   Transportation Anticipated car, drives self;family or friend will provide   Living Environment Comment Patient lives with wife in condo co-op in [place], [place]. Patient drives [place].   Self-Care   Usual Activity Tolerance moderate   Current Activity Tolerance poor   Regular Exercise No   Activity/Exercise/Self-Care Comment Patient has limited activity tolerance at baseline 2/2 COPD, on 4L O@ at baseline, tries to remain active, reports he can walk ~1 block before feeling fatigued, enjoys being at cabin.   Functional Level Prior   Ambulation 0-->independent   Transferring 0-->independent   Toileting 0-->independent   Bathing 0-->independent   Communication 0-->understands/communicates without difficulty   Swallowing 0-->swallows foods/liquids without difficulty   Cognition 0 - no cognition issues reported   Fall history within last six months yes   Number of times patient has fallen within last six months 1   Which of the above functional risks had a recent onset or change? ambulation;transferring   Prior Functional Level Comment Patient is independent with functional mobility at baseline, does not use AD, on 4L O2.   General Information   Onset of Illness/Injury or Date of Surgery - Date 08/27/19   Referring Physician Jenni Smith MD   Patient/Family Goals Statement return home   Pertinent History of Current Problem (include personal factors and/or comorbidities that impact the POC) 83 year old male who was admitted on 8/27/2019 with a cardiac arrest. 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   General Info Comments Activity orders: verbal okay by CSI team and bedside RN to get up to chair   Cognitive Status Examination   Orientation orientation to person, place and time   Level of Consciousness alert   Follows Commands and Answers Questions 100% of the time   Personal Safety and Judgment intact   Cognitive Comment intermittent confusion per chart review, alert and oriented during PT evaluation   Pain Assessment   Patient Currently in Pain No   Integumentary/Edema   Integumentary/Edema Comments Diffuse bruising throughout body especially R groin and L flank. Skin tear R elbow.    Posture    Posture Protracted shoulders   Range of Motion (ROM)   ROM Comment BLE WFL   Strength   Strength Comments Generalized weakness and deconditioning, demonstrates at least 3/5 strength with mobility   Bed Mobility   Bed Mobility Comments Supine>sit with mod A and HOB elevated   Transfer Skills   Transfer Comments sit<>stand with min Ax2   Gait   Gait Comments Not assessed   Balance   Balance Comments impaired standing balance requiring min Ax1-2 for standing stability   Sensory Examination   Sensory Perception no deficits were identified   General Therapy Interventions   Planned Therapy Interventions balance training;bed mobility training;gait training;strengthening;stretching;transfer training;risk factor education;home program guidelines;progressive activity/exercise   Clinical Impression   Criteria for Skilled Therapeutic Intervention yes, treatment indicated   PT  "Diagnosis impaired functional mobility   Influenced by the following impairments decreased strength, balance, and activity tolerance   Functional limitations due to impairments bed mobility, transfers, gait, functional endurance   Clinical Presentation Evolving/Changing   Clinical Presentation Rationale requiring ICU level of cares, PMH/comorbidities, personal factors, clinical judgement   Clinical Decision Making (Complexity) Moderate complexity   Therapy Frequency 6x/week   Predicted Duration of Therapy Intervention (days/wks) 2 weeks   Anticipated Equipment Needs at Discharge   (TBD)   Anticipated Discharge Disposition   (TBD, currently recommend rehab stay)   Risk & Benefits of therapy have been explained Yes   Patient, Family & other staff in agreement with plan of care Yes   [facility] TM \"6 Clicks\"   2016, Trustees of [facility], under license to [employer].  All rights reserved.   6 Clicks Short Forms Basic Mobility Inpatient Short Form   [facility]  \"6 Clicks\" V.2 Basic Mobility Inpatient Short Form   1. Turning from your back to your side while in a flat bed without using bedrails? 3 - A Little   2. Moving from lying on your back to sitting on the side of a flat bed without using bedrails? 2 - A Lot   3. Moving to and from a bed to a chair (including a wheelchair)? 3 - A Little   4. Standing up from a chair using your arms (e.g., wheelchair, or bedside chair)? 3 - A Little   5. To walk in hospital room? 2 - A Lot   6. Climbing 3-5 steps with a railing? 2 - A Lot   Basic Mobility Raw Score (Score out of 24.Lower scores equate to lower levels of function) 15   Total Evaluation Time   Total Evaluation Time (Minutes) 8     "

## 2019-09-03 NOTE — PLAN OF CARE
SLP: Cancel - pt lethargic on time of attempt and requiring 90% FiO2. Any PO intake should be given with caution d/t high oxygen needs. Will continue to follow and treat per POC.

## 2019-09-03 NOTE — PLAN OF CARE
Discharge Planner PT  4E  Patient plan for discharge: did not discuss this session  Current status: Pt participated in seated UE and LE Therex x 10 reps. Pt tx sit->stand x 2 trials with Min A. Pt tx sit->supine with Min A.   Barriers to return to prior living situation: respiratory status, high O2 needs, decreased strength, activity intolerance  Recommendations for discharge: TCU  Rationale for recommendations: Pt would benefit from additional skilled PT to address functional mobility, transfers and gait.       Entered by: Marixa Travis 09/03/2019 5:03 PM

## 2019-09-03 NOTE — PLAN OF CARE
Neuro: A/O x4 throughout shift. Some forgetfulness with minor tasks like keeping his R arm down for his arterial line.   Cardiac: Afebrile. Concern for arrhythmia this morning. RN ordered EKG - showed atrial fibrillation, notified CSI MD Dr. BOBO Loera. MD stated no concern for arrhythmia - thinks it was just ectopy. Hypotensive in the afternoon, MD notified again of hypotension. MD decreased antihypertensive dosing, and ordered 500 mL NS bolus. Responsive to bolus, hydralazine given once blood pressure stabilized.   Resp: Coarse/diminished. Productive cough. HFNC 90% for most of day until around 1600 - patient becoming increasingly short of breath and tachypneic. ABG drawn around 1600. See results. Patient placed back on BiPAP for about 2 hours, patient states it is a lot more comfortable and he likes it on. Presents better after wearing BiPAP.   GI/: 2 large liquid bowel movements today. Free water flush decreased to 60 ml/hr. Diuresed once today, blood pressure fluid responsive after bolus, no intent to diuresis again today.   Electrolytes replaced. WOC consult - did not see pressure injury just scabbing.  Plan: wean O2 as able. Increase mobility.   See flowsheets for additional documentation/MAR for medication administration.   Will continue to monitor and assess. Will update MD with changes/concerns/updates.

## 2019-09-03 NOTE — PLAN OF CARE
Neuro: Able to answer all orientation questions appropriately. Uses call light appropriately. Some residual delirium. No numbness or tingling.   Cardiac: Afebrile. SR 80-90's w/ RBBB. Frequent PVCs. Normotensive.   Respiratory: Lung sound coarse. Chest physiotherapy and nebulizer treatments given as scheduled. Productive cough. Able to suction secretions independently with younker. HFNC throughout night. 80-90%, 30-40LPM. Unable to successfully wean. Significant dyspnea on exertion and requires 100% while moving and for some time afterwards to recover.   GI: BM liquid, small x1. Tube feed at goal, 50mL/hr. 120mL/hr free water flushes for high Na levels. Cleared today for nectar thick liquids. Able to take oral medications without issue.   : 75mL/hr via silva. 40mEq K+ given for K+ of 3.3.   Skin: Significant bruising throughout. Possible pressure injury on back of head from EEG lead. WOC consult placed.    Gtts:   Access: Double lumen PICC, PIV, R radial art line, silva     Hgb 7.1 this AM. Team aware. No interventions at this time.     Plan: Wean O2 as able. Continue to monitor blood gasses. Increase mobility. Pull silva.

## 2019-09-03 NOTE — CONSULTS
Electrophysiology Consultation Note   EP Attending: .   Reason for consultation: Consideration for ICD.   Provider requesting consultation: , [facility] Service.  Date of Service: 9/3/2019      HPI:   Mr. Aguirre is an 83 year old male who has a past medical history significant for COPD (uses 2L home oxygen with activities), CABG (LIMA-LAD, SVG-D1, SVG-OM2, SVG-rPDA) 2003, PVD with bilateral lower extremity claudication (left > right), carotid artery disease with occluded right carotid and s/p left CEA, renal artery stenosis s/p stenting 2013, HLD, HTN, left subclavian artery stenosis, and CKD III. He was admitted on 8/27/19 after suffering an out of hospital cardiac arrest. He was at the [facility] and had a witnessed collapse. Bystander CPR was started and EMS found him in VT/VF. He was defibrillated X1 and given 1 of epi. ROSC was obtained in field. On arrival here, he had a pulse and was hypotensive. He was intubated. He was taken for emergent coronary angiogram which showed severe 3V  CAD with LM/LAD lesion and CTOp of proximal RCA with L to right collaterals. LIMA-LAD graft minimally diseased but small and primarily retrograde flow through native LAD. SVG-RCA chronically closed at anastomosis, SVG-D1 could not be selectively injected but appears closed due to dye hangup during injection of the native coronaries. There may also be a closed graft to OM2 but this is not certain. He underwent PCI to dLM/ostial LAD with CONCETTA and balloon angioplasty of ostial LCx. He was also placed on therapeutic hypothermia protocol. He has made good recovery thus far. Per his family he has had another episode of a cardiac arrest in 2018 no angiogram was done he had ROSC and good recovery and per report had no further work up, and a reported syncopal event both attributed to dehydration. Echo shows LVEF 60-70%, mildly reduced RV function, and no significant valvular issues. ECG shows SR with RBBB. Electrolytes and  "renal function stable. Current cardiac medications: ASA, Plavix, hydralazine, and isordil.        Past Medical & Surgical History:   No past medical history on file.    Allergies: Per MAR   No Known Allergies  Medications:   Per MAR current outpatient cardiovascular medications include:   No medications prior to admission.     No current outpatient medications on file.     Current Facility-Administered Medications   Medication Dose Route Frequency     acetylcysteine  2 mL Nebulization Q4H NATE     aspirin  81 mg Oral Daily     cefTRIAXone  2 g Intravenous Q24H     clopidogrel  75 mg Oral Daily     heparin lock flush  5-10 mL Intracatheter Q24H     hydrALAZINE  50 mg Oral 4x Daily     insulin aspart  1-7 Units Subcutaneous TID AC     insulin aspart  1-5 Units Subcutaneous At Bedtime     ipratropium - albuterol 0.5 mg/2.5 mg/3 mL  3 mL Nebulization 4x daily     isosorbide dinitrate  40 mg Oral TID     multivitamins w/minerals  15 mL Per Feeding Tube Daily     pantoprazole  40 mg Oral or Feeding Tube Daily     predniSONE  40 mg Oral Daily     protein modular  1 packet Per Feeding Tube Daily     senna-docusate  1 tablet Oral or Feeding Tube At Bedtime     Family History:   No family history on file.  Social History:   Social History     Tobacco Use     Smoking status: Not on file   Substance Use Topics     Alcohol use: Not on file       ROS:   A comprehensive 10 point ROS was negative other than as mentioned in HPI.    Physical Examination:   VITALS: BP 92/56   Pulse 84   Temp 97.6  F (36.4  C) (Axillary)   Resp (!) 31   Ht 1.727 m (5' 7.99\")   Wt 81.8 kg (180 lb 5.4 oz)   SpO2 (!) 89%   BMI 27.43 kg/m    GENERAL APPEARANCE: AxO, NAD   HEENT: NCAT, EOMI, MMM. PERRLA.   NECK: Supple.    CHEST: coarse throughout. On BIPAP.   CARDIOVASCULAR: S1S2, Reg, No m/r/g.   ABDOMEN: BS+, soft, ND, NT.   EXTREMITIES: 1+ BLE edema. Distal pulses intact.   NEURO: Grossly nonfocal.   PSYCH: Normal affect.  SKIN: Warm and dry. "   Data:   Labs:  BMP  Recent Labs   Lab 19  0847 19  0316 19  2351 19  2136 19  1546 19  1109  19  0402   NA  --  146* 146* 147* 149* 150*   < > 152*   POTASSIUM 3.8 3.3*  --   --  3.8 3.8   < > 3.2*   CHLORIDE  --  113*  --   --  115* 119*  --  120*   YOON  --  8.2*  --   --  8.2* 7.9*  --  8.6   CO2  --  23  --   --  25 22  --  24   BUN  --  40*  --   --  44* 41*  --  43*   CR  --  1.01  --   --  1.08 1.09  --  1.15   GLC  --  148*  --   --  146* 191*  --  147*    < > = values in this interval not displayed.     CBC  Recent Labs   Lab 196 19  1546 19  0402 19  1626   WBC 15.7* 17.7* 10.6 14.8*   RBC 2.33* 2.58* 2.51* 2.61*   HGB 7.1* 7.8* 7.4* 7.9*   HCT 23.5* 25.7* 25.6* 26.2*   * 100 102* 100   MCH 30.5 30.2 29.5 30.3   MCHC 30.2* 30.4* 28.9* 30.2*   RDW 19.2* 19.3* 19.1* 19.5*   PLT 83* 100* 69* 74*     INR  Recent Labs   Lab 19  0330 19  1357 19  0413 19  2053   INR 1.40* 1.46* 1.55* 1.75*     No results found for: CKTOTAL, CKMB, TROPN  Cholesterol (mg/dL)   Date Value   2019 119     HDL Cholesterol (mg/dL)   Date Value   2019 35 (L)     LDL Cholesterol Calculated (mg/dL)   Date Value   2019 69     EK/23/19 ECHO:   Interpretation Summary  Technically difficult study.  Left ventricular function is normal or even hyperdynamic in some views.The EF  is 60-70%. No regional wall motion abnormalities aer seen.  RV appears dilated with probably mildly reduced function on limited views.  No significant valve disease.     There is no prior study for direct comparison.  Assessment:   Mr. Aguirre is an 83 year old male who has a past medical history significant for COPD (uses 2L home oxygen with activities), CABG (LIMA-LAD, SVG-D1, SVG-OM2, SVG-rPDA) , PVD with bilateral lower extremity claudication (left > right), carotid artery disease with occluded right carotid and s/p left CEA, renal artery  stenosis s/p stenting 2013, HLD, HTN, left subclavian artery stenosis, and CKD III. He was admitted on 8/27/19 after suffering an out of hospital cardiac arrest. He was at the [facility] and had a witnessed collapse. Bystander CPR was started and EMS found him in VT/VF. He was defibrillated X1 and given 1 of epi. ROSC was obtained in field. On arrival here, he had a pulse and was hypotensive. He was intubated. He was taken for emergent coronary angiogram which showed severe 3V  CAD with LM/LAD lesion and CTOp of proximal RCA with L to right collaterals. LIMA-LAD graft minimally diseased but small and primarily retrograde flow through native LAD. SVG-RCA chronically closed at anastomosis, SVG-D1 could not be selectively injected but appears closed due to dye hangup during injection of the native coronaries. There may also be a closed graft to OM2 but this is not certain. He underwent PCI to dLM/ostial LAD with CONCETTA and balloon angioplasty of ostial LCx. He was also placed on therapeutic hypothermia protocol. He has made good recovery thus far. Per his family he has had another episode of a cardiac arrest in 2018 no angiogram was done he had ROSC and good recovery and per report had no further work up, and a reported syncopal event both attributed to dehydration. Echo shows LVEF 60-70%, mildly reduced RV function, and no significant valvular issues. ECG shows SR with RBBB. Electrolytes and renal function stable. Current cardiac medications: ASA, Plavix, hydralazine, and isordil.      EP Recommendations:  Out of hospital cardiac arrest, hx CAD:  - he did have some revascularization; however, amount of disease does not fully account for cause of arrest. Therefore, we would favor secondary prevention ICD. We discussed that we do not have proven benefit in his age category for ICD. We discussed indications, risks, and benefits in detail with him. He stated that he wishes to be full code and would be interested in an  ICD. However, he asked if we could come back tomorrow to discuss in detail with him when his family is also present.We will be happy to do so.     The patient states understanding and is agreeable with plan.   Thank you for allowing us to participate in the care of this patient.     The patient was discussed w/ Dr. Marion.  The above note reflects our joint plan.    MARKUS Urrutia CNP  Electrophysiology Consult Service  Pager: [phone]    EP STAFF NOTE  I have seen and examined the patient as part of a shared visit with MADONNA Urrutia NP.  I agree with the note above. I reviewed today's vital signs and medications. I have reviewed and discussed with the advanced practice provider their physical examination, assessment, and plan   Briefly, out of hospital VF arrest, amount of revascularized CAD does not explain cardiac arrest, with likely high scar burden  My key history/exam findings are: RRR.   The key management decisions made by me: qualifies for ICD for secondary prevention.    Shorty Marion MD [facility]  Cardiology - Electrophysiology

## 2019-09-03 NOTE — PROGRESS NOTES
WO Nurse Inpatient Pressure Injury Assessment   Reason for consultation: Evaluate and treat scalp wound      ASSESSMENT  Right mid upper posterior scalp wound due to trauma, no source of pressure noted when patients head lying on pillow  Status: initial assessment  Recommend provider order: NA     TREATMENT PLAN  Right mid upper posterior scalp wound: Daily cleanse with saline, okay to wash hair. Pat dry and apply thin layer of Vaseline.   Orders Written  WOC Nurse follow-up plan:signing off  Nursing to notify the Provider(s) and re-consult the WOC Nurse if wound(s) deteriorates or new skin concern.    Patient History  According to provider note(s):  83 year old male who was admitted on 8/27/2019 with a cardiac arrest. 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    Objective Data  Containment of urine/stool: Bowel Program and Indwelling catheter    Current Diet/ Nutrition:  Orders Placed This Encounter      Full Liquid Diet Nectar Thickened Liquids (pre-thickened or use instant food thickener)    Output:   I/O last 3 completed shifts:  In: 5286 [P.O.:740; I.V.:636; NG/GT:2760]  Out: 3635 [Urine:3635]    Risk Assessment:   Sensory Perception: 3-->slightly limited  Moisture: 4-->rarely moist  Activity: 3-->walks occasionally  Mobility: 3-->slightly limited  Nutrition: 3-->adequate  Friction and Shear: 2-->potential problem  Emre Score: 18    Labs:   Recent Labs   Lab 09/03/19  0316  08/31/19  0330  08/28/19  0413   ALBUMIN 2.1*   < > 2.2*   < > 2.4*   HGB 7.1*   < > 7.6*   < > 9.9*   INR  --   --  1.40*   < > 1.55*   WBC 15.7*   < > 9.4   < > 27.4*   A1C  --   --   --   --  5.9*   CRP   --   --   --   --  13.0*    < > = values in this interval not displayed.     Physical Exam  Skin inspection: focused entire scalp    Wound Location:  Mid upper posterior scalp    Date of last Photo 9/3  Wound History: EGG in removed and no longer in place.   Measurements (length x width x depth, in cm) 0.2 cm x 0.8 cm  x  0 cm   Wound Base:  100 % dry drainage  Tunneling N/A  Undermining N/A  Palpation of the wound bed: firm   Periwound skin: intact  Color: normal and consistent with surrounding tissue  Temperature: normal   Drainage:, scant, dried   Description of drainage: bloody  Odor: none  Pain: denies , none    Interventions  Current support surface: Standard  Low air loss mattress  Current off-loading measures: Pillows under calves  Repositioning aid: Pillows  Visual inspection of wound(s) completed   Wound Care: was done per plan of care.  Supplies: floor stock, discussed with RN and discussed with patient  Educated provided: plan of care  Education provided to: patient  and nurse  Discussed importance of:dressing change frequency  Discussed plan of care with Patient and Nurse    Angélica Burroughs RN  CWOCN

## 2019-09-03 NOTE — PROGRESS NOTES
Cardiology - CSI Progress Note    Assessment & Plan   83 year old male who was admitted on 8/27/2019 with a cardiac arrest. 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    Plan for today    - continue prednisone for COPD exacerbation  - discontinue vancomycin switched to ceftriaxone to complete 7d  - chest physiotherapy for mobilization of secretions  -      Neurology: Initially Cooled to 34 degrees.  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   - extubated 8/30 with good neurologic function post extubation, some dysphagia  -NPO until speech assessment    Cardiovascular / Hemodynamics: Refractory VF arrest s/p  CONCETTA to LM-> LAD, Lcx.  LA 7-> 1.3  TTE: EF 60-70%, RV mildly reduced function no significant valvular disease  EKG: NSR RBBB   --off pressors 8/30pm  --continue ASA 81mg and plavix 75 mg PO QD  --hold lipitor for now given likely hepatic injury during arrest  --hold ACE/ARB for now given likely reduced renal fxn after arrest- will change hydral isordil for this once more stable   Pulmonary:  history of COPD on 2L of O2 with exertion.   Vent Settings: FiO2 (%): 90 %  Resp: 30  Chest CT- diffuse ground glass opacities bilateral pleural plaques and RLL cavitation with possible emphysema and fibrosis  CXR: ordered for today  --on scheduled duonebs QID  --prednisone for COPD exacerbation, continue abx  --mucomyst and chest physiotherapy    GI and Nutrition: No known medical hx.  --monitor BID LFTs  --postpyloric feeding  --bowel regimen -colace  --GI Prophylaxis: PPI    Renal, Fluid and Electrolytes: SCr  1.7, stable  Monitor creatinine and UOP  --maintain K>3 and Mg>2    Infectious Disease:  Leukocytosis c/w arrest though suspect VAP Blood cultures negative, sputum Cx candida contamination, sputum culture reordered  --vancomycin/zosyn 8/27pm-9/2 Transitioned to cefepime 9/3-**  --follow blood cultures- pan cultured yesterday and no growth to date  -stress dose hydrocortisone 50mg q8h started 8/30- 9/1- transitioned to PO pred    Hematology and Oncology: ASA/plavix for CONCETTA.   - left chest wall hematoma likely source of bleeding  --SQH TID for ppx- restared heparin 9/1   Endocrinology: Diabetic with an A1c of 6.6 in 2017- was considered pre diabetic and this has since resolved A1c on admission 5.9  No known medical history. BG elevated.  --insulin gtt - medium intensity sliding scale  -- non severe malnutrition- thickened liquid diet    Lines: PICC line RUE August 29 2019  R radial arterial line August 29,2019  Mcclelland catheter August 27, 2019  Current lines are required for patient management       Family update by me today: Yes      Code Status: Full     The pt was discussed and evaluated with Dr. Radha MD, attending physician, who agrees with the assessment and plan above.     Jenni Loera    Interval History     No acute events overnight  Still on high flow O2 with copious secretions with good cough   FiO2 (%): 90 %  Resp: 30        Physical Exam   Temp: 98.2  F (36.8  C) Temp src: Axillary BP: 120/69   Heart Rate: 92 Resp: 30 SpO2: 92 % O2 Device: High Flow Nasal Cannula (HFNC) Oxygen Delivery: Other (Comments)(35 LPM)  Vitals:    08/29/19 0000 08/31/19 0400 09/01/19 0000   Weight: 78.5 kg (173 lb 1 oz) 82.2 kg (181 lb 3.5 oz) 81.8 kg (180 lb 5.4 oz)     Vital Signs with Ranges  Temp:  [97.5  F (36.4  C)-98.6  F (37  C)] 98.2  F (36.8  C)  Heart Rate:  [] 92  Resp:  [17-39] 30  BP: ()/(52-69) 120/69  MAP:  [59 mmHg-142 mmHg] 76 mmHg  Arterial Line BP: (106-189)/(39-69) 138/51  FiO2  "(%):  [45 %-100 %] 90 %  SpO2:  [79 %-99 %] 92 %  I/O last 3 completed shifts:  In: 5286 [P.O.:740; I.V.:636; NG/GT:2760]  Out: 3635 [Urine:3635]    Heart Rate: 92, Blood pressure 120/69, pulse 84, temperature 98.2  F (36.8  C), temperature source Axillary, resp. rate 30, height 1.727 m (5' 7.99\"), weight 81.8 kg (180 lb 5.4 oz), SpO2 92 %.  180 lbs 5.38 oz  GEN:  Alert on high flow nasal cannula no distress  CV:  Regular rate and rhythm, no murmur or JVD.  S1 + S2 noted, no S3 or S4.  LUNGS:  Coarse breath sounds bilaterally with copious secretions.   ABD:  Active bowel sounds, soft, non-tender/non-distended.  No rebound/guarding/rigidity.  EXT:  No edema or cyanosis.     Medications     IV fluid REPLACEMENT ONLY       IV fluid REPLACEMENT ONLY       - MEDICATION INSTRUCTIONS -       - MEDICATION INSTRUCTIONS -       Percutaneous Coronary Intervention orders placed (this is information for BPA alerting)       ACE/ARB/ARNI NOT PRESCRIBED       BETA BLOCKER NOT PRESCRIBED         acetylcysteine  2 mL Nebulization Q4H NATE     aspirin  81 mg Oral Daily     cefTRIAXone  2 g Intravenous Q24H     clopidogrel  75 mg Oral Daily     heparin lock flush  5-10 mL Intracatheter Q24H     hydrALAZINE  50 mg Oral 4x Daily     insulin aspart  1-7 Units Subcutaneous TID AC     insulin aspart  1-5 Units Subcutaneous At Bedtime     ipratropium - albuterol 0.5 mg/2.5 mg/3 mL  3 mL Nebulization 4x daily     isosorbide dinitrate  40 mg Oral TID     multivitamins w/minerals  15 mL Per Feeding Tube Daily     pantoprazole  40 mg Oral or Feeding Tube Daily     predniSONE  40 mg Oral Daily     protein modular  1 packet Per Feeding Tube Daily     senna-docusate  1 tablet Oral or Feeding Tube At Bedtime       Data   Recent Labs   Lab 09/03/19  0847 09/03/19  0316 09/02/19  2351 09/02/19  2136 09/02/19  1546 09/02/19  1109  09/02/19  0402  08/31/19  0330  08/28/19  1357  08/28/19  0413   WBC  --  15.7*  --   --  17.7*  --   --  10.6   < > 9.4   < " >  --   --  27.4*   HGB  --  7.1*  --   --  7.8*  --   --  7.4*   < > 7.6*   < >  --   --  9.9*   MCV  --  101*  --   --  100  --   --  102*   < > 99   < >  --   --  103*   PLT  --  83*  --   --  100*  --   --  69*   < > 68*   < >  --   --  211   INR  --   --   --   --   --   --   --   --   --  1.40*  --  1.46*  --  1.55*   NA  --  146* 146* 147* 149* 150*   < > 152*   < > 147*   < >  --    < > 146*   POTASSIUM 3.8 3.3*  --   --  3.8 3.8   < > 3.2*   < > 3.9   < >  --    < > 4.1   CHLORIDE  --  113*  --   --  115* 119*  --  120*   < > 119*   < >  --    < > 117*   CO2  --  23  --   --  25 22  --  24   < > 20   < >  --    < > 20   BUN  --  40*  --   --  44* 41*  --  43*   < > 34*   < >  --    < > 33*   CR  --  1.01  --   --  1.08 1.09  --  1.15   < > 1.31*   < >  --    < > 1.84*   ANIONGAP  --  9  --   --  9 9  --  9   < > 7   < >  --    < > 9   YOON  --  8.2*  --   --  8.2* 7.9*  --  8.6   < > 8.2*   < >  --    < > 7.6*   GLC  --  148*  --   --  146* 191*  --  147*   < > 152*   < >  --    < > 123*   ALBUMIN  --  2.1*  --   --  2.3*  --   --  2.2*   < > 2.2*   < >  --   --  2.4*   PROTTOTAL  --  5.4*  --   --  6.2*  --   --  5.8*   < > 5.8*   < >  --   --  5.8*   BILITOTAL  --  0.7  --   --  0.9  --   --  0.7   < > 0.6   < >  --   --  1.1   ALKPHOS  --  48  --   --  57  --   --  51   < > 59   < >  --   --  65   ALT  --  38  --   --  46  --   --  40   < > 46   < >  --   --  69   AST  --  42  --   --  50*  --   --  52*   < > 76*   < >  --   --  78*    < > = values in this interval not displayed.       No results found for this or any previous visit (from the past 24 hour(s)).      Critical Care Services Progress Note:     José Aguirre remains critically ill with cardiac arrhythmia and shock     I personally examined and evaluated the patient today.   The patient s prognosis today is tentative  I have evaluated all laboratory values and imaging studies from the past 24 hours.  Key findings and decisions made today  included   - continue prednisone for COPD exacerbation  - discontinue vancomycin switched to ceftriaxone to complete 7d  - chest physiotherapy for mobilization of secretions  -     I personally managed the sedation, pain control and analgesia, metabolic abnormalities, antibiotic therapy, nutritional status and non-invasive positive pressure ventilator.   Consults ongoing and ordered are none  Procedures that will happen today are: none  All treatments were placed at my direction.  I formulated today s plan with the house staff team or resident(s) and agree with the findings and plan in the associated note.       The above plans and care have been discussed with no one and all questions and concerns were addressed.     I spent a total of 60 minutes (excluding procedure time) personally providing and directing critical care services at the bedside and on the critical care unit for José Aguirre.        Andrew Garcia MD

## 2019-09-04 ENCOUNTER — APPOINTMENT (OUTPATIENT)
Dept: SPEECH THERAPY | Facility: CLINIC | Age: 84
DRG: 224 | End: 2019-09-04
Payer: MEDICARE

## 2019-09-04 ENCOUNTER — APPOINTMENT (OUTPATIENT)
Dept: GENERAL RADIOLOGY | Facility: CLINIC | Age: 84
DRG: 224 | End: 2019-09-04
Attending: INTERNAL MEDICINE
Payer: MEDICARE

## 2019-09-04 ENCOUNTER — APPOINTMENT (OUTPATIENT)
Dept: OCCUPATIONAL THERAPY | Facility: CLINIC | Age: 84
DRG: 224 | End: 2019-09-04
Attending: STUDENT IN AN ORGANIZED HEALTH CARE EDUCATION/TRAINING PROGRAM
Payer: MEDICARE

## 2019-09-04 ENCOUNTER — APPOINTMENT (OUTPATIENT)
Dept: GENERAL RADIOLOGY | Facility: CLINIC | Age: 84
DRG: 224 | End: 2019-09-04
Attending: STUDENT IN AN ORGANIZED HEALTH CARE EDUCATION/TRAINING PROGRAM
Payer: MEDICARE

## 2019-09-04 ENCOUNTER — ALLIED HEALTH/NURSE VISIT (OUTPATIENT)
Dept: NEUROLOGY | Facility: CLINIC | Age: 84
DRG: 224 | End: 2019-09-04
Attending: PSYCHIATRY & NEUROLOGY
Payer: MEDICARE

## 2019-09-04 DIAGNOSIS — R41.82 ALTERED MENTAL STATUS: Primary | ICD-10-CM

## 2019-09-04 LAB
ABO + RH BLD: NORMAL
ABO + RH BLD: NORMAL
ALBUMIN SERPL-MCNC: 2.2 G/DL (ref 3.4–5)
ALBUMIN SERPL-MCNC: 2.4 G/DL (ref 3.4–5)
ALP SERPL-CCNC: 54 U/L (ref 40–150)
ALP SERPL-CCNC: 55 U/L (ref 40–150)
ALT SERPL W P-5'-P-CCNC: 40 U/L (ref 0–70)
ALT SERPL W P-5'-P-CCNC: 43 U/L (ref 0–70)
ANION GAP SERPL CALCULATED.3IONS-SCNC: 10 MMOL/L (ref 3–14)
ANION GAP SERPL CALCULATED.3IONS-SCNC: 9 MMOL/L (ref 3–14)
AST SERPL W P-5'-P-CCNC: 33 U/L (ref 0–45)
AST SERPL W P-5'-P-CCNC: 38 U/L (ref 0–45)
BACTERIA SPEC CULT: NO GROWTH
BACTERIA SPEC CULT: NO GROWTH
BASE DEFICIT BLDA-SCNC: 0.3 MMOL/L
BASE DEFICIT BLDA-SCNC: 3.2 MMOL/L
BASE EXCESS BLDA CALC-SCNC: 1.1 MMOL/L
BASE EXCESS BLDA CALC-SCNC: 2.9 MMOL/L
BILIRUB SERPL-MCNC: 0.6 MG/DL (ref 0.2–1.3)
BILIRUB SERPL-MCNC: 0.6 MG/DL (ref 0.2–1.3)
BLD GP AB SCN SERPL QL: NORMAL
BLD PROD TYP BPU: NORMAL
BLD PROD TYP BPU: NORMAL
BLD UNIT ID BPU: 0
BLOOD BANK CMNT PATIENT-IMP: NORMAL
BLOOD PRODUCT CODE: NORMAL
BPU ID: NORMAL
BUN SERPL-MCNC: 43 MG/DL (ref 7–30)
BUN SERPL-MCNC: 43 MG/DL (ref 7–30)
CALCIUM SERPL-MCNC: 8.2 MG/DL (ref 8.5–10.1)
CALCIUM SERPL-MCNC: 8.2 MG/DL (ref 8.5–10.1)
CHLORIDE SERPL-SCNC: 108 MMOL/L (ref 94–109)
CHLORIDE SERPL-SCNC: 112 MMOL/L (ref 94–109)
CO2 SERPL-SCNC: 22 MMOL/L (ref 20–32)
CO2 SERPL-SCNC: 24 MMOL/L (ref 20–32)
CREAT SERPL-MCNC: 0.99 MG/DL (ref 0.66–1.25)
CREAT SERPL-MCNC: 1.02 MG/DL (ref 0.66–1.25)
ERYTHROCYTE [DISTWIDTH] IN BLOOD BY AUTOMATED COUNT: 18.6 % (ref 10–15)
ERYTHROCYTE [DISTWIDTH] IN BLOOD BY AUTOMATED COUNT: 19.5 % (ref 10–15)
GFR SERPL CREATININE-BSD FRML MDRD: 67 ML/MIN/{1.73_M2}
GFR SERPL CREATININE-BSD FRML MDRD: 70 ML/MIN/{1.73_M2}
GLUCOSE BLDC GLUCOMTR-MCNC: 126 MG/DL (ref 70–99)
GLUCOSE BLDC GLUCOMTR-MCNC: 162 MG/DL (ref 70–99)
GLUCOSE BLDC GLUCOMTR-MCNC: 164 MG/DL (ref 70–99)
GLUCOSE BLDC GLUCOMTR-MCNC: 196 MG/DL (ref 70–99)
GLUCOSE SERPL-MCNC: 142 MG/DL (ref 70–99)
GLUCOSE SERPL-MCNC: 165 MG/DL (ref 70–99)
HCO3 BLD-SCNC: 21 MMOL/L (ref 21–28)
HCO3 BLD-SCNC: 23 MMOL/L (ref 21–28)
HCO3 BLD-SCNC: 24 MMOL/L (ref 21–28)
HCO3 BLD-SCNC: 26 MMOL/L (ref 21–28)
HCT VFR BLD AUTO: 21.7 % (ref 40–53)
HCT VFR BLD AUTO: 26.2 % (ref 40–53)
HGB BLD-MCNC: 6.6 G/DL (ref 13.3–17.7)
HGB BLD-MCNC: 8 G/DL (ref 13.3–17.7)
HGB BLD-MCNC: 8.1 G/DL (ref 13.3–17.7)
HGB BLD-MCNC: 8.1 G/DL (ref 13.3–17.7)
HGB BLD-MCNC: 8.3 G/DL (ref 13.3–17.7)
INTERPRETATION ECG - MUSE: NORMAL
LACTATE BLD-SCNC: 1.4 MMOL/L (ref 0.7–2)
LACTATE BLD-SCNC: 5 MMOL/L (ref 0.7–2)
Lab: NORMAL
Lab: NORMAL
MAGNESIUM SERPL-MCNC: 2.1 MG/DL (ref 1.6–2.3)
MAGNESIUM SERPL-MCNC: 2.1 MG/DL (ref 1.6–2.3)
MCH RBC QN AUTO: 30 PG (ref 26.5–33)
MCH RBC QN AUTO: 30.8 PG (ref 26.5–33)
MCHC RBC AUTO-ENTMCNC: 30.4 G/DL (ref 31.5–36.5)
MCHC RBC AUTO-ENTMCNC: 30.5 G/DL (ref 31.5–36.5)
MCV RBC AUTO: 101 FL (ref 78–100)
MCV RBC AUTO: 98 FL (ref 78–100)
NUM BPU REQUESTED: 1
O2/TOTAL GAS SETTING VFR VENT: 100 %
O2/TOTAL GAS SETTING VFR VENT: 100 %
O2/TOTAL GAS SETTING VFR VENT: 80 %
O2/TOTAL GAS SETTING VFR VENT: 90 %
PCO2 BLD: 29 MM HG (ref 35–45)
PCO2 BLD: 31 MM HG (ref 35–45)
PCO2 BLD: 33 MM HG (ref 35–45)
PCO2 BLD: 34 MM HG (ref 35–45)
PH BLD: 7.4 PH (ref 7.35–7.45)
PH BLD: 7.5 PH (ref 7.35–7.45)
PHOSPHATE SERPL-MCNC: 3.5 MG/DL (ref 2.5–4.5)
PHOSPHATE SERPL-MCNC: 4 MG/DL (ref 2.5–4.5)
PLATELET # BLD AUTO: 111 10E9/L (ref 150–450)
PLATELET # BLD AUTO: 87 10E9/L (ref 150–450)
PO2 BLD: 106 MM HG (ref 80–105)
PO2 BLD: 311 MM HG (ref 80–105)
PO2 BLD: 53 MM HG (ref 80–105)
PO2 BLD: 62 MM HG (ref 80–105)
POTASSIUM SERPL-SCNC: 3.4 MMOL/L (ref 3.4–5.3)
POTASSIUM SERPL-SCNC: 3.5 MMOL/L (ref 3.4–5.3)
POTASSIUM SERPL-SCNC: 3.8 MMOL/L (ref 3.4–5.3)
PROT SERPL-MCNC: 5.5 G/DL (ref 6.8–8.8)
PROT SERPL-MCNC: 6.1 G/DL (ref 6.8–8.8)
RBC # BLD AUTO: 2.14 10E12/L (ref 4.4–5.9)
RBC # BLD AUTO: 2.67 10E12/L (ref 4.4–5.9)
SODIUM SERPL-SCNC: 140 MMOL/L (ref 133–144)
SODIUM SERPL-SCNC: 145 MMOL/L (ref 133–144)
SPECIMEN EXP DATE BLD: NORMAL
SPECIMEN SOURCE: NORMAL
SPECIMEN SOURCE: NORMAL
TRANSFUSION STATUS PATIENT QL: NORMAL
TRANSFUSION STATUS PATIENT QL: NORMAL
WBC # BLD AUTO: 13.3 10E9/L (ref 4–11)
WBC # BLD AUTO: 17.2 10E9/L (ref 4–11)

## 2019-09-04 PROCEDURE — C9113 INJ PANTOPRAZOLE SODIUM, VIA: HCPCS | Performed by: INTERNAL MEDICINE

## 2019-09-04 PROCEDURE — 85027 COMPLETE CBC AUTOMATED: CPT | Performed by: STUDENT IN AN ORGANIZED HEALTH CARE EDUCATION/TRAINING PROGRAM

## 2019-09-04 PROCEDURE — P9016 RBC LEUKOCYTES REDUCED: HCPCS

## 2019-09-04 PROCEDURE — 00000146 ZZHCL STATISTIC GLUCOSE BY METER IP

## 2019-09-04 PROCEDURE — 84100 ASSAY OF PHOSPHORUS: CPT | Performed by: STUDENT IN AN ORGANIZED HEALTH CARE EDUCATION/TRAINING PROGRAM

## 2019-09-04 PROCEDURE — 94799 UNLISTED PULMONARY SVC/PX: CPT

## 2019-09-04 PROCEDURE — 71045 X-RAY EXAM CHEST 1 VIEW: CPT

## 2019-09-04 PROCEDURE — 94660 CPAP INITIATION&MGMT: CPT

## 2019-09-04 PROCEDURE — 25000125 ZZHC RX 250

## 2019-09-04 PROCEDURE — 25000128 H RX IP 250 OP 636: Performed by: INTERNAL MEDICINE

## 2019-09-04 PROCEDURE — 93005 ELECTROCARDIOGRAM TRACING: CPT

## 2019-09-04 PROCEDURE — 25000128 H RX IP 250 OP 636: Performed by: STUDENT IN AN ORGANIZED HEALTH CARE EDUCATION/TRAINING PROGRAM

## 2019-09-04 PROCEDURE — 83735 ASSAY OF MAGNESIUM: CPT | Performed by: STUDENT IN AN ORGANIZED HEALTH CARE EDUCATION/TRAINING PROGRAM

## 2019-09-04 PROCEDURE — 25000132 ZZH RX MED GY IP 250 OP 250 PS 637: Mod: GY | Performed by: INTERNAL MEDICINE

## 2019-09-04 PROCEDURE — 27210432 ZZH NUTRITION PRODUCT RENAL BASIC LITER

## 2019-09-04 PROCEDURE — 25000125 ZZHC RX 250: Performed by: STUDENT IN AN ORGANIZED HEALTH CARE EDUCATION/TRAINING PROGRAM

## 2019-09-04 PROCEDURE — 40000986 XR CHEST PORT 1 VW

## 2019-09-04 PROCEDURE — 80053 COMPREHEN METABOLIC PANEL: CPT | Performed by: STUDENT IN AN ORGANIZED HEALTH CARE EDUCATION/TRAINING PROGRAM

## 2019-09-04 PROCEDURE — 84132 ASSAY OF SERUM POTASSIUM: CPT | Performed by: STUDENT IN AN ORGANIZED HEALTH CARE EDUCATION/TRAINING PROGRAM

## 2019-09-04 PROCEDURE — 40000275 ZZH STATISTIC RCP TIME EA 10 MIN

## 2019-09-04 PROCEDURE — 82803 BLOOD GASES ANY COMBINATION: CPT | Performed by: STUDENT IN AN ORGANIZED HEALTH CARE EDUCATION/TRAINING PROGRAM

## 2019-09-04 PROCEDURE — 83605 ASSAY OF LACTIC ACID: CPT | Performed by: STUDENT IN AN ORGANIZED HEALTH CARE EDUCATION/TRAINING PROGRAM

## 2019-09-04 PROCEDURE — 97535 SELF CARE MNGMENT TRAINING: CPT | Mod: GO | Performed by: OCCUPATIONAL THERAPIST

## 2019-09-04 PROCEDURE — 92526 ORAL FUNCTION THERAPY: CPT | Mod: GN

## 2019-09-04 PROCEDURE — 94640 AIRWAY INHALATION TREATMENT: CPT | Mod: 76

## 2019-09-04 PROCEDURE — 95951 ZZHC EEG VIDEO < 12 HR: CPT | Mod: 52,ZF

## 2019-09-04 PROCEDURE — 93010 ELECTROCARDIOGRAM REPORT: CPT | Mod: 59 | Performed by: INTERNAL MEDICINE

## 2019-09-04 PROCEDURE — 94668 MNPJ CHEST WALL SBSQ: CPT

## 2019-09-04 PROCEDURE — 25800030 ZZH RX IP 258 OP 636: Performed by: INTERNAL MEDICINE

## 2019-09-04 PROCEDURE — 85018 HEMOGLOBIN: CPT | Performed by: STUDENT IN AN ORGANIZED HEALTH CARE EDUCATION/TRAINING PROGRAM

## 2019-09-04 PROCEDURE — 25000132 ZZH RX MED GY IP 250 OP 250 PS 637: Mod: GY | Performed by: STUDENT IN AN ORGANIZED HEALTH CARE EDUCATION/TRAINING PROGRAM

## 2019-09-04 PROCEDURE — 40000014 ZZH STATISTIC ARTERIAL MONITORING DAILY

## 2019-09-04 PROCEDURE — 74018 RADEX ABDOMEN 1 VIEW: CPT

## 2019-09-04 PROCEDURE — 97165 OT EVAL LOW COMPLEX 30 MIN: CPT | Mod: GO | Performed by: OCCUPATIONAL THERAPIST

## 2019-09-04 PROCEDURE — 85018 HEMOGLOBIN: CPT | Performed by: INTERNAL MEDICINE

## 2019-09-04 PROCEDURE — 94640 AIRWAY INHALATION TREATMENT: CPT

## 2019-09-04 PROCEDURE — 25000131 ZZH RX MED GY IP 250 OP 636 PS 637: Mod: GY | Performed by: STUDENT IN AN ORGANIZED HEALTH CARE EDUCATION/TRAINING PROGRAM

## 2019-09-04 PROCEDURE — 99233 SBSQ HOSP IP/OBS HIGH 50: CPT | Performed by: INTERNAL MEDICINE

## 2019-09-04 PROCEDURE — 20000004 ZZH R&B ICU UMMC

## 2019-09-04 RX ORDER — ALBUTEROL SULFATE 0.83 MG/ML
SOLUTION RESPIRATORY (INHALATION)
Status: COMPLETED
Start: 2019-09-04 | End: 2019-09-04

## 2019-09-04 RX ORDER — ATORVASTATIN CALCIUM 40 MG/1
40 TABLET, FILM COATED ORAL EVERY EVENING
Status: DISCONTINUED | OUTPATIENT
Start: 2019-09-04 | End: 2019-09-19 | Stop reason: HOSPADM

## 2019-09-04 RX ORDER — FUROSEMIDE 10 MG/ML
40 INJECTION INTRAMUSCULAR; INTRAVENOUS EVERY 12 HOURS
Status: DISCONTINUED | OUTPATIENT
Start: 2019-09-04 | End: 2019-09-05

## 2019-09-04 RX ORDER — IPRATROPIUM BROMIDE AND ALBUTEROL SULFATE 2.5; .5 MG/3ML; MG/3ML
3 SOLUTION RESPIRATORY (INHALATION) EVERY 4 HOURS
Status: DISCONTINUED | OUTPATIENT
Start: 2019-09-05 | End: 2019-09-12

## 2019-09-04 RX ADMIN — IPRATROPIUM BROMIDE AND ALBUTEROL SULFATE 3 ML: .5; 3 SOLUTION RESPIRATORY (INHALATION) at 16:38

## 2019-09-04 RX ADMIN — CEFTRIAXONE SODIUM 2 G: 2 INJECTION, POWDER, FOR SOLUTION INTRAMUSCULAR; INTRAVENOUS at 10:34

## 2019-09-04 RX ADMIN — HYDRALAZINE HYDROCHLORIDE 25 MG: 25 TABLET ORAL at 21:26

## 2019-09-04 RX ADMIN — ASPIRIN 81 MG CHEWABLE TABLET 81 MG: 81 TABLET CHEWABLE at 08:06

## 2019-09-04 RX ADMIN — IPRATROPIUM BROMIDE AND ALBUTEROL SULFATE 3 ML: .5; 3 SOLUTION RESPIRATORY (INHALATION) at 20:49

## 2019-09-04 RX ADMIN — ALBUTEROL SULFATE 2.5 MG: 2.5 SOLUTION RESPIRATORY (INHALATION) at 00:41

## 2019-09-04 RX ADMIN — POTASSIUM CHLORIDE 20 MEQ: 750 TABLET, EXTENDED RELEASE ORAL at 05:06

## 2019-09-04 RX ADMIN — ACETYLCYSTEINE 2 ML: 200 SOLUTION ORAL; RESPIRATORY (INHALATION) at 16:38

## 2019-09-04 RX ADMIN — ACETYLCYSTEINE 2 ML: 200 SOLUTION ORAL; RESPIRATORY (INHALATION) at 08:13

## 2019-09-04 RX ADMIN — ALBUTEROL SULFATE 2.5 MG: 2.5 SOLUTION RESPIRATORY (INHALATION) at 05:00

## 2019-09-04 RX ADMIN — IPRATROPIUM BROMIDE AND ALBUTEROL SULFATE 3 ML: .5; 3 SOLUTION RESPIRATORY (INHALATION) at 08:13

## 2019-09-04 RX ADMIN — HYDRALAZINE HYDROCHLORIDE 25 MG: 25 TABLET ORAL at 14:48

## 2019-09-04 RX ADMIN — SODIUM CHLORIDE 8 MG/HR: 9 INJECTION, SOLUTION INTRAVENOUS at 12:47

## 2019-09-04 RX ADMIN — ISOSORBIDE DINITRATE 20 MG: 20 TABLET ORAL at 08:06

## 2019-09-04 RX ADMIN — ACETYLCYSTEINE 2 ML: 200 SOLUTION ORAL; RESPIRATORY (INHALATION) at 12:18

## 2019-09-04 RX ADMIN — PREDNISONE 40 MG: 20 TABLET ORAL at 08:06

## 2019-09-04 RX ADMIN — SODIUM CHLORIDE 8 MG/HR: 9 INJECTION, SOLUTION INTRAVENOUS at 07:01

## 2019-09-04 RX ADMIN — ACETYLCYSTEINE 2 ML: 200 SOLUTION ORAL; RESPIRATORY (INHALATION) at 20:49

## 2019-09-04 RX ADMIN — FUROSEMIDE 40 MG: 10 INJECTION, SOLUTION INTRAVENOUS at 16:44

## 2019-09-04 RX ADMIN — FUROSEMIDE 40 MG: 10 INJECTION, SOLUTION INTRAVENOUS at 05:02

## 2019-09-04 RX ADMIN — ACETYLCYSTEINE 2 ML: 200 SOLUTION ORAL; RESPIRATORY (INHALATION) at 05:00

## 2019-09-04 RX ADMIN — MULTIVIT AND MINERALS-FERROUS GLUCONATE 9 MG IRON/15 ML ORAL LIQUID 15 ML: at 08:06

## 2019-09-04 RX ADMIN — CLOPIDOGREL BISULFATE 75 MG: 75 TABLET, FILM COATED ORAL at 08:06

## 2019-09-04 RX ADMIN — ATORVASTATIN CALCIUM 40 MG: 40 TABLET, FILM COATED ORAL at 20:40

## 2019-09-04 RX ADMIN — ISOSORBIDE DINITRATE 20 MG: 20 TABLET ORAL at 20:40

## 2019-09-04 RX ADMIN — ACETYLCYSTEINE 2 ML: 200 SOLUTION ORAL; RESPIRATORY (INHALATION) at 00:41

## 2019-09-04 RX ADMIN — IPRATROPIUM BROMIDE AND ALBUTEROL SULFATE 3 ML: .5; 3 SOLUTION RESPIRATORY (INHALATION) at 12:17

## 2019-09-04 RX ADMIN — Medication 1 PACKET: at 08:07

## 2019-09-04 RX ADMIN — HYDRALAZINE HYDROCHLORIDE 25 MG: 25 TABLET ORAL at 06:04

## 2019-09-04 RX ADMIN — ISOSORBIDE DINITRATE 20 MG: 20 TABLET ORAL at 14:48

## 2019-09-04 ASSESSMENT — ACTIVITIES OF DAILY LIVING (ADL)
ADLS_ACUITY_SCORE: 17
ADLS_ACUITY_SCORE: 16
ADLS_ACUITY_SCORE: 16
ADLS_ACUITY_SCORE: 17
PREVIOUS_RESPONSIBILITIES: MEAL PREP;HOUSEKEEPING;LAUNDRY;SHOPPING;MEDICATION MANAGEMENT;FINANCES;DRIVING

## 2019-09-04 NOTE — PROVIDER NOTIFICATION
09/04/19 0115   Vitals   BP (!) 67/41   BP - Mean 49   Resp 24   Around 0115, RN went into patient room and found him fidgeting with the pulse ox cord thinking- patient was confused and thought it was something for him to drink. Patient states he thinks he is in [place]. At previous assessment, patient was alert and oriented. Patient was wearing Bipap and it was taken off for patient to speak, he was put on HFNC. At that time patient became unresponsive, hypotensive, and was incontinent of stool. HR remained stable. Patient's eyes were open and pupils reactive. Cross cover fellow called into room to assess. Patient also became gray in color, stiff, and eyes were fixed looking up and to the left. Episode lasted for about 1-2 minutes. O2 sats were 85-90% on 100% FiO2. After a couple minutes patient started to become more alert and oriented. ABG drawn - MD notified of lactate of 5 and pO2 of 53.

## 2019-09-04 NOTE — PLAN OF CARE
Cared for patient [id number]. See previous 3 notes for updates/events throughout the night. Patient having intermittent confusion w/ disorientation to place and time. Remains afebrile, HR 80-90s. RR 20-30s, breathing is labored. On Bipap at % FiO2, intermittent using HFNC 40L 100% FiO2. MAPs variable- see flowsheet. Good UOP, Lasix x 1 with good response. BM x 1. Emesis x 1. Hbg 6.6 - PRBCs given. K+ replaced. TF at 50ml/hr w/ q1hr water flushes. Family called and asked for updates.

## 2019-09-04 NOTE — PROGRESS NOTES
09/04/19 0900   Quick Adds   Type of Visit Initial Occupational Therapy Evaluation   Living Environment   Lives With spouse   Living Arrangements condominium   Home Accessibility no concerns   Transportation Anticipated car, drives self   Self-Care   Usual Activity Tolerance moderate   Current Activity Tolerance poor   Regular Exercise No  (had CR in past)   Equipment Currently Used at Home other (see comments)  (electric scooter for long distances e.g., stores)   Functional Level   Ambulation 1-->assistive equipment   Transferring 0-->independent   Toileting 0-->independent   Bathing 0-->independent   Dressing 0-->independent   Eating 0-->independent   Communication 0-->understands/communicates without difficulty   Swallowing 0-->swallows foods/liquids without difficulty   Cognition 0 - no cognition issues reported   Fall history within last six months yes   Number of times patient has fallen within last six months 3  (fainting)   General Information   Referring Physician Jenni Brandon   Patient/Family Goals Statement return to PLOF   Additional Occupational Profile Info/Pertinent History of Current Problem 83 year old male who was admitted on 8/27/2019 with a cardiac arrest. 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    Cognitive Status Examination   Orientation orientation to person, place and time   Level of Consciousness alert   Follows Commands (Cognition) WFL   Memory intact   Attention  Distractible during evaluation   Organization/Problem Solving Problem solving impaired   Cognitive Comment further testing required.    Visual Perception   Visual Perception Wears glasses   Visual Acuity Pt having difficulty reading therapist name badge from approx 2 feet, able to read other newspaper size print from approx 2 feet and clock on wall from approx 15 feet.    Visual Field intact   Sensory Examination   Sensory Quick Adds No deficits were identified   Range of Motion (ROM)   ROM Quick Adds No deficits were identified   Strength   Manual Muscle Testing Quick Adds Other   Strength Comments B UE/LE grossly 4/5   Hand Strength   Hand Strength Comments no concerns.    Coordination   Coordination Comments no concerns.    Lower Body Dressing   Level of [place]: Dress Lower Body minimum assist (75% patients effort)   Instrumental Activities of Daily Living (IADL)   Previous Responsibilities meal prep;housekeeping;laundry;shopping;medication management;finances;driving   IADL Comments SO assists as needed, pt lives in co-op that assists with all lawn maintenance.    Activities of Daily Living Analysis   Impairments Contributing to Impaired Activities of Daily Living cognition impaired;strength decreased  (decreased activity tolerance. )   General Therapy Interventions   Planned Therapy Interventions ADL retraining;IADL retraining;cognition;strengthening;transfer training;home program guidelines;progressive activity/exercise;risk factor education   Clinical Impression   Criteria for Skilled Therapeutic Interventions Met yes, treatment indicated   OT Diagnosis decreased ADL I   Assessment of Occupational Performance 5 or more Performance Deficits   Identified Performance Deficits dressing, bathing, home making, driving, leisure.    Clinical Decision Making (Complexity) Low complexity   Therapy Frequency 5x/week   Predicted Duration of Therapy Intervention (days/wks) 1 week   Anticipated Discharge Disposition  "Transitional Care Facility   Risks and Benefits of Treatment have been explained. Yes   Patient, Family & other staff in agreement with plan of care Yes   Clinical Impression Comments Pt presents to OT with decreased activity tolerance with increased O2 demands from his baseline 3L NC, pt with mild cognitive deficits as well leading to decreased ADL I. pt to benefit from skilled OT intervention to address the above problem list. See cheri note for treatment provided today.    [facility]-PAC TM \"6 Clicks\"   2016, Trustees of [facility], under license to [email or web address].  All rights reserved.   6 Clicks Short Forms Daily Activity Inpatient Short Form   [facility] AM-PAC  \"6 Clicks\" Daily Activity Inpatient Short Form   1. Putting on and taking off regular lower body clothing? 3 - A Little   2. Bathing (including washing, rinsing, drying)? 3 - A Little   3. Toileting, which includes using toilet, bedpan or urinal? 3 - A Little   4. Putting on and taking off regular upper body clothing? 3 - A Little   5. Taking care of personal grooming such as brushing teeth? 4 - None   6. Eating meals? 4 - None   Daily Activity Raw Score (Score out of 24.Lower scores equate to lower levels of function) 20   Total Evaluation Time   Total Evaluation Time (Minutes) 5     "

## 2019-09-04 NOTE — PROVIDER NOTIFICATION
Cards II Fellow notified again for a similar unresponsive/hypoxic event that occurred around 0100. Patient suddenly not responding to RN, tachypneic/labored breathing, O2 sats dropped to 50-60% when Bipap mask removed. José continues to be confused about where he is. Lasix and CXR ordered.

## 2019-09-04 NOTE — CONSULTS
"    GASTROENTEROLOGY CONSULTATION      Date of Admission:  8/27/2019          ASSESSMENT AND RECOMMENDATIONS:     José Aguirre is a 83 year old male with extensive vascular disease, COPD, HLD, who is admitted following VT/VF arrest at [facility] with ROSC in field, now s/p PCI on Plavix and ASA who had one isolated episode of coffee ground emesis with slight drop in Hgb from 7.0--> 6.6, now up to 8.3 after 1U pRBC's.  Prior to episode, pt was on PPI 40mg once daily, and now pt is currently on IV PPI infusion.     #Coffee ground emesis: Likely 2/2 to gastritis (in setting of NGT, ICU stress, anticoagulation).      #Anemia: Likely 2/2 to left chest wall hematoma in setting of anticoagulation. Pt also had one isolated episode of coffee ground emesis after liquid while on BIPAP as well as one episode of hemoptysis (per nursing). No other melena, hematochezia.     RECOMMENDATIONS    --Conservative management w/ continuation of IV PPI drip for now   --If no further evidence of GIB in 2-3 days, can likely de-escalate to PO PPI     Thank you for involving us in this patient's care. Please do not hesitate to contact the GI service with any questions or concerns.     Patient care plan discussed with Dr. Rowell, GI staff physician.    Marci Stafford MD  Gastroenterology   PGY4   Pager [phone]            Chief Complaint:   We were asked by Dr. Brandon of I to evaluate this patient with \"coffee ground emesis\"    History is obtained from the patient and the medical record.          History of Present Illness:   José Aguirre is a 83 year old male with extensive vascular disease who is admitted following cardiac arrest at [facility]. 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  He has a drug eluting stent and is currently on Plavix and ASA.    Re: Coffee ground emesis event, this ooccurred after large volume liquid while on BIPAP. He has no other melena/hematochezia, but did have one epsiode of hemoptysis. Currently has CT chest with evidence of chest wall hematoma.     He is also getting treated for a COPD exacerbation with prednisone. He is status post stress dose steroids as well. He denies taking NSAIDs. He does not smoke. Retired Salesman.     Family history: No family history of GIB or GI malignancy.               Past Medical History:   Reviewed and edited as appropriate  No past medical history on file.         Past Surgical History:   No GI surgeries         Previous Endoscopy:   None on file - pt doesn't remember details, he thinks he has had a colonoscopy and EGD prior          Social History:   Reviewed and edited as appropriate  Social History     Socioeconomic History     Marital status:      Spouse name: Not on file     Number of children: Not on file     Years of education: Not on file     Highest education level: Not on file   Occupational History     Not on file   Social Needs     Financial resource strain: Not on file     Food insecurity:     Worry: Not on file     Inability: Not on file     Transportation needs:     Medical: Not on file     Non-medical: Not on file   Tobacco Use     Smoking status: Not on file   Substance and Sexual Activity     Alcohol use: Not on file     Drug use: Not on file     Sexual activity: Not on file   Lifestyle     Physical activity:     Days per week: Not on file     Minutes per session: Not on file     Stress: Not on file   Relationships     Social connections:     Talks on phone: Not on file     Gets together: Not on file     Attends [employer] service: Not on file     Active member of club or organization: Not on file     Attends meetings  of clubs or organizations: Not on file     Relationship status: Not on file     Intimate partner violence:     Fear of current or ex partner: Not on file     Emotionally abused: Not on file     Physically abused: Not on file     Forced sexual activity: Not on file   Other Topics Concern     Not on file   Social History Narrative     Not on file            Family History:   Reviewed and edited as appropriate  No known history of gastrointestinal/liver disease or  gastrointestinal malignancies       Allergies:   Reviewed and edited as appropriate   No Known Allergies         Medications:     Current Facility-Administered Medications   Medication     acetylcysteine (MUCOMYST) 20 % nebulizer solution 2 mL     artificial tears ophthalmic ointment     aspirin (ASA) chewable tablet 81 mg     cefTRIAXone (ROCEPHIN) 2 g vial to attach to  ml bag for ADULTS or NS 50 ml bag for PEDS     clopidogrel (PLAVIX) tablet 75 mg     dextrose 10 % 1,000 mL infusion     dextrose 10 % 1,000 mL infusion     glucose gel 15-30 g    Or     dextrose 50 % injection 25-50 mL    Or     glucagon injection 1 mg     fentaNYL (PF) (SUBLIMAZE) injection 50 mcg     furosemide (LASIX) injection 40 mg     heparin lock flush 10 UNIT/ML injection 2-5 mL     heparin lock flush 10 UNIT/ML injection 5-10 mL     heparin lock flush 10 UNIT/ML injection 5-10 mL     hydrALAZINE (APRESOLINE) injection 10 mg     hydrALAZINE (APRESOLINE) tablet 25 mg     insulin aspart (NovoLOG) inj (RAPID ACTING)     insulin aspart (NovoLOG) inj (RAPID ACTING)     ipratropium - albuterol 0.5 mg/2.5 mg/3 mL (DUONEB) neb solution 3 mL     isosorbide dinitrate (ISORDIL) tablet 20 mg     lidocaine (LMX4) cream     lidocaine 1 % 0.1-1 mL     magnesium sulfate 2 g in NS intermittent infusion ([facility] or [facility] Cmpd)     magnesium sulfate 4 g in 100 mL sterile water (premade)     medication instruction     Medication Instructions     melatonin tablet 10 mg     metoprolol (LOPRESSOR)  "injection 5 mg     midazolam (VERSED) injection 1-2 mg     multivitamins w/minerals (CERTAVITE) liquid 15 mL     naloxone (NARCAN) injection 0.1-0.4 mg     nitroGLYcerin (NITROSTAT) sublingual tablet 0.4 mg     pantoprazole (PROTONIX) 80 mg in sodium chloride 0.9 % 100 mL infusion     Percutaneous Coronary Intervention orders placed (this is information for BPA alerting)     potassium chloride (KLOR-CON) Packet 20-40 mEq     potassium chloride 10 mEq in 100 mL intermittent infusion with 10 mg lidocaine     potassium chloride 10 mEq in 100 mL sterile water intermittent infusion (premix)     potassium chloride 20 mEq in 50 mL intermittent infusion     potassium chloride ER (K-DUR/KLOR-CON M) CR tablet 20-40 mEq     potassium phosphate 10 mmol in D5W 250 mL intermittent infusion     potassium phosphate 15 mmol in D5W 250 mL intermittent infusion     potassium phosphate 20 mmol in D5W 250 mL intermittent infusion     potassium phosphate 20 mmol in D5W 500 mL intermittent infusion     potassium phosphate 25 mmol in D5W 500 mL intermittent infusion     predniSONE (DELTASONE) tablet 40 mg     protein modular (PROSOURCE TF) 1 packet     Reason ACE/ARB/ARNI order not selected     Reason beta blocker not prescribed     senna-docusate (SENOKOT-S/PERICOLACE) 8.6-50 MG per tablet 1 tablet     sodium chloride (PF) 0.9% PF flush 10-20 mL     sodium chloride (PF) 0.9% PF flush 5-50 mL             Review of Systems:     A complete review of systems was performed and is negative except as noted in the HPI           Physical Exam:   /52 (BP Location: Left arm)   Pulse 84   Temp 98.3  F (36.8  C) (Oral)   Resp (!) 31   Ht 1.727 m (5' 7.99\")   Wt 81.8 kg (180 lb 5.4 oz)   SpO2 97%   BMI 27.43 kg/m    Wt:   Wt Readings from Last 2 Encounters:   09/01/19 81.8 kg (180 lb 5.4 oz)      Constitutional: cooperative, pleasant, not dyspneic/diaphoretic, no acute distress  Eyes: Sclera anicteric/injected  Ears/nose/mouth/throat: " Normal oropharynx without ulcers or exudate, mucus membranes moist, hearing intact  Neck: supple, thyroid normal size  CV: No edema  Respiratory: Unlabored breathing on high-flow nasal cannula   Lymph: No axillary, submandibular, supraclavicular or inguinal lymphadenopathy  Abdomen: +Bandage, no scars, Nondistended,  no hepatosplenomegaly, nontender, no peritoneal signs  Skin: warm, perfused, no jaundice  Neuro: AAO x 3, No asterixis  Psych: Normal affect           Data:   Labs and imaging below were independently reviewed and interpreted    BMP  Recent Labs   Lab 19  0334 19  1525 19  0847 19  0316 19  2351  19  1546   * 141  --  146* 146*   < > 149*   POTASSIUM 3.5 4.1 3.8 3.3*  --   --  3.8   CHLORIDE 112* 109  --  113*  --   --  115*   YOON 8.2* 8.0*  --  8.2*  --   --  8.2*   CO2 24 22  --  23  --   --  25   BUN 43* 42*  --  40*  --   --  44*   CR 0.99 1.01  --  1.01  --   --  1.08   * 214*  --  148*  --   --  146*    < > = values in this interval not displayed.     CBC  Recent Labs   Lab 19  0832 19  0334 19  1525 19  0316 19  1546   WBC  --  13.3* 18.0* 15.7* 17.7*   RBC  --  2.14* 2.33* 2.33* 2.58*   HGB 8.3* 6.6* 7.0* 7.1* 7.8*   HCT  --  21.7* 23.6* 23.5* 25.7*   MCV  --  101* 101* 101* 100   MCH  --  30.8 30.0 30.5 30.2   MCHC  --  30.4* 29.7* 30.2* 30.4*   RDW  --  18.6* 19.0* 19.2* 19.3*   PLT  --  87* 103* 83* 100*     INR  Recent Labs   Lab 19  0330 19  1357   INR 1.40* 1.46*     LFTs  Recent Labs   Lab 19  0334 19  1525 19  0316 19  1546   ALKPHOS 54 49 48 57   AST 38 39 42 50*   ALT 40 38 38 46   BILITOTAL 0.6 0.6 0.7 0.9   PROTTOTAL 5.5* 5.7* 5.4* 6.2*   ALBUMIN 2.2* 2.2* 2.1* 2.3*      PANCNo lab results found in last 7 days.    Imagin19  IMPRESSION: Left chest wall hematoma, likely explanation for  hemoglobin drop.  In detail:  1. No evidence for retroperitoneal bleed. There  does appear to be  increased thickening and soft tissue swelling involving the left sided  abdominal and intercostal muscles associated with patient's rib  fractures with dispersion of the previously visualized hematoma  overlying the fractured left ribs.  2. Findings in the chest are largely unchanged when compared with the  prior exam including diffuse groundglass and consolidative opacities,  possible cavitation in the right lower lobe, hyperdense fluid  superficial to the pericardium.  3. Increased fat stranding associated with the bladder, prostate and  seminal vesicles. Possibly infectious.  4. Other incidental findings including atrophic left kidney and  partially calcified gallbladder as well as other described findings  are unchanged.

## 2019-09-04 NOTE — PLAN OF CARE
Discharge Planner SLP   Patient plan for discharge: Unknown  Current status: Recommend diet advancement to dysphagia 2 (diced) diet/thin liquids with intermittent supervision. Pt to be fully alert and upright for all PO, taking small bites/sips at slow rate. Hold PO with worsening respiratory status or increased O2 needs. SLP to follow.  Barriers to return to prior living situation: Medical condition, respiratory status, modified diet  Recommendations for discharge: TCU  Rationale for recommendations: Pt will benefit from ongoing ST targeting swallow function       Entered by: Alicia Bailey 09/04/2019 11:02 AM

## 2019-09-04 NOTE — PROGRESS NOTES
Admitted/transferred from:   Reason for admission/transfer: lateral transfer  Patient status upon admission/transfer: stable  Interventions: Head to toe assessment, continue to monitor cardiac and respiratory status  Plan: Continue to try to wean FiO2 on HFNC; ambulate; monitor hemodynamic status  2 RN skin assessment: completed by Nina Callaway RN and Alonzo Quintanilla RN  Result of skin assessment and interventions/actions: Multiple skin tears, excessive bruising, and angio groin site; most intact or improving; no new skin interventions at this time  Height, weight, drug calc weight: done  Patient belongings (see Flowsheet - Adult Profile for details): arrived with patient; in closet and lock box  MDRO education (if applicable): n/a

## 2019-09-04 NOTE — PROVIDER NOTIFICATION
MD notified when patient had emesis while wearing Bipap mask. Patient was unable to take mask off himself. Patient states he doesn't think he aspirated. Emesis was coffee-ground/brown, appeared like old blood. CXR and AXR done at that time. MD at bedside.

## 2019-09-04 NOTE — PLAN OF CARE
Discharge Planner OT   Patient plan for discharge: TCU  Current status: pt desatting into low 80's on 80% HFNC requiring boost to 100% with recovery to mid 90's all sitting up in chair with dressing LE's. Pt scoring 26 on SLUMS indicating mild neurocognitive deficits, however, this is borderline, 27 is considered normal.  Barriers to return to prior living situation: decreased activity tolerance.   Recommendations for discharge: TCU  Rationale for recommendations: pt below baseline in ADL I       Entered by: Alex Martin 09/04/2019 9:41 AM

## 2019-09-04 NOTE — PROGRESS NOTES
Cardiology - CSI Progress Note    Assessment & Plan   83 year old male who was admitted on 8/27/2019 with a cardiac arrest. 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    Plan for today    - continue prednisone for COPD exacerbation  - continue ceftriaxone to complete 10d  - chest physiotherapy for mobilization of secretions  - assess PICC placement  - monitor hgb on PPI though low suspicion for GIB  - EEG  - discuss thoracentesis to improve O2 status      Neurology: Initially Cooled to 34 degrees.  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   - extubated 8/30 with good neurologic function post extubation, some dysphagia  -speech consult dysphagia 3 diet   - some episodes of unresponsiveness started EEG   Cardiovascular / Hemodynamics: Refractory VF arrest s/p  CONCETTA to LM-> LAD, Lcx.  LA 7-> 1.3  TTE: EF 60-70%, RV mildly reduced function no significant valvular disease  EKG: NSR RBBB   --off pressors 8/30pm  --continue ASA 81mg and plavix 75 mg PO QD  --atorvastatin 40mg d  --hold ACE/ARB for now given likely reduced renal fxn after arrest- will change hydral isordil for this once more stable   Pulmonary:  history of COPD on 2L of O2 with exertion.   Vent Settings: FiO2 (%): 85 %  Resp: (!) 37  Chest CT- diffuse ground glass opacities bilateral pleural plaques and RLL cavitation with possible emphysema and fibrosis  CXR: ordered for today  --on scheduled duonebs QID  --prednisone for COPD exacerbation, continue abx  --mucomyst and chest physiotherapy   - QID duoneb   GI and Nutrition: No known medical  hx.  --postpyloric feeding  --bowel regimen -colace  --GI Prophylaxis: PPI    Renal, Fluid and Electrolytes: SCr 1.7, stable  Monitor creatinine and UOP  --maintain K>3 and Mg>2    Infectious Disease:  Leukocytosis c/w arrest though suspect VAP Blood cultures negative, sputum Cx candida contamination, sputum culture reordered  --vancomycin/zosyn 8/27pm-9/2 Transitioned to cefepime 9/3-**  --follow blood cultures- pan cultured yesterday and no growth to date  -stress dose hydrocortisone 50mg q8h started 8/30- 9/1- transitioned to PO pred    Hematology and Oncology: ASA/plavix for CONCETTA.   - left chest wall hematoma likely source of bleeding  --SQH TID for ppx- restared heparin 9/1   Endocrinology: Diabetic with an A1c of 6.6 in 2017- was considered pre diabetic and this has since resolved A1c on admission 5.9  No known medical history. BG elevated.  --insulin gtt - medium intensity sliding scale  -- non severe malnutrition- thickened liquid diet    Lines: PICC line RUE August 29 2019  R radial arterial line August 29,2019  Mcclelland catheter August 27, 2019  Current lines are required for patient management       Family update by me today: Yes      Code Status: Full     The pt was discussed and evaluated with Dr. Jo MD, attending physician, who agrees with the assessment and plan above.     Jenni Aleksandr Theodoreal    Interval History     Had two episodes of unresponsiveness? Overnight then A&O X3   hgb drop  Vomited into his BiPAP ?aspiration  FiO2 (%): 85 %  Resp: (!) 37        Physical Exam   Temp: 97.9  F (36.6  C) Temp src: Oral BP: 115/73 Pulse: 90 Heart Rate: 96 Resp: (!) 37 SpO2: 90 % O2 Device: High Flow Nasal Cannula (HFNC) Oxygen Delivery: Other (Comments)(35LPM)  Vitals:    08/29/19 0000 08/31/19 0400 09/01/19 0000   Weight: 78.5 kg (173 lb 1 oz) 82.2 kg (181 lb 3.5 oz) 81.8 kg (180 lb 5.4 oz)     Vital Signs with Ranges  Temp:  [97.7  F (36.5  C)-98.9  F (37.2  C)] 97.9  F (36.6  C)  Pulse:  []  "90  Heart Rate:  [] 96  Resp:  [12-37] 37  BP: ()/(41-80) 115/73  MAP:  [58 mmHg-112 mmHg] 112 mmHg  Arterial Line BP: ()/(5-101) 167/67  FiO2 (%):  [60 %-100 %] 85 %  SpO2:  [82 %-100 %] 90 %  I/O last 3 completed shifts:  In: 3498.83 [P.O.:120; I.V.:363.83; NG/GT:1565]  Out: 3225 [Urine:2950; Stool:275]    Heart Rate: 96, Blood pressure 115/73, pulse 90, temperature 97.9  F (36.6  C), temperature source Oral, resp. rate (!) 37, height 1.727 m (5' 7.99\"), weight 81.8 kg (180 lb 5.4 oz), SpO2 90 %.  180 lbs 5.38 oz  GEN:  Alert on high flow nasal cannula no distress  CV:  Regular rate and rhythm,   S1 + S2 noted, no S3 or S4.  LUNGS:  Coarse breath sounds bilaterally with copious secretions.   ABD:  Active bowel sounds, soft, non-tender/non-distended.  No rebound/guarding/rigidity.  EXT:  No edema or cyanosis.     Medications     IV fluid REPLACEMENT ONLY       IV fluid REPLACEMENT ONLY       - MEDICATION INSTRUCTIONS -       - MEDICATION INSTRUCTIONS -       pantoprazole (PROTONIX) infusion ADULT/PEDS GREATER than or EQUAL to 45 kg 8 mg/hr (09/04/19 1400)     Percutaneous Coronary Intervention orders placed (this is information for BPA alerting)       ACE/ARB/ARNI NOT PRESCRIBED       BETA BLOCKER NOT PRESCRIBED         acetylcysteine  2 mL Nebulization Q4H NATE     aspirin  81 mg Oral Daily     cefTRIAXone  2 g Intravenous Q24H     clopidogrel  75 mg Oral Daily     furosemide  40 mg Intravenous Q12H     heparin lock flush  5-10 mL Intracatheter Q24H     hydrALAZINE  25 mg Oral Q8H NATE     insulin aspart  1-7 Units Subcutaneous TID AC     insulin aspart  1-5 Units Subcutaneous At Bedtime     ipratropium - albuterol 0.5 mg/2.5 mg/3 mL  3 mL Nebulization 4x daily     isosorbide dinitrate  20 mg Oral TID     multivitamins w/minerals  15 mL Per Feeding Tube Daily     predniSONE  40 mg Oral Daily     protein modular  1 packet Per Feeding Tube Daily     senna-docusate  1 tablet Oral or Feeding Tube At " Bedtime       Data   Recent Labs   Lab 09/04/19  1245 09/04/19  0832 09/04/19  0334 09/03/19  1525 09/03/19  0847 09/03/19  0316  08/31/19  0330   WBC  --   --  13.3* 18.0*  --  15.7*   < > 9.4   HGB 8.1* 8.3* 6.6* 7.0*  --  7.1*   < > 7.6*   MCV  --   --  101* 101*  --  101*   < > 99   PLT  --   --  87* 103*  --  83*   < > 68*   INR  --   --   --   --   --   --   --  1.40*   NA  --   --  145* 141  --  146*   < > 147*   POTASSIUM  --   --  3.5 4.1 3.8 3.3*   < > 3.9   CHLORIDE  --   --  112* 109  --  113*   < > 119*   CO2  --   --  24 22  --  23   < > 20   BUN  --   --  43* 42*  --  40*   < > 34*   CR  --   --  0.99 1.01  --  1.01   < > 1.31*   ANIONGAP  --   --  9 10  --  9   < > 7   YOON  --   --  8.2* 8.0*  --  8.2*   < > 8.2*   GLC  --   --  142* 214*  --  148*   < > 152*   ALBUMIN  --   --  2.2* 2.2*  --  2.1*   < > 2.2*   PROTTOTAL  --   --  5.5* 5.7*  --  5.4*   < > 5.8*   BILITOTAL  --   --  0.6 0.6  --  0.7   < > 0.6   ALKPHOS  --   --  54 49  --  48   < > 59   ALT  --   --  40 38  --  38   < > 46   AST  --   --  38 39  --  42   < > 76*    < > = values in this interval not displayed.       Recent Results (from the past 24 hour(s))   XR Chest Port 1 View    Narrative    Exam: Single view of the chest, 9/4/2019 5:57 AM    Indication: Hypoxia    Comparison: 9/3/2019    Findings:   Single portable view of the chest. Right upper approach PICC line,  enteric tube are unchanged in position. Trachea is midline,  cardiomegaly. Continued bilateral pleural effusions, left greater than  right with bibasilar streaky opacities. Slightly increased mixed  interstitial and airspace opacities in the right lower lung field  since prior. No acute osseous abnormality.      Impression    IMPRESSION:  1. Slightly increased right lower lung field interstitial/streaky  opacities. Atelectasis versus aspiration related developing  consolidation.  2. Cardiomegaly with mild pulmonary edema.  3. Slight increased size of bilateral pleural  effusions, left greater  than right.    I have personally reviewed the examination and initial interpretation  and I agree with the findings.    FARIBA MACKEY MD   XR Abdomen Port 1 View    Narrative    XR ABDOMEN PORT 1 VW  9/4/2019 5:55 AM      HISTORY: emesis    COMPARISON: 8/31/2019, CT abdomen and pelvis 8/31/2019.    FINDINGS: Supine views of the abdomen. Feeding tube tip projects over  the fourth portion of the duodenum. Vascular stent projecting over the  mid abdomen. Mcclelland catheter projecting over the bladder. Mild gaseous  gastric distention. Nonobstructive bowel gas pattern. Evaluation for  pneumoperitoneum is limited on the supine study. No acute osseous  abnormality. Visualized lung bases are better characterized on  separate chest radiograph.      Impression    IMPRESSION: Nonobstructive bowel gas pattern.    I have personally reviewed the examination and initial interpretation  and I agree with the findings.    LENNOX KOTHARI MD

## 2019-09-05 ENCOUNTER — APPOINTMENT (OUTPATIENT)
Dept: PHYSICAL THERAPY | Facility: CLINIC | Age: 84
DRG: 224 | End: 2019-09-05
Payer: MEDICARE

## 2019-09-05 ENCOUNTER — DOCUMENTATION ONLY (OUTPATIENT)
Dept: OTHER | Facility: CLINIC | Age: 84
End: 2019-09-05

## 2019-09-05 ENCOUNTER — APPOINTMENT (OUTPATIENT)
Dept: OCCUPATIONAL THERAPY | Facility: CLINIC | Age: 84
DRG: 224 | End: 2019-09-05
Payer: MEDICARE

## 2019-09-05 ENCOUNTER — ALLIED HEALTH/NURSE VISIT (OUTPATIENT)
Dept: NEUROLOGY | Facility: CLINIC | Age: 84
DRG: 224 | End: 2019-09-05
Attending: PSYCHIATRY & NEUROLOGY
Payer: MEDICARE

## 2019-09-05 ENCOUNTER — RESULTS ONLY (OUTPATIENT)
Dept: MEDSURG UNIT | Facility: CLINIC | Age: 84
End: 2019-09-05

## 2019-09-05 ENCOUNTER — APPOINTMENT (OUTPATIENT)
Dept: INTERVENTIONAL RADIOLOGY/VASCULAR | Facility: CLINIC | Age: 84
DRG: 224 | End: 2019-09-05
Attending: PHYSICIAN ASSISTANT
Payer: MEDICARE

## 2019-09-05 DIAGNOSIS — R41.82 ALTERED MENTAL STATUS: Primary | ICD-10-CM

## 2019-09-05 LAB
ALBUMIN SERPL-MCNC: 2.2 G/DL (ref 3.4–5)
ALBUMIN SERPL-MCNC: 2.5 G/DL (ref 3.4–5)
ALP SERPL-CCNC: 48 U/L (ref 40–150)
ALP SERPL-CCNC: 52 U/L (ref 40–150)
ALT SERPL W P-5'-P-CCNC: 38 U/L (ref 0–70)
ALT SERPL W P-5'-P-CCNC: 44 U/L (ref 0–70)
ANION GAP SERPL CALCULATED.3IONS-SCNC: 7 MMOL/L (ref 3–14)
ANION GAP SERPL CALCULATED.3IONS-SCNC: 8 MMOL/L (ref 3–14)
APPEARANCE FLD: NORMAL
AST SERPL W P-5'-P-CCNC: 30 U/L (ref 0–45)
AST SERPL W P-5'-P-CCNC: 33 U/L (ref 0–45)
BACTERIA SPEC CULT: NORMAL
BILIRUB SERPL-MCNC: 0.7 MG/DL (ref 0.2–1.3)
BILIRUB SERPL-MCNC: 0.8 MG/DL (ref 0.2–1.3)
BUN SERPL-MCNC: 40 MG/DL (ref 7–30)
BUN SERPL-MCNC: 41 MG/DL (ref 7–30)
CALCIUM SERPL-MCNC: 8.1 MG/DL (ref 8.5–10.1)
CALCIUM SERPL-MCNC: 8.4 MG/DL (ref 8.5–10.1)
CHLORIDE SERPL-SCNC: 107 MMOL/L (ref 94–109)
CHLORIDE SERPL-SCNC: 108 MMOL/L (ref 94–109)
CO2 SERPL-SCNC: 27 MMOL/L (ref 20–32)
CO2 SERPL-SCNC: 27 MMOL/L (ref 20–32)
COLOR FLD: NORMAL
CREAT SERPL-MCNC: 0.96 MG/DL (ref 0.66–1.25)
CREAT SERPL-MCNC: 1.08 MG/DL (ref 0.66–1.25)
ERYTHROCYTE [DISTWIDTH] IN BLOOD BY AUTOMATED COUNT: 18.9 % (ref 10–15)
ERYTHROCYTE [DISTWIDTH] IN BLOOD BY AUTOMATED COUNT: 19 % (ref 10–15)
GFR SERPL CREATININE-BSD FRML MDRD: 63 ML/MIN/{1.73_M2}
GFR SERPL CREATININE-BSD FRML MDRD: 72 ML/MIN/{1.73_M2}
GLUCOSE BLDC GLUCOMTR-MCNC: 112 MG/DL (ref 70–99)
GLUCOSE BLDC GLUCOMTR-MCNC: 133 MG/DL (ref 70–99)
GLUCOSE BLDC GLUCOMTR-MCNC: 185 MG/DL (ref 70–99)
GLUCOSE BLDC GLUCOMTR-MCNC: 205 MG/DL (ref 70–99)
GLUCOSE FLD-MCNC: 187 MG/DL
GLUCOSE SERPL-MCNC: 120 MG/DL (ref 70–99)
GLUCOSE SERPL-MCNC: 210 MG/DL (ref 70–99)
GRAM STN SPEC: NORMAL
HCT VFR BLD AUTO: 24.2 % (ref 40–53)
HCT VFR BLD AUTO: 28 % (ref 40–53)
HGB BLD-MCNC: 7.4 G/DL (ref 13.3–17.7)
HGB BLD-MCNC: 8.3 G/DL (ref 13.3–17.7)
INTERPRETATION ECG - MUSE: NORMAL
LDH FLD L TO P-CCNC: 296 U/L
LDH SERPL L TO P-CCNC: 376 U/L (ref 85–227)
LYMPHOCYTES NFR FLD MANUAL: 9 %
MAGNESIUM SERPL-MCNC: 2.2 MG/DL (ref 1.6–2.3)
MAGNESIUM SERPL-MCNC: 2.3 MG/DL (ref 1.6–2.3)
MCH RBC QN AUTO: 29.6 PG (ref 26.5–33)
MCH RBC QN AUTO: 30.6 PG (ref 26.5–33)
MCHC RBC AUTO-ENTMCNC: 29.6 G/DL (ref 31.5–36.5)
MCHC RBC AUTO-ENTMCNC: 30.6 G/DL (ref 31.5–36.5)
MCV RBC AUTO: 100 FL (ref 78–100)
MCV RBC AUTO: 100 FL (ref 78–100)
MONOS+MACROS NFR FLD MANUAL: 24 %
NEUTS BAND NFR FLD MANUAL: 67 %
PH FLD: 7.6 PH
PHOSPHATE SERPL-MCNC: 3.5 MG/DL (ref 2.5–4.5)
PHOSPHATE SERPL-MCNC: 3.6 MG/DL (ref 2.5–4.5)
PLATELET # BLD AUTO: 115 10E9/L (ref 150–450)
PLATELET # BLD AUTO: 94 10E9/L (ref 150–450)
POTASSIUM SERPL-SCNC: 3.5 MMOL/L (ref 3.4–5.3)
POTASSIUM SERPL-SCNC: 3.8 MMOL/L (ref 3.4–5.3)
PROT FLD-MCNC: 2 G/DL
PROT SERPL-MCNC: 5.6 G/DL (ref 6.8–8.8)
PROT SERPL-MCNC: 6.3 G/DL (ref 6.8–8.8)
RBC # BLD AUTO: 2.42 10E12/L (ref 4.4–5.9)
RBC # BLD AUTO: 2.8 10E12/L (ref 4.4–5.9)
SODIUM SERPL-SCNC: 142 MMOL/L (ref 133–144)
SODIUM SERPL-SCNC: 143 MMOL/L (ref 133–144)
SPECIMEN SOURCE FLD: NORMAL
SPECIMEN SOURCE: NORMAL
SPECIMEN SOURCE: NORMAL
WBC # BLD AUTO: 12.5 10E9/L (ref 4–11)
WBC # BLD AUTO: 13.2 10E9/L (ref 4–11)
WBC # FLD AUTO: 578 /UL

## 2019-09-05 PROCEDURE — 00000102 ZZHCL STATISTIC CYTO WRIGHT STAIN TC: Performed by: STUDENT IN AN ORGANIZED HEALTH CARE EDUCATION/TRAINING PROGRAM

## 2019-09-05 PROCEDURE — 94799 UNLISTED PULMONARY SVC/PX: CPT

## 2019-09-05 PROCEDURE — 27211039 ZZH NEEDLE CR2

## 2019-09-05 PROCEDURE — 94668 MNPJ CHEST WALL SBSQ: CPT

## 2019-09-05 PROCEDURE — 97110 THERAPEUTIC EXERCISES: CPT | Mod: GO | Performed by: OCCUPATIONAL THERAPIST

## 2019-09-05 PROCEDURE — 88305 TISSUE EXAM BY PATHOLOGIST: CPT | Performed by: STUDENT IN AN ORGANIZED HEALTH CARE EDUCATION/TRAINING PROGRAM

## 2019-09-05 PROCEDURE — 83615 LACTATE (LD) (LDH) ENZYME: CPT | Performed by: STUDENT IN AN ORGANIZED HEALTH CARE EDUCATION/TRAINING PROGRAM

## 2019-09-05 PROCEDURE — 94660 CPAP INITIATION&MGMT: CPT

## 2019-09-05 PROCEDURE — 00000146 ZZHCL STATISTIC GLUCOSE BY METER IP

## 2019-09-05 PROCEDURE — 94640 AIRWAY INHALATION TREATMENT: CPT

## 2019-09-05 PROCEDURE — 00000155 ZZHCL STATISTIC H-CELL BLOCK W/STAIN: Performed by: STUDENT IN AN ORGANIZED HEALTH CARE EDUCATION/TRAINING PROGRAM

## 2019-09-05 PROCEDURE — 87075 CULTR BACTERIA EXCEPT BLOOD: CPT | Performed by: STUDENT IN AN ORGANIZED HEALTH CARE EDUCATION/TRAINING PROGRAM

## 2019-09-05 PROCEDURE — 84100 ASSAY OF PHOSPHORUS: CPT | Performed by: STUDENT IN AN ORGANIZED HEALTH CARE EDUCATION/TRAINING PROGRAM

## 2019-09-05 PROCEDURE — 97110 THERAPEUTIC EXERCISES: CPT | Mod: GP

## 2019-09-05 PROCEDURE — 85027 COMPLETE CBC AUTOMATED: CPT | Performed by: STUDENT IN AN ORGANIZED HEALTH CARE EDUCATION/TRAINING PROGRAM

## 2019-09-05 PROCEDURE — 0W9B3ZZ DRAINAGE OF LEFT PLEURAL CAVITY, PERCUTANEOUS APPROACH: ICD-10-PCS | Performed by: PHYSICIAN ASSISTANT

## 2019-09-05 PROCEDURE — 25000131 ZZH RX MED GY IP 250 OP 636 PS 637: Mod: GY | Performed by: STUDENT IN AN ORGANIZED HEALTH CARE EDUCATION/TRAINING PROGRAM

## 2019-09-05 PROCEDURE — 84157 ASSAY OF PROTEIN OTHER: CPT | Performed by: STUDENT IN AN ORGANIZED HEALTH CARE EDUCATION/TRAINING PROGRAM

## 2019-09-05 PROCEDURE — 25000132 ZZH RX MED GY IP 250 OP 250 PS 637: Mod: GY | Performed by: INTERNAL MEDICINE

## 2019-09-05 PROCEDURE — 25000125 ZZHC RX 250

## 2019-09-05 PROCEDURE — 88112 CYTOPATH CELL ENHANCE TECH: CPT | Performed by: STUDENT IN AN ORGANIZED HEALTH CARE EDUCATION/TRAINING PROGRAM

## 2019-09-05 PROCEDURE — 87205 SMEAR GRAM STAIN: CPT | Performed by: STUDENT IN AN ORGANIZED HEALTH CARE EDUCATION/TRAINING PROGRAM

## 2019-09-05 PROCEDURE — 87102 FUNGUS ISOLATION CULTURE: CPT | Performed by: STUDENT IN AN ORGANIZED HEALTH CARE EDUCATION/TRAINING PROGRAM

## 2019-09-05 PROCEDURE — 27210432 ZZH NUTRITION PRODUCT RENAL BASIC LITER

## 2019-09-05 PROCEDURE — 25000128 H RX IP 250 OP 636: Performed by: INTERNAL MEDICINE

## 2019-09-05 PROCEDURE — 40000982 ZZH STATISTICAL HAIKU-CANTO PHOTO

## 2019-09-05 PROCEDURE — 97116 GAIT TRAINING THERAPY: CPT | Mod: GP

## 2019-09-05 PROCEDURE — 99233 SBSQ HOSP IP/OBS HIGH 50: CPT | Performed by: INTERNAL MEDICINE

## 2019-09-05 PROCEDURE — 27210429 ZZH NUTRITION PRODUCT INTERMEDIATE LITER

## 2019-09-05 PROCEDURE — 25000128 H RX IP 250 OP 636: Performed by: STUDENT IN AN ORGANIZED HEALTH CARE EDUCATION/TRAINING PROGRAM

## 2019-09-05 PROCEDURE — 94667 MNPJ CHEST WALL 1ST: CPT

## 2019-09-05 PROCEDURE — 25000132 ZZH RX MED GY IP 250 OP 250 PS 637: Mod: GY | Performed by: STUDENT IN AN ORGANIZED HEALTH CARE EDUCATION/TRAINING PROGRAM

## 2019-09-05 PROCEDURE — 97535 SELF CARE MNGMENT TRAINING: CPT | Mod: GO | Performed by: OCCUPATIONAL THERAPIST

## 2019-09-05 PROCEDURE — 83735 ASSAY OF MAGNESIUM: CPT | Performed by: STUDENT IN AN ORGANIZED HEALTH CARE EDUCATION/TRAINING PROGRAM

## 2019-09-05 PROCEDURE — 25000125 ZZHC RX 250: Performed by: STUDENT IN AN ORGANIZED HEALTH CARE EDUCATION/TRAINING PROGRAM

## 2019-09-05 PROCEDURE — C9113 INJ PANTOPRAZOLE SODIUM, VIA: HCPCS | Performed by: INTERNAL MEDICINE

## 2019-09-05 PROCEDURE — 80053 COMPREHEN METABOLIC PANEL: CPT | Performed by: STUDENT IN AN ORGANIZED HEALTH CARE EDUCATION/TRAINING PROGRAM

## 2019-09-05 PROCEDURE — 87070 CULTURE OTHR SPECIMN AEROBIC: CPT | Performed by: STUDENT IN AN ORGANIZED HEALTH CARE EDUCATION/TRAINING PROGRAM

## 2019-09-05 PROCEDURE — 94640 AIRWAY INHALATION TREATMENT: CPT | Mod: 76

## 2019-09-05 PROCEDURE — 97530 THERAPEUTIC ACTIVITIES: CPT | Mod: GP

## 2019-09-05 PROCEDURE — C9113 INJ PANTOPRAZOLE SODIUM, VIA: HCPCS | Performed by: STUDENT IN AN ORGANIZED HEALTH CARE EDUCATION/TRAINING PROGRAM

## 2019-09-05 PROCEDURE — 83986 ASSAY PH BODY FLUID NOS: CPT | Performed by: STUDENT IN AN ORGANIZED HEALTH CARE EDUCATION/TRAINING PROGRAM

## 2019-09-05 PROCEDURE — 95951 ZZHC EEG VIDEO EACH 24 HR: CPT | Mod: ZF

## 2019-09-05 PROCEDURE — 32555 ASPIRATE PLEURA W/ IMAGING: CPT

## 2019-09-05 PROCEDURE — 25800030 ZZH RX IP 258 OP 636: Performed by: INTERNAL MEDICINE

## 2019-09-05 PROCEDURE — 40000275 ZZH STATISTIC RCP TIME EA 10 MIN

## 2019-09-05 PROCEDURE — 82945 GLUCOSE OTHER FLUID: CPT | Performed by: STUDENT IN AN ORGANIZED HEALTH CARE EDUCATION/TRAINING PROGRAM

## 2019-09-05 PROCEDURE — 89051 BODY FLUID CELL COUNT: CPT | Performed by: STUDENT IN AN ORGANIZED HEALTH CARE EDUCATION/TRAINING PROGRAM

## 2019-09-05 PROCEDURE — 20000004 ZZH R&B ICU UMMC

## 2019-09-05 RX ORDER — ASPIRIN 81 MG/1
81 TABLET ORAL DAILY
COMMUNITY
End: 2020-01-01

## 2019-09-05 RX ORDER — FUROSEMIDE 20 MG
20 TABLET ORAL 2 TIMES DAILY
Status: ON HOLD | COMMUNITY
End: 2019-09-17

## 2019-09-05 RX ORDER — POTASSIUM CHLORIDE 1.5 G/1.58G
20 POWDER, FOR SOLUTION ORAL 2 TIMES DAILY
COMMUNITY
End: 2020-01-01 | Stop reason: DRUGHIGH

## 2019-09-05 RX ORDER — FUROSEMIDE 10 MG/ML
60 INJECTION INTRAMUSCULAR; INTRAVENOUS EVERY 12 HOURS
Status: DISCONTINUED | OUTPATIENT
Start: 2019-09-05 | End: 2019-09-12

## 2019-09-05 RX ORDER — CHOLECALCIFEROL (VITAMIN D3) 50 MCG
2000 TABLET ORAL DAILY
COMMUNITY

## 2019-09-05 RX ORDER — NICOTINE POLACRILEX 4 MG
15-30 LOZENGE BUCCAL
Status: CANCELLED | OUTPATIENT
Start: 2019-09-05

## 2019-09-05 RX ORDER — CLOPIDOGREL BISULFATE 75 MG/1
75 TABLET ORAL DAILY
COMMUNITY

## 2019-09-05 RX ORDER — CARVEDILOL 25 MG/1
25 TABLET ORAL 2 TIMES DAILY WITH MEALS
Status: ON HOLD | COMMUNITY
End: 2019-09-09

## 2019-09-05 RX ORDER — DEXTROSE MONOHYDRATE 25 G/50ML
25-50 INJECTION, SOLUTION INTRAVENOUS
Status: CANCELLED | OUTPATIENT
Start: 2019-09-05

## 2019-09-05 RX ORDER — ROSUVASTATIN CALCIUM 20 MG/1
40 TABLET, COATED ORAL DAILY
Status: ON HOLD | COMMUNITY
End: 2019-09-09

## 2019-09-05 RX ORDER — FERROUS SULFATE 325(65) MG
325 TABLET ORAL
COMMUNITY

## 2019-09-05 RX ADMIN — IPRATROPIUM BROMIDE AND ALBUTEROL SULFATE 3 ML: .5; 3 SOLUTION RESPIRATORY (INHALATION) at 04:20

## 2019-09-05 RX ADMIN — ASPIRIN 81 MG CHEWABLE TABLET 81 MG: 81 TABLET CHEWABLE at 09:25

## 2019-09-05 RX ADMIN — ACETYLCYSTEINE 2 ML: 200 SOLUTION ORAL; RESPIRATORY (INHALATION) at 19:36

## 2019-09-05 RX ADMIN — IPRATROPIUM BROMIDE AND ALBUTEROL SULFATE 3 ML: .5; 3 SOLUTION RESPIRATORY (INHALATION) at 19:36

## 2019-09-05 RX ADMIN — Medication 12.5 MG: at 09:26

## 2019-09-05 RX ADMIN — HYDRALAZINE HYDROCHLORIDE 25 MG: 25 TABLET ORAL at 06:06

## 2019-09-05 RX ADMIN — HYDRALAZINE HYDROCHLORIDE 25 MG: 25 TABLET ORAL at 13:16

## 2019-09-05 RX ADMIN — PANTOPRAZOLE SODIUM 40 MG: 40 INJECTION, POWDER, LYOPHILIZED, FOR SOLUTION INTRAVENOUS at 09:26

## 2019-09-05 RX ADMIN — IPRATROPIUM BROMIDE AND ALBUTEROL SULFATE 3 ML: .5; 3 SOLUTION RESPIRATORY (INHALATION) at 08:38

## 2019-09-05 RX ADMIN — POTASSIUM CHLORIDE 20 MEQ: 29.8 INJECTION, SOLUTION INTRAVENOUS at 06:14

## 2019-09-05 RX ADMIN — ACETYLCYSTEINE 2 ML: 200 SOLUTION ORAL; RESPIRATORY (INHALATION) at 12:46

## 2019-09-05 RX ADMIN — LIDOCAINE HYDROCHLORIDE 10 ML: 10 INJECTION, SOLUTION EPIDURAL; INFILTRATION; INTRACAUDAL; PERINEURAL at 15:07

## 2019-09-05 RX ADMIN — ACETYLCYSTEINE 2 ML: 200 SOLUTION ORAL; RESPIRATORY (INHALATION) at 04:20

## 2019-09-05 RX ADMIN — ACETYLCYSTEINE 2 ML: 200 SOLUTION ORAL; RESPIRATORY (INHALATION) at 08:42

## 2019-09-05 RX ADMIN — ACETYLCYSTEINE 2 ML: 200 SOLUTION ORAL; RESPIRATORY (INHALATION) at 00:46

## 2019-09-05 RX ADMIN — CLOPIDOGREL BISULFATE 75 MG: 75 TABLET, FILM COATED ORAL at 09:26

## 2019-09-05 RX ADMIN — IPRATROPIUM BROMIDE AND ALBUTEROL SULFATE 3 ML: .5; 3 SOLUTION RESPIRATORY (INHALATION) at 16:41

## 2019-09-05 RX ADMIN — FUROSEMIDE 40 MG: 10 INJECTION, SOLUTION INTRAVENOUS at 06:03

## 2019-09-05 RX ADMIN — IPRATROPIUM BROMIDE AND ALBUTEROL SULFATE 3 ML: .5; 3 SOLUTION RESPIRATORY (INHALATION) at 00:46

## 2019-09-05 RX ADMIN — Medication 1 PACKET: at 09:26

## 2019-09-05 RX ADMIN — PREDNISONE 40 MG: 20 TABLET ORAL at 09:25

## 2019-09-05 RX ADMIN — FUROSEMIDE 60 MG: 10 INJECTION, SOLUTION INTRAVENOUS at 17:44

## 2019-09-05 RX ADMIN — HYDRALAZINE HYDROCHLORIDE 25 MG: 25 TABLET ORAL at 21:40

## 2019-09-05 RX ADMIN — SODIUM CHLORIDE 8 MG/HR: 9 INJECTION, SOLUTION INTRAVENOUS at 00:57

## 2019-09-05 RX ADMIN — MULTIVIT AND MINERALS-FERROUS GLUCONATE 9 MG IRON/15 ML ORAL LIQUID 15 ML: at 09:25

## 2019-09-05 RX ADMIN — CEFTRIAXONE SODIUM 2 G: 2 INJECTION, POWDER, FOR SOLUTION INTRAMUSCULAR; INTRAVENOUS at 09:28

## 2019-09-05 RX ADMIN — IPRATROPIUM BROMIDE AND ALBUTEROL SULFATE 3 ML: .5; 3 SOLUTION RESPIRATORY (INHALATION) at 12:47

## 2019-09-05 RX ADMIN — ATORVASTATIN CALCIUM 40 MG: 40 TABLET, FILM COATED ORAL at 20:31

## 2019-09-05 RX ADMIN — ACETYLCYSTEINE 2 ML: 200 SOLUTION ORAL; RESPIRATORY (INHALATION) at 16:42

## 2019-09-05 RX ADMIN — POTASSIUM CHLORIDE 20 MEQ: 29.8 INJECTION, SOLUTION INTRAVENOUS at 01:00

## 2019-09-05 ASSESSMENT — ACTIVITIES OF DAILY LIVING (ADL)
ADLS_ACUITY_SCORE: 17
ADLS_ACUITY_SCORE: 18

## 2019-09-05 ASSESSMENT — MIFFLIN-ST. JEOR: SCORE: 1516.37

## 2019-09-05 NOTE — PLAN OF CARE
OT/4C:  Discharge Planner OT   Patient plan for discharge: unstated  Current status: Pt CGA sit to stand and BSC to chair transfer. Max A xavier-cares and clothing management. Pt tolerated standing x7 minutes. Pt min VCs and set-up for simple seated ADLs  Barriers to return to prior living situation: medical status, cognition, activity tolerance, ADL I  Recommendations for discharge: TCU  Rationale for recommendations: to progress ADL I, cognition, mobility       Entered by: Krystal Alexander 09/05/2019 12:06 PM

## 2019-09-05 NOTE — PLAN OF CARE
PT 4C / Discharge Planner PT   Patient plan for discharge: not discussed today  Current status: Patient completes bed mobility and transfers with min A, completes standing LE exercises, marching in place and ambulated 5 ft forward/backward x3 with FWW + CGA, limited by high O2 needs on HFNC FiO2 70% at requires FiO2 80% to maintain SpO2>88% with activity, HR 80-90s Afib with RBBB 80-90s.   Barriers to return to prior living situation: acute medical needs, O2 requirements, deconditioning, level of assist  Recommendations for discharge: TCU  Rationale for recommendations: Patient is below baseline for mobility, will require continued skilled therapy to progress strength, balance, activity tolerance and functional independence. May be good ARU candidate as patient was previously IND without AD, has good family support, motivated for therapy.

## 2019-09-05 NOTE — PHARMACY-ADMISSION MEDICATION HISTORY
Admission medication history interview status for the 8/27/2019 admission is complete. See Epic admission navigator for allergy information, pharmacy, prior to admission medications and immunization status.     Medication history interview sources:   Duplicate EMR records    Changes made to PTA medication list (reason)  Added:   All listed medications  Deleted:  None  Changed:  None    Additional medication history information (including reliability of information, actions taken by pharmacist):None      Prior to Admission medications    Medication Sig Last Dose Taking? Auth Provider   aspirin 81 MG EC tablet Take 81 mg by mouth daily  Yes Unknown, Entered By History   carvedilol (COREG) 25 MG tablet Take 25 mg by mouth 2 times daily (with meals)  Yes Unknown, Entered By History   clopidogrel (PLAVIX) 75 MG tablet Take 75 mg by mouth daily  Yes Unknown, Entered By History   ferrous sulfate (FEROSUL) 325 (65 Fe) MG tablet Take 325 mg by mouth daily (with breakfast)  Yes Unknown, Entered By History   furosemide (LASIX) 20 MG tablet Take 20 mg by mouth 2 times daily  Yes Unknown, Entered By History   mometasone (ASMANEX) 220 MCG/INH inhaler Inhale 2 puffs into the lungs every evening  Yes Unknown, Entered By History   potassium chloride (KLOR-CON) 20 MEQ packet Take 20 mEq by mouth daily  Yes Unknown, Entered By History   rosuvastatin (CRESTOR) 20 MG tablet Take 40 mg by mouth daily  Yes Unknown, Entered By History   umeclidinium-vilanterol (ANORO ELLIPTA) 62.5-25 MCG/INH oral inhaler Inhale 1 puff into the lungs daily  Yes Unknown, Entered By History   vitamin D3 (CHOLECALCIFEROL) 2000 units (50 mcg) tablet Take 2,000 Units by mouth daily  Yes Unknown, Entered By History

## 2019-09-05 NOTE — PROGRESS NOTES
Medicine Procedure Request Note:    Procedure Name: Thoracentesis  Proceduralist (primary): Jt  Pre-procedure diagnosis: pleural effusion  Post-procedure diagnosis: pleural effusion  Indication: pleural effusion diagnosis and fluid removal    Ultrasound used to locate pleural fluid, no optimal location while patient in upright position in either hemithorax.  POCUS images permanently saved in the EMR.  Primary team notified.    Louis Waters MD, MPH

## 2019-09-05 NOTE — PROGRESS NOTES
Cardiology - CSI Progress Note    Assessment & Plan   83 year old male who was admitted on 8/27/2019 with a cardiac arrest. 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    Plan for today    - continue prednisone for COPD exacerbation  - continue ceftriaxone to complete 14d  - chest physiotherapy for mobilization of secretions  -discontinue pantoprazole  - start metoprolol and statin  - wean O2 for goal SaO2>88%  - monitor for seizures on EEG  - delirium precautions  - increase diuretic dose 40lasix BID to 60mg BID  - thoracentesis     Neurology: Initially Cooled to 34 degrees.  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   - extubated 8/30 with good neurologic function post extubation, some dysphagia  -speech consult dysphagia 3 diet   - some episodes of unresponsiveness started EEG- no seizures to date   Cardiovascular / Hemodynamics: Refractory VF arrest s/p  CONCETTA to LM-> LAD, Lcx.  LA 7-> 1.3  TTE: EF 60-70%, RV mildly reduced function no significant valvular disease  EKG: NSR RBBB   --off pressors 8/30pm  --continue ASA 81mg and plavix 75 mg PO QD  --atorvastatin 40mg d  --metoprolol XL 12.5mg d   Pulmonary:  history of COPD on 2L of O2 with exertion.   Vent Settings: FiO2 (%): 80 %  Resp: 15  Chest CT- diffuse ground glass opacities bilateral pleural plaques and RLL cavitation with possible emphysema and fibrosis  CXR: ordered for today  --on scheduled duonebs QID  --prednisone for COPD exacerbation, continue abx  --mucomyst and chest physiotherapy   - QID duoneb   GI and Nutrition: No known medical  hx.  --postpyloric feeding  --bowel regimen -colace  --GI Prophylaxis: PPI    Renal, Fluid and Electrolytes: SCr 1.7, stable  Monitor creatinine and UOP  --maintain K>3 and Mg>2    Infectious Disease:  Leukocytosis c/w arrest though suspect VAP Blood cultures negative, sputum Cx candida contamination, sputum culture reordered  --vancomycin/zosyn 8/27pm-9/2 Transitioned to cefepime 9/3-**  --follow blood cultures- pan cultured yesterday and no growth to date  -stress dose hydrocortisone 50mg q8h started 8/30- 9/1- transitioned to PO pred    Hematology and Oncology: ASA/plavix for CONCETTA.   - left chest wall hematoma likely source of bleeding  --SQH TID for ppx- restared heparin 9/1   Endocrinology: Diabetic with an A1c of 6.6 in 2017- was considered pre diabetic and this has since resolved A1c on admission 5.9  No known medical history. BG elevated.  --insulin gtt - medium intensity sliding scale  -- non severe malnutrition- thickened liquid diet    Lines: PICC line RUE August 29 2019  Mcclelland catheter August 27, 2019  Current lines are required for patient management       Family update by me today: Yes      Code Status: Full     The pt was discussed and evaluated with Dr. Jo MD, attending physician, who agrees with the assessment and plan above.     Jenni Loera    Interval History     No episodes overnight   No seizures on EEG  Pulled a line and IV but A&O X3 this am  Net negative 523cc  FiO2 (%): 80 %  Resp: 15        Physical Exam   Temp: 97.9  F (36.6  C) Temp src: Axillary BP: 132/58 Pulse: 77 Heart Rate: 74 Resp: 15 SpO2: 99 % O2 Device: BiPAP/CPAP Oxygen Delivery: Other (Comments)(35LPM)  Vitals:    08/31/19 0400 09/01/19 0000 09/05/19 0000   Weight: 82.2 kg (181 lb 3.5 oz) 81.8 kg (180 lb 5.4 oz) 84.7 kg (186 lb 11.7 oz)     Vital Signs with Ranges  Temp:  [96.9  F (36.1  C)-98.9  F (37.2  C)] 97.9  F (36.6  C)  Pulse:  [] 77  Heart Rate:  [] 74  Resp:  [10-37] 15  BP:  "()/(52-91) 132/58  MAP:  [57 mmHg-218 mmHg] 70 mmHg  Arterial Line BP: ()/(5-248) 122/53  FiO2 (%):  [80 %-100 %] 80 %  SpO2:  [82 %-100 %] 99 %  I/O last 3 completed shifts:  In: 2966.83 [P.O.:120; I.V.:566.83; NG/GT:1080]  Out: 3410 [Urine:2935; Stool:475]    Heart Rate: 74, Blood pressure 132/58, pulse 77, temperature 97.9  F (36.6  C), temperature source Axillary, resp. rate 15, height 1.727 m (5' 7.99\"), weight 84.7 kg (186 lb 11.7 oz), SpO2 99 %.  186 lbs 11.67 oz  GEN:  Alert on BiPAP A&O X3  CV:  Regular rate and rhythm,   S1 + S2 noted, no S3 or S4.  LUNGS:  Coarse breath sounds bilaterally with copious secretions.   ABD:  Active bowel sounds, soft, non-tender/non-distended.  No rebound/guarding/rigidity.  EXT:  No edema or cyanosis.     Medications     IV fluid REPLACEMENT ONLY       IV fluid REPLACEMENT ONLY       - MEDICATION INSTRUCTIONS -       - MEDICATION INSTRUCTIONS -       Percutaneous Coronary Intervention orders placed (this is information for BPA alerting)       ACE/ARB/ARNI NOT PRESCRIBED       BETA BLOCKER NOT PRESCRIBED         acetylcysteine  2 mL Nebulization Q4H NATE     aspirin  81 mg Oral Daily     atorvastatin  40 mg Oral QPM     cefTRIAXone  2 g Intravenous Q24H     clopidogrel  75 mg Oral Daily     furosemide  60 mg Intravenous Q12H     heparin lock flush  5-10 mL Intracatheter Q24H     hydrALAZINE  25 mg Oral Q8H NATE     insulin aspart  1-7 Units Subcutaneous TID AC     insulin aspart  1-5 Units Subcutaneous At Bedtime     ipratropium - albuterol 0.5 mg/2.5 mg/3 mL  3 mL Nebulization Q4H     metoprolol succinate ER  12.5 mg Oral Daily     multivitamins w/minerals  15 mL Per Feeding Tube Daily     pantoprazole (PROTONIX) IV  40 mg Intravenous Daily with breakfast     predniSONE  40 mg Oral Daily     protein modular  1 packet Per Feeding Tube Daily     senna-docusate  1 tablet Oral or Feeding Tube At Bedtime       Data   Recent Labs   Lab 09/05/19  0347 09/04/19 2008 " 09/04/19  1626  09/04/19  0334  08/31/19  0330   WBC 12.5*  --  17.2*  --  13.3*   < > 9.4   HGB 7.4* 8.1* 8.0*   < > 6.6*   < > 7.6*     --  98  --  101*   < > 99   PLT 94*  --  111*  --  87*   < > 68*   INR  --   --   --   --   --   --  1.40*     --  140  --  145*   < > 147*   POTASSIUM 3.5 3.4 3.8  --  3.5   < > 3.9   CHLORIDE 108  --  108  --  112*   < > 119*   CO2 27  --  22  --  24   < > 20   BUN 40*  --  43*  --  43*   < > 34*   CR 1.08  --  1.02  --  0.99   < > 1.31*   ANIONGAP 7  --  10  --  9   < > 7   YOON 8.1*  --  8.2*  --  8.2*   < > 8.2*   *  --  165*  --  142*   < > 152*   ALBUMIN 2.2*  --  2.4*  --  2.2*   < > 2.2*   PROTTOTAL 5.6*  --  6.1*  --  5.5*   < > 5.8*   BILITOTAL 0.8  --  0.6  --  0.6   < > 0.6   ALKPHOS 48  --  55  --  54   < > 59   ALT 38  --  43  --  40   < > 46   AST 30  --  33  --  38   < > 76*    < > = values in this interval not displayed.       Recent Results (from the past 24 hour(s))   XR Chest Port 1 View    Narrative    XR CHEST PORT 1 VW  9/4/2019 4:37 PM      HISTORY: shortness of breath    COMPARISON: 9/4/2018    FINDINGS: Stable right PICC and enteric tube. Postsurgical chest.  Mildly increased patchy airspace and interstitial opacities diffusely,  greatest in the basilar regions.      Impression    IMPRESSION: Mildly increased patchy airspace and interstitial  opacities diffusely, greatest in the basilar regions. Suggesting  progression of pulmonary edema.    I have personally reviewed the examination and initial interpretation  and I agree with the findings.    ZUHAIR MATHUR MD

## 2019-09-05 NOTE — CONSULTS
sent page to referring [facility] [id number] - re: Morgan Aguirreer. MRN: [id number]. Please consult CAPS service for desired thoras. IR HÉCTOR Bruno [id number]

## 2019-09-05 NOTE — PLAN OF CARE
SLP: Cancel - Pt is requiring high levels of respiratory support (80% FIO2 via HFNC, 40LPM); borderline appropriate for ST intervention/PO intake. Per discussion with RN, pt heading to thoracentesis this PM. Will plan to keep pt NPO and SLP will reschedule per POC.

## 2019-09-05 NOTE — PLAN OF CARE
Problem: Adult Inpatient Plan of Care  Goal: Patient-Specific Goal (Individualization)  9/5/2019 0746 by Isamar Casillas, RN  Outcome: No Change  9/5/2019 0745 by Isamar Casillas, RN  Outcome: No Change     ICU End of Shift Summary. See flowsheets for vital signs and detailed assessment.    Changes this shift: Patient had Thoracentesis done in IR. Patient up to chair and working with therapies. BM x 2. TF adjusted per dietician.     Plan:  Continue to monitor and follow plan of care.

## 2019-09-05 NOTE — PROGRESS NOTES
CLINICAL NUTRITION SERVICES - REASSESSMENT NOTE     Nutrition Prescription    RECOMMENDATIONS FOR MDs/PROVIDERS TO ORDER:  Diet adv as able    Malnutrition Status:    Non-severe malnutrition in the context of chronic illness    Recommendations already ordered by Registered Dietitian (RD):  RD to recommend changing TF formula as K+ has been stable: Nutren 1.5 @ 60 mL/hr + Prosource (1 pkt/day) to provide 2200 kcals (28 kcal/kg/day), 108 g PRO (1.4 g/kg), 1094 mL H2O, 253 g CHO and no fiber daily.    RD to order Vitamin D lab, pt on Prednisone     Future/Additional Recommendations:  Monitor renal function and weight trends   Once pts diet adv > CL and PO intakes are substantial then recommend cycling TF and ordering calorie counts + supplements     EVALUATION OF THE PROGRESS TOWARD GOALS   Diet: NPO  Nutrition Support: Nepro @ 50 ml/hr + Prosource (1 pkt/day) = 2200 kcals (28), 108 g PRO (1.4), 876 ml H2O, 193 g CHO, 15 g Fiber   Access: NDT     Intake: Average TF intakes over the past 7 days: 896 ml, 1612 kcals (20 kcals/kg), 73 g pro ( .9 g/kg pro)  + 1 pkt prosource  TF+ Prosource: 1652 kcals (20 kcals/kg), 84 g pro ( 1.1 g/kg pro)     NEW FINDINGS   Weight: weight gain since admit, suspect fluids   Labs: K and phos have been WNL  Meds: Prednisone   GI: 1-4 BM+ /day   Neuro: Pt oriented to person, place, month and year, but pulls at lines and EEG wires when alone in room. Restless. Found pt. With a-line pulled out along with PIV.  Mitts placed for safety, pt takes those off and has been pulling at EEG leads.     MALNUTRITION  % Intake: < 75% for > 7 days (non-severe)  % Weight Loss: None noted  Subcutaneous Fat Loss: Facial region, Upper arm and Lower arm: Mild - per previous RD (pt not in room)  Muscle Loss: Facial & jaw region, Upper arm, Lower arm, Upper leg, Patellar region and Posterior calf: At least moderate, suspect some degree of sarcopenia  - per previous RD (pt not in room)  Fluid Accumulation/Edema:  None noted  Malnutrition Diagnosis: Non-severe malnutrition in the context of chronic illness    Previous Goals   Total avg nutritional intake to meet a minimum of 25 kcal/kg and 1.3 g PRO/kg daily (per dosing wt 79 kg).  Evaluation: Not met    Previous Nutrition Diagnosis  Inadequate protein-energy intake related to NPO while intubated, without nutrition support as evidenced by no nutrition x 2 days  Evaluation: Improving    CURRENT NUTRITION DIAGNOSIS  Inadequate protein-energy intake related to slow advancement of TF and TF interupptions as evidenced by TF on providin kcals ( 20 kcals/kg), 84 g pro ( 1.1 g/kg pro) - not goal       INTERVENTIONS  Implementation  Collaboration with other providers- SICU rounds  Enteral Nutrition - Modify : formula     Goals  Total avg nutritional intake to meet a minimum of 25 kcal/kg and 1.3 g PRO/kg daily (per dosing wt 79 kg).    Monitoring/Evaluation  Progress toward goals will be monitored and evaluated per protocol.      Emilie Carvajal, RD, MS, LD  SICU: [phone]

## 2019-09-05 NOTE — PLAN OF CARE
ICU End of Shift Summary. See flowsheets for vital signs and detailed assessment.    Changes this shift:   Pt oriented to person, place, month and year, but pulls at lines and EEG wires when alone in room. Restless. Found pt. With a-line pulled out along with PIV.  Mitts placed for safety, pt takes those off and has been pulling at EEG leads.   Remains  on BIPAP at 80%.  Has large amount of air leak.      Plan:   Change over to HFNC.   Planning on AICD placement in near future  Daughter updated this am about overnoc events.

## 2019-09-05 NOTE — PROGRESS NOTES
Patient Name: José Aguirre  Medical Record Number: [id number]  Today's Date: 9/5/2019    Procedure: Left Thoracentesis  Proceduralist: Michael Puentes PA-C    No sedation, Lidocaine 1% only.    Procedure start time: 1455  Puncture time: 1500  Procedure end time: 1530    Report given to: BERLIN Bethea 4C  : n/a    Other Notes: Pt arrived to IR room #7 from. Consent reviewed. Pt denies any questions or concerns regarding procedure. Left thoracentesis done, drained 220 mL of left pleural fluid and sent 120 mL to lab for ordered labs. Pt positioned sitting at edge of bed and monitored per protocol. Pt tolerated procedure without any noted complications. Pt transferred back to Unit 4C.

## 2019-09-05 NOTE — PROCEDURES
[facility], [place]    Procedure: IR Procedure Note  Date/Time: 9/5/2019 3:20 PM  Performed by: Rachid Puentes PA-C  Authorized by: Rachid Puentes PA-C     UNIVERSAL PROTOCOL   Site Marked: NA  Prior Images Obtained and Reviewed:  Yes  Required items: Required blood products, implants, devices and special equipment available    Patient identity confirmed:  Verbally with patient  NA - No sedation, light sedation, or local anesthesia  Confirmation Checklist:  Relevant allergies, procedure was appropriate and matched the consent or emergent situation and correct equipment/implants were available  Time out: Immediately prior to the procedure a time out was called    Preparation: Patient was prepped and draped in usual sterile fashion       ANESTHESIA    Anesthesia: Local infiltration  Local Anesthetic:  Lidocaine 1% without epinephrine  Anesthetic Total (mL):  5      SEDATION    Patient Sedated: No    See dictated procedure note for full details.  Findings: EBL < 1. Ml    Limited U/S evaluation of right hemithorax reveals trace pleural fluid. Right thoracentesis relatively contraindicated due to lack of fluid.    U/S guided left diagnostic and therapeutic thoracentesis, with return of sanguinous fluid. 120 ml aspirated and sent for labs as ordered.    Specimens: fluid and/or tissue for laboratory analysis    Complications: None    Condition: Stable    Plan: Follow up per primary team.    PROCEDURE   Patient Tolerance:  Patient tolerated the procedure well with no immediate complications    Time of Sedation in Minutes by Physician:  0

## 2019-09-05 NOTE — PLAN OF CARE
ICU End of Shift Summary. See flowsheets for vital signs and detailed assessment.    Changes this shift: Hemodynamically stable; High flow nasal cannula % FiO2 and 35LPM; very coarse lung sounds after emesis early this morning; brown thick secretions; last ABG with paO2 of 62 on 100% O2; bipap now in place; MD aware    Plan:  Repeat ABG; continue to monitor cardiac status

## 2019-09-05 NOTE — PROGRESS NOTES
INITIAL REPORT of Video-EEG Monitoring              DATE OF RECORDIN2019         I reviewed the first 2 hours of video-EEG monitoring of José Aguirre.        The EEG during waking was normal.  No interictal epileptiform abnormalities and no electrographic seizures were observed.    Real Kent M.D., [facility] [phone]

## 2019-09-06 ENCOUNTER — ALLIED HEALTH/NURSE VISIT (OUTPATIENT)
Dept: NEUROLOGY | Facility: CLINIC | Age: 84
DRG: 224 | End: 2019-09-06
Attending: PSYCHIATRY & NEUROLOGY
Payer: MEDICARE

## 2019-09-06 ENCOUNTER — APPOINTMENT (OUTPATIENT)
Dept: PHYSICAL THERAPY | Facility: CLINIC | Age: 84
DRG: 224 | End: 2019-09-06
Payer: MEDICARE

## 2019-09-06 ENCOUNTER — APPOINTMENT (OUTPATIENT)
Dept: SPEECH THERAPY | Facility: CLINIC | Age: 84
DRG: 224 | End: 2019-09-06
Payer: MEDICARE

## 2019-09-06 DIAGNOSIS — I46.9 CARDIAC ARREST (H): Primary | ICD-10-CM

## 2019-09-06 LAB
ALBUMIN SERPL-MCNC: 2.3 G/DL (ref 3.4–5)
ALBUMIN SERPL-MCNC: 2.4 G/DL (ref 3.4–5)
ALP SERPL-CCNC: 52 U/L (ref 40–150)
ALP SERPL-CCNC: 52 U/L (ref 40–150)
ALT SERPL W P-5'-P-CCNC: 37 U/L (ref 0–70)
ALT SERPL W P-5'-P-CCNC: 38 U/L (ref 0–70)
ANION GAP SERPL CALCULATED.3IONS-SCNC: 6 MMOL/L (ref 3–14)
ANION GAP SERPL CALCULATED.3IONS-SCNC: 9 MMOL/L (ref 3–14)
AST SERPL W P-5'-P-CCNC: 25 U/L (ref 0–45)
AST SERPL W P-5'-P-CCNC: 29 U/L (ref 0–45)
BILIRUB SERPL-MCNC: 0.6 MG/DL (ref 0.2–1.3)
BILIRUB SERPL-MCNC: 0.8 MG/DL (ref 0.2–1.3)
BUN SERPL-MCNC: 38 MG/DL (ref 7–30)
BUN SERPL-MCNC: 42 MG/DL (ref 7–30)
CALCIUM SERPL-MCNC: 8.2 MG/DL (ref 8.5–10.1)
CALCIUM SERPL-MCNC: 8.3 MG/DL (ref 8.5–10.1)
CHLORIDE SERPL-SCNC: 107 MMOL/L (ref 94–109)
CHLORIDE SERPL-SCNC: 107 MMOL/L (ref 94–109)
CO2 SERPL-SCNC: 26 MMOL/L (ref 20–32)
CO2 SERPL-SCNC: 27 MMOL/L (ref 20–32)
CREAT SERPL-MCNC: 0.89 MG/DL (ref 0.66–1.25)
CREAT SERPL-MCNC: 1.1 MG/DL (ref 0.66–1.25)
DEPRECATED CALCIDIOL+CALCIFEROL SERPL-MC: 26 UG/L (ref 20–75)
ERYTHROCYTE [DISTWIDTH] IN BLOOD BY AUTOMATED COUNT: 19.3 % (ref 10–15)
ERYTHROCYTE [DISTWIDTH] IN BLOOD BY AUTOMATED COUNT: 19.7 % (ref 10–15)
GFR SERPL CREATININE-BSD FRML MDRD: 61 ML/MIN/{1.73_M2}
GFR SERPL CREATININE-BSD FRML MDRD: 79 ML/MIN/{1.73_M2}
GLUCOSE BLDC GLUCOMTR-MCNC: 143 MG/DL (ref 70–99)
GLUCOSE BLDC GLUCOMTR-MCNC: 150 MG/DL (ref 70–99)
GLUCOSE BLDC GLUCOMTR-MCNC: 171 MG/DL (ref 70–99)
GLUCOSE BLDC GLUCOMTR-MCNC: 193 MG/DL (ref 70–99)
GLUCOSE SERPL-MCNC: 129 MG/DL (ref 70–99)
GLUCOSE SERPL-MCNC: 144 MG/DL (ref 70–99)
HCT VFR BLD AUTO: 26.2 % (ref 40–53)
HCT VFR BLD AUTO: 28.2 % (ref 40–53)
HGB BLD-MCNC: 7.9 G/DL (ref 13.3–17.7)
HGB BLD-MCNC: 8.3 G/DL (ref 13.3–17.7)
LACTATE BLD-SCNC: 1.7 MMOL/L (ref 0.7–2)
MAGNESIUM SERPL-MCNC: 2.1 MG/DL (ref 1.6–2.3)
MAGNESIUM SERPL-MCNC: 2.2 MG/DL (ref 1.6–2.3)
MCH RBC QN AUTO: 30.1 PG (ref 26.5–33)
MCH RBC QN AUTO: 30.4 PG (ref 26.5–33)
MCHC RBC AUTO-ENTMCNC: 29.4 G/DL (ref 31.5–36.5)
MCHC RBC AUTO-ENTMCNC: 30.2 G/DL (ref 31.5–36.5)
MCV RBC AUTO: 101 FL (ref 78–100)
MCV RBC AUTO: 102 FL (ref 78–100)
PHOSPHATE SERPL-MCNC: 2.6 MG/DL (ref 2.5–4.5)
PHOSPHATE SERPL-MCNC: 3.1 MG/DL (ref 2.5–4.5)
PLATELET # BLD AUTO: 126 10E9/L (ref 150–450)
PLATELET # BLD AUTO: 149 10E9/L (ref 150–450)
POTASSIUM SERPL-SCNC: 3.4 MMOL/L (ref 3.4–5.3)
POTASSIUM SERPL-SCNC: 3.9 MMOL/L (ref 3.4–5.3)
PROT SERPL-MCNC: 5.8 G/DL (ref 6.8–8.8)
PROT SERPL-MCNC: 6 G/DL (ref 6.8–8.8)
RBC # BLD AUTO: 2.6 10E12/L (ref 4.4–5.9)
RBC # BLD AUTO: 2.76 10E12/L (ref 4.4–5.9)
SODIUM SERPL-SCNC: 140 MMOL/L (ref 133–144)
SODIUM SERPL-SCNC: 143 MMOL/L (ref 133–144)
WBC # BLD AUTO: 14.3 10E9/L (ref 4–11)
WBC # BLD AUTO: 16.8 10E9/L (ref 4–11)

## 2019-09-06 PROCEDURE — 97530 THERAPEUTIC ACTIVITIES: CPT | Mod: GP

## 2019-09-06 PROCEDURE — 25000132 ZZH RX MED GY IP 250 OP 250 PS 637: Mod: GY | Performed by: INTERNAL MEDICINE

## 2019-09-06 PROCEDURE — 94668 MNPJ CHEST WALL SBSQ: CPT

## 2019-09-06 PROCEDURE — 94640 AIRWAY INHALATION TREATMENT: CPT | Mod: 76

## 2019-09-06 PROCEDURE — 92526 ORAL FUNCTION THERAPY: CPT | Mod: GN

## 2019-09-06 PROCEDURE — 94660 CPAP INITIATION&MGMT: CPT

## 2019-09-06 PROCEDURE — 25000125 ZZHC RX 250: Performed by: STUDENT IN AN ORGANIZED HEALTH CARE EDUCATION/TRAINING PROGRAM

## 2019-09-06 PROCEDURE — 40000983 ZZH STATISTIC HFNC ADULT NON-CPAP

## 2019-09-06 PROCEDURE — 25000128 H RX IP 250 OP 636: Performed by: INTERNAL MEDICINE

## 2019-09-06 PROCEDURE — 83735 ASSAY OF MAGNESIUM: CPT | Performed by: STUDENT IN AN ORGANIZED HEALTH CARE EDUCATION/TRAINING PROGRAM

## 2019-09-06 PROCEDURE — 80053 COMPREHEN METABOLIC PANEL: CPT | Performed by: STUDENT IN AN ORGANIZED HEALTH CARE EDUCATION/TRAINING PROGRAM

## 2019-09-06 PROCEDURE — 25000128 H RX IP 250 OP 636

## 2019-09-06 PROCEDURE — 40000281 ZZH STATISTIC TRANSPORT TIME EA 15 MIN

## 2019-09-06 PROCEDURE — 40000047 ZZH STATISTIC CTO2 CONT OXYGEN TECH TIME EA 90 MIN

## 2019-09-06 PROCEDURE — 94640 AIRWAY INHALATION TREATMENT: CPT

## 2019-09-06 PROCEDURE — 40000275 ZZH STATISTIC RCP TIME EA 10 MIN

## 2019-09-06 PROCEDURE — 27210429 ZZH NUTRITION PRODUCT INTERMEDIATE LITER

## 2019-09-06 PROCEDURE — 25000132 ZZH RX MED GY IP 250 OP 250 PS 637: Mod: GY | Performed by: STUDENT IN AN ORGANIZED HEALTH CARE EDUCATION/TRAINING PROGRAM

## 2019-09-06 PROCEDURE — 95951 ZZHC EEG VIDEO < 12 HR: CPT | Mod: 52,ZF

## 2019-09-06 PROCEDURE — 99291 CRITICAL CARE FIRST HOUR: CPT | Mod: GC | Performed by: INTERNAL MEDICINE

## 2019-09-06 PROCEDURE — 83605 ASSAY OF LACTIC ACID: CPT | Performed by: STUDENT IN AN ORGANIZED HEALTH CARE EDUCATION/TRAINING PROGRAM

## 2019-09-06 PROCEDURE — 85027 COMPLETE CBC AUTOMATED: CPT | Performed by: STUDENT IN AN ORGANIZED HEALTH CARE EDUCATION/TRAINING PROGRAM

## 2019-09-06 PROCEDURE — C9113 INJ PANTOPRAZOLE SODIUM, VIA: HCPCS

## 2019-09-06 PROCEDURE — 20000004 ZZH R&B ICU UMMC

## 2019-09-06 PROCEDURE — 84100 ASSAY OF PHOSPHORUS: CPT | Performed by: STUDENT IN AN ORGANIZED HEALTH CARE EDUCATION/TRAINING PROGRAM

## 2019-09-06 PROCEDURE — 97116 GAIT TRAINING THERAPY: CPT | Mod: GP

## 2019-09-06 PROCEDURE — 25000125 ZZHC RX 250

## 2019-09-06 PROCEDURE — 25000128 H RX IP 250 OP 636: Performed by: STUDENT IN AN ORGANIZED HEALTH CARE EDUCATION/TRAINING PROGRAM

## 2019-09-06 PROCEDURE — 82306 VITAMIN D 25 HYDROXY: CPT | Performed by: STUDENT IN AN ORGANIZED HEALTH CARE EDUCATION/TRAINING PROGRAM

## 2019-09-06 PROCEDURE — 00000146 ZZHCL STATISTIC GLUCOSE BY METER IP

## 2019-09-06 RX ORDER — FUROSEMIDE 10 MG/ML
60 INJECTION INTRAMUSCULAR; INTRAVENOUS ONCE
Status: COMPLETED | OUTPATIENT
Start: 2019-09-06 | End: 2019-09-06

## 2019-09-06 RX ADMIN — FUROSEMIDE 60 MG: 10 INJECTION, SOLUTION INTRAVENOUS at 17:20

## 2019-09-06 RX ADMIN — Medication 1 PACKET: at 09:56

## 2019-09-06 RX ADMIN — IPRATROPIUM BROMIDE AND ALBUTEROL SULFATE 3 ML: .5; 3 SOLUTION RESPIRATORY (INHALATION) at 20:46

## 2019-09-06 RX ADMIN — ACETYLCYSTEINE 2 ML: 200 SOLUTION ORAL; RESPIRATORY (INHALATION) at 16:13

## 2019-09-06 RX ADMIN — CEFTRIAXONE SODIUM 2 G: 2 INJECTION, POWDER, FOR SOLUTION INTRAMUSCULAR; INTRAVENOUS at 11:29

## 2019-09-06 RX ADMIN — PANTOPRAZOLE SODIUM 40 MG: 40 INJECTION, POWDER, FOR SOLUTION INTRAVENOUS at 20:36

## 2019-09-06 RX ADMIN — ATORVASTATIN CALCIUM 40 MG: 40 TABLET, FILM COATED ORAL at 20:37

## 2019-09-06 RX ADMIN — ACETYLCYSTEINE 2 ML: 200 SOLUTION ORAL; RESPIRATORY (INHALATION) at 12:23

## 2019-09-06 RX ADMIN — CLOPIDOGREL BISULFATE 75 MG: 75 TABLET, FILM COATED ORAL at 09:19

## 2019-09-06 RX ADMIN — PANTOPRAZOLE SODIUM 40 MG: 40 INJECTION, POWDER, FOR SOLUTION INTRAVENOUS at 09:19

## 2019-09-06 RX ADMIN — ACETYLCYSTEINE 2 ML: 200 SOLUTION ORAL; RESPIRATORY (INHALATION) at 00:51

## 2019-09-06 RX ADMIN — IPRATROPIUM BROMIDE AND ALBUTEROL SULFATE 3 ML: .5; 3 SOLUTION RESPIRATORY (INHALATION) at 16:13

## 2019-09-06 RX ADMIN — ACETYLCYSTEINE 2 ML: 200 SOLUTION ORAL; RESPIRATORY (INHALATION) at 08:44

## 2019-09-06 RX ADMIN — ALTEPLASE 2 MG: 2.2 INJECTION, POWDER, LYOPHILIZED, FOR SOLUTION INTRAVENOUS at 20:37

## 2019-09-06 RX ADMIN — IPRATROPIUM BROMIDE AND ALBUTEROL SULFATE 3 ML: .5; 3 SOLUTION RESPIRATORY (INHALATION) at 05:04

## 2019-09-06 RX ADMIN — ACETYLCYSTEINE 4 ML: 200 SOLUTION ORAL; RESPIRATORY (INHALATION) at 20:46

## 2019-09-06 RX ADMIN — IPRATROPIUM BROMIDE AND ALBUTEROL SULFATE 3 ML: .5; 3 SOLUTION RESPIRATORY (INHALATION) at 00:51

## 2019-09-06 RX ADMIN — IPRATROPIUM BROMIDE AND ALBUTEROL SULFATE 3 ML: .5; 3 SOLUTION RESPIRATORY (INHALATION) at 12:23

## 2019-09-06 RX ADMIN — MULTIVIT AND MINERALS-FERROUS GLUCONATE 9 MG IRON/15 ML ORAL LIQUID 15 ML: at 09:19

## 2019-09-06 RX ADMIN — ASPIRIN 81 MG CHEWABLE TABLET 81 MG: 81 TABLET CHEWABLE at 09:19

## 2019-09-06 RX ADMIN — Medication 12.5 MG: at 09:20

## 2019-09-06 RX ADMIN — SODIUM CHLORIDE, PRESERVATIVE FREE 5 ML: 5 INJECTION INTRAVENOUS at 17:05

## 2019-09-06 RX ADMIN — IPRATROPIUM BROMIDE AND ALBUTEROL SULFATE 3 ML: .5; 3 SOLUTION RESPIRATORY (INHALATION) at 08:44

## 2019-09-06 RX ADMIN — POTASSIUM CHLORIDE 20 MEQ: 1.5 POWDER, FOR SOLUTION ORAL at 02:45

## 2019-09-06 RX ADMIN — ACETYLCYSTEINE 2 ML: 200 SOLUTION ORAL; RESPIRATORY (INHALATION) at 05:04

## 2019-09-06 ASSESSMENT — ACTIVITIES OF DAILY LIVING (ADL)
ADLS_ACUITY_SCORE: 18
ADLS_ACUITY_SCORE: 18
ADLS_ACUITY_SCORE: 17
ADLS_ACUITY_SCORE: 18
ADLS_ACUITY_SCORE: 17
ADLS_ACUITY_SCORE: 17

## 2019-09-06 ASSESSMENT — MIFFLIN-ST. JEOR: SCORE: 1510.37

## 2019-09-06 NOTE — PLAN OF CARE
ICU End of Shift Summary. See flowsheets for vital signs and detailed assessment.    Changes this shift: Pt stable overnight, one periodic episode of low BPs, MD notified plan to hold 0600 lasix and hydralazine, pressures stabilized later in night. Lactic 1.7, other labs stable.      Plan:  Continue to monitor and wean FIO2 from HFNC

## 2019-09-06 NOTE — PROGRESS NOTES
"GI Progress Note  Date of Service:  9/5/2019      Assessment:   José Aguirre is a 83 year old male with extensive vascular disease, COPD, HLD, who is admitted following VT/VF arrest at [facility] with ROSC in field, now s/p PCI on Plavix and ASA who had one isolated episode of coffee ground emesis with slight drop in Hgb requiring blod tra     #Coffee ground emesis: Likely 2/2 to gastritis (in setting of NGT, ICU stress, anticoagulation).      #Anemia: Likely 2/2 to left chest wall hematoma in setting of anticoagulation. Pt also had one isolated episode of coffee ground emesis after liquid while on BIPAP as well as one episode of hemoptysis (per nursing). No other melena, hematochezia.      RECOMMENDATIONS     --Can transition to IV PPI 40mg BID   --If several days with no GIB, can transition back to PO PPI BID     Thank you for involving us in this patient's care. Please do not hesitate to contact the GI service with any questions or concerns.     GI will sign off now.      Patient care plan discussed with Dr. Rowell, GI staff physician.      Marci Stafford MD, [facility]  GI Fellow, PGY4  Pager:[phone]      __________________________________________________________________________________  Subjective:  Nursing notes reviewed and noted.    Feeling well. Denies pain or any further coffee ground emesis.  This was corroborated with nurse.     Physical Examination:  Vital Signs:  /61   Pulse 92   Temp 98.5  F (36.9  C) (Oral)   Resp 20   Ht 1.727 m (5' 7.99\")   Wt 84.7 kg (186 lb 11.7 oz)   SpO2 (!) 88%   BMI 28.40 kg/m     General:  NAD. Resting comfortably.  HEENT:  PERRL, EOMI, No scleral icterus of conjunctival injection. MMM.  Neck:  Supple.   Chest:  Clear bilaterally.    Cardiovascular: RRR. No m/r/g.   Abdomen:  Soft, NT, ND. BS+, No organomegally.   Extremities:  No peripheral edema.   Skin:  No rashes, abrasions or concerning lesions appreciated.   Neurological:  Hard of hearing (hearing aids " are broken). Visualized working with OT. Electrodes stuck to head.     DATA:  Labs:    Reviewed and noted

## 2019-09-06 NOTE — PLAN OF CARE
Discharge Planner SLP   Patient plan for discharge: Unknown  Current status: Pt remains appropriate for dysphagia 2 (diced) diet/thin liquids. Pt to be fully alert and upright for all PO, taking small bites/sips at slow rate. Hold PO with increase in O2 requirements. SLP to follow.  Barriers to return to prior living situation: Medical condition, O2 needs  Recommendations for discharge: Defer to PT/OT  Rationale for recommendations: Anticipate pt will meet all ST goals prior to discharge       Entered by: Alicia Bailey 09/06/2019 10:43 AM

## 2019-09-06 NOTE — PROGRESS NOTES
Cardiology - CSI Progress Note    Assessment & Plan   83 year old male who was admitted on 8/27/2019 with a cardiac arrest. 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    Plan for today    - continue prednisone for COPD exacerbation  - continue ceftriaxone to complete 14d  - chest physiotherapy for mobilization of secretions  -stop hydralazine  - continue aggressive diuresis  - up to floor   - discontinue EEG  - discontinue silva     Neurology: Initially Cooled to 34 degrees.  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   - extubated 8/30 with good neurologic function post extubation, some dysphagia  -speech consult dysphagia 3 diet   - some episodes of unresponsiveness started EEG- no seizures to date- no seizures, will discontinue    Cardiovascular / Hemodynamics: Refractory VF arrest s/p  CONCETTA to LM-> LAD, Lcx.  LA 7-> 1.3  TTE: EF 60-70%, RV mildly reduced function no significant valvular disease  EKG: NSR RBBB   --off pressors 8/30pm  --continue ASA 81mg and plavix 75 mg PO QD  --atorvastatin 40mg d  --metoprolol XL 12.5mg d  - EP consulted for potential ICD placement    Pulmonary:  history of COPD on 2L of O2 with exertion.   Vent Settings: FiO2 (%): 75 %  Resp: 22  Chest CT- diffuse ground glass opacities bilateral pleural plaques and RLL cavitation with possible emphysema and fibrosis  --on scheduled duonebs QID  --prednisone for COPD exacerbation, continue abx  --mucomyst and chest physiotherapy   - QID duoneb   GI and Nutrition: No known medical hx.  --postpyloric feeding  --bowel regimen  -colace  --GI Prophylaxis: PPI    Renal, Fluid and Electrolytes: SCr 1.7, stable  Monitor creatinine and UOP  --maintain K>3 and Mg>2    Infectious Disease:  Leukocytosis c/w arrest though suspect VAP Blood cultures negative, sputum Cx candida contamination, sputum culture reordered  --vancomycin/zosyn 8/27pm-9/2 Transitioned to cefepime 9/3-**  --follow blood cultures- pan cultured yesterday and no growth to date  -stress dose hydrocortisone 50mg q8h started 8/30- 9/1- transitioned to PO pred    Hematology and Oncology: ASA/plavix for CONCETTA.   - left chest wall hematoma likely source of bleeding  --SQH TID for ppx- restared heparin 9/1   Endocrinology: Diabetic with an A1c of 6.6 in 2017- was considered pre diabetic and this has since resolved A1c on admission 5.9  No known medical history. BG elevated.  --insulin gtt - medium intensity sliding scale  -- non severe malnutrition- thickened liquid diet    Lines: PICC line RUE August 29 2019  Mcclelland catheter August 27, 2019  Current lines are required for patient management       Family update by me today: Yes      Code Status: Full     The pt was discussed and evaluated with Dr. Jo MD, attending physician, who agrees with the assessment and plan above.     Jenni Loera    Interval History     No episodes overnight   No seizures on EEG  Looks well feeling better   Down to 75% on high flow and intermittent BiPAP at 60%    Intake/Output Summary (Last 24 hours) at 9/6/2019 0820  Last data filed at 9/6/2019 0600  Gross per 24 hour   Intake 2065 ml   Output 5100 ml   Net -3035 ml       FiO2 (%): 75 %  Resp: 22        Physical Exam   Temp: 98.6  F (37  C) Temp src: Axillary BP: 116/63 Pulse: 89 Heart Rate: 89 Resp: 22 SpO2: 95 % O2 Device: High Flow Nasal Cannula (HFNC) Oxygen Delivery: Other (Comments)(35 LPM)  Vitals:    09/01/19 0000 09/05/19 0000 09/06/19 0458   Weight: 81.8 kg (180 lb 5.4 oz) 84.7 kg (186 lb 11.7 oz) 84.1 kg (185 lb 6.5 oz)     Vital  "Signs with Ranges  Temp:  [98  F (36.7  C)-98.8  F (37.1  C)] 98.6  F (37  C)  Pulse:  [75-96] 89  Heart Rate:  [] 89  Resp:  [16-29] 22  BP: ()/(39-84) 116/63  FiO2 (%):  [60 %-80 %] 75 %  SpO2:  [87 %-100 %] 95 %  I/O last 3 completed shifts:  In: 2125 [P.O.:120; I.V.:175; NG/GT:540]  Out: 5100 [Urine:5100]    Heart Rate: 89, Blood pressure 116/63, pulse 89, temperature 98.6  F (37  C), temperature source Axillary, resp. rate 22, height 1.727 m (5' 7.99\"), weight 84.1 kg (185 lb 6.5 oz), SpO2 95 %.  185 lbs 6.51 oz  GEN:  Alert on nasal cannula A&O X3  CV:  Regular rate and rhythm,   S1 + S2 noted, no S3 or S4.  LUNGS:  Coarse breath sounds bilaterally with copious secretions.   ABD:  Active bowel sounds, soft, non-tender/non-distended.  No rebound/guarding/rigidity.  EXT:  No edema or cyanosis.     Medications     IV fluid REPLACEMENT ONLY       IV fluid REPLACEMENT ONLY       - MEDICATION INSTRUCTIONS -       - MEDICATION INSTRUCTIONS -       Percutaneous Coronary Intervention orders placed (this is information for BPA alerting)       ACE/ARB/ARNI NOT PRESCRIBED         acetylcysteine  2 mL Nebulization Q4H On license of [facility]     aspirin  81 mg Oral Daily     atorvastatin  40 mg Oral QPM     cefTRIAXone  2 g Intravenous Q24H     clopidogrel  75 mg Oral Daily     furosemide  60 mg Intravenous Q12H     heparin lock flush  5-10 mL Intracatheter Q24H     insulin aspart  1-7 Units Subcutaneous TID AC     insulin aspart  1-5 Units Subcutaneous At Bedtime     ipratropium - albuterol 0.5 mg/2.5 mg/3 mL  3 mL Nebulization Q4H     metoprolol succinate ER  12.5 mg Oral Daily     multivitamins w/minerals  15 mL Per Feeding Tube Daily     pantoprazole (PROTONIX) IV  40 mg Intravenous Daily with breakfast     predniSONE  40 mg Oral Daily     protein modular  1 packet Per Feeding Tube Daily     senna-docusate  1 tablet Oral or Feeding Tube At Bedtime       Data   Recent Labs   Lab 09/06/19  0156 09/05/19  1753 09/05/19  0347  " 08/31/19  0330   WBC 14.3* 13.2* 12.5*   < > 9.4   HGB 7.9* 8.3* 7.4*   < > 7.6*   * 100 100   < > 99   * 115* 94*   < > 68*   INR  --   --   --   --  1.40*    142 143   < > 147*   POTASSIUM 3.4 3.8 3.5   < > 3.9   CHLORIDE 107 107 108   < > 119*   CO2 27 27 27   < > 20   BUN 42* 41* 40*   < > 34*   CR 1.10 0.96 1.08   < > 1.31*   ANIONGAP 9 8 7   < > 7   YOON 8.3* 8.4* 8.1*   < > 8.2*   * 210* 120*   < > 152*   ALBUMIN 2.3* 2.5* 2.2*   < > 2.2*   PROTTOTAL 5.8* 6.3* 5.6*   < > 5.8*   BILITOTAL 0.8 0.7 0.8   < > 0.6   ALKPHOS 52 52 48   < > 59   ALT 37 44 38   < > 46   AST 29 33 30   < > 76*    < > = values in this interval not displayed.       Recent Results (from the past 24 hour(s))   POC US Guide for Thorcentesis    Impression    Ultrasound used to locate pleural fluid, no optimal location while patient in upright position in either hemithorax.  POCUS images permanently saved in the EMR.  Primary team notified.   IR Thoracentesis    Narrative    Procedure date: 9/5/2019.    History: Patient with left pleural effusion, presenting for ultrasound  guided thoracentesis. Primary team requesting bilateral thoracenteses  if possible.    Procedure: Left diagnostic and therapeutic thoracentesis.    Preop diagnosis: Left pleural effusion.    Postop diagnosis: Left pleural effusion, resolved.    : Michael Puentes PA-C.    Assist: None.    Medications: 10 ml 1% lidocaine local anesthesia.    Face to face sedation time: None.    Nursing: Throughout the procedure the patient's vital signs and oxygen  saturations were continuously monitored by IR nursing staff under my  supervision, and remained stable.    Fluoroscopy time: None.    IV contrast: None.    Consent: Informed written and verbal consent was obtained.    Procedure/Findings:  The patient was placed in the upright position on  the gurney. Limited ultrasound evaluation of the right hemithorax  revealed trace pleural effusion. Right  thoracentesis relatively  contraindicated due to lack of fluid. Limited ultrasound evaluation of  the left posterior hemithorax revealed a small pleural effusion. The  area overlying  the left posterior hemithorax was prepped and draped  in usual sterile fashion. Under ultrasound guidance, the area  overlying the effusion was anesthetized to the pleura with 10 ml 1%  lidocaine. Under ultrasound guidance a 5 French, 10 cm straight Yueh  needle/catheter was advanced into the pleural space, with initial  return of sanguinous fluid. The catheter was advanced off the needle  into the pleural space and the needle was removed. 120 cc aspirated  and sent for labs as ordered. Extension tubing was connected to the  catheter and vacuum drainage. An additional 100cc' s of fluid was  aspirated.  Repeat ultrasound revealed no significant fluid remaining.   The catheter was removed with the patient performing an expiratory  maneuver, and the entrance site was occluded while pressure was  applied to the intercostal space for approximately 1 minute. The site  was cleansed and dressed with a sterile occlusive bandage.  Images  were saved throughout the procedure. The procedure was well tolerated,  with no immediate complications.  Lab results pending.    Estimate blood loss: Less than 1 cc.    Specimens:  120 cc sanguinous fluid from the left pleural space.      Impression    Impression: Ultrasound guided left diagnostic and therapeutic  thoracentesis. Total of 220 cc of sanguinous fluid drained.    Plan: Patient transported to post care unit in stable condition.   Interventional radiology post procedure standing orders for  thoracentesis.  Daily dressing changes as needed.      Attestation: The physician assistant (PA) who performed this procedure  and signed the above report is licensed to practice in the state of  [place] pursuant to [place] Statute 147[place]09.  This includes meeting the  Statute and [place] Board of Medical Practice  requirement of an  active Delegation Agreement, which documents delegation of services by  primary and alternate supervising physicians. All services rendered  are performed under a collaborative agreement with Dr. Noris Watkins, Director of Interventional Radiology, [facility].    JUANCARLOS ROBBINS PA-C     Critical Care Services Progress Note:     José Aguirre remains critically ill with cardiac arrhythmia and shock     I personally examined and evaluated the patient today.   The patient s prognosis today is unchanged  I have evaluated all laboratory values and imaging studies from the past 24 hours.  Key findings and decisions made today included    continue prednisone for COPD exacerbation  - continue ceftriaxone to complete 14d  - chest physiotherapy for mobilization of secretions  -stop hydralazine  - continue aggressive diuresis  - up to floor   - discontinue EEG  - discontinue silva  I personally managed the sedation, pain control and analgesia, metabolic abnormalities, antibiotic therapy, nutritional status and non-invasive positive pressure ventilator.   Consults ongoing and ordered are none  Procedures that will happen today are: none  All treatments were placed at my direction.  I formulated today s plan with the house staff team or resident(s) and agree with the findings and plan in the associated note.       The above plans and care have been discussed with family and all questions and concerns were addressed.     I spent a total of 60 minutes (excluding procedure time) personally providing and directing critical care services at the bedside and on the critical care unit for José Aguirre.        Andrew Garcia MD

## 2019-09-07 ENCOUNTER — APPOINTMENT (OUTPATIENT)
Dept: OCCUPATIONAL THERAPY | Facility: CLINIC | Age: 84
DRG: 224 | End: 2019-09-07
Payer: MEDICARE

## 2019-09-07 ENCOUNTER — APPOINTMENT (OUTPATIENT)
Dept: GENERAL RADIOLOGY | Facility: CLINIC | Age: 84
DRG: 224 | End: 2019-09-07
Attending: STUDENT IN AN ORGANIZED HEALTH CARE EDUCATION/TRAINING PROGRAM
Payer: MEDICARE

## 2019-09-07 LAB
ALBUMIN SERPL-MCNC: 2.2 G/DL (ref 3.4–5)
ALBUMIN SERPL-MCNC: 2.4 G/DL (ref 3.4–5)
ALP SERPL-CCNC: 54 U/L (ref 40–150)
ALP SERPL-CCNC: 63 U/L (ref 40–150)
ALT SERPL W P-5'-P-CCNC: 31 U/L (ref 0–70)
ALT SERPL W P-5'-P-CCNC: 33 U/L (ref 0–70)
ANION GAP SERPL CALCULATED.3IONS-SCNC: 7 MMOL/L (ref 3–14)
ANION GAP SERPL CALCULATED.3IONS-SCNC: 9 MMOL/L (ref 3–14)
AST SERPL W P-5'-P-CCNC: 21 U/L (ref 0–45)
AST SERPL W P-5'-P-CCNC: 25 U/L (ref 0–45)
BASE EXCESS BLDV CALC-SCNC: 3.9 MMOL/L
BILIRUB SERPL-MCNC: 0.7 MG/DL (ref 0.2–1.3)
BILIRUB SERPL-MCNC: 0.8 MG/DL (ref 0.2–1.3)
BUN SERPL-MCNC: 43 MG/DL (ref 7–30)
BUN SERPL-MCNC: 43 MG/DL (ref 7–30)
CALCIUM SERPL-MCNC: 8.1 MG/DL (ref 8.5–10.1)
CALCIUM SERPL-MCNC: 8.2 MG/DL (ref 8.5–10.1)
CHLORIDE SERPL-SCNC: 103 MMOL/L (ref 94–109)
CHLORIDE SERPL-SCNC: 105 MMOL/L (ref 94–109)
CO2 SERPL-SCNC: 26 MMOL/L (ref 20–32)
CO2 SERPL-SCNC: 28 MMOL/L (ref 20–32)
CREAT SERPL-MCNC: 1.01 MG/DL (ref 0.66–1.25)
CREAT SERPL-MCNC: 1.14 MG/DL (ref 0.66–1.25)
ERYTHROCYTE [DISTWIDTH] IN BLOOD BY AUTOMATED COUNT: 19.2 % (ref 10–15)
ERYTHROCYTE [DISTWIDTH] IN BLOOD BY AUTOMATED COUNT: 19.5 % (ref 10–15)
GFR SERPL CREATININE-BSD FRML MDRD: 59 ML/MIN/{1.73_M2}
GFR SERPL CREATININE-BSD FRML MDRD: 68 ML/MIN/{1.73_M2}
GLUCOSE BLDC GLUCOMTR-MCNC: 168 MG/DL (ref 70–99)
GLUCOSE BLDC GLUCOMTR-MCNC: 191 MG/DL (ref 70–99)
GLUCOSE BLDC GLUCOMTR-MCNC: 217 MG/DL (ref 70–99)
GLUCOSE BLDC GLUCOMTR-MCNC: 263 MG/DL (ref 70–99)
GLUCOSE SERPL-MCNC: 149 MG/DL (ref 70–99)
GLUCOSE SERPL-MCNC: 241 MG/DL (ref 70–99)
GRAM STN SPEC: NORMAL
HCO3 BLDV-SCNC: 28 MMOL/L (ref 21–28)
HCT VFR BLD AUTO: 25.9 % (ref 40–53)
HCT VFR BLD AUTO: 28.4 % (ref 40–53)
HGB BLD-MCNC: 7.6 G/DL (ref 13.3–17.7)
HGB BLD-MCNC: 8.3 G/DL (ref 13.3–17.7)
LACTATE BLD-SCNC: 2 MMOL/L (ref 0.7–2)
LACTATE BLD-SCNC: NORMAL MMOL/L (ref 0.7–2)
Lab: NORMAL
MAGNESIUM SERPL-MCNC: 2 MG/DL (ref 1.6–2.3)
MAGNESIUM SERPL-MCNC: 2.1 MG/DL (ref 1.6–2.3)
MCH RBC QN AUTO: 29.6 PG (ref 26.5–33)
MCH RBC QN AUTO: 29.6 PG (ref 26.5–33)
MCHC RBC AUTO-ENTMCNC: 29.2 G/DL (ref 31.5–36.5)
MCHC RBC AUTO-ENTMCNC: 29.3 G/DL (ref 31.5–36.5)
MCV RBC AUTO: 101 FL (ref 78–100)
MCV RBC AUTO: 101 FL (ref 78–100)
O2/TOTAL GAS SETTING VFR VENT: 80 %
PCO2 BLDV: 41 MM HG (ref 40–50)
PH BLDV: 7.45 PH (ref 7.32–7.43)
PHOSPHATE SERPL-MCNC: 3.2 MG/DL (ref 2.5–4.5)
PHOSPHATE SERPL-MCNC: 3.4 MG/DL (ref 2.5–4.5)
PLATELET # BLD AUTO: 156 10E9/L (ref 150–450)
PLATELET # BLD AUTO: 184 10E9/L (ref 150–450)
PO2 BLDV: 30 MM HG (ref 25–47)
POTASSIUM SERPL-SCNC: 3.8 MMOL/L (ref 3.4–5.3)
POTASSIUM SERPL-SCNC: 4.4 MMOL/L (ref 3.4–5.3)
PROCALCITONIN SERPL-MCNC: 0.24 NG/ML
PROT SERPL-MCNC: 5.6 G/DL (ref 6.8–8.8)
PROT SERPL-MCNC: 6.4 G/DL (ref 6.8–8.8)
RBC # BLD AUTO: 2.57 10E12/L (ref 4.4–5.9)
RBC # BLD AUTO: 2.8 10E12/L (ref 4.4–5.9)
SODIUM SERPL-SCNC: 138 MMOL/L (ref 133–144)
SODIUM SERPL-SCNC: 139 MMOL/L (ref 133–144)
SPECIMEN SOURCE: NORMAL
WBC # BLD AUTO: 15.8 10E9/L (ref 4–11)
WBC # BLD AUTO: 19.3 10E9/L (ref 4–11)

## 2019-09-07 PROCEDURE — 84100 ASSAY OF PHOSPHORUS: CPT | Performed by: STUDENT IN AN ORGANIZED HEALTH CARE EDUCATION/TRAINING PROGRAM

## 2019-09-07 PROCEDURE — 25000128 H RX IP 250 OP 636: Performed by: STUDENT IN AN ORGANIZED HEALTH CARE EDUCATION/TRAINING PROGRAM

## 2019-09-07 PROCEDURE — 87070 CULTURE OTHR SPECIMN AEROBIC: CPT | Performed by: STUDENT IN AN ORGANIZED HEALTH CARE EDUCATION/TRAINING PROGRAM

## 2019-09-07 PROCEDURE — 94640 AIRWAY INHALATION TREATMENT: CPT

## 2019-09-07 PROCEDURE — 87205 SMEAR GRAM STAIN: CPT | Performed by: STUDENT IN AN ORGANIZED HEALTH CARE EDUCATION/TRAINING PROGRAM

## 2019-09-07 PROCEDURE — 83605 ASSAY OF LACTIC ACID: CPT

## 2019-09-07 PROCEDURE — 27210429 ZZH NUTRITION PRODUCT INTERMEDIATE LITER

## 2019-09-07 PROCEDURE — C9113 INJ PANTOPRAZOLE SODIUM, VIA: HCPCS

## 2019-09-07 PROCEDURE — 84145 PROCALCITONIN (PCT): CPT | Performed by: STUDENT IN AN ORGANIZED HEALTH CARE EDUCATION/TRAINING PROGRAM

## 2019-09-07 PROCEDURE — 25000125 ZZHC RX 250

## 2019-09-07 PROCEDURE — 83735 ASSAY OF MAGNESIUM: CPT | Performed by: STUDENT IN AN ORGANIZED HEALTH CARE EDUCATION/TRAINING PROGRAM

## 2019-09-07 PROCEDURE — 82803 BLOOD GASES ANY COMBINATION: CPT | Performed by: STUDENT IN AN ORGANIZED HEALTH CARE EDUCATION/TRAINING PROGRAM

## 2019-09-07 PROCEDURE — 85027 COMPLETE CBC AUTOMATED: CPT | Performed by: STUDENT IN AN ORGANIZED HEALTH CARE EDUCATION/TRAINING PROGRAM

## 2019-09-07 PROCEDURE — 40000983 ZZH STATISTIC HFNC ADULT NON-CPAP

## 2019-09-07 PROCEDURE — 94668 MNPJ CHEST WALL SBSQ: CPT

## 2019-09-07 PROCEDURE — 97535 SELF CARE MNGMENT TRAINING: CPT | Mod: GO | Performed by: OCCUPATIONAL THERAPIST

## 2019-09-07 PROCEDURE — 12000004 ZZH R&B IMCU UMMC

## 2019-09-07 PROCEDURE — 71045 X-RAY EXAM CHEST 1 VIEW: CPT

## 2019-09-07 PROCEDURE — 25000132 ZZH RX MED GY IP 250 OP 250 PS 637: Mod: GY | Performed by: INTERNAL MEDICINE

## 2019-09-07 PROCEDURE — 25000128 H RX IP 250 OP 636

## 2019-09-07 PROCEDURE — 40000275 ZZH STATISTIC RCP TIME EA 10 MIN

## 2019-09-07 PROCEDURE — 87106 FUNGI IDENTIFICATION YEAST: CPT | Performed by: STUDENT IN AN ORGANIZED HEALTH CARE EDUCATION/TRAINING PROGRAM

## 2019-09-07 PROCEDURE — 25000131 ZZH RX MED GY IP 250 OP 636 PS 637: Mod: GY | Performed by: STUDENT IN AN ORGANIZED HEALTH CARE EDUCATION/TRAINING PROGRAM

## 2019-09-07 PROCEDURE — 97530 THERAPEUTIC ACTIVITIES: CPT | Mod: GO | Performed by: OCCUPATIONAL THERAPIST

## 2019-09-07 PROCEDURE — 00000146 ZZHCL STATISTIC GLUCOSE BY METER IP

## 2019-09-07 PROCEDURE — 94799 UNLISTED PULMONARY SVC/PX: CPT

## 2019-09-07 PROCEDURE — 25000132 ZZH RX MED GY IP 250 OP 250 PS 637: Mod: GY | Performed by: STUDENT IN AN ORGANIZED HEALTH CARE EDUCATION/TRAINING PROGRAM

## 2019-09-07 PROCEDURE — 80053 COMPREHEN METABOLIC PANEL: CPT | Performed by: STUDENT IN AN ORGANIZED HEALTH CARE EDUCATION/TRAINING PROGRAM

## 2019-09-07 PROCEDURE — 25000125 ZZHC RX 250: Performed by: STUDENT IN AN ORGANIZED HEALTH CARE EDUCATION/TRAINING PROGRAM

## 2019-09-07 PROCEDURE — 99233 SBSQ HOSP IP/OBS HIGH 50: CPT | Performed by: INTERNAL MEDICINE

## 2019-09-07 PROCEDURE — 87077 CULTURE AEROBIC IDENTIFY: CPT | Performed by: STUDENT IN AN ORGANIZED HEALTH CARE EDUCATION/TRAINING PROGRAM

## 2019-09-07 PROCEDURE — 94640 AIRWAY INHALATION TREATMENT: CPT | Mod: 76

## 2019-09-07 RX ORDER — PREDNISONE 20 MG/1
40 TABLET ORAL DAILY
Status: COMPLETED | OUTPATIENT
Start: 2019-09-07 | End: 2019-09-08

## 2019-09-07 RX ORDER — PREDNISONE 5 MG/1
5 TABLET ORAL DAILY
Status: DISCONTINUED | OUTPATIENT
Start: 2019-09-19 | End: 2019-09-13

## 2019-09-07 RX ORDER — PREDNISONE 20 MG/1
20 TABLET ORAL DAILY
Status: COMPLETED | OUTPATIENT
Start: 2019-09-09 | End: 2019-09-13

## 2019-09-07 RX ORDER — PREDNISONE 10 MG/1
10 TABLET ORAL DAILY
Status: DISCONTINUED | OUTPATIENT
Start: 2019-09-14 | End: 2019-09-13

## 2019-09-07 RX ADMIN — IPRATROPIUM BROMIDE AND ALBUTEROL SULFATE 3 ML: .5; 3 SOLUTION RESPIRATORY (INHALATION) at 16:35

## 2019-09-07 RX ADMIN — Medication 1 PACKET: at 07:31

## 2019-09-07 RX ADMIN — IPRATROPIUM BROMIDE AND ALBUTEROL SULFATE 3 ML: .5; 3 SOLUTION RESPIRATORY (INHALATION) at 07:55

## 2019-09-07 RX ADMIN — ACETYLCYSTEINE 2 ML: 200 SOLUTION ORAL; RESPIRATORY (INHALATION) at 07:56

## 2019-09-07 RX ADMIN — ASPIRIN 81 MG CHEWABLE TABLET 81 MG: 81 TABLET CHEWABLE at 07:28

## 2019-09-07 RX ADMIN — PREDNISONE 40 MG: 20 TABLET ORAL at 14:06

## 2019-09-07 RX ADMIN — PANTOPRAZOLE SODIUM 40 MG: 40 INJECTION, POWDER, FOR SOLUTION INTRAVENOUS at 20:12

## 2019-09-07 RX ADMIN — FUROSEMIDE 60 MG: 10 INJECTION, SOLUTION INTRAVENOUS at 17:52

## 2019-09-07 RX ADMIN — CLOPIDOGREL BISULFATE 75 MG: 75 TABLET, FILM COATED ORAL at 07:28

## 2019-09-07 RX ADMIN — FUROSEMIDE 60 MG: 10 INJECTION, SOLUTION INTRAVENOUS at 06:05

## 2019-09-07 RX ADMIN — ACETYLCYSTEINE 2 ML: 200 SOLUTION ORAL; RESPIRATORY (INHALATION) at 16:35

## 2019-09-07 RX ADMIN — POTASSIUM CHLORIDE 20 MEQ: 1.5 POWDER, FOR SOLUTION ORAL at 06:05

## 2019-09-07 RX ADMIN — ATORVASTATIN CALCIUM 40 MG: 40 TABLET, FILM COATED ORAL at 20:12

## 2019-09-07 RX ADMIN — ACETYLCYSTEINE 2 ML: 200 SOLUTION ORAL; RESPIRATORY (INHALATION) at 20:28

## 2019-09-07 RX ADMIN — IPRATROPIUM BROMIDE AND ALBUTEROL SULFATE 3 ML: .5; 3 SOLUTION RESPIRATORY (INHALATION) at 20:28

## 2019-09-07 RX ADMIN — IPRATROPIUM BROMIDE AND ALBUTEROL SULFATE 3 ML: .5; 3 SOLUTION RESPIRATORY (INHALATION) at 12:30

## 2019-09-07 RX ADMIN — IPRATROPIUM BROMIDE AND ALBUTEROL SULFATE 3 ML: .5; 3 SOLUTION RESPIRATORY (INHALATION) at 03:54

## 2019-09-07 RX ADMIN — IPRATROPIUM BROMIDE AND ALBUTEROL SULFATE 3 ML: .5; 3 SOLUTION RESPIRATORY (INHALATION) at 00:01

## 2019-09-07 RX ADMIN — ACETYLCYSTEINE 2 ML: 200 SOLUTION ORAL; RESPIRATORY (INHALATION) at 12:31

## 2019-09-07 RX ADMIN — ACETYLCYSTEINE 4 ML: 200 SOLUTION ORAL; RESPIRATORY (INHALATION) at 00:02

## 2019-09-07 RX ADMIN — CEFTRIAXONE SODIUM 2 G: 2 INJECTION, POWDER, FOR SOLUTION INTRAMUSCULAR; INTRAVENOUS at 10:16

## 2019-09-07 RX ADMIN — ACETYLCYSTEINE 2 ML: 200 SOLUTION ORAL; RESPIRATORY (INHALATION) at 03:54

## 2019-09-07 RX ADMIN — PANTOPRAZOLE SODIUM 40 MG: 40 INJECTION, POWDER, FOR SOLUTION INTRAVENOUS at 07:28

## 2019-09-07 RX ADMIN — MULTIVIT AND MINERALS-FERROUS GLUCONATE 9 MG IRON/15 ML ORAL LIQUID 15 ML: at 07:28

## 2019-09-07 ASSESSMENT — MIFFLIN-ST. JEOR: SCORE: 1485.37

## 2019-09-07 ASSESSMENT — ACTIVITIES OF DAILY LIVING (ADL)
ADLS_ACUITY_SCORE: 17
ADLS_ACUITY_SCORE: 18
ADLS_ACUITY_SCORE: 17
ADLS_ACUITY_SCORE: 18
ADLS_ACUITY_SCORE: 18
ADLS_ACUITY_SCORE: 17

## 2019-09-07 NOTE — PROGRESS NOTES
Cardiology - [facility] Progress Note    Assessment & Plan   83 year old male who was admitted on 8/27/2019 with a cardiac arrest. 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    Plan for today  - Resumed prednisone with taper as it was discontinued yesterday  - Pulmonary toilet and flutter valve  - Continue diuresis  - Continue silva due to trauma and need for straight cath which he does at home  - Transfer to   - Calorie counts with cycled tube feeds     Neurology: Initially Cooled to 34 degrees.  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   - extubated 8/30 with good neurologic function post extubation, some dysphagia  -speech consult dysphagia 3 diet   - some episodes of unresponsiveness started EEG- no seizures to date- no seizures, will discontinue    Cardiovascular / Hemodynamics: Refractory VF arrest s/p  CONCETTA to LM-> LAD, Lcx.  LA 7-> 1.3  TTE: EF 60-70%, RV mildly reduced function no significant valvular disease  EKG: NSR RBBB   --off pressors 8/30pm  --continue ASA 81mg and plavix 75 mg PO QD  --atorvastatin 40mg d  --metoprolol XL 12.5mg d  - EP consulted for potential ICD placement    Pulmonary:  history of COPD on 2L of O2 with exertion.   Vent Settings: FiO2 (%): (S) 85 %  Resp: 14  Chest CT- diffuse ground glass opacities bilateral pleural plaques and RLL cavitation with possible emphysema and fibrosis  --on scheduled duonebs QID  --resumed prednisone with taper   --mucomyst and chest physiotherapy   - QID duoneb  -- CXR today without worsening infiltrates or signs of pneumonia  - Wean high flow  with O2 sat target at >885   GI and Nutrition: No known medical hx.  --postpyloric feeding  --bowel regimen -colace  --GI Prophylaxis: PPI   --Calorie counts and cycled TF to attempt removal of NJ   Renal, Fluid and Electrolytes: SCr 1.7, stable  Monitor creatinine and UOP  --maintain K>3 and Mg>2   --Keep silva for now due to trauma   Infectious Disease:  Leukocytosis c/w arrest though suspect VAP Blood cultures negative, sputum Cx candida contamination, sputum culture from today pending; pleural effusion from few days ago with negative cultures but it is exudative  -- Continue rocephin   Hematology and Oncology: ASA/plavix for CONCETTA.   - left chest wall hematoma likely source of bleeding  --SQH TID for ppx- restared heparin 9/1   Endocrinology: Diabetic with an A1c of 6.6 in 2017- was considered pre diabetic and this has since resolved A1c on admission 5.9  No known medical history. BG elevated.  --insulin gtt - medium intensity sliding scale  -- non severe malnutrition- thickened liquid diet    Lines: PICC line RUE August 29 2019  Silva catheter August 27, 2019  Current lines are required for patient management       Family update by me today: Yes      Code Status: Full     The pt was discussed and evaluated with Dr. Jo MD, attending physician, who agrees with the assessment and plan above.     [place]    Interval History   Stood up and pulled silva accidentally which caused urethral trauma; urine now less blood and clearing. Eating well. Breathing feels improved.       Intake/Output Summary (Last 24 hours) at 9/7/2019 1830  Last data filed at 9/7/2019 1800  Gross per 24 hour   Intake 1890 ml   Output 4955 ml   Net -3065 ml     FiO2 (%): (S) 85 %  Resp: 14      Physical Exam   Temp: 98.5  F (36.9  C) Temp src: Oral BP: 107/65 Pulse: 91 Heart Rate: 87 Resp: 14 SpO2: 95 % O2 Device: High Flow Nasal Cannula (HFNC) Oxygen Delivery: Other (Comments)(35 lpm)  Vitals:    09/05/19 0000 09/06/19 0458 09/07/19 0400  "  Weight: 84.7 kg (186 lb 11.7 oz) 84.1 kg (185 lb 6.5 oz) 81.6 kg (179 lb 14.3 oz)     Vital Signs with Ranges  Temp:  [98  F (36.7  C)-98.5  F (36.9  C)] 98.5  F (36.9  C)  Pulse:  [] 91  Heart Rate:  [] 87  Resp:  [9-33] 14  BP: ()/(36-97) 107/65  FiO2 (%):  [70 %-90 %] 85 %  SpO2:  [82 %-100 %] 95 %  I/O last 3 completed shifts:  In: 1890 [I.V.:110; NG/GT:460]  Out: 4730 [Urine:4730]    Heart Rate: 87, Blood pressure 107/65, pulse 91, temperature 98.5  F (36.9  C), temperature source Oral, resp. rate 14, height 1.727 m (5' 7.99\"), weight 81.6 kg (179 lb 14.3 oz), SpO2 95 %.  179 lbs 14.33 oz  GEN:  Alert on nasal cannula A&O X3  CV:  Regular rate and rhythm,   S1 + S2 noted, no S3 or S4.  LUNGS:  Coarse breath sounds bilaterally with copious secretions.   ABD:  Active bowel sounds, soft, non-tender/non-distended.  No rebound/guarding/rigidity.  EXT:  No edema or cyanosis.     Medications     IV fluid REPLACEMENT ONLY       IV fluid REPLACEMENT ONLY       - MEDICATION INSTRUCTIONS -       - MEDICATION INSTRUCTIONS -       Percutaneous Coronary Intervention orders placed (this is information for BPA alerting)       ACE/ARB/ARNI NOT PRESCRIBED         acetylcysteine  2 mL Nebulization Q4H [facility]     aspirin  81 mg Oral Daily     atorvastatin  40 mg Oral QPM     cefTRIAXone  2 g Intravenous Q24H     clopidogrel  75 mg Oral Daily     furosemide  60 mg Intravenous Q12H     heparin lock flush  5-10 mL Intracatheter Q24H     insulin aspart  1-7 Units Subcutaneous TID AC     insulin aspart  1-5 Units Subcutaneous At Bedtime     ipratropium - albuterol 0.5 mg/2.5 mg/3 mL  3 mL Nebulization Q4H     metoprolol succinate ER  12.5 mg Oral Daily     multivitamins w/minerals  15 mL Per Feeding Tube Daily     pantoprazole (PROTONIX) IV  40 mg Intravenous BID     predniSONE  40 mg Oral or Feeding Tube Daily    Followed by     [START ON 9/9/2019] predniSONE  20 mg Oral or Feeding Tube Daily    Followed by     [START " ON 9/14/2019] predniSONE  10 mg Oral or Feeding Tube Daily    Followed by     [START ON 9/19/2019] predniSONE  5 mg Oral or Feeding Tube Daily     protein modular  1 packet Per Feeding Tube Daily     senna-docusate  1 tablet Oral or Feeding Tube At Bedtime       Data   Recent Labs   Lab 09/07/19  1600 09/07/19  0352 09/06/19  1612   WBC 19.3* 15.8* 16.8*   HGB 8.3* 7.6* 8.3*   * 101* 102*    156 149*    139 140   POTASSIUM 4.4 3.8 3.9   CHLORIDE 103 105 107   CO2 26 28 26   BUN 43* 43* 38*   CR 1.14 1.01 0.89   ANIONGAP 9 7 6   YOON 8.2* 8.1* 8.2*   * 149* 144*   ALBUMIN 2.4* 2.2* 2.4*   PROTTOTAL 6.4* 5.6* 6.0*   BILITOTAL 0.7 0.8 0.6   ALKPHOS 63 54 52   ALT 33 31 38   AST 21 25 25       Recent Results (from the past 24 hour(s))   XR Chest Port 1 View    Narrative    Single view chest    Comparisons: 9/4/2019    HISTORY: Evaluate for pneumonia    FINDINGS: The patient is rotated to the left. Right-sided PICC line  remains in place. Feeding tube remains in place. There are diffuse  interstitial opacities which are similar to the prior. Unchanged  blunting of both costophrenic angles. Pulmonary vascularity is  indistinct. Cardiac silhouette is unchanged.      Impression    IMPRESSION:  1. No significant change in probable pulmonary edema  2. No significant change in small bilateral pleural effusions with  associated bibasilar consolidation/atelectasis.    CATALINO MERCADO MD

## 2019-09-07 NOTE — PLAN OF CARE
ICU End of Shift Summary. See flowsheets for vital signs and detailed assessment.    Changes this shift: A & O x4, denies pain, VSS. HFNC 80-90%. Pt able to cough up secretions, especially after pecussion treatment. Pt started using IS up to 1500 consistently. Mcclelland remains in place per MD, until urine color is no longer red/pink. Urine still with some clots. New steroid taper started. Chest xray done, sputum samples send, and new VBG drawn and sent. See results. Pt worked with PT today, see note. Pt up in chair frequently today. Good appetite, eating 100%.    Plan:  Transfer to  when bed available. Calorie count to start tomorrow. Continue with plan of care, wean O2 as able.

## 2019-09-07 NOTE — PLAN OF CARE
PT 4C / Discharge Planner PT   Patient plan for discharge: not discussed today  Current status: Patient completes sit<>stand transfers with min A, patient ambulated 8ft forward/backward x4 reps continuously with FWW + CGA, desats to 79% on HFNC FiO2 80%, recovers within 2 min and increased FiO2 to 100. Additional ambulation in room 8ft forward/backward x4 reps at FiO2 100%, maintaining SpO2 >85%. Increased secretion mobilization and clearance with activity.  Barriers to return to prior living situation: acute medical needs, O2 requirements, deconditioning, level of assist  Recommendations for discharge: TCU  Rationale for recommendations: Patient is below baseline for mobility, will require continued skilled therapy to progress strength, balance, activity tolerance and functional independence. May be good ARU candidate as patient was previously IND without AD, has good family support, motivated for therapy.

## 2019-09-07 NOTE — PLAN OF CARE
ICU End of Shift Summary. See flowsheets for vital signs and detailed assessment.    Changes this shift: When the patient was up in the chair today and stood to get on the commode, the urinary catheter got pulled as it was caught in the chair leg. There was a small amount of bleeding from the meatus and the urine volume was less than earlier but he still had about 300 ml of urine volume. He complained of feeling that his bladder was very full and painful so the silva catheter was removed and a new one inserted. There was a small moderate sized clot that came out with the old catheter and the urine that returned in the new catheter was more than a liter and was dark pink in color. The MDs were informed this afternoon about the initial injury and bleeding and again this evening.   The patient was returned from  after transfer due to the amount of oxygen support he requires. He is on HFNC at 70-80% or bipap at 50% to maintain SpO2's > 90%.    Plan:  Monitor the urine volume and color closely. Assess and monitor his respiratory status closely as well.

## 2019-09-07 NOTE — PLAN OF CARE
Discharge Planner OT   Patient plan for discharge: unstated  Current status: Pt SBA for basic G/H standing at bedside table, CGA marching in place for 5 bouts of approx 1 min, therapist encouraging pulmonary hygiene by educating in deep cough, minimal productivity despite obvious mucus in airway.   Barriers to return to prior living situation: decreased activity tolerance, increased O2 demands.   Recommendations for discharge: TCU  Rationale for recommendations: Pt below baseline in ADL I.        Entered by: Alex Martin 09/07/2019 3:15 PM

## 2019-09-07 NOTE — PLAN OF CARE
ICU End of Shift Summary. See flowsheets for vital signs and detailed assessment.    Changes this shift: VSS overnight. HFNC 80-90%. Pt able to cough up secretions. Mcclelland replaced at shift change due to occlusion. Urine still red with some clots. 60mg of lasix given at 0600. Potassium replaced per protocol.     Plan:  Continue to monitor and follow plan of care  Problem: Adult Inpatient Plan of Care  Goal: Plan of Care Review  9/7/2019 0617 by Kellie Powell RN  Outcome: No Change  9/6/2019 1830 by Nati Allison RN  Outcome: Improving     Problem: Arrhythmia (Acute Coronary Syndrome)  Goal: Normalized Cardiac Rhythm  9/7/2019 0617 by Kellie Powell RN  Outcome: No Change  9/6/2019 1830 by Nati Allison RN  Outcome: Improving

## 2019-09-08 ENCOUNTER — APPOINTMENT (OUTPATIENT)
Dept: SPEECH THERAPY | Facility: CLINIC | Age: 84
DRG: 224 | End: 2019-09-08
Payer: MEDICARE

## 2019-09-08 LAB
ALBUMIN SERPL-MCNC: 2.3 G/DL (ref 3.4–5)
ALP SERPL-CCNC: 60 U/L (ref 40–150)
ALT SERPL W P-5'-P-CCNC: 29 U/L (ref 0–70)
ANION GAP SERPL CALCULATED.3IONS-SCNC: 11 MMOL/L (ref 3–14)
AST SERPL W P-5'-P-CCNC: 20 U/L (ref 0–45)
BILIRUB SERPL-MCNC: 0.7 MG/DL (ref 0.2–1.3)
BUN SERPL-MCNC: 47 MG/DL (ref 7–30)
CALCIUM SERPL-MCNC: 8.1 MG/DL (ref 8.5–10.1)
CHLORIDE SERPL-SCNC: 102 MMOL/L (ref 94–109)
CO2 SERPL-SCNC: 25 MMOL/L (ref 20–32)
CREAT SERPL-MCNC: 1.15 MG/DL (ref 0.66–1.25)
GFR SERPL CREATININE-BSD FRML MDRD: 58 ML/MIN/{1.73_M2}
GLUCOSE BLDC GLUCOMTR-MCNC: 138 MG/DL (ref 70–99)
GLUCOSE BLDC GLUCOMTR-MCNC: 172 MG/DL (ref 70–99)
GLUCOSE BLDC GLUCOMTR-MCNC: 275 MG/DL (ref 70–99)
GLUCOSE BLDC GLUCOMTR-MCNC: 283 MG/DL (ref 70–99)
GLUCOSE SERPL-MCNC: 189 MG/DL (ref 70–99)
MAGNESIUM SERPL-MCNC: 2.1 MG/DL (ref 1.6–2.3)
PHOSPHATE SERPL-MCNC: 3.2 MG/DL (ref 2.5–4.5)
POTASSIUM SERPL-SCNC: 2.8 MMOL/L (ref 3.4–5.3)
POTASSIUM SERPL-SCNC: 3.8 MMOL/L (ref 3.4–5.3)
PROT SERPL-MCNC: 5.9 G/DL (ref 6.8–8.8)
SODIUM SERPL-SCNC: 138 MMOL/L (ref 133–144)

## 2019-09-08 PROCEDURE — 84132 ASSAY OF SERUM POTASSIUM: CPT | Performed by: INTERNAL MEDICINE

## 2019-09-08 PROCEDURE — 25000131 ZZH RX MED GY IP 250 OP 636 PS 637: Mod: GY | Performed by: STUDENT IN AN ORGANIZED HEALTH CARE EDUCATION/TRAINING PROGRAM

## 2019-09-08 PROCEDURE — 92526 ORAL FUNCTION THERAPY: CPT | Mod: GN

## 2019-09-08 PROCEDURE — C9113 INJ PANTOPRAZOLE SODIUM, VIA: HCPCS

## 2019-09-08 PROCEDURE — 25000125 ZZHC RX 250

## 2019-09-08 PROCEDURE — 00000146 ZZHCL STATISTIC GLUCOSE BY METER IP

## 2019-09-08 PROCEDURE — 25000128 H RX IP 250 OP 636: Performed by: STUDENT IN AN ORGANIZED HEALTH CARE EDUCATION/TRAINING PROGRAM

## 2019-09-08 PROCEDURE — 94640 AIRWAY INHALATION TREATMENT: CPT | Mod: 76

## 2019-09-08 PROCEDURE — 94640 AIRWAY INHALATION TREATMENT: CPT

## 2019-09-08 PROCEDURE — 40000802 ZZH SITE CHECK

## 2019-09-08 PROCEDURE — 80053 COMPREHEN METABOLIC PANEL: CPT | Performed by: STUDENT IN AN ORGANIZED HEALTH CARE EDUCATION/TRAINING PROGRAM

## 2019-09-08 PROCEDURE — 94668 MNPJ CHEST WALL SBSQ: CPT

## 2019-09-08 PROCEDURE — 80048 BASIC METABOLIC PNL TOTAL CA: CPT | Performed by: STUDENT IN AN ORGANIZED HEALTH CARE EDUCATION/TRAINING PROGRAM

## 2019-09-08 PROCEDURE — 36592 COLLECT BLOOD FROM PICC: CPT | Performed by: INTERNAL MEDICINE

## 2019-09-08 PROCEDURE — 84100 ASSAY OF PHOSPHORUS: CPT | Performed by: STUDENT IN AN ORGANIZED HEALTH CARE EDUCATION/TRAINING PROGRAM

## 2019-09-08 PROCEDURE — 94799 UNLISTED PULMONARY SVC/PX: CPT

## 2019-09-08 PROCEDURE — 25000132 ZZH RX MED GY IP 250 OP 250 PS 637: Mod: GY | Performed by: STUDENT IN AN ORGANIZED HEALTH CARE EDUCATION/TRAINING PROGRAM

## 2019-09-08 PROCEDURE — 40000983 ZZH STATISTIC HFNC ADULT NON-CPAP

## 2019-09-08 PROCEDURE — 25000132 ZZH RX MED GY IP 250 OP 250 PS 637: Mod: GY | Performed by: INTERNAL MEDICINE

## 2019-09-08 PROCEDURE — 12000004 ZZH R&B IMCU UMMC

## 2019-09-08 PROCEDURE — 36592 COLLECT BLOOD FROM PICC: CPT | Performed by: STUDENT IN AN ORGANIZED HEALTH CARE EDUCATION/TRAINING PROGRAM

## 2019-09-08 PROCEDURE — 83735 ASSAY OF MAGNESIUM: CPT | Performed by: STUDENT IN AN ORGANIZED HEALTH CARE EDUCATION/TRAINING PROGRAM

## 2019-09-08 PROCEDURE — 25000125 ZZHC RX 250: Performed by: STUDENT IN AN ORGANIZED HEALTH CARE EDUCATION/TRAINING PROGRAM

## 2019-09-08 PROCEDURE — 25000128 H RX IP 250 OP 636

## 2019-09-08 PROCEDURE — 27210429 ZZH NUTRITION PRODUCT INTERMEDIATE LITER

## 2019-09-08 PROCEDURE — 99232 SBSQ HOSP IP/OBS MODERATE 35: CPT | Performed by: PHYSICIAN ASSISTANT

## 2019-09-08 PROCEDURE — 40000275 ZZH STATISTIC RCP TIME EA 10 MIN

## 2019-09-08 PROCEDURE — 40000558 ZZH STATISTIC CVC DRESSING CHANGE

## 2019-09-08 RX ADMIN — ACETYLCYSTEINE 2 ML: 200 SOLUTION ORAL; RESPIRATORY (INHALATION) at 08:19

## 2019-09-08 RX ADMIN — SODIUM CHLORIDE, PRESERVATIVE FREE 5 ML: 5 INJECTION INTRAVENOUS at 10:24

## 2019-09-08 RX ADMIN — ACETYLCYSTEINE 2 ML: 200 SOLUTION ORAL; RESPIRATORY (INHALATION) at 03:53

## 2019-09-08 RX ADMIN — Medication 5 ML: at 18:18

## 2019-09-08 RX ADMIN — POTASSIUM CHLORIDE 20 MEQ: 1.5 POWDER, FOR SOLUTION ORAL at 09:07

## 2019-09-08 RX ADMIN — ACETYLCYSTEINE 2 ML: 200 SOLUTION ORAL; RESPIRATORY (INHALATION) at 15:44

## 2019-09-08 RX ADMIN — IPRATROPIUM BROMIDE AND ALBUTEROL SULFATE 3 ML: .5; 3 SOLUTION RESPIRATORY (INHALATION) at 19:41

## 2019-09-08 RX ADMIN — PANTOPRAZOLE SODIUM 40 MG: 40 INJECTION, POWDER, FOR SOLUTION INTRAVENOUS at 09:01

## 2019-09-08 RX ADMIN — IPRATROPIUM BROMIDE AND ALBUTEROL SULFATE 3 ML: .5; 3 SOLUTION RESPIRATORY (INHALATION) at 08:17

## 2019-09-08 RX ADMIN — IPRATROPIUM BROMIDE AND ALBUTEROL SULFATE 3 ML: .5; 3 SOLUTION RESPIRATORY (INHALATION) at 00:05

## 2019-09-08 RX ADMIN — IPRATROPIUM BROMIDE AND ALBUTEROL SULFATE 3 ML: .5; 3 SOLUTION RESPIRATORY (INHALATION) at 15:41

## 2019-09-08 RX ADMIN — ACETYLCYSTEINE 2 ML: 200 SOLUTION ORAL; RESPIRATORY (INHALATION) at 11:34

## 2019-09-08 RX ADMIN — FUROSEMIDE 60 MG: 10 INJECTION, SOLUTION INTRAVENOUS at 05:48

## 2019-09-08 RX ADMIN — POTASSIUM CHLORIDE 40 MEQ: 1.5 POWDER, FOR SOLUTION ORAL at 19:13

## 2019-09-08 RX ADMIN — Medication 12.5 MG: at 09:01

## 2019-09-08 RX ADMIN — ATORVASTATIN CALCIUM 40 MG: 40 TABLET, FILM COATED ORAL at 21:42

## 2019-09-08 RX ADMIN — PREDNISONE 40 MG: 20 TABLET ORAL at 09:01

## 2019-09-08 RX ADMIN — ASPIRIN 81 MG CHEWABLE TABLET 81 MG: 81 TABLET CHEWABLE at 09:01

## 2019-09-08 RX ADMIN — POTASSIUM CHLORIDE 40 MEQ: 1.5 POWDER, FOR SOLUTION ORAL at 21:42

## 2019-09-08 RX ADMIN — Medication 1 PACKET: at 09:02

## 2019-09-08 RX ADMIN — MULTIVIT AND MINERALS-FERROUS GLUCONATE 9 MG IRON/15 ML ORAL LIQUID 15 ML: at 09:01

## 2019-09-08 RX ADMIN — ACETYLCYSTEINE 2 ML: 200 SOLUTION ORAL; RESPIRATORY (INHALATION) at 19:41

## 2019-09-08 RX ADMIN — ACETYLCYSTEINE 2 ML: 200 SOLUTION ORAL; RESPIRATORY (INHALATION) at 00:05

## 2019-09-08 RX ADMIN — CEFTRIAXONE SODIUM 2 G: 2 INJECTION, POWDER, FOR SOLUTION INTRAMUSCULAR; INTRAVENOUS at 09:03

## 2019-09-08 RX ADMIN — PANTOPRAZOLE SODIUM 40 MG: 40 INJECTION, POWDER, FOR SOLUTION INTRAVENOUS at 21:43

## 2019-09-08 RX ADMIN — FUROSEMIDE 60 MG: 10 INJECTION, SOLUTION INTRAVENOUS at 17:24

## 2019-09-08 RX ADMIN — IPRATROPIUM BROMIDE AND ALBUTEROL SULFATE 3 ML: .5; 3 SOLUTION RESPIRATORY (INHALATION) at 03:53

## 2019-09-08 RX ADMIN — IPRATROPIUM BROMIDE AND ALBUTEROL SULFATE 3 ML: .5; 3 SOLUTION RESPIRATORY (INHALATION) at 11:32

## 2019-09-08 RX ADMIN — CLOPIDOGREL BISULFATE 75 MG: 75 TABLET, FILM COATED ORAL at 09:01

## 2019-09-08 ASSESSMENT — ACTIVITIES OF DAILY LIVING (ADL)
ADLS_ACUITY_SCORE: 16
ADLS_ACUITY_SCORE: 17
ADLS_ACUITY_SCORE: 16
ADLS_ACUITY_SCORE: 17
ADLS_ACUITY_SCORE: 16
ADLS_ACUITY_SCORE: 17

## 2019-09-08 ASSESSMENT — MIFFLIN-ST. JEOR: SCORE: 1471.37

## 2019-09-08 NOTE — PROGRESS NOTES
Cardiology - [facility] Progress Note    Assessment & Plan   83 year old male who was admitted on 8/27/2019 with a cardiac arrest. He has a history of PVD with renal artery stenosis s/p stenting in 2013,occluded R carotid artery and s/p left carotid endarterectomy, left subclavian stenosis, CAD s/p CABG with L-LAD, S-D1 and OM2, PDA), COPD on 2L of O2 with exertion at home, and hyperlipidemia. admitted after a VT/VF arrest, obtained ROSC in the field. Patient was found to have disease in the LM into pLAD s/p PCI to LM->LAD and ost LCx.     Interval: Patient transferred to  from ICU due to improving oxygenation. Continuing with aggressive pulmonary physiotherapy. RT COPD consult placed. Patient from 70% FiO2 >> 60%. Feels well. No complaints of chest pain or worsening SOB, coughing. VS stable.     Plan for today  - Continue prednisone with taper  - Pulmonary toilet and flutter valve. RT COPD consult  - Continue diuresis, continues to have good UOP, stable creat  - Continue silva due to trauma and need for straight cath which he does at home  - Transfer to   - Calorie counts with cycled tube feeds  - EP consult for consideration of ICD this admission. EP saw 9/3/19     Assessment & Plan     #VT/VF arrest with ROSC   #CAD status post PCI to native LM into the LAD and ostial LCX  #History of CABG '03 (LIMA-LAD, SVG-D1, SVG-OM2, SVG-PDA)  8/27/19 admission after OOH cardiac arrest. He was at the [facility] and had a witnessed collapse. Bystander CPR was started and EMS found him in VT/VF. He was defibrillated X1 and given 1 of epi. ROSC was obtained in field. On arrival here, he had a pulse and was hypotensive, was intubated, taken for emergent coronary angiogram which showed severe 3V native vessel CAD with LM/LAD lesion and CTOp of proximal RCA with L to right collaterals. LIMA-LAD graft minimally diseased but small and primarily retrograde flow through native LAD. SVG-RCA chronically closed at anastomosis, SVG-D1  appears closed, probably closed SVG-OM2. He underwent PCI to dLM/ostial LAD with CONCETTA and balloon angioplasty of ostial LCx. He has preserved LV EF with mildly reduced RV function. Though he did have CAD and was stented, felt his burden of CAD was unlikely to be the source of his arrest. EP aware of patient and plan is for ICD before discharge.   Barriers to discharge: Ongoing pulmonary edema and COPD management requiring HFNC, ICD for secondary prevention   -Continue aspirin and plavix   -Continue lipitor 40 mg daily   -Continue Toprol XL 12.5 mg   -Continue tele  -EP following, appreciate recommendations. Will reach out Monday 9/9/19 regarding timing for ICD. Just in case, will make patient NPO at midnight.     #Hypoxia, multifactorial   #COPD exacerbation   #Pulmonary edema, history pleural effusion status post thoracentesis on left, residual small bilateral pleural effusions  Patient on 2L O2 at home for COPD. Status post arrest with significant pulmonary edema and COPD exacerbation requiring HFNC and BiPAP.Patient with left pleural effusion status post thora on 9/5 on left and has residual unchanged, small bilateral effusions. Patient being aggressively diuresed with 60 IV Lasix q12, making >2L UOP daily. Volume in setting of aggressive resuscitation. Atelectasis in the setting of rib fracture pain after CPR.   -RT COPD consult, appreciate their assistance   -Prednisone burst/taper  -S/P Zosyn, Rocephin until 9/10/19 for a total of 14 days abx therapy  -Frequent pulmonary toilet/physiotherapy   -Nebs, inhalers   -Encourage IS (difficult 2/2 chest wall pain though this is improving)   -Fluid restriction, I/O's. Would continue to diurese until bump in creat then transition to orals.     Chest wall pain  Rib fracture   Patient states pain is improving overall. He is not requiring any narcotics and would tend to avoid as he did have witnessed probably delirium in the ICU manifesting as seizure like activity without  seizures on EEG. Moreover component of respiratory depression with this. Patient tolerating chest physiotherapy and feeling well in this regard.     Traumatic Silva   Patient does straight cath at home. Traumatic silva iatrogenic, will continue with indwelling silva for strict I/O and to maintain patency in setting of injury and inflammation   -Continue with silva for now     PVD with renal artery stenosis s/p stenting in 2013  occluded R carotid artery  s/p left carotid endarterectomy  Hx left subclavian stenosis  Monitor  Continue aspirin and plavix      Neurology: Initially Cooled to 34 degrees 8/27 for 24 hrs per protocol.  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   - extubated 8/30 with good neurologic function post extubation, some dysphagia  -speech consult: dysphagia 3 diet   - some episodes of unresponsiveness started EEG- no seizures to date- no seizures, and thus discontinued   Cardiovascular / Hemodynamics: Refractory VF arrest s/p  CONCETTA to LM-> LAD, Lcx.  LA 7-> 1.3  TTE: EF 60-70%, RV mildly reduced function no significant valvular disease  EKG: NSR RBBB   --off pressors 8/30pm  --continue ASA 81mg and plavix 75 mg PO QD  --atorvastatin 40mg d  --metoprolol XL 12.5mg d  - EP consulted for potential ICD placement, no time scheduled to date   Pulmonary:  history of COPD on 2L of O2 with exertion.   Vent Settings: FiO2 (%): 60 %  Resp: 20  Chest CT- diffuse ground glass opacities bilateral pleural plaques and RLL cavitation with possible emphysema and fibrosis  --on scheduled duonebs QID  --resumed prednisone with taper   --mucomyst and chest physiotherapy   - QID duoneb  -- CXR today without worsening infiltrates or signs of pneumonia  - Wean high flow with O2 sat target at >85%   GI and Nutrition: No known medical hx.  --postpyloric feeding  --bowel regimen -colace  --GI Prophylaxis: PPI   --Calorie counts and cycled TF to  attempt removal of NJ   Renal, Fluid and Electrolytes: SCr 1.7, stable  Monitor creatinine and UOP  --maintain K>3 and Mg>2   --Keep silva for now due to trauma   Infectious Disease:  Leukocytosis c/w arrest though suspect VAP Blood cultures negative, sputum Cx candida contamination, sputum culture from today pending; pleural effusion from few days ago with negative cultures but it is exudative  -- Continue rocephin x 14 days (end date 9/10/19)    Hematology and Oncology: ASA/plavix for CONCETTA.   - left chest wall hematoma likely source of bleeding, stable and no further bleeding  --SQH TID for DVT ppx- restared heparin 9/1   Endocrinology: Diabetic with an A1c of 6.6 in 2017- was considered pre diabetic and this has since resolved A1c on admission 5.9  No known medical history. BG elevated.  --insulin gtt - medium intensity sliding scale  -- non severe malnutrition- thickened liquid diet    Lines: PICC line RUE August 29 2019  Silva catheter August 27, 2019  Current lines are required for patient management       Family update by me today: Yes      Code Status: Full     The pt was discussed with Dr. Jo MD, attending physician, who agrees with the assessment and plan above.     Anthony Joyce   CSI PA-C   [id number]      Intake/Output Summary (Last 24 hours) at 9/7/2019 1830  Last data filed at 9/7/2019 1800  Gross per 24 hour   Intake 1890 ml   Output 4955 ml   Net -3065 ml     FiO2 (%): 70 %  Resp: 20      Physical Exam   Temp: 99.1  F (37.3  C) Temp src: Oral BP: (!) 120/95 Pulse: 81 Heart Rate: 87 Resp: 20 SpO2: 94 % O2 Device: High Flow Nasal Cannula (HFNC) Oxygen Delivery: Other (Comments)(35 lpm)  Vitals:    09/06/19 0458 09/07/19 0400 09/08/19 0017   Weight: 84.1 kg (185 lb 6.5 oz) 81.6 kg (179 lb 14.3 oz) 80.2 kg (176 lb 12.9 oz)     Vital Signs with Ranges  Temp:  [97.6  F (36.4  C)-99.2  F (37.3  C)] 97.6  F (36.4  C)  Pulse:  [] 84  Heart Rate:  [] 87  Resp:  [14-33] 20  BP: ()/(53-95)  "102/60  FiO2 (%):  [50 %-100 %] 60 %  SpO2:  [82 %-100 %] 97 %  I/O last 3 completed shifts:  In: 2060 [P.O.:120; I.V.:130; NG/GT:550]  Out: 2935 [Urine:2935]    Heart Rate: 87, Blood pressure 102/60, pulse 84, temperature 97.6  F (36.4  C), temperature source Oral, resp. rate 20, height 1.727 m (5' 7.99\"), weight 80.2 kg (176 lb 12.9 oz), SpO2 97 %.  176 lbs 12.94 oz  GEN:  Alert on nasal cannula A&O X3  CV:  Regular rate and rhythm,   S1 + S2 noted, no S3 or S4.  LUNGS:  Coarse breath sounds bilaterally with copious secretions. HFNC  ABD:  Active bowel sounds, soft, non-tender/non-distended.  No rebound/guarding/rigidity.  EXT:  No edema or cyanosis.     Medications     IV fluid REPLACEMENT ONLY       IV fluid REPLACEMENT ONLY       - MEDICATION INSTRUCTIONS -       - MEDICATION INSTRUCTIONS -       Percutaneous Coronary Intervention orders placed (this is information for BPA alerting)       ACE/ARB/ARNI NOT PRESCRIBED         acetylcysteine  2 mL Nebulization Q4H [facility]     aspirin  81 mg Oral Daily     atorvastatin  40 mg Oral QPM     cefTRIAXone  2 g Intravenous Q24H     clopidogrel  75 mg Oral Daily     furosemide  60 mg Intravenous Q12H     heparin lock flush  5-10 mL Intracatheter Q24H     insulin aspart  1-7 Units Subcutaneous TID AC     insulin aspart  1-5 Units Subcutaneous At Bedtime     ipratropium - albuterol 0.5 mg/2.5 mg/3 mL  3 mL Nebulization Q4H     metoprolol succinate ER  12.5 mg Oral Daily     multivitamins w/minerals  15 mL Per Feeding Tube Daily     pantoprazole (PROTONIX) IV  40 mg Intravenous BID     predniSONE  40 mg Oral or Feeding Tube Daily    Followed by     [START ON 9/9/2019] predniSONE  20 mg Oral or Feeding Tube Daily    Followed by     [START ON 9/14/2019] predniSONE  10 mg Oral or Feeding Tube Daily    Followed by     [START ON 9/19/2019] predniSONE  5 mg Oral or Feeding Tube Daily     protein modular  1 packet Per Feeding Tube Daily     senna-docusate  1 tablet Oral or Feeding Tube " At Bedtime       Data   Recent Labs   Lab 09/08/19  0422 09/07/19  1600 09/07/19  0352 09/06/19  1612   WBC  --  19.3* 15.8* 16.8*   HGB  --  8.3* 7.6* 8.3*   MCV  --  101* 101* 102*   PLT  --  184 156 149*    138 139 140   POTASSIUM 3.8 4.4 3.8 3.9   CHLORIDE 102 103 105 107   CO2 25 26 28 26   BUN 47* 43* 43* 38*   CR 1.15 1.14 1.01 0.89   ANIONGAP 11 9 7 6   YOON 8.1* 8.2* 8.1* 8.2*   * 241* 149* 144*   ALBUMIN 2.3* 2.4* 2.2* 2.4*   PROTTOTAL 5.9* 6.4* 5.6* 6.0*   BILITOTAL 0.7 0.7 0.8 0.6   ALKPHOS 60 63 54 52   ALT 29 33 31 38   AST 20 21 25 25       Recent Results (from the past 24 hour(s))   XR Chest Port 1 View    Narrative    Single view chest    Comparisons: 9/4/2019    HISTORY: Evaluate for pneumonia    FINDINGS: The patient is rotated to the left. Right-sided PICC line  remains in place. Feeding tube remains in place. There are diffuse  interstitial opacities which are similar to the prior. Unchanged  blunting of both costophrenic angles. Pulmonary vascularity is  indistinct. Cardiac silhouette is unchanged.      Impression    IMPRESSION:  1. No significant change in probable pulmonary edema  2. No significant change in small bilateral pleural effusions with  associated bibasilar consolidation/atelectasis.    CATALINO MERCADO MD

## 2019-09-08 NOTE — PLAN OF CARE
"Blood pressure 111/56, pulse 82, temperature 98.3  F (36.8  C), temperature source Oral, resp. rate 20, height 1.727 m (5' 7.99\"), weight 80.2 kg (176 lb 12.9 oz), SpO2 94 %.  Neuro: A&Ox4.   Cardiac Afib- rate controlled. VSS.       Respiratory: Sating in high 80's to high 90's on 60-70% on High Flow nasal cannula. Breathing slightly improved with diuresis.   GI/: Adequate urine output, remains light pink/ tea colored. Loose BM X2  Diet/appetite: On DD2 diet, thin liquids- good appetite    Sonu Counts to begin today.  On TF overnight at 60cc/hr with Q4 60cc FWF, as pt was receiving- Provider notified to clarify orders.   Activity:  Assist x1 with walker to commode, chair.  Pain:  Pt denies.  Skin: No new deficits noted. Gross ecchymosis, skin alterations as noted in Epic.  No new deficits revealed.   LDA's: LIMA PICC, HARSHAD Mcclelland     Plan: Continue with POC. Notify primary team with changes.   "

## 2019-09-08 NOTE — PROGRESS NOTES
Neuro: A&Ox4.   Cardiac Afib. VSS.   Respiratory: Sating in high 80's to mid 90's on 70-90% High Flow 02. Goal of 88%. Known COPD, wears 2LNC at home.  GI/: Adequate urine output, remains clear light red. Loose BM X1.  Diet/appetite: On DD2 diet, thin liquids.    Sonu Counts to begin today.  On TF overnight at 60cc/hr with Q4 60cc FWF, as pt was receiving on ICU prior to transfer. Called [facility] Provider for order clarification. Per Provider, ok to continue as above overnight, and TF/FWF regimen to be clarified today.  Activity:  Assist x1 with walker to commode, chair.  Pain:  Pt denies.  Skin: No new deficits noted. Gross ecchymosis, skin alterations, noted in Epic.  2 RN skin check done upon transfer by writer and DICK Rudolph, [place]. No new deficits revealed.   LDA's: DL PICC, HARSHAD Mcclelland    Plan: Continue with POC. Notify primary team with changes. Plan for ICD prior to discharge.

## 2019-09-08 NOTE — PLAN OF CARE
Discharge Planner SLP   Patient plan for discharge: none stated  Current status: SLP: Pt seen for dysphagia tx on 30L HFNC with 60% FiO2. Pt was only agreeable to small amount of PO trials, however pt remains appropriate for dysphagia diet 2 and thin liquids. Pt remains at elevated aspiration given ongoing high O2 requirements. ST to continue to follow.   Barriers to return to prior living situation: dysphagia, respiratory status   Recommendations for discharge: TCU  Rationale for recommendations: pt would benefit from continued ST to safely return to baseline diet level        Entered by: Amy Cole 09/08/2019 11:38 AM

## 2019-09-09 ENCOUNTER — APPOINTMENT (OUTPATIENT)
Dept: GENERAL RADIOLOGY | Facility: CLINIC | Age: 84
DRG: 224 | End: 2019-09-09
Payer: MEDICARE

## 2019-09-09 ENCOUNTER — APPOINTMENT (OUTPATIENT)
Dept: OCCUPATIONAL THERAPY | Facility: CLINIC | Age: 84
DRG: 224 | End: 2019-09-09
Payer: MEDICARE

## 2019-09-09 DIAGNOSIS — J43.2 CENTRILOBULAR EMPHYSEMA (H): Primary | ICD-10-CM

## 2019-09-09 LAB
ANION GAP SERPL CALCULATED.3IONS-SCNC: 7 MMOL/L (ref 3–14)
BACTERIA SPEC CULT: ABNORMAL
BACTERIA SPEC CULT: ABNORMAL
BASOPHILS # BLD AUTO: 0 10E9/L (ref 0–0.2)
BASOPHILS NFR BLD AUTO: 0.2 %
BUN SERPL-MCNC: 54 MG/DL (ref 7–30)
CALCIUM SERPL-MCNC: 8.7 MG/DL (ref 8.5–10.1)
CHLORIDE SERPL-SCNC: 103 MMOL/L (ref 94–109)
CO2 SERPL-SCNC: 28 MMOL/L (ref 20–32)
COPATH REPORT: NORMAL
CREAT SERPL-MCNC: 1.19 MG/DL (ref 0.66–1.25)
DIFFERENTIAL METHOD BLD: ABNORMAL
EOSINOPHIL # BLD AUTO: 0.1 10E9/L (ref 0–0.7)
EOSINOPHIL NFR BLD AUTO: 0.4 %
ERYTHROCYTE [DISTWIDTH] IN BLOOD BY AUTOMATED COUNT: 20.5 % (ref 10–15)
ERYTHROCYTE [DISTWIDTH] IN BLOOD BY AUTOMATED COUNT: 20.6 % (ref 10–15)
GFR SERPL CREATININE-BSD FRML MDRD: 56 ML/MIN/{1.73_M2}
GLUCOSE BLDC GLUCOMTR-MCNC: 148 MG/DL (ref 70–99)
GLUCOSE BLDC GLUCOMTR-MCNC: 185 MG/DL (ref 70–99)
GLUCOSE BLDC GLUCOMTR-MCNC: 194 MG/DL (ref 70–99)
GLUCOSE BLDC GLUCOMTR-MCNC: 269 MG/DL (ref 70–99)
GLUCOSE BLDC GLUCOMTR-MCNC: 300 MG/DL (ref 70–99)
GLUCOSE SERPL-MCNC: 166 MG/DL (ref 70–99)
HCT VFR BLD AUTO: 26.1 % (ref 40–53)
HCT VFR BLD AUTO: 26.5 % (ref 40–53)
HGB BLD-MCNC: 7.7 G/DL (ref 13.3–17.7)
HGB BLD-MCNC: 7.8 G/DL (ref 13.3–17.7)
IMM GRANULOCYTES # BLD: 0.2 10E9/L (ref 0–0.4)
IMM GRANULOCYTES NFR BLD: 1 %
LACTATE BLD-SCNC: 2.2 MMOL/L (ref 0.7–2)
LYMPHOCYTES # BLD AUTO: 1 10E9/L (ref 0.8–5.3)
LYMPHOCYTES NFR BLD AUTO: 5.6 %
MCH RBC QN AUTO: 30.6 PG (ref 26.5–33)
MCH RBC QN AUTO: 30.7 PG (ref 26.5–33)
MCHC RBC AUTO-ENTMCNC: 29.4 G/DL (ref 31.5–36.5)
MCHC RBC AUTO-ENTMCNC: 29.5 G/DL (ref 31.5–36.5)
MCV RBC AUTO: 104 FL (ref 78–100)
MCV RBC AUTO: 104 FL (ref 78–100)
MONOCYTES # BLD AUTO: 1.3 10E9/L (ref 0–1.3)
MONOCYTES NFR BLD AUTO: 7.8 %
NEUTROPHILS # BLD AUTO: 14.6 10E9/L (ref 1.6–8.3)
NEUTROPHILS NFR BLD AUTO: 85 %
NRBC # BLD AUTO: 0 10*3/UL
NRBC BLD AUTO-RTO: 0 /100
PLATELET # BLD AUTO: 178 10E9/L (ref 150–450)
PLATELET # BLD AUTO: 191 10E9/L (ref 150–450)
POTASSIUM SERPL-SCNC: 4.2 MMOL/L (ref 3.4–5.3)
POTASSIUM SERPL-SCNC: 4.3 MMOL/L (ref 3.4–5.3)
PROCALCITONIN SERPL-MCNC: 0.3 NG/ML
RBC # BLD AUTO: 2.52 10E12/L (ref 4.4–5.9)
RBC # BLD AUTO: 2.54 10E12/L (ref 4.4–5.9)
SODIUM SERPL-SCNC: 138 MMOL/L (ref 133–144)
SPECIMEN SOURCE: ABNORMAL
WBC # BLD AUTO: 17.1 10E9/L (ref 4–11)
WBC # BLD AUTO: 17.8 10E9/L (ref 4–11)

## 2019-09-09 PROCEDURE — 94667 MNPJ CHEST WALL 1ST: CPT

## 2019-09-09 PROCEDURE — 87040 BLOOD CULTURE FOR BACTERIA: CPT | Performed by: STUDENT IN AN ORGANIZED HEALTH CARE EDUCATION/TRAINING PROGRAM

## 2019-09-09 PROCEDURE — 36592 COLLECT BLOOD FROM PICC: CPT | Performed by: PHYSICIAN ASSISTANT

## 2019-09-09 PROCEDURE — 27210429 ZZH NUTRITION PRODUCT INTERMEDIATE LITER

## 2019-09-09 PROCEDURE — 94668 MNPJ CHEST WALL SBSQ: CPT

## 2019-09-09 PROCEDURE — 25000125 ZZHC RX 250

## 2019-09-09 PROCEDURE — 85027 COMPLETE CBC AUTOMATED: CPT | Performed by: PHYSICIAN ASSISTANT

## 2019-09-09 PROCEDURE — 83605 ASSAY OF LACTIC ACID: CPT

## 2019-09-09 PROCEDURE — 36415 COLL VENOUS BLD VENIPUNCTURE: CPT | Performed by: STUDENT IN AN ORGANIZED HEALTH CARE EDUCATION/TRAINING PROGRAM

## 2019-09-09 PROCEDURE — 25000132 ZZH RX MED GY IP 250 OP 250 PS 637: Mod: GY | Performed by: STUDENT IN AN ORGANIZED HEALTH CARE EDUCATION/TRAINING PROGRAM

## 2019-09-09 PROCEDURE — G0463 HOSPITAL OUTPT CLINIC VISIT: HCPCS

## 2019-09-09 PROCEDURE — 80048 BASIC METABOLIC PNL TOTAL CA: CPT | Performed by: PHYSICIAN ASSISTANT

## 2019-09-09 PROCEDURE — 25000125 ZZHC RX 250: Performed by: STUDENT IN AN ORGANIZED HEALTH CARE EDUCATION/TRAINING PROGRAM

## 2019-09-09 PROCEDURE — 84132 ASSAY OF SERUM POTASSIUM: CPT | Performed by: NURSE PRACTITIONER

## 2019-09-09 PROCEDURE — 71045 X-RAY EXAM CHEST 1 VIEW: CPT

## 2019-09-09 PROCEDURE — 40000275 ZZH STATISTIC RCP TIME EA 10 MIN

## 2019-09-09 PROCEDURE — 25000131 ZZH RX MED GY IP 250 OP 636 PS 637: Mod: GY | Performed by: STUDENT IN AN ORGANIZED HEALTH CARE EDUCATION/TRAINING PROGRAM

## 2019-09-09 PROCEDURE — 94640 AIRWAY INHALATION TREATMENT: CPT

## 2019-09-09 PROCEDURE — 40000962 ZZH STATISTIC CHRONIC DISEASE SPECIALIST RT CONSULT

## 2019-09-09 PROCEDURE — 25000128 H RX IP 250 OP 636: Performed by: STUDENT IN AN ORGANIZED HEALTH CARE EDUCATION/TRAINING PROGRAM

## 2019-09-09 PROCEDURE — 27211427 ZZ H AEROBIKA WITH MANOMETER

## 2019-09-09 PROCEDURE — 94799 UNLISTED PULMONARY SVC/PX: CPT

## 2019-09-09 PROCEDURE — 97110 THERAPEUTIC EXERCISES: CPT | Mod: GO

## 2019-09-09 PROCEDURE — 84145 PROCALCITONIN (PCT): CPT | Performed by: PHYSICIAN ASSISTANT

## 2019-09-09 PROCEDURE — 12000004 ZZH R&B IMCU UMMC

## 2019-09-09 PROCEDURE — 40000983 ZZH STATISTIC HFNC ADULT NON-CPAP

## 2019-09-09 PROCEDURE — 94640 AIRWAY INHALATION TREATMENT: CPT | Mod: 76

## 2019-09-09 PROCEDURE — 83605 ASSAY OF LACTIC ACID: CPT | Performed by: STUDENT IN AN ORGANIZED HEALTH CARE EDUCATION/TRAINING PROGRAM

## 2019-09-09 PROCEDURE — C9113 INJ PANTOPRAZOLE SODIUM, VIA: HCPCS

## 2019-09-09 PROCEDURE — 25000132 ZZH RX MED GY IP 250 OP 250 PS 637: Mod: GY | Performed by: INTERNAL MEDICINE

## 2019-09-09 PROCEDURE — 36592 COLLECT BLOOD FROM PICC: CPT | Performed by: NURSE PRACTITIONER

## 2019-09-09 PROCEDURE — 97530 THERAPEUTIC ACTIVITIES: CPT | Mod: GO

## 2019-09-09 PROCEDURE — 00000146 ZZHCL STATISTIC GLUCOSE BY METER IP

## 2019-09-09 PROCEDURE — 85025 COMPLETE CBC W/AUTO DIFF WBC: CPT | Performed by: STUDENT IN AN ORGANIZED HEALTH CARE EDUCATION/TRAINING PROGRAM

## 2019-09-09 PROCEDURE — 25000128 H RX IP 250 OP 636

## 2019-09-09 PROCEDURE — 40000802 ZZH SITE CHECK

## 2019-09-09 PROCEDURE — 25000132 ZZH RX MED GY IP 250 OP 250 PS 637: Mod: GY | Performed by: NURSE PRACTITIONER

## 2019-09-09 PROCEDURE — 40000989 ZZH STATISTIC CHRONIC PULMONARY DISEASE SPECIALIST

## 2019-09-09 RX ORDER — FERROUS SULFATE 325(65) MG
325 TABLET ORAL
Status: DISCONTINUED | OUTPATIENT
Start: 2019-09-10 | End: 2019-09-19 | Stop reason: HOSPADM

## 2019-09-09 RX ORDER — ALBUTEROL SULFATE 90 UG/1
2 AEROSOL, METERED RESPIRATORY (INHALATION) EVERY 6 HOURS PRN
Status: DISCONTINUED | OUTPATIENT
Start: 2019-09-09 | End: 2019-09-19 | Stop reason: HOSPADM

## 2019-09-09 RX ADMIN — FUROSEMIDE 60 MG: 10 INJECTION, SOLUTION INTRAVENOUS at 17:54

## 2019-09-09 RX ADMIN — Medication 5 ML: at 20:11

## 2019-09-09 RX ADMIN — FUROSEMIDE 60 MG: 10 INJECTION, SOLUTION INTRAVENOUS at 05:58

## 2019-09-09 RX ADMIN — IPRATROPIUM BROMIDE AND ALBUTEROL SULFATE 3 ML: .5; 3 SOLUTION RESPIRATORY (INHALATION) at 12:11

## 2019-09-09 RX ADMIN — UMECLIDINIUM BROMIDE AND VILANTEROL TRIFENATATE 1 PUFF: 62.5; 25 POWDER RESPIRATORY (INHALATION) at 15:16

## 2019-09-09 RX ADMIN — Medication 5 ML: at 04:54

## 2019-09-09 RX ADMIN — ASPIRIN 81 MG CHEWABLE TABLET 81 MG: 81 TABLET CHEWABLE at 08:34

## 2019-09-09 RX ADMIN — IPRATROPIUM BROMIDE AND ALBUTEROL SULFATE 3 ML: .5; 3 SOLUTION RESPIRATORY (INHALATION) at 00:25

## 2019-09-09 RX ADMIN — Medication 5 ML: at 22:38

## 2019-09-09 RX ADMIN — Medication 12.5 MG: at 08:34

## 2019-09-09 RX ADMIN — CLOPIDOGREL BISULFATE 75 MG: 75 TABLET, FILM COATED ORAL at 08:34

## 2019-09-09 RX ADMIN — PANTOPRAZOLE SODIUM 40 MG: 40 INJECTION, POWDER, FOR SOLUTION INTRAVENOUS at 08:34

## 2019-09-09 RX ADMIN — CEFTRIAXONE SODIUM 2 G: 2 INJECTION, POWDER, FOR SOLUTION INTRAMUSCULAR; INTRAVENOUS at 09:37

## 2019-09-09 RX ADMIN — ACETYLCYSTEINE 2 ML: 200 SOLUTION ORAL; RESPIRATORY (INHALATION) at 03:31

## 2019-09-09 RX ADMIN — IPRATROPIUM BROMIDE AND ALBUTEROL SULFATE 3 ML: .5; 3 SOLUTION RESPIRATORY (INHALATION) at 20:08

## 2019-09-09 RX ADMIN — IPRATROPIUM BROMIDE AND ALBUTEROL SULFATE 3 ML: .5; 3 SOLUTION RESPIRATORY (INHALATION) at 15:36

## 2019-09-09 RX ADMIN — ACETYLCYSTEINE 4 ML: 200 SOLUTION ORAL; RESPIRATORY (INHALATION) at 08:48

## 2019-09-09 RX ADMIN — SODIUM CHLORIDE, PRESERVATIVE FREE 10 ML: 5 INJECTION INTRAVENOUS at 17:54

## 2019-09-09 RX ADMIN — ACETYLCYSTEINE 2 ML: 200 SOLUTION ORAL; RESPIRATORY (INHALATION) at 00:25

## 2019-09-09 RX ADMIN — IPRATROPIUM BROMIDE AND ALBUTEROL SULFATE 3 ML: .5; 3 SOLUTION RESPIRATORY (INHALATION) at 08:48

## 2019-09-09 RX ADMIN — PANTOPRAZOLE SODIUM 40 MG: 40 INJECTION, POWDER, FOR SOLUTION INTRAVENOUS at 19:39

## 2019-09-09 RX ADMIN — IPRATROPIUM BROMIDE AND ALBUTEROL SULFATE 3 ML: .5; 3 SOLUTION RESPIRATORY (INHALATION) at 03:31

## 2019-09-09 RX ADMIN — ATORVASTATIN CALCIUM 40 MG: 40 TABLET, FILM COATED ORAL at 19:39

## 2019-09-09 RX ADMIN — MULTIVIT AND MINERALS-FERROUS GLUCONATE 9 MG IRON/15 ML ORAL LIQUID 15 ML: at 08:33

## 2019-09-09 RX ADMIN — ACETYLCYSTEINE 4 ML: 200 SOLUTION ORAL; RESPIRATORY (INHALATION) at 20:08

## 2019-09-09 RX ADMIN — MOMETASONE FUROATE 2 PUFF: 220 INHALANT RESPIRATORY (INHALATION) at 19:39

## 2019-09-09 RX ADMIN — ACETYLCYSTEINE 2 ML: 200 SOLUTION ORAL; RESPIRATORY (INHALATION) at 12:11

## 2019-09-09 RX ADMIN — ACETYLCYSTEINE 2 ML: 200 SOLUTION ORAL; RESPIRATORY (INHALATION) at 15:36

## 2019-09-09 RX ADMIN — PREDNISONE 20 MG: 20 TABLET ORAL at 08:34

## 2019-09-09 RX ADMIN — Medication 1 PACKET: at 08:36

## 2019-09-09 ASSESSMENT — ACTIVITIES OF DAILY LIVING (ADL)
ADLS_ACUITY_SCORE: 16
ADLS_ACUITY_SCORE: 17

## 2019-09-09 ASSESSMENT — MIFFLIN-ST. JEOR: SCORE: 1469.37

## 2019-09-09 NOTE — PLAN OF CARE
Neuro: A&Ox4.   Cardiac: AFib. VSS.   Respiratory: Sating 90% on 60% FiO2 via HighFlow.  GI/: Adequate urine output. BM X0  Diet/appetite: Tolerating diet and feeds @ 60cc/hr - cycled [id number]. Eating well.  Activity:  Assist of 1 and walker - up to chair.  Pain: At acceptable level on current regimen.   Skin: See documentation - new pressure ulcer BONIFACIO pineda  LDA's: [place] - Mcclelland    Plan: Continue with POC. Notify primary team with changes.

## 2019-09-09 NOTE — PROGRESS NOTES
Neuro: A&Ox4.   Cardiac: Afib. VSS.   Respiratory: Maintaining > 88% 02 sats on 60% High Flow NC.  Unable to titrate down 02 further overnight.   GI/: Adequate urine output through silva. Urine remains clear cherry/pink in color. No BM.  Diet/appetite: Tolerating TF well  Overnight (60cc/hr with Q4hr 60cc FWF), and po fluids. On Sonu Counts.   Activity:  Assist x1 with walker when up to chair, commode, and in halls.  Pain: Denies   Skin:  No new deficits noted. Gross ecchymosis, skin alterations as noted in Epic.  No new deficits revealed.   LDA's:  DL PICC, HARSHAD Silva    Plan: Continue with POC. Notify primary team with changes.

## 2019-09-09 NOTE — CONSULTS
Chronic Pulmonary Disease Specialist Consult   COPD Initial Interview    2019    Patient: José Aguirre      :  1935                    MRN:[id number]      Reason for Consult:  Patient w/COPD and heart failure, cardiac arrest with CAD. Being treated for COPD exacerbation, high oxygen demands. Please assist in his COPD cares.      History of Present Illness: José Aguirre is a 83 year old male who was admitted on 2019 with a cardiac arrest. 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      Patients Last PFT on 19  DESCRIPTION OF STUDY:  FEV1/FVC is 2.3/3.7 (92/102%).  FEV1/FVC is low at 63%.  DLCO is 27% of predicted.   ASSESSMENT:  Mild obstructive ventilatory defect with normal lung volumes and a marked defect in the diffusion capacity, most consistent with emphysema.    [facility] 'the triple' medication but the [facility] doesn't have it so he takes 'the other 3'.  Patient unable confirm identify of medications based on pictures.   The only medication patient has in chart is Anoro Ellipta which the patient does not recognize when shown a picture.     Patient states that prior to his arrest and this admission, he was [facility]. He uses 1/2 the dose when ambulating the schreiber to make it last longer. Patient does not endorse much shortness of breath during the day with activity.     Home respiratory medications Include:  -Unable to ascertain which maintenance inhalers patient uses. When shown patient an albuterol rescue inhaler, he seemed to recognize it.  **Located patient chart under duplicate medical record which confirms previously prescribed respiratory medications. Patients former wife  called me as well and confirmed which medications he has at home  -Asmanex  mcg - (ICS- FDA only approved for asthma)  -Stiolto Respimat - (LABA/LAMA)  -Anoro Ellipta -(LABA/LAMA) Duplicate medication; same as Stiolto  -Albuterol HFA    Assessment: Patient on 70% FiO2, 30LPM with sats between 88%-95% throughout consult. HR 92, /52, RR 16     Action:     -Evaluated patients inspiratory strength using In-Check device: Patient able to generate sufficient inspiratory flow for adequate drug deposition. 90 LPM Initially generated excessive insp flow; educated on the proper/sufficient insp flow needed for optimal drug deposition.    -Evaluated patients coordination and technique with inhaler: Patient demonstrates good technique with albuterol inhaler in conjunction with a spacer.     -Patient able to generate a pressure of 20 cm H2O on Aerobika OPEP device for 2-3 seconds.The goal for each breath, for a total of 3 sets of 10 breaths, is to exhale at 10-20 of pressure for 3-4 seconds without fatigue.      Recommendations:    -Continue with current inpatient respiratory medication schedule.    -Switch to using Breath Actuated Nebulizer ([facility] BAN Neb) instead of Aerogen for neb treatments during the day.     -Discharge with previous prescriptions of Asmanex (recommend this medication be reconciled as it is not approved for COPD), Stiolto Respimat, and Albuterol HFA as patient gets these [facility]s [facility]. Discontinue Anoro Ellipta    -Overnight oximetry 24-48 hours prior to discharge to assess for nocturnal hypoxia    -Establish care with a Pulmonologist and get complete PFT's    -Use Aerobika Oscillating PEP Device for 3 sets of 10 breaths two times daily. Perform 2 to 3 'elise coughs'  to clear airway after each set. May use device with or without nebs. Consistent use of this device will help open smaller airways,improve mucus clearance, decrease cough frequency, and improve exercise tolerance    -Use  "Aerochamber with MDI's for better drug deposition.    -Patient needs continued reinforcement and continued education on inhaler use and breath recovery techniques      -Medication Therapy Management Referral as outpatient to reinforce patient on the proper use of inhalers, nebulizer's, and other home medications    -PT/OT consult to assess safety with returning home, functional abilities and limitations, home care needs    -Referral for outpatient pulmonary/cardiac Rehab    -Patient needs nebulizer compressor at discharge with prescription for tubing, cups and mask.     -Home Oxygen Assessment (Walk test) 24-48 hours prior to discharge to determine if O2 needs have increased at rest and with exertion/activity. (*If patient requires oxygen-please include in the DME order: \"Please provide portable oxygen concentrator AND test for oxygen conserving device using -LPM to maintain sats between )      Will continue to follow and support patient as needed. Will follow up with phone call 48 hours after discharge.     I spent 60 minutes with the patient.    Ana Gallo, RRT, CTTS  Chronic Pulmonary Disease Specialist  Office: [phone]  Pager: [phone]        "

## 2019-09-09 NOTE — PROGRESS NOTES
CLINICAL NUTRITION SERVICES- Brief Note    RD was notified that bridle was pressing on nare due to oxygen mask creating a sore. Writer contacted Dietitians who place bridles- not available to come reposition.     Writer inspected area with charge nurse and adjusted positing of feeding tube and oxgen.     Geeta Palm RD, LD  6B pager: [phone]

## 2019-09-09 NOTE — PLAN OF CARE
SLP: Pt refused PO trials on multiple attempts saying he had recently eaten on both attempts and was not hungry. Will continue to follow per POC.

## 2019-09-09 NOTE — PROGRESS NOTES
Calorie Count  Intake recorded for: 9/8  Total Kcals: 1069 Total Protein: 63g  Kcals from Hospital Food: 1069  Protein: 63g  Kcals from Outside Food (average):0 Protein: 0g  # Meals Recorded: 3 meals (First - 100% milk, ice cream, 90% peaches, 33% scrambled eggs)      (Second - 100% pudding, milk, roast beef & mashed potatoes w/ gravy)      (Third - 100% milk, peaches, jello, 25% egg noodles w/ chicken luis, carrots)  # Supplements Recorded: 0

## 2019-09-09 NOTE — PROGRESS NOTES
"  Interventional Cardiology Progress Note    Interval History:  - No acute events overnight  - Patient given 60mL IV Lasix, net negative 400mL  - Remains on HFNC 30L 60% FiO2 with O2 sats above 90%  - Patient denies SOB, but also reports he has not been out of bed much  - Patient denies chest pain, lightheadedness, dizziness    Most recent vital signs:  /67 (BP Location: Left arm)   Pulse 82   Temp 98.2  F (36.8  C) (Oral)   Resp 16   Ht 1.727 m (5' 7.99\")   Wt 80 kg (176 lb 5.9 oz)   SpO2 97%   BMI 26.82 kg/m    Temp:  [97.6  F (36.4  C)-99.2  F (37.3  C)] 98.2  F (36.8  C)  Pulse:  [82-84] 82  Heart Rate:  [78-94] 79  Resp:  [16-20] 16  BP: (101-120)/(50-67) 120/67  FiO2 (%):  [50 %-80 %] 60 %  SpO2:  [88 %-100 %] 97 %  Wt Readings from Last 2 Encounters:   09/09/19 80 kg (176 lb 5.9 oz)       Intake/Output Summary (Last 24 hours) at 9/9/2019 0902  Last data filed at 9/9/2019 0700  Gross per 24 hour   Intake 2810 ml   Output 3865 ml   Net -1055 ml     Physical exam:  General: In bed, in NAD  HEENT: EOMI, PERRLA, no scleral icterus or injection  CARDIAC: RRR, no m/r/g appreciated. Peripheral pulses 2+  RESP: CTAB, no wheezes, rhonchi or crackles appreciated.  GI: NABS, NT/ND, no guarding or rebound  : Mcclelland in place  EXTREMITIES: NO LE edema, pulses 2+. Femoral access site w/o bleeding, dressing c/d/i. No bruits appreciated.   SKIN: No acute lesions appreciated  NEURO: Alert and oriented X3, CN II-XII grossly intact, no focal neurological deficits noted    Labs (Past three days):  CBC  Recent Labs   Lab 09/09/19  0501 09/07/19  1600 09/07/19  0352 09/06/19  1612   WBC 17.8* 19.3* 15.8* 16.8*   RBC 2.52* 2.80* 2.57* 2.76*   HGB 7.7* 8.3* 7.6* 8.3*   HCT 26.1* 28.4* 25.9* 28.2*   * 101* 101* 102*   MCH 30.6 29.6 29.6 30.1   MCHC 29.5* 29.2* 29.3* 29.4*   RDW 20.5* 19.5* 19.2* 19.7*    184 156 149*     BMP  Recent Labs   Lab 09/09/19  0501 09/09/19  0136 09/08/19  1818 09/08/19  0422 " 09/07/19  1600 09/07/19  0352 09/06/19  1612     --   --  138 138 139 140   POTASSIUM 4.2 4.3 2.8* 3.8 4.4 3.8 3.9   CHLORIDE 103  --   --  102 103 105 107   CO2 28  --   --  25 26 28 26   ANIONGAP 7  --   --  11 9 7 6   *  --   --  189* 241* 149* 144*   BUN 54*  --   --  47* 43* 43* 38*   CR 1.19  --   --  1.15 1.14 1.01 0.89   GFRESTIMATED 56*  --   --  58* 59* 68 79   GFRESTBLACK 65  --   --  67 68 79 >90   YOON 8.7  --   --  8.1* 8.2* 8.1* 8.2*   MAG  --   --   --  2.1 2.0 2.1 2.1   PHOS  --   --   --  3.2 3.2 3.4 2.6     Troponins: No results found for: TROPI, TROPONIN, TROPR, TROPN     INRNo lab results found in last 7 days.  Liver panel  Recent Labs   Lab 09/08/19  0422 09/07/19  1600 09/07/19  0352 09/06/19  1612   PROTTOTAL 5.9* 6.4* 5.6* 6.0*   ALBUMIN 2.3* 2.4* 2.2* 2.4*   BILITOTAL 0.7 0.7 0.8 0.6   ALKPHOS 60 63 54 52   AST 20 21 25 25   ALT 29 33 31 38       Imaging/procedure results:   Echocardiogram 8/28/19:  Interpretation Summary  Technically difficult study.  Left ventricular function is normal or even hyperdynamic in some views.The EF  is 60-70%. No regional wall motion abnormalities aer seen.  RV appears dilated with probably mildly reduced function on limited views.  No significant valve disease.     There is no prior study for direct comparison.    Assessment & Plan:  José Aguirre is an 83 year old male with a PMHx of PVD with renal artery stenosis s/p stenting in 2013,occluded R carotid artery and s/p left carotid endarterectomy, left subclavian stenosis, CAD s/p CABG with L-LAD, S-D1 and OM2, PDA), COPD (on 2-3L home O2), and HLD who was admitted on 8/27/2019 with a VT/VF arrest with ROSC in the field. Patient was found to have disease in the LM into pLAD s/p PCI to LM->LAD and ost LCx.     #VT/VF arrest with ROSC prior to arrival  #CAD status post PCI to native LM into the LAD and ostial LCX  #History of CABG '03 (LIMA-LAD, SVG-D1, SVG-OM2, SVG-PDA)  8/27/19 admission after OOH  cardiac arrest. He was at the [facility] and had a witnessed collapse. Bystander CPR was started and EMS found him in VT/VF. He was defibrillated X1 and given 1 of epi. ROSC was obtained in field. On arrival here, he had a pulse and was hypotensive, was intubated, taken for emergent coronary angiogram which showed severe 3V native vessel CAD with LM/LAD lesion and  of proximal RCA with L to right collaterals. LIMA-LAD graft minimally diseased but small and primarily retrograde flow through native LAD. SVG-RCA chronically closed at anastomosis, SVG-D1 and SBG-OM2 closed. He underwent PCI to dLM/ostial LAD with CONCETTA and balloon angioplasty of ostial LCx. He has preserved LV EF with mildly reduced RV function. Though he did have CAD and was stented, felt his burden of CAD was unlikely to be the source of his arrest. EP aware of patient and plan is for ICD before discharge.     -Continue aspirin and plavix   -Continue lipitor 40 mg daily   -Continue Toprol XL 12.5 mg   -EP following, appreciate recommendations.  Will touch base with EP team today about timing of ICD.   - Pt will need follow up with Dr. Osorio in 2-4 weeks after discharge for post cardiac arrest follow up.     #Hypoxia, multifactorial   #COPD exacerbation   #Pulmonary edema, history pleural effusion status post thoracentesis on left, residual small bilateral pleural effusions  Patient on 2-3L O2 at home for COPD. Status post arrest with significant pulmonary edema and COPD exacerbation requiring HFNC and BiPAP. Patient with left pleural effusion status post thora on 9/5 on left and has residual unchanged, small bilateral effusions. Patient being aggressively diuresed with 60 IV Lasix q12, making >2L UOP daily. Volume in setting of aggressive resuscitation. Atelectasis in the setting of rib fracture pain after CPR.     - RT COPD consult, appreciate their assistance   - Prednisone burst/taper  - S/P Zosyn, Rocephin until 9/10/19 for a total of 14 days  abx therapy  - Frequent pulmonary toilet/physiotherapy   - Nebs, inhalers   - Encourage IS (difficult 2/2 chest wall pain though this is improving)   - Fluid restriction, I/O's. Would continue to diurese until bump in creat then transition to orals.   - Continue to wean O2 as able, O2 sat goal >88%     # Chest wall pain  # Rib fracture   Patient states pain is improving overall. He is not requiring any narcotics and would tend to avoid as he did have witnessed probably delirium in the ICU manifesting as seizure like activity without seizures on EEG. Moreover component of respiratory depression with this. Patient tolerating chest physiotherapy and feeling well in this regard.   - Prn Tylenol for mild-mod pain     # Traumatic Silva   Patient does straight cath at home. Traumatic silva iatrogenic, will continue with indwelling silva for strict I/O and to maintain patency in setting of injury and inflammation   -Continue with silva for now      # PVD with renal artery stenosis s/p stenting in 2013   # occluded R carotid artery  #s/p left carotid endarterectomy  # Hx left subclavian stenosis  - Continue aspirin and plavix     Lines in Place:  PICC RUE: 8/29/19  Silva catheter: 8/27/19  Current lines are required for patient management    Prophylaxis:   GI: Protonix  DVT: SCD's, subcutaneous heparin    Diet: DD2 with thin liquids, cycled TF at night  Activity: Up as tolerated, qshift  Code Status: Full  Disposition: Likely require TCU vs ARU, possible later this week pending O2 requirements, ICD timing    Patient discussed w/ Dr. Osorio who agrees with plan as outlined above.      Kailyn Bruce, APRN, CNP  [facility]  Interventional Cardiology  [phone]

## 2019-09-09 NOTE — DISCHARGE SUMMARY
[address] [address]  p: [phone]    Discharge Summary: Cardiology Service    José Aguirre MRN# [id number]   YOB: 1935 Age: 83 year old       Admission Date: 8/27/2019  Discharge Date: 09/19/2019    Discharge Diagnoses:  1. VT/VF arrest  2. CAD s/p PCI to LM, LAD, oLCX  3. History of CABG (LIMA-LAD, SVG-OM1, SVG-PDA)  4. Hypoxia and acute respiratory failure.  5. COPD Exacerbation  6. Pulmonary edema, history of pleural effusion s/p left thoracentesis  7. Chest wall pain  8. Rib Fracture  9. Traumatic Mcclelland  10. Chronic Intermittent Straight Cath  11. PVD with renal artery stenosis s/p stent 2013  12. Occluded R) carotid artery  13. S/p left carotid endarterectomy  14. History of L) subclavian stenosis    Pertinent Procedures:  1. Coronary Angiogram 8/27/19  2. Thoracentesis 9/5/19  3. ICD 9/10/19    Consults:  Electrophysiology  Pulmonology  COPD RT Specialist  Interventional Radiology  Speech Therapy  PT/OT  SW/RN Care Coordinators    Imaging with results:  CXR 9/17/2019  IMPRESSION:   1. Interval removal of right upper extremity PICC with no evidence of  retained catheter.  2. Unchanged pulmonary edema   3. Unchanged bibasilar atelectasis versus consolidation.    Echocardiogram 8/28/19:  Interpretation Summary  Technically difficult study.  Left ventricular function is normal or even hyperdynamic in some views.The EF  is 60-70%. No regional wall motion abnormalities aer seen.  RV appears dilated with probably mildly reduced function on limited views.  No significant valve disease.     There is no prior study for direct comparison.    Coronary Angiogram 8/27/19:  Unknown male brought in from [facility] with VT/VF arrest in the field, achieved ROSC en route, here for angiogram +/- PCI   Pre-procedure Diagnosis     OOH Cardiac arrest    Post-procedure Diagnosis     Severe multi-vessel CAD s/p LM->LAD CONCETTA x 2      Conclusion      Severe 3-vessel coronary artery disease as described below with severe LM/LAD lesion and  of the proximal RCA with L->R collaterals    LIMA-LAD graft is minimally diseased but small, with primarily retrograde flow through native LAD    SVG-RCA graft is chronically closed at the aortic anastamosis. SVG-D1 could not be selectively injected but appears closed due to dye hangup during injection of the native coronaries. There may also be a closed graft to OM2 but this is not certain    Successful PCI of the distal LM/ostial LAD with 2 Synergy CONCETTA (4.0 x 12 and 3.5 x 8 from proximal to distal)    Successful balloon angioplasty of the ostial LCx    Successful Thermagard placement in the RFV            Coronary Findings   Diagnostic   Dominance: Left   Left Main   Dist LM to Ost LAD lesion is 70% stenosed.   Left Anterior Descending   First Diagonal Branch   Ost 1st Diag lesion is 50% stenosed.   Left Circumflex   There appears to be a missing obtuse marginal somewhere after OM1   Ost Cx lesion is 40% stenosed.   Mid Cx lesion is 50% stenosed.   First Obtuse Marginal Branch   The vessel is small.   Second Left Posterolateral Branch   The vessel is small.   2nd LPL lesion is 60% stenosed.   Right Coronary Artery   The vessel is small.   Prox RCA lesion is 100% stenosed. The lesion is chronic total occlusion.   Right Posterior Descending Artery   Collaterals   RPDA filled by collaterals from 1st Sept.      LIMA Graft to Mid LAD   The graft is small. The graft is angiographically normal. Small LIMA without significant disease; predominantly retrograde filling   Vein Graft to Ost RPDA   Ostial stump   Origin lesion is 100% stenosed.   Graft to 1st Diag   The flow in the graft is reversed. Unable to locate aortic vein graft takeoff, but fills retrograde through diagonal with dye hangup consistent with stagnant flow and thrombus   Origin lesion is 100% stenosed.     Intervention   Dist LM to Ost LAD lesion   Stent   The  pre-interventional distal flow is normal (JOEL 3). A stent was successfully placed. The post-interventional distal flow is normal (JOEL 3). Using a 6F XB 3.5 guide, the LAD and LCx were wired with separate Camilo Blue wires. The left main lesion was then directly stented into the LAD with a 4.0 x 12 Synergy CONCETTA. The LCx was then re-wired with a new Camilo Blue wire and the trapped LCx wire was removed. The LCx was then ballooned through the stent struts with a 2.5 pre-dil balloon and kissing inflation was performed with a 2.5 Emerge in the LCx and a 3.5 Emerge in the LM/LAD. Repeat angiography showed poor expansion of the ostial LCx, so it was re-ballooned with a 3.0 x 6 Angiosculpt scoring balloon. Angiography after this showed improvement in the LCx lesion but did reveal a distal edge dissection beyond the LM/LAD stent. As a result, a 3.5 x 8 Synergy CONCETTA was deployed distally overlapping with the original stent. Final angiography revealed excellent stent expansion, JOEL III flow, no further edge dissection, and no other evidence of complication.   There is a 10% residual stenosis post intervention.   Ost Cx lesion   Angioplasty   See LM/LAD PCI comments   There is a 20% residual stenosis post intervention.     Brief HPI:  José Aguirre is an 83 year old male with a PMHx of PVD with renal artery stenosis s/p stenting in 2013,occluded R carotid artery and s/p left carotid endarterectomy, left subclavian stenosis, CAD s/p CABG with L-LAD, S-D1 and OM2, PDA), COPD (on 2-3L home O2), and HLD who was admitted on 8/27/2019 with a VT/VF arrest with ROSC in the field. Patient was found to have disease in the LM into pLAD s/p PCI to LM->LAD and ost LCx.     Hospital Course by Diagnosis:  #VT/VF arrest with ROSC prior to arrival  #CAD status post PCI to native LM into the LAD and ostial LCX  #History of CABG '03 (LIMA-LAD, SVG-D1, SVG-OM2, SVG-PDA)  8/27/19 admission after OOH cardiac arrest. He was at the [facility] and had a  witnessed collapse. Bystander CPR was started and EMS found him in VT/VF. He was defibrillated X1 and given 1 of epi. ROSC was obtained in field, no ECMO. On arrival, he had a pulse and was hypotensive, was intubated, taken for emergent coronary angiogram which showed severe 3V native vessel CAD with LM/LAD lesion and  of proximal RCA with L to right collaterals. LIMA-LAD graft minimally diseased but small and primarily retrograde flow through native LAD. SVG-RCA chronically closed at anastomosis, SVG-D1 and SVG-OM2 closed. He underwent PCI to dLM/ostial LAD with CONCETTA and balloon angioplasty of ostial LCx. He has preserved LV EF with mildly reduced RV function. Though he did have CAD and was stented, felt his burden of CAD was unlikely to be the source of his arrest. Received ICD 9/10/2019 for secondary prevention. PICC removed 9/17. Of note, PICC on removal was noted to be 1cm less than documented insertion size. D/w PICC team who stated that it was most likely a mis-measure on insertion as the tip was a clean cut. CXR did not demonstrate any retained fragments of the PICC line.    -Continue aspirin and plavix   -Continue lipitor 40 mg daily   -Continue Toprol XL 12.5 mg   - s/p IV diuresis. Weight at discharge:165 pounds. Could consider additional 1mg bumex daily PRN for weight gain of 2 or more pounds in 24h.  - Follow up with Dr. Osorio in 4 weeks after discharge for post cardiac arrest follow up.     #Hypoxia, multifactorial - improving  #COPD exacerbation - improving  #Pulmonary edema, history pleural effusion status post thoracentesis on left, residual small bilateral pleural effusions  Patient reportedly on 2-3L O2 at home for COPD. Status post arrest with significant pulmonary edema and COPD exacerbation requiring HFNC and BiPAP. Patient with left pleural effusion status post thora on 9/5 on left and has residual unchanged, small bilateral effusions. Patient was aggressively diuresed with 60 IV Lasix q12,  making >2L UOP daily, then PO bumex. Atelectasis in the setting of rib fracture pain after CPR contributing. Following ~20 pounds of diuresis (I/Os discordant), and treatment of COPD exacerbation, patient currently requiring 3-4L with rest and 8-9L with activity. Patient is below his admission weight. Suspect he is near his true dry weight, and as such, will only discharge with 1mg bumex daily. He should have a BMP checked in 3-5 days, and may need bumex discontinued if his creatinine or BUN/Cr ratio increases. It is unclear if 8-9L of oxygen with activity is his new/true baseline, but suspect this may continue to improve as he completes the steroid burst and continues treatment.      - RT COPD consult, appreciate their assistance   - Pulm consulted, recommended treatment for COPD exacerbation   - s/p Azithromycin 500mg x1 dose, then 250mg daily x4 doses   - Prednisone 40mg daily from 9/14-9/18 (total 5 doses)   - Prednisone 20mg daily from 9/19-9/23 (total 5 doses)  - S/P Zosyn, Rocephin through 9/10/19 for a total of 14 days abx therapy   - Nebs, inhalers   - Encourage IS at least 4 times daily  - O2 sat goal >88%  - OP Pulmonology Referral for OP COPD management/follow up in 6 weeks      # Chest wall pain, resolved  # Rib fracture   Patient states pain is improving overall. He is not requiring any narcotics and would tend to avoid as he did have witnessed probably delirium in the ICU manifesting as seizure like activity without seizures on EEG. Moreover component of respiratory depression with this. Patient tolerating chest physiotherapy and feeling well in this regard.   - Prn Tylenol for mild-mod pain     # Traumatic Silva   ## Chronic Intermittent Straight Catheterization  Patient does straight cath at home. Traumatic silva iatrogenic and subsequently silva was continued for IV diuresis as above.    - Silva removed 9/19  - resume PTA straight cathing     # PVD with renal artery stenosis s/p stenting in 2013    # occluded R carotid artery  #s/p left carotid endarterectomy  # Hx left subclavian stenosis  - Continue aspirin and plavix     Condition on discharge  Temp:  [98.1  F (36.7  C)-99.2  F (37.3  C)] 98.2  F (36.8  C)  Pulse:  [82-92] 92  Heart Rate:  [78-94] 83  Resp:  [16-20] 16  BP: (101-120)/(50-67) 101/52  FiO2 (%):  [60 %-70 %] 60 %  SpO2:  [88 %-98 %] 93 %  General: Alert, interactive, NAD, frail appearing   Eyes: sclera anicteric, EOMI  Neck: JVP WNL, carotid 2+ bilaterally  Cardiovascular: regular rate and rhythm, normal S1 and S2, no murmurs, gallops, or rubs  Resp: Rhonchorus lung sounds in upper fields, diminished at bases   GI: Soft, nontender, nondistended. +BS.  No HSM or masses, no rebound or guarding.  Extremities: No edema, no cyanosis or clubbing, dorsalis pedis and posterior tibialis pulses 2+ bilaterally  Skin: Warm and dry, no jaundice or rash  Neuro: CN 2-12 grossly intact  Psych: Alert & oriented x 3    Medication Changes:  Continue Plavix (x 1 year uninterrupted)  STOP Coreg  STOP Furosemide  START Metoprolol succinate 12.5 mg daily  START Bumex 1mg daily  START Prednisone 20mg daily (9/19-9/23/2019)  START Zinc Sulfate 220mg daily (last dose 9/23/2019)  START Vitamin A [id number] daily (last dose 9/23/2019)  START Pantoprazole 40mg daily    Discharge medications:   Current Discharge Medication List      START taking these medications    Details   acetylcysteine (MUCOMYST) 20 % neb solution Take 2 mLs by nebulization 4 times daily    Associated Diagnoses: Chronic obstructive pulmonary disease, unspecified COPD type (H)      albuterol (PROAIR HFA/PROVENTIL HFA/VENTOLIN HFA) 108 (90 Base) MCG/ACT inhaler Inhale 2 puffs into the lungs every 6 hours as needed for wheezing    Comments: Pharmacy may dispense brand covered by insurance (Proair, or proventil or ventolin or generic albuterol inhaler)  Associated Diagnoses: Chronic obstructive pulmonary disease, unspecified COPD type (H); Simple  chronic bronchitis (H)      Ascorbic Acid 500 MG CHEW Take 500 mg by mouth daily    Associated Diagnoses: Simple chronic bronchitis (H)      atorvastatin (LIPITOR) 40 MG tablet Take 1 tablet (40 mg) by mouth every evening    Associated Diagnoses: Cardiac arrest (H)      bumetanide (BUMEX) 1 MG tablet Take 1 tablet (1 mg) by mouth daily    Associated Diagnoses: Cardiac arrest (H)      !! insulin aspart (NOVOLOG PEN) 100 UNIT/ML pen Inject 1-10 Units Subcutaneous 3 times daily (before meals)    Associated Diagnoses: Cardiac arrest (H)      !! insulin aspart (NOVOLOG PEN) 100 UNIT/ML pen Inject 1-7 Units Subcutaneous At Bedtime    Associated Diagnoses: Cardiac arrest (H)      ipratropium - albuterol 0.5 mg/2.5 mg/3 mL (DUONEB) 0.5-2.5 (3) MG/3ML neb solution Take 1 vial (3 mLs) by nebulization 4 times daily    Associated Diagnoses: Chronic obstructive pulmonary disease, unspecified COPD type (H)      metoprolol succinate ER (TOPROL-XL) 25 MG 24 hr tablet Take 0.5 tablets (12.5 mg) by mouth daily    Associated Diagnoses: Cardiac arrest (H)      multivitamin w/minerals (THERA-VIT-M) tablet Take 1 tablet by mouth daily    Associated Diagnoses: Simple chronic bronchitis (H)      nitroGLYcerin (NITROSTAT) 0.4 MG sublingual tablet For chest pain place 1 tablet under the tongue every 5 minutes for 3 doses. If symptoms persist 5 minutes after 1st dose call 911.    Associated Diagnoses: Cardiac arrest (H)      pantoprazole (PROTONIX) 40 MG EC tablet Take 1 tablet (40 mg) by mouth 2 times daily (before meals)    Associated Diagnoses: Cardiac arrest (H)      !! predniSONE (DELTASONE) 20 MG tablet Take 1 tablet (20 mg) by mouth daily for 5 days    Associated Diagnoses: Chronic obstructive pulmonary disease, unspecified COPD type (H); Simple chronic bronchitis (H)      !! predniSONE (DELTASONE) 20 MG tablet Take 2 tablets (40 mg) by mouth daily for 1 day    Associated Diagnoses: Chronic obstructive pulmonary disease, unspecified  COPD type (H); Simple chronic bronchitis (H)      vitamin A 06678 units capsule Take 2 capsules (20,000 Units) by mouth daily    Associated Diagnoses: Simple chronic bronchitis (H)      zinc sulfate (ZINCATE) 220 (50 Zn) MG capsule Take 1 capsule (220 mg) by mouth daily    Associated Diagnoses: Simple chronic bronchitis (H)       !! - Potential duplicate medications found. Please discuss with provider.      CONTINUE these medications which have NOT CHANGED    Details   aspirin 81 MG EC tablet Take 81 mg by mouth daily      clopidogrel (PLAVIX) 75 MG tablet Take 75 mg by mouth daily      ferrous sulfate (FEROSUL) 325 (65 Fe) MG tablet Take 325 mg by mouth daily (with breakfast)      mometasone (ASMANEX) 220 MCG/INH inhaler Inhale 2 puffs into the lungs every evening      potassium chloride (KLOR-CON) 20 MEQ packet Take 20 mEq by mouth daily      umeclidinium-vilanterol (ANORO ELLIPTA) 62.5-25 MCG/INH oral inhaler Inhale 1 puff into the lungs daily      vitamin D3 (CHOLECALCIFEROL) 2000 units (50 mcg) tablet Take 2,000 Units by mouth daily         STOP taking these medications       furosemide (LASIX) 20 MG tablet Comments:   Reason for Stopping:             Labs or imaging requiring follow-up after discharge:  BMP within 3-5 days of discharge from hospital.    Follow-up:  - Follow up with PCP in 7-10 days following TCU discharge for post hospitalization follow up  - Follow up with electrophysiology in about 4 weeks regarding recent ICD placement  - Follow up with Dr. Osorio in Interventional Cardiology Clinic for post cardiac arrest follow up (message sent to scheduling) in about 4 weeks  - Follow up with Pulmonology in 3-6 weeks to establish care with COPD clinic. Pulmonology referral given    Code status:  Full    Santo Daly PA-C  [facility] Cardiology Team  Discussed the above with Dr. Horton, who agrees with the above plan.    Patient Care Team:  Clinic, [facility] Pickard as PCP - General  Ana Gallo, RT  as Chronic Pulmonary Disease Specialist (Pulmonary)    I have seen and examined the patient with the CSI team. I agree with the assessment and plan of the discharge summary note above.I have reviewed pertinent labs. More than 30 minutes were spent organizing the patient's discharge.    John Horton MD  Interventional Cardiology  Pager: [phone]

## 2019-09-10 ENCOUNTER — APPOINTMENT (OUTPATIENT)
Dept: SPEECH THERAPY | Facility: CLINIC | Age: 84
DRG: 224 | End: 2019-09-10
Payer: MEDICARE

## 2019-09-10 ENCOUNTER — APPOINTMENT (OUTPATIENT)
Dept: GENERAL RADIOLOGY | Facility: CLINIC | Age: 84
DRG: 224 | End: 2019-09-10
Attending: NURSE PRACTITIONER
Payer: MEDICARE

## 2019-09-10 LAB
ANION GAP SERPL CALCULATED.3IONS-SCNC: 10 MMOL/L (ref 3–14)
BACTERIA SPEC CULT: NO GROWTH
BUN SERPL-MCNC: 49 MG/DL (ref 7–30)
CALCIUM SERPL-MCNC: 8.2 MG/DL (ref 8.5–10.1)
CHLORIDE SERPL-SCNC: 102 MMOL/L (ref 94–109)
CO2 SERPL-SCNC: 26 MMOL/L (ref 20–32)
CREAT SERPL-MCNC: 1.22 MG/DL (ref 0.66–1.25)
ERYTHROCYTE [DISTWIDTH] IN BLOOD BY AUTOMATED COUNT: 20.8 % (ref 10–15)
GFR SERPL CREATININE-BSD FRML MDRD: 54 ML/MIN/{1.73_M2}
GLUCOSE BLDC GLUCOMTR-MCNC: 190 MG/DL (ref 70–99)
GLUCOSE BLDC GLUCOMTR-MCNC: 215 MG/DL (ref 70–99)
GLUCOSE SERPL-MCNC: 134 MG/DL (ref 70–99)
HCT VFR BLD AUTO: 25.9 % (ref 40–53)
HGB BLD-MCNC: 7.5 G/DL (ref 13.3–17.7)
LACTATE BLD-SCNC: 1.7 MMOL/L (ref 0.7–2)
LACTATE BLD-SCNC: 1.9 MMOL/L (ref 0.7–2)
MCH RBC QN AUTO: 30.9 PG (ref 26.5–33)
MCHC RBC AUTO-ENTMCNC: 29 G/DL (ref 31.5–36.5)
MCV RBC AUTO: 107 FL (ref 78–100)
PLATELET # BLD AUTO: 193 10E9/L (ref 150–450)
POTASSIUM SERPL-SCNC: 3.7 MMOL/L (ref 3.4–5.3)
RBC # BLD AUTO: 2.43 10E12/L (ref 4.4–5.9)
SODIUM SERPL-SCNC: 139 MMOL/L (ref 133–144)
SPECIMEN SOURCE: NORMAL
WBC # BLD AUTO: 14.4 10E9/L (ref 4–11)

## 2019-09-10 PROCEDURE — G0463 HOSPITAL OUTPT CLINIC VISIT: HCPCS

## 2019-09-10 PROCEDURE — 94668 MNPJ CHEST WALL SBSQ: CPT

## 2019-09-10 PROCEDURE — 40000802 ZZH SITE CHECK

## 2019-09-10 PROCEDURE — 93005 ELECTROCARDIOGRAM TRACING: CPT

## 2019-09-10 PROCEDURE — 27210338 ZZH CIRCUIT HUMID FACE/TRACH MSK

## 2019-09-10 PROCEDURE — C9113 INJ PANTOPRAZOLE SODIUM, VIA: HCPCS

## 2019-09-10 PROCEDURE — 80048 BASIC METABOLIC PNL TOTAL CA: CPT | Performed by: PHYSICIAN ASSISTANT

## 2019-09-10 PROCEDURE — 25000131 ZZH RX MED GY IP 250 OP 636 PS 637: Mod: GY | Performed by: STUDENT IN AN ORGANIZED HEALTH CARE EDUCATION/TRAINING PROGRAM

## 2019-09-10 PROCEDURE — 94640 AIRWAY INHALATION TREATMENT: CPT | Mod: 76

## 2019-09-10 PROCEDURE — 40000141 ZZH STATISTIC PERIPHERAL IV START W/O US GUIDANCE

## 2019-09-10 PROCEDURE — 33249 INSJ/RPLCMT DEFIB W/LEAD(S): CPT | Performed by: INTERNAL MEDICINE

## 2019-09-10 PROCEDURE — 36592 COLLECT BLOOD FROM PICC: CPT | Performed by: PHYSICIAN ASSISTANT

## 2019-09-10 PROCEDURE — 94640 AIRWAY INHALATION TREATMENT: CPT

## 2019-09-10 PROCEDURE — 25000125 ZZHC RX 250

## 2019-09-10 PROCEDURE — 94660 CPAP INITIATION&MGMT: CPT

## 2019-09-10 PROCEDURE — 40000986 XR CHEST PORT 1 VW

## 2019-09-10 PROCEDURE — 02HK3KZ INSERTION OF DEFIBRILLATOR LEAD INTO RIGHT VENTRICLE, PERCUTANEOUS APPROACH: ICD-10-PCS | Performed by: INTERNAL MEDICINE

## 2019-09-10 PROCEDURE — 27210794 ZZH OR GENERAL SUPPLY STERILE: Performed by: INTERNAL MEDICINE

## 2019-09-10 PROCEDURE — 25000132 ZZH RX MED GY IP 250 OP 250 PS 637: Mod: GY | Performed by: INTERNAL MEDICINE

## 2019-09-10 PROCEDURE — 00000146 ZZHCL STATISTIC GLUCOSE BY METER IP

## 2019-09-10 PROCEDURE — 40000275 ZZH STATISTIC RCP TIME EA 10 MIN

## 2019-09-10 PROCEDURE — 27210429 ZZH NUTRITION PRODUCT INTERMEDIATE LITER

## 2019-09-10 PROCEDURE — C1722 AICD, SINGLE CHAMBER: HCPCS | Performed by: INTERNAL MEDICINE

## 2019-09-10 PROCEDURE — 25000132 ZZH RX MED GY IP 250 OP 250 PS 637: Mod: GY | Performed by: STUDENT IN AN ORGANIZED HEALTH CARE EDUCATION/TRAINING PROGRAM

## 2019-09-10 PROCEDURE — 25000128 H RX IP 250 OP 636: Performed by: NURSE PRACTITIONER

## 2019-09-10 PROCEDURE — 25000125 ZZHC RX 250: Performed by: STUDENT IN AN ORGANIZED HEALTH CARE EDUCATION/TRAINING PROGRAM

## 2019-09-10 PROCEDURE — 85027 COMPLETE CBC AUTOMATED: CPT | Performed by: PHYSICIAN ASSISTANT

## 2019-09-10 PROCEDURE — 25000128 H RX IP 250 OP 636: Performed by: STUDENT IN AN ORGANIZED HEALTH CARE EDUCATION/TRAINING PROGRAM

## 2019-09-10 PROCEDURE — 83605 ASSAY OF LACTIC ACID: CPT

## 2019-09-10 PROCEDURE — 0JH609Z INSERTION OF CARDIAC RESYNCHRONIZATION DEFIBRILLATOR PULSE GENERATOR INTO CHEST SUBCUTANEOUS TISSUE AND FASCIA, OPEN APPROACH: ICD-10-PCS | Performed by: INTERNAL MEDICINE

## 2019-09-10 PROCEDURE — 25000125 ZZHC RX 250: Performed by: INTERNAL MEDICINE

## 2019-09-10 PROCEDURE — 94667 MNPJ CHEST WALL 1ST: CPT

## 2019-09-10 PROCEDURE — C1895 LEAD, AICD, ENDO DUAL COIL: HCPCS | Performed by: INTERNAL MEDICINE

## 2019-09-10 PROCEDURE — 25000128 H RX IP 250 OP 636: Performed by: INTERNAL MEDICINE

## 2019-09-10 PROCEDURE — C1894 INTRO/SHEATH, NON-LASER: HCPCS | Performed by: INTERNAL MEDICINE

## 2019-09-10 PROCEDURE — 93010 ELECTROCARDIOGRAM REPORT: CPT | Mod: 76 | Performed by: INTERNAL MEDICINE

## 2019-09-10 PROCEDURE — 33249 INSJ/RPLCMT DEFIB W/LEAD(S): CPT | Mod: GC | Performed by: INTERNAL MEDICINE

## 2019-09-10 PROCEDURE — 12000004 ZZH R&B IMCU UMMC

## 2019-09-10 PROCEDURE — 25000128 H RX IP 250 OP 636

## 2019-09-10 PROCEDURE — 92526 ORAL FUNCTION THERAPY: CPT | Mod: GN

## 2019-09-10 PROCEDURE — 25000132 ZZH RX MED GY IP 250 OP 250 PS 637: Mod: GY | Performed by: NURSE PRACTITIONER

## 2019-09-10 RX ORDER — CEFAZOLIN SODIUM 1 G/3ML
1 INJECTION, POWDER, FOR SOLUTION INTRAMUSCULAR; INTRAVENOUS
Status: DISCONTINUED | OUTPATIENT
Start: 2019-09-10 | End: 2019-09-10 | Stop reason: HOSPADM

## 2019-09-10 RX ORDER — CEFAZOLIN SODIUM 1 G/3ML
1 INJECTION, POWDER, FOR SOLUTION INTRAMUSCULAR; INTRAVENOUS EVERY 8 HOURS
Status: COMPLETED | OUTPATIENT
Start: 2019-09-10 | End: 2019-09-11

## 2019-09-10 RX ORDER — NALOXONE HYDROCHLORIDE 0.4 MG/ML
.1-.4 INJECTION, SOLUTION INTRAMUSCULAR; INTRAVENOUS; SUBCUTANEOUS
Status: DISCONTINUED | OUTPATIENT
Start: 2019-09-10 | End: 2019-09-19 | Stop reason: HOSPADM

## 2019-09-10 RX ORDER — IOPAMIDOL 755 MG/ML
INJECTION, SOLUTION INTRAVASCULAR
Status: DISCONTINUED | OUTPATIENT
Start: 2019-09-10 | End: 2019-09-10 | Stop reason: HOSPADM

## 2019-09-10 RX ORDER — LIDOCAINE 40 MG/G
CREAM TOPICAL
Status: DISCONTINUED | OUTPATIENT
Start: 2019-09-10 | End: 2019-09-10 | Stop reason: HOSPADM

## 2019-09-10 RX ORDER — DOBUTAMINE HYDROCHLORIDE 200 MG/100ML
5-40 INJECTION INTRAVENOUS CONTINUOUS PRN
Status: DISCONTINUED | OUTPATIENT
Start: 2019-09-10 | End: 2019-09-10 | Stop reason: HOSPADM

## 2019-09-10 RX ORDER — SODIUM CHLORIDE 9 MG/ML
INJECTION, SOLUTION INTRAVENOUS CONTINUOUS
Status: DISCONTINUED | OUTPATIENT
Start: 2019-09-10 | End: 2019-09-10 | Stop reason: HOSPADM

## 2019-09-10 RX ORDER — LIDOCAINE 40 MG/G
CREAM TOPICAL
Status: DISCONTINUED | OUTPATIENT
Start: 2019-09-10 | End: 2019-09-19 | Stop reason: HOSPADM

## 2019-09-10 RX ORDER — OXYCODONE AND ACETAMINOPHEN 5; 325 MG/1; MG/1
1 TABLET ORAL EVERY 4 HOURS PRN
Status: DISCONTINUED | OUTPATIENT
Start: 2019-09-10 | End: 2019-09-19 | Stop reason: HOSPADM

## 2019-09-10 RX ORDER — CEFAZOLIN SODIUM 2 G/100ML
2 INJECTION, SOLUTION INTRAVENOUS
Status: COMPLETED | OUTPATIENT
Start: 2019-09-10 | End: 2019-09-10

## 2019-09-10 RX ADMIN — PREDNISONE 20 MG: 20 TABLET ORAL at 08:56

## 2019-09-10 RX ADMIN — ACETYLCYSTEINE 2 ML: 200 SOLUTION ORAL; RESPIRATORY (INHALATION) at 16:20

## 2019-09-10 RX ADMIN — ATORVASTATIN CALCIUM 40 MG: 40 TABLET, FILM COATED ORAL at 20:23

## 2019-09-10 RX ADMIN — SODIUM CHLORIDE, PRESERVATIVE FREE 5 ML: 5 INJECTION INTRAVENOUS at 09:08

## 2019-09-10 RX ADMIN — ACETYLCYSTEINE 2 ML: 200 SOLUTION ORAL; RESPIRATORY (INHALATION) at 20:20

## 2019-09-10 RX ADMIN — ACETYLCYSTEINE 2 ML: 200 SOLUTION ORAL; RESPIRATORY (INHALATION) at 12:56

## 2019-09-10 RX ADMIN — Medication 5 ML: at 21:06

## 2019-09-10 RX ADMIN — IPRATROPIUM BROMIDE AND ALBUTEROL SULFATE 3 ML: .5; 3 SOLUTION RESPIRATORY (INHALATION) at 04:07

## 2019-09-10 RX ADMIN — IPRATROPIUM BROMIDE AND ALBUTEROL SULFATE 3 ML: .5; 3 SOLUTION RESPIRATORY (INHALATION) at 12:55

## 2019-09-10 RX ADMIN — CEFAZOLIN 1 G: 1 INJECTION, POWDER, FOR SOLUTION INTRAMUSCULAR; INTRAVENOUS at 20:23

## 2019-09-10 RX ADMIN — FUROSEMIDE 60 MG: 10 INJECTION, SOLUTION INTRAVENOUS at 06:16

## 2019-09-10 RX ADMIN — ACETYLCYSTEINE 4 ML: 200 SOLUTION ORAL; RESPIRATORY (INHALATION) at 00:01

## 2019-09-10 RX ADMIN — UMECLIDINIUM BROMIDE AND VILANTEROL TRIFENATATE 1 PUFF: 62.5; 25 POWDER RESPIRATORY (INHALATION) at 08:56

## 2019-09-10 RX ADMIN — ACETYLCYSTEINE 4 ML: 200 SOLUTION ORAL; RESPIRATORY (INHALATION) at 04:07

## 2019-09-10 RX ADMIN — PANTOPRAZOLE SODIUM 40 MG: 40 INJECTION, POWDER, FOR SOLUTION INTRAVENOUS at 08:56

## 2019-09-10 RX ADMIN — Medication 12.5 MG: at 08:56

## 2019-09-10 RX ADMIN — CLOPIDOGREL BISULFATE 75 MG: 75 TABLET, FILM COATED ORAL at 08:56

## 2019-09-10 RX ADMIN — FERROUS SULFATE TAB 325 MG (65 MG ELEMENTAL FE) 325 MG: 325 (65 FE) TAB at 08:56

## 2019-09-10 RX ADMIN — IPRATROPIUM BROMIDE AND ALBUTEROL SULFATE 3 ML: .5; 3 SOLUTION RESPIRATORY (INHALATION) at 00:01

## 2019-09-10 RX ADMIN — ASPIRIN 81 MG CHEWABLE TABLET 81 MG: 81 TABLET CHEWABLE at 08:56

## 2019-09-10 RX ADMIN — Medication 5 ML: at 04:39

## 2019-09-10 RX ADMIN — POTASSIUM CHLORIDE 20 MEQ: 1.5 POWDER, FOR SOLUTION ORAL at 06:54

## 2019-09-10 RX ADMIN — PANTOPRAZOLE SODIUM 40 MG: 40 INJECTION, POWDER, FOR SOLUTION INTRAVENOUS at 20:23

## 2019-09-10 RX ADMIN — IPRATROPIUM BROMIDE AND ALBUTEROL SULFATE 3 ML: .5; 3 SOLUTION RESPIRATORY (INHALATION) at 16:20

## 2019-09-10 RX ADMIN — FUROSEMIDE 60 MG: 10 INJECTION, SOLUTION INTRAVENOUS at 17:59

## 2019-09-10 RX ADMIN — MULTIVIT AND MINERALS-FERROUS GLUCONATE 9 MG IRON/15 ML ORAL LIQUID 15 ML: at 08:56

## 2019-09-10 RX ADMIN — IPRATROPIUM BROMIDE AND ALBUTEROL SULFATE 3 ML: .5; 3 SOLUTION RESPIRATORY (INHALATION) at 20:20

## 2019-09-10 ASSESSMENT — ACTIVITIES OF DAILY LIVING (ADL)
ADLS_ACUITY_SCORE: 17

## 2019-09-10 ASSESSMENT — MIFFLIN-ST. JEOR: SCORE: 1465.37

## 2019-09-10 NOTE — PROCEDURES
EEG #-3 (Day 3 of Video-EEG Monitoring)    DATE OF RECORDIN2019    DURATION OF RECORDIN hours, 12 minutes.    CLINICAL SUMMARY:  This video-EEG monitoring procedure was performed in diagnostic evaluation of encephalopathy in José Aguirre.  He reportedly was not receiving any medications known to affect electrocerebral function on this day of monitoring.      TECHNICAL SUMMARY:  This continuous EEG monitoring procedure was performed with 23 scalp electrodes in 10-20 system placements, and additional scalp, precordial and other surface electrodes used for electrical referencing and artifact detection.  Video monitoring was utilized and periodically reviewed by EEG technologists and the physician for electroclinical correlation.     INTERICTAL EEG ACTIVITIES:  During maximal waking, there was a symmetric, well-modulated, approximately 10 Hz posterior dominant rhythm, which was attenuated on eye opening.  Lower amplitude faster activities predominated anteriorly.  Drowsiness was manifested by predominance of centrally maximum semirhythmic theta slowing and dropout of the posterior dominant rhythm during deeper drowsiness.  Symmetric sleep spindles were seen during slow wave sleep.      There were no interictal epileptiform abnormalities.     ICTAL RECORDINGS:  No electrographic seizures and no paroxysmal behavioral events were recorded on this day of monitoring.      SUMMARY OF DAY 3 OF VIDEO-EEG MONITORING:    The interictal EEG recording was normal in waking, drowsiness and slow wave sleep.   No pathological slowing, no interictal epileptiform abnormalities, no electrographic seizures and no paroxysmal behavioral events were recorded on this day of monitoring.      SUMMARY OF 3 DAYS OF VIDEO-EEG MONITORING:    Over the 3 days of monitoring, the interictal EEG recording was persistently normal in all states.    No pathological slowing, no interictal epileptiform abnormalities, no electrographic  seizures, and no paroxysmal behavioral events were recorded over the 3 days of monitoring.   Real Kent M.D., Professor of Neurology       D: 2019   T: 2019   MT: NELLY      Name:     ROC TAYLOR   MRN:      [id number]        Account:        [id number]   :      1935           Procedure Date: 2019      Document: [id number]

## 2019-09-10 NOTE — PROGRESS NOTES
[facility], [place]    Sepsis Evaluation Progress Note    Date of Service: 09/09/2019    I was called to see José Aguirre due to abnormal vital signs triggering the Sepsis SIRS screening alert. He is known to have an infection.     Physical Exam    Vital Signs:  Temp: 98.3  F (36.8  C) Temp src: Oral BP: 106/64 Pulse: 112 Heart Rate: 91 Resp: 20 SpO2: 95 % O2 Device: High Flow Nasal Cannula (HFNC) Oxygen Delivery: Other (Comments)(30L)    Lab:  Lactic Acid   Date Value Ref Range Status   09/09/2019 2.2 (H) 0.7 - 2.0 mmol/L Final     Lactate for Sepsis Protocol   Date Value Ref Range Status   09/07/2019 Canceled, Test credited 0.7 - 2.0 mmol/L Final     Comment:     Duplicate request       The patient is at baseline mental status.    The rest of their physical exam is significant for tachycardia and increase in oxygen requirement.    Assessment and Plan    The SIRS and exam findings are possibly due to sepsis. He is on empiric antibiotics for possible pneumonia (day 13/14). His leukocytosis is stable and he is afebrile, but he has not had a lactic acidosis in the past and his oxygen requirement has increased. Given these changes concerning for infection, will work-up as below.  - blood cultures  - CXR  - CBC  - add-on procal  - repeat LA in 2 hours  - hold off on IVF pending repeat LA and CXR, given aggressive diuresis  - will broaden ABX if clinically worsening or if concerning CXR changes    ID: The patient is currently on the following antibiotics: ceftriaxone    Current antibiotic coverage is appropriate for source of infection.    Fluid: Fluid bolus not indicated due to fluid overload. Will trend lactate and bolus/hold diuretics if trending up..    Lab: Repeat lactic acid ordered for 2 hours from now.    Disposition: The patient will remain on the current unit. We will continue to monitor this patient closely.    Candace العلي MD

## 2019-09-10 NOTE — PROGRESS NOTES
Calorie Count  Intake recorded for: 9/9  Total Kcals: 1250 Total Protein: 54g  Kcals from Hospital Food: 1250  Protein: 54g  Kcals from Outside Food (average):0 Protein: 0g  # Meals Recorded: 2 meals (First - 100% blueberry muffin, orange juice, peaches, yogurt, pancake w/ butter & syrup)      (Second - 100% peaches, jello, milk, roast turkey & mashed potatoes w/ gravy, carrots)  # Supplements Recorded: 0

## 2019-09-10 NOTE — PRE-PROCEDURE
GENERAL PRE-PROCEDURE:   Procedure:  ICD implant  Date/Time:  9/10/2019 10:27 AM    Verbal consent obtained?: Yes    Written consent obtained?: Yes    Risks and benefits: Risks, benefits and alternatives were discussed    Consent given by:  Patient  Patient states understanding of procedure being performed: Yes    Patient's understanding of procedure matches consent: Yes    Procedure consent matches procedure scheduled: Yes    Appropriately NPO:  Yes  ASA Class:  Class 3- Severe systemic disease, definite functional limitations  Mallampati  :  Grade 3- soft palate visible, posterior pharyngeal wall not visible  Lungs:  Crackles left base and crackles right base  Heart:  Normal heart sounds and rate  History & Physical reviewed:  History and physical reviewed and no updates needed  Statement of review:  I have reviewed the lab findings, diagnostic data, medications, and the plan for sedation    MARKUS Urrutia CNP  Electrophysiology Consult Service  Pager: [phone]

## 2019-09-10 NOTE — PROGRESS NOTES
[facility] Nurse Inpatient Pressure Injury Assessment   Reason for consultation: Evaluate and treat R nare      ASSESSMENT  Pressure Injury: on R nare , hospital acquired ,   This is a Medical Device Related Pressure Injury (MDRPI) due to hi flow oxygen tubing and Bridle string  Pressure Injury is Stage 2   Contributing factor of the pressure injury: pressure and moisture  Status: initial assessment       TREATMENT PLAN  R nare wound: BID cleanse with normal saline and dry, then apply vaseline. Ensure to reposition tubes (NJ/bridle and high flow O2) as much as possible and keep away from wound bed.     Orders Written  WO Nurse follow-up plan:weekly  Nursing to notify the Provider(s) and re-consult the WO Nurse if wound(s) deteriorates or new skin concern.    Patient History  According to provider note(s):  José Aguirre is a 83 year old male who was admitted on 8/27/2019 with a cardiac arrest. 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 The arrest was witnessed he received bystander CPR by the fire department who found him with a shockable rhythm he received one shock and one dose of epinephrine and transferred him here. On arrival he had a pulse was hypotensive with MAP's in the 50's but was moving  he was intubated and received one dose of epinephrine.  ECG per EMS and in the ED shows afib with RBBB and ST depression in the lateral leads with some TWI in the anterior leads.     Objective Data      Current Diet/ Nutrition:  Orders Placed This Encounter      Dysphagia Diet Level 3 Advanced Thin Liquids (water, ice chips, juice, milk gelatin, ice cream, etc)      Output:   I/O last 3 completed shifts:  In: 1510 [P.O.:720; I.V.:40; NG/GT:390]  Out: 3790 [Urine:3790]    Risk  Assessment:   Sensory Perception: 3-->slightly limited  Moisture: 4-->rarely moist  Activity: 3-->walks occasionally  Mobility: 3-->slightly limited  Nutrition: 3-->adequate  Friction and Shear: 3-->no apparent problem  Emre Score: 19      Labs:   Recent Labs   Lab 09/10/19  0440  09/08/19  0422   ALBUMIN  --   --  2.3*   HGB 7.5*   < >  --    WBC 14.4*   < >  --     < > = values in this interval not displayed.       Physical Exam  Skin inspection: focused R nare    9/10      Wound Location:  R lateral nare   Date of last Photo 9/10  Wound History: high flow O2 pressing NJ bridle into R lateral side of nare. RN already had dietician come and loosen bridle to prevent further injury.   Measurements (length x width x depth, in cm) 0.2 cm x 0.6 cm  x  0.05 cm   Wound Base:  100 % dermis  Tunneling N/A  Undermining N/A  Palpation of the wound bed: normal   Periwound skin: intact  Color: normal and consistent with surrounding tissue  Temperature: normal   Drainage:, scant  Description of drainage: serous  Odor: none  Pain: during dressing change, tender    Interventions  Current support surface: Standard  Atmos Air mattress  Current off-loading measures: Pillows under calves  Repositioning aid: Pillows  Visual inspection of wound(s) completed   Wound Care: was done per plan of care.  Supplies: floor stock  Educated provided: importance of repositioning and plan of care  Education provided to: patient , spouse and nurse  Discussed importance of:repositioning every 2 hours  Discussed plan of care with Patient, Family and Nurse    Martine Marin RN, BSN, CWON

## 2019-09-10 NOTE — PLAN OF CARE
Neuro: Patient alert and oriented x4. Pleasant.   Cardiac: NSR/BBB with inverted T-wave in 90's, BP's stable, afebrile.       Respiratory: Sating 88-92% on 60% HFNC.  GI/: Adequate urine output, pink tinged. No BM this shift.   Diet/appetite: Advanced to DD3 diet with thin liquids. Video swallow study tomorrow.   Activity:  Assist of 1 with walker, up to chair.  Pain: At acceptable level on current regimen.   Skin: No new deficits noted.  LDA's: Mcclelland, right PICC.     Plan: Family updated. Will continue to monitor.

## 2019-09-10 NOTE — PROGRESS NOTES
Bridle Placement:   Reason for bridle placement: Securement of feeding tube; Previous bridle removed by bedside RN as bridle was pressing on nare due to oxygen mask creating a sore.   Medicine delivered during procedure: lubricating jelly   Procedure: Successful   Location of top of clip on FT: @ 90 cm marker   Condition of nose/skin at time of bridle placement: Unremarkable  Face to Face time with patient: 5 minutes.    Candace Stein RD, LD   5A [phone])/7B floor pager [phone]

## 2019-09-10 NOTE — PROVIDER NOTIFICATION
The patient was placed on BiPAP 10/5, 60%, Rate of 12 for his cath lab procedure.    9/10/2019  Jose De La Torre, RT RRT

## 2019-09-10 NOTE — PROCEDURES
EEG #-2 (Day 2 of Video-EEG Monitoring)    DATE OF RECORDIN2019    DURATION OF RECORDIN hours, 41 minutes.    CLINICAL SUMMARY:  This video-EEG monitoring procedure was performed in diagnostic evaluation of encephalopathy in Roc Aguirre.  He reportedly was not receiving any medications known to affect electrocerebral function on this day of monitoring.      TECHNICAL SUMMARY:  This continuous EEG monitoring procedure was performed with 23 scalp electrodes in 10-20 system placements, and additional scalp, precordial and other surface electrodes used for electrical referencing and artifact detection.  Video monitoring was utilized and periodically reviewed by EEG technologists and the physician for electroclinical correlation.     INTERICTAL EEG ACTIVITIES:  During maximal waking, there was a symmetric, well-modulated, approximately 10 Hz posterior dominant rhythm, which was attenuated on eye opening.  Lower amplitude faster activities predominated anteriorly.  Drowsiness was manifested by predominance of centrally maximum semirhythmic theta slowing and dropout of the posterior dominant rhythm during deeper drowsiness.  Symmetric sleep spindles were seen during slow wave sleep.      There were no interictal epileptiform abnormalities.     ICTAL RECORDINGS:  No electrographic seizures and no paroxysmal behavioral events were recorded on this day of monitoring.      SUMMARY OF DAY 2 OF VIDEO-EEG MONITORING:    The interictal EEG recording was normal in waking, drowsiness and slow wave sleep.   No pathological slowing, no interictal epileptiform abnormalities, no electrographic seizures and no paroxysmal behavioral events were recorded on this day of monitoring.    Real Kent M.D., Professor of Neurology       D: 2019   T: 2019   MT: JAME      Name:     ROC AGUIRRE   MRN:      [id number]        Account:        [id number]   :      1935           Procedure Date:  09/05/2019      Document: [id number]

## 2019-09-10 NOTE — PROGRESS NOTES
"  Interventional Cardiology Progress Note  Interval History:  - No acute events overnight  - Patient triggered sepsis protocol; cxr improving, lactic this am 1.9, pt afebrile  - Patient reports slept well, denies SOB  - Remains on HFNC 30L 60-75% FiO2  - s/p IV Lasix, net negative 1.9L yesterday, Cr stable    Most recent vital signs:  /56 (BP Location: Left arm)   Pulse 85   Temp 98.6  F (37  C) (Oral)   Resp 20   Ht 1.727 m (5' 7.99\")   Wt 79.6 kg (175 lb 7.8 oz)   SpO2 90%   BMI 26.69 kg/m    Temp:  [97.6  F (36.4  C)-98.7  F (37.1  C)] 98.6  F (37  C)  Pulse:  [] 85  Heart Rate:  [83-91] 84  Resp:  [16-20] 20  BP: ()/(52-79) 112/56  FiO2 (%):  [60 %-75 %] 75 %  SpO2:  [90 %-95 %] 90 %  Wt Readings from Last 2 Encounters:   09/10/19 79.6 kg (175 lb 7.8 oz)       Intake/Output Summary (Last 24 hours) at 9/10/2019 0835  Last data filed at 9/10/2019 0800  Gross per 24 hour   Intake 1180 ml   Output 2650 ml   Net -1470 ml       Physical exam:  General: In bed, in NAD  HEENT: EOMI, PERRLA, no scleral icterus or injection  CARDIAC: RRR, no m/r/g appreciated. Peripheral pulses 2+  RESP: CTAB, diminished in bases, no wheezes, rhonchi or crackles appreciated.  GI: NABS, NT/ND, no guarding or rebound  : Mcclelland in place  EXTREMITIES: NO LE edema, pulses 2+.   SKIN: No acute lesions appreciated; scattered bruising t/o BUE  NEURO: Alert and oriented X3, CN II-XII grossly intact, no focal neurological deficits noted      Labs (Past three days):  CBC  Recent Labs   Lab 09/10/19  0440 09/09/19  2246 09/09/19  0501 09/07/19  1600   WBC 14.4* 17.1* 17.8* 19.3*   RBC 2.43* 2.54* 2.52* 2.80*   HGB 7.5* 7.8* 7.7* 8.3*   HCT 25.9* 26.5* 26.1* 28.4*   * 104* 104* 101*   MCH 30.9 30.7 30.6 29.6   MCHC 29.0* 29.4* 29.5* 29.2*   RDW 20.8* 20.6* 20.5* 19.5*    191 178 184     BMP  Recent Labs   Lab 09/10/19  0440 09/09/19  0501 09/09/19  0136 09/08/19  1818 09/08/19  0422 09/07/19  1600 09/07/19  0352 " 09/06/19  1612    138  --   --  138 138 139 140   POTASSIUM 3.7 4.2 4.3 2.8* 3.8 4.4 3.8 3.9   CHLORIDE 102 103  --   --  102 103 105 107   CO2 26 28  --   --  25 26 28 26   ANIONGAP 10 7  --   --  11 9 7 6   * 166*  --   --  189* 241* 149* 144*   BUN 49* 54*  --   --  47* 43* 43* 38*   CR 1.22 1.19  --   --  1.15 1.14 1.01 0.89   GFRESTIMATED 54* 56*  --   --  58* 59* 68 79   GFRESTBLACK 63 65  --   --  67 68 79 >90   YOON 8.2* 8.7  --   --  8.1* 8.2* 8.1* 8.2*   MAG  --   --   --   --  2.1 2.0 2.1 2.1   PHOS  --   --   --   --  3.2 3.2 3.4 2.6     Troponins: No results found for: TROPI, TROPONIN, TROPR, TROPN     INRNo lab results found in last 7 days.  Liver panel  Recent Labs   Lab 09/08/19  0422 09/07/19  1600 09/07/19  0352 09/06/19  1612   PROTTOTAL 5.9* 6.4* 5.6* 6.0*   ALBUMIN 2.3* 2.4* 2.2* 2.4*   BILITOTAL 0.7 0.7 0.8 0.6   ALKPHOS 60 63 54 52   AST 20 21 25 25   ALT 29 33 31 38       Imaging/procedure results: Reviewed.     Assessment & Plan:  José Aguirre is an 83 year old male with a PMHx of PVD with renal artery stenosis s/p stenting in 2013,occluded R carotid artery and s/p left carotid endarterectomy, left subclavian stenosis, CAD s/p CABG with L-LAD, S-D1 and OM2, PDA), COPD (on 2-3L home O2), and HLD who was admitted on 8/27/2019 with a VT/VF arrest with ROSC in the field. Patient was found to have disease in the LM into pLAD s/p PCI to LM->LAD and ost LCx.      #VT/VF arrest with ROSC prior to arrival  #CAD status post PCI to native LM into the LAD and ostial LCX  #History of CABG '03 (LIMA-LAD, SVG-D1, SVG-OM2, SVG-PDA)  8/27/19 admission after OOH cardiac arrest. He was at the [facility] and had a witnessed collapse. Bystander CPR was started and EMS found him in VT/VF. He was defibrillated X1 and given 1 of epi. ROSC was obtained in field. On arrival here, he had a pulse and was hypotensive, was intubated, taken for emergent coronary angiogram which showed severe 3V native  vessel CAD with LM/LAD lesion and  of proximal RCA with L to right collaterals. LIMA-LAD graft minimally diseased but small and primarily retrograde flow through native LAD. SVG-RCA chronically closed at anastomosis, SVG-D1 and SBG-OM2 closed. He underwent PCI to dLM/ostial LAD with CONCETTA and balloon angioplasty of ostial LCx. He has preserved LV EF with mildly reduced RV function. Though he did have CAD and was stented, felt his burden of CAD was unlikely to be the source of his arrest.      -Continue aspirin and plavix   -Continue lipitor 40 mg daily   -Continue Toprol XL 12.5 mg   -EP following; ICD planned for this morning  - Pt will need follow up with Dr. Osorio in 2-4 weeks after discharge for post cardiac arrest follow up.     #Hypoxia, multifactorial   #COPD exacerbation   #Pulmonary edema, history pleural effusion status post thoracentesis on left, residual small bilateral pleural effusions  Patient on 2-3L O2 at home for COPD. Status post arrest with significant pulmonary edema and COPD exacerbation requiring HFNC and BiPAP. Patient with left pleural effusion status post thora on 9/5 on left and has residual unchanged, small bilateral effusions. Patient being aggressively diuresed with 60 IV Lasix q12, making >2L UOP daily. Volume in setting of aggressive resuscitation. Atelectasis in the setting of rib fracture pain after CPR. 9/10 pt triggered sepsis protocol, requiring more O2, lactic acid initially 2.1, recheck 1.9. Cxr with improving pulm edema/infection.  Patient denies SOB.      - RT COPD consult, appreciate their assistance, pt to be set up with Pulmonology as OP to establish care   - Prednisone burst/taper  - S/P Zosyn, Rocephin until 9/10/19 for a total of 14 days abx therapy  - Frequent pulmonary toilet/physiotherapy   - Nebs, inhalers   - Encourage IS (difficult 2/2 chest wall pain though this is improving)   - Fluid restriction, I/O's. Would continue to diurese until bump in creat then  transition to orals.   - Continue to wean O2 as able, O2 sat goal >87%     # Chest wall pain  # Rib fracture   Patient states pain is improving overall. He is not requiring any narcotics and would tend to avoid as he did have witnessed probably delirium in the ICU manifesting as seizure like activity without seizures on EEG. Moreover component of respiratory depression with this. Patient tolerating chest physiotherapy and feeling well in this regard.   - Prn Tylenol for mild-mod pain     # Traumatic Silva   Patient does straight cath at home. Traumatic silva iatrogenic, will continue with indwelling silva for strict I/O and to maintain patency in setting of injury and inflammation   -Continue with silva for now      # PVD with renal artery stenosis s/p stenting in 2013   # occluded R carotid artery  #s/p left carotid endarterectomy  # Hx left subclavian stenosis  - Continue aspirin and plavix      Lines in Place:  PICC RUE: 8/29/19  Silva catheter: 8/27/19  Current lines are required for patient management     Prophylaxis:   GI: Protonix  DVT: SCD's, subcutaneous heparin     Diet: NPO at midnight for procedure; then resume DD2 with TF at night  Activity: Up as tolerated, qshift  Code Status: Full  Disposition: Likely require TCU vs ARU, possible later this week pending O2 requirements, ICD recovery    Patient discussed w/ Dr. Osorio.    MARKUS Paniagua, CNP  [facility]  Interventional Cardiology  [phone]

## 2019-09-10 NOTE — PLAN OF CARE
Neuro: A&Ox4.   Cardiac: Afib, BBB. VSS.   Respiratory: Sating 88-92% on 75% HFNC.  GI/: Adequate urine output. Last BM 9/8/19.  Diet/appetite: Tolerating NPO diet at MN for possible procedure.   Activity:  Assist of 1 with walker, up to chair.  Pain: At acceptable level on current regimen.   Skin: No new deficits noted.  LDA's: Mcclelland, right PICC.    Plan: Continue with POC. Notify primary team with changes. Plan is NPO at MN 9/10 for possible ICD placement. Administered PRN 20meq K this shift, recheck 9/11/19 AM draw.

## 2019-09-10 NOTE — DISCHARGE INSTRUCTIONS
Home Care after an ICD implant    Wound care:  Keep your incision (surgery wound) dry for 3 days.  After 3 days, you may remove the outer bandage.  Keep the strips of tape on.  They will be removed at your clinic visit.  Check for signs of infection each day.  These include increased redness, swelling, drainage or a fever over 101 F (38.3 C).  Call us immediately if you see any of these signs.  If there are no signs of infection, you may shower in 3 days.  Do not submerge the incision (in a bath tub, hot tub, or swimming pool) until fully healed.  Pain:   You may have mild to moderate pain for 3 to 5 days.  Take acetaminophen (Tylenol) or ibuprofen (Advil) for the pain.  Call us if the pain is severe or lasts more than 5 days.    Activity:  You should slowly go back to your normal activities after 24 hours.  Healing will take 4 to 6 weeks.  No driving for 3 days  Avoid climbing a ladder alone.  It is best to stay within 4 feet of the ground.  Avoid anything that may cause rough contact or a hard hit to your chest.  This includes football, hockey, and other contact sports.  Do not go swimming or boating alone.      For at least 4 weeks:  Do not raise your affected arm above your shoulder.  Do not use your affected arm to push, pull, or lift anything over 10 pounds.  Avoid repetitive upper body activities for 6 weeks (ie: golf, swimming, and weight lifting)    Follow Up Visits:  Return to the clinic in 7 to 10 days to have your device and wound checked.    Telling others about your device:  Before you have any medical tests or treatments, tell the doctors, dentists, and other care providers about your device.  There are a few tests and treatments that may interfere with your device.  (These include MRI, radiation therapy, electrocautery, and others.)  Your care team may need to take special steps to keep you safe.  Before you leave the hospital, you will receive a temporary ID card.  A permanent card will be mailed  to you about 6 to 8 weeks later.  Always carry the ID card with you.  It has important details about your device.  You should also get a MedicAlert ID.  Please ask us for a MedicAlert brochure, or go to [email or web address].    Safety near electrical equipment:  All of these are safe to use when in good repair:    Microwaves    Radios    Cordless phone    Remote controls    Small electrical tools  Cell phones: Keep cell phones at least 6 inches from your device.  Do not carry the phone in a pocket near your device.  Security gonzalez: It is okay to walk through security gonzalez at the airports and department stores.  Tell airport security that you have a defibrillator.  They should keep the screening wand at least 6 inches from your device.  Full-body scanners are safe.    Avoid the following:    Arc welding, chain saws and high-powered industrial or commercial tools.    Power lines, power plants and large power generators.    Electric body fat scales.    Magnetic mattress pads or pillow.    What to do after a shock from your ICD:  1. Stop what you re doing and rest.  2. If you feel fine before and after the shock, call the device clinic.  3. If you feel unwell or receive more than one shock, call 911 or go to the emergency room.  A shock could mean that your condition has changed and you may need to see a doctor.    Questions?  Please call [facility]    Device Nurse:          Business Hours:  [phone]    After Hours:  [phone]   Choose option 4, then ask for the  on-call device nurse at job code [id number].    Your next device clinic appointment is scheduled on:     Wednesday, September 18th at  1 PM.                                                 [facility] [address]  [address]

## 2019-09-10 NOTE — PROVIDER NOTIFICATION
Time of notification: 9:10 PM  Provider notified: [id number] Cards  Patient status: Triggered sepsis protocol. Lactic resulted at 2.2  Temp:  [97.6  F (36.4  C)-99.2  F (37.3  C)] 98.2  F (36.8  C)  Pulse:  [] 124  Heart Rate:  [78-94] 91  Resp:  [16-20] 18  BP: (101-120)/(50-79) 113/79  FiO2 (%):  [60 %-70 %] 65 %  SpO2:  [91 %-98 %] 92 %  Orders received: Team to assess at bedside. Blood cultures and chest xray ordered and obtained.

## 2019-09-11 ENCOUNTER — APPOINTMENT (OUTPATIENT)
Dept: GENERAL RADIOLOGY | Facility: CLINIC | Age: 84
DRG: 224 | End: 2019-09-11
Attending: NURSE PRACTITIONER
Payer: MEDICARE

## 2019-09-11 ENCOUNTER — APPOINTMENT (OUTPATIENT)
Dept: PHYSICAL THERAPY | Facility: CLINIC | Age: 84
DRG: 224 | End: 2019-09-11
Payer: MEDICARE

## 2019-09-11 ENCOUNTER — ANCILLARY PROCEDURE (OUTPATIENT)
Dept: CARDIOLOGY | Facility: CLINIC | Age: 84
DRG: 224 | End: 2019-09-11
Attending: INTERNAL MEDICINE
Payer: MEDICARE

## 2019-09-11 ENCOUNTER — APPOINTMENT (OUTPATIENT)
Dept: SPEECH THERAPY | Facility: CLINIC | Age: 84
DRG: 224 | End: 2019-09-11
Payer: MEDICARE

## 2019-09-11 DIAGNOSIS — Z95.810 S/P ICD (INTERNAL CARDIAC DEFIBRILLATOR) PROCEDURE: Primary | ICD-10-CM

## 2019-09-11 DIAGNOSIS — I46.9 CARDIAC ARREST (H): Primary | ICD-10-CM

## 2019-09-11 DIAGNOSIS — I46.9 CARDIAC ARREST (H): ICD-10-CM

## 2019-09-11 PROBLEM — Z98.890 S/P CAROTID ENDARTERECTOMY: Status: ACTIVE | Noted: 2019-09-11

## 2019-09-11 PROBLEM — J96.21 ACUTE ON CHRONIC RESPIRATORY FAILURE WITH HYPOXEMIA (H): Status: ACTIVE | Noted: 2019-09-11

## 2019-09-11 PROBLEM — Z95.1 HX OF CABG: Status: ACTIVE | Noted: 2019-09-11

## 2019-09-11 PROBLEM — J44.9 COPD (CHRONIC OBSTRUCTIVE PULMONARY DISEASE) (H): Chronic | Status: ACTIVE | Noted: 2019-09-11

## 2019-09-11 PROBLEM — Z98.890 S/P CAROTID ENDARTERECTOMY: Chronic | Status: ACTIVE | Noted: 2019-09-11

## 2019-09-11 PROBLEM — Z95.1 HX OF CABG: Chronic | Status: ACTIVE | Noted: 2019-09-11

## 2019-09-11 PROBLEM — I73.9 PVD (PERIPHERAL VASCULAR DISEASE) (H): Chronic | Status: ACTIVE | Noted: 2019-09-11

## 2019-09-11 PROBLEM — I25.10 CAD (CORONARY ARTERY DISEASE): Chronic | Status: ACTIVE | Noted: 2019-09-11

## 2019-09-11 PROBLEM — I25.10 CAD (CORONARY ARTERY DISEASE): Status: ACTIVE | Noted: 2019-09-11

## 2019-09-11 LAB
ANION GAP SERPL CALCULATED.3IONS-SCNC: 9 MMOL/L (ref 3–14)
BUN SERPL-MCNC: 51 MG/DL (ref 7–30)
CALCIUM SERPL-MCNC: 8.3 MG/DL (ref 8.5–10.1)
CHLORIDE SERPL-SCNC: 101 MMOL/L (ref 94–109)
CO2 SERPL-SCNC: 30 MMOL/L (ref 20–32)
CREAT SERPL-MCNC: 1.02 MG/DL (ref 0.66–1.25)
ERYTHROCYTE [DISTWIDTH] IN BLOOD BY AUTOMATED COUNT: 20.4 % (ref 10–15)
GFR SERPL CREATININE-BSD FRML MDRD: 67 ML/MIN/{1.73_M2}
GLUCOSE BLDC GLUCOMTR-MCNC: 132 MG/DL (ref 70–99)
GLUCOSE BLDC GLUCOMTR-MCNC: 204 MG/DL (ref 70–99)
GLUCOSE BLDC GLUCOMTR-MCNC: 204 MG/DL (ref 70–99)
GLUCOSE BLDC GLUCOMTR-MCNC: 259 MG/DL (ref 70–99)
GLUCOSE SERPL-MCNC: 174 MG/DL (ref 70–99)
HCT VFR BLD AUTO: 28.4 % (ref 40–53)
HGB BLD-MCNC: 8 G/DL (ref 13.3–17.7)
INTERPRETATION ECG - MUSE: NORMAL
INTERPRETATION ECG - MUSE: NORMAL
MCH RBC QN AUTO: 29.4 PG (ref 26.5–33)
MCHC RBC AUTO-ENTMCNC: 28.2 G/DL (ref 31.5–36.5)
MCV RBC AUTO: 104 FL (ref 78–100)
PLATELET # BLD AUTO: 203 10E9/L (ref 150–450)
POTASSIUM SERPL-SCNC: 3.8 MMOL/L (ref 3.4–5.3)
RBC # BLD AUTO: 2.72 10E12/L (ref 4.4–5.9)
SODIUM SERPL-SCNC: 140 MMOL/L (ref 133–144)
WBC # BLD AUTO: 12.4 10E9/L (ref 4–11)

## 2019-09-11 PROCEDURE — 25000132 ZZH RX MED GY IP 250 OP 250 PS 637: Mod: GY | Performed by: NURSE PRACTITIONER

## 2019-09-11 PROCEDURE — 92526 ORAL FUNCTION THERAPY: CPT | Mod: GN

## 2019-09-11 PROCEDURE — 36592 COLLECT BLOOD FROM PICC: CPT | Performed by: PHYSICIAN ASSISTANT

## 2019-09-11 PROCEDURE — 00000146 ZZHCL STATISTIC GLUCOSE BY METER IP

## 2019-09-11 PROCEDURE — 40000989 ZZH STATISTIC CHRONIC PULMONARY DISEASE SPECIALIST

## 2019-09-11 PROCEDURE — 25000128 H RX IP 250 OP 636

## 2019-09-11 PROCEDURE — 93282 PRGRMG EVAL IMPLANTABLE DFB: CPT

## 2019-09-11 PROCEDURE — 94640 AIRWAY INHALATION TREATMENT: CPT

## 2019-09-11 PROCEDURE — 94668 MNPJ CHEST WALL SBSQ: CPT

## 2019-09-11 PROCEDURE — 25000125 ZZHC RX 250: Performed by: STUDENT IN AN ORGANIZED HEALTH CARE EDUCATION/TRAINING PROGRAM

## 2019-09-11 PROCEDURE — 25000132 ZZH RX MED GY IP 250 OP 250 PS 637: Mod: GY | Performed by: INTERNAL MEDICINE

## 2019-09-11 PROCEDURE — 25000125 ZZHC RX 250

## 2019-09-11 PROCEDURE — C9113 INJ PANTOPRAZOLE SODIUM, VIA: HCPCS

## 2019-09-11 PROCEDURE — 25000132 ZZH RX MED GY IP 250 OP 250 PS 637: Mod: GY | Performed by: STUDENT IN AN ORGANIZED HEALTH CARE EDUCATION/TRAINING PROGRAM

## 2019-09-11 PROCEDURE — 40000986 XR CHEST 2 VW

## 2019-09-11 PROCEDURE — 25000131 ZZH RX MED GY IP 250 OP 636 PS 637: Mod: GY | Performed by: STUDENT IN AN ORGANIZED HEALTH CARE EDUCATION/TRAINING PROGRAM

## 2019-09-11 PROCEDURE — 97110 THERAPEUTIC EXERCISES: CPT | Mod: GP

## 2019-09-11 PROCEDURE — 40000802 ZZH SITE CHECK

## 2019-09-11 PROCEDURE — 80048 BASIC METABOLIC PNL TOTAL CA: CPT | Performed by: PHYSICIAN ASSISTANT

## 2019-09-11 PROCEDURE — 25000128 H RX IP 250 OP 636: Performed by: NURSE PRACTITIONER

## 2019-09-11 PROCEDURE — 25000128 H RX IP 250 OP 636: Performed by: STUDENT IN AN ORGANIZED HEALTH CARE EDUCATION/TRAINING PROGRAM

## 2019-09-11 PROCEDURE — 93282 PRGRMG EVAL IMPLANTABLE DFB: CPT | Mod: 26 | Performed by: INTERNAL MEDICINE

## 2019-09-11 PROCEDURE — 97530 THERAPEUTIC ACTIVITIES: CPT | Mod: GP

## 2019-09-11 PROCEDURE — 85027 COMPLETE CBC AUTOMATED: CPT | Performed by: PHYSICIAN ASSISTANT

## 2019-09-11 PROCEDURE — 40000275 ZZH STATISTIC RCP TIME EA 10 MIN

## 2019-09-11 PROCEDURE — 40000047 ZZH STATISTIC CTO2 CONT OXYGEN TECH TIME EA 90 MIN

## 2019-09-11 PROCEDURE — 12000004 ZZH R&B IMCU UMMC

## 2019-09-11 PROCEDURE — 99024 POSTOP FOLLOW-UP VISIT: CPT | Performed by: NURSE PRACTITIONER

## 2019-09-11 PROCEDURE — 94640 AIRWAY INHALATION TREATMENT: CPT | Mod: 76

## 2019-09-11 PROCEDURE — 94664 DEMO&/EVAL PT USE INHALER: CPT

## 2019-09-11 PROCEDURE — 94667 MNPJ CHEST WALL 1ST: CPT

## 2019-09-11 PROCEDURE — 40000983 ZZH STATISTIC HFNC ADULT NON-CPAP

## 2019-09-11 RX ADMIN — ACETYLCYSTEINE 2 ML: 200 SOLUTION ORAL; RESPIRATORY (INHALATION) at 08:16

## 2019-09-11 RX ADMIN — FUROSEMIDE 60 MG: 10 INJECTION, SOLUTION INTRAVENOUS at 06:15

## 2019-09-11 RX ADMIN — PANTOPRAZOLE SODIUM 40 MG: 40 INJECTION, POWDER, FOR SOLUTION INTRAVENOUS at 08:01

## 2019-09-11 RX ADMIN — ASPIRIN 81 MG CHEWABLE TABLET 81 MG: 81 TABLET CHEWABLE at 08:00

## 2019-09-11 RX ADMIN — CEFAZOLIN 1 G: 1 INJECTION, POWDER, FOR SOLUTION INTRAMUSCULAR; INTRAVENOUS at 11:16

## 2019-09-11 RX ADMIN — POTASSIUM CHLORIDE 20 MEQ: 1.5 POWDER, FOR SOLUTION ORAL at 08:03

## 2019-09-11 RX ADMIN — CEFAZOLIN 1 G: 1 INJECTION, POWDER, FOR SOLUTION INTRAMUSCULAR; INTRAVENOUS at 03:58

## 2019-09-11 RX ADMIN — ACETYLCYSTEINE 4 ML: 200 SOLUTION ORAL; RESPIRATORY (INHALATION) at 20:19

## 2019-09-11 RX ADMIN — PANTOPRAZOLE SODIUM 40 MG: 40 INJECTION, POWDER, FOR SOLUTION INTRAVENOUS at 20:08

## 2019-09-11 RX ADMIN — FUROSEMIDE 60 MG: 10 INJECTION, SOLUTION INTRAVENOUS at 17:44

## 2019-09-11 RX ADMIN — ACETYLCYSTEINE 2 ML: 200 SOLUTION ORAL; RESPIRATORY (INHALATION) at 12:05

## 2019-09-11 RX ADMIN — ATORVASTATIN CALCIUM 40 MG: 40 TABLET, FILM COATED ORAL at 20:06

## 2019-09-11 RX ADMIN — IPRATROPIUM BROMIDE AND ALBUTEROL SULFATE 3 ML: .5; 3 SOLUTION RESPIRATORY (INHALATION) at 20:18

## 2019-09-11 RX ADMIN — IPRATROPIUM BROMIDE AND ALBUTEROL SULFATE 3 ML: .5; 3 SOLUTION RESPIRATORY (INHALATION) at 12:04

## 2019-09-11 RX ADMIN — ACETYLCYSTEINE 2 ML: 200 SOLUTION ORAL; RESPIRATORY (INHALATION) at 03:59

## 2019-09-11 RX ADMIN — Medication 12.5 MG: at 08:00

## 2019-09-11 RX ADMIN — MULTIVIT AND MINERALS-FERROUS GLUCONATE 9 MG IRON/15 ML ORAL LIQUID 15 ML: at 08:01

## 2019-09-11 RX ADMIN — Medication 1 PACKET: at 08:01

## 2019-09-11 RX ADMIN — IPRATROPIUM BROMIDE AND ALBUTEROL SULFATE 3 ML: .5; 3 SOLUTION RESPIRATORY (INHALATION) at 04:00

## 2019-09-11 RX ADMIN — IPRATROPIUM BROMIDE AND ALBUTEROL SULFATE 3 ML: .5; 3 SOLUTION RESPIRATORY (INHALATION) at 00:14

## 2019-09-11 RX ADMIN — SODIUM CHLORIDE, PRESERVATIVE FREE 5 ML: 5 INJECTION INTRAVENOUS at 11:17

## 2019-09-11 RX ADMIN — CEPHALEXIN 500 MG: 250 CAPSULE ORAL at 20:06

## 2019-09-11 RX ADMIN — CLOPIDOGREL BISULFATE 75 MG: 75 TABLET, FILM COATED ORAL at 08:00

## 2019-09-11 RX ADMIN — FERROUS SULFATE TAB 325 MG (65 MG ELEMENTAL FE) 325 MG: 325 (65 FE) TAB at 08:00

## 2019-09-11 RX ADMIN — Medication 5 ML: at 06:31

## 2019-09-11 RX ADMIN — IPRATROPIUM BROMIDE AND ALBUTEROL SULFATE 3 ML: .5; 3 SOLUTION RESPIRATORY (INHALATION) at 08:15

## 2019-09-11 RX ADMIN — PREDNISONE 20 MG: 20 TABLET ORAL at 08:00

## 2019-09-11 RX ADMIN — ACETYLCYSTEINE 2 ML: 200 SOLUTION ORAL; RESPIRATORY (INHALATION) at 00:14

## 2019-09-11 ASSESSMENT — ACTIVITIES OF DAILY LIVING (ADL)
ADLS_ACUITY_SCORE: 17

## 2019-09-11 ASSESSMENT — MIFFLIN-ST. JEOR: SCORE: 1466.37

## 2019-09-11 NOTE — PROGRESS NOTES
Chronic Pulmonary Disease Specialist Progress note    -Assessment:  Patient with sats of 100% on 70% FiO2 at 30LPM, titrated down to 60% at 25LPM recognizing patient requires higher FiO2 with exertion. RR 18, HR 85, /57.     -Action:    -Evaluated patients inspiratory strength using In-Check device: Patient able to generate sufficient inspiratory flow for adequate drug deposition. 90 LPM Initially generated excessive insp flow; educated on the proper/sufficient insp flow needed for optimal drug deposition.     -Evaluated patients coordination and technique with inhaler: Patient demonstrates good technique with albuterol inhaler in conjunction with a spacer.     Reviewed patients home medications and provided pictures of each.   Reinforcement and re-education on inhaler technique and use of Aerobika. Patient verbalizes and demonstrates understanding with use of both devices.    Recommendations:    -Patient will require medication optimization with pulmonologist to assess if Asmanex is appropriate for patient as this medication is FDA approved for asthma and not COPD.    -Patient would benefit from using  Breath Actuated Nebulizer (hospital BAN Neb) instead of Aerogen for neb treatments during the day.     Ana Gallo, RRT, CTTS  Chronic Pulmonary Disease Specialist  Office: [phone]  Pager: [phone]

## 2019-09-11 NOTE — PROGRESS NOTES
Electrophysiology Post Procedure Follow Up Note  Date of Procedure: 9/10/19  DOS: 9/11/2019   Pre-operative Diagnosis:  Cardiac arrest Ischemic cardiomyopathy  Post-operative diagnosis:   Successful implantation of ICD device, secondary prevention of SCD.  Hospital Course:  83 year old male who has a past medical history significant for COPD (uses 2L home oxygen with activities), CABG (LIMA-LAD, SVG-D1, SVG-OM2, SVG-rPDA) 2003, PVD with bilateral lower extremity claudication (left > right), carotid artery disease with occluded right carotid and s/p left CEA, renal artery stenosis s/p stenting 2013, HLD, HTN, left subclavian artery stenosis, and CKD III. He was admitted on 8/27/19 after suffering an out of hospital cardiac arrest. He was at the [facility] and had a witnessed collapse. Bystander CPR was started and EMS found him in VT/VF. He was defibrillated X1 and given 1 of epi. ROSC was obtained in field. On arrival here, he had a pulse and was hypotensive. He was intubated. He was taken for emergent coronary angiogram which showed severe 3V  CAD with LM/LAD lesion and CTOp of proximal RCA with L to right collaterals. LIMA-LAD graft minimally diseased but small and primarily retrograde flow through native LAD. SVG-RCA chronically closed at anastomosis, SVG-D1 could not be selectively injected but appears closed due to dye hangup during injection of the native coronaries. There may also be a closed graft to OM2 but this is not certain. He underwent PCI to dLM/ostial LAD with CONCETTA and balloon angioplasty of ostial LCx. He was also placed on therapeutic hypothermia protocol. He has made good recovery thus far. Per his family he has had another episode of a cardiac arrest in 2018 no angiogram was done he had ROSC and good recovery and per report had no further work up, and a reported syncopal event both attributed to dehydration. Echo shows LVEF 60-70%, mildly reduced RV function, and no significant valvular  "issues. ECG shows SR with RBBB  Patient underwent a successful ICD implant as secondary prevention of SCD yesterday. He had an uneventful overnight stay. Device interrogation shows normal device function and stable lead parameters. Chest x-ray demonstrates no evidence of pneumothorax. Left subclavian site is CDI, no bleeding, and no hematoma. Patient is at recent baseline, and voiding without difficulties. Patient remains hemodynamically stable.     ROS:   10 point ROS neg other than the symptoms noted above.   Exam:  /67   Pulse 87   Temp 98.9  F (37.2  C) (Oral)   Resp 20   Ht 1.727 m (5' 7.99\")   Wt 79.7 kg (175 lb 11.3 oz)   SpO2 (!) 87%   BMI 26.72 kg/m      Constitutional: alert and no distress  Head: Normocephalic.   Neck: Neck supple.   ENT: PERRLA.   Cardiovascular: RRR.  Respiratory: coarse lung sounds throughout, on supplemental oxyge  Gastrointestinal: Abdomen soft, non-tender. BS normal.   : Deferred  Neurologic: grossly normal.   Psychiatric: mentation appears normal and affect normal/bright    Discharge Medications:  Current Facility-Administered Medications   Medication     acetylcysteine (MUCOMYST) 20 % nebulizer solution 2 mL     albuterol (PROAIR HFA/PROVENTIL HFA/VENTOLIN HFA) 108 (90 Base) MCG/ACT inhaler 2 puff     aspirin (ASA) chewable tablet 81 mg     atorvastatin (LIPITOR) tablet 40 mg     ceFAZolin (ANCEF) 1 g vial to attach to  ml bag for ADULT or 50 ml bag for PEDS     cephALEXin (KEFLEX) capsule 500 mg     clopidogrel (PLAVIX) tablet 75 mg     dextrose 10 % 1,000 mL infusion     dextrose 10 % 1,000 mL infusion     glucose gel 15-30 g    Or     dextrose 50 % injection 25-50 mL    Or     glucagon injection 1 mg     ferrous sulfate (FEROSUL) tablet 325 mg     furosemide (LASIX) injection 60 mg     heparin lock flush 10 UNIT/ML injection 5-10 mL     heparin lock flush 10 UNIT/ML injection 5-10 mL     HOLD: enoxaparin (LOVENOX) Post Procedure     HOLD: heparin (IV or " Subcutaneous) Post Procedure     HOLD: metformin and metformin containing medications on day of procedure and 48 hours after IV contrast given     hydrALAZINE (APRESOLINE) injection 10 mg     insulin aspart (NovoLOG) inj (RAPID ACTING)     insulin aspart (NovoLOG) inj (RAPID ACTING)     ipratropium - albuterol 0.5 mg/2.5 mg/3 mL (DUONEB) neb solution 3 mL     lidocaine (LMX4) cream     lidocaine 1 % 0.1-1 mL     magnesium sulfate 2 g in NS intermittent infusion (PharMEDium or FV Cmpd)     magnesium sulfate 4 g in 100 mL sterile water (premade)     melatonin tablet 10 mg     metoprolol succinate (TOPROL-XL) half-tab 12.5 mg     mometasone (ASMANEX) 220 MCG/INH Inhaler 2 puff     multivitamins w/minerals (CERTAVITE) liquid 15 mL     naloxone (NARCAN) injection 0.1-0.4 mg     nitroGLYcerin (NITROSTAT) sublingual tablet 0.4 mg     oxyCODONE-acetaminophen (PERCOCET) 5-325 MG per tablet 1 tablet     pantoprazole (PROTONIX) 40 mg IV push injection     Percutaneous Coronary Intervention orders placed (this is information for BPA alerting)     potassium chloride (KLOR-CON) Packet 20-40 mEq     potassium chloride 10 mEq in 100 mL intermittent infusion with 10 mg lidocaine     potassium chloride 10 mEq in 100 mL sterile water intermittent infusion (premix)     potassium chloride 20 mEq in 50 mL intermittent infusion     potassium chloride ER (K-DUR/KLOR-CON M) CR tablet 20-40 mEq     potassium phosphate 10 mmol in D5W 250 mL intermittent infusion     potassium phosphate 15 mmol in D5W 250 mL intermittent infusion     potassium phosphate 20 mmol in D5W 250 mL intermittent infusion     potassium phosphate 20 mmol in D5W 500 mL intermittent infusion     potassium phosphate 25 mmol in D5W 500 mL intermittent infusion     predniSONE (DELTASONE) tablet 20 mg    Followed by     [START ON 9/14/2019] predniSONE (DELTASONE) tablet 10 mg    Followed by     [START ON 9/19/2019] predniSONE (DELTASONE) tablet 5 mg     protein modular  (PROSOURCE TF) 1 packet     Reason ACE/ARB/ARNI order not selected     senna-docusate (SENOKOT-S/PERICOLACE) 8.6-50 MG per tablet 1 tablet     sodium chloride (PF) 0.9% PF flush 10-20 mL     sodium chloride (PF) 0.9% PF flush 3 mL     sodium chloride (PF) 0.9% PF flush 3 mL     sodium chloride (PF) 0.9% PF flush 5-50 mL     Labs:  BMP  Recent Labs   Lab 09/10/19  0440 09/09/19  0501 09/09/19  0136 09/08/19  1818 09/08/19  0422 09/07/19  1600    138  --   --  138 138   POTASSIUM 3.7 4.2 4.3 2.8* 3.8 4.4   CHLORIDE 102 103  --   --  102 103   YOON 8.2* 8.7  --   --  8.1* 8.2*   CO2 26 28  --   --  25 26   BUN 49* 54*  --   --  47* 43*   CR 1.22 1.19  --   --  1.15 1.14   * 166*  --   --  189* 241*     CBC  Recent Labs   Lab 09/10/19  0440 09/09/19  2246 09/09/19  0501 09/07/19  1600   WBC 14.4* 17.1* 17.8* 19.3*   RBC 2.43* 2.54* 2.52* 2.80*   HGB 7.5* 7.8* 7.7* 8.3*   HCT 25.9* 26.5* 26.1* 28.4*   * 104* 104* 101*   MCH 30.9 30.7 30.6 29.6   MCHC 29.0* 29.4* 29.5* 29.2*   RDW 20.8* 20.6* 20.5* 19.5*    191 178 184       Plan & Follow up:    Follow up with device clinic in 7-10 days    Oral antibiotics x 5 days    Keep incision clean and dry for 72 hours post procedure    No lifting > 10lbs or over shoulder level with left arm for 4 weeks    Notify device clinic/EP immediately for any redness, swelling, bleeding, discharge, fevers or chills.    The patient states understanding and is agreeable with the plan.       MARKUS Urrutia CNP  Electrophysiology Consult Service  Pager: [phone]

## 2019-09-11 NOTE — PLAN OF CARE
Discharge Planner SLP   Patient plan for discharge: none stated   Current status: SLP: Pt with functional tolerance of current diet level. Pt politely declined advanced solid textures today. Recommend pt continue dysphagia diet 3 and thin liquids. Pt should be fully upright and alert for all PO, take small single sips/bites, slow pacing, and alternate between consistencies. ST to continue to follow.   Barriers to return to prior living situation: dysphagia, respiratory status   Recommendations for discharge: TCU  Rationale for recommendations: pt would benefit from continued ST to safely return to baseline diet level        Entered by: Amy Cole 09/11/2019 10:25 AM

## 2019-09-11 NOTE — PLAN OF CARE
Neuro: A&Ox4.   Cardiac: SR. VSS.   Respiratory: Sating >88% on 55-70% fiO2 Bipap.  GI/: Adequate urine output. BM X1  Diet/appetite: Tolerating DD3 diet. Eating well.  Activity:  Assist of 1, up to chair.  Pain: At acceptable level on current regimen.   Skin: No new deficits noted.  LDA's: Right PICC, PIVx1.     Plan: Continue with POC. Notify primary team with changes.

## 2019-09-11 NOTE — PROGRESS NOTES
Calorie Count  Intake recorded for: 9/10  Total Kcals: 904 Total Protein: 59g  Kcals from Hospital Food: 904  Protein: 59g  Kcals from Outside Food (average):0 Protein: 0g  # Meals Recorded: 2 meals (First - 100% ice cream, skim milk, baked cod, mashed potatoes w/ gravy)      (Second - 100% Greek yogurt, milk, 50% peaches, meatloaf & mashed potatoes w/ gravy)  # Supplements Recorded: 0

## 2019-09-11 NOTE — PLAN OF CARE
Discharge Planner PT / 6B   Patient plan for discharge: Not discussed today.  Current status: Pt s/p ICD - education provided on post-op restrictions and implications for functional mobility. Pt completes supine > sit with HOB elevated and CGA. Pt demonstrates sit <> stand transfers with FWW + CGA from EOB. Pt performs stand-pivot transfer from EOB > chair with FWW + CGA. Pt engaged in seated/standing LE exercises to progress strength/endurance. Pt tolerates activity well, does continue to require seated rest breaks throughout session 2/2 fatigue/CRANE. Pt maintains SpO2 >90% on 100% FiO2 HFNC with activity this date.   Barriers to return to prior living situation: Medical needs, O2 requirements, weakness/deconditioning, impaired balance, level of assist.  Recommendations for discharge: TCU at this time.  Rationale for recommendations: Pt is below baseline for mobility. Pt will require continued skilled therapy to progress strength, balance, activity tolerance and functional independence.

## 2019-09-11 NOTE — PROGRESS NOTES
CLINICAL NUTRITION SERVICES - REASSESSMENT NOTE     Nutrition Prescription    RECOMMENDATIONS FOR MDs/PROVIDERS TO ORDER:  None at this time    Malnutrition Status:    Non-severe malnutrition in the context of chronic illness     Recommendations already ordered by Registered Dietitian (RD):  Pt may order supplement PRN     For pressure injury ordered 1. Adult Pressure Injury Care Protocol  -Thera-vit-M daily  -Vitamin C (ascorbic acid) 500 mg x 10 days  -Vitamin A 20,000 units x 10 days  -Zinc Sulfate 220 mg x 10 days    Future/Additional Recommendations:  Adequacy of oral intake, need for scheduled supplements   Monitor weight trends      EVALUATION OF THE PROGRESS TOWARD GOALS   Diet: Dysphagia Level 3:  Advanced (9/10), 2000 ml fluid restriction (9/8)  Nutrition Support: Discontinued 9/10 (Nutren 1.5 @ 60 mL/hr x12 hours)     Intake: % of intakes   Calorie count: 9/10: 904 calories and 59 g protein     9/9: 1250 calories and 54 g protein     9/8: 1069 calories and 63 g protein   Average oral intake: 1074 calories and 59 g protein meeting 54% and 56% of energy and protein needs respectively.     Average enteral nutrition intake (9/4-9/10) + prosource provided 1057 mL/day, 1620 calories (21 kcal/kg), and 116 g protein (1.4 g/kg)       NEW FINDINGS   Weight Trends: 79.7 kg (within 1 kg from admission weight)     GI: Patient reported he is feeling hungry, excited that feeding tube was removed. He has a swallow study this morning so did not eat breakfast but had corn flakes at bedside which he reported he was told to suck on for swallow function. Denied constipation/diarrhea, last bowel movement today.     Skin: Patient noted to have pressure injury on nare due to bridle and high flow O2. On 9/9- evaluated by WOCN 9/10- noted to be stage 2.      Labs: Glucose 174, 204, 259, 204     Medications:   Lasix   Protonix     MALNUTRITION  % Intake: Decreased intake does not meet criteria  % Weight Loss: None  noted  Subcutaneous Fat Loss: Facial region, Upper arm: and Lower arm: Mild  Muscle Loss: Facial & jaw region,  Upper arm (bicep, tricep),  Lower arm  (forearm), Upper leg (quadricep, hamstring), Patellar region and Posterior calf:  Mild-moderate  Fluid Accumulation/Edema: None noted  Malnutrition Diagnosis: Non-severe malnutrition in the context of chronic illness     Previous Goals   Total avg nutritional intake to meet a minimum of 25 kcal/kg and 1.3 g PRO/kg daily (per dosing wt 79 kg).  Evaluation: Met (TF + oral intake)     Previous Nutrition Diagnosis  Inadequate protein-energy intake related to slow advancement of TF and TF interupptions as evidenced by TF on providin kcals ( 20 kcals/kg), 84 g pro ( 1.1 g/kg pro) - not goal   Evaluation: No longer applicable, nutrition diagnosis changed below    CURRENT NUTRITION DIAGNOSIS  Inadequate oral intake related to inadequate consumption of high calorie/protien foods as evidenced by patient meeting 54% and 56% of low end estimated energy needs per calorie count     INTERVENTIONS  Implementation  Collaboration with other providers  Nutrition education for recommended modifications   -small frequent meals and snacks   - Intake of higher calorie/high protein foods to improve intake     Goals  Patient to consume % of nutritionally adequate meal trays TID, or the equivalent with supplements/snacks.    Monitoring/Evaluation  Progress toward goals will be monitored and evaluated per protocol.    Geeta Palm RD, GLORIA  6B pager: [phone]

## 2019-09-11 NOTE — PLAN OF CARE
Neuro: Patient alert and oriented x4. Pleasant.   Cardiac: NSR/wandering atrial pacemaker 90's-100's, BP's stable, afebrile.       Respiratory: Sating 88-92% on 60% HFNC. 100% FIO2 with activity. Desat's to 70's if O2 is not increased.   GI/: Adequate urine output via silva. No BM this shift.   Diet/appetite: DD3 diet with thin liquids. Eating well.   Activity:  Assist of 1 with walker, up to chair.  Pain: At acceptable level on current regimen.   Skin: No new deficits noted.  LDA's: Silva, right PICC.     Plan: NJ removed, TF DC'ed. X-ray completed this A.M. To check new ICD. Potassium replaced. Will continue to monitor.

## 2019-09-11 NOTE — PROGRESS NOTES
"  Interventional Cardiology Progress Note  Interval History:  - No acute events overnight  - Pt now s/p ICD, reports feeling well, slight left shoulder soreness  - Patient continues on HFNC, 55-80%, O2 sats 84-94%  - Continues to receive 60 mg IV Lasix BID with good UOP, Cr remains stable  - Patient denies SOB, lightheadedness, dizziness    Most recent vital signs:  /68 (BP Location: Left arm)   Pulse 87   Temp 98.6  F (37  C) (Oral)   Resp 20   Ht 1.727 m (5' 7.99\")   Wt 79.7 kg (175 lb 11.3 oz)   SpO2 94%   BMI 26.72 kg/m    Temp:  [97.8  F (36.6  C)-98.9  F (37.2  C)] 98.6  F (37  C)  Pulse:  [87-97] 87  Heart Rate:  [75-92] 90  Resp:  [20-24] 20  BP: ()/(36-68) 120/68  FiO2 (%):  [30 %-80 %] 60 %  SpO2:  [87 %-100 %] 94 %  Wt Readings from Last 2 Encounters:   09/11/19 79.7 kg (175 lb 11.3 oz)       Intake/Output Summary (Last 24 hours) at 9/11/2019 0913  Last data filed at 9/11/2019 0853  Gross per 24 hour   Intake 1460 ml   Output 3625 ml   Net -2165 ml       Physical exam:  General: In bed, in NAD  HEENT: EOMI, PERRLA, no scleral icterus or injection  CARDIAC: RRR, no m/r/g appreciated. Peripheral pulses 2+  RESP: CTAB, diminished in bases,  no wheezes, rhonchi or crackles appreciated.  GI: NABS, NT/ND, no guarding or rebound  : Mcclelland in place  EXTREMITIES: NO LE edema, pulses 2+  SKIN: No acute lesions appreciated, scattered bruising BUE  NEURO: Alert and oriented X3, CN II-XII grossly intact, no focal neurological deficits noted    Labs (Past three days):  CBC  Recent Labs   Lab 09/11/19  0638 09/10/19  0440 09/09/19  2246 09/09/19  0501   WBC 12.4* 14.4* 17.1* 17.8*   RBC 2.72* 2.43* 2.54* 2.52*   HGB 8.0* 7.5* 7.8* 7.7*   HCT 28.4* 25.9* 26.5* 26.1*   * 107* 104* 104*   MCH 29.4 30.9 30.7 30.6   MCHC 28.2* 29.0* 29.4* 29.5*   RDW 20.4* 20.8* 20.6* 20.5*    193 191 178     [facility]  Recent Labs   Lab 09/11/19  0638 09/10/19  0440 09/09/19  0501 09/09/19  0136  09/08/19  0422 " 09/07/19  1600 09/07/19  0352 09/06/19  1612    139 138  --   --  138 138 139 140   POTASSIUM 3.8 3.7 4.2 4.3   < > 3.8 4.4 3.8 3.9   CHLORIDE 101 102 103  --   --  102 103 105 107   CO2 30 26 28  --   --  25 26 28 26   ANIONGAP 9 10 7  --   --  11 9 7 6   * 134* 166*  --   --  189* 241* 149* 144*   BUN 51* 49* 54*  --   --  47* 43* 43* 38*   CR 1.02 1.22 1.19  --   --  1.15 1.14 1.01 0.89   GFRESTIMATED 67 54* 56*  --   --  58* 59* 68 79   GFRESTBLACK 78 63 65  --   --  67 68 79 >90   YOON 8.3* 8.2* 8.7  --   --  8.1* 8.2* 8.1* 8.2*   MAG  --   --   --   --   --  2.1 2.0 2.1 2.1   PHOS  --   --   --   --   --  3.2 3.2 3.4 2.6    < > = values in this interval not displayed.     Troponins: No results found for: TROPI, TROPONIN, TROPR, TROPN     INRNo lab results found in last 7 days.  Liver panel  Recent Labs   Lab 09/08/19  0422 09/07/19  1600 09/07/19  0352 09/06/19  1612   PROTTOTAL 5.9* 6.4* 5.6* 6.0*   ALBUMIN 2.3* 2.4* 2.2* 2.4*   BILITOTAL 0.7 0.7 0.8 0.6   ALKPHOS 60 63 54 52   AST 20 21 25 25   ALT 29 33 31 38       Imaging/procedure results:  EKG 12 Lead: NSR with occ PAC's, HR 87      EP Device 9/10/19:  EQUIPMENT  1.The Pulse Generator is a Twenty Jeans model 489146, Serial [id number].   2.The RV Lead is a Twenty Jeans model 878668 with atrial sensing, Serial number [id number]     MEASUREMENTS  The RV is sensing R waves of 13.5 mV and a pacing capture threshold of 0.6 V @ 0.4 msec with an impedance of 636 ohms.  P wave on the ICD lead's atrial sensor was 7.5mV.     FINAL PROGRAMMING  Joseph Settings:  -Pacing mode: VDD  -Lower Rate Limit: 50 ppm.  -Upper Rate Limit: 130 ppm.  Tachy Settings:  -Monitor Zone: set at 171 bpm MONITOR only  -VT Zone: set at 200bpm, Therapy ATP x3, 40Jx6  -VF zone: set at  240bpm, Therapy: ATP x1, 40Jx6    Assessment & Plan:  José Aguirre is an 83 year old male with a PMHx of PVD with renal artery stenosis s/p stenting in 2013, occluded R carotid artery and s/p left carotid  endarterectomy, left subclavian stenosis, CAD s/p CABG with L-LAD, S-D1 and OM2, PDA), COPD (on 2-3L home O2), and HLD who was admitted on 8/27/2019 with a VT/VF arrest with ROSC in the field. Patient was found to have disease in the LM into pLAD s/p PCI to LM->LAD and ost LCx.      #VT/VF arrest with ROSC prior to arrival  #CAD status post PCI to native LM into the LAD and ostial LCX  #History of CABG '03 (LIMA-LAD, SVG-D1, SVG-OM2, SVG-PDA)  8/27/19 admission after OOH cardiac arrest. He was at the [facility] and had a witnessed collapse. Bystander CPR was started and EMS found him in VT/VF. He was defibrillated X1 and given 1 of epi. ROSC was obtained in field. On arrival here, he had a pulse and was hypotensive, was intubated, taken for emergent coronary angiogram which showed severe 3V native vessel CAD with LM/LAD lesion and  of proximal RCA with L to right collaterals. LIMA-LAD graft minimally diseased but small and primarily retrograde flow through native LAD. SVG-RCA chronically closed at anastomosis, SVG-D1 and SBG-OM2 closed. He underwent PCI to dLM/ostial LAD with CONCETTA and balloon angioplasty of ostial LCx. He has preserved LV EF with mildly reduced RV function. Though he did have CAD and was stented, felt his burden of CAD was unlikely to be the source of his arrest.      -Continue aspirin and plavix   -Continue lipitor 40 mg daily   -Continue Toprol XL 12.5 mg   -s/p ICD 9/10  - Pt will need follow up with Dr. Osorio in 2-4 weeks after discharge for post cardiac arrest follow up.     #Hypoxia, multifactorial   #COPD exacerbation   #Pulmonary edema, history pleural effusion status post thoracentesis on left, residual small bilateral pleural effusions  Patient on 2-3L O2 at home for COPD. Status post arrest with significant pulmonary edema and COPD exacerbation requiring HFNC and BiPAP. Patient with left pleural effusion status post thora on 9/5 on left and has residual unchanged, small bilateral  effusions. Patient being aggressively diuresed with 60 IV Lasix q12, making >2L UOP daily. Volume in setting of aggressive resuscitation. Atelectasis in the setting of rib fracture pain after CPR. 9/10 pt triggered sepsis protocol, requiring more O2, lactic acid initially 2.1, recheck 1.9. Cxr with improving pulm edema/infection.  Patient denies SOB.      - RT COPD consult, appreciate their assistance, pt to be set up with Pulmonology as OP to establish care   - Prednisone burst/taper  - S/P Zosyn, Rocephin (finished 14d tx 9/10)  - Frequent pulmonary toilet/physiotherapy   - Nebs, inhalers   - Encourage IS (difficult 2/2 chest wall pain though this is improving)   - Fluid restriction, I/O's.   - Would continue to diurese until bump in creat then transition to orals   - Continue to wean O2 as able, O2 sat goal >87%     # Chest wall pain, resolved  # Rib fracture   Patient states pain is improving overall. He is not requiring any narcotics and would tend to avoid as he did have witnessed probably delirium in the ICU manifesting as seizure like activity without seizures on EEG. Moreover component of respiratory depression with this. Patient tolerating chest physiotherapy and feeling well in this regard.   - Prn Tylenol for mild-mod pain     # Traumatic Silva   Patient does straight cath at home. Traumatic silva iatrogenic, will continue with indwelling silva for strict I/O and to maintain patency in setting of injury and inflammation   -Continue with silva for now      # PVD with renal artery stenosis s/p stenting in 2013   # occluded R carotid artery  #s/p left carotid endarterectomy  # Hx left subclavian stenosis  - Continue aspirin and plavix      Lines in Place:  PICC RUE: 8/29/19  Silva catheter: 8/27/19  Current lines are required for patient management     Prophylaxis:   GI: Protonix   DVT: SCD's, subcutaneous heparin     Diet: DD3, stop TF (patient tolerated POs), continue to ADAT per SP  Activity: Up as  tolerated, qshift  Code Status: Full  Disposition: Likely require TCU vs ARU, possible later this week pending O2 requirements, diuresis    Patient discussed w/ Dr. Osorio.      Kailyn Bruce, APRN, CNP  [facility]  Interventional Cardiology  [phone]

## 2019-09-12 ENCOUNTER — APPOINTMENT (OUTPATIENT)
Dept: GENERAL RADIOLOGY | Facility: CLINIC | Age: 84
DRG: 224 | End: 2019-09-12
Attending: PHYSICIAN ASSISTANT
Payer: MEDICARE

## 2019-09-12 ENCOUNTER — APPOINTMENT (OUTPATIENT)
Dept: SPEECH THERAPY | Facility: CLINIC | Age: 84
DRG: 224 | End: 2019-09-12
Payer: MEDICARE

## 2019-09-12 ENCOUNTER — APPOINTMENT (OUTPATIENT)
Dept: CT IMAGING | Facility: CLINIC | Age: 84
DRG: 224 | End: 2019-09-12
Attending: PHYSICIAN ASSISTANT
Payer: MEDICARE

## 2019-09-12 ENCOUNTER — APPOINTMENT (OUTPATIENT)
Dept: PHYSICAL THERAPY | Facility: CLINIC | Age: 84
DRG: 224 | End: 2019-09-12
Payer: MEDICARE

## 2019-09-12 ENCOUNTER — APPOINTMENT (OUTPATIENT)
Dept: OCCUPATIONAL THERAPY | Facility: CLINIC | Age: 84
DRG: 224 | End: 2019-09-12
Payer: MEDICARE

## 2019-09-12 LAB
ALBUMIN UR-MCNC: NEGATIVE MG/DL
ANION GAP SERPL CALCULATED.3IONS-SCNC: 8 MMOL/L (ref 3–14)
APPEARANCE UR: CLEAR
BACTERIA SPEC CULT: NORMAL
BILIRUB UR QL STRIP: NEGATIVE
BUN SERPL-MCNC: 45 MG/DL (ref 7–30)
CALCIUM SERPL-MCNC: 8 MG/DL (ref 8.5–10.1)
CHLORIDE SERPL-SCNC: 101 MMOL/L (ref 94–109)
CO2 SERPL-SCNC: 26 MMOL/L (ref 20–32)
COLOR UR AUTO: ABNORMAL
CREAT SERPL-MCNC: 1.08 MG/DL (ref 0.66–1.25)
ERYTHROCYTE [DISTWIDTH] IN BLOOD BY AUTOMATED COUNT: 20.3 % (ref 10–15)
GFR SERPL CREATININE-BSD FRML MDRD: 63 ML/MIN/{1.73_M2}
GLUCOSE BLDC GLUCOMTR-MCNC: 132 MG/DL (ref 70–99)
GLUCOSE BLDC GLUCOMTR-MCNC: 190 MG/DL (ref 70–99)
GLUCOSE BLDC GLUCOMTR-MCNC: 231 MG/DL (ref 70–99)
GLUCOSE SERPL-MCNC: 136 MG/DL (ref 70–99)
GLUCOSE UR STRIP-MCNC: NEGATIVE MG/DL
HCT VFR BLD AUTO: 28.6 % (ref 40–53)
HGB BLD-MCNC: 8.2 G/DL (ref 13.3–17.7)
HGB UR QL STRIP: ABNORMAL
HYALINE CASTS #/AREA URNS LPF: 2 /LPF (ref 0–2)
KETONES UR STRIP-MCNC: NEGATIVE MG/DL
LACTATE BLD-SCNC: 1.2 MMOL/L (ref 0.7–2)
LEUKOCYTE ESTERASE UR QL STRIP: ABNORMAL
Lab: NORMAL
MAGNESIUM SERPL-MCNC: 2.5 MG/DL (ref 1.6–2.3)
MCH RBC QN AUTO: 29.8 PG (ref 26.5–33)
MCHC RBC AUTO-ENTMCNC: 28.7 G/DL (ref 31.5–36.5)
MCV RBC AUTO: 104 FL (ref 78–100)
MDC_IDC_LEAD_IMPLANT_DT: NORMAL
MDC_IDC_LEAD_LOCATION: NORMAL
MDC_IDC_LEAD_LOCATION_DETAIL_1: NORMAL
MDC_IDC_LEAD_MFG: NORMAL
MDC_IDC_LEAD_MODEL: NORMAL
MDC_IDC_LEAD_SERIAL: NORMAL
MDC_IDC_MSMT_BATTERY_STATUS: NORMAL
MDC_IDC_MSMT_BATTERY_VOLTAGE: 3.1 V
MDC_IDC_MSMT_CAP_CHARGE_TYPE: NORMAL
MDC_IDC_MSMT_LEADCHNL_RA_SENSING_INTR_AMPL: 10.3 MV
MDC_IDC_MSMT_LEADCHNL_RV_IMPEDANCE_VALUE: 599 OHM
MDC_IDC_MSMT_LEADCHNL_RV_PACING_THRESHOLD_AMPLITUDE: 0.3 V
MDC_IDC_MSMT_LEADCHNL_RV_PACING_THRESHOLD_AMPLITUDE: 0.3 V
MDC_IDC_MSMT_LEADCHNL_RV_PACING_THRESHOLD_PULSEWIDTH: 0.4 MS
MDC_IDC_MSMT_LEADCHNL_RV_PACING_THRESHOLD_PULSEWIDTH: 0.4 MS
MDC_IDC_MSMT_LEADCHNL_RV_SENSING_INTR_AMPL: 15.1 MV
MDC_IDC_PG_IMPLANT_DTM: NORMAL
MDC_IDC_PG_MFG: NORMAL
MDC_IDC_PG_MODEL: NORMAL
MDC_IDC_PG_SERIAL: NORMAL
MDC_IDC_PG_TYPE: NORMAL
MDC_IDC_SESS_CLINIC_NAME: NORMAL
MDC_IDC_SESS_DTM: NORMAL
MDC_IDC_SESS_REPROGRAMMED: NORMAL
MDC_IDC_SESS_TYPE: NORMAL
MDC_IDC_SET_BRADY_AT_MODE_SWITCH_MODE: NORMAL
MDC_IDC_SET_BRADY_AT_MODE_SWITCH_RATE: 160 {BEATS}/MIN
MDC_IDC_SET_BRADY_HYSTRATE: 50 {BEATS}/MIN
MDC_IDC_SET_BRADY_LOWRATE: 50 {BEATS}/MIN
MDC_IDC_SET_BRADY_MAX_SENSOR_RATE: 130 {BEATS}/MIN
MDC_IDC_SET_BRADY_MAX_TRACKING_RATE: 140 {BEATS}/MIN
MDC_IDC_SET_BRADY_MODE: NORMAL
MDC_IDC_SET_BRADY_NIGHT_RATE: 50 {BEATS}/MIN
MDC_IDC_SET_CRT_PACED_CHAMBERS: NORMAL
MDC_IDC_SET_LEADCHNL_LV_PACING_ANODE_ELECTRODE_1: NORMAL
MDC_IDC_SET_LEADCHNL_LV_PACING_ANODE_LOCATION_1: NORMAL
MDC_IDC_SET_LEADCHNL_LV_PACING_CATHODE_ELECTRODE_1: NORMAL
MDC_IDC_SET_LEADCHNL_LV_PACING_CATHODE_LOCATION_1: NORMAL
MDC_IDC_SET_LEADCHNL_LV_PACING_POLARITY: NORMAL
MDC_IDC_SET_LEADCHNL_LV_SENSING_ANODE_ELECTRODE_1: NORMAL
MDC_IDC_SET_LEADCHNL_LV_SENSING_ANODE_LOCATION_1: NORMAL
MDC_IDC_SET_LEADCHNL_LV_SENSING_CATHODE_ELECTRODE_1: NORMAL
MDC_IDC_SET_LEADCHNL_LV_SENSING_CATHODE_LOCATION_1: NORMAL
MDC_IDC_SET_LEADCHNL_LV_SENSING_POLARITY: NORMAL
MDC_IDC_SET_LEADCHNL_RA_PACING_ANODE_ELECTRODE_1: NORMAL
MDC_IDC_SET_LEADCHNL_RA_PACING_ANODE_LOCATION_1: NORMAL
MDC_IDC_SET_LEADCHNL_RA_PACING_CATHODE_ELECTRODE_1: NORMAL
MDC_IDC_SET_LEADCHNL_RA_PACING_CATHODE_LOCATION_1: NORMAL
MDC_IDC_SET_LEADCHNL_RA_PACING_POLARITY: NORMAL
MDC_IDC_SET_LEADCHNL_RA_SENSING_POLARITY: NORMAL
MDC_IDC_SET_LEADCHNL_RA_SENSING_SENSITIVITY: 0.4 MV
MDC_IDC_SET_LEADCHNL_RV_PACING_AMPLITUDE: 1.3 V
MDC_IDC_SET_LEADCHNL_RV_PACING_POLARITY: NORMAL
MDC_IDC_SET_LEADCHNL_RV_PACING_PULSEWIDTH: 0.4 MS
MDC_IDC_SET_LEADCHNL_RV_SENSING_POLARITY: NORMAL
MDC_IDC_SET_LEADCHNL_RV_SENSING_SENSITIVITY: 0.8 MV
MDC_IDC_SET_ZONE_DETECTION_INTERVAL: 250 MS
MDC_IDC_SET_ZONE_DETECTION_INTERVAL: 300 MS
MDC_IDC_SET_ZONE_DETECTION_INTERVAL: 350 MS
MDC_IDC_SET_ZONE_TYPE: NORMAL
MDC_IDC_STAT_AT_MODE_SW_COUNT: 1
MDC_IDC_STAT_BRADY_RV_PERCENT_PACED: 3 %
MDC_IDC_STAT_EPISODE_RECENT_COUNT: 0
MDC_IDC_STAT_EPISODE_RECENT_COUNT: 1
MDC_IDC_STAT_EPISODE_TYPE: NORMAL
MDC_IDC_STAT_TACHYTHERAPY_SHOCKS_ABORTED_TOTAL: 0
MDC_IDC_STAT_TACHYTHERAPY_SHOCKS_DELIVERED_RECENT: 0
MDC_IDC_STAT_TACHYTHERAPY_SHOCKS_DELIVERED_TOTAL: 0
MUCOUS THREADS #/AREA URNS LPF: PRESENT /LPF
NITRATE UR QL: NEGATIVE
NT-PROBNP SERPL-MCNC: 1923 PG/ML (ref 0–1800)
PH UR STRIP: 6.5 PH (ref 5–7)
PHOSPHATE SERPL-MCNC: 3.4 MG/DL (ref 2.5–4.5)
PLATELET # BLD AUTO: 213 10E9/L (ref 150–450)
POTASSIUM SERPL-SCNC: 3.6 MMOL/L (ref 3.4–5.3)
PROCALCITONIN SERPL-MCNC: 0.36 NG/ML
RBC # BLD AUTO: 2.75 10E12/L (ref 4.4–5.9)
RBC #/AREA URNS AUTO: 4 /HPF (ref 0–2)
SODIUM SERPL-SCNC: 136 MMOL/L (ref 133–144)
SOURCE: ABNORMAL
SP GR UR STRIP: 1.01 (ref 1–1.03)
SPECIMEN SOURCE: NORMAL
UROBILINOGEN UR STRIP-MCNC: NORMAL MG/DL (ref 0–2)
WBC # BLD AUTO: 17.7 10E9/L (ref 4–11)
WBC #/AREA URNS AUTO: 5 /HPF (ref 0–5)

## 2019-09-12 PROCEDURE — 81001 URINALYSIS AUTO W/SCOPE: CPT | Performed by: PHYSICIAN ASSISTANT

## 2019-09-12 PROCEDURE — 84145 PROCALCITONIN (PCT): CPT | Performed by: PHYSICIAN ASSISTANT

## 2019-09-12 PROCEDURE — 93005 ELECTROCARDIOGRAM TRACING: CPT

## 2019-09-12 PROCEDURE — 83880 ASSAY OF NATRIURETIC PEPTIDE: CPT | Performed by: PHYSICIAN ASSISTANT

## 2019-09-12 PROCEDURE — 36592 COLLECT BLOOD FROM PICC: CPT | Performed by: PHYSICIAN ASSISTANT

## 2019-09-12 PROCEDURE — 92526 ORAL FUNCTION THERAPY: CPT | Mod: GN

## 2019-09-12 PROCEDURE — 71045 X-RAY EXAM CHEST 1 VIEW: CPT

## 2019-09-12 PROCEDURE — 99207 ZZC NO BILLABLE SERVICE THIS VISIT: CPT | Performed by: INTERNAL MEDICINE

## 2019-09-12 PROCEDURE — 00000146 ZZHCL STATISTIC GLUCOSE BY METER IP

## 2019-09-12 PROCEDURE — 94640 AIRWAY INHALATION TREATMENT: CPT

## 2019-09-12 PROCEDURE — 93010 ELECTROCARDIOGRAM REPORT: CPT | Performed by: INTERNAL MEDICINE

## 2019-09-12 PROCEDURE — 94668 MNPJ CHEST WALL SBSQ: CPT

## 2019-09-12 PROCEDURE — 40000802 ZZH SITE CHECK

## 2019-09-12 PROCEDURE — 80048 BASIC METABOLIC PNL TOTAL CA: CPT | Performed by: PHYSICIAN ASSISTANT

## 2019-09-12 PROCEDURE — 25000125 ZZHC RX 250

## 2019-09-12 PROCEDURE — 85027 COMPLETE CBC AUTOMATED: CPT | Performed by: PHYSICIAN ASSISTANT

## 2019-09-12 PROCEDURE — 99233 SBSQ HOSP IP/OBS HIGH 50: CPT | Performed by: PHYSICIAN ASSISTANT

## 2019-09-12 PROCEDURE — 40000809 ZZH STATISTIC NO DOCUMENTATION TO SUPPORT CHARGE

## 2019-09-12 PROCEDURE — 25000132 ZZH RX MED GY IP 250 OP 250 PS 637: Mod: GY | Performed by: INTERNAL MEDICINE

## 2019-09-12 PROCEDURE — 94660 CPAP INITIATION&MGMT: CPT

## 2019-09-12 PROCEDURE — 97535 SELF CARE MNGMENT TRAINING: CPT | Mod: GO

## 2019-09-12 PROCEDURE — C9113 INJ PANTOPRAZOLE SODIUM, VIA: HCPCS

## 2019-09-12 PROCEDURE — 71275 CT ANGIOGRAPHY CHEST: CPT

## 2019-09-12 PROCEDURE — 40000275 ZZH STATISTIC RCP TIME EA 10 MIN

## 2019-09-12 PROCEDURE — 87086 URINE CULTURE/COLONY COUNT: CPT | Performed by: PHYSICIAN ASSISTANT

## 2019-09-12 PROCEDURE — 83735 ASSAY OF MAGNESIUM: CPT | Performed by: PHYSICIAN ASSISTANT

## 2019-09-12 PROCEDURE — 25000128 H RX IP 250 OP 636: Performed by: PHYSICIAN ASSISTANT

## 2019-09-12 PROCEDURE — 25000128 H RX IP 250 OP 636

## 2019-09-12 PROCEDURE — 25000128 H RX IP 250 OP 636: Performed by: STUDENT IN AN ORGANIZED HEALTH CARE EDUCATION/TRAINING PROGRAM

## 2019-09-12 PROCEDURE — 25000132 ZZH RX MED GY IP 250 OP 250 PS 637: Mod: GY | Performed by: STUDENT IN AN ORGANIZED HEALTH CARE EDUCATION/TRAINING PROGRAM

## 2019-09-12 PROCEDURE — 25000132 ZZH RX MED GY IP 250 OP 250 PS 637: Mod: GY | Performed by: NURSE PRACTITIONER

## 2019-09-12 PROCEDURE — 83605 ASSAY OF LACTIC ACID: CPT

## 2019-09-12 PROCEDURE — 97110 THERAPEUTIC EXERCISES: CPT | Mod: GP

## 2019-09-12 PROCEDURE — 25000131 ZZH RX MED GY IP 250 OP 636 PS 637: Mod: GY | Performed by: STUDENT IN AN ORGANIZED HEALTH CARE EDUCATION/TRAINING PROGRAM

## 2019-09-12 PROCEDURE — 94640 AIRWAY INHALATION TREATMENT: CPT | Mod: 76

## 2019-09-12 PROCEDURE — 25000125 ZZHC RX 250: Performed by: STUDENT IN AN ORGANIZED HEALTH CARE EDUCATION/TRAINING PROGRAM

## 2019-09-12 PROCEDURE — 97110 THERAPEUTIC EXERCISES: CPT | Mod: GO

## 2019-09-12 PROCEDURE — 40000047 ZZH STATISTIC CTO2 CONT OXYGEN TECH TIME EA 90 MIN

## 2019-09-12 PROCEDURE — 12000004 ZZH R&B IMCU UMMC

## 2019-09-12 PROCEDURE — 94667 MNPJ CHEST WALL 1ST: CPT

## 2019-09-12 PROCEDURE — 84100 ASSAY OF PHOSPHORUS: CPT | Performed by: PHYSICIAN ASSISTANT

## 2019-09-12 PROCEDURE — 40000274 ZZH STATISTIC RCP CONSULT EA 30 MIN

## 2019-09-12 RX ORDER — FUROSEMIDE 10 MG/ML
80 INJECTION INTRAMUSCULAR; INTRAVENOUS EVERY 12 HOURS
Status: DISCONTINUED | OUTPATIENT
Start: 2019-09-12 | End: 2019-09-14

## 2019-09-12 RX ORDER — IOPAMIDOL 755 MG/ML
61 INJECTION, SOLUTION INTRAVASCULAR ONCE
Status: COMPLETED | OUTPATIENT
Start: 2019-09-12 | End: 2019-09-12

## 2019-09-12 RX ORDER — IPRATROPIUM BROMIDE AND ALBUTEROL SULFATE 2.5; .5 MG/3ML; MG/3ML
3 SOLUTION RESPIRATORY (INHALATION)
Status: DISCONTINUED | OUTPATIENT
Start: 2019-09-13 | End: 2019-09-19

## 2019-09-12 RX ORDER — ACETYLCYSTEINE 200 MG/ML
2 SOLUTION ORAL; RESPIRATORY (INHALATION) 4 TIMES DAILY
Status: DISCONTINUED | OUTPATIENT
Start: 2019-09-13 | End: 2019-09-19

## 2019-09-12 RX ADMIN — Medication 5 ML: at 09:46

## 2019-09-12 RX ADMIN — PANTOPRAZOLE SODIUM 40 MG: 40 INJECTION, POWDER, FOR SOLUTION INTRAVENOUS at 07:46

## 2019-09-12 RX ADMIN — ACETYLCYSTEINE 4 ML: 200 SOLUTION ORAL; RESPIRATORY (INHALATION) at 03:48

## 2019-09-12 RX ADMIN — FERROUS SULFATE TAB 325 MG (65 MG ELEMENTAL FE) 325 MG: 325 (65 FE) TAB at 07:46

## 2019-09-12 RX ADMIN — FUROSEMIDE 80 MG: 10 INJECTION, SOLUTION INTRAVENOUS at 17:35

## 2019-09-12 RX ADMIN — FUROSEMIDE 60 MG: 10 INJECTION, SOLUTION INTRAVENOUS at 05:46

## 2019-09-12 RX ADMIN — Medication 12.5 MG: at 07:46

## 2019-09-12 RX ADMIN — SODIUM CHLORIDE, PRESERVATIVE FREE 5 ML: 5 INJECTION INTRAVENOUS at 11:39

## 2019-09-12 RX ADMIN — ACETYLCYSTEINE 2 ML: 200 SOLUTION ORAL; RESPIRATORY (INHALATION) at 20:36

## 2019-09-12 RX ADMIN — CEPHALEXIN 500 MG: 250 CAPSULE ORAL at 07:46

## 2019-09-12 RX ADMIN — IPRATROPIUM BROMIDE AND ALBUTEROL SULFATE 3 ML: .5; 3 SOLUTION RESPIRATORY (INHALATION) at 09:25

## 2019-09-12 RX ADMIN — PANTOPRAZOLE SODIUM 40 MG: 40 INJECTION, POWDER, FOR SOLUTION INTRAVENOUS at 20:26

## 2019-09-12 RX ADMIN — CEPHALEXIN 500 MG: 250 CAPSULE ORAL at 14:11

## 2019-09-12 RX ADMIN — IPRATROPIUM BROMIDE AND ALBUTEROL SULFATE 3 ML: .5; 3 SOLUTION RESPIRATORY (INHALATION) at 03:48

## 2019-09-12 RX ADMIN — PREDNISONE 20 MG: 20 TABLET ORAL at 07:46

## 2019-09-12 RX ADMIN — IPRATROPIUM BROMIDE AND ALBUTEROL SULFATE 3 ML: .5; 3 SOLUTION RESPIRATORY (INHALATION) at 20:35

## 2019-09-12 RX ADMIN — IPRATROPIUM BROMIDE AND ALBUTEROL SULFATE 3 ML: .5; 3 SOLUTION RESPIRATORY (INHALATION) at 12:07

## 2019-09-12 RX ADMIN — IOPAMIDOL 61 ML: 755 INJECTION, SOLUTION INTRAVENOUS at 17:04

## 2019-09-12 RX ADMIN — ACETYLCYSTEINE 2 ML: 200 SOLUTION ORAL; RESPIRATORY (INHALATION) at 12:08

## 2019-09-12 RX ADMIN — ACETYLCYSTEINE 2 ML: 200 SOLUTION ORAL; RESPIRATORY (INHALATION) at 09:25

## 2019-09-12 RX ADMIN — IPRATROPIUM BROMIDE AND ALBUTEROL SULFATE 3 ML: .5; 3 SOLUTION RESPIRATORY (INHALATION) at 00:03

## 2019-09-12 RX ADMIN — ATORVASTATIN CALCIUM 40 MG: 40 TABLET, FILM COATED ORAL at 20:26

## 2019-09-12 RX ADMIN — IPRATROPIUM BROMIDE AND ALBUTEROL SULFATE 3 ML: .5; 3 SOLUTION RESPIRATORY (INHALATION) at 16:11

## 2019-09-12 RX ADMIN — ASPIRIN 81 MG CHEWABLE TABLET 81 MG: 81 TABLET CHEWABLE at 07:46

## 2019-09-12 RX ADMIN — ACETYLCYSTEINE 4 ML: 200 SOLUTION ORAL; RESPIRATORY (INHALATION) at 00:04

## 2019-09-12 RX ADMIN — CLOPIDOGREL BISULFATE 75 MG: 75 TABLET, FILM COATED ORAL at 07:46

## 2019-09-12 RX ADMIN — ACETYLCYSTEINE 2 ML: 200 SOLUTION ORAL; RESPIRATORY (INHALATION) at 16:12

## 2019-09-12 RX ADMIN — POTASSIUM CHLORIDE 20 MEQ: 750 TABLET, EXTENDED RELEASE ORAL at 14:11

## 2019-09-12 RX ADMIN — CEPHALEXIN 500 MG: 250 CAPSULE ORAL at 20:26

## 2019-09-12 ASSESSMENT — ACTIVITIES OF DAILY LIVING (ADL)
ADLS_ACUITY_SCORE: 17

## 2019-09-12 ASSESSMENT — MIFFLIN-ST. JEOR: SCORE: 1460.37

## 2019-09-12 NOTE — PROGRESS NOTES
[facility]   Cardiology Progress      Interval History: Denies fever, chills, nausea, vomiting, chest pain, SOB, cough, abdominal pain, dysuria, LE pain/swelling.  No acute events overnight    Daily Changes:  - pulmonary consult  - procalcitonin  - BNP  - will attempt to wean O2 at bedside.  - repeat EKG given concern for possible atrial fibrillation  - increase lasix to 80mg IV BID  - sputum culture, urine culture  - portable CXR    Physical Exam:  Temp:  [97.8  F (36.6  C)-98.6  F (37  C)] 98.1  F (36.7  C)  Pulse:  [] 88  Heart Rate:  [85-93] 93  Resp:  [18-22] 20  BP: (104-120)/(57-68) 104/64  FiO2 (%):  [60 %-70 %] 60 %  SpO2:  [92 %-100 %] 92 %    GEN: NAD  Pulm: poor air movement. Bibasilar rales. Scattered rhonchi  Cardiac: Distant heart sounds. JVP not appreciably elevated, no appreciable murmurs.  no edema.  GI: soft, non distended  Neuro: CN II-XII grossly intact. Alert and oriented x4.    Medications:    acetylcysteine  2 mL Nebulization Q4H NATE     aspirin  81 mg Oral Daily     atorvastatin  40 mg Oral QPM     cephALEXin  500 mg Oral TID     clopidogrel  75 mg Oral Daily     ferrous sulfate  325 mg Oral Daily with breakfast     furosemide  80 mg Intravenous Q12H     heparin lock flush  5-10 mL Intracatheter Q24H     insulin aspart  1-10 Units Subcutaneous TID AC     insulin aspart  1-7 Units Subcutaneous At Bedtime     ipratropium - albuterol 0.5 mg/2.5 mg/3 mL  3 mL Nebulization Q4H     metoprolol succinate ER  12.5 mg Oral Daily     mometasone  2 puff Inhalation QPM     pantoprazole (PROTONIX) IV  40 mg Intravenous BID     predniSONE  20 mg Oral or Feeding Tube Daily    Followed by     [START ON 9/14/2019] predniSONE  10 mg Oral or Feeding Tube Daily    Followed by     [START ON 9/19/2019] predniSONE  5 mg Oral or Feeding Tube Daily     senna-docusate  1 tablet Oral or Feeding Tube At Bedtime     sodium chloride (PF)  3 mL Intracatheter Q8H       IV fluid  REPLACEMENT ONLY       Percutaneous Coronary Intervention orders placed (this is information for BPA alerting)       ACE/ARB/ARNI NOT PRESCRIBED         Labs:   CMP  Recent Labs   Lab 09/12/19  0435 09/11/19  0638 09/10/19  0440 09/09/19  0501  09/08/19 0422 09/07/19  1600 09/07/19  0352 09/06/19  1612    140 139 138  --  138 138 139 140   POTASSIUM 3.6 3.8 3.7 4.2   < > 3.8 4.4 3.8 3.9   CHLORIDE 101 101 102 103  --  102 103 105 107   CO2 26 30 26 28  --  25 26 28 26   ANIONGAP 8 9 10 7  --  11 9 7 6   * 174* 134* 166*  --  189* 241* 149* 144*   BUN 45* 51* 49* 54*  --  47* 43* 43* 38*   CR 1.08 1.02 1.22 1.19  --  1.15 1.14 1.01 0.89   GFRESTIMATED 63 67 54* 56*  --  58* 59* 68 79   GFRESTBLACK 73 78 63 65  --  67 68 79 >90   YOON 8.0* 8.3* 8.2* 8.7  --  8.1* 8.2* 8.1* 8.2*   MAG 2.5*  --   --   --   --  2.1 2.0 2.1 2.1   PHOS 3.4  --   --   --   --  3.2 3.2 3.4 2.6   PROTTOTAL  --   --   --   --   --  5.9* 6.4* 5.6* 6.0*   ALBUMIN  --   --   --   --   --  2.3* 2.4* 2.2* 2.4*   BILITOTAL  --   --   --   --   --  0.7 0.7 0.8 0.6   ALKPHOS  --   --   --   --   --  60 63 54 52   AST  --   --   --   --   --  20 21 25 25   ALT  --   --   --   --   --  29 33 31 38    < > = values in this interval not displayed.     CBC  Recent Labs   Lab 09/12/19  0435 09/11/19  0638 09/10/19  0440 09/09/19  2246   WBC 17.7* 12.4* 14.4* 17.1*   RBC 2.75* 2.72* 2.43* 2.54*   HGB 8.2* 8.0* 7.5* 7.8*   HCT 28.6* 28.4* 25.9* 26.5*   * 104* 107* 104*   MCH 29.8 29.4 30.9 30.7   MCHC 28.7* 28.2* 29.0* 29.4*   RDW 20.3* 20.4* 20.8* 20.6*    203 193 191     INRNo lab results found in last 7 days.  Arterial Blood Gas  Recent Labs   Lab 09/07/19  0910   O2PER 80     ASSESSMENT/PLAN:  José Aguirre is an 83 year old male with a PMHx of PVD with renal artery stenosis s/p stenting in 2013, occluded R carotid artery and s/p left carotid endarterectomy, left subclavian stenosis, CAD s/p CABG with L-LAD, S-D1 and OM2, PDA),  COPD (on 2-3L home O2), and HLD who was admitted on 8/27/2019 with a VT/VF arrest with ROSC in the field. Patient was found to have disease in the LM into pLAD s/p PCI to LM->LAD and ost LCx.      #VT/VF arrest with ROSC prior to arrival  #CAD status post PCI to native LM into the LAD and ostial LCX  #History of CABG '03 (LIMA-LAD, SVG-D1, SVG-OM2, SVG-PDA)  8/27/19 admission after OOH cardiac arrest. He was at [facility] and had a witnessed collapse. Bystander CPR was started and EMS found him in VT/VF. He was defibrillated X1 and given 1 of epi. ROSC was obtained in field. On arrival here, he had a pulse and was hypotensive, was intubated, taken for emergent coronary angiogram which showed severe 3V native vessel CAD with LM/LAD lesion and  of proximal RCA with L to right collaterals. LIMA-LAD graft minimally diseased but small and primarily retrograde flow through native LAD. SVG-RCA chronically closed at anastomosis, SVG-D1 and SBG-OM2 closed. He underwent PCI to dLM/ostial LAD with CONCETTA and balloon angioplasty of ostial LCx. He has preserved LV EF with mildly reduced RV function. Though he did have CAD and was stented, felt his burden of CAD was unlikely to be the source of his arrest.   - Continue aspirin and Plavix   - Continue Lipitor 40 mg daily   - Continue Toprol XL 12.5 mg   - s/p ICD 9/10  - Pt will need follow up with Dr. Osorio in 2-4 weeks after discharge for post cardiac arrest follow up.     # Acute on chronic hypoxemic respiratory failure  # Possible COPD exacerbation   # Pulmonary edema, history pleural effusion status post thoracentesis on left, residual small bilateral pleural effusions  Patient on 2-3L O2 at home for COPD. Status post arrest with significant pulmonary edema and COPD exacerbation requiring HFNC and BiPAP. Patient noted to have left pleural effusion 09/05; he is status post thoracentesis. Nonetheless has residual, unchanged, small bilateral effusions. S/P Zosyn,  Rocephin (finished 14d tx 9/10)  DDx: likely component of pulmonary edema given aggressive fluid resuscitation s/p arrest.  In addition, atelectasis likely contributing.  However, with increasing white blood cell count, can not rule out infection  - order BNP, procalcitonin for further deliniation of hypoxic etiology  - pulmonary consult today; consideration of CT scan of chest   - RT COPD consult; appreciate assistance  - will attempt to wean O2 with RN today at bedside  - continue prednisone taper (20mg x5 days, 10mg x5 days, 5mg x5 days)  - Frequent pulmonary toilet/physiotherapy   - Continue to wean O2 as able, O2 sat goal >87%    # Possible Atrial Fibrillation  Atrial fibrillation versus normal sinus rhythm with frequent PAC; unclear.  - EKG today     # Chest wall pain, resolved  # Rib fracture   Patient states pain is improving overall. He is not requiring any narcotics and would tend to avoid as he did have witnessed probably delirium in the ICU manifesting as seizure like activity without seizures on EEG. Moreover component of respiratory depression with this. Patient tolerating chest physiotherapy and feeling well in this regard.   - Prn Tylenol for mild-mod pain     # Traumatic Silva   Patient does straight cath at home. Traumatic silva iatrogenic, will continue with indwelling silva for strict I/O and to maintain patency in setting of injury and inflammation   - Continue with silva for now while diuresing     # PVD with renal artery stenosis s/p stenting in 2013   # occluded R carotid artery  # s/p left carotid endarterectomy  # Hx left subclavian stenosis  - Continue aspirin and Plavix     Anticipated Disposition: pending improvement re: pulmonary issues    Patient seen and discussed with Dr. John Horton, who agrees with above plan.    Maritza Womack PA-C  Cardiology - [facility]        I have seen and examined the patient with the CSI team. I agree with the assessment and plan of the note  above.I have reviewed pertinent labs.     John Horton MD  Interventional Cardiology  Pager: [phone]

## 2019-09-12 NOTE — CONSULTS
[facility]  Pulmonary, Allergy, Critical Care and Sleep Medicine    Initial Consultation - 09/12/2019  José Aguirre MRN# [id number]   Age: 83 year old YOB: 1935     Date of Admission: 8/27/2019  Reason for Consultation: Hypoxemia  Requesting Team: CSI    Assessment and Recommendations:    83yoM w/ Hx of COPD on home 2-3L w/ exertion and while sleeping, CABG, Carotid artery occlusion, L Subclavian stenosis - admitted 8/27 with VT/Fib Arrest. S/p PCI to pLAD and LCx. After extubation, required HF NC for multiple days. CSI team consulting for persisting hypoxic respiratory failure.     #Acute on Chronic Hypoxic Respiratory Failure - at baseline, pt sat's around 88-93% at rest with quick desaturation with exertion for which he uses oxygen ~2-3L and while sleeping. On mometasone and umeclidium-vilaterol thru [facility] (? vs local pulmonologist). CT on presentation with cardiac arrest showed diffuse GGOs and consolidative opacities. Multiple etiologies possible. Pt's baseline is not healthy and he likely has minimal reserve, though we do not have access for prior PFTs. As such, the inflammatory insult of cardiac arrest, CPR, etc has likely worsened his lungs which will require some time to recover. We were able to wean him off high flow at the bedside to 8-10 L NC at rest. PE is not ruled out in this patient. Also infection is possible evidenced by leukocytosis but he has no subjective symptoms to suggest this. His Hgb is at stable. Is not hypoventilating. He was volume overloaded and cardiology team is diuresing, which also contributes to high O2 needs. Cards team reports he has recovered EF though I do not see images/Echo in the chart.     -Recommend obtaining CT PE to assess for infiltrates or PE, if unremarkable then patient will likely benefit from cardiopulmonary rehab and should see his outpatient pulmonologist soon after discharge     Seen and discussed with   Siddharth Pereyra MD - [facility] Fellow, Pgr [phone]    Chief Complaint and History of Present Illness:    CC:  High O2 Needs    HPI: 83yoM w/ Hx of COPD on home 2-3L w/ exertion and while sleeping, CABG, Carotid artery occlusion, L Subclavian stenosis - admitted 8/27 with VT/Fib Arrest. S/p PCI to pLAD and LCx. After extubation, required HF NC for multiple days up to today. CSI team consulting for persisting hypoxic respiratory failure. Though on high O2 amounts, patient subjectively says his lungs feel at baseline. No cough. No recent COPD exacerbations. No fevers. No LE swelling. At home, he wears oxygen while sleeping and with exertion. SPO2 reads 88-93% while at rest off oxygen.     Review of Systems:  Complete 12 point ROS negative unless mentioned in HPI    Histories, Prior to Admission Medications, Allergies:    Past Medical History: Above  Past Surgical History:  Past Surgical History:   Procedure Laterality Date     IR THORACENTESIS  9/5/2019     Past Social History:    Tobacco: former smoker, 50 pack yrs    Family History:  Brother with cancer, sister with dementia     Medications:    acetylcysteine  2 mL Nebulization Q4H NATE     aspirin  81 mg Oral Daily     atorvastatin  40 mg Oral QPM     cephALEXin  500 mg Oral TID     clopidogrel  75 mg Oral Daily     ferrous sulfate  325 mg Oral Daily with breakfast     furosemide  80 mg Intravenous Q12H     heparin lock flush  5-10 mL Intracatheter Q24H     insulin aspart  1-10 Units Subcutaneous TID AC     insulin aspart  1-7 Units Subcutaneous At Bedtime     ipratropium - albuterol 0.5 mg/2.5 mg/3 mL  3 mL Nebulization Q4H     metoprolol succinate ER  12.5 mg Oral Daily     mometasone  2 puff Inhalation QPM     pantoprazole (PROTONIX) IV  40 mg Intravenous BID     predniSONE  20 mg Oral or Feeding Tube Daily    Followed by     [START ON 9/14/2019] predniSONE  10 mg Oral or Feeding Tube Daily    Followed by     [START ON 9/19/2019] predniSONE  5 mg Oral or  Feeding Tube Daily     senna-docusate  1 tablet Oral or Feeding Tube At Bedtime     sodium chloride (PF)  3 mL Intracatheter Q8H     albuterol, IV fluid REPLACEMENT ONLY, glucose **OR** dextrose **OR** glucagon, heparin lock flush, HOLD MEDICATION, HOLD MEDICATION, HOLD MEDICATION, hydrALAZINE, lidocaine 4%, lidocaine (buffered or not buffered), magnesium sulfate, magnesium sulfate, melatonin, naloxone, nitroGLYcerin, oxyCODONE-acetaminophen, Percutaneous Coronary Intervention orders placed (this is information for BPA alerting), potassium chloride, potassium chloride with lidocaine, potassium chloride, potassium chloride, potassium chloride, potassium phosphate (KPHOS) in D5W IV, potassium phosphate (KPHOS) in D5W IV, potassium phosphate (KPHOS) in D5W IV, potassium phosphate (KPHOS) in D5W IV, potassium phosphate (KPHOS) in D5W IV, ACE/ARB/ARNI NOT PRESCRIBED, sodium chloride (PF), sodium chloride (PF), sodium chloride (PF)    Allergies:   No Known Allergies    Physical Exam:    Temp:  [97.8  F (36.6  C)-98.3  F (36.8  C)] 97.8  F (36.6  C)  Pulse:  [] 82  Heart Rate:  [93-96] 96  Resp:  [18-22] 18  BP: ()/(56-66) 102/56  FiO2 (%):  [60 %-70 %] 70 %  SpO2:  [92 %-100 %] 98 %    Intake/Output Summary (Last 24 hours) at 9/12/2019 1818  Last data filed at 9/12/2019 1800  Gross per 24 hour   Intake 1570 ml   Output 3400 ml   Net -1830 ml     General: sitting up in chair, NAD  HEENT: anicteric, moist mucosa  Neck: no palpable lymphadenopathy  Chest: no wheezing, L sided faint crackles up to mid lung  Cardiac: RRR no murmurs  Abdomen: Soft, flat, non tender, active BS  Extremities: No LE Edema  Neuro: A&Ox3, no focal deficits   Skin: no rash noted    Laboratory, imaging, and microbiologic data:    Admission labs reviewed by me, notable for:    Labs reviewed by me, notable for: WBC 17    CXR imaging reviewed & interpreted by me, notable for: interstitial opacities with some cephalization

## 2019-09-12 NOTE — PLAN OF CARE
"/64   Pulse 88   Temp 98.1  F (36.7  C) (Oral)   Resp 20   Ht 1.727 m (5' 7.99\")   Wt 79.1 kg (174 lb 6.1 oz)   SpO2 92%   BMI 26.52 kg/m      Neuro: A&Ox4.   Cardiac: VSS, SR with WAP - had runs of aberrant Afib, awaiting results of BMP/Mag/Phos labs        Respiratory: Sating 92%+ on 60% HFNC while at rest, 100% with activity.   GI/: Adequate urine output via silva  Diet/appetite: Tolerating DD3 diet, thin liquids - fluid restriction 2L   Activity:  Assist x 1-2  Pain: Denies pain.   Skin: No new deficits noted. Severe bruising to L flank/generalized. Nare pressure injury  LDA's: R double lumen PICC     Denies complaints at this time, will continue with plan of care      "

## 2019-09-12 NOTE — PLAN OF CARE
Neuro: Patient alert and oriented x4. Pleasant.   Cardiac: NSR/wandering atrial pacemaker 90's-100's, BP's stable, afebrile.       Respiratory: Sating 88-92% on 7-10 L oxymizer. Requiring 15 L with activity. Desat's to 70's if O2 is not increased.   GI/: Adequate urine output via silva. No BM this shift.   Diet/appetite: DD3 diet with thin liquids. Eating well.   Activity:  Assist of 1 with walker, up to chair. Worked with PT and OT.   Pain: At acceptable level on current regimen.   Skin: No new deficits noted.  LDA's: Silva, right PICC.      Plan: Pulmonary consulted and saw patient. X-ray completed. CT completed. Potassium replaced. UA collected. Unable to collect sputum this shift. Family updated. Will continue to monitor.

## 2019-09-12 NOTE — PLAN OF CARE
Discharge Planner PT / 6B   Patient plan for discharge: Not discussed today.  Current status: Pt demonstrates sit <> stand transfers with SBA. Pt engaged in standing LE exercises to progress strength/endurance. Pt maintains SpO2 >90% on 100% FiO2 HFNC with activity, occasional seated rest breaks required throughout PT session 2/2 fatigue/SOB.  Barriers to return to prior living situation: Medical needs, O2 requirements, weakness/deconditioning, impaired balance, level of assist.  Recommendations for discharge: TCU at this time.  Rationale for recommendations: Pt is below baseline for mobility. Pt will require continued skilled therapy to progress strength, balance, activity tolerance and functional independence.

## 2019-09-12 NOTE — PROVIDER NOTIFICATION
Per telemetry, patient had 15 beat run of aberrant Afib, followed by a 9 beat run; discussed with MD, will get labs now to check for abnormalities.

## 2019-09-12 NOTE — PLAN OF CARE
Neuro: A&Ox4.   Cardiac: SR with WAP, VSS.    Respiratory: Sating 92%+ on 60% HFNC while at rest, 100% with activity. Denies worsened SOB.   GI/: Adequate urine output, yellow per silva. No BM. No N/V.   Diet/appetite: Tolerating DD3 diet, thin liquids 2L FR.   Activity:  Assist of 1 per report, did not ambulate on my shift.   Pain: Denies pain.   Skin: No new deficits noted. Severe bruising to L flank/generalized. Nare pressure injury and head scabbing.   LDA's: R double picc locked and TKO port.     Plan: Continue with POC. Notify primary team with changes.

## 2019-09-12 NOTE — PLAN OF CARE
Discharge Planner SLP   Patient plan for discharge: none stated   Current status: SLP: Pt with improved PO tolerance today. Recommend pt advance to regular textures and thin liquids. Pt should be fully upright and alert for all PO, take small single sips/bites, slow pacing, and alternate between consistencies. ST to continue to follow to assess diet tolerance.   Barriers to return to prior living situation: respiratory status, medical readiness   Recommendations for discharge: defer to PT/OT  Rationale for recommendations: anticipate pt will meet swallowing goals prior to discharge        Entered by: Amy Cole 09/12/2019 12:04 PM

## 2019-09-13 ENCOUNTER — APPOINTMENT (OUTPATIENT)
Dept: PHYSICAL THERAPY | Facility: CLINIC | Age: 84
DRG: 224 | End: 2019-09-13
Payer: MEDICARE

## 2019-09-13 ENCOUNTER — APPOINTMENT (OUTPATIENT)
Dept: SPEECH THERAPY | Facility: CLINIC | Age: 84
DRG: 224 | End: 2019-09-13
Payer: MEDICARE

## 2019-09-13 LAB
ANION GAP SERPL CALCULATED.3IONS-SCNC: 9 MMOL/L (ref 3–14)
BACTERIA SPEC CULT: NO GROWTH
BASOPHILS # BLD AUTO: 0 10E9/L (ref 0–0.2)
BASOPHILS NFR BLD AUTO: 0.1 %
BUN SERPL-MCNC: 38 MG/DL (ref 7–30)
CALCIUM SERPL-MCNC: 8.3 MG/DL (ref 8.5–10.1)
CHLORIDE SERPL-SCNC: 100 MMOL/L (ref 94–109)
CO2 SERPL-SCNC: 26 MMOL/L (ref 20–32)
CREAT SERPL-MCNC: 1.13 MG/DL (ref 0.66–1.25)
DIFFERENTIAL METHOD BLD: ABNORMAL
EOSINOPHIL # BLD AUTO: 0.1 10E9/L (ref 0–0.7)
EOSINOPHIL NFR BLD AUTO: 0.5 %
ERYTHROCYTE [DISTWIDTH] IN BLOOD BY AUTOMATED COUNT: 19.4 % (ref 10–15)
FOLATE SERPL-MCNC: 19.5 NG/ML
GFR SERPL CREATININE-BSD FRML MDRD: 59 ML/MIN/{1.73_M2}
GLUCOSE BLDC GLUCOMTR-MCNC: 121 MG/DL (ref 70–99)
GLUCOSE BLDC GLUCOMTR-MCNC: 128 MG/DL (ref 70–99)
GLUCOSE BLDC GLUCOMTR-MCNC: 184 MG/DL (ref 70–99)
GLUCOSE BLDC GLUCOMTR-MCNC: 254 MG/DL (ref 70–99)
GLUCOSE SERPL-MCNC: 126 MG/DL (ref 70–99)
HCT VFR BLD AUTO: 27 % (ref 40–53)
HGB BLD-MCNC: 7.8 G/DL (ref 13.3–17.7)
IMM GRANULOCYTES # BLD: 0.1 10E9/L (ref 0–0.4)
IMM GRANULOCYTES NFR BLD: 0.5 %
INTERPRETATION ECG - MUSE: NORMAL
INTERPRETATION ECG - MUSE: NORMAL
LACTATE BLD-SCNC: 1.1 MMOL/L (ref 0.7–2)
LYMPHOCYTES # BLD AUTO: 0.5 10E9/L (ref 0.8–5.3)
LYMPHOCYTES NFR BLD AUTO: 3.6 %
MCH RBC QN AUTO: 30 PG (ref 26.5–33)
MCHC RBC AUTO-ENTMCNC: 28.9 G/DL (ref 31.5–36.5)
MCV RBC AUTO: 104 FL (ref 78–100)
MONOCYTES # BLD AUTO: 1 10E9/L (ref 0–1.3)
MONOCYTES NFR BLD AUTO: 6.9 %
NEUTROPHILS # BLD AUTO: 12.4 10E9/L (ref 1.6–8.3)
NEUTROPHILS NFR BLD AUTO: 88.4 %
NRBC # BLD AUTO: 0 10*3/UL
NRBC BLD AUTO-RTO: 0 /100
PLATELET # BLD AUTO: 205 10E9/L (ref 150–450)
POTASSIUM SERPL-SCNC: 3.8 MMOL/L (ref 3.4–5.3)
RBC # BLD AUTO: 2.6 10E12/L (ref 4.4–5.9)
SODIUM SERPL-SCNC: 134 MMOL/L (ref 133–144)
SPECIMEN SOURCE: NORMAL
VIT B12 SERPL-MCNC: 1283 PG/ML (ref 193–986)
WBC # BLD AUTO: 14 10E9/L (ref 4–11)

## 2019-09-13 PROCEDURE — 94640 AIRWAY INHALATION TREATMENT: CPT

## 2019-09-13 PROCEDURE — 25000132 ZZH RX MED GY IP 250 OP 250 PS 637: Mod: GY | Performed by: INTERNAL MEDICINE

## 2019-09-13 PROCEDURE — C9113 INJ PANTOPRAZOLE SODIUM, VIA: HCPCS

## 2019-09-13 PROCEDURE — 25000132 ZZH RX MED GY IP 250 OP 250 PS 637: Mod: GY | Performed by: NURSE PRACTITIONER

## 2019-09-13 PROCEDURE — 40000802 ZZH SITE CHECK

## 2019-09-13 PROCEDURE — 80048 BASIC METABOLIC PNL TOTAL CA: CPT | Performed by: PHYSICIAN ASSISTANT

## 2019-09-13 PROCEDURE — 83605 ASSAY OF LACTIC ACID: CPT

## 2019-09-13 PROCEDURE — 99207 ZZC NO BILLABLE SERVICE THIS VISIT: CPT | Performed by: INTERNAL MEDICINE

## 2019-09-13 PROCEDURE — 82746 ASSAY OF FOLIC ACID SERUM: CPT | Performed by: PHYSICIAN ASSISTANT

## 2019-09-13 PROCEDURE — 85027 COMPLETE CBC AUTOMATED: CPT | Performed by: PHYSICIAN ASSISTANT

## 2019-09-13 PROCEDURE — 93005 ELECTROCARDIOGRAM TRACING: CPT

## 2019-09-13 PROCEDURE — 25000132 ZZH RX MED GY IP 250 OP 250 PS 637: Mod: GY | Performed by: STUDENT IN AN ORGANIZED HEALTH CARE EDUCATION/TRAINING PROGRAM

## 2019-09-13 PROCEDURE — 94640 AIRWAY INHALATION TREATMENT: CPT | Mod: 76

## 2019-09-13 PROCEDURE — 94667 MNPJ CHEST WALL 1ST: CPT

## 2019-09-13 PROCEDURE — 25000128 H RX IP 250 OP 636: Performed by: PHYSICIAN ASSISTANT

## 2019-09-13 PROCEDURE — 97530 THERAPEUTIC ACTIVITIES: CPT | Mod: GP

## 2019-09-13 PROCEDURE — 97116 GAIT TRAINING THERAPY: CPT | Mod: GP

## 2019-09-13 PROCEDURE — 12000004 ZZH R&B IMCU UMMC

## 2019-09-13 PROCEDURE — 00000146 ZZHCL STATISTIC GLUCOSE BY METER IP

## 2019-09-13 PROCEDURE — 40000275 ZZH STATISTIC RCP TIME EA 10 MIN

## 2019-09-13 PROCEDURE — 36592 COLLECT BLOOD FROM PICC: CPT | Performed by: PHYSICIAN ASSISTANT

## 2019-09-13 PROCEDURE — 94660 CPAP INITIATION&MGMT: CPT

## 2019-09-13 PROCEDURE — 99233 SBSQ HOSP IP/OBS HIGH 50: CPT | Performed by: PHYSICIAN ASSISTANT

## 2019-09-13 PROCEDURE — 25000125 ZZHC RX 250: Performed by: INTERNAL MEDICINE

## 2019-09-13 PROCEDURE — 40000809 ZZH STATISTIC NO DOCUMENTATION TO SUPPORT CHARGE

## 2019-09-13 PROCEDURE — 25000131 ZZH RX MED GY IP 250 OP 636 PS 637: Mod: GY | Performed by: STUDENT IN AN ORGANIZED HEALTH CARE EDUCATION/TRAINING PROGRAM

## 2019-09-13 PROCEDURE — 25000128 H RX IP 250 OP 636

## 2019-09-13 PROCEDURE — 92526 ORAL FUNCTION THERAPY: CPT | Mod: GN

## 2019-09-13 PROCEDURE — 82607 VITAMIN B-12: CPT | Performed by: PHYSICIAN ASSISTANT

## 2019-09-13 PROCEDURE — 25000132 ZZH RX MED GY IP 250 OP 250 PS 637: Mod: GY | Performed by: PHYSICIAN ASSISTANT

## 2019-09-13 PROCEDURE — 25000132 ZZH RX MED GY IP 250 OP 250 PS 637: Mod: GY

## 2019-09-13 PROCEDURE — 25000125 ZZHC RX 250: Performed by: STUDENT IN AN ORGANIZED HEALTH CARE EDUCATION/TRAINING PROGRAM

## 2019-09-13 PROCEDURE — 93010 ELECTROCARDIOGRAM REPORT: CPT | Performed by: INTERNAL MEDICINE

## 2019-09-13 PROCEDURE — 94668 MNPJ CHEST WALL SBSQ: CPT

## 2019-09-13 PROCEDURE — 25000128 H RX IP 250 OP 636: Performed by: STUDENT IN AN ORGANIZED HEALTH CARE EDUCATION/TRAINING PROGRAM

## 2019-09-13 PROCEDURE — 85025 COMPLETE CBC W/AUTO DIFF WBC: CPT | Performed by: PHYSICIAN ASSISTANT

## 2019-09-13 RX ORDER — SODIUM CHLORIDE FOR INHALATION 3 %
3 VIAL, NEBULIZER (ML) INHALATION ONCE
Status: COMPLETED | OUTPATIENT
Start: 2019-09-13 | End: 2019-09-13

## 2019-09-13 RX ORDER — POTASSIUM CHLORIDE 1.5 G/1.58G
40 POWDER, FOR SOLUTION ORAL ONCE
Status: COMPLETED | OUTPATIENT
Start: 2019-09-13 | End: 2019-09-13

## 2019-09-13 RX ORDER — ASCORBIC ACID 250 MG
500 TABLET,CHEWABLE ORAL DAILY
Status: DISCONTINUED | OUTPATIENT
Start: 2019-09-13 | End: 2019-09-19 | Stop reason: HOSPADM

## 2019-09-13 RX ORDER — AZITHROMYCIN 250 MG/1
500 TABLET, FILM COATED ORAL ONCE
Status: COMPLETED | OUTPATIENT
Start: 2019-09-13 | End: 2019-09-13

## 2019-09-13 RX ORDER — AZITHROMYCIN 250 MG/1
250 TABLET, FILM COATED ORAL DAILY
Status: COMPLETED | OUTPATIENT
Start: 2019-09-14 | End: 2019-09-17

## 2019-09-13 RX ORDER — ZINC SULFATE 50(220)MG
220 CAPSULE ORAL DAILY
Status: DISCONTINUED | OUTPATIENT
Start: 2019-09-13 | End: 2019-09-19 | Stop reason: HOSPADM

## 2019-09-13 RX ORDER — MULTIPLE VITAMINS W/ MINERALS TAB 9MG-400MCG
1 TAB ORAL DAILY
Status: DISCONTINUED | OUTPATIENT
Start: 2019-09-13 | End: 2019-09-19 | Stop reason: HOSPADM

## 2019-09-13 RX ORDER — PREDNISONE 20 MG/1
40 TABLET ORAL DAILY
Status: COMPLETED | OUTPATIENT
Start: 2019-09-14 | End: 2019-09-18

## 2019-09-13 RX ADMIN — POTASSIUM CHLORIDE 40 MEQ: 1.5 POWDER, FOR SOLUTION ORAL at 01:48

## 2019-09-13 RX ADMIN — IPRATROPIUM BROMIDE AND ALBUTEROL SULFATE 3 ML: .5; 3 SOLUTION RESPIRATORY (INHALATION) at 16:09

## 2019-09-13 RX ADMIN — CLOPIDOGREL BISULFATE 75 MG: 75 TABLET, FILM COATED ORAL at 07:49

## 2019-09-13 RX ADMIN — FUROSEMIDE 80 MG: 10 INJECTION, SOLUTION INTRAVENOUS at 05:34

## 2019-09-13 RX ADMIN — ACETYLCYSTEINE 2 ML: 200 SOLUTION ORAL; RESPIRATORY (INHALATION) at 12:22

## 2019-09-13 RX ADMIN — ACETYLCYSTEINE 2 ML: 200 SOLUTION ORAL; RESPIRATORY (INHALATION) at 09:20

## 2019-09-13 RX ADMIN — ATORVASTATIN CALCIUM 40 MG: 40 TABLET, FILM COATED ORAL at 19:35

## 2019-09-13 RX ADMIN — IPRATROPIUM BROMIDE AND ALBUTEROL SULFATE 3 ML: .5; 3 SOLUTION RESPIRATORY (INHALATION) at 20:35

## 2019-09-13 RX ADMIN — ACETYLCYSTEINE 2 ML: 200 SOLUTION ORAL; RESPIRATORY (INHALATION) at 16:09

## 2019-09-13 RX ADMIN — CEPHALEXIN 500 MG: 250 CAPSULE ORAL at 19:35

## 2019-09-13 RX ADMIN — PANTOPRAZOLE SODIUM 40 MG: 40 INJECTION, POWDER, FOR SOLUTION INTRAVENOUS at 19:36

## 2019-09-13 RX ADMIN — CEPHALEXIN 500 MG: 250 CAPSULE ORAL at 14:07

## 2019-09-13 RX ADMIN — PREDNISONE 20 MG: 20 TABLET ORAL at 07:49

## 2019-09-13 RX ADMIN — Medication 12.5 MG: at 07:49

## 2019-09-13 RX ADMIN — ASPIRIN 81 MG CHEWABLE TABLET 81 MG: 81 TABLET CHEWABLE at 07:49

## 2019-09-13 RX ADMIN — Medication 500 MG: at 11:45

## 2019-09-13 RX ADMIN — SODIUM CHLORIDE SOLN NEBU 3% 3 ML: 3 NEBU SOLN at 16:09

## 2019-09-13 RX ADMIN — Medication 5 ML: at 01:38

## 2019-09-13 RX ADMIN — Medication 20000 UNITS: at 11:45

## 2019-09-13 RX ADMIN — ACETYLCYSTEINE 2 ML: 200 SOLUTION ORAL; RESPIRATORY (INHALATION) at 20:35

## 2019-09-13 RX ADMIN — SODIUM CHLORIDE, PRESERVATIVE FREE 5 ML: 5 INJECTION INTRAVENOUS at 11:45

## 2019-09-13 RX ADMIN — IPRATROPIUM BROMIDE AND ALBUTEROL SULFATE 3 ML: .5; 3 SOLUTION RESPIRATORY (INHALATION) at 12:22

## 2019-09-13 RX ADMIN — PANTOPRAZOLE SODIUM 40 MG: 40 INJECTION, POWDER, FOR SOLUTION INTRAVENOUS at 07:50

## 2019-09-13 RX ADMIN — FERROUS SULFATE TAB 325 MG (65 MG ELEMENTAL FE) 325 MG: 325 (65 FE) TAB at 07:49

## 2019-09-13 RX ADMIN — MULTIPLE VITAMINS W/ MINERALS TAB 1 TABLET: TAB at 11:45

## 2019-09-13 RX ADMIN — FUROSEMIDE 80 MG: 10 INJECTION, SOLUTION INTRAVENOUS at 17:34

## 2019-09-13 RX ADMIN — ZINC SULFATE CAP 220 MG (50 MG ELEMENTAL ZN) 220 MG: 220 (50 ZN) CAP at 11:45

## 2019-09-13 RX ADMIN — POTASSIUM CHLORIDE 20 MEQ: 1.5 POWDER, FOR SOLUTION ORAL at 07:48

## 2019-09-13 RX ADMIN — CEPHALEXIN 500 MG: 250 CAPSULE ORAL at 07:49

## 2019-09-13 RX ADMIN — AZITHROMYCIN 500 MG: 250 TABLET, FILM COATED ORAL at 14:07

## 2019-09-13 RX ADMIN — IPRATROPIUM BROMIDE AND ALBUTEROL SULFATE 3 ML: .5; 3 SOLUTION RESPIRATORY (INHALATION) at 09:20

## 2019-09-13 ASSESSMENT — MIFFLIN-ST. JEOR: SCORE: 1451.37

## 2019-09-13 ASSESSMENT — ACTIVITIES OF DAILY LIVING (ADL)
ADLS_ACUITY_SCORE: 17

## 2019-09-13 NOTE — PLAN OF CARE
Discharge Planner PT / 6B   Patient plan for discharge: Not discussed today.  Current status: Pt demonstrates sit <> stand transfers with SBA, stand-pivot transfers from chair <> commode with 4WW and SBA. Pt ambulates ~30ft 3x and ~90ft with 4WW and CGA, decreased gait speed, no overt LOB though slightly unsteady at times, seated rest breaks taken upon completion of each ambulation bout 2/2 fatigue. Pt on 15 L O2 via oxymizer for therapy session. After each bout of ~30ft, SpO2 >88%. After bout of ~90ft pt reading low-mid 80s, with seated rest break of ~1-2 min SpO2 improves to >90%. Pt asymptomatic throughout, only reports fatigue.  Barriers to return to prior living situation: Medical needs, O2 requirements, weakness/deconditioning, impaired balance, level of assist.  Recommendations for discharge: TCU at this time.  Rationale for recommendations: Pt is below baseline for mobility, also limited by high O2 needs. Pt will require continued skilled therapy to progress strength, balance, activity tolerance, and functional independence in order to safely return to PLOF.

## 2019-09-13 NOTE — PROGRESS NOTES
[facility] [place]    Brief Cardiology Note  Date of Service: 9/13/2019     Discussed case with pulmonology.  The following treatment changes were recommended and ordered.  1. Start 40mg prednisone daily.  Will be on this 09/14 - 09/18.  Will then need an additional 20mg prednisone daily 09/19 - 09/23  2. Start Azithromycin, 500mg today followed by 250mg PO daily x4 days  3. Continue to encourage the use of Acapella valve  4. Will trial hypertonic saline nebulizer x1 to assess it efficacy in regards to hypoxia.      Maritza Womack PA-C

## 2019-09-13 NOTE — PLAN OF CARE
Neuro: A&Ox4.   Cardiac: SR WAP/BBB. VSS.   Respiratory: Sating 91% on Oxymizer nasal cannula 13LPM.  GI/: Adequate urine output.  Diet/appetite: Tolerating DD3 diet. Eating well.  Activity:  Assist of 1, up to chair and in halls.  Pain: At acceptable level on current regimen.   Skin: No new deficits noted.  LDA's: [place], Right PICC.    Plan: Continue with POC. Notify primary team with changes.

## 2019-09-13 NOTE — PLAN OF CARE
Discharge Planner OT   Patient plan for discharge: Not discussed this session.   Current status: Pt completed transfers supine seated EOB with CGA and vc's. Pt required CGA and vc's for transfer supine<->seated EOB. Pt completed transfer sit<->standing pivot transfer to bedside chair. Therapist educated pt on and reviewed ICD precautions. Pt completed 17 minutes of leg ergometer exercise with fair tolerance and rest as needed. VSS.   Barriers to return to prior living situation: Decreased independence with functional transfers/ADLs. Fatigue.   Recommendations for discharge: TCU  Rationale for recommendations: Pt will benefit from continued therapy to address barriers above and to maximize functional independence.        Entered by: Fili Hoover 09/12/2019 10:39 PM

## 2019-09-13 NOTE — PLAN OF CARE
Discharge Planner SLP   Patient plan for discharge: Did not discuss  Current status: Pt tolerated regular solids, ice cream and thin liquids. Adequate oral manipulation and clearance, no s/sx of aspiration. Pt denies any difficulty.     Continue regular solids and thin liquids with general safe swallow precautions.     Barriers to return to prior living situation: Respiratory status, below baseline   Recommendations for discharge: TCU  Rationale for recommendations: SLP at next level of care for management of dysphagia given below baseline          Entered by: Loreta Jimenez 09/13/2019 2:16 PM

## 2019-09-13 NOTE — PROGRESS NOTES
[facility]   Cardiology Progress      Interval History: Denies fever, chills, nausea, vomiting, chest pain, SOB, abdominal pain, new LE pain/swelling. Mcclelland remains in place.  Still some mild cough. Some VT overnight; no shocks administered    Changes Today:  - continue attempts to wean O2.  Have asked that RN place patient on ear probe due to inconsistency with use of finger probe.  - continue 80mg IV lasix BID given renal stability, loss of > 2L urine yesterday  - fluid restriction of 1800cc/day    Physical Exam:  Temp:  [97.8  F (36.6  C)-98.6  F (37  C)] 97.8  F (36.6  C)  Pulse:  [81-93] 82  Heart Rate:  [83-96] 83  Resp:  [18-24] 20  BP: ()/(52-66) 101/61  FiO2 (%):  [60 %-70 %] 70 %  SpO2:  [90 %-100 %] 91 %      Intake/Output Summary (Last 24 hours) at 9/13/2019 0848  Last data filed at 9/13/2019 0811  Gross per 24 hour   Intake 1100 ml   Output 3200 ml   Net -2100 ml     Weight:  Baseline: unclear, on presentation approximately 173 pounds. Suspect this has dropped due to prolonged hospitalization/loss of muscle mass.  Hospitalization Max: 187 pounds  Today: 172.4 pounds.    GEN: NAD  Pulm: rhoncorous breath sounds.  Bibasilar inspiratory rales.  Cardiac: Normal S1 and S2. Seemingly regular rate/rhythm. JVP not appreciably elevated, no murmurs.  No LE edema.  GI: soft, non distended  Neuro:  Alert and oriented x4.    Medications:    acetylcysteine  2 mL Nebulization 4x Daily     aspirin  81 mg Oral Daily     atorvastatin  40 mg Oral QPM     cephALEXin  500 mg Oral TID     clopidogrel  75 mg Oral Daily     ferrous sulfate  325 mg Oral Daily with breakfast     furosemide  80 mg Intravenous Q12H     heparin lock flush  5-10 mL Intracatheter Q24H     insulin aspart  1-10 Units Subcutaneous TID AC     insulin aspart  1-7 Units Subcutaneous At Bedtime     ipratropium - albuterol 0.5 mg/2.5 mg/3 mL  3 mL Nebulization 4x daily     metoprolol succinate ER  12.5 mg Oral Daily      mometasone  2 puff Inhalation QPM     pantoprazole (PROTONIX) IV  40 mg Intravenous BID     predniSONE  20 mg Oral or Feeding Tube Daily    Followed by     [START ON 9/14/2019] predniSONE  10 mg Oral or Feeding Tube Daily    Followed by     [START ON 9/19/2019] predniSONE  5 mg Oral or Feeding Tube Daily     senna-docusate  1 tablet Oral or Feeding Tube At Bedtime     sodium chloride (PF)  3 mL Intracatheter Q8H       IV fluid REPLACEMENT ONLY       Percutaneous Coronary Intervention orders placed (this is information for BPA alerting)       ACE/ARB/ARNI NOT PRESCRIBED         Labs:   CMP  Recent Labs   Lab 09/13/19  0455 09/12/19  0435 09/11/19  0638 09/10/19  0440  09/08/19  0422 09/07/19  1600 09/07/19  0352 09/06/19  1612    136 140 139   < > 138 138 139 140   POTASSIUM 3.8 3.6 3.8 3.7   < > 3.8 4.4 3.8 3.9   CHLORIDE 100 101 101 102   < > 102 103 105 107   CO2 26 26 30 26   < > 25 26 28 26   ANIONGAP 9 8 9 10   < > 11 9 7 6   * 136* 174* 134*   < > 189* 241* 149* 144*   BUN 38* 45* 51* 49*   < > 47* 43* 43* 38*   CR 1.13 1.08 1.02 1.22   < > 1.15 1.14 1.01 0.89   GFRESTIMATED 59* 63 67 54*   < > 58* 59* 68 79   GFRESTBLACK 69 73 78 63   < > 67 68 79 >90   YOON 8.3* 8.0* 8.3* 8.2*   < > 8.1* 8.2* 8.1* 8.2*   MAG  --  2.5*  --   --   --  2.1 2.0 2.1 2.1   PHOS  --  3.4  --   --   --  3.2 3.2 3.4 2.6   PROTTOTAL  --   --   --   --   --  5.9* 6.4* 5.6* 6.0*   ALBUMIN  --   --   --   --   --  2.3* 2.4* 2.2* 2.4*   BILITOTAL  --   --   --   --   --  0.7 0.7 0.8 0.6   ALKPHOS  --   --   --   --   --  60 63 54 52   AST  --   --   --   --   --  20 21 25 25   ALT  --   --   --   --   --  29 33 31 38    < > = values in this interval not displayed.     CBC  Recent Labs   Lab 09/13/19  0455 09/12/19  0435 09/11/19  0638 09/10/19  0440   WBC 14.0* 17.7* 12.4* 14.4*   RBC 2.60* 2.75* 2.72* 2.43*   HGB 7.8* 8.2* 8.0* 7.5*   HCT 27.0* 28.6* 28.4* 25.9*   * 104* 104* 107*   MCH 30.0 29.8 29.4 30.9   MCHC  28.9* 28.7* 28.2* 29.0*   RDW 19.4* 20.3* 20.4* 20.8*    213 203 193     INRNo lab results found in last 7 days.  Arterial Blood Gas  Recent Labs   Lab 09/07/19  0910   O2PER 80       ASSESSMENT/PLAN:  José Aguirre is an 83 year old male with a PMHx of PVD with renal artery stenosis s/p stenting in 2013, occluded R carotid artery and s/p left carotid endarterectomy, left subclavian stenosis, CAD s/p CABG with L-LAD, S-D1 and OM2, PDA), COPD (on 2-3L home O2), and HLD who was admitted on 8/27/2019 with a VT/VF arrest with ROSC in the field. Patient was found to have disease in the LM into pLAD s/p PCI to LM->LAD and ost LCx.      #VT/VF arrest with ROSC prior to arrival  #CAD status post PCI to native LM into the LAD and ostial LCX  #History of CABG '03 (LIMA-LAD, SVG-D1, SVG-OM2, SVG-PDA)  8/27/19 admission after OOH cardiac arrest. He was at [facility] and had a witnessed collapse. Bystander CPR was started and EMS found him in VT/VF. He was defibrillated X1 and given 1 of epi. ROSC was obtained in field. On arrival here, he had a pulse and was hypotensive, was intubated, taken for emergent coronary angiogram which showed severe 3V native vessel CAD with LM/LAD lesion and  of proximal RCA with L to right collaterals. LIMA-LAD graft minimally diseased but small and primarily retrograde flow through native LAD. SVG-RCA chronically closed at anastomosis, SVG-D1 and SBG-OM2 closed. He underwent PCI to dLM/ostial LAD with CONCETTA and balloon angioplasty of ostial LCx. He has preserved LV EF with mildly reduced RV function. Though he did have CAD and was stented, felt his burden of CAD was unlikely to be the source of his arrest.  Patient continues to have runs of VT on telemetry; he is asymptomatic with these are none are long enough to require ICD shock.  - Continue aspirin and Plavix   - Continue Lipitor 40 mg daily   - Continue Toprol XL 12.5 mg   - s/p ICD 9/10  - Pt will need follow up with Dr. Osorio in  2-4 weeks after discharge for post cardiac arrest follow up.     # Acute on chronic hypoxemic respiratory failure  # Possible COPD exacerbation   # Pulmonary edema, history pleural effusion status post thoracentesis on left, residual small bilateral pleural effusions  Patient on 2-3L O2 at home for COPD. Status post arrest with significant pulmonary edema and COPD exacerbation requiring HFNC and BiPAP. Patient noted to have left pleural effusion 09/05; he is status post thoracentesis. Patient is s/p Zosyn and Rocephin (finished 14d tx 9/10).  Procalcitonin 09/12 0.3.  BNP 09/12, 1,900. CT PE shows no PE, but improving ground glass opacity and reticular/cystic changes of lungs. WBC improved today to 14k without intervention  DDx: likely component of pulmonary edema given aggressive fluid resuscitation s/p arrest.  In addition, atelectasis/COPD likely contributing.  However, with increasing white blood cell count, can not rule out infection  - continue 80mg IV lasix BID  - continue prednisone taper (20mg x5 days, 10mg x5 days, 5mg x5 days)  - pulmonary consult; appreciate recommendations  - RT COPD consult; appreciate recommendations  - Frequent pulmonary toilet/physiotherapy   - Continue to wean O2 as able, O2 sat goal >87%    # Leukocytosis - improving  WBC as high as 17 yesterday.  Urine culture pending, but negative after 24 hours.  Procalcitonin 0.3.  WBC today 14 without intervention.  No constitutional complaints.  Afebrile.  DDx: secondary to stress?  Prednisone use?  Possible infection, but doubt at this point in time.  - sputum culture ordered, has yet to be collected  - WBC daily  - conditional blood cultures ordered should patient have T >100.3     # Possible Atrial Fibrillation - resolved  Atrial fibrillation versus normal sinus rhythm with frequent PAC; unclear.  EKG yesterday with baseline RBBB with PAC.  No evidence of Afib on telemetry    # Rib fracture   Patient states pain is improving overall. He  is not requiring any narcotics and would tend to avoid as he did have witnessed probably delirium in the ICU manifesting as seizure like activity without seizures on EEG. Moreover component of respiratory depression with this. Patient tolerating chest physiotherapy and feeling well in this regard.   - Prn Tylenol for mild-mod pain     # Traumatic Silva   Patient does straight cath at home. Traumatic silva iatrogenic, will continue with indwelling silva for strict I/O and to maintain patency in setting of injury and inflammation   - Continue with silva for now while diuresing     # PVD with renal artery stenosis s/p stenting in 2013   # occluded R carotid artery  # s/p left carotid endarterectomy  # Hx left subclavian stenosis  - Continue aspirin and Plavix       Anticipated Disposition: pending hypoxia.  Patient needs to be on LESS than 6L NC WITH activity for acceptance to TCU.    Patient seen and discussed with Dr. John Horton, who agrees with above plan.    Maritza Womack PA-C  Cardiology - [facility]      I have seen and examined the patient with the CSI team. I agree with the assessment and plan of the note above.I have reviewed pertinent labs.     John Horton MD  Interventional Cardiology  Pager: [phone]

## 2019-09-13 NOTE — PROGRESS NOTES
[facility]  Pulmonary, Allergy, Critical Care and Sleep Medicine  Follow-up Note - 09/13/2019    Assessment and Recommendations:    83yoM w/ Hx of COPD on home 2-3L w/ exertion and while sleeping, CABG, Carotid artery occlusion, L Subclavian stenosis - admitted 8/27 with VT/Fib Arrest. S/p PCI to pLAD and LCx. After extubation, required HF NC for multiple days. CSI team consulting for persisting hypoxic respiratory failure.      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 On mometasone and umeclidium-vilaterol. O2 needs appear higher than baseline currently; although the fact that he is asymptomatic with drops in SPO2 argue that his baseline is possibly worse than what he has stated. Nearing euvolemic per primary team and they reported that his EF has recovered. Is not hypoventilating. Repeat CT scan shows no PE. The diffuse GGO and consolidative opacities have significantly improved from his post-CPR scan. He does have persistent emphysematous changes, reticular fibrosis and pleural calcific changes along with mucus plugging. He has no current evidence of pulmonary infection - sputum grew coag neg staph on 9/7 and has been covered for this since then. Overall most likely etiology is a very poor baseline with inflammatory insult due to cardiac arrest and resuscitation. However, can attempt to improve O2 needs by treating for airway inflammation and facilitating better airway clearance of mucus plugs.     -Start Azithromycin 500 today then 250 x4 days  -Continue Duonebs QID while  inpatient, discharge on home ICS, LABA/LAMA inhalers (mometasone and umeclidium-vilaterol)  -Trial Hypertonic Saline neb 1x after a Duoneb, if helpful then can continue during admission BID  -Instruct pt to use Flutter Valve device Q2 hrs while inpatient, can take home with him  -Increase prednisone back to 40 mg x5 days then 20 mg x5 days then stop  -Discharge with Pulmonary Clinic follow up with High resolution CT scan to assess for CPFE and for RUL lung nodule follow up    Pulmonary to sign off but please do not hesitate to contact with further questions.      Seen and discussed with Dr Siddharth Pereyra MD - [facility] Fellow, Pgr [phone]     Subjective, Interval history:   Feels unchanged today. ROS 12 pt negative. No LE swelling, fevers. Can cough up sputum with help of the flutter valve device.      Objective:   Medications:    acetylcysteine  2 mL Nebulization 4x Daily     ascorbic acid  500 mg Oral Daily     aspirin  81 mg Oral Daily     atorvastatin  40 mg Oral QPM     [START ON 9/14/2019] azithromycin  250 mg Oral Daily     cephALEXin  500 mg Oral TID     clopidogrel  75 mg Oral Daily     ferrous sulfate  325 mg Oral Daily with breakfast     furosemide  80 mg Intravenous Q12H     heparin lock flush  5-10 mL Intracatheter Q24H     insulin aspart  1-10 Units Subcutaneous TID AC     insulin aspart  1-7 Units Subcutaneous At Bedtime     ipratropium - albuterol 0.5 mg/2.5 mg/3 mL  3 mL Nebulization 4x daily     metoprolol succinate ER  12.5 mg Oral Daily     mometasone  2 puff Inhalation QPM     multivitamin w/minerals  1 tablet Oral Daily     pantoprazole (PROTONIX) IV  40 mg Intravenous BID     [START ON 9/14/2019] predniSONE  40 mg Oral Daily     senna-docusate  1 tablet Oral or Feeding Tube At Bedtime     sodium chloride (PF)  3 mL Intracatheter Q8H     vitamin A  20,000 Units Oral Daily     zinc sulfate  220 mg Oral Daily     albuterol, IV fluid REPLACEMENT ONLY, glucose **OR** dextrose **OR**  glucagon, heparin lock flush, HOLD MEDICATION, HOLD MEDICATION, HOLD MEDICATION, hydrALAZINE, lidocaine 4%, lidocaine (buffered or not buffered), magnesium sulfate, magnesium sulfate, melatonin, naloxone, nitroGLYcerin, oxyCODONE-acetaminophen, Percutaneous Coronary Intervention orders placed (this is information for BPA alerting), potassium chloride, potassium chloride with lidocaine, potassium chloride, potassium chloride, potassium chloride, potassium phosphate (KPHOS) in D5W IV, potassium phosphate (KPHOS) in D5W IV, potassium phosphate (KPHOS) in D5W IV, potassium phosphate (KPHOS) in D5W IV, potassium phosphate (KPHOS) in D5W IV, ACE/ARB/ARNI NOT PRESCRIBED, sodium chloride (PF), sodium chloride (PF), sodium chloride (PF)    Physical Exam:  Temp:  [97.7  F (36.5  C)-98.6  F (37  C)] 97.7  F (36.5  C)  Pulse:  [81-87] 82  Heart Rate:  [75-94] 91  Resp:  [20-24] 22  BP: ()/(52-66) 113/59  SpO2:  [87 %-97 %] 96 %    Intake/Output Summary (Last 24 hours) at 9/13/2019 1757  Last data filed at 9/13/2019 1600  Gross per 24 hour   Intake 880 ml   Output 3050 ml   Net -2170 ml     General: laying in bed in NAD  HEENT: anicteric, moist mucosa  Neck: no palpable lymphadenopathy, no JVD noted  Chest: faint basilar crackles, no wheezing  Cardiac: RRR no murmurs  Abdomen: Soft, flat, non tender, active BS  Extremities: No LE Edema  Neuro: A&Ox3, no focal deficits   Skin: no rash noted    Labs and imaging: Notable for WBC 14 from 17

## 2019-09-13 NOTE — PROVIDER NOTIFICATION
Call placed to [phone] informing of 61 beat VT run. Pt asymptomatic, denies palpitations, CP, or SOB. VSS. EKG and 40meq K ordered. Lactic resulted at 1.1.

## 2019-09-13 NOTE — PLAN OF CARE
"Neuro: A&Ox4. Hard of hearing, dentures in place.   Cardiac: SR, BBB with frequent PACs. HR 70-90's. ICD in place. No further telemetry notifications.     Respiratory: Sating >87% on 5-11 lpm via Oxymizer cannula most of shift, requiring 15 lpm for activity.   GI/: Adequate urine output via silva cath. BM X2  Diet/appetite: Tolerating DD3 diet. Eating well. 1800 FR.   Activity:  Assist of 1, up in chair most of day. Ambulated in halls x1.   Pain: Denies.   Skin: No new deficits noted. Chest dressing C/D/I. R nare wound C/D/I. Sacral mepilex in place for prevention.   LDA's: R PICC, hep lock.     Plan: K replaced. Continue with POC. Notify primary team with changes.  /59 (BP Location: Left arm)   Pulse 82   Temp 97.7  F (36.5  C) (Oral)   Resp 22   Ht 1.727 m (5' 7.99\")   Wt 78.2 kg (172 lb 6.4 oz)   SpO2 96%   BMI 26.22 kg/m      "

## 2019-09-14 LAB
ANION GAP SERPL CALCULATED.3IONS-SCNC: 10 MMOL/L (ref 3–14)
BASOPHILS # BLD AUTO: 0 10E9/L (ref 0–0.2)
BASOPHILS NFR BLD AUTO: 0.2 %
BUN SERPL-MCNC: 45 MG/DL (ref 7–30)
CALCIUM SERPL-MCNC: 8.5 MG/DL (ref 8.5–10.1)
CHLORIDE SERPL-SCNC: 98 MMOL/L (ref 94–109)
CO2 SERPL-SCNC: 26 MMOL/L (ref 20–32)
CREAT SERPL-MCNC: 1.24 MG/DL (ref 0.66–1.25)
DIFFERENTIAL METHOD BLD: ABNORMAL
EOSINOPHIL # BLD AUTO: 0.1 10E9/L (ref 0–0.7)
EOSINOPHIL NFR BLD AUTO: 0.8 %
ERYTHROCYTE [DISTWIDTH] IN BLOOD BY AUTOMATED COUNT: 19.1 % (ref 10–15)
GFR SERPL CREATININE-BSD FRML MDRD: 53 ML/MIN/{1.73_M2}
GLUCOSE BLDC GLUCOMTR-MCNC: 129 MG/DL (ref 70–99)
GLUCOSE BLDC GLUCOMTR-MCNC: 139 MG/DL (ref 70–99)
GLUCOSE BLDC GLUCOMTR-MCNC: 139 MG/DL (ref 70–99)
GLUCOSE BLDC GLUCOMTR-MCNC: 177 MG/DL (ref 70–99)
GLUCOSE SERPL-MCNC: 102 MG/DL (ref 70–99)
HCT VFR BLD AUTO: 27.6 % (ref 40–53)
HGB BLD-MCNC: 8.2 G/DL (ref 13.3–17.7)
IMM GRANULOCYTES # BLD: 0.1 10E9/L (ref 0–0.4)
IMM GRANULOCYTES NFR BLD: 0.4 %
LACTATE BLD-SCNC: 1 MMOL/L (ref 0.7–2)
LYMPHOCYTES # BLD AUTO: 0.7 10E9/L (ref 0.8–5.3)
LYMPHOCYTES NFR BLD AUTO: 6 %
MAGNESIUM SERPL-MCNC: 2.5 MG/DL (ref 1.6–2.3)
MCH RBC QN AUTO: 30.3 PG (ref 26.5–33)
MCHC RBC AUTO-ENTMCNC: 29.7 G/DL (ref 31.5–36.5)
MCV RBC AUTO: 102 FL (ref 78–100)
MONOCYTES # BLD AUTO: 0.9 10E9/L (ref 0–1.3)
MONOCYTES NFR BLD AUTO: 7.9 %
NEUTROPHILS # BLD AUTO: 9.6 10E9/L (ref 1.6–8.3)
NEUTROPHILS NFR BLD AUTO: 84.7 %
NRBC # BLD AUTO: 0 10*3/UL
NRBC BLD AUTO-RTO: 0 /100
PHOSPHATE SERPL-MCNC: 4.3 MG/DL (ref 2.5–4.5)
PLATELET # BLD AUTO: 199 10E9/L (ref 150–450)
POTASSIUM SERPL-SCNC: 3.4 MMOL/L (ref 3.4–5.3)
RBC # BLD AUTO: 2.71 10E12/L (ref 4.4–5.9)
SODIUM SERPL-SCNC: 135 MMOL/L (ref 133–144)
WBC # BLD AUTO: 11.3 10E9/L (ref 4–11)

## 2019-09-14 PROCEDURE — 25000131 ZZH RX MED GY IP 250 OP 636 PS 637: Mod: GY | Performed by: PHYSICIAN ASSISTANT

## 2019-09-14 PROCEDURE — 94799 UNLISTED PULMONARY SVC/PX: CPT

## 2019-09-14 PROCEDURE — 94668 MNPJ CHEST WALL SBSQ: CPT

## 2019-09-14 PROCEDURE — 00000146 ZZHCL STATISTIC GLUCOSE BY METER IP

## 2019-09-14 PROCEDURE — 36415 COLL VENOUS BLD VENIPUNCTURE: CPT | Performed by: PHYSICIAN ASSISTANT

## 2019-09-14 PROCEDURE — 25000132 ZZH RX MED GY IP 250 OP 250 PS 637: Mod: GY | Performed by: PHYSICIAN ASSISTANT

## 2019-09-14 PROCEDURE — 12000004 ZZH R&B IMCU UMMC

## 2019-09-14 PROCEDURE — 25000132 ZZH RX MED GY IP 250 OP 250 PS 637: Mod: GY

## 2019-09-14 PROCEDURE — 40000802 ZZH SITE CHECK

## 2019-09-14 PROCEDURE — C9113 INJ PANTOPRAZOLE SODIUM, VIA: HCPCS

## 2019-09-14 PROCEDURE — 99207 ZZC NO BILLABLE SERVICE THIS VISIT: CPT | Performed by: NURSE PRACTITIONER

## 2019-09-14 PROCEDURE — 83735 ASSAY OF MAGNESIUM: CPT | Performed by: PHYSICIAN ASSISTANT

## 2019-09-14 PROCEDURE — 25000128 H RX IP 250 OP 636: Performed by: PHYSICIAN ASSISTANT

## 2019-09-14 PROCEDURE — 25000128 H RX IP 250 OP 636: Performed by: STUDENT IN AN ORGANIZED HEALTH CARE EDUCATION/TRAINING PROGRAM

## 2019-09-14 PROCEDURE — 25000132 ZZH RX MED GY IP 250 OP 250 PS 637: Mod: GY | Performed by: NURSE PRACTITIONER

## 2019-09-14 PROCEDURE — 94640 AIRWAY INHALATION TREATMENT: CPT

## 2019-09-14 PROCEDURE — 36415 COLL VENOUS BLD VENIPUNCTURE: CPT | Performed by: NURSE PRACTITIONER

## 2019-09-14 PROCEDURE — 99232 SBSQ HOSP IP/OBS MODERATE 35: CPT | Performed by: INTERNAL MEDICINE

## 2019-09-14 PROCEDURE — 25000128 H RX IP 250 OP 636

## 2019-09-14 PROCEDURE — 94640 AIRWAY INHALATION TREATMENT: CPT | Mod: 76

## 2019-09-14 PROCEDURE — 40000275 ZZH STATISTIC RCP TIME EA 10 MIN

## 2019-09-14 PROCEDURE — 85025 COMPLETE CBC W/AUTO DIFF WBC: CPT | Performed by: PHYSICIAN ASSISTANT

## 2019-09-14 PROCEDURE — 25000125 ZZHC RX 250: Performed by: INTERNAL MEDICINE

## 2019-09-14 PROCEDURE — 83605 ASSAY OF LACTIC ACID: CPT | Performed by: NURSE PRACTITIONER

## 2019-09-14 PROCEDURE — 84100 ASSAY OF PHOSPHORUS: CPT | Performed by: PHYSICIAN ASSISTANT

## 2019-09-14 PROCEDURE — 80048 BASIC METABOLIC PNL TOTAL CA: CPT | Performed by: PHYSICIAN ASSISTANT

## 2019-09-14 PROCEDURE — 25000132 ZZH RX MED GY IP 250 OP 250 PS 637: Mod: GY | Performed by: STUDENT IN AN ORGANIZED HEALTH CARE EDUCATION/TRAINING PROGRAM

## 2019-09-14 PROCEDURE — 25000132 ZZH RX MED GY IP 250 OP 250 PS 637: Mod: GY | Performed by: INTERNAL MEDICINE

## 2019-09-14 RX ORDER — BUMETANIDE 1 MG/1
2 TABLET ORAL 2 TIMES DAILY
Status: DISCONTINUED | OUTPATIENT
Start: 2019-09-14 | End: 2019-09-15

## 2019-09-14 RX ORDER — POTASSIUM CHLORIDE 750 MG/1
20 TABLET, EXTENDED RELEASE ORAL ONCE
Status: COMPLETED | OUTPATIENT
Start: 2019-09-14 | End: 2019-09-14

## 2019-09-14 RX ADMIN — POTASSIUM CHLORIDE 20 MEQ: 750 TABLET, EXTENDED RELEASE ORAL at 09:03

## 2019-09-14 RX ADMIN — CEPHALEXIN 500 MG: 250 CAPSULE ORAL at 19:51

## 2019-09-14 RX ADMIN — IPRATROPIUM BROMIDE AND ALBUTEROL SULFATE 3 ML: .5; 3 SOLUTION RESPIRATORY (INHALATION) at 16:34

## 2019-09-14 RX ADMIN — CEPHALEXIN 500 MG: 250 CAPSULE ORAL at 08:08

## 2019-09-14 RX ADMIN — PANTOPRAZOLE SODIUM 40 MG: 40 INJECTION, POWDER, FOR SOLUTION INTRAVENOUS at 08:09

## 2019-09-14 RX ADMIN — PREDNISONE 40 MG: 20 TABLET ORAL at 08:09

## 2019-09-14 RX ADMIN — Medication 500 MG: at 08:07

## 2019-09-14 RX ADMIN — BUMETANIDE 2 MG: 1 TABLET ORAL at 16:58

## 2019-09-14 RX ADMIN — ACETYLCYSTEINE 2 ML: 200 SOLUTION ORAL; RESPIRATORY (INHALATION) at 13:13

## 2019-09-14 RX ADMIN — ACETYLCYSTEINE 2 ML: 200 SOLUTION ORAL; RESPIRATORY (INHALATION) at 19:56

## 2019-09-14 RX ADMIN — SODIUM CHLORIDE, PRESERVATIVE FREE 5 ML: 5 INJECTION INTRAVENOUS at 09:05

## 2019-09-14 RX ADMIN — FUROSEMIDE 80 MG: 10 INJECTION, SOLUTION INTRAVENOUS at 05:52

## 2019-09-14 RX ADMIN — CEPHALEXIN 500 MG: 250 CAPSULE ORAL at 13:55

## 2019-09-14 RX ADMIN — POTASSIUM CHLORIDE 20 MEQ: 750 TABLET, EXTENDED RELEASE ORAL at 05:52

## 2019-09-14 RX ADMIN — MULTIPLE VITAMINS W/ MINERALS TAB 1 TABLET: TAB at 08:08

## 2019-09-14 RX ADMIN — IPRATROPIUM BROMIDE AND ALBUTEROL SULFATE 3 ML: .5; 3 SOLUTION RESPIRATORY (INHALATION) at 13:13

## 2019-09-14 RX ADMIN — FERROUS SULFATE TAB 325 MG (65 MG ELEMENTAL FE) 325 MG: 325 (65 FE) TAB at 08:08

## 2019-09-14 RX ADMIN — AZITHROMYCIN 250 MG: 250 TABLET, FILM COATED ORAL at 08:08

## 2019-09-14 RX ADMIN — CLOPIDOGREL BISULFATE 75 MG: 75 TABLET, FILM COATED ORAL at 08:08

## 2019-09-14 RX ADMIN — ACETYLCYSTEINE 2 ML: 200 SOLUTION ORAL; RESPIRATORY (INHALATION) at 16:33

## 2019-09-14 RX ADMIN — ZINC SULFATE CAP 220 MG (50 MG ELEMENTAL ZN) 220 MG: 220 (50 ZN) CAP at 08:09

## 2019-09-14 RX ADMIN — ACETYLCYSTEINE 2 ML: 200 SOLUTION ORAL; RESPIRATORY (INHALATION) at 08:33

## 2019-09-14 RX ADMIN — Medication 6.25 MG: at 15:59

## 2019-09-14 RX ADMIN — IPRATROPIUM BROMIDE AND ALBUTEROL SULFATE 3 ML: .5; 3 SOLUTION RESPIRATORY (INHALATION) at 19:57

## 2019-09-14 RX ADMIN — ASPIRIN 81 MG CHEWABLE TABLET 81 MG: 81 TABLET CHEWABLE at 08:08

## 2019-09-14 RX ADMIN — PANTOPRAZOLE SODIUM 40 MG: 40 INJECTION, POWDER, FOR SOLUTION INTRAVENOUS at 19:50

## 2019-09-14 RX ADMIN — Medication 20000 UNITS: at 08:08

## 2019-09-14 RX ADMIN — ATORVASTATIN CALCIUM 40 MG: 40 TABLET, FILM COATED ORAL at 19:51

## 2019-09-14 RX ADMIN — IPRATROPIUM BROMIDE AND ALBUTEROL SULFATE 3 ML: .5; 3 SOLUTION RESPIRATORY (INHALATION) at 08:33

## 2019-09-14 ASSESSMENT — ACTIVITIES OF DAILY LIVING (ADL)
ADLS_ACUITY_SCORE: 17

## 2019-09-14 ASSESSMENT — MIFFLIN-ST. JEOR: SCORE: 1435.37

## 2019-09-14 NOTE — PROGRESS NOTES
[facility]   Cardiology Progress      SUMMARY: José Aguirre is an 83 year old male with a PMHx of PVD, renal artery stenosis s/p stenting in 2013, carotid artery disease, left subclavian stenosis, CAD s/p CABG ( L-LAD, S-D1 and OM2, PDA), COPD (on 2-3L home O2), and HLD who was admitted on 8/27/2019 to [facility] with a witnessed OOH VT/VF arrest-->immediate bystander CPR--> shocked x 1, 1 mg Epi-->ROSC in the field. On arrival, pt had a pulse but was hypotensive. He was intubated and taken for emergent coronary angiogram which showed severe 3V native vessel CAD but no acute culprit lesion to explain cardiac arrest, LIMA-LAD graft minimally diseased but small and primarily retrograde flow through native LAD,  of SVG-RCA at anastomosis, SVG-D1 and SVG-OM2 closed. He underwent PCI to dLM/ostial LAD with CONCETTA and balloon angioplasty of ostial LCx. Echo revealed preserved EF, mildly reduced RV function. Underwent ICD placement on 9/10/2019. Hospital course has been c/b pleural effusion requiring thoracentesis and acute on chronic COPD exacerbation. Underwent chest CT on 9/12 which was negative for PE, showed resolved pl effusions and improving GGO, possible sequela of prior necrotizing pneumonia. Pulm consulted and started azithro and prednisone for COPD exacerbation.    SUBJECTIVE AND INTERVAL: Decreasing o2 requirements, down to 6L oxymizer, though with activity continues to need up to 15L. Continues to diurese well. 2.5 L last 24, net negative 13L since admission. Weight down 1.6 kg last 24h.  While crt WNL, BUN elevated, GFR slight decline. CBC stable. WBC down trending. Denies fever, chills, nausea, vomiting, chest pain, SOB, abdominal pain, new LE pain/swelling. Mcclelland remains in place. Less VT overnight, 1, 7 beat run. No afib.     PHYSICAL EXAM  Temp:  [97.7  F (36.5  C)-98.5  F (36.9  C)] 98.5  F (36.9  C)  Pulse:  [76-91] 91  Heart Rate:  [75-94] 88  Resp:  [20-24] 20  BP:  ()/(48-63) 96/52  SpO2:  [87 %-97 %] 94 %    Intake/Output Summary (Last 24 hours) at 9/14/2019 0644  Last data filed at 9/14/2019 0351  Gross per 24 hour   Intake 720 ml   Output 2500 ml   Net -1780 ml     Weight:  Baseline: unclear, on presentation approximately 173 pounds.   Hospitalization Max: 187 pounds  Today: 168# pounds.    GEN: NAD  Pulm: RLL with inspiratory wheezes and rrales, less coarse on LLL but no rales  Cardiac: Normal S1 and S2. Seemingly regular rate/rhythm. JVP not appreciably elevated, no murmurs.  No LE edema.  GI: soft, non distended  Neuro:  Alert and oriented x4.    PERTINENT MEDS  ASA 81 mg daily  Plavix 75 mg daily  Lasix 80 mg BID  Toprol 12.5 mg daily    Labs:   CMP  Recent Labs   Lab 09/14/19  0401 09/13/19  0455 09/12/19  0435 09/11/19  0638  09/08/19  0422 09/07/19  1600    134 136 140   < > 138 138   POTASSIUM 3.4 3.8 3.6 3.8   < > 3.8 4.4   CHLORIDE 98 100 101 101   < > 102 103   CO2 26 26 26 30   < > 25 26   ANIONGAP 10 9 8 9   < > 11 9   * 126* 136* 174*   < > 189* 241*   BUN 45* 38* 45* 51*   < > 47* 43*   CR 1.24 1.13 1.08 1.02   < > 1.15 1.14   GFRESTIMATED 53* 59* 63 67   < > 58* 59*   GFRESTBLACK 62 69 73 78   < > 67 68   YOON 8.5 8.3* 8.0* 8.3*   < > 8.1* 8.2*   MAG 2.5*  --  2.5*  --   --  2.1 2.0   PHOS 4.3  --  3.4  --   --  3.2 3.2   PROTTOTAL  --   --   --   --   --  5.9* 6.4*   ALBUMIN  --   --   --   --   --  2.3* 2.4*   BILITOTAL  --   --   --   --   --  0.7 0.7   ALKPHOS  --   --   --   --   --  60 63   AST  --   --   --   --   --  20 21   ALT  --   --   --   --   --  29 33    < > = values in this interval not displayed.     CBC  Recent Labs   Lab 09/14/19  0401 09/13/19  0455 09/12/19  0435 09/11/19  0638   WBC 11.3* 14.0* 17.7* 12.4*   RBC 2.71* 2.60* 2.75* 2.72*   HGB 8.2* 7.8* 8.2* 8.0*   HCT 27.6* 27.0* 28.6* 28.4*   * 104* 104* 104*   MCH 30.3 30.0 29.8 29.4   MCHC 29.7* 28.9* 28.7* 28.2*   RDW 19.1* 19.4* 20.3* 20.4*    205  213 203     INRNo lab results found in last 7 days.  Arterial Blood Gas  Recent Labs   Lab 09/07/19  0910   O2PER 80   IMAGING:   ECHO  Technically difficult study.  Left ventricular function is normal or even hyperdynamic in some views.The EF  is 60-70%. No regional wall motion abnormalities aer seen.  RV appears dilated with probably mildly reduced function on limited views.  No significant valve disease.     CORONARY ANGIOGRAM 8/27/2019  # Severe 3-vessel coronary artery disease as described below with severe LM/LAD lesion and  of the proximal RCA with L->R collaterals  # OTOOLE-LAD graft is minimally diseased but small, with primarily retrograde flow through native LAD  # SVG-RCA graft is chronically closed at the aortic anastamosis. SVG-D1 could not be selectively injected but appears closed due to dye hangup during injection of the native coronaries. There may also be a closed graft to OM2 but this is not certain  # Successful PCI of the distal LM/ostial LAD with 2 Synergy CONCETTA (4.0 x 12 and 3.5 x 8 from proximal to distal)  #Successful balloon angioplasty of the ostial LCx  # Successful Thermagard placement in the RFV  Plan     Follow bedrest per protocol    Continued medical management and lifestyle modifications for cardiovascular risk factor optimizations.  * CT Head/C/A/P  * Leave RFA sheath in place during hypothermia due to coagulopathy  * Aspirin 81 lifelong  * Dual anti-platelet therapy for at least 1 year, and potentially longer if tolerated  * Low-intensity heparin drip while RFA sheath remains in place  * High-intensity statin  * Cardiac rehab referral  * Further plans per primary team    ASSESSMENT  José Aguirre is an 83 year old male with a PMHx of PVD with renal artery stenosis s/p stenting in 2013, occluded R carotid artery and s/p left carotid endarterectomy, left subclavian stenosis, CAD s/p CABG with L-LAD, S-D1 and OM2, PDA), COPD (on 2-3L home O2), and HLD who was admitted on 8/27/2019 with  a VT/VF arrest with ROSC in the field. Patient was found to have disease in the LM into pLAD s/p PCI to LM->LAD and ost LCx, now s/p PCI to dLM/ostial LAD with CONCETTA and balloon angioplasty of ostial LCx.      # VT/VF arrest with ROSC prior to arrival- s/p ICD  # CAD s/p CAB (03) and PCI to native LM into the LAD and ostial LCX (8/27/2019) CAB in 2003 with LIMA-LAD, SVG-D1, SVG-OM2, SVG-PDA  8/27/19 admission after OOH cardiac arrest. Angio revealed severe 3V native vessel CAD with LM/LAD lesion and  of proximal RCA with L to right collaterals. LIMA-LAD graft minimally diseased but small and primarily retrograde flow through native LAD. SVG-RCA chronically closed at anastomosis, SVG-D1 and SBG-OM2 closed. He has preserved LV EF with mildly reduced RV function. On DAPT, statin, BB.  # Acute on chronic hypoxemic respiratory failure- improving. On less 02.   # COPD exacerbation - started on prednisone, azithromycin. Pulm consulted. On home 02 2-3 L PTA.  # Pulmonary edema, history pleural effusion status post thoracentesis on left, residual small bilateral pleural effusions - Atelectasis/COPD exacerbation. No s/o infection. Currently diuresing with 80mg IV lasix BID, started  prednisone taper per pulm (20mg x5 days, 10mg x5 days, 5mg x5 days) Pulm consulted and following. RT COPD consult; appreciate recommendations.   # Leukocytosis - improving- down to 11 today from 14 yesterday. Urine culture with NGTD. Afebrile. Sputum culture ordered, has yet to be collected  # Possible Atrial Fibrillation - resolved- Apparent possible Atrial fibrillation versus normal sinus rhythm with frequent PAC. No evidence of Afib on telemetry  # Rib fracture   # Traumatic Mcclelland -Pt does straight cath at home.  # PVD with renal artery stenosis s/p stenting in 2013   # occluded R carotid artery  # s/p left carotid endarterectomy  # Hx left subclavian stenosis    PLAN:  # Transition to PO diuretic, 2 mg PO Bumex BID  # Remove Mcclelland?  Traumatic  silva insertion, no e/o trauma since that time   # Continue aspirin and Plavix Continue rx for COPD exacerbation.   # Frequent pulmonary toilet/physiotherapy   # Continue to wean O2 as able, O2 sat goal >87%  # BB was held this am for soft pressures but patient has intermittent VT, give one time dose short acting BB now, resume Succinate tomorrow am.    Anticipated Disposition: pending hypoxia.  Patient needs to be on LESS than 6L NC WITH activity for acceptance to TCU.    MARKUS Lara CNP on 9/14/2019 at 9:33 AM      ATTENDING NOTE:  Patient has been seen and evaluated by me on 09/14/2019. I have reviewed the documentation above.  I have reviewed today's vital signs, medications, labs, and imaging results.  I have reviewed and edited, as necessary, the history, review of systems, physical examination, and assessment and plan.  I have discussed my assessment and plan with the nurse practitioner.   I have seen and examined the patient today as part of a shared visit with Shereen Garcia NP.  José Aguirre is a 83 year old male with risk factor profile (+) HTN, glucose intolerance, (+) hypercholesterolemia, (-) tobacco use, (-) fam Hx premature CAD, PVD, renal artery stenosis s/p stenting in 2013, carotid artery disease, left subclavian stenosis, 3V CAD s/p CABG ( L-LAD, S-D1 and OM2, PDA), COPD (2-3 L home O2), and HLD, admitted on 8/27/2019 to [facility] following a  witnessed OOH VT/VF arrest-->immediate bystander CPR--> shocked x 1, 1 mg Epi-->ROSC in the field.  He was intubated and transported to [facility] where emergent coronary angiography showed severe 3V native vessel CAD but noculprit lesion (LIMA-LAD graft minimally diseased but small and primarily retrograde flow through native LAD,  of SVG-RCA at anastomosis, SVG-D1 & SVG-OM2 occluded).  He underwent PCI to dLM/ostial LAD with CONCETTA and balloon angioplasty of ostial LCx. ECHO showed preserved EF.  He had AICD (9/10/2019).  He developed LF pleural effusion  requiring thoracentesis and acute COPD exacerbation. CT negative for PE.  Pulmonary consulted, and patient started on Azithromycin and Prednisone.   Exam documented above.  Volume overloaded.  Patient will received additional IV diuresis.  Monitor effusion.  Continue rehab.      Paulino Lambert MD     Cardiovascular Division         No

## 2019-09-14 NOTE — PLAN OF CARE
"BP 96/52   Pulse 91   Temp 98.5  F (36.9  C) (Oral)   Resp 20   Ht 1.727 m (5' 7.99\")   Wt 76.6 kg (168 lb 14 oz)   SpO2 94%   BMI 25.68 kg/m      Neuro: A&Ox4. Follows commands. [place]  Cardiac: SR/paced  (above VDD  pacer setting). SBP . Afebrile.   Respiratory: Sating >87% on 6L oximizer NC/oxymask overnight. Needing up to 10L with activity.  GI/: Adequate urine output via silva. No BMs, passing gas.  Diet/appetite: DD3. 1.8L FR.  Activity:  Assist of 1  Pain: At acceptable level on current regimen.   Skin: No new deficits noted.  LDA's: Right double lumen PICC.    Plan: Continue with POC. Notify primary team with changes.   "

## 2019-09-14 NOTE — PLAN OF CARE
"Neuro: A&Ox4. Hard of hearing.  Cardiac: Paced/SR, BBB with frequent PACs. HR 70-90's. ICD in place. Intermittent episodes of Vtach-1 dose metoprolol ordered.   Respiratory: Sating >87% on 5-6 lpm at rest via Oxymizer cannula, requiring 10 lpm for activity.   GI/: Adequate urine output via silva cath. BM X2  Diet/appetite: Tolerating DD3 diet. Eating well. 1800 FR.   Activity:  Assist of 1, up in chair most of day.   Pain: Denies.   Skin: No new deficits noted.   LDA's: R PICC, hep lock.      Plan: Continue with POC. Notify primary team with changes.  /59 (BP Location: Left arm)   Pulse 82   Temp 98.9  F (37.2  C) (Oral)   Resp 20   Ht 1.727 m (5' 7.99\")   Wt 76.6 kg (168 lb 14 oz)   SpO2 93%   BMI 25.68 kg/m      "

## 2019-09-14 NOTE — PROVIDER NOTIFICATION
"Telemetry notification regarding a run of [employer]. Pt was sleeping and asymptomatic   BP 99/64   Pulse 82   Temp 98.8  F (37.1  C) (Oral)   Resp 20   Ht 1.727 m (5' 7.99\")   Wt 76.6 kg (168 lb 14 oz)   SpO2 95%   BMI 25.68 kg/m        Orders received: 1x dose of metoprolol 6.25mg  "

## 2019-09-15 LAB
ANION GAP SERPL CALCULATED.3IONS-SCNC: 9 MMOL/L (ref 3–14)
BACTERIA SPEC CULT: NO GROWTH
BACTERIA SPEC CULT: NO GROWTH
BASOPHILS # BLD AUTO: 0 10E9/L (ref 0–0.2)
BASOPHILS NFR BLD AUTO: 0.1 %
BUN SERPL-MCNC: 40 MG/DL (ref 7–30)
CALCIUM SERPL-MCNC: 8.2 MG/DL (ref 8.5–10.1)
CHLORIDE SERPL-SCNC: 99 MMOL/L (ref 94–109)
CO2 SERPL-SCNC: 28 MMOL/L (ref 20–32)
CREAT SERPL-MCNC: 1.1 MG/DL (ref 0.66–1.25)
DIFFERENTIAL METHOD BLD: ABNORMAL
EOSINOPHIL # BLD AUTO: 0.1 10E9/L (ref 0–0.7)
EOSINOPHIL NFR BLD AUTO: 0.6 %
ERYTHROCYTE [DISTWIDTH] IN BLOOD BY AUTOMATED COUNT: 18.6 % (ref 10–15)
GFR SERPL CREATININE-BSD FRML MDRD: 61 ML/MIN/{1.73_M2}
GLUCOSE BLDC GLUCOMTR-MCNC: 120 MG/DL (ref 70–99)
GLUCOSE BLDC GLUCOMTR-MCNC: 136 MG/DL (ref 70–99)
GLUCOSE BLDC GLUCOMTR-MCNC: 156 MG/DL (ref 70–99)
GLUCOSE BLDC GLUCOMTR-MCNC: 243 MG/DL (ref 70–99)
GLUCOSE SERPL-MCNC: 90 MG/DL (ref 70–99)
GRAM STN SPEC: NORMAL
HCT VFR BLD AUTO: 25.9 % (ref 40–53)
HGB BLD-MCNC: 7.6 G/DL (ref 13.3–17.7)
IMM GRANULOCYTES # BLD: 0.1 10E9/L (ref 0–0.4)
IMM GRANULOCYTES NFR BLD: 0.5 %
LYMPHOCYTES # BLD AUTO: 0.7 10E9/L (ref 0.8–5.3)
LYMPHOCYTES NFR BLD AUTO: 7.7 %
Lab: NORMAL
MCH RBC QN AUTO: 29.9 PG (ref 26.5–33)
MCHC RBC AUTO-ENTMCNC: 29.3 G/DL (ref 31.5–36.5)
MCV RBC AUTO: 102 FL (ref 78–100)
MONOCYTES # BLD AUTO: 0.7 10E9/L (ref 0–1.3)
MONOCYTES NFR BLD AUTO: 7.6 %
NEUTROPHILS # BLD AUTO: 7.9 10E9/L (ref 1.6–8.3)
NEUTROPHILS NFR BLD AUTO: 83.5 %
NRBC # BLD AUTO: 0 10*3/UL
NRBC BLD AUTO-RTO: 0 /100
NT-PROBNP SERPL-MCNC: 2116 PG/ML (ref 0–1800)
PLATELET # BLD AUTO: 190 10E9/L (ref 150–450)
POTASSIUM SERPL-SCNC: 3.4 MMOL/L (ref 3.4–5.3)
RBC # BLD AUTO: 2.54 10E12/L (ref 4.4–5.9)
SODIUM SERPL-SCNC: 136 MMOL/L (ref 133–144)
SPECIMEN SOURCE: NORMAL
WBC # BLD AUTO: 9.5 10E9/L (ref 4–11)

## 2019-09-15 PROCEDURE — 87077 CULTURE AEROBIC IDENTIFY: CPT | Performed by: PHYSICIAN ASSISTANT

## 2019-09-15 PROCEDURE — 99207 ZZC NO BILLABLE SERVICE THIS VISIT: CPT | Performed by: NURSE PRACTITIONER

## 2019-09-15 PROCEDURE — 87106 FUNGI IDENTIFICATION YEAST: CPT | Performed by: PHYSICIAN ASSISTANT

## 2019-09-15 PROCEDURE — 40000558 ZZH STATISTIC CVC DRESSING CHANGE

## 2019-09-15 PROCEDURE — 94799 UNLISTED PULMONARY SVC/PX: CPT

## 2019-09-15 PROCEDURE — 25000132 ZZH RX MED GY IP 250 OP 250 PS 637: Mod: GY

## 2019-09-15 PROCEDURE — 40000802 ZZH SITE CHECK

## 2019-09-15 PROCEDURE — C9113 INJ PANTOPRAZOLE SODIUM, VIA: HCPCS

## 2019-09-15 PROCEDURE — 12000004 ZZH R&B IMCU UMMC

## 2019-09-15 PROCEDURE — 40000275 ZZH STATISTIC RCP TIME EA 10 MIN

## 2019-09-15 PROCEDURE — 25000132 ZZH RX MED GY IP 250 OP 250 PS 637: Mod: GY | Performed by: STUDENT IN AN ORGANIZED HEALTH CARE EDUCATION/TRAINING PROGRAM

## 2019-09-15 PROCEDURE — 87070 CULTURE OTHR SPECIMN AEROBIC: CPT | Performed by: PHYSICIAN ASSISTANT

## 2019-09-15 PROCEDURE — 25000132 ZZH RX MED GY IP 250 OP 250 PS 637: Mod: GY | Performed by: NURSE PRACTITIONER

## 2019-09-15 PROCEDURE — 25000128 H RX IP 250 OP 636

## 2019-09-15 PROCEDURE — 87205 SMEAR GRAM STAIN: CPT | Performed by: PHYSICIAN ASSISTANT

## 2019-09-15 PROCEDURE — 25000132 ZZH RX MED GY IP 250 OP 250 PS 637: Mod: GY | Performed by: INTERNAL MEDICINE

## 2019-09-15 PROCEDURE — 85025 COMPLETE CBC W/AUTO DIFF WBC: CPT | Performed by: PHYSICIAN ASSISTANT

## 2019-09-15 PROCEDURE — 94640 AIRWAY INHALATION TREATMENT: CPT | Mod: 76

## 2019-09-15 PROCEDURE — 25000128 H RX IP 250 OP 636: Performed by: STUDENT IN AN ORGANIZED HEALTH CARE EDUCATION/TRAINING PROGRAM

## 2019-09-15 PROCEDURE — 25000132 ZZH RX MED GY IP 250 OP 250 PS 637: Mod: GY | Performed by: PHYSICIAN ASSISTANT

## 2019-09-15 PROCEDURE — 80048 BASIC METABOLIC PNL TOTAL CA: CPT | Performed by: PHYSICIAN ASSISTANT

## 2019-09-15 PROCEDURE — 00000146 ZZHCL STATISTIC GLUCOSE BY METER IP

## 2019-09-15 PROCEDURE — 87186 SC STD MICRODIL/AGAR DIL: CPT | Performed by: PHYSICIAN ASSISTANT

## 2019-09-15 PROCEDURE — 36592 COLLECT BLOOD FROM PICC: CPT | Performed by: PHYSICIAN ASSISTANT

## 2019-09-15 PROCEDURE — 25000131 ZZH RX MED GY IP 250 OP 636 PS 637: Mod: GY | Performed by: PHYSICIAN ASSISTANT

## 2019-09-15 PROCEDURE — 99232 SBSQ HOSP IP/OBS MODERATE 35: CPT | Performed by: INTERNAL MEDICINE

## 2019-09-15 PROCEDURE — 25000125 ZZHC RX 250: Performed by: INTERNAL MEDICINE

## 2019-09-15 PROCEDURE — 83880 ASSAY OF NATRIURETIC PEPTIDE: CPT | Performed by: PHYSICIAN ASSISTANT

## 2019-09-15 RX ORDER — BUMETANIDE 1 MG/1
1 TABLET ORAL DAILY
Status: DISCONTINUED | OUTPATIENT
Start: 2019-09-16 | End: 2019-09-17

## 2019-09-15 RX ORDER — BUMETANIDE 1 MG/1
3 TABLET ORAL 2 TIMES DAILY
Status: DISCONTINUED | OUTPATIENT
Start: 2019-09-15 | End: 2019-09-15

## 2019-09-15 RX ORDER — BUMETANIDE 1 MG/1
2 TABLET ORAL 2 TIMES DAILY
Status: DISCONTINUED | OUTPATIENT
Start: 2019-09-15 | End: 2019-09-15

## 2019-09-15 RX ADMIN — Medication 500 MG: at 08:13

## 2019-09-15 RX ADMIN — ACETYLCYSTEINE 2 ML: 200 SOLUTION ORAL; RESPIRATORY (INHALATION) at 16:26

## 2019-09-15 RX ADMIN — AZITHROMYCIN 250 MG: 250 TABLET, FILM COATED ORAL at 08:13

## 2019-09-15 RX ADMIN — PANTOPRAZOLE SODIUM 40 MG: 40 INJECTION, POWDER, FOR SOLUTION INTRAVENOUS at 08:14

## 2019-09-15 RX ADMIN — CLOPIDOGREL BISULFATE 75 MG: 75 TABLET, FILM COATED ORAL at 08:13

## 2019-09-15 RX ADMIN — CEPHALEXIN 500 MG: 250 CAPSULE ORAL at 20:31

## 2019-09-15 RX ADMIN — ASPIRIN 81 MG CHEWABLE TABLET 81 MG: 81 TABLET CHEWABLE at 08:13

## 2019-09-15 RX ADMIN — CEPHALEXIN 500 MG: 250 CAPSULE ORAL at 14:27

## 2019-09-15 RX ADMIN — ACETYLCYSTEINE 2 ML: 200 SOLUTION ORAL; RESPIRATORY (INHALATION) at 09:12

## 2019-09-15 RX ADMIN — IPRATROPIUM BROMIDE AND ALBUTEROL SULFATE 3 ML: .5; 3 SOLUTION RESPIRATORY (INHALATION) at 11:54

## 2019-09-15 RX ADMIN — IPRATROPIUM BROMIDE AND ALBUTEROL SULFATE 3 ML: .5; 3 SOLUTION RESPIRATORY (INHALATION) at 16:26

## 2019-09-15 RX ADMIN — SODIUM CHLORIDE, PRESERVATIVE FREE 5 ML: 5 INJECTION INTRAVENOUS at 09:27

## 2019-09-15 RX ADMIN — ACETYLCYSTEINE 2 ML: 200 SOLUTION ORAL; RESPIRATORY (INHALATION) at 19:35

## 2019-09-15 RX ADMIN — MULTIPLE VITAMINS W/ MINERALS TAB 1 TABLET: TAB at 08:13

## 2019-09-15 RX ADMIN — PREDNISONE 40 MG: 20 TABLET ORAL at 08:13

## 2019-09-15 RX ADMIN — IPRATROPIUM BROMIDE AND ALBUTEROL SULFATE 3 ML: .5; 3 SOLUTION RESPIRATORY (INHALATION) at 19:35

## 2019-09-15 RX ADMIN — ACETYLCYSTEINE 2 ML: 200 SOLUTION ORAL; RESPIRATORY (INHALATION) at 11:54

## 2019-09-15 RX ADMIN — BUMETANIDE 2 MG: 1 TABLET ORAL at 08:59

## 2019-09-15 RX ADMIN — POTASSIUM CHLORIDE 20 MEQ: 750 TABLET, EXTENDED RELEASE ORAL at 06:45

## 2019-09-15 RX ADMIN — CEPHALEXIN 500 MG: 250 CAPSULE ORAL at 08:13

## 2019-09-15 RX ADMIN — ATORVASTATIN CALCIUM 40 MG: 40 TABLET, FILM COATED ORAL at 20:31

## 2019-09-15 RX ADMIN — Medication 12.5 MG: at 08:13

## 2019-09-15 RX ADMIN — Medication 20000 UNITS: at 08:13

## 2019-09-15 RX ADMIN — FERROUS SULFATE TAB 325 MG (65 MG ELEMENTAL FE) 325 MG: 325 (65 FE) TAB at 08:13

## 2019-09-15 RX ADMIN — Medication 5 ML: at 05:08

## 2019-09-15 RX ADMIN — ZINC SULFATE CAP 220 MG (50 MG ELEMENTAL ZN) 220 MG: 220 (50 ZN) CAP at 08:14

## 2019-09-15 RX ADMIN — PANTOPRAZOLE SODIUM 40 MG: 40 INJECTION, POWDER, FOR SOLUTION INTRAVENOUS at 20:31

## 2019-09-15 RX ADMIN — IPRATROPIUM BROMIDE AND ALBUTEROL SULFATE 3 ML: .5; 3 SOLUTION RESPIRATORY (INHALATION) at 09:12

## 2019-09-15 ASSESSMENT — MIFFLIN-ST. JEOR: SCORE: 1435.37

## 2019-09-15 ASSESSMENT — ACTIVITIES OF DAILY LIVING (ADL)
ADLS_ACUITY_SCORE: 17

## 2019-09-15 NOTE — PROGRESS NOTES
[facility]   Cardiology Progress      SUMMARY: José Aguirre is an 83 year old male with a PMHx of PVD, renal artery stenosis s/p stenting in 2013, carotid artery disease, left subclavian stenosis, CAD s/p CABG ( L-LAD, S-D1 and OM2, PDA), COPD (on 2-3L home O2), and HLD who was admitted on 8/27/2019 to [facility] with a witnessed OOH VT/VF arrest-->immediate bystander CPR--> shocked x 1, 1 mg Epi-->ROSC in the field. On arrival, pt had a pulse but was hypotensive. He was intubated and taken for emergent coronary angiogram which showed severe 3V native vessel CAD but no acute culprit lesion to explain cardiac arrest, LIMA-LAD graft minimally diseased but small and primarily retrograde flow through native LAD,  of SVG-RCA at anastomosis, SVG-D1 and SVG-OM2 closed. He underwent PCI to dLM/ostial LAD with CONCETTA and balloon angioplasty of ostial LCx. Echo revealed preserved EF, mildly reduced RV function. Underwent ICD placement on 9/10/2019. Hospital course has been c/b pleural effusion requiring thoracentesis and acute on chronic COPD exacerbation. Underwent chest CT on 9/12 which was negative for PE, showed resolved pl effusions and improving GGO, possible sequela of prior necrotizing pneumonia. Pulm consulted and started azithro and prednisone for COPD exacerbation.    SUBJECTIVE AND INTERVAL: Did have wide complex tachycardia overnight, thought to be PMT. Pacer settings were VDD initially due to some heart block, since switched to VVI to prevent atrial sensing. Patient transitioned to PO diuretic 9/14, continues to have adequate UOP of 1.8 L, net negative 340 ml last 24, net negative 14.6 L since admission. Weight today unchanged. Oxygenation with improvement, 6L resting, 8-10 L with exercise. BP remains soft, Hgb low but stable at 7.6 (from 8.2 ) Currently at his home baseline weight of 168#. BUN and GFR with improvement, Crt WNL. Denies fever, chills, nausea, vomiting, chest pain, SOB,  abdominal pain, new LE pain/swelling. Mcclelland remains in place.     PHYSICAL EXAM  Temp:  [96.5  F (35.8  C)-98.9  F (37.2  C)] 98.3  F (36.8  C)  Pulse:  [82] 82  Heart Rate:  [72-97] 78  Resp:  [18-20] 18  BP: ()/(45-64) 90/49  SpO2:  [88 %-95 %] 88 %      Intake/Output Summary (Last 24 hours) at 9/15/2019 1019  Last data filed at 9/15/2019 0827  Gross per 24 hour   Intake 1500 ml   Output 2075 ml   Net -575 ml     Weight:  Baseline: 168#   Hospitalization Max: 187 pounds  Today: 168# pounds.    GEN: NAD  Pulm: RLL with inspiratory wheezes and rrales, less coarse on LLL but no rales  Cardiac: Normal S1 and S2. Seemingly regular rate/rhythm. JVP not appreciably elevated, no murmurs.  No LE edema.  GI: soft, non distended  Neuro:  Alert and oriented x4.    PERTINENT MEDS  ASA 81 mg daily  Plavix 75 mg daily  Bumex 2 mg BID  Toprol 12.5 mg daily    Labs:   CMP  Recent Labs   Lab 09/15/19  0506 09/14/19  0401 09/13/19 0455 09/12/19  0435    135 134 136   POTASSIUM 3.4 3.4 3.8 3.6   CHLORIDE 99 98 100 101   CO2 28 26 26 26   ANIONGAP 9 10 9 8   GLC 90 102* 126* 136*   BUN 40* 45* 38* 45*   CR 1.10 1.24 1.13 1.08   GFRESTIMATED 61 53* 59* 63   GFRESTBLACK 71 62 69 73   YOON 8.2* 8.5 8.3* 8.0*   MAG  --  2.5*  --  2.5*   PHOS  --  4.3  --  3.4     CBC  Recent Labs   Lab 09/15/19  0506 09/14/19  0401 09/13/19  0455 09/12/19  0435   WBC 9.5 11.3* 14.0* 17.7*   RBC 2.54* 2.71* 2.60* 2.75*   HGB 7.6* 8.2* 7.8* 8.2*   HCT 25.9* 27.6* 27.0* 28.6*   * 102* 104* 104*   MCH 29.9 30.3 30.0 29.8   MCHC 29.3* 29.7* 28.9* 28.7*   RDW 18.6* 19.1* 19.4* 20.3*    199 205 213     INRNo lab results found in last 7 days.  Arterial Blood Gas  No lab results found in last 7 days.IMAGING:   ECHO  Technically difficult study.  Left ventricular function is normal or even hyperdynamic in some views.The EF  is 60-70%. No regional wall motion abnormalities aer seen.  RV appears dilated with probably mildly reduced function  on limited views.  No significant valve disease.     CORONARY ANGIOGRAM 8/27/2019  # Severe 3-vessel coronary artery disease as described below with severe LM/LAD lesion and  of the proximal RCA with L->R collaterals  # OTOOLE-LAD graft is minimally diseased but small, with primarily retrograde flow through native LAD  # SVG-RCA graft is chronically closed at the aortic anastamosis. SVG-D1 could not be selectively injected but appears closed due to dye hangup during injection of the native coronaries. There may also be a closed graft to OM2 but this is not certain  # Successful PCI of the distal LM/ostial LAD with 2 Synergy CONCETTA (4.0 x 12 and 3.5 x 8 from proximal to distal)  #Successful balloon angioplasty of the ostial LCx  # Successful Thermagard placement in the RFV  Plan     Follow bedrest per protocol    Continued medical management and lifestyle modifications for cardiovascular risk factor optimizations.  * CT Head/C/A/P  * Leave RFA sheath in place during hypothermia due to coagulopathy  * Aspirin 81 lifelong  * Dual anti-platelet therapy for at least 1 year, and potentially longer if tolerated  * Low-intensity heparin drip while RFA sheath remains in place  * High-intensity statin  * Cardiac rehab referral  * Further plans per primary team    ASSESSMENT  José Aguirre is an 83 year old male with a PMHx of PVD with renal artery stenosis s/p stenting in 2013, occluded R carotid artery and s/p left carotid endarterectomy, left subclavian stenosis, CAD s/p CABG with L-LAD, S-D1 and OM2, PDA), COPD (on 2-3L home O2), and HLD who was admitted on 8/27/2019 with a VT/VF arrest with ROSC in the field. Patient was found to have disease in the LM into pLAD s/p PCI to LM->LAD and ost LCx, now s/p PCI to dLM/ostial LAD with CONCETTA and balloon angioplasty of ostial LCx.      # VT/VF arrest with ROSC prior to arrival- s/p ICD  # CAD s/p CAB (03) and PCI to native LM into the LAD and ostial LCX (8/27/2019) CAB in 2003 with  LIMA-LAD, SVG-D1, SVG-OM2, SVG-PDA  8/27/19 admission after OOH cardiac arrest. Angio revealed severe 3V native vessel CAD with LM/LAD lesion and  of proximal RCA with L to right collaterals. LIMA-LAD graft minimally diseased but small and primarily retrograde flow through native LAD. SVG-RCA chronically closed at anastomosis, SVG-D1 and SBG-OM2 closed. He has preserved LV EF with mildly reduced RV function. On DAPT, statin, BB.  # Acute on chronic hypoxemic respiratory failure- improving. On less 02.   # COPD exacerbation - started on prednisone, azithromycin. Pulm consulted. On home 02 2-3 L PTA.  # Pulmonary edema, history pleural effusion status post thoracentesis on left, residual small bilateral pleural effusions - Atelectasis/COPD exacerbation. No s/o infection. Currently diuresing with 80mg IV lasix BID, started  prednisone taper per pulm (20mg x5 days, 10mg x5 days, 5mg x5 days) Pulm consulted and following. RT COPD consult; appreciate recommendations.   # Leukocytosis - improving- down to 11 today from 14 yesterday. Urine culture with NGTD. Afebrile. Sputum culture ordered, has yet to be collected  # Possible Atrial Fibrillation - resolved- Apparent possible Atrial fibrillation versus normal sinus rhythm with frequent PAC. No evidence of Afib on telemetry  # Rib fracture   # Traumatic Silva -Pt does straight cath at home.  # PVD with renal artery stenosis s/p stenting in 2013   # occluded R carotid artery  # s/p left carotid endarterectomy  # Hx left subclavian stenosis    PLAN:  # Device interrogation this am given probable PMT and probable need for increased PVARP.   # Decrease Bumex to 1 mg daily.   # Remove Silva?  Traumatic silva insertion, no e/o trauma since that time   # Continue aspirin and Plavix, Continue rx for COPD exacerbation.   # Frequent pulmonary toilet/physiotherapy   # Continue to wean O2 as able, O2 sat goal >87%  # Will notify EP of device setting change in case they have  additional recs given hx of AV block.     Anticipated Disposition: TCU early this week pending respiratory status.     MARKUS Lara CNP on 9/15/2019 at 9:33 AM          ATTENDING NOTE:  Patient has been seen and evaluated by me on 09/15/2019. I have reviewed the documentation above.  I have reviewed today's vital signs, medications, labs, and imaging results.  I have reviewed and edited, as necessary, the history, review of systems, physical examination, and assessment and plan.  I have discussed my assessment and plan with the nurse practitioner.   I have seen and examined the patient today as part of a shared visit with Shereen Garcia NP.  José Aguirre is a 83 year old male with risk factor profile (+) HTN, glucose intolerance, (+) hypercholesterolemia, (-) tobacco use, (-) fam Hx premature CAD, PVD, renal artery stenosis s/p stenting in 2013, carotid artery disease, left subclavian stenosis, 3V CAD s/p CABG ( L-LAD, S-D1 and OM2, PDA), COPD (2-3 L home O2), and HLD, admitted on 8/27/2019 to [facility] following a  witnessed OOH VT/VF arrest-->immediate bystander CPR--> shocked x 1, 1 mg Epi-->ROSC in the field.  He was intubated and transported to [facility] where emergent coronary angiography showed severe 3V native vessel CAD but noculprit lesion (LIMA-LAD graft minimally diseased but small and primarily retrograde flow through native LAD,  of SVG-RCA at anastomosis, SVG-D1 & SVG-OM2 occluded).  He underwent PCI to dLM/ostial LAD with CONCETTA and balloon angioplasty of ostial LCx. ECHO showed preserved EF.  He had AICD (9/10/2019).  He developed LF pleural effusion requiring thoracentesis and acute COPD exacerbation. CT negative for PE.  Pulmonary consulted, and patient started on Azithromycin and Prednisone.   Exam documented above.  Patient converted from IV Lasix to po Bumex.  Monitor effusion.  Patient had brief pacemaker mediated tachycardia.  EP consulted, probable increase PVARP.  Continue rehab.      Paulino  MD Fausto     Cardiovascular Division

## 2019-09-15 NOTE — PROVIDER NOTIFICATION
Time of notification: 1240 AM  Provider notified: [id number]  Patient status:  Pt showing wide-complex v-paced tachycardia above VDD  pacer settings on tele monitor. Not VT in my opinion but strange that Pt continues to have rates in the upper 130s. Pt asymptomatic.  Temp:  [96.5  F (35.8  C)-98.9  F (37.2  C)] 98.4  F (36.9  C)  Pulse:  [76-91] 82  Heart Rate:  [72-97] 85  Resp:  [18-20] 18  BP: ()/(45-64) 93/45  SpO2:  [89 %-95 %] 94 %  Orders received:   Fellow and resident examined tele strips via smart [place]. Decided to examine pt at bedside and interrogate ICD/pacemaker. Continue to monitor and update with changes.     See fellow's note ( Dr. Hernandez) regarding interrogation results.

## 2019-09-15 NOTE — PROGRESS NOTES
Social Work: Assessment with Discharge Plan    Patient Name:  Roc Aguirre  :  1935  Age:  83 year old  MRN:  [id number]  Risk/Complexity Score:     Completed assessment with:  pt    Presenting Information   Reason for Referral:  Discharge plan  Date of Intake:  September 15, 2019  Referral Source:  Nurse  Decision Maker:  pt  Alternate Decision Maker:  n/a  Health Care Directive:  Will bring in copy- copy is with personal   Living Situation:  Senior living condominium  Previous Functional Status:  Independent  Patient and family understanding of hospitalization: Pt expressed he understands hospital stay and reason for hospitalization and is agreeable to TCU stay following hospitalization  Cultural/Language/Spiritual Considerations:  Jewish  Adjustment to Illness:  Pt has been on oxygen for a year. He states he is familiar with illness and had good understanding    Physical Health  Reason for Admission:    1. ST elevation myocardial infarction involving left anterior descending (LAD) coronary artery (H)      Services Needed/Recommended:  TCU        Support System  Significant relationship at present time:  Pt lives with his ex-wife and has 4 children/ 10 grandchildren, that live locally that assist him.  Family of origin is available for support:  Pt has good family support from ex wife and adult children.  Other support available:  Pt lives in a condominium that has services available.  Gaps in support system:  None noted  Patient is caregiver to:  None     Provider Information   Primary Care Physician:  Javier Beltran   [phone]   Clinic:  [address] [facility] / [place] [address]          Financial   Income Source:    Financial Concerns:  Pt states he is not concerned about finances or medical bills.  Insurance:    Payor/Plan Subscriber Name Rel Member # Group #   MEDICARE - MEDICARE [facility] AGUIRRE [facility] [id number]       ATTN CLAIMS, [address]       Discharge Plan    Patient and family discharge goal:  TCU  Provided education on discharge plan:  YES  Patient agreeable to discharge plan:  YES  A list of Medicare Certified Facilities was provided to the patient and/or family to encourage patient choice. Patient's choices for facility are:  [facility]   Will NH provide Skilled rehabilitation or complex medical:  yes  General information regarding anticipated insurance coverage and possible out of pocket cost was discussed. Patient and patient's family are aware patient may incur the cost of transportation to the facility, pending insurance payment: YES  Barriers to discharge:  Bed availability    Discharge Recommendations   Anticipated Disposition:  Facility:  TCU to be determined  Transportation Needs:  Family:  Son will provide tranportation to facility  Name of Transportation Company and Phone:  N/A

## 2019-09-15 NOTE — PLAN OF CARE
"Neuro: A&Ox4.   Cardiac: SR frequent PACs. HR 70-90's. ICD in place. SBP . No telemetry notifications this shift.   Respiratory: Sating >87% on 4 lpm at rest, 6 lpm with activity via NC. Denies SOB.   GI/: Adequate urine output via silva cath. BM X1  Diet/appetite: Tolerating DD3 diet. Eating well. 1800 FR.   Activity:  Assist of 1, up in chair most of day.   Pain: Denies.   Skin: No new deficits noted.   LDA's: R PICC, hep lock.      Plan: Sputum sample sent. Discharge planning to TCU. Continue with POC. Notify primary team with changes.  BP 99/57 (BP Location: Left arm)   Pulse 82   Temp 98.9  F (37.2  C) (Oral)   Resp 18   Ht 1.727 m (5' 7.99\")   Wt 76.6 kg (168 lb 14 oz)   SpO2 94%   BMI 25.68 kg/m      "

## 2019-09-15 NOTE — PROGRESS NOTES
I was called to patient's room due to episodes of Wide Complex tachycardia. Of note, he has a ICD that is programmed at VDD. On review of Tele, patient appears to be pacing around 130 beats per minute (upper tracking rate of his pacemaker). I interrogated his device and these episodes seem to occur after he has a PAC. 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 Therefore, I switched his mode to VVI to prevent atrial sensing overnight.     Overall, patient should have atrial sensing in the long-term. Therefore, I would recommend calling device nurse in the AM and adjust his settings (eg increase PVARP) to prevent further episodes.     Jasbir Hernandez   Cardiology Fellow   Pager: [phone]

## 2019-09-16 ENCOUNTER — ANCILLARY PROCEDURE (OUTPATIENT)
Dept: CARDIOLOGY | Facility: CLINIC | Age: 84
DRG: 224 | End: 2019-09-16
Attending: INTERNAL MEDICINE
Payer: MEDICARE

## 2019-09-16 ENCOUNTER — APPOINTMENT (OUTPATIENT)
Dept: SPEECH THERAPY | Facility: CLINIC | Age: 84
DRG: 224 | End: 2019-09-16
Payer: MEDICARE

## 2019-09-16 DIAGNOSIS — I46.9 CARDIAC ARREST (H): Primary | ICD-10-CM

## 2019-09-16 DIAGNOSIS — I46.9 CARDIAC ARREST (H): ICD-10-CM

## 2019-09-16 LAB
ANION GAP SERPL CALCULATED.3IONS-SCNC: 11 MMOL/L (ref 3–14)
BASOPHILS # BLD AUTO: 0 10E9/L (ref 0–0.2)
BASOPHILS NFR BLD AUTO: 0.1 %
BUN SERPL-MCNC: 37 MG/DL (ref 7–30)
CALCIUM SERPL-MCNC: 8.3 MG/DL (ref 8.5–10.1)
CHLORIDE SERPL-SCNC: 99 MMOL/L (ref 94–109)
CO2 SERPL-SCNC: 28 MMOL/L (ref 20–32)
CREAT SERPL-MCNC: 1.12 MG/DL (ref 0.66–1.25)
DIFFERENTIAL METHOD BLD: ABNORMAL
EOSINOPHIL # BLD AUTO: 0.1 10E9/L (ref 0–0.7)
EOSINOPHIL NFR BLD AUTO: 0.7 %
ERYTHROCYTE [DISTWIDTH] IN BLOOD BY AUTOMATED COUNT: 18.7 % (ref 10–15)
GFR SERPL CREATININE-BSD FRML MDRD: 60 ML/MIN/{1.73_M2}
GLUCOSE BLDC GLUCOMTR-MCNC: 128 MG/DL (ref 70–99)
GLUCOSE BLDC GLUCOMTR-MCNC: 252 MG/DL (ref 70–99)
GLUCOSE BLDC GLUCOMTR-MCNC: 255 MG/DL (ref 70–99)
GLUCOSE BLDC GLUCOMTR-MCNC: 94 MG/DL (ref 70–99)
GLUCOSE SERPL-MCNC: 88 MG/DL (ref 70–99)
HCT VFR BLD AUTO: 26.7 % (ref 40–53)
HGB BLD-MCNC: 7.6 G/DL (ref 13.3–17.7)
IMM GRANULOCYTES # BLD: 0.1 10E9/L (ref 0–0.4)
IMM GRANULOCYTES NFR BLD: 0.7 %
LYMPHOCYTES # BLD AUTO: 0.9 10E9/L (ref 0.8–5.3)
LYMPHOCYTES NFR BLD AUTO: 9.5 %
MAGNESIUM SERPL-MCNC: 2.5 MG/DL (ref 1.6–2.3)
MCH RBC QN AUTO: 29.3 PG (ref 26.5–33)
MCHC RBC AUTO-ENTMCNC: 28.5 G/DL (ref 31.5–36.5)
MCV RBC AUTO: 103 FL (ref 78–100)
MONOCYTES # BLD AUTO: 0.7 10E9/L (ref 0–1.3)
MONOCYTES NFR BLD AUTO: 8.1 %
NEUTROPHILS # BLD AUTO: 7.2 10E9/L (ref 1.6–8.3)
NEUTROPHILS NFR BLD AUTO: 80.9 %
NRBC # BLD AUTO: 0 10*3/UL
NRBC BLD AUTO-RTO: 0 /100
PHOSPHATE SERPL-MCNC: 3.2 MG/DL (ref 2.5–4.5)
PLATELET # BLD AUTO: 188 10E9/L (ref 150–450)
POTASSIUM SERPL-SCNC: 3.4 MMOL/L (ref 3.4–5.3)
RBC # BLD AUTO: 2.59 10E12/L (ref 4.4–5.9)
SODIUM SERPL-SCNC: 138 MMOL/L (ref 133–144)
WBC # BLD AUTO: 8.9 10E9/L (ref 4–11)

## 2019-09-16 PROCEDURE — 40000141 ZZH STATISTIC PERIPHERAL IV START W/O US GUIDANCE

## 2019-09-16 PROCEDURE — 25000128 H RX IP 250 OP 636: Performed by: STUDENT IN AN ORGANIZED HEALTH CARE EDUCATION/TRAINING PROGRAM

## 2019-09-16 PROCEDURE — 25000132 ZZH RX MED GY IP 250 OP 250 PS 637: Mod: GY | Performed by: PHYSICIAN ASSISTANT

## 2019-09-16 PROCEDURE — 94640 AIRWAY INHALATION TREATMENT: CPT | Mod: 76

## 2019-09-16 PROCEDURE — 25000131 ZZH RX MED GY IP 250 OP 636 PS 637: Mod: GY | Performed by: PHYSICIAN ASSISTANT

## 2019-09-16 PROCEDURE — 40000275 ZZH STATISTIC RCP TIME EA 10 MIN

## 2019-09-16 PROCEDURE — 84100 ASSAY OF PHOSPHORUS: CPT | Performed by: PHYSICIAN ASSISTANT

## 2019-09-16 PROCEDURE — 25000132 ZZH RX MED GY IP 250 OP 250 PS 637: Mod: GY | Performed by: NURSE PRACTITIONER

## 2019-09-16 PROCEDURE — 40000809 ZZH STATISTIC NO DOCUMENTATION TO SUPPORT CHARGE

## 2019-09-16 PROCEDURE — 93282 PRGRMG EVAL IMPLANTABLE DFB: CPT

## 2019-09-16 PROCEDURE — 40000274 ZZH STATISTIC RCP CONSULT EA 30 MIN

## 2019-09-16 PROCEDURE — 80048 BASIC METABOLIC PNL TOTAL CA: CPT | Performed by: PHYSICIAN ASSISTANT

## 2019-09-16 PROCEDURE — 25000132 ZZH RX MED GY IP 250 OP 250 PS 637: Mod: GY | Performed by: STUDENT IN AN ORGANIZED HEALTH CARE EDUCATION/TRAINING PROGRAM

## 2019-09-16 PROCEDURE — 94640 AIRWAY INHALATION TREATMENT: CPT

## 2019-09-16 PROCEDURE — 00000146 ZZHCL STATISTIC GLUCOSE BY METER IP

## 2019-09-16 PROCEDURE — 40000983 ZZH STATISTIC HFNC ADULT NON-CPAP

## 2019-09-16 PROCEDURE — 94799 UNLISTED PULMONARY SVC/PX: CPT

## 2019-09-16 PROCEDURE — 99232 SBSQ HOSP IP/OBS MODERATE 35: CPT | Performed by: PHYSICIAN ASSISTANT

## 2019-09-16 PROCEDURE — 92526 ORAL FUNCTION THERAPY: CPT | Mod: GN

## 2019-09-16 PROCEDURE — 25000125 ZZHC RX 250: Performed by: INTERNAL MEDICINE

## 2019-09-16 PROCEDURE — 83735 ASSAY OF MAGNESIUM: CPT | Performed by: PHYSICIAN ASSISTANT

## 2019-09-16 PROCEDURE — 25000132 ZZH RX MED GY IP 250 OP 250 PS 637: Mod: GY | Performed by: INTERNAL MEDICINE

## 2019-09-16 PROCEDURE — 12000004 ZZH R&B IMCU UMMC

## 2019-09-16 PROCEDURE — 93282 PRGRMG EVAL IMPLANTABLE DFB: CPT | Mod: 26 | Performed by: INTERNAL MEDICINE

## 2019-09-16 PROCEDURE — 40000047 ZZH STATISTIC CTO2 CONT OXYGEN TECH TIME EA 90 MIN

## 2019-09-16 PROCEDURE — 40000802 ZZH SITE CHECK

## 2019-09-16 PROCEDURE — 25000132 ZZH RX MED GY IP 250 OP 250 PS 637: Mod: GY

## 2019-09-16 PROCEDURE — 85025 COMPLETE CBC W/AUTO DIFF WBC: CPT | Performed by: PHYSICIAN ASSISTANT

## 2019-09-16 PROCEDURE — 36592 COLLECT BLOOD FROM PICC: CPT | Performed by: PHYSICIAN ASSISTANT

## 2019-09-16 RX ORDER — PREDNISONE 20 MG/1
20 TABLET ORAL DAILY
Status: DISCONTINUED | OUTPATIENT
Start: 2019-09-19 | End: 2019-09-19 | Stop reason: HOSPADM

## 2019-09-16 RX ORDER — AZITHROMYCIN 250 MG/1
250 TABLET, FILM COATED ORAL DAILY
Status: CANCELLED | DISCHARGE
Start: 2019-09-17

## 2019-09-16 RX ORDER — PANTOPRAZOLE SODIUM 40 MG/1
40 TABLET, DELAYED RELEASE ORAL
Status: DISCONTINUED | OUTPATIENT
Start: 2019-09-16 | End: 2019-09-19 | Stop reason: HOSPADM

## 2019-09-16 RX ADMIN — CLOPIDOGREL BISULFATE 75 MG: 75 TABLET, FILM COATED ORAL at 08:23

## 2019-09-16 RX ADMIN — ATORVASTATIN CALCIUM 40 MG: 40 TABLET, FILM COATED ORAL at 19:42

## 2019-09-16 RX ADMIN — SODIUM CHLORIDE, PRESERVATIVE FREE 5 ML: 5 INJECTION INTRAVENOUS at 11:05

## 2019-09-16 RX ADMIN — IPRATROPIUM BROMIDE AND ALBUTEROL SULFATE 3 ML: .5; 3 SOLUTION RESPIRATORY (INHALATION) at 08:19

## 2019-09-16 RX ADMIN — ACETYLCYSTEINE 2 ML: 200 SOLUTION ORAL; RESPIRATORY (INHALATION) at 08:24

## 2019-09-16 RX ADMIN — Medication 5 ML: at 04:45

## 2019-09-16 RX ADMIN — AZITHROMYCIN 250 MG: 250 TABLET, FILM COATED ORAL at 08:21

## 2019-09-16 RX ADMIN — ASPIRIN 81 MG CHEWABLE TABLET 81 MG: 81 TABLET CHEWABLE at 08:21

## 2019-09-16 RX ADMIN — IPRATROPIUM BROMIDE AND ALBUTEROL SULFATE 3 ML: .5; 3 SOLUTION RESPIRATORY (INHALATION) at 13:01

## 2019-09-16 RX ADMIN — Medication 12.5 MG: at 08:21

## 2019-09-16 RX ADMIN — Medication 500 MG: at 08:21

## 2019-09-16 RX ADMIN — PREDNISONE 40 MG: 20 TABLET ORAL at 08:21

## 2019-09-16 RX ADMIN — MULTIPLE VITAMINS W/ MINERALS TAB 1 TABLET: TAB at 08:21

## 2019-09-16 RX ADMIN — ZINC SULFATE CAP 220 MG (50 MG ELEMENTAL ZN) 220 MG: 220 (50 ZN) CAP at 08:21

## 2019-09-16 RX ADMIN — FERROUS SULFATE TAB 325 MG (65 MG ELEMENTAL FE) 325 MG: 325 (65 FE) TAB at 08:23

## 2019-09-16 RX ADMIN — ACETYLCYSTEINE 2 ML: 200 SOLUTION ORAL; RESPIRATORY (INHALATION) at 16:50

## 2019-09-16 RX ADMIN — Medication 20000 UNITS: at 08:21

## 2019-09-16 RX ADMIN — CEPHALEXIN 500 MG: 250 CAPSULE ORAL at 08:21

## 2019-09-16 RX ADMIN — PANTOPRAZOLE SODIUM 40 MG: 40 TABLET, DELAYED RELEASE ORAL at 08:22

## 2019-09-16 RX ADMIN — ACETYLCYSTEINE 2 ML: 200 SOLUTION ORAL; RESPIRATORY (INHALATION) at 19:47

## 2019-09-16 RX ADMIN — CEPHALEXIN 500 MG: 250 CAPSULE ORAL at 13:15

## 2019-09-16 RX ADMIN — POTASSIUM CHLORIDE 20 MEQ: 750 TABLET, EXTENDED RELEASE ORAL at 06:45

## 2019-09-16 RX ADMIN — ACETYLCYSTEINE 2 ML: 200 SOLUTION ORAL; RESPIRATORY (INHALATION) at 13:01

## 2019-09-16 RX ADMIN — IPRATROPIUM BROMIDE AND ALBUTEROL SULFATE 3 ML: .5; 3 SOLUTION RESPIRATORY (INHALATION) at 19:47

## 2019-09-16 RX ADMIN — IPRATROPIUM BROMIDE AND ALBUTEROL SULFATE 3 ML: .5; 3 SOLUTION RESPIRATORY (INHALATION) at 16:49

## 2019-09-16 RX ADMIN — BUMETANIDE 1 MG: 1 TABLET ORAL at 08:21

## 2019-09-16 ASSESSMENT — ACTIVITIES OF DAILY LIVING (ADL)
ADLS_ACUITY_SCORE: 17

## 2019-09-16 ASSESSMENT — MIFFLIN-ST. JEOR: SCORE: 1438.37

## 2019-09-16 NOTE — PROGRESS NOTES
Social Work Services Progress Note    Hospital Day: 20  Date of Initial Social Work Evaluation:  9/15/19 - please see for details  Collaborated with:  [facility] (Jesus), [facility] (Mony), Chart Review    Data:  Pt is 84 y/o male admitted to [facility] on 8/27/19 s/p cardiac arrest. 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    SW involved for TCU placement pending medical stability. Per update from [facility] (Jesus), Pt is not ready for discharge today but likely will be ready tomorrow.     Intervention:  JOHANN received call from Mony in Admissions at [facility] (Ph: [phone], Admissions: [phone], F: [phone]) that Pt is accepted for placement. JOHANN provided update to Mony that Pt will likely be ready for discharge tomorrow. Mony confirms there will be a bed available. SW to f/u with Mony tomorrow AM to confirm discharge.     In preparation for discharge planning, JOHANN completed PAS. Confirmation #: [id number]    Assessment:  Pt will discharge to TCU pending medical stability.    Plan:    Anticipated Disposition:  Facility:  [facility] TCU    Barriers to d/c plan:  Medical stability    Follow Up:  SW to continue to follow and assist with discharge plan.    Addendum at 3:50PM: JOHANN met with Pt and dtr (Daija) at bedside and updated him re: acceptance at [facility]. He said he was very happy with this as it was his first choice facility. He likes the location and knows people who are there. He states that a family member will provide discharge transport tomorrow and will be able to bring him his portable O2 from home for transport.    JOHANN did receive call from Pt's son (Larry) asking for call back. JOHANN confirmed with Pt that SW can return this call and provide update on discharge planning.  JOHANN provided update to Larry via the phone today. He was understanding.    SW to continue to follow.     CHRISTIANO Paz, LGSW  6[facility] Intermediate Care Unit   Phone: [phone]  Pager: [phone]

## 2019-09-16 NOTE — PLAN OF CARE
"/54 (BP Location: Left arm)   Pulse 82   Temp 98  F (36.7  C) (Oral)   Resp 20   Ht 1.727 m (5' 7.99\")   Wt 76.9 kg (169 lb 8.5 oz)   SpO2 (!) 89%   BMI 25.78 kg/m      Neuro: A&Ox4. Follows commands. [facility]  Cardiac: SR/paced 80-90. SBP . Afebrile.        Respiratory: Sating >87% on 3-4L NC overnight. Needing up to 6L with activity.  GI/: Adequate urine output via silva. No BMs, passing gas.  Diet/appetite: DD3. 2g Na. 1.8L FR.  Activity:  Assist of 1  Pain: At acceptable level on current regimen.   Skin: No new deficits noted.  LDA's: Right double lumen PICC.     Plan: Continue with POC. Notify primary team with changes.   "

## 2019-09-16 NOTE — PLAN OF CARE
Discharge Planner SLP   Patient plan for discharge: Pt did not state  Current status: Recommend regular diet and thin liquids.  Pt to be fully alert and upright for all PO, taking small bites/sips at slow rate, alternating bites and sips.  Pt with functional oropharyngeal swallowing mechanism, SLP to follow 1-2x for PO tolerance  Barriers to return to prior living situation: medical readiness, respiratory status  Recommendations for discharge: TCU  Rationale for recommendations: Anticipate pt will meet goals prior to discharge       Entered by: Joseline Ratliff 09/16/2019 10:00 AM

## 2019-09-16 NOTE — PLAN OF CARE
Neuro: Alert and oriented x4. Neuro's intact.   Cardiac: NSR / wandering atrial pacemaker. Frequent PACs. HR 70-90's. ICD in place. BP's stable.  Respiratory: Sating >87% on 4 lpm at rest, 6 lpm with activity via NC. Denies SOB.   GI/: Adequate urine output via silva cath. BM X1  Diet/appetite: Tolerating DD3 diet. Eating well. 1800 FR.   Activity:  Assist of 1, up in chair the whole day.    Pain: Denies.   Skin: No new deficits noted.   LDA's: R PICC, hep lock.    Plan: Probably discharging tomorrow to TCU.

## 2019-09-16 NOTE — PROGRESS NOTES
[facility]   Cardiology Progress      Interval History:   - Oxygen needs are decreasing. Now on 3L at rest  - Denies chest pain, nausea, headache, vision change, numbness, or weakness    Changes Today:  - Monitor UOP given decrease in bumex yesterday  - plan for walk test today to reassess O2 needs with activity. TCU requires <6LNC with activity  - remove PICC    Physical Exam:  Temp:  [97.7  F (36.5  C)-98.9  F (37.2  C)] 98  F (36.7  C)  Pulse:  [67] 67  Heart Rate:  [67-95] 67  Resp:  [18-20] 18  BP: ()/(42-57) 94/54  SpO2:  [87 %-97 %] 87 %      Intake/Output Summary (Last 24 hours) at 9/16/2019 0845  Last data filed at 9/16/2019 0806  Gross per 24 hour   Intake 500 ml   Output 2025 ml   Net -1525 ml         Weight:  Baseline: unclear, on presentation approximately 173 pounds. Suspect this has dropped due to prolonged hospitalization/loss of muscle mass.  Hospitalization Max: 187 pounds  Today: 168 pounds.    GEN: NAD, awake, alert  Pulm: rhoncorous breath sounds. Diminished in bases.  Cardiac: Normal S1 and S2. Seemingly regular rate/rhythm. JVP not appreciably elevated, no murmurs.  No LE edema.  GI: soft, non distended  Neuro:  Alert and oriented x4.    Medications:    acetylcysteine  2 mL Nebulization 4x Daily     ascorbic acid  500 mg Oral Daily     aspirin  81 mg Oral Daily     atorvastatin  40 mg Oral QPM     azithromycin  250 mg Oral Daily     bumetanide  1 mg Oral Daily     cephALEXin  500 mg Oral TID     clopidogrel  75 mg Oral Daily     ferrous sulfate  325 mg Oral Daily with breakfast     heparin lock flush  5-10 mL Intracatheter Q24H     insulin aspart  1-10 Units Subcutaneous TID AC     insulin aspart  1-7 Units Subcutaneous At Bedtime     ipratropium - albuterol 0.5 mg/2.5 mg/3 mL  3 mL Nebulization 4x daily     metoprolol succinate ER  12.5 mg Oral Daily     mometasone  2 puff Inhalation QPM     multivitamin w/minerals  1 tablet Oral Daily     pantoprazole   40 mg Oral BID AC     predniSONE  40 mg Oral Daily     senna-docusate  1 tablet Oral or Feeding Tube At Bedtime     sodium chloride (PF)  3 mL Intracatheter Q8H     vitamin A  20,000 Units Oral Daily     zinc sulfate  220 mg Oral Daily       IV fluid REPLACEMENT ONLY       Percutaneous Coronary Intervention orders placed (this is information for BPA alerting)       ACE/ARB/ARNI NOT PRESCRIBED         Labs:   CMP  Recent Labs   Lab 09/16/19  0450 09/15/19  0506 09/14/19  0401 09/13/19  0455 09/12/19  0435    136 135 134 136   POTASSIUM 3.4 3.4 3.4 3.8 3.6   CHLORIDE 99 99 98 100 101   CO2 28 28 26 26 26   ANIONGAP 11 9 10 9 8   GLC 88 90 102* 126* 136*   BUN 37* 40* 45* 38* 45*   CR 1.12 1.10 1.24 1.13 1.08   GFRESTIMATED 60* 61 53* 59* 63   GFRESTBLACK 70 71 62 69 73   YOON 8.3* 8.2* 8.5 8.3* 8.0*   MAG 2.5*  --  2.5*  --  2.5*   PHOS 3.2  --  4.3  --  3.4     CBC  Recent Labs   Lab 09/16/19  0450 09/15/19  0506 09/14/19  0401 09/13/19  0455   WBC 8.9 9.5 11.3* 14.0*   RBC 2.59* 2.54* 2.71* 2.60*   HGB 7.6* 7.6* 8.2* 7.8*   HCT 26.7* 25.9* 27.6* 27.0*   * 102* 102* 104*   MCH 29.3 29.9 30.3 30.0   MCHC 28.5* 29.3* 29.7* 28.9*   RDW 18.7* 18.6* 19.1* 19.4*    190 199 205     INRNo lab results found in last 7 days.  Arterial Blood Gas  No lab results found in last 7 days.    ASSESSMENT/PLAN:  José Aguirre is an 83 year old male with a PMHx of PVD with renal artery stenosis s/p stenting in 2013, occluded R carotid artery and s/p left carotid endarterectomy, left subclavian stenosis, CAD s/p CABG with L-LAD, S-D1 and OM2, PDA), COPD (on 2-3L home O2), and HLD who was admitted on 8/27/2019 with a VT/VF arrest with ROSC in the field. Patient was found to have disease in the LM into pLAD s/p PCI to LM->LAD and ost LCx.      #VT/VF arrest with ROSC prior to arrival  #CAD status post PCI to native LM into the LAD and ostial LCX  #History of CABG '03 (LIMA-LAD, SVG-D1, SVG-OM2, SVG-PDA)  8/27/19 admission  after OOH cardiac arrest. He was at the [facility] and had a witnessed collapse. Bystander CPR was started and EMS found him in VT/VF. He was defibrillated X1 and given 1 of epi. ROSC was obtained in field. On arrival here, he had a pulse and was hypotensive, was intubated, taken for emergent coronary angiogram which showed severe 3V native vessel CAD with LM/LAD lesion and  of proximal RCA with L to right collaterals. LIMA-LAD graft minimally diseased but small and primarily retrograde flow through native LAD. SVG-RCA chronically closed at anastomosis, SVG-D1 and SBG-OM2 closed. He underwent PCI to dLM/ostial LAD with CONCETTA and balloon angioplasty of ostial LCx. He has preserved LV EF with mildly reduced RV function. Though he did have CAD and was stented, felt his burden of CAD was unlikely to be the source of his arrest.  Patient continues to have runs of VT on telemetry; he is asymptomatic with these. None are long enough to require ICD shock. Did have apparent PMT early morning on 9/15. ICD changed from VDD to VVI at that time.  - Continue aspirin and Plavix   - Continue Lipitor 40 mg daily   - Continue Toprol XL 12.5 mg    - s/p ICD 9/10  - Pt will need follow up with Dr. Osorio in 2-4 weeks after discharge for post cardiac arrest follow up.  - EP notified of ICD change. Will evaluate patient today     # Acute on chronic hypoxemic respiratory failure - improving  # Possible COPD exacerbation   # Pulmonary edema, history pleural effusion status post thoracentesis on left, residual small bilateral pleural effusions  Patient on 2-3L O2 at home for COPD. Status post arrest with significant pulmonary edema and COPD exacerbation requiring HFNC and BiPAP. Patient noted to have left pleural effusion 09/05; now status post thoracentesis. Patient is s/p Zosyn and Rocephin (finished 14d tx 9/10).  Procalcitonin 09/12 0.3.  BNP 09/12, 1,900. CT PE shows no PE, but improving ground glass opacity and reticular/cystic  changes of lungs. Suspect combination of COPD flair + volume overload.  - bumex 1mg daily  - pulmonary consult; appreciate recommendations   - prednisone taper (40mg x5 days, 20mg x5 days) per pulm recs   - Complete azithromycin 500mg x1 day, then 250mg daily x4 days   - Flutter valve qid   - See pulm in clinic with repeat high resolution CT at that time following discharge  - RT COPD consult; appreciate recommendations  - Continue to wean O2 as able, O2 sat goal >87%    # Leukocytosis - resolved  WBC as high as 17 9/12.  Urine culture unremarkable.  Procalcitonin 0.3.  No constitutional complaints.  Afebrile.  DDx: secondary to stress?  Prednisone use? Possible atypical pneumonia.  - WBC daily  - conditional blood cultures ordered should patient have T >100.3  - azithromycin as above     # Possible Atrial Fibrillation - resolved  Atrial fibrillation versus normal sinus rhythm with frequent PAC; unclear.  EKG yesterday with baseline RBBB with PAC.  No evidence of Afib on telemetry    # Rib fracture- improving  Patient states pain is improving overall. He is not requiring any narcotics and would tend to avoid as he did have witnessed probably delirium in the ICU manifesting as seizure like activity without seizures on EEG. Moreover component of respiratory depression with this. Patient tolerating chest physiotherapy and feeling well in this regard.   - Prn Tylenol for mild-mod pain     # Traumatic Silva   Patient does straight cath at home. Traumatic silva iatrogenic, will continue with indwelling silva for strict I/O and to maintain patency in setting of injury and inflammation   - Continue with silva for now while diuresing     # PVD with renal artery stenosis s/p stenting in 2013   # occluded R carotid artery  # s/p left carotid endarterectomy  # Hx left subclavian stenosis  - Continue aspirin and Plavix       Anticipated Disposition: pending hypoxia.  Patient needs to be on LESS than 6L NC WITH activity for  acceptance to TCU.    Patient discussed with Dr. Garcia, who agrees with above plan.      Santo Daly PA-C  [facility] Cardiology Team

## 2019-09-17 ENCOUNTER — APPOINTMENT (OUTPATIENT)
Dept: OCCUPATIONAL THERAPY | Facility: CLINIC | Age: 84
DRG: 224 | End: 2019-09-17
Payer: MEDICARE

## 2019-09-17 ENCOUNTER — APPOINTMENT (OUTPATIENT)
Dept: GENERAL RADIOLOGY | Facility: CLINIC | Age: 84
DRG: 224 | End: 2019-09-17
Attending: PHYSICIAN ASSISTANT
Payer: MEDICARE

## 2019-09-17 ENCOUNTER — APPOINTMENT (OUTPATIENT)
Dept: SPEECH THERAPY | Facility: CLINIC | Age: 84
DRG: 224 | End: 2019-09-17
Payer: MEDICARE

## 2019-09-17 LAB
ANION GAP SERPL CALCULATED.3IONS-SCNC: 8 MMOL/L (ref 3–14)
ANION GAP SERPL CALCULATED.3IONS-SCNC: 8 MMOL/L (ref 3–14)
BASOPHILS # BLD AUTO: 0 10E9/L (ref 0–0.2)
BASOPHILS NFR BLD AUTO: 0 %
BUN SERPL-MCNC: 30 MG/DL (ref 7–30)
BUN SERPL-MCNC: 31 MG/DL (ref 7–30)
CALCIUM SERPL-MCNC: 8.2 MG/DL (ref 8.5–10.1)
CALCIUM SERPL-MCNC: 8.9 MG/DL (ref 8.5–10.1)
CHLORIDE SERPL-SCNC: 100 MMOL/L (ref 94–109)
CHLORIDE SERPL-SCNC: 102 MMOL/L (ref 94–109)
CO2 SERPL-SCNC: 27 MMOL/L (ref 20–32)
CO2 SERPL-SCNC: 28 MMOL/L (ref 20–32)
CREAT SERPL-MCNC: 1.06 MG/DL (ref 0.66–1.25)
CREAT SERPL-MCNC: 1.13 MG/DL (ref 0.66–1.25)
DIFFERENTIAL METHOD BLD: ABNORMAL
EOSINOPHIL # BLD AUTO: 0 10E9/L (ref 0–0.7)
EOSINOPHIL NFR BLD AUTO: 0.4 %
ERYTHROCYTE [DISTWIDTH] IN BLOOD BY AUTOMATED COUNT: 18.2 % (ref 10–15)
GFR SERPL CREATININE-BSD FRML MDRD: 59 ML/MIN/{1.73_M2}
GFR SERPL CREATININE-BSD FRML MDRD: 64 ML/MIN/{1.73_M2}
GLUCOSE BLDC GLUCOMTR-MCNC: 138 MG/DL (ref 70–99)
GLUCOSE BLDC GLUCOMTR-MCNC: 168 MG/DL (ref 70–99)
GLUCOSE BLDC GLUCOMTR-MCNC: 197 MG/DL (ref 70–99)
GLUCOSE BLDC GLUCOMTR-MCNC: 200 MG/DL (ref 70–99)
GLUCOSE BLDC GLUCOMTR-MCNC: 95 MG/DL (ref 70–99)
GLUCOSE SERPL-MCNC: 173 MG/DL (ref 70–99)
GLUCOSE SERPL-MCNC: 99 MG/DL (ref 70–99)
HCT VFR BLD AUTO: 27.5 % (ref 40–53)
HGB BLD-MCNC: 7.8 G/DL (ref 13.3–17.7)
IMM GRANULOCYTES # BLD: 0.1 10E9/L (ref 0–0.4)
IMM GRANULOCYTES NFR BLD: 0.6 %
LYMPHOCYTES # BLD AUTO: 0.6 10E9/L (ref 0.8–5.3)
LYMPHOCYTES NFR BLD AUTO: 7.3 %
MAGNESIUM SERPL-MCNC: 2.4 MG/DL (ref 1.6–2.3)
MCH RBC QN AUTO: 29.2 PG (ref 26.5–33)
MCHC RBC AUTO-ENTMCNC: 28.4 G/DL (ref 31.5–36.5)
MCV RBC AUTO: 103 FL (ref 78–100)
MONOCYTES # BLD AUTO: 0.7 10E9/L (ref 0–1.3)
MONOCYTES NFR BLD AUTO: 8 %
NEUTROPHILS # BLD AUTO: 7.1 10E9/L (ref 1.6–8.3)
NEUTROPHILS NFR BLD AUTO: 83.7 %
NRBC # BLD AUTO: 0 10*3/UL
NRBC BLD AUTO-RTO: 0 /100
PLATELET # BLD AUTO: 196 10E9/L (ref 150–450)
POTASSIUM SERPL-SCNC: 3.6 MMOL/L (ref 3.4–5.3)
POTASSIUM SERPL-SCNC: 4.4 MMOL/L (ref 3.4–5.3)
RBC # BLD AUTO: 2.67 10E12/L (ref 4.4–5.9)
SODIUM SERPL-SCNC: 135 MMOL/L (ref 133–144)
SODIUM SERPL-SCNC: 136 MMOL/L (ref 133–144)
WBC # BLD AUTO: 8.5 10E9/L (ref 4–11)

## 2019-09-17 PROCEDURE — 12000004 ZZH R&B IMCU UMMC

## 2019-09-17 PROCEDURE — 97530 THERAPEUTIC ACTIVITIES: CPT | Mod: GO

## 2019-09-17 PROCEDURE — 80048 BASIC METABOLIC PNL TOTAL CA: CPT | Performed by: PHYSICIAN ASSISTANT

## 2019-09-17 PROCEDURE — 25000131 ZZH RX MED GY IP 250 OP 636 PS 637: Mod: GY | Performed by: PHYSICIAN ASSISTANT

## 2019-09-17 PROCEDURE — 36592 COLLECT BLOOD FROM PICC: CPT | Performed by: PHYSICIAN ASSISTANT

## 2019-09-17 PROCEDURE — 94640 AIRWAY INHALATION TREATMENT: CPT | Mod: 76

## 2019-09-17 PROCEDURE — 25000132 ZZH RX MED GY IP 250 OP 250 PS 637: Mod: GY | Performed by: INTERNAL MEDICINE

## 2019-09-17 PROCEDURE — 25000132 ZZH RX MED GY IP 250 OP 250 PS 637: Mod: GY

## 2019-09-17 PROCEDURE — 25000132 ZZH RX MED GY IP 250 OP 250 PS 637: Mod: GY | Performed by: PHYSICIAN ASSISTANT

## 2019-09-17 PROCEDURE — 25000132 ZZH RX MED GY IP 250 OP 250 PS 637: Mod: GY | Performed by: NURSE PRACTITIONER

## 2019-09-17 PROCEDURE — G0463 HOSPITAL OUTPT CLINIC VISIT: HCPCS

## 2019-09-17 PROCEDURE — 99232 SBSQ HOSP IP/OBS MODERATE 35: CPT | Performed by: PHYSICIAN ASSISTANT

## 2019-09-17 PROCEDURE — 94640 AIRWAY INHALATION TREATMENT: CPT

## 2019-09-17 PROCEDURE — 00000146 ZZHCL STATISTIC GLUCOSE BY METER IP

## 2019-09-17 PROCEDURE — 71045 X-RAY EXAM CHEST 1 VIEW: CPT

## 2019-09-17 PROCEDURE — 36415 COLL VENOUS BLD VENIPUNCTURE: CPT | Performed by: PHYSICIAN ASSISTANT

## 2019-09-17 PROCEDURE — 25000132 ZZH RX MED GY IP 250 OP 250 PS 637: Mod: GY | Performed by: STUDENT IN AN ORGANIZED HEALTH CARE EDUCATION/TRAINING PROGRAM

## 2019-09-17 PROCEDURE — 92526 ORAL FUNCTION THERAPY: CPT | Mod: GN

## 2019-09-17 PROCEDURE — 83735 ASSAY OF MAGNESIUM: CPT | Performed by: PHYSICIAN ASSISTANT

## 2019-09-17 PROCEDURE — 25000125 ZZHC RX 250: Performed by: INTERNAL MEDICINE

## 2019-09-17 PROCEDURE — 85025 COMPLETE CBC W/AUTO DIFF WBC: CPT | Performed by: PHYSICIAN ASSISTANT

## 2019-09-17 PROCEDURE — 40000275 ZZH STATISTIC RCP TIME EA 10 MIN

## 2019-09-17 PROCEDURE — 25000128 H RX IP 250 OP 636: Performed by: STUDENT IN AN ORGANIZED HEALTH CARE EDUCATION/TRAINING PROGRAM

## 2019-09-17 PROCEDURE — 94799 UNLISTED PULMONARY SVC/PX: CPT

## 2019-09-17 RX ORDER — BUMETANIDE 1 MG/1
2 TABLET ORAL ONCE
Status: COMPLETED | OUTPATIENT
Start: 2019-09-17 | End: 2019-09-17

## 2019-09-17 RX ORDER — ATORVASTATIN CALCIUM 40 MG/1
40 TABLET, FILM COATED ORAL EVERY EVENING
DISCHARGE
Start: 2019-09-17 | End: 2019-10-09

## 2019-09-17 RX ORDER — IPRATROPIUM BROMIDE AND ALBUTEROL SULFATE 2.5; .5 MG/3ML; MG/3ML
3 SOLUTION RESPIRATORY (INHALATION) 4 TIMES DAILY
Status: CANCELLED | DISCHARGE
Start: 2019-09-17

## 2019-09-17 RX ORDER — PREDNISONE 20 MG/1
40 TABLET ORAL DAILY
DISCHARGE
Start: 2019-09-18 | End: 2019-09-19

## 2019-09-17 RX ORDER — ALBUTEROL SULFATE 90 UG/1
2 AEROSOL, METERED RESPIRATORY (INHALATION) EVERY 6 HOURS PRN
Status: CANCELLED | DISCHARGE
Start: 2019-09-17

## 2019-09-17 RX ORDER — ZINC SULFATE 50(220)MG
220 CAPSULE ORAL DAILY
DISCHARGE
Start: 2019-09-18 | End: 2020-01-01

## 2019-09-17 RX ORDER — ZINC SULFATE 50(220)MG
220 CAPSULE ORAL DAILY
Qty: 6 CAPSULE | Refills: 0 | Status: CANCELLED | DISCHARGE
Start: 2019-09-17 | End: 2019-09-23

## 2019-09-17 RX ORDER — ACETYLCYSTEINE 200 MG/ML
2 SOLUTION ORAL; RESPIRATORY (INHALATION) 4 TIMES DAILY
DISCHARGE
Start: 2019-09-17 | End: 2019-10-09

## 2019-09-17 RX ORDER — BUMETANIDE 1 MG/1
1 TABLET ORAL DAILY
Status: CANCELLED | DISCHARGE
Start: 2019-09-17

## 2019-09-17 RX ORDER — MULTIPLE VITAMINS W/ MINERALS TAB 9MG-400MCG
1 TAB ORAL DAILY
DISCHARGE
Start: 2019-09-18

## 2019-09-17 RX ORDER — PREDNISONE 20 MG/1
20 TABLET ORAL DAILY
Status: CANCELLED | DISCHARGE
Start: 2019-09-19 | End: 2019-09-24

## 2019-09-17 RX ORDER — BUMETANIDE 1 MG/1
1 TABLET ORAL ONCE
Status: DISCONTINUED | OUTPATIENT
Start: 2019-09-17 | End: 2019-09-17

## 2019-09-17 RX ORDER — BUMETANIDE 1 MG/1
1 TABLET ORAL
Status: DISCONTINUED | OUTPATIENT
Start: 2019-09-18 | End: 2019-09-18

## 2019-09-17 RX ORDER — PREDNISONE 20 MG/1
20 TABLET ORAL DAILY
DISCHARGE
Start: 2019-09-19 | End: 2019-09-25

## 2019-09-17 RX ORDER — ATORVASTATIN CALCIUM 40 MG/1
40 TABLET, FILM COATED ORAL EVERY EVENING
Status: CANCELLED | DISCHARGE
Start: 2019-09-17

## 2019-09-17 RX ORDER — PANTOPRAZOLE SODIUM 40 MG/1
40 TABLET, DELAYED RELEASE ORAL
Status: CANCELLED | DISCHARGE
Start: 2019-09-17

## 2019-09-17 RX ORDER — ACETYLCYSTEINE 200 MG/ML
2 SOLUTION ORAL; RESPIRATORY (INHALATION) 4 TIMES DAILY
Status: CANCELLED | DISCHARGE
Start: 2019-09-17

## 2019-09-17 RX ORDER — BUMETANIDE 1 MG/1
1 TABLET ORAL DAILY
DISCHARGE
Start: 2019-09-18 | End: 2019-09-19

## 2019-09-17 RX ORDER — IPRATROPIUM BROMIDE AND ALBUTEROL SULFATE 2.5; .5 MG/3ML; MG/3ML
3 SOLUTION RESPIRATORY (INHALATION) 4 TIMES DAILY
DISCHARGE
Start: 2019-09-17 | End: 2019-10-09 | Stop reason: ALTCHOICE

## 2019-09-17 RX ORDER — ALBUTEROL SULFATE 90 UG/1
2 AEROSOL, METERED RESPIRATORY (INHALATION) EVERY 6 HOURS PRN
DISCHARGE
Start: 2019-09-17 | End: 2019-09-22

## 2019-09-17 RX ORDER — METOPROLOL SUCCINATE 25 MG/1
12.5 TABLET, EXTENDED RELEASE ORAL DAILY
Status: CANCELLED | DISCHARGE
Start: 2019-09-17

## 2019-09-17 RX ORDER — METOPROLOL SUCCINATE 25 MG/1
12.5 TABLET, EXTENDED RELEASE ORAL DAILY
DISCHARGE
Start: 2019-09-18 | End: 2019-10-09

## 2019-09-17 RX ORDER — PANTOPRAZOLE SODIUM 40 MG/1
40 TABLET, DELAYED RELEASE ORAL
DISCHARGE
Start: 2019-09-17 | End: 2019-10-09

## 2019-09-17 RX ORDER — PREDNISONE 20 MG/1
40 TABLET ORAL DAILY
Qty: 2 TABLET | Refills: 0 | Status: CANCELLED | DISCHARGE
Start: 2019-09-17 | End: 2019-09-18

## 2019-09-17 RX ORDER — NITROGLYCERIN 0.4 MG/1
TABLET SUBLINGUAL
DISCHARGE
Start: 2019-09-17 | End: 2019-10-09

## 2019-09-17 RX ORDER — MULTIPLE VITAMINS W/ MINERALS TAB 9MG-400MCG
1 TAB ORAL DAILY
Status: CANCELLED | DISCHARGE
Start: 2019-09-17

## 2019-09-17 RX ADMIN — Medication 20000 UNITS: at 07:59

## 2019-09-17 RX ADMIN — ACETYLCYSTEINE 2 ML: 200 SOLUTION ORAL; RESPIRATORY (INHALATION) at 15:57

## 2019-09-17 RX ADMIN — AZITHROMYCIN 250 MG: 250 TABLET, FILM COATED ORAL at 08:02

## 2019-09-17 RX ADMIN — ZINC SULFATE CAP 220 MG (50 MG ELEMENTAL ZN) 220 MG: 220 (50 ZN) CAP at 08:02

## 2019-09-17 RX ADMIN — FERROUS SULFATE TAB 325 MG (65 MG ELEMENTAL FE) 325 MG: 325 (65 FE) TAB at 07:58

## 2019-09-17 RX ADMIN — ACETYLCYSTEINE 2 ML: 200 SOLUTION ORAL; RESPIRATORY (INHALATION) at 08:38

## 2019-09-17 RX ADMIN — POTASSIUM CHLORIDE 20 MEQ: 750 TABLET, EXTENDED RELEASE ORAL at 08:54

## 2019-09-17 RX ADMIN — ASPIRIN 81 MG CHEWABLE TABLET 81 MG: 81 TABLET CHEWABLE at 07:57

## 2019-09-17 RX ADMIN — PREDNISONE 40 MG: 20 TABLET ORAL at 07:57

## 2019-09-17 RX ADMIN — ACETYLCYSTEINE 2 ML: 200 SOLUTION ORAL; RESPIRATORY (INHALATION) at 20:07

## 2019-09-17 RX ADMIN — BUMETANIDE 1 MG: 1 TABLET ORAL at 08:00

## 2019-09-17 RX ADMIN — Medication 12.5 MG: at 08:02

## 2019-09-17 RX ADMIN — ACETYLCYSTEINE 2 ML: 200 SOLUTION ORAL; RESPIRATORY (INHALATION) at 12:12

## 2019-09-17 RX ADMIN — Medication 500 MG: at 07:57

## 2019-09-17 RX ADMIN — PANTOPRAZOLE SODIUM 40 MG: 40 TABLET, DELAYED RELEASE ORAL at 08:00

## 2019-09-17 RX ADMIN — IPRATROPIUM BROMIDE AND ALBUTEROL SULFATE 3 ML: .5; 3 SOLUTION RESPIRATORY (INHALATION) at 15:57

## 2019-09-17 RX ADMIN — IPRATROPIUM BROMIDE AND ALBUTEROL SULFATE 3 ML: .5; 3 SOLUTION RESPIRATORY (INHALATION) at 12:11

## 2019-09-17 RX ADMIN — IPRATROPIUM BROMIDE AND ALBUTEROL SULFATE 3 ML: .5; 3 SOLUTION RESPIRATORY (INHALATION) at 08:38

## 2019-09-17 RX ADMIN — MULTIPLE VITAMINS W/ MINERALS TAB 1 TABLET: TAB at 07:58

## 2019-09-17 RX ADMIN — ATORVASTATIN CALCIUM 40 MG: 40 TABLET, FILM COATED ORAL at 19:36

## 2019-09-17 RX ADMIN — PANTOPRAZOLE SODIUM 40 MG: 40 TABLET, DELAYED RELEASE ORAL at 16:52

## 2019-09-17 RX ADMIN — Medication 5 ML: at 04:41

## 2019-09-17 RX ADMIN — CLOPIDOGREL BISULFATE 75 MG: 75 TABLET, FILM COATED ORAL at 08:00

## 2019-09-17 RX ADMIN — BUMETANIDE 2 MG: 1 TABLET ORAL at 14:18

## 2019-09-17 RX ADMIN — IPRATROPIUM BROMIDE AND ALBUTEROL SULFATE 3 ML: .5; 3 SOLUTION RESPIRATORY (INHALATION) at 20:07

## 2019-09-17 ASSESSMENT — ACTIVITIES OF DAILY LIVING (ADL)
ADLS_ACUITY_SCORE: 17

## 2019-09-17 ASSESSMENT — MIFFLIN-ST. JEOR: SCORE: 1440.37

## 2019-09-17 NOTE — PROGRESS NOTES
[facility]   Cardiology Progress      Interval History:   - Patient walked late in day - required 8LNC with activity, O2 sats dipped to low 80s  - otherwise feels well. Denies chest pain, dyspnea, nausea, headache, numbness or weakness    Changes Today:  - PICC out  - Leave silva in - plan to increase diuresis today    Physical Exam:  Temp:  [97.6  F (36.4  C)-99.5  F (37.5  C)] 97.6  F (36.4  C)  Pulse:  [77-79] 78  Heart Rate:  [74-90] 74  Resp:  [16-18] 18  BP: ()/(54-63) 115/60  SpO2:  [88 %-99 %] 91 %      Intake/Output Summary (Last 24 hours) at 9/17/2019 1415  Last data filed at 9/17/2019 1048  Gross per 24 hour   Intake 720 ml   Output 1975 ml   Net -1255 ml           Weight:  Baseline: unclear, on presentation approximately 173 pounds. Suspect this has dropped due to prolonged hospitalization/loss of muscle mass.  Hospitalization Max: 187 pounds  Today: 169 pounds.    GEN: NAD, awake, alert  Pulm: rhoncorous breath sounds. Diminished in bases.  Cardiac: Normal S1 and S2. Seemingly regular rate/rhythm. JVP not appreciably elevated, no murmurs.  Trace LE edema.  GI: soft, non distended  Neuro:  Alert and oriented x4.    Medications:    acetylcysteine  2 mL Nebulization 4x Daily     ascorbic acid  500 mg Oral Daily     aspirin  81 mg Oral Daily     atorvastatin  40 mg Oral QPM     [START ON 9/18/2019] bumetanide  1 mg Oral BID     bumetanide  2 mg Oral Once     clopidogrel  75 mg Oral Daily     ferrous sulfate  325 mg Oral Daily with breakfast     heparin lock flush  5-10 mL Intracatheter Q24H     insulin aspart  1-10 Units Subcutaneous TID AC     insulin aspart  1-7 Units Subcutaneous At Bedtime     ipratropium - albuterol 0.5 mg/2.5 mg/3 mL  3 mL Nebulization 4x daily     metoprolol succinate ER  12.5 mg Oral Daily     mometasone  2 puff Inhalation QPM     multivitamin w/minerals  1 tablet Oral Daily     pantoprazole  40 mg Oral BID AC     [START ON 9/19/2019]  predniSONE  20 mg Oral Daily     predniSONE  40 mg Oral Daily     senna-docusate  1 tablet Oral or Feeding Tube At Bedtime     sodium chloride (PF)  3 mL Intracatheter Q8H     vitamin A  20,000 Units Oral Daily     zinc sulfate  220 mg Oral Daily       IV fluid REPLACEMENT ONLY       Percutaneous Coronary Intervention orders placed (this is information for BPA alerting)       ACE/ARB/ARNI NOT PRESCRIBED         Labs:   CMP  Recent Labs   Lab 09/17/19  0440 09/16/19  0450 09/15/19  0506 09/14/19  0401  09/12/19  0435    138 136 135   < > 136   POTASSIUM 3.6 3.4 3.4 3.4   < > 3.6   CHLORIDE 102 99 99 98   < > 101   CO2 27 28 28 26   < > 26   ANIONGAP 8 11 9 10   < > 8   GLC 99 88 90 102*   < > 136*   BUN 31* 37* 40* 45*   < > 45*   CR 1.06 1.12 1.10 1.24   < > 1.08   GFRESTIMATED 64 60* 61 53*   < > 63   GFRESTBLACK 74 70 71 62   < > 73   YOON 8.2* 8.3* 8.2* 8.5   < > 8.0*   MAG  --  2.5*  --  2.5*  --  2.5*   PHOS  --  3.2  --  4.3  --  3.4    < > = values in this interval not displayed.     CBC  Recent Labs   Lab 09/17/19 0440 09/16/19 0450 09/15/19  0506 09/14/19  0401   WBC 8.5 8.9 9.5 11.3*   RBC 2.67* 2.59* 2.54* 2.71*   HGB 7.8* 7.6* 7.6* 8.2*   HCT 27.5* 26.7* 25.9* 27.6*   * 103* 102* 102*   MCH 29.2 29.3 29.9 30.3   MCHC 28.4* 28.5* 29.3* 29.7*   RDW 18.2* 18.7* 18.6* 19.1*    188 190 199     INRNo lab results found in last 7 days.  Arterial Blood Gas  No lab results found in last 7 days.    ASSESSMENT/PLAN:  José Aguirre is an 83 year old male with a PMHx of PVD with renal artery stenosis s/p stenting in 2013, occluded R carotid artery and s/p left carotid endarterectomy, left subclavian stenosis, CAD s/p CABG with L-LAD, S-D1 and OM2, PDA), COPD (on 2-3L home O2), and HLD who was admitted on 8/27/2019 with a VT/VF arrest with ROSC in the field. Patient was found to have disease in the LM into pLAD s/p PCI to LM->LAD and ost LCx.      #VT/VF arrest with ROSC prior to arrival  #CAD status  post PCI to native LM into the LAD and ostial LCX  #History of CABG '03 (LIMA-LAD, SVG-D1, SVG-OM2, SVG-PDA)  8/27/19 admission after OOH cardiac arrest. He was at the [facility] and had a witnessed collapse. Bystander CPR was started and EMS found him in VT/VF. He was defibrillated X1 and given 1 of epi. ROSC was obtained in field. On arrival here, he had a pulse and was hypotensive, was intubated, taken for emergent coronary angiogram which showed severe 3V native vessel CAD with LM/LAD lesion and  of proximal RCA with L to right collaterals. LIMA-LAD graft minimally diseased but small and primarily retrograde flow through native LAD. SVG-RCA chronically closed at anastomosis, SVG-D1 and SBG-OM2 closed. He underwent PCI to dLM/ostial LAD with CONCETTA and balloon angioplasty of ostial LCx. He has preserved LV EF with mildly reduced RV function. Though he did have CAD and was stented, felt his burden of CAD was unlikely to be the source of his arrest.  Patient continues to have runs of VT on telemetry; he is asymptomatic with these. None are long enough to require ICD shock. Did have apparent PMT early morning on 9/15. ICD changed from VDD to VVI at that time.   - Continue aspirin and Plavix   - Continue Lipitor 40 mg daily   - Continue Toprol XL 12.5 mg    - s/p ICD 9/10  - Pt will need follow up with Dr. Osorio in 2-4 weeks after discharge for post cardiac arrest follow up.  - PVARP adjusted to 250ms due to PMT     # Acute on chronic hypoxemic respiratory failure - improving  # Possible COPD exacerbation   # Pulmonary edema, history pleural effusion status post thoracentesis on left, residual small bilateral pleural effusions  Patient on 2-3L O2 at home for COPD. Status post arrest with significant pulmonary edema and COPD exacerbation requiring HFNC and BiPAP. Patient noted to have left pleural effusion 09/05; now status post thoracentesis. Patient is s/p Zosyn and Rocephin (finished 14d tx 9/10).   Procalcitonin 09/12 0.3.  BNP 09/12, 1,900. CT PE shows no PE, but improving ground glass opacity and reticular/cystic changes of lungs. Suspect combination of COPD flair + volume overload. Was improved yesterday, now again with increased oxygen needs 9/17 (desats to 80s on 8L with activity). Strongly suspect patient may have had higher oxygen needs than previously reported given his functional status remains good despite hypoxia.   - increase bumex to 1mg bid starting tomorrow, give additional 2mg dose now  - pulmonary consult; appreciate recommendations   - prednisone taper (40mg x5 days, 20mg x5 days) per pulm recs   - s/p azithromycin 500mg x1 day, then 250mg daily x4 days   - Flutter valve qid   - See pulm in clinic with repeat high resolution CT at that time following discharge  - RT COPD consult; appreciate recommendations  - Continue to wean O2 as able, O2 sat goal >87%    # Leukocytosis - resolved  WBC as high as 17 9/12.  Urine culture unremarkable.  Procalcitonin 0.3.  No constitutional complaints.  Afebrile.  DDx: secondary to stress?  Prednisone use? Possible atypical pneumonia.  - conditional blood cultures ordered should patient have T >100.3  - azithromycin as above     # Possible Atrial Fibrillation - resolved  Atrial fibrillation versus normal sinus rhythm with frequent PAC; unclear.  EKG yesterday with baseline RBBB with PAC.  No evidence of Afib on telemetry    # Rib fracture- improving  Patient states pain is improving overall. He is not requiring any narcotics and would tend to avoid as he did have witnessed probably delirium in the ICU manifesting as seizure like activity without seizures on EEG. Moreover component of respiratory depression with this. Patient tolerating chest physiotherapy and feeling well in this regard.   - Prn Tylenol for mild-mod pain     # Traumatic Silva   Patient does straight cath at home. Traumatic silva iatrogenic, will continue with indwelling silva for strict I/O  and to maintain patency in setting of injury and inflammation   - Continue with silva for now while diuresing     # PVD with renal artery stenosis s/p stenting in 2013   # occluded R carotid artery  # s/p left carotid endarterectomy  # Hx left subclavian stenosis  - Continue aspirin and Plavix     Anticipated Disposition: pending hypoxia.  Patient needs to be on LESS than 6L NC WITH activity for acceptance to TCU.    Patient discussed with Dr. Garcia, who agrees with above plan.      Santo Daly PA-C  [facility] Cardiology Team

## 2019-09-17 NOTE — PLAN OF CARE
"Discharge Planner SLP   Patient plan for discharge: Per pt \"I'm leaving today\"  Current status: Recommend continuation of regular diet and thin liquids.  Pt to be fully alert and upright for all PO, taking small bites/sips at slow rate.  Pt with functional oropharyngeal swallowing mechanism, SLP to follow x1 for PO tolerance  Barriers to return to prior living situation: Medical readiness  Recommendations for discharge: TCU  Rationale for recommendations: Anticipate pt will meet goals prior to discharge       Entered by: Joseline Ratliff 09/17/2019 11:44 AM       "

## 2019-09-17 NOTE — PLAN OF CARE
Neuro: A&Ox4.   Cardiac: SR. HR 70-80s. SBP stable. Afebrile. VSS.   Respiratory: Sating>90% on 3-4L at rest. While ambulating in the schreiber, pt requiring 6-8L, with sat dipping to upper 70s/low80s. Team made aware. Extra dose of bumex given this afternoon.   GI/: Adequate urine output. Mcclelland to remain in place. BM X2.  Diet/appetite: Tolerating 2gm Na diet + 1800 ml FR.  Activity:  Assist of 1, up to chair and in halls.  Pain: At acceptable level on current regimen.   Skin: No new deficits noted.  LDA's: PICC removed. Measurement marked at 42 cm. In the chart, PICC measured at 43 cm. PICC RN notified and to bedside to access, appears to be clean cut, not concerned at this time. HÉCTOR Daly notified, chest xray ordered, no signs of catheter. Per PICC RN, most likely PICC like not measured correctly.     Pt discharge to rehab delayed today due to increased O2 needs with ambulation today. Family aware. K+ 3.6, replaced, recheck tomorrow. Will continue to monitor.     Plan: Continue with POC. Notify primary team with changes.

## 2019-09-17 NOTE — PLAN OF CARE
Assumed care for this patient at 0330.  Neuro: A&Ox4.   Cardiac: SR. BP improved.   Vitals: Temp: 98.5  F (36.9  C) Temp src: Oral BP: 111/63  Resp: 18 SpO2: 98 % O2 Device: Nasal cannula Oxygen Delivery: 3 LPM    Respiratory: Denies SOB at rest  GI/: Mcclelland with good UOP.   Diet/appetite: Tolerating diet.  Activity:  Assist of 1 up OOB  Pain: Denies  Skin: No new deficits noted.  LDA's: PICC  Mcclelland catheter  Plan: Continue with POC. Notify primary team with changes. Anticipate discharge today

## 2019-09-17 NOTE — PLAN OF CARE
Cancel early PM due to pt leaving for TCU, then cancel in late PM as pt fatigued from working with OT (and no longer scheduled to disch to TCU today)

## 2019-09-17 NOTE — PLAN OF CARE
"Cares provided from 3340-7700    BP 97/58 (BP Location: Left arm)   Pulse 67   Temp 98.1  F (36.7  C) (Oral)   Resp 18   Ht 1.727 m (5' 7.99\")   Wt 76.9 kg (169 lb 8.5 oz)   SpO2 93%   BMI 25.78 kg/m      Neuro: A&Ox4. Follows commands. [place]  Cardiac: SR/paced 80-90. SBP 90s. Afebrile.        Respiratory: Sating >87% on 2-4L NC overnight. Needing up to 6L with activity.  GI/: Adequate urine output via silva. 1 BMs, passing gas.  Diet/appetite: 2g Na. 1.8L FR.  Activity: Assist of 1  Pain: At acceptable level on current regimen.   Skin: No new deficits noted.  LDA's: Right double lumen PICC. Left PIV     Plan: Continue with POC. Notify primary team with changes.   "

## 2019-09-17 NOTE — PROGRESS NOTES
[facility] Nurse Inpatient Pressure Injury Assessment   Reason for consultation: Evaluate and treat R nare      ASSESSMENT  Pressure Injury: on R nare , hospital acquired ,   This is a Medical Device Related Pressure Injury (MDRPI) due to hi flow oxygen tubing and Bridle string  Pressure Injury is Stage 2   Contributing factor of the pressure injury: pressure and moisture  Status: improving     TREATMENT PLAN  R nare wound: BID cleanse with normal saline and dry, then apply vaseline.     Orders revised  WO Nurse follow-up plan:weekly  Nursing to notify the Provider(s) and re-consult the [facility] Nurse if wound(s) deteriorates or new skin concern.    Patient History  According to provider note(s):  José Aguirre is a 83 year old male who was admitted on 8/27/2019 with a cardiac arrest. 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 The arrest was witnessed he received bystander CPR by the fire department who found him with a shockable rhythm he received one shock and one dose of epinephrine and transferred him here. On arrival he had a pulse was hypotensive with MAP's in the 50's but was moving  he was intubated and received one dose of epinephrine.  ECG per EMS and in the ED shows afib with RBBB and ST depression in the lateral leads with some TWI in the anterior leads.     Objective Data      Current Diet/ Nutrition:  Orders Placed This Encounter      2 Gram Sodium Diet      Advance Diet as Tolerated      Output:   I/O last 3 completed shifts:  In: 720 [P.O.:720]  Out: 1925 [Urine:1925]    Risk Assessment:   Sensory Perception: 3-->slightly limited  Moisture: 4-->rarely moist  Activity: 3-->walks occasionally  Mobility: 3-->slightly limited  Nutrition: 3-->adequate  Friction and Shear:  3-->no apparent problem  Emre Score: 19      Labs:   Recent Labs   Lab 09/17/19  0440   HGB 7.8*   WBC 8.5       Physical Exam  Skin inspection: focused R nare    9/10  Wound Location:  R lateral nare   Date of last Photo 9/10  Wound History: previously with high flow O2 pressing NJ bridle into R lateral side of nare. Now with no feeding tube in nare, O2 via NC   Measurements (length x width x depth, in cm) 0.2 cm x 0.4 cm  x  0.05 cm   Wound Base:  100 % dermis  Tunneling N/A  Undermining N/A  Palpation of the wound bed: normal   Periwound skin: intact  Color: normal and consistent with surrounding tissue  Temperature: normal   Drainage:, none  Odor: none  Pain:  denies    Interventions  Current support surface: Standard  Atmos Air mattress  Current off-loading measures: Pillows under calves  Repositioning aid: Pillows  Visual inspection of wound(s) completed   Wound Care: was not done (pt eating)  Supplies: floor stock  Educated provided:  Wound progress  Education participant:  patient

## 2019-09-18 ENCOUNTER — TELEPHONE (OUTPATIENT)
Dept: CARDIOLOGY | Facility: CLINIC | Age: 84
End: 2019-09-18

## 2019-09-18 ENCOUNTER — APPOINTMENT (OUTPATIENT)
Dept: PHYSICAL THERAPY | Facility: CLINIC | Age: 84
DRG: 224 | End: 2019-09-18
Payer: MEDICARE

## 2019-09-18 ENCOUNTER — APPOINTMENT (OUTPATIENT)
Dept: SPEECH THERAPY | Facility: CLINIC | Age: 84
DRG: 224 | End: 2019-09-18
Payer: MEDICARE

## 2019-09-18 ENCOUNTER — TELEPHONE (OUTPATIENT)
Dept: PULMONOLOGY | Facility: CLINIC | Age: 84
End: 2019-09-18

## 2019-09-18 ENCOUNTER — APPOINTMENT (OUTPATIENT)
Dept: OCCUPATIONAL THERAPY | Facility: CLINIC | Age: 84
DRG: 224 | End: 2019-09-18
Payer: MEDICARE

## 2019-09-18 LAB
ALBUMIN UR-MCNC: NEGATIVE MG/DL
ANION GAP SERPL CALCULATED.3IONS-SCNC: 10 MMOL/L (ref 3–14)
ANION GAP SERPL CALCULATED.3IONS-SCNC: 8 MMOL/L (ref 3–14)
APPEARANCE UR: CLEAR
BACTERIA SPEC CULT: ABNORMAL
BASOPHILS # BLD AUTO: 0 10E9/L (ref 0–0.2)
BASOPHILS NFR BLD AUTO: 0.1 %
BILIRUB UR QL STRIP: NEGATIVE
BUN SERPL-MCNC: 31 MG/DL (ref 7–30)
BUN SERPL-MCNC: 32 MG/DL (ref 7–30)
CALCIUM SERPL-MCNC: 8.5 MG/DL (ref 8.5–10.1)
CALCIUM SERPL-MCNC: 8.8 MG/DL (ref 8.5–10.1)
CHLORIDE SERPL-SCNC: 102 MMOL/L (ref 94–109)
CHLORIDE SERPL-SCNC: 98 MMOL/L (ref 94–109)
CO2 SERPL-SCNC: 26 MMOL/L (ref 20–32)
CO2 SERPL-SCNC: 27 MMOL/L (ref 20–32)
COLOR UR AUTO: ABNORMAL
CREAT SERPL-MCNC: 1.1 MG/DL (ref 0.66–1.25)
CREAT SERPL-MCNC: 1.15 MG/DL (ref 0.66–1.25)
DIFFERENTIAL METHOD BLD: ABNORMAL
EOSINOPHIL # BLD AUTO: 0 10E9/L (ref 0–0.7)
EOSINOPHIL NFR BLD AUTO: 0.1 %
ERYTHROCYTE [DISTWIDTH] IN BLOOD BY AUTOMATED COUNT: 18 % (ref 10–15)
GFR SERPL CREATININE-BSD FRML MDRD: 58 ML/MIN/{1.73_M2}
GFR SERPL CREATININE-BSD FRML MDRD: 61 ML/MIN/{1.73_M2}
GLUCOSE BLDC GLUCOMTR-MCNC: 118 MG/DL (ref 70–99)
GLUCOSE BLDC GLUCOMTR-MCNC: 146 MG/DL (ref 70–99)
GLUCOSE BLDC GLUCOMTR-MCNC: 271 MG/DL (ref 70–99)
GLUCOSE BLDC GLUCOMTR-MCNC: 72 MG/DL (ref 70–99)
GLUCOSE SERPL-MCNC: 253 MG/DL (ref 70–99)
GLUCOSE SERPL-MCNC: 96 MG/DL (ref 70–99)
GLUCOSE UR STRIP-MCNC: NEGATIVE MG/DL
HCT VFR BLD AUTO: 30.5 % (ref 40–53)
HGB BLD-MCNC: 8.6 G/DL (ref 13.3–17.7)
HGB UR QL STRIP: NEGATIVE
HYALINE CASTS #/AREA URNS LPF: 6 /LPF (ref 0–2)
IMM GRANULOCYTES # BLD: 0 10E9/L (ref 0–0.4)
IMM GRANULOCYTES NFR BLD: 0.3 %
KETONES UR STRIP-MCNC: NEGATIVE MG/DL
LEUKOCYTE ESTERASE UR QL STRIP: NEGATIVE
LYMPHOCYTES # BLD AUTO: 0.6 10E9/L (ref 0.8–5.3)
LYMPHOCYTES NFR BLD AUTO: 5.1 %
MAGNESIUM SERPL-MCNC: 2.4 MG/DL (ref 1.6–2.3)
MCH RBC QN AUTO: 28.8 PG (ref 26.5–33)
MCHC RBC AUTO-ENTMCNC: 28.2 G/DL (ref 31.5–36.5)
MCV RBC AUTO: 102 FL (ref 78–100)
MDC_IDC_LEAD_IMPLANT_DT: NORMAL
MDC_IDC_LEAD_LOCATION: NORMAL
MDC_IDC_LEAD_LOCATION_DETAIL_1: NORMAL
MDC_IDC_LEAD_MFG: NORMAL
MDC_IDC_LEAD_MODEL: NORMAL
MDC_IDC_LEAD_SERIAL: NORMAL
MDC_IDC_MSMT_BATTERY_STATUS: NORMAL
MDC_IDC_MSMT_BATTERY_VOLTAGE: 3.1 V
MDC_IDC_MSMT_CAP_CHARGE_TYPE: NORMAL
MDC_IDC_MSMT_LEADCHNL_RA_SENSING_INTR_AMPL: 10.5 MV
MDC_IDC_MSMT_LEADCHNL_RV_IMPEDANCE_VALUE: 619 OHM
MDC_IDC_MSMT_LEADCHNL_RV_PACING_THRESHOLD_AMPLITUDE: 0.5 V
MDC_IDC_MSMT_LEADCHNL_RV_PACING_THRESHOLD_PULSEWIDTH: 0.4 MS
MDC_IDC_MSMT_LEADCHNL_RV_SENSING_INTR_AMPL: 11.6 MV
MDC_IDC_PG_IMPLANT_DTM: NORMAL
MDC_IDC_PG_MFG: NORMAL
MDC_IDC_PG_MODEL: NORMAL
MDC_IDC_PG_SERIAL: NORMAL
MDC_IDC_PG_TYPE: NORMAL
MDC_IDC_SESS_CLINIC_NAME: NORMAL
MDC_IDC_SESS_DTM: NORMAL
MDC_IDC_SESS_REPROGRAMMED: NORMAL
MDC_IDC_SESS_TYPE: NORMAL
MDC_IDC_SET_BRADY_AT_MODE_SWITCH_MODE: NORMAL
MDC_IDC_SET_BRADY_AT_MODE_SWITCH_RATE: 160 {BEATS}/MIN
MDC_IDC_SET_BRADY_HYSTRATE: 50 {BEATS}/MIN
MDC_IDC_SET_BRADY_LOWRATE: 50 {BEATS}/MIN
MDC_IDC_SET_BRADY_MAX_SENSOR_RATE: 120 {BEATS}/MIN
MDC_IDC_SET_BRADY_MAX_TRACKING_RATE: 130 {BEATS}/MIN
MDC_IDC_SET_BRADY_MODE: NORMAL
MDC_IDC_SET_BRADY_NIGHT_RATE: 50 {BEATS}/MIN
MDC_IDC_SET_CRT_PACED_CHAMBERS: NORMAL
MDC_IDC_SET_LEADCHNL_LV_PACING_ANODE_ELECTRODE_1: NORMAL
MDC_IDC_SET_LEADCHNL_LV_PACING_ANODE_LOCATION_1: NORMAL
MDC_IDC_SET_LEADCHNL_LV_PACING_CATHODE_ELECTRODE_1: NORMAL
MDC_IDC_SET_LEADCHNL_LV_PACING_CATHODE_LOCATION_1: NORMAL
MDC_IDC_SET_LEADCHNL_LV_PACING_POLARITY: NORMAL
MDC_IDC_SET_LEADCHNL_LV_SENSING_ANODE_ELECTRODE_1: NORMAL
MDC_IDC_SET_LEADCHNL_LV_SENSING_ANODE_LOCATION_1: NORMAL
MDC_IDC_SET_LEADCHNL_LV_SENSING_CATHODE_ELECTRODE_1: NORMAL
MDC_IDC_SET_LEADCHNL_LV_SENSING_CATHODE_LOCATION_1: NORMAL
MDC_IDC_SET_LEADCHNL_LV_SENSING_POLARITY: NORMAL
MDC_IDC_SET_LEADCHNL_RA_PACING_ANODE_ELECTRODE_1: NORMAL
MDC_IDC_SET_LEADCHNL_RA_PACING_ANODE_LOCATION_1: NORMAL
MDC_IDC_SET_LEADCHNL_RA_PACING_CATHODE_ELECTRODE_1: NORMAL
MDC_IDC_SET_LEADCHNL_RA_PACING_CATHODE_LOCATION_1: NORMAL
MDC_IDC_SET_LEADCHNL_RA_PACING_POLARITY: NORMAL
MDC_IDC_SET_LEADCHNL_RA_SENSING_POLARITY: NORMAL
MDC_IDC_SET_LEADCHNL_RA_SENSING_SENSITIVITY: 0.4 MV
MDC_IDC_SET_LEADCHNL_RV_PACING_AMPLITUDE: 1.5 V
MDC_IDC_SET_LEADCHNL_RV_PACING_POLARITY: NORMAL
MDC_IDC_SET_LEADCHNL_RV_PACING_PULSEWIDTH: 0.4 MS
MDC_IDC_SET_LEADCHNL_RV_SENSING_POLARITY: NORMAL
MDC_IDC_SET_LEADCHNL_RV_SENSING_SENSITIVITY: 0.8 MV
MDC_IDC_SET_ZONE_DETECTION_INTERVAL: 250 MS
MDC_IDC_SET_ZONE_DETECTION_INTERVAL: 300 MS
MDC_IDC_SET_ZONE_DETECTION_INTERVAL: 350 MS
MDC_IDC_SET_ZONE_TYPE: NORMAL
MDC_IDC_STAT_AT_MODE_SW_COUNT: 0
MDC_IDC_STAT_BRADY_RV_PERCENT_PACED: 1 %
MDC_IDC_STAT_EPISODE_RECENT_COUNT: 0
MDC_IDC_STAT_EPISODE_TYPE: NORMAL
MDC_IDC_STAT_TACHYTHERAPY_SHOCKS_ABORTED_TOTAL: 0
MDC_IDC_STAT_TACHYTHERAPY_SHOCKS_DELIVERED_RECENT: 0
MDC_IDC_STAT_TACHYTHERAPY_SHOCKS_DELIVERED_TOTAL: 0
MONOCYTES # BLD AUTO: 0.8 10E9/L (ref 0–1.3)
MONOCYTES NFR BLD AUTO: 6.8 %
MUCOUS THREADS #/AREA URNS LPF: PRESENT /LPF
NEUTROPHILS # BLD AUTO: 10.5 10E9/L (ref 1.6–8.3)
NEUTROPHILS NFR BLD AUTO: 87.6 %
NITRATE UR QL: NEGATIVE
NRBC # BLD AUTO: 0 10*3/UL
NRBC BLD AUTO-RTO: 0 /100
PH UR STRIP: 6 PH (ref 5–7)
PHOSPHATE SERPL-MCNC: 3.7 MG/DL (ref 2.5–4.5)
PLATELET # BLD AUTO: 206 10E9/L (ref 150–450)
POTASSIUM SERPL-SCNC: 3.6 MMOL/L (ref 3.4–5.3)
POTASSIUM SERPL-SCNC: 3.8 MMOL/L (ref 3.4–5.3)
RBC # BLD AUTO: 2.99 10E12/L (ref 4.4–5.9)
RBC #/AREA URNS AUTO: <1 /HPF (ref 0–2)
SODIUM SERPL-SCNC: 133 MMOL/L (ref 133–144)
SODIUM SERPL-SCNC: 137 MMOL/L (ref 133–144)
SOURCE: ABNORMAL
SP GR UR STRIP: 1.01 (ref 1–1.03)
SPECIMEN SOURCE: ABNORMAL
UROBILINOGEN UR STRIP-MCNC: NORMAL MG/DL (ref 0–2)
WBC # BLD AUTO: 12 10E9/L (ref 4–11)
WBC #/AREA URNS AUTO: <1 /HPF (ref 0–5)

## 2019-09-18 PROCEDURE — 25000132 ZZH RX MED GY IP 250 OP 250 PS 637: Mod: GY

## 2019-09-18 PROCEDURE — 36415 COLL VENOUS BLD VENIPUNCTURE: CPT | Performed by: PHYSICIAN ASSISTANT

## 2019-09-18 PROCEDURE — 99232 SBSQ HOSP IP/OBS MODERATE 35: CPT | Performed by: INTERNAL MEDICINE

## 2019-09-18 PROCEDURE — 40000275 ZZH STATISTIC RCP TIME EA 10 MIN

## 2019-09-18 PROCEDURE — 94640 AIRWAY INHALATION TREATMENT: CPT

## 2019-09-18 PROCEDURE — 25000132 ZZH RX MED GY IP 250 OP 250 PS 637: Mod: GY | Performed by: PHYSICIAN ASSISTANT

## 2019-09-18 PROCEDURE — 97535 SELF CARE MNGMENT TRAINING: CPT | Mod: GO

## 2019-09-18 PROCEDURE — 81001 URINALYSIS AUTO W/SCOPE: CPT | Performed by: PHYSICIAN ASSISTANT

## 2019-09-18 PROCEDURE — 97110 THERAPEUTIC EXERCISES: CPT | Mod: GP

## 2019-09-18 PROCEDURE — 25000132 ZZH RX MED GY IP 250 OP 250 PS 637: Mod: GY | Performed by: NURSE PRACTITIONER

## 2019-09-18 PROCEDURE — 84100 ASSAY OF PHOSPHORUS: CPT | Performed by: PHYSICIAN ASSISTANT

## 2019-09-18 PROCEDURE — 25000131 ZZH RX MED GY IP 250 OP 636 PS 637: Mod: GY | Performed by: PHYSICIAN ASSISTANT

## 2019-09-18 PROCEDURE — 25000125 ZZHC RX 250: Performed by: INTERNAL MEDICINE

## 2019-09-18 PROCEDURE — 25000132 ZZH RX MED GY IP 250 OP 250 PS 637: Mod: GY | Performed by: STUDENT IN AN ORGANIZED HEALTH CARE EDUCATION/TRAINING PROGRAM

## 2019-09-18 PROCEDURE — 80048 BASIC METABOLIC PNL TOTAL CA: CPT | Performed by: PHYSICIAN ASSISTANT

## 2019-09-18 PROCEDURE — 25000132 ZZH RX MED GY IP 250 OP 250 PS 637: Mod: GY | Performed by: INTERNAL MEDICINE

## 2019-09-18 PROCEDURE — 92526 ORAL FUNCTION THERAPY: CPT | Mod: GN

## 2019-09-18 PROCEDURE — 94640 AIRWAY INHALATION TREATMENT: CPT | Mod: 76

## 2019-09-18 PROCEDURE — 94799 UNLISTED PULMONARY SVC/PX: CPT

## 2019-09-18 PROCEDURE — 97530 THERAPEUTIC ACTIVITIES: CPT | Mod: GP

## 2019-09-18 PROCEDURE — 97110 THERAPEUTIC EXERCISES: CPT | Mod: GO

## 2019-09-18 PROCEDURE — 12000004 ZZH R&B IMCU UMMC

## 2019-09-18 PROCEDURE — 83735 ASSAY OF MAGNESIUM: CPT | Performed by: PHYSICIAN ASSISTANT

## 2019-09-18 PROCEDURE — 85025 COMPLETE CBC W/AUTO DIFF WBC: CPT | Performed by: PHYSICIAN ASSISTANT

## 2019-09-18 PROCEDURE — 00000146 ZZHCL STATISTIC GLUCOSE BY METER IP

## 2019-09-18 RX ORDER — BUMETANIDE 1 MG/1
2 TABLET ORAL ONCE
Status: COMPLETED | OUTPATIENT
Start: 2019-09-18 | End: 2019-09-18

## 2019-09-18 RX ORDER — BUMETANIDE 1 MG/1
1 TABLET ORAL
Status: DISCONTINUED | OUTPATIENT
Start: 2019-09-19 | End: 2019-09-19

## 2019-09-18 RX ADMIN — ACETYLCYSTEINE 2 ML: 200 SOLUTION ORAL; RESPIRATORY (INHALATION) at 07:50

## 2019-09-18 RX ADMIN — IPRATROPIUM BROMIDE AND ALBUTEROL SULFATE 3 ML: .5; 3 SOLUTION RESPIRATORY (INHALATION) at 12:07

## 2019-09-18 RX ADMIN — PANTOPRAZOLE SODIUM 40 MG: 40 TABLET, DELAYED RELEASE ORAL at 08:24

## 2019-09-18 RX ADMIN — Medication 20000 UNITS: at 08:23

## 2019-09-18 RX ADMIN — ACETYLCYSTEINE 2 ML: 200 SOLUTION ORAL; RESPIRATORY (INHALATION) at 20:54

## 2019-09-18 RX ADMIN — ZINC SULFATE CAP 220 MG (50 MG ELEMENTAL ZN) 220 MG: 220 (50 ZN) CAP at 08:23

## 2019-09-18 RX ADMIN — ACETYLCYSTEINE 2 ML: 200 SOLUTION ORAL; RESPIRATORY (INHALATION) at 12:07

## 2019-09-18 RX ADMIN — IPRATROPIUM BROMIDE AND ALBUTEROL SULFATE 3 ML: .5; 3 SOLUTION RESPIRATORY (INHALATION) at 20:54

## 2019-09-18 RX ADMIN — Medication 500 MG: at 08:22

## 2019-09-18 RX ADMIN — IPRATROPIUM BROMIDE AND ALBUTEROL SULFATE 3 ML: .5; 3 SOLUTION RESPIRATORY (INHALATION) at 07:50

## 2019-09-18 RX ADMIN — PANTOPRAZOLE SODIUM 40 MG: 40 TABLET, DELAYED RELEASE ORAL at 15:31

## 2019-09-18 RX ADMIN — MULTIPLE VITAMINS W/ MINERALS TAB 1 TABLET: TAB at 08:25

## 2019-09-18 RX ADMIN — Medication 12.5 MG: at 08:24

## 2019-09-18 RX ADMIN — FERROUS SULFATE TAB 325 MG (65 MG ELEMENTAL FE) 325 MG: 325 (65 FE) TAB at 08:23

## 2019-09-18 RX ADMIN — PREDNISONE 40 MG: 20 TABLET ORAL at 08:22

## 2019-09-18 RX ADMIN — ATORVASTATIN CALCIUM 40 MG: 40 TABLET, FILM COATED ORAL at 19:30

## 2019-09-18 RX ADMIN — IPRATROPIUM BROMIDE AND ALBUTEROL SULFATE 3 ML: .5; 3 SOLUTION RESPIRATORY (INHALATION) at 15:34

## 2019-09-18 RX ADMIN — CLOPIDOGREL BISULFATE 75 MG: 75 TABLET, FILM COATED ORAL at 08:24

## 2019-09-18 RX ADMIN — POTASSIUM CHLORIDE 20 MEQ: 750 TABLET, EXTENDED RELEASE ORAL at 06:27

## 2019-09-18 RX ADMIN — BUMETANIDE 2 MG: 1 TABLET ORAL at 15:32

## 2019-09-18 RX ADMIN — ASPIRIN 81 MG CHEWABLE TABLET 81 MG: 81 TABLET CHEWABLE at 08:24

## 2019-09-18 RX ADMIN — BUMETANIDE 2 MG: 1 TABLET ORAL at 08:24

## 2019-09-18 RX ADMIN — ACETYLCYSTEINE 2 ML: 200 SOLUTION ORAL; RESPIRATORY (INHALATION) at 15:33

## 2019-09-18 ASSESSMENT — ACTIVITIES OF DAILY LIVING (ADL)
ADLS_ACUITY_SCORE: 17

## 2019-09-18 ASSESSMENT — MIFFLIN-ST. JEOR: SCORE: 1426.37

## 2019-09-18 NOTE — PLAN OF CARE
Discharge Planner PT  - 6B  Patient plan for discharge: TCU.  Current status: Pt demonstrates transfers with SBA. To promote strength and endurance, pt ambulates ~130ft, 200ft 2x, and ~130ft with 4WW and CGA-SBA. Pt takes seated rest breaks upon completion of each ambulation bout. Pt denies SOB with ambulation, reports only fatigue. For first bout of ambulation pt on 6 L O2 via oxymizer and SpO2 dropping to low-mid 80s towards end of ambulation bout. For remaining 3 ambulation bouts, pt on 8 L O2 via oxymizer - pt drops to mid-high 80s towards end of each ambulation bout, within ~1-2 min of seated rest break SpO2 recovers >90%. RN notified of O2 needs.  Barriers to return to prior living situation: Medical needs, O2 requirements, weakness/deconditioning, impaired balance, level of assist.  Recommendations for discharge: TCU at this time.  Rationale for recommendations: Pt is below baseline for mobility, also limited by high O2 needs. Pt will require continued skilled therapy to progress strength, balance, activity tolerance, and functional independence in order to safely return to PLOF.

## 2019-09-18 NOTE — PLAN OF CARE
Time: 1900 - 2330  Reason for admission: Pt admitted s/p VT/VF arrest.   Hx: PVD, renal artery stenosis s/p stenting in 2013, occluded R carotid artery & s/p L carotid endarterectomy, L subclavian stenosis, CAD s/p CABG, COPD (2-3L home O2), HLD.  VS: VSS on 4-5L O2, increased O2 when moving around. Denied any pain.   Activity:  Assist x 1 with walker.   Neuros: Alert and oriented x 4. [place].  Cardiac: Denied any CP. No edema noted. Palpable radial and pedal pulses. SR in 80-90's.   Respiratory: LS diminished in bases. Dyspnea on exertion. O2 needs vary depending on what pt is doing. 4-5L this shift. O2 goal <87%.   GI/: +gas/+BS. LBM today. Abd rounded and firm. Denied any abd pain, N/V. Mcclelland in place for retention - adequate clear, yellow output.   Diet: 2G Na diet, tolerating well. 1.8L FR - 480 mL this shift.   Skin: Scattered bruising throughout body. Skin tear to R elbow, R forehead abrasion - daily wound care, and R nare with blanchable redness - BID wound care.  Lines: PIV to LFA RUPINDER. Stas.    Labs: K/Mag protocol - at goal. ACHS BS - 200 at bedtime.  New changes this shift: Pt rested comfortably in bed watching TV entire shift. No concerns noted.   Plan: Walk test in am - pending results, possibly discharge to TCU.   Will continue to monitor & follow POC.

## 2019-09-18 NOTE — PLAN OF CARE
Discharge Planner SLP   Patient plan for discharge: TCU  Current status: Pt with functional tolerance of current diet; recommend continuation of regular diet/thin liquids. Pt to be fully alert and upright for all PO, taking small bites/sips at slow rate. No additional SLP services indicated.   Barriers to return to prior living situation: None from ST perspective  Recommendations for discharge: Defer to PT/OT  Rationale for recommendations: Functional oropharyngeal swallow mechanism       Entered by: Alicia Bailey 09/18/2019 11:01 AM         Speech Language Therapy Discharge Summary    Reason for therapy discharge:    All goals and outcomes met, no further needs identified.    Progress towards therapy goal(s). See goals on Care Plan in [facility] electronic health record for goal details.  Goals met    Therapy recommendation(s):    No further therapy is recommended.

## 2019-09-18 NOTE — PROGRESS NOTES
CLINICAL NUTRITION SERVICES - REASSESSMENT NOTE     Nutrition Prescription    RECOMMENDATIONS FOR MDs/PROVIDERS TO ORDER:  None at this time.     Malnutrition Status:    Non-severe malnutrition in the context of chronic illness     Recommendations already ordered by Registered Dietitian (RD):  None at this time.     Future/Additional Recommendations:  Monitor weight trends    If to remain inpatient and suspect PO intake is decreasing, consider:  --Nutrition education as appropriate  --Supplements per pt's preference  --Calorie counts to determine need for more aggressive nutrition intervention.      EVALUATION OF THE PROGRESS TOWARD GOALS   Diet:   2 g Sodium and 1800 mL Fluid Restriction. Room service with assist.    Intake:   Per flowsheets, pt consuming % of multiple meals daily. Per review of [employer], pt ordering 3 meals from kitchen daily, averaging 435 kcal/tray and 20 g protein/tray.       Met with pt today who endorses good appetite and PO intake. Pt reports that he is consistently consuming 100% 3 meals daily. Pt denies barriers to PO intake at this time. Pt reports that he has not had formal heart healthy diet education in the past, but he is open to written and verbal education today. Pt reports that he has good support at home to help with diet compliance as his wife is very health conscious when it comes to nutrition.      NEW FINDINGS   Chart Review:   PMH significant for PVD with renal artery stenosis s/p stenting in 2013, occluded R carotid artery and s/p left carotid endarterectomy, left subclavian stenosis, CAD s/p CABG, COPD (on 2-3L home O2), and HLD. Admitted 8/27/19 with a VT/VF arrest. Upon arrival, pt was intubated and taken for emergent coronary angiogram. Status post arrest with significant pulmonary edema and COPD exacerbation requiring HFNC and BiPAP. Was improving, then with increased O2 needs 9/17. Discharge to TCU pending hypoxia.     Labs:   K+ 3.8 (WNL), Mg++ 2.4 (H), Phos  3.7 (WNL)  Glucose 118  BUN 32 (H), Cr 1.10 (WNL)    Medications:   Ascorbic acid 500 mg  Bumex  Ferrous sulfate 325 mg   Insulin  Thera-Vit-M  Prednisone  Bowel regimen  Vitamin A 20,000 units  Zinc sulfate 220 mg  Potassium Chloride PRN - received 20 mEq this AM    Weight:   Pt is down 3.1 kg (4% body weight) since admission, which is not clinically significant. Suspect related to fluid in part. Per PA note, suspect weight drop may also be due to prolonged hospitalization/loss of muscle mass.     Pt reports that ~1 year ago his UBW was 205-210 lbs. Pt reports that he has been intentionally losing weight through diet modification in an effort to improve his iglesia. Pt reports diet modifications such as decreasing processed foods in his diet (eg no more hamburger helper), monitoring his sodium intake, using nutrition labels, and substituting healthier options for his previous diet staples.     Updated dosing weight and assessed nutrition needs:     Updated Dosing Weight: 76 kg (actual) - based on lowest documented wt so far this adm (75.7 kg on 9/18) and IBW of 69.18 kg     ASSESSED NUTRITION NEEDS  Estimated Energy Needs: 1900 - 2280 kcals/day (25 - 30 kcals/kg)  Justification: Maintenance  Estimated Protein Needs: 99 - 137 grams protein/day (1.3 - 1.8 grams of pro/kg)  Justification: Increased needs    GI:  Per intake/output, pt having 1-2 BM daily. Last BM documented today.     Skin:  Per WOC nurse note on 9/17/19, pt's stage 2 pressure injury on R nare is improving. Emre Score: 19. Pt currently on day 7 of pressure injury micronutrient protocol.     MALNUTRITION  % Intake: No decreased intake noted  % Weight Loss: Weight loss does not meet criteria  Subcutaneous Fat Loss: Facial region, Upper arm: and Lower arm: Mild  Muscle Loss: Facial & jaw region,  Upper arm (bicep, tricep),  Lower arm  (forearm), Upper leg (quadricep, hamstring), Patellar region and Posterior calf:  Mild-moderate  Fluid Accumulation/Edema:  Trace LE edema  Malnutrition Diagnosis: Non-severe malnutrition in the context of chronic illness     Previous Goals   Patient to consume % of nutritionally adequate meal trays TID, or the equivalent with supplements/snacks.  Evaluation: Met    Previous Nutrition Diagnosis  Inadequate oral intake related to inadequate consumption of high calorie/protien foods as evidenced by patient meeting 54% and 56% of low end estimated energy needs per calorie count   Evaluation: Improving    CURRENT NUTRITION DIAGNOSIS  Food- and nutrition-related knowledge deficit r/t no previous knowledge of heart-healthy diet AEB pt report of no previous formal heart-healthy nutrition education.    INTERVENTIONS  Implementation  Nutrition Education  1.  Provided verbal instruction on a heart-healthy diet.  2.  Provided handouts:  Heart Health Guidelines (includes Heart Healthy Food Choices), Your Heart-Healthy Diet (Words You Will Hear), 10 Tips for Heart-Healthy Cooking, Dining Out With Your Heart In Mind, Recipe Changes for Heart-Healthy Cooking,  Low-Sodium Foods and Drinks, Tips for a Low-Sodium Diet  3. Emphasized importance of adequate energy and protein intake to maintain muscle stores.     Goals  1. Patient to consume % of nutritionally adequate meal trays TID, or the equivalent with supplements/snacks.  2.  Pt will verbalize at least four foods high in saturated or trans-fats and five foods high in sodium.    3.  All nutrition related questions/concerns were addressed.   4. Pt will list at least two interventions to make current meal plan more heart-healthy.     Monitoring/Evaluation  Progress toward goals will be monitored and evaluated per protocol.    Follow-up:   Patient to ask any further nutrition-related questions before discharge. In addition, pt may request outpatient RD appointment.    Keon Franco, MS, RD, LD  [address] Floor RD Pager [phone]

## 2019-09-18 NOTE — PROGRESS NOTES
Social Work Services Progress Note    Hospital Day: 22  Date of Initial Social Work Evaluation:  9/15/19 - please see for details  Collaborated with:  [facility] (Jesus), [facility] (Mony), Chart Review    Data:  Pt is 84 y/o male admitted to [facility] on 8/27/19 s/p cardiac arrest. 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     SW involved for TCU placement pending medical stability. Per update from [facility] (Jesus), still working on diuresing and weaning Pt's O2 levels to be 6L or below w/activity. Pt could potentially be ready for discharge tomorrow.     Intervention:  SW spoke with Mony in Admissions at [facility] (Ph: [phone], Admissions: [address] [place] [address], F: [phone]). She will have an available private room for Pt tomorrow. SW to f/u with Mony tomorrow AM to confirm discharge.     Assessment:  Pt will discharge to TCU pending medical stability.    Plan:    Anticipated Disposition:  Facility:  [place] [address]  Ph: [phone], F: [phone]  Admissions: [address] [place] [address]    PAS Confirmation #: [id number]    Barriers to d/c plan:  Medical stability, O2 6L or less w/activity    Follow Up:  SW to continue to follow and assist with discharge plan.    CHRISTIANO Paz, ANDREASSW  [facility] Intermediate Care Unit   Phone: [phone]  Pager: [phone]

## 2019-09-18 NOTE — TELEPHONE ENCOUNTER
[facility] Call Center    Phone Message    May a detailed message be left on voicemail: no    Reason for Call: Other: Pt is needing to be scheduled with Dr Blackmon at 4 week Hospital discharge per discharge orders there is no availabilty pt is still inpatient can somene please assist. Thank you     Action Taken: Message routed to:  [facility] ([facility]): Pulmonary

## 2019-09-18 NOTE — PLAN OF CARE
Neuro: A&Ox4.   Cardiac: SR. BP stable. Occasional PVC's and paced beats  Vitals: Temp: 98.9  F (37.2  C) Temp src: Oral BP: 105/57 Pulse: 83 Heart Rate: 88 Resp: 18     Respiratory: 3-5 LPM NC. Desats to 70's with any activity OOB.   GI/: Mcclelland with adequate UOP.   Diet/appetite: Tolerating diet.  Activity:  Assist of 1 up OOB  Pain: Denies  Skin: No new deficits noted.  LDA's: Mcclelland catheter  Plan: Continue with POC. Notify primary team with changes.

## 2019-09-18 NOTE — PLAN OF CARE
Neuro: Alert and oriented x4. Neuro's intact.   Cardiac: NSR / wandering atrial pacemaker. Frequent PACs. HR 70-90's. ICD in place. BP's stable.  Respiratory: Sating >87% on 4-5L at rest. 6-8L with activity. Patient desats to upper 70's to lower 80's with activity, but does not show signs of distress.   GI/: Adequate urine output via silva cath. Bumex x2. 1 BM today.   Diet/appetite: Tolerating regular diet. Eating well. 1800 FR.   Activity:  Assist of 1, up in chair the whole day. Worked with PT and OT.   Pain: Denies.   Skin: No new deficits noted.   LDA's: Left PIV, SL.   Plan: Waiting for O2 needs to improve, then plans for TCU. Will continue to monitor.

## 2019-09-18 NOTE — TELEPHONE ENCOUNTER
Health Call Center    Phone Message    May a detailed message be left on voicemail: no    Reason for Call: Other: Pt is needing a hosp follow up within 2 weeks with Dr Osorio per Discharge notes can someone assist me with getting the pt scheduled he is still inpatient. Thank you      Action Taken: Message routed to:  [facility] ([facility] Cardiology

## 2019-09-18 NOTE — PLAN OF CARE
Neuro: A&Ox4.   Cardiac: SR. VSS.   Respiratory: Sating 94% on 3L nc when awake and still. With minimal activity, sats 87% on 3L. When asleep, pt mouth breathes and sats fall to lower 80's. Requires 6L nc.   GI/: Adequate urine output via silva. Remove silva before discharge. BM yesterday.   Diet/appetite: Tolerating 2g Na diet and 1.8L FR. Eating well.  Activity:  Assist of 1 to transition, help with lines and to manage O2.   Pain: At acceptable level on current regimen.   Skin: No new deficits noted.  LDA's: PIV, Silva, nasal cannula.    Plan: Re- evaluate respiratory states tomorrow and plan for discharge. Continue with POC. Notify primary team with changes.

## 2019-09-18 NOTE — PLAN OF CARE
Discharge Planner OT   Patient plan for discharge: Rehab  Current status: Pt seated upright in bedside chair. Therapist educated pt on and reviewed icd precautions and safety with functional transfers. Pt required Min A and vc's for transfer sit<->standing. Pt ambulated ~100ft x 2 with CGA, vc's and FWW. Pt on 6L O2 NC with desaturation to 76/78% O2, requiring rest and 8-10L O2 for recovery to mid 90's. Pt tolerated therapy session well.   Barriers to return to prior living situation: Decreased strength and endurance, Decreased independence with functional transfers/ADLs. O2 demands.   Recommendations for discharge: TCU  Rationale for recommendations: Pt will benefit from continued therapy to address barriers above and to maximize functional independence.        Entered by: Fili Hoover 09/17/2019 8:54 PM

## 2019-09-18 NOTE — PROGRESS NOTES
[facility]   Cardiology Progress      Interval History:   - States he feels his breathing and strength have improved from yesterday  - denies chest pain, edema, abdominal distension, headache, fever, chills    Changes Today:  - repeat bumex 2mg this AM, consider repeat dose in afternoon pending UOP  - repeat walk test this AM    Physical Exam:  Temp:  [97.6  F (36.4  C)-98.9  F (37.2  C)] 98.9  F (37.2  C)  Pulse:  [78-91] 83  Heart Rate:  [74-88] 88  Resp:  [18-19] 18  BP: (105-130)/(52-64) 128/63  SpO2:  [75 %-100 %] 92 %      Intake/Output Summary (Last 24 hours) at 9/17/2019 1415  Last data filed at 9/17/2019 1048  Gross per 24 hour   Intake 720 ml   Output 1975 ml   Net -1255 ml           Weight:  Baseline: unclear, on presentation approximately 173 pounds. Suspect this has dropped due to prolonged hospitalization/loss of muscle mass.  Hospitalization Max: 187 pounds  Today: 166 pounds.    GEN: NAD, awake, alert  Pulm: rhoncorous breath sounds. Diminished in bases.  Cardiac: 1/6 ALEXANDRA loudest over aortic area. Seemingly regular rate/rhythm. JVP not appreciably elevated.  Trace LE edema.  GI: soft, non distended  Neuro:  Alert and oriented x4.    Medications:    acetylcysteine  2 mL Nebulization 4x Daily     ascorbic acid  500 mg Oral Daily     aspirin  81 mg Oral Daily     atorvastatin  40 mg Oral QPM     [START ON 9/19/2019] bumetanide  1 mg Oral BID     clopidogrel  75 mg Oral Daily     ferrous sulfate  325 mg Oral Daily with breakfast     heparin lock flush  5-10 mL Intracatheter Q24H     insulin aspart  1-10 Units Subcutaneous TID AC     insulin aspart  1-7 Units Subcutaneous At Bedtime     ipratropium - albuterol 0.5 mg/2.5 mg/3 mL  3 mL Nebulization 4x daily     metoprolol succinate ER  12.5 mg Oral Daily     mometasone  2 puff Inhalation QPM     multivitamin w/minerals  1 tablet Oral Daily     pantoprazole  40 mg Oral BID AC     [START ON 9/19/2019] predniSONE  20 mg Oral  Daily     senna-docusate  1 tablet Oral or Feeding Tube At Bedtime     sodium chloride (PF)  3 mL Intracatheter Q8H     vitamin A  20,000 Units Oral Daily     zinc sulfate  220 mg Oral Daily       IV fluid REPLACEMENT ONLY       Percutaneous Coronary Intervention orders placed (this is information for BPA alerting)       ACE/ARB/ARNI NOT PRESCRIBED         Labs:   CMP  Recent Labs   Lab 09/18/19  0441 09/17/19  1837 09/17/19  0440 09/16/19  0450  09/14/19  0401  09/12/19  0435    135 136 138   < > 135   < > 136   POTASSIUM 3.6 4.4 3.6 3.4   < > 3.4   < > 3.6   CHLORIDE 102 100 102 99   < > 98   < > 101   CO2 26 28 27 28   < > 26   < > 26   ANIONGAP 10 8 8 11   < > 10   < > 8   GLC 96 173* 99 88   < > 102*   < > 136*   BUN 32* 30 31* 37*   < > 45*   < > 45*   CR 1.10 1.13 1.06 1.12   < > 1.24   < > 1.08   GFRESTIMATED 61 59* 64 60*   < > 53*   < > 63   GFRESTBLACK 71 69 74 70   < > 62   < > 73   YOON 8.5 8.9 8.2* 8.3*   < > 8.5   < > 8.0*   MAG 2.4*  --  2.4* 2.5*  --  2.5*  --  2.5*   PHOS 3.7  --   --  3.2  --  4.3  --  3.4    < > = values in this interval not displayed.     CBC  Recent Labs   Lab 09/18/19  0441 09/17/19  0440 09/16/19  0450 09/15/19  0506   WBC 12.0* 8.5 8.9 9.5   RBC 2.99* 2.67* 2.59* 2.54*   HGB 8.6* 7.8* 7.6* 7.6*   HCT 30.5* 27.5* 26.7* 25.9*   * 103* 103* 102*   MCH 28.8 29.2 29.3 29.9   MCHC 28.2* 28.4* 28.5* 29.3*   RDW 18.0* 18.2* 18.7* 18.6*    196 188 190     INRNo lab results found in last 7 days.  Arterial Blood Gas  No lab results found in last 7 days.    ASSESSMENT/PLAN:  José Aguirre is an 83 year old male with a PMHx of PVD with renal artery stenosis s/p stenting in 2013, occluded R carotid artery and s/p left carotid endarterectomy, left subclavian stenosis, CAD s/p CABG with L-LAD, S-D1 and OM2, PDA), COPD (on 2-3L home O2), and HLD who was admitted on 8/27/2019 with a VT/VF arrest with ROSC in the field. Patient was found to have disease in the LM into pLAD s/p  PCI to LM->LAD and ost LCx.      #VT/VF arrest with ROSC prior to arrival  #CAD status post PCI to native LM into the LAD and ostial LCX  #History of CABG '03 (LIMA-LAD, SVG-D1, SVG-OM2, SVG-PDA)  8/27/19 admission after OOH cardiac arrest. He was at the [facility] and had a witnessed collapse. Bystander CPR was started and EMS found him in VT/VF. He was defibrillated X1 and given 1 of epi. ROSC was obtained in field. On arrival here, he had a pulse and was hypotensive, was intubated, taken for emergent coronary angiogram which showed severe 3V native vessel CAD with LM/LAD lesion and  of proximal RCA with L to right collaterals. LIMA-LAD graft minimally diseased but small and primarily retrograde flow through native LAD. SVG-RCA chronically closed at anastomosis, SVG-D1 and SBG-OM2 closed. He underwent PCI to dLM/ostial LAD with CONCETTA and balloon angioplasty of ostial LCx. He has preserved LV EF with mildly reduced RV function. Though he did have CAD and was stented, felt his burden of CAD was unlikely to be the source of his arrest.  Patient continues to have runs of VT on telemetry; he is asymptomatic with these. None are long enough to require ICD shock. Did have apparent PMT early morning on 9/15. ICD changed from VDD to VVI at that time.   - Continue aspirin and Plavix   - Continue Lipitor 40 mg daily   - Continue Toprol XL 12.5 mg    - s/p ICD 9/10  - Pt will need follow up with Dr. Osorio in 2-4 weeks after discharge for post cardiac arrest follow up.  - PVARP adjusted to 250ms due to PMT     # Acute on chronic hypoxemic respiratory failure - improving  # Possible COPD exacerbation   # Pulmonary edema, history pleural effusion status post thoracentesis on left, residual small bilateral pleural effusions  Patient on 2-3L O2 at home for COPD. Status post arrest with significant pulmonary edema and COPD exacerbation requiring HFNC and BiPAP. Patient noted to have left pleural effusion 09/05; now status post  thoracentesis. Patient is s/p Zosyn and Rocephin (finished 14d tx 9/10).  Procalcitonin 09/12 0.3.  BNP 09/12, 1,900. CT PE shows no PE, but improving ground glass opacity and reticular/cystic changes of lungs. Suspect combination of COPD flair + volume overload. Was improved yesterday, now again with increased oxygen needs 9/17 (desats to 80s on 8L with activity). Strongly suspect patient may have had higher oxygen needs than previously reported given his functional status remains good despite hypoxia.   - Bumex 2mg now, repeat PRN. Likely will need bumex 1mg bid for maintenance   - pulmonary consult; appreciate recommendations   - prednisone taper (40mg x5 days, 20mg x5 days) per pulm recs   - s/p azithromycin 500mg x1 day, then 250mg daily x4 days   - Flutter valve qid   - See pulm in clinic with repeat high resolution CT at that time following discharge  - RT COPD consult; appreciate recommendations  - Continue to wean O2 as able, O2 sat goal >87%    # Leukocytosis  WBC as high as 17 9/12.  Urine culture unremarkable.  Procalcitonin 0.3.  No constitutional complaints.  Afebrile. Elevated to 12 9/18. Asymptomatic and afebrile.   DDx: secondary to stress?  Prednisone use? Possible atypical pneumonia.  - continue to monitor  - azithromycin as above. Consider longer course if appears infected     # Possible Atrial Fibrillation - resolved  Atrial fibrillation versus normal sinus rhythm with frequent PAC; unclear.  EKG yesterday with baseline RBBB with PAC.  No evidence of Afib on telemetry    # Rib fracture- improving  Patient states pain is improving overall. He is not requiring any narcotics and would tend to avoid as he did have witnessed probably delirium in the ICU manifesting as seizure like activity without seizures on EEG. Moreover component of respiratory depression with this. Patient tolerating chest physiotherapy and feeling well in this regard.   - Prn Tylenol for mild-mod pain     # Traumatic Mcclelland    Patient does straight cath at home. Traumatic silva iatrogenic, will continue with indwelling silva for strict I/O and to maintain patency in setting of injury and inflammation   - Continue with silva for now while diuresing     # PVD with renal artery stenosis s/p stenting in 2013   # occluded R carotid artery  # s/p left carotid endarterectomy  # Hx left subclavian stenosis  - Continue aspirin and Plavix     Anticipated Disposition: pending hypoxia.  Patient needs to be on LESS than 6L NC WITH activity for acceptance to TCU.    Patient discussed with Dr. Lambert, who agrees with above plan.      Santo Daly PA-C  [facility] Cardiology Team            ATTENDING NOTE:  Patient has been seen and evaluated by me on 09/18/2019. I have reviewed the documentation above.  I have reviewed today's vital signs, medications, labs, and imaging results.  I have reviewed and edited, as necessary, the history, review of systems, physical examination, and assessment and plan.  I have discussed my assessment and plan with the nurse practitioner.   I have seen and examined the patient today as part of a shared visit with HÉCTOR Jolly.  José Aguirre is a 83 year old male with risk factor profile (+) HTN, glucose intolerance, (+) hypercholesterolemia, (-) tobacco use, (-) fam Hx premature CAD, PVD, renal artery stenosis s/p stenting in 2013, carotid artery disease, left subclavian stenosis, 3V CAD s/p CABG ( L-LAD, S-D1 and OM2, PDA), COPD (2-3 L home O2), and HLD, admitted on 8/27/2019 to [facility] following a  witnessed OOH VT/VF arrest-->immediate bystander CPR--> shocked x 1, 1 mg Epi-->ROSC in the field.  He was intubated and transported to [facility] where emergent coronary angiography showed severe 3V native vessel CAD but noculprit lesion (LIMA-LAD graft minimally diseased but small and primarily retrograde flow through native LAD,  of SVG-RCA at anastomosis, SVG-D1 & SVG-OM2 occluded).  He underwent PCI to dLM/ostial LAD with CONCETTA  and balloon angioplasty of ostial LCx. ECHO showed preserved EF.  He had AICD (9/10/2019).  He developed LF pleural effusion requiring thoracentesis and acute COPD exacerbation. CT negative for PE.  Pulmonary consulted, and patient started on Azithromycin and Prednisone.   Exam documented above.  Patient converted from IV Lasix to po Bumex but exam shows increasing volume.  Increase Bumex and assess ambulatory O2 saturation.  CXR shows mild pulmonary edema and resolution of edema and bibasilar atelectasis.  Incentive spirometer.  Monitor effusion.  Patient had brief pacemaker mediated tachycardia.  EP consulted and increased PVARP earlier this week.  Continue rehab.      Paulino Lambert MD     Cardiovascular Division

## 2019-09-19 ENCOUNTER — APPOINTMENT (OUTPATIENT)
Dept: PHYSICAL THERAPY | Facility: CLINIC | Age: 84
DRG: 224 | End: 2019-09-19
Payer: MEDICARE

## 2019-09-19 VITALS
SYSTOLIC BLOOD PRESSURE: 99 MMHG | TEMPERATURE: 97.6 F | DIASTOLIC BLOOD PRESSURE: 55 MMHG | RESPIRATION RATE: 18 BRPM | BODY MASS INDEX: 25.03 KG/M2 | HEART RATE: 71 BPM | WEIGHT: 165.12 LBS | OXYGEN SATURATION: 93 % | HEIGHT: 68 IN

## 2019-09-19 LAB
ANION GAP SERPL CALCULATED.3IONS-SCNC: 8 MMOL/L (ref 3–14)
BASOPHILS # BLD AUTO: 0 10E9/L (ref 0–0.2)
BASOPHILS NFR BLD AUTO: 0.1 %
BUN SERPL-MCNC: 31 MG/DL (ref 7–30)
CALCIUM SERPL-MCNC: 8.4 MG/DL (ref 8.5–10.1)
CHLORIDE SERPL-SCNC: 100 MMOL/L (ref 94–109)
CO2 SERPL-SCNC: 28 MMOL/L (ref 20–32)
CREAT SERPL-MCNC: 1.1 MG/DL (ref 0.66–1.25)
DIFFERENTIAL METHOD BLD: ABNORMAL
EOSINOPHIL # BLD AUTO: 0.1 10E9/L (ref 0–0.7)
EOSINOPHIL NFR BLD AUTO: 0.6 %
ERYTHROCYTE [DISTWIDTH] IN BLOOD BY AUTOMATED COUNT: 17.6 % (ref 10–15)
GFR SERPL CREATININE-BSD FRML MDRD: 61 ML/MIN/{1.73_M2}
GLUCOSE BLDC GLUCOMTR-MCNC: 128 MG/DL (ref 70–99)
GLUCOSE BLDC GLUCOMTR-MCNC: 136 MG/DL (ref 70–99)
GLUCOSE SERPL-MCNC: 84 MG/DL (ref 70–99)
HCT VFR BLD AUTO: 30.8 % (ref 40–53)
HGB BLD-MCNC: 9 G/DL (ref 13.3–17.7)
IMM GRANULOCYTES # BLD: 0.1 10E9/L (ref 0–0.4)
IMM GRANULOCYTES NFR BLD: 0.6 %
LYMPHOCYTES # BLD AUTO: 0.8 10E9/L (ref 0.8–5.3)
LYMPHOCYTES NFR BLD AUTO: 9.7 %
MCH RBC QN AUTO: 29.5 PG (ref 26.5–33)
MCHC RBC AUTO-ENTMCNC: 29.2 G/DL (ref 31.5–36.5)
MCV RBC AUTO: 101 FL (ref 78–100)
MONOCYTES # BLD AUTO: 0.6 10E9/L (ref 0–1.3)
MONOCYTES NFR BLD AUTO: 7.4 %
NEUTROPHILS # BLD AUTO: 6.8 10E9/L (ref 1.6–8.3)
NEUTROPHILS NFR BLD AUTO: 81.6 %
NRBC # BLD AUTO: 0 10*3/UL
NRBC BLD AUTO-RTO: 0 /100
PLATELET # BLD AUTO: 196 10E9/L (ref 150–450)
POTASSIUM SERPL-SCNC: 3.4 MMOL/L (ref 3.4–5.3)
RBC # BLD AUTO: 3.05 10E12/L (ref 4.4–5.9)
SODIUM SERPL-SCNC: 137 MMOL/L (ref 133–144)
WBC # BLD AUTO: 8.4 10E9/L (ref 4–11)

## 2019-09-19 PROCEDURE — 99239 HOSP IP/OBS DSCHRG MGMT >30: CPT | Performed by: INTERNAL MEDICINE

## 2019-09-19 PROCEDURE — 80048 BASIC METABOLIC PNL TOTAL CA: CPT | Performed by: PHYSICIAN ASSISTANT

## 2019-09-19 PROCEDURE — 85025 COMPLETE CBC W/AUTO DIFF WBC: CPT | Performed by: PHYSICIAN ASSISTANT

## 2019-09-19 PROCEDURE — 97530 THERAPEUTIC ACTIVITIES: CPT | Mod: GP

## 2019-09-19 PROCEDURE — 36415 COLL VENOUS BLD VENIPUNCTURE: CPT | Performed by: PHYSICIAN ASSISTANT

## 2019-09-19 PROCEDURE — 25000132 ZZH RX MED GY IP 250 OP 250 PS 637: Mod: GY | Performed by: INTERNAL MEDICINE

## 2019-09-19 PROCEDURE — 40000275 ZZH STATISTIC RCP TIME EA 10 MIN

## 2019-09-19 PROCEDURE — 00000146 ZZHCL STATISTIC GLUCOSE BY METER IP

## 2019-09-19 PROCEDURE — 94640 AIRWAY INHALATION TREATMENT: CPT

## 2019-09-19 PROCEDURE — 25000132 ZZH RX MED GY IP 250 OP 250 PS 637: Mod: GY | Performed by: NURSE PRACTITIONER

## 2019-09-19 PROCEDURE — 25000132 ZZH RX MED GY IP 250 OP 250 PS 637: Mod: GY | Performed by: STUDENT IN AN ORGANIZED HEALTH CARE EDUCATION/TRAINING PROGRAM

## 2019-09-19 PROCEDURE — 25000132 ZZH RX MED GY IP 250 OP 250 PS 637: Mod: GY

## 2019-09-19 PROCEDURE — 25000131 ZZH RX MED GY IP 250 OP 636 PS 637: Mod: GY | Performed by: PHYSICIAN ASSISTANT

## 2019-09-19 PROCEDURE — 94640 AIRWAY INHALATION TREATMENT: CPT | Mod: 76

## 2019-09-19 PROCEDURE — 25000132 ZZH RX MED GY IP 250 OP 250 PS 637: Mod: GY | Performed by: PHYSICIAN ASSISTANT

## 2019-09-19 PROCEDURE — 97110 THERAPEUTIC EXERCISES: CPT | Mod: GP

## 2019-09-19 PROCEDURE — 97116 GAIT TRAINING THERAPY: CPT | Mod: GP

## 2019-09-19 PROCEDURE — 25000125 ZZHC RX 250: Performed by: INTERNAL MEDICINE

## 2019-09-19 RX ORDER — BUMETANIDE 1 MG/1
1 TABLET ORAL DAILY
DISCHARGE
Start: 2019-09-19 | End: 2019-10-09

## 2019-09-19 RX ORDER — BUMETANIDE 1 MG/1
1 TABLET ORAL
Status: DISCONTINUED | OUTPATIENT
Start: 2019-09-19 | End: 2019-09-19 | Stop reason: HOSPADM

## 2019-09-19 RX ORDER — BUMETANIDE 1 MG/1
2 TABLET ORAL ONCE
Status: COMPLETED | OUTPATIENT
Start: 2019-09-19 | End: 2019-09-19

## 2019-09-19 RX ORDER — ACETYLCYSTEINE 200 MG/ML
2 SOLUTION ORAL; RESPIRATORY (INHALATION) 4 TIMES DAILY PRN
Status: DISCONTINUED | OUTPATIENT
Start: 2019-09-19 | End: 2019-09-19 | Stop reason: HOSPADM

## 2019-09-19 RX ORDER — IPRATROPIUM BROMIDE AND ALBUTEROL SULFATE 2.5; .5 MG/3ML; MG/3ML
3 SOLUTION RESPIRATORY (INHALATION) 4 TIMES DAILY PRN
Status: DISCONTINUED | OUTPATIENT
Start: 2019-09-19 | End: 2019-09-19 | Stop reason: HOSPADM

## 2019-09-19 RX ADMIN — Medication 20000 UNITS: at 08:03

## 2019-09-19 RX ADMIN — IPRATROPIUM BROMIDE AND ALBUTEROL SULFATE 3 ML: .5; 3 SOLUTION RESPIRATORY (INHALATION) at 08:32

## 2019-09-19 RX ADMIN — POTASSIUM CHLORIDE 20 MEQ: 750 TABLET, EXTENDED RELEASE ORAL at 08:06

## 2019-09-19 RX ADMIN — PREDNISONE 20 MG: 20 TABLET ORAL at 08:05

## 2019-09-19 RX ADMIN — ACETYLCYSTEINE 2 ML: 200 SOLUTION ORAL; RESPIRATORY (INHALATION) at 12:48

## 2019-09-19 RX ADMIN — CLOPIDOGREL BISULFATE 75 MG: 75 TABLET, FILM COATED ORAL at 08:04

## 2019-09-19 RX ADMIN — IPRATROPIUM BROMIDE AND ALBUTEROL SULFATE 3 ML: .5; 3 SOLUTION RESPIRATORY (INHALATION) at 12:48

## 2019-09-19 RX ADMIN — PANTOPRAZOLE SODIUM 40 MG: 40 TABLET, DELAYED RELEASE ORAL at 08:05

## 2019-09-19 RX ADMIN — Medication 500 MG: at 08:03

## 2019-09-19 RX ADMIN — ZINC SULFATE CAP 220 MG (50 MG ELEMENTAL ZN) 220 MG: 220 (50 ZN) CAP at 08:03

## 2019-09-19 RX ADMIN — BUMETANIDE 2 MG: 1 TABLET ORAL at 08:04

## 2019-09-19 RX ADMIN — ASPIRIN 81 MG CHEWABLE TABLET 81 MG: 81 TABLET CHEWABLE at 08:03

## 2019-09-19 RX ADMIN — FERROUS SULFATE TAB 325 MG (65 MG ELEMENTAL FE) 325 MG: 325 (65 FE) TAB at 08:04

## 2019-09-19 RX ADMIN — ACETYLCYSTEINE 2 ML: 200 SOLUTION ORAL; RESPIRATORY (INHALATION) at 08:32

## 2019-09-19 RX ADMIN — Medication 12.5 MG: at 08:04

## 2019-09-19 RX ADMIN — MULTIPLE VITAMINS W/ MINERALS TAB 1 TABLET: TAB at 08:05

## 2019-09-19 ASSESSMENT — ACTIVITIES OF DAILY LIVING (ADL)
ADLS_ACUITY_SCORE: 17

## 2019-09-19 ASSESSMENT — MIFFLIN-ST. JEOR: SCORE: 1418.37

## 2019-09-19 NOTE — PLAN OF CARE
Occupational Therapy Discharge Summary    Reason for therapy discharge:    Discharged to transitional care facility.    Progress towards therapy goal(s). See goals on Care Plan in [facility] electronic health record for goal details.  Goals partially met.  Barriers to achieving goals:   discharge from facility.    Therapy recommendation(s):    Continued therapy is recommended.  Rationale/Recommendations:  Pt will benefit from continued therapy increase independence with functional transfers/ADLs and to increase strength and endurance. .

## 2019-09-19 NOTE — PLAN OF CARE
[id number]:   Pt admitted 8/27 after VT/VF arrest at the [facility].   Hx: PVD w/ renal artery stenosis. Occluded R carotid artery. L subclavian stenosis s/p L carotid endarterectomy . CAD s/p CABG. HLD, COPD (on 2L NC at home).    Neuro: A/Ox4. Neuros intact.   Cardiac: SR with PACs, HR 60s-90s. AVSS BP soft 90s/50s.   Respiratory: O2 sats stable on 5L oximyzer cannula   GI/: Mcclelland in place with large output. No BM this shift, last BM 9/18  Diet/appetite: 2g Na diet, 1.8L FR.   Activity:  Ax1  Pain: Denies pain this shift.   Skin: significant bruising noted throughout. Old skin tear on R elbow, healing.   LDA's: PIV SL   Labs: K resulted 3.4, needs replacement.   Plan: Continue with POC. Notify primary team with changes.

## 2019-09-19 NOTE — PLAN OF CARE
Physical Therapy Discharge Summary    Reason for therapy discharge:    Discharged to transitional care facility.    Progress towards therapy goal(s). [facility] for goal details.  Goals not met.  Barriers to achieving goals:   discharge from facility.    Therapy recommendation(s):    Continued therapy is recommended.  Rationale/Recommendations:  to progress strength, high level balance, activity tolerance, and mobility independence.

## 2019-09-19 NOTE — PLAN OF CARE
Discharge Planner OT   Patient plan for discharge: Rehab  Current status: Pt seated upright in bedside chair upon arrival. Educated pt and reviewed ICD precautions. Pt required CGA/Min A and vc's to adhere to precautions with transfers. Pt ambulated to bathroom with CGA, vc's and FWW. Pt completed self cares standing at sink with setup and vc's. Pt on 6L O2 oximizer for walk to bathroom and ADLs with desaturation to 76%. Pt required ~3 minutes and 8 L O2 to recover to 95%. Pt ambulated back to chair on 8L with desaturation to 85% recovering witthin 2 minutes to 100% O2. Pt completed 20 minutes of leg ergometer exercise with mild resistance and few rest breaks. Pt maintained O2 saturations throughout on 8L O2 oximizer. VSS.   Barriers to return to prior living situation: Decreased strength and endurance, Fatigue, Decreased independence with functional transfers/ADLs.  Recommendations for discharge: TCU  Rationale for recommendations: Pt will benefit from continued therapy to address barriers above and to maximize functional independence.         Entered by: Fili Hoover 09/18/2019 8:38 PM

## 2019-09-19 NOTE — PROGRESS NOTES
[facility]   Cardiology Progress      Interval History:   - Patient reports continued improvement in dyspnea and strength (although continues to require ~8L with activity).   - Tolerating diuresis, however, MAPs have been trending lower ~67  - denies chest pain, edema, abdominal distension, headache, fever, chills    Changes Today:  - repeat bumex 2mg this AM. Might repeat this afternoon pending BP/MAP  - Walk the halls each shift. Continue incentive spirometer qid    Physical Exam:  Temp:  [97.4  F (36.3  C)-98.1  F (36.7  C)] (P) 97.6  F (36.4  C)  Pulse:  [71-87] 71  Heart Rate:  [75-90] (P) 75  Resp:  [16-18] 16  BP: ()/(47-67) 99/55  SpO2:  [89 %-100 %] 91 %      Intake/Output Summary (Last 24 hours) at 9/19/2019 1110  Last data filed at 9/19/2019 0800  Gross per 24 hour   Intake 1400 ml   Output 2700 ml   Net -1300 ml       Weight:  Baseline: unclear, on presentation approximately 173 pounds. Suspect this has dropped due to prolonged hospitalization/loss of muscle mass.  Hospitalization Max: 187 pounds  Today: 165 pounds.    GEN: NAD, awake, alert  Pulm: rhoncorous breath sounds. Diminished in bases.  Cardiac: 1/6 ALEXANDRA loudest over aortic area. Seemingly regular rate/rhythm. JVP not appreciably elevated.  no LE edema.  GI: soft, non distended  Neuro:  Alert and oriented x4.    Medications:    acetylcysteine  2 mL Nebulization 4x Daily     ascorbic acid  500 mg Oral Daily     aspirin  81 mg Oral Daily     atorvastatin  40 mg Oral QPM     bumetanide  1 mg Oral BID     clopidogrel  75 mg Oral Daily     ferrous sulfate  325 mg Oral Daily with breakfast     heparin lock flush  5-10 mL Intracatheter Q24H     insulin aspart  1-10 Units Subcutaneous TID AC     insulin aspart  1-7 Units Subcutaneous At Bedtime     ipratropium - albuterol 0.5 mg/2.5 mg/3 mL  3 mL Nebulization 4x daily     metoprolol succinate ER  12.5 mg Oral Daily     mometasone  2 puff Inhalation QPM      multivitamin w/minerals  1 tablet Oral Daily     pantoprazole  40 mg Oral BID AC     predniSONE  20 mg Oral Daily     senna-docusate  1 tablet Oral or Feeding Tube At Bedtime     sodium chloride (PF)  3 mL Intracatheter Q8H     vitamin A  20,000 Units Oral Daily     zinc sulfate  220 mg Oral Daily       IV fluid REPLACEMENT ONLY       Percutaneous Coronary Intervention orders placed (this is information for BPA alerting)       ACE/ARB/ARNI NOT PRESCRIBED         Labs:   CMP  Recent Labs   Lab 09/19/19  0530 09/18/19  1345 09/18/19  0441 09/17/19  1837 09/17/19  0440 09/16/19  0450  09/14/19  0401    133 137 135 136 138   < > 135   POTASSIUM 3.4 3.8 3.6 4.4 3.6 3.4   < > 3.4   CHLORIDE 100 98 102 100 102 99   < > 98   CO2 28 27 26 28 27 28   < > 26   ANIONGAP 8 8 10 8 8 11   < > 10   GLC 84 253* 96 173* 99 88   < > 102*   BUN 31* 31* 32* 30 31* 37*   < > 45*   CR 1.10 1.15 1.10 1.13 1.06 1.12   < > 1.24   GFRESTIMATED 61 58* 61 59* 64 60*   < > 53*   GFRESTBLACK 71 67 71 69 74 70   < > 62   YOON 8.4* 8.8 8.5 8.9 8.2* 8.3*   < > 8.5   MAG  --   --  2.4*  --  2.4* 2.5*  --  2.5*   PHOS  --   --  3.7  --   --  3.2  --  4.3    < > = values in this interval not displayed.     CBC  Recent Labs   Lab 09/19/19  0530 09/18/19  0441 09/17/19  0440 09/16/19  0450   WBC 8.4 12.0* 8.5 8.9   RBC 3.05* 2.99* 2.67* 2.59*   HGB 9.0* 8.6* 7.8* 7.6*   HCT 30.8* 30.5* 27.5* 26.7*   * 102* 103* 103*   MCH 29.5 28.8 29.2 29.3   MCHC 29.2* 28.2* 28.4* 28.5*   RDW 17.6* 18.0* 18.2* 18.7*    206 196 188     INRNo lab results found in last 7 days.  Arterial Blood Gas  No lab results found in last 7 days.    ASSESSMENT/PLAN:  José Aguirre is an 83 year old male with a PMHx of PVD with renal artery stenosis s/p stenting in 2013, occluded R carotid artery and s/p left carotid endarterectomy, left subclavian stenosis, CAD s/p CABG with L-LAD, S-D1 and OM2, PDA), COPD (on 2-3L home O2), and HLD who was admitted on 8/27/2019 with a  VT/VF arrest with ROSC in the field. Patient was found to have disease in the LM into pLAD s/p PCI to LM->LAD and ost LCx.      #VT/VF arrest with ROSC prior to arrival  #CAD status post PCI to native LM into the LAD and ostial LCX  #History of CABG '03 (LIMA-LAD, SVG-D1, SVG-OM2, SVG-PDA)  8/27/19 admission after OOH cardiac arrest. He was at the [facility] and had a witnessed collapse. Bystander CPR was started and EMS found him in VT/VF. He was defibrillated X1 and given 1 of epi. ROSC was obtained in field. On arrival here, he had a pulse and was hypotensive, was intubated, taken for emergent coronary angiogram which showed severe 3V native vessel CAD with LM/LAD lesion and  of proximal RCA with L to right collaterals. LIMA-LAD graft minimally diseased but small and primarily retrograde flow through native LAD. SVG-RCA chronically closed at anastomosis, SVG-D1 and SBG-OM2 closed. He underwent PCI to dLM/ostial LAD with CONCETTA and balloon angioplasty of ostial LCx. He has preserved LV EF with mildly reduced RV function. Though he did have CAD and was stented, felt his burden of CAD was unlikely to be the source of his arrest.  Patient continues to have runs of VT on telemetry; he is asymptomatic with these. None are long enough to require ICD shock. Did have apparent PMT early morning on 9/15. PVARP was increased to 250ms as a result.   - Continue aspirin and Plavix   - Continue Lipitor 40 mg daily   - Continue Toprol XL 12.5 mg    - s/p ICD 9/10  - Pt will need follow up with Dr. Osorio in 2-4 weeks after discharge for post cardiac arrest follow up.     # Acute on chronic hypoxemic respiratory failure - improving  # Possible COPD exacerbation   # Pulmonary edema, history pleural effusion status post thoracentesis on left, residual small bilateral pleural effusions  Patient on 2-3L O2 at home for COPD. Status post arrest with significant pulmonary edema and COPD exacerbation requiring HFNC and BiPAP. Patient  noted to have left pleural effusion 09/05; now status post thoracentesis. Patient is s/p Zosyn and Rocephin (finished 14d tx 9/10).  Procalcitonin 09/12 0.3.  BNP 09/12, 1,900. CT PE shows no PE, but improving ground glass opacity and reticular/cystic changes of lungs. Suspect combination of COPD flair + volume overload. Was improved yesterday, now again with increased oxygen needs 9/17 (desats to 80s on 8L with activity). Strongly suspect patient may have had higher oxygen needs than previously reported given his functional status remains good despite hypoxia.   - Bumex 2mg now, repeat PRN. Likely will need bumex 1mg bid for maintenance   - pulmonary consult; appreciate recommendations   - prednisone taper (40mg x5 days, 20mg x5 days) per pulm recs   - s/p azithromycin 500mg x1 day, then 250mg daily x4 days   - Flutter valve qid   - See pulm in clinic with repeat high resolution CT at that time following discharge  - RT COPD consult; appreciate recommendations  - Continue to wean O2 as able, O2 sat goal >87%    # Leukocytosis - resolved  WBC as high as 17 9/12.  Urine culture unremarkable.  Procalcitonin 0.3.  No constitutional complaints.  Afebrile. Asymptomatic and afebrile.   DDx: secondary to stress?  Prednisone use? Possible atypical pneumonia.  - continue to monitor  - azithromycin as above.      # Possible Atrial Fibrillation - resolved  Atrial fibrillation versus normal sinus rhythm with frequent PAC; unclear.  EKG with baseline RBBB with PAC.  No evidence of Afib on telemetry    # Rib fracture- improving  Patient states pain is improving overall. He is not requiring any narcotics and would tend to avoid as he did have witnessed probably delirium in the ICU manifesting as seizure like activity without seizures on EEG. Moreover component of respiratory depression with this. Patient tolerating chest physiotherapy and feeling well in this regard.   - Prn Tylenol for mild-mod pain     # Traumatic M[place]le[place]and    Patient does straight cath at home. Traumatic silva iatrogenic, will continue with indwelling silva for strict I/O and to maintain patency in setting of injury and inflammation   - May remove silva     # PVD with renal artery stenosis s/p stenting in 2013   # occluded R carotid artery  # s/p left carotid endarterectomy  # Hx left subclavian stenosis  - Continue aspirin and Plavix     Anticipated Disposition: pending hypoxia.  Patient needs to be on LESS than 6L NC WITH activity for acceptance to TCU.    Patient discussed with Dr. Horton, who agrees with above plan.      Santo Daly PA-C  [facility] Cardiology Team

## 2019-09-19 NOTE — PLAN OF CARE
Discharge Planner PT  - 6B  Patient plan for discharge: TCU.  Current status: Pt transfers with SBA. Pt ambulates ~250ft and 150ft with 4WW and SBA - no LOB. Pt progresses to no AD for additional ~130ft with no AD and close CGA. When ambulating without AD, pt with slower gait speed and occasional instances of unsteadiness though experiences no overt LOB. Pt takes seated rest breaks upon completion of each ambulation bout. Pt ascends/descends 3 steps 4x with B HR and CGA. Pt on 8 L O2 via oxymizer for therapy session. With short bouts of activity pt maintains SpO2 >88%. With prolonged activity, pt desats to 84-88%, though SpO2 improves >88% with seated rest break of 1 min or less, pt asymptomatic.  Barriers to return to prior living situation: Medical needs, O2 requirements, weakness/deconditioning, impaired balance, level of assist.  Recommendations for discharge: TCU at this time.  Rationale for recommendations: Pt is below baseline for mobility, also limited by high O2 needs. Pt will require continued skilled therapy to progress strength, balance, activity tolerance, and functional independence in order to safely return to PLOF.

## 2019-09-19 NOTE — PROGRESS NOTES
DISCHARGE  3:05pm 9/19/19                   ----------------------------------------------------------------------------  Discharged to: [facility] TCU  Via: private transportation - daughter Ella   Accompanied by: Family  Discharge Instructions: diet, activity, medications, follow up appointments, when to call the MD, aftercare instructions given to patient/daughter.   Prescriptions: Last meds given sent with patient in chart. Discussed new meds with patient.   Follow Up Appointments: arranged; information given  Belongings: All sent with pt  IV: d/c'd  Telemetry: d/c'd  Pt exhibits understanding of above discharge instructions; all questions answered.  Discharge Paperwork: Signed, copied, and sent home with patient.   Report called to TCU who confirm they are ready for patient.

## 2019-09-19 NOTE — PROGRESS NOTES
Social Work Services Discharge Note      Patient Name:  José Aguirre     Anticipated Discharge Date:  9/19/19    Discharge Disposition:   TCU:  [address] [address]  Ph: [phone], F: [phone]  *For Nurse to Nurse, please call Main # and ask for TCU Nursing Station*  Admissions: [phone]    Following MD:  Per facilities designation     Pre-Admission Screening (PAS) online form has been completed.  The Level of Care (LOC) is:  Determined  Confirmation Code is:  [id number]  Patient/caregiver informed of referral to [facility] for Pre-Admission Screening for skilled nursing facility (SNF) placement and to expect a phone call post discharge from SNF.     Additional Services/Equipment Arranged:    -Transportation: Pt's dtr (Ella) will provide discharge transport at 3PM today.  -Oxygen for Transport: Pt has portable O2 tank from home that he will use for O2 during transport.      Patient / Family response to discharge plan:  Pt and family in agreement.     Persons notified of above discharge plan:  Pt, Pt's dtr (Ella), 6B Nursing Staff, [facility] (Jesus), [facility] ([facility])    Staff Discharge Instructions:  SW sent discharge orders via In Basket.  Please print a packet and send with patient.   Please complete nurse to nurse prior to discharge.     CTS Handoff completed:  NO, no PCP in Epic    Additional Comments:  JOHANN confirmed with [facility] in Admissions that they are aware of Pt's O2 needs and they have agreed to provide up to 8-10L w/activity if needed. Per PT today, pt requires 8L w/activity and per nursing Pt was on 4-5L at rest. [facility] will provide O2 through [employer].     CHRISTIANO Paz, CALEB  6B Intermediate Care Unit   Phone: [phone]  Pager: [phone]

## 2019-09-20 ENCOUNTER — NURSING HOME VISIT (OUTPATIENT)
Dept: GERIATRICS | Facility: CLINIC | Age: 84
End: 2019-09-20
Payer: MEDICARE

## 2019-09-20 VITALS
DIASTOLIC BLOOD PRESSURE: 62 MMHG | TEMPERATURE: 98 F | SYSTOLIC BLOOD PRESSURE: 122 MMHG | BODY MASS INDEX: 26.53 KG/M2 | OXYGEN SATURATION: 95 % | HEART RATE: 68 BPM | HEIGHT: 67 IN | RESPIRATION RATE: 16 BRPM | WEIGHT: 169 LBS

## 2019-09-20 DIAGNOSIS — R09.02 HYPOXIA: ICD-10-CM

## 2019-09-20 DIAGNOSIS — R53.81 PHYSICAL DECONDITIONING: ICD-10-CM

## 2019-09-20 DIAGNOSIS — S22.39XD CLOSED FRACTURE OF ONE RIB WITH ROUTINE HEALING, UNSPECIFIED LATERALITY, SUBSEQUENT ENCOUNTER: ICD-10-CM

## 2019-09-20 DIAGNOSIS — I73.9 PVD (PERIPHERAL VASCULAR DISEASE) (H): ICD-10-CM

## 2019-09-20 DIAGNOSIS — J96.91 RESPIRATORY FAILURE WITH HYPOXIA (H): Primary | ICD-10-CM

## 2019-09-20 DIAGNOSIS — I87.1 SUBCLAVIAN VEIN STENOSIS, LEFT: ICD-10-CM

## 2019-09-20 DIAGNOSIS — Z97.8 FOLEY CATHETER IN PLACE: ICD-10-CM

## 2019-09-20 DIAGNOSIS — J44.1 COPD EXACERBATION (H): ICD-10-CM

## 2019-09-20 DIAGNOSIS — Z95.1 HX OF CABG: ICD-10-CM

## 2019-09-20 DIAGNOSIS — Z98.890 HISTORY OF LEFT-SIDED CAROTID ENDARTERECTOMY: ICD-10-CM

## 2019-09-20 DIAGNOSIS — I49.01 VENTRICULAR FIBRILLATION (H): Primary | ICD-10-CM

## 2019-09-20 DIAGNOSIS — I25.10 CORONARY ARTERY DISEASE INVOLVING NATIVE CORONARY ARTERY OF NATIVE HEART WITHOUT ANGINA PECTORIS: ICD-10-CM

## 2019-09-20 DIAGNOSIS — R33.9 URINARY RETENTION: ICD-10-CM

## 2019-09-20 DIAGNOSIS — E11.8 TYPE 2 DIABETES MELLITUS WITH COMPLICATION, WITHOUT LONG-TERM CURRENT USE OF INSULIN (H): ICD-10-CM

## 2019-09-20 DIAGNOSIS — J81.0 ACUTE PULMONARY EDEMA (H): ICD-10-CM

## 2019-09-20 DIAGNOSIS — R91.1 LUNG NODULE: ICD-10-CM

## 2019-09-20 PROCEDURE — 99310 SBSQ NF CARE HIGH MDM 45: CPT | Performed by: NURSE PRACTITIONER

## 2019-09-20 ASSESSMENT — MIFFLIN-ST. JEOR: SCORE: 1420.21

## 2019-09-20 NOTE — PROGRESS NOTES
[facility]  PRIMARY CARE PROVIDER AND CLINIC:  [facility], [address] [facility] / [place] [address]  Chief Complaint   Patient presents with     Hospital F/U     [place] [facility] Record Number:  [id number]  Place of Service where encounter took place:  [facility] (formerly [facility]3]  ([facility]) [[id number]]    José Aguirre  is a 83 year old  (1935), admitted to the above facility from  [facility]. Hospital stay 08/27/2019 through 09/19/2019..  Admitted to this facility for  rehab, medical management and nursing care.    HPI:    HPI information obtained from: facility chart records, facility staff, patient report and [facility] chart review.   Brief Summary of Hospital Course:     83 y.o male with PMH PVD, KARLY s/p stent 2013, occluded right carotid artery, s/p left cardotid endarterectomy, left subclavian stenosis, CAD s/p CABG, COPD - O2 dependent 3-5 L at home, HLD and urinary retention admitted to hospital after cardiac arrest at [facility]. Witnessed collapse and bystander CPR, EMS found patient in VT/VF arrest, defibrillated in field x 1 and admitted to ED with pulse and hypotensive. Coronary angiogram revealed severe multi-vessle CAD. Underwent PCI to dLM/ostial LAD with CONCETTA and balloon agnioplasty to ostial LCx. Also received ICD 9/10/19 for secondary prevention. Patient found to have significant pulmonary edema and COPD exacerbation. Underwent thoracentesis 9/5 for left pleural effusion and requred HFNC and Bipap. 20 lb diuresed with IV lasx and then changed to oral Bumex. Treated with Z-pack, Zosyn and Rocephin  and prednisone taper. Patient was performing intermittent straight cath chronically at home. Had traumatic Mcclelland placement inpatient, this was removed prior to discharge. Patient is admitted to TCU for acute rehab and medical management.      Updates on Status Since Skilled nursing Admission:     Currently patient is found in room.  Alert, calm, NAD. In w/c. Ex-wife Dorie present as well. Patient current denies pain. Reports breathing is overall improving. Is wearing 8 LNC, denies SOB currently. Reports chronic CRANE. States usually wears 2-3 LNC at rest and 5 L NC with activity. Denies chest pain, dizziness. States day has been busy meeting everyone. Reports worked with therapy and that it seemed to go well. Patient states appetite good and slept okay last night. Denies constipation. Reports had BM today. States does usually straight cath self but states has not been straight cathed since Mcclelland removed yesterday. Denies abd or bladder pain. VS reviewed and stable.     CODE STATUS/ADVANCE DIRECTIVES DISCUSSION:   CPR/Full code   Patient's living condition: lives with spouse  ALLERGIES: Patient has no known allergies.  PAST MEDICAL HISTORY:  has a past medical history of Anemia, Atrophy of left kidney, CKD (chronic kidney disease), stage III (H), Claudication, intermittent (H), Coronary artery disease (2003), History of GI bleed (2012), Hypercholesterolemia, Hypertension, Left carotid artery stenosis, PVD (peripheral vascular disease) (H), and Renal artery stenosis (H).  PAST SURGICAL HISTORY:   has a past surgical history that includes Tonsillectomy; Bypass graft artery coronary (06/2003); Eye surgery; carotid endarterectomy (Left); Heart Cath Left heart cath (06/2003); Renal Artery Angiogram (Right, 05/2013); Endarterectomy carotid (Left, 7/11/2017); IR Thoracentesis (9/5/2019); and EP ICD (N/A, 9/10/2019).  FAMILY HISTORY: family history includes Arthritis in his brother; Cancer (age of onset: 70) in his brother; Cerebrovascular Disease (age of onset: 90) in his mother; Dementia in his sister.  SOCIAL HISTORY:   reports that he quit smoking about 18 years ago. His smoking use included cigarettes. He has a 50.00 pack-year smoking history. He does not have any smokeless tobacco history on file. He reports that he drinks alcohol. He reports that he  does not use drugs.    Post Discharge Medication Reconciliation Status: discharge medications reconciled and changed, per note/orders (see AVS)    Current Outpatient Medications   Medication Sig Dispense Refill     acetylcysteine (MUCOMYST) 20 % neb solution Take 2 mLs by nebulization 4 times daily       albuterol (PROAIR HFA/PROVENTIL HFA/VENTOLIN HFA) 108 (90 Base) MCG/ACT Inhaler Inhale 2 puffs into the lungs every 6 hours as needed for shortness of breath / dyspnea or wheezing       Ascorbic Acid 500 MG CHEW Take 500 mg by mouth daily       aspirin 81 MG EC tablet Take 81 mg by mouth daily       atorvastatin (LIPITOR) 40 MG tablet Take 1 tablet (40 mg) by mouth every evening       bumetanide (BUMEX) 1 MG tablet Take 1 tablet (1 mg) by mouth daily       clopidogrel (PLAVIX) 75 MG tablet Take 75 mg by mouth daily       ferrous sulfate (FEROSUL) 325 (65 Fe) MG tablet Take 325 mg by mouth daily (with breakfast)       ipratropium - albuterol 0.5 mg/2.5 mg/3 mL (DUONEB) 0.5-2.5 (3) MG/3ML neb solution Take 1 vial (3 mLs) by nebulization 4 times daily       metoprolol succinate ER (TOPROL-XL) 25 MG 24 hr tablet Take 0.5 tablets (12.5 mg) by mouth daily       mometasone (ASMANEX) 220 MCG/INH inhaler Inhale 1 puff into the lungs every morning AND 2 puffs every evening       multivitamin w/minerals (THERA-VIT-M) tablet Take 1 tablet by mouth daily       nitroGLYcerin (NITROSTAT) 0.4 MG sublingual tablet For chest pain place 1 tablet under the tongue every 5 minutes for 3 doses. If symptoms persist 5 minutes after 1st dose call 911.       pantoprazole (PROTONIX) 40 MG EC tablet Take 1 tablet (40 mg) by mouth 2 times daily (before meals)       potassium chloride (KLOR-CON) 20 MEQ packet Take 20 mEq by mouth daily for 5 Days AND Give 40 mg by mouth one time only for COPD until 09/20/2019       predniSONE (DELTASONE) 20 MG tablet Take 1 tablet (20 mg) by mouth daily for 5 days       vitamin A 46039 units capsule Take 2  capsules (20,000 Units) by mouth daily       vitamin D3 (CHOLECALCIFEROL) 2000 units (50 mcg) tablet Take 2,000 Units by mouth daily       zinc sulfate (ZINCATE) 220 (50 Zn) MG capsule Take 1 capsule (220 mg) by mouth daily       albuterol (PROAIR HFA/PROVENTIL HFA/VENTOLIN HFA) 108 (90 Base) MCG/ACT inhaler Inhale 2 puffs into the lungs every 6 hours as needed for wheezing (Patient not taking: Reported on 9/20/2019)       ASPIRIN PO Take 81 mg by mouth daily        carvedilol (COREG) 25 MG tablet Take 25 mg by mouth 2 times daily (with meals)       Cholecalciferol (VITAMIN D) 2000 UNITS tablet Take 2,000 Units by mouth 2 times daily  30 tablet      clopidogrel (PLAVIX) 75 MG tablet Take 75 mg by mouth daily        ferrous sulfate 325 (65 FE) MG tablet Take 325 mg by mouth 2 times daily        furosemide (LASIX) 40 MG tablet Take 0.5 tablets (20 mg) by mouth 2 times daily (Patient not taking: Reported on 9/20/2019) 60 tablet 3     insulin aspart (NOVOLOG PEN) 100 UNIT/ML pen Inject 1-10 Units Subcutaneous 3 times daily (before meals) (Patient not taking: Reported on 9/20/2019)       insulin aspart (NOVOLOG PEN) 100 UNIT/ML pen Inject 1-7 Units Subcutaneous At Bedtime (Patient not taking: Reported on 9/20/2019)       mometasone (ASMANEX) 220 MCG/INH Inhaler Inhale 2 puffs into the lungs every evening       OMEPRAZOLE PO Take 20 mg by mouth daily       potassium chloride SA (K-DUR/KLOR-CON M) 20 MEQ CR tablet Take 2 tablets (40 mEq) by mouth daily for 5 days, then take 1 tablet (20 mEq) daily (Patient not taking: Reported on 9/20/2019) 90 tablet 3     rosuvastatin (CRESTOR) 40 MG tablet Take 1 tablet (40 mg) by mouth daily (Patient not taking: Reported on 9/20/2019) 30 tablet 3     tiotropium-olodaterol 2.5-2.5 MCG/ACT AERS Inhale 1 puff into the lungs daily       triamcinolone (KENALOG) 0.1 % cream Apply topically 2 times daily as needed for irritation        umeclidinium-vilanterol (ANORO ELLIPTA) 62.5-25 MCG/INH  "oral inhaler Inhale 1 puff into the lungs daily       umeclidinium-vilanterol (ANORO ELLIPTA) 62.5-25 MCG/INH oral inhaler Inhale 1 puff into the lungs daily         ROS:  10 point ROS of systems including Constitutional, Eyes, Respiratory, Cardiovascular, Gastroenterology, Genitourinary, Integumentary, Musculoskeletal, Psychiatric were all negative except for pertinent positives noted in my HPI.    Vitals:  /62   Pulse 68   Temp 98  F (36.7  C)   Resp 16   Ht 1.702 m (5' 7\")   Wt 76.7 kg (169 lb)   SpO2 95%   BMI 26.47 kg/m    Exam:  GENERAL APPEARANCE: Alert, in no distress   ENT: Mouth and posterior oropharynx normal, moist mucous membranes   EYES: EOM, conjunctivae, lids, pupils and irises normal   NECK: No adenopathy,masses or thyromegaly   RESP: respiratory effort and palpation of chest normal, Lung sounds decreased throughout O2 on 8LNC  CV: Palpation and auscultation of heart done , regular rate and rhythm, no murmur, rub, or gallop, peripheral edema   ABDOMEN: normal bowel sounds, soft, nontender, no hepatosplenomegaly or other masses   : palpation of bladder WNL   M/S: Gait and station normal   Digits and nails normal- w/c, PARRY  SKIN: Inspection of skin and subcutaneous tissue baseline, Palpation of skin and subcutaneous tissue baseline  NEURO: Cranial nerves 2-12 are normal tested and grossly at patient's baseline, Examination of sensation by touch normal   PSYCH: oriented X 3, affect and mood normal             Lab/Diagnostic data:  Recent labs in [facility] reviewed by me today.     ASSESSMENT/PLAN:  Ventricular fibrillation (H)  Coronary artery disease involving native coronary artery of native heart without angina pectoris  Hx of CABG  - underwent PCI to dLM/ostial LAD with CONCETTA and balloon angioplasty of ostial LCx. He has preserved LV EF with mildly reduced RV function. Received ICD 9/10/2019 for secondary prevention.  VSS  -  Declines ACP- supportive approach and monitor coping/mood and " behaivors  - keep incision clean and dry  - continue on aspirin, Plavix, statin  - continue on Toprol XL    Hypoxia  COPD exacerbation (H)  - is O2 dependent - 3-5 LNC, currently requiring 5-8 LNC  - IS, pulmonary toilet  - PT- endurance  - continue on mucomyst, albuterol, duonebs, asmanex, tiotropium-olodaterol, umeclidinium-vilanterol,  and prednisone taper.   - patient was not utilizing scheduled nebulizer treatments at home prior to hospitalization and will need nebulizer if is discharged on these  - will need to f/w pulmonology    Pulmonary edema  Bilateral Pleural effusion  S/p thorancetesis 9/5 left, f/u CXR revealed small bilateral Pleural effusions  - continues on Bumex  - follow daily weights, resp status closely    Rib fracture  - healing without issue  - continue pulmonary toilet and pain regimen  - follow clincially    PVD (peripheral vascular disease) (H  History of left-sided carotid endarterectomy  Right carotid artery occlusion  Subclavian vein stenosis,   -  Noted  - continue on Plavix, ASA and statin    Type 2 diabetes mellitus with complication without long-term current use of insulin (H)  Lab Results   Component Value Date    A1C 5.9 08/28/2019    A1C 6.6 07/11/2017    A1C 5.9 01/11/2016     - diet controlled  - follow BGL      Urinary retention  - chronic self cath at home. No straight cath since Mcclelland pulled. Discussed with nursing- bladder scan obtained [id number] and straight cathed successfully  - orders placed for q 8 hour and prn Straight cath, and to educate patient and require return demonstration on self performance. (discussed at length with patient and nursing)    Physical deconditioning  - 2/2 above  - lives with ex-wife, relatively independent prior to hospitalization. Ambulated without right ear  - PT/OT  - ongoing discharge planning, SW follow and care conferences per unit protocol      Orders written by provider at facility    Total unit floor time 72 minutes- Time consisted of  examination of patient, review of patient medical records, labs, imaging and current admission orders/clarification of orders and documentation. 45 minutes spent on counseling and care coordination with patient, familyt and nursing regarding clinical status, plan of care and interventions as outlined above.        Electronically signed by:  MARKUS Lewis CNP

## 2019-09-20 NOTE — LETTER
9/20/2019        RE: José Aguirre  [address]  [facility] [address]        [facility]  PRIMARY CARE PROVIDER AND CLINIC:  [facility], [address] [facility] [place] / [place] [address]  Chief Complaint   Patient presents with     Hospital F/U     [place] Medical Record Number:  [id number]  Place of Service where encounter took place:  [facility]  ([facility]) [[id number]]    José Aguirre  is a 83 year old  (1935), admitted to the above facility from  [facility]. Hospital stay 08/27/2019 through 09/19/2019..  Admitted to this facility for  rehab, medical management and nursing care.    HPI:    HPI information obtained from: facility chart records, facility staff, patient report and [facility] chart review.   Brief Summary of Hospital Course:     83 y.o male with PMH PVD, KARLY s/p stent 2013, occluded right carotid artery, s/p left cardotid endarterectomy, left subclavian stenosis, CAD s/p CABG, COPD - O2 dependent 3-5 L at home, HLD and urinary retention admitted to hospital after cardiac arrest at [facility]. Witnessed collapse and bystander CPR, EMS found patient in VT/VF arrest, defibrillated in field x 1 and admitted to ED with pulse and hypotensive. Coronary angiogram revealed severe multi-vessle CAD. Underwent PCI to dLM/ostial LAD with CONCETTA and balloon agnioplasty to ostial LCx. Also received ICD 9/10/19 for secondary prevention. Patient found to have significant pulmonary edema and COPD exacerbation. Underwent thoracentesis 9/5 for left pleural effusion and requred HFNC and Bipap. 20 lb diuresed with IV lasx and then changed to oral Bumex. Treated with Z-pack, Zosyn and Rocephin  and prednisone taper. Patient was performing intermittent straight cath chronically at home. Had traumatic Mcclelland placement inpatient, this was removed prior to discharge. Patient is admitted to TCU for acute rehab and medical  management.      Updates on Status Since Skilled nursing Admission:     Currently patient is found in room. Alert, calm, NAD. In w/c. Ex-wife Dorie present as well. Patient current denies pain. Reports breathing is overall improving. Is wearing 8 LNC, denies SOB currently. Reports chronic CRANE. States usually wears 2-3 LNC at rest and 5 L NC with activity. Denies chest pain, dizziness. States day has been busy meeting everyone. Reports worked with therapy and that it seemed to go well. Patient states appetite good and slept okay last night. Denies constipation. Reports had BM today. States does usually straight cath self but states has not been straight cathed since Mcclelland removed yesterday. Denies abd or bladder pain. VS reviewed and stable.     CODE STATUS/ADVANCE DIRECTIVES DISCUSSION:   CPR/Full code   Patient's living condition: lives with spouse  ALLERGIES: Patient has no known allergies.  PAST MEDICAL HISTORY:  has a past medical history of Anemia, Atrophy of left kidney, CKD (chronic kidney disease), stage III (H), Claudication, intermittent (H), Coronary artery disease (2003), History of GI bleed (2012), Hypercholesterolemia, Hypertension, Left carotid artery stenosis, PVD (peripheral vascular disease) (H), and Renal artery stenosis (H).  PAST SURGICAL HISTORY:   has a past surgical history that includes Tonsillectomy; Bypass graft artery coronary (06/2003); Eye surgery; carotid endarterectomy (Left); Heart Cath Left heart cath (06/2003); Renal Artery Angiogram (Right, 05/2013); Endarterectomy carotid (Left, 7/11/2017); IR Thoracentesis (9/5/2019); and EP ICD (N/A, 9/10/2019).  FAMILY HISTORY: family history includes Arthritis in his brother; Cancer (age of onset: 70) in his brother; Cerebrovascular Disease (age of onset: 90) in his mother; Dementia in his sister.  SOCIAL HISTORY:   reports that he quit smoking about 18 years ago. His smoking use included cigarettes. He has a 50.00 pack-year smoking history.  He does not have any smokeless tobacco history on file. He reports that he drinks alcohol. He reports that he does not use drugs.    Post Discharge Medication Reconciliation Status: discharge medications reconciled and changed, per note/orders (see AVS)    Current Outpatient Medications   Medication Sig Dispense Refill     acetylcysteine (MUCOMYST) 20 % neb solution Take 2 mLs by nebulization 4 times daily       albuterol (PROAIR HFA/PROVENTIL HFA/VENTOLIN HFA) 108 (90 Base) MCG/ACT Inhaler Inhale 2 puffs into the lungs every 6 hours as needed for shortness of breath / dyspnea or wheezing       Ascorbic Acid 500 MG CHEW Take 500 mg by mouth daily       aspirin 81 MG EC tablet Take 81 mg by mouth daily       atorvastatin (LIPITOR) 40 MG tablet Take 1 tablet (40 mg) by mouth every evening       bumetanide (BUMEX) 1 MG tablet Take 1 tablet (1 mg) by mouth daily       clopidogrel (PLAVIX) 75 MG tablet Take 75 mg by mouth daily       ferrous sulfate (FEROSUL) 325 (65 Fe) MG tablet Take 325 mg by mouth daily (with breakfast)       ipratropium - albuterol 0.5 mg/2.5 mg/3 mL (DUONEB) 0.5-2.5 (3) MG/3ML neb solution Take 1 vial (3 mLs) by nebulization 4 times daily       metoprolol succinate ER (TOPROL-XL) 25 MG 24 hr tablet Take 0.5 tablets (12.5 mg) by mouth daily       mometasone (ASMANEX) 220 MCG/INH inhaler Inhale 1 puff into the lungs every morning AND 2 puffs every evening       multivitamin w/minerals (THERA-VIT-M) tablet Take 1 tablet by mouth daily       nitroGLYcerin (NITROSTAT) 0.4 MG sublingual tablet For chest pain place 1 tablet under the tongue every 5 minutes for 3 doses. If symptoms persist 5 minutes after 1st dose call 911.       pantoprazole (PROTONIX) 40 MG EC tablet Take 1 tablet (40 mg) by mouth 2 times daily (before meals)       potassium chloride (KLOR-CON) 20 MEQ packet Take 20 mEq by mouth daily for 5 Days AND Give 40 mg by mouth one time only for COPD until 09/20/2019       predniSONE (DELTASONE)  20 MG tablet Take 1 tablet (20 mg) by mouth daily for 5 days       vitamin A 41366 units capsule Take 2 capsules (20,000 Units) by mouth daily       vitamin D3 (CHOLECALCIFEROL) 2000 units (50 mcg) tablet Take 2,000 Units by mouth daily       zinc sulfate (ZINCATE) 220 (50 Zn) MG capsule Take 1 capsule (220 mg) by mouth daily       albuterol (PROAIR HFA/PROVENTIL HFA/VENTOLIN HFA) 108 (90 Base) MCG/ACT inhaler Inhale 2 puffs into the lungs every 6 hours as needed for wheezing (Patient not taking: Reported on 9/20/2019)       ASPIRIN PO Take 81 mg by mouth daily        carvedilol (COREG) 25 MG tablet Take 25 mg by mouth 2 times daily (with meals)       Cholecalciferol (VITAMIN D) 2000 UNITS tablet Take 2,000 Units by mouth 2 times daily  30 tablet      clopidogrel (PLAVIX) 75 MG tablet Take 75 mg by mouth daily        ferrous sulfate 325 (65 FE) MG tablet Take 325 mg by mouth 2 times daily        furosemide (LASIX) 40 MG tablet Take 0.5 tablets (20 mg) by mouth 2 times daily (Patient not taking: Reported on 9/20/2019) 60 tablet 3     insulin aspart (NOVOLOG PEN) 100 UNIT/ML pen Inject 1-10 Units Subcutaneous 3 times daily (before meals) (Patient not taking: Reported on 9/20/2019)       insulin aspart (NOVOLOG PEN) 100 UNIT/ML pen Inject 1-7 Units Subcutaneous At Bedtime (Patient not taking: Reported on 9/20/2019)       mometasone (ASMANEX) 220 MCG/INH Inhaler Inhale 2 puffs into the lungs every evening       OMEPRAZOLE PO Take 20 mg by mouth daily       potassium chloride SA (K-DUR/KLOR-CON M) 20 MEQ CR tablet Take 2 tablets (40 mEq) by mouth daily for 5 days, then take 1 tablet (20 mEq) daily (Patient not taking: Reported on 9/20/2019) 90 tablet 3     rosuvastatin (CRESTOR) 40 MG tablet Take 1 tablet (40 mg) by mouth daily (Patient not taking: Reported on 9/20/2019) 30 tablet 3     tiotropium-olodaterol 2.5-2.5 MCG/ACT AERS Inhale 1 puff into the lungs daily       triamcinolone (KENALOG) 0.1 % cream Apply topically  "2 times daily as needed for irritation        umeclidinium-vilanterol (ANORO ELLIPTA) 62.5-25 MCG/INH oral inhaler Inhale 1 puff into the lungs daily       umeclidinium-vilanterol (ANORO ELLIPTA) 62.5-25 MCG/INH oral inhaler Inhale 1 puff into the lungs daily         ROS:  10 point ROS of systems including Constitutional, Eyes, Respiratory, Cardiovascular, Gastroenterology, Genitourinary, Integumentary, Musculoskeletal, Psychiatric were all negative except for pertinent positives noted in my HPI.    Vitals:  /62   Pulse 68   Temp 98  F (36.7  C)   Resp 16   Ht 1.702 m (5' 7\")   Wt 76.7 kg (169 lb)   SpO2 95%   BMI 26.47 kg/m     Exam:  GENERAL APPEARANCE: Alert, in no distress   ENT: Mouth and posterior oropharynx normal, moist mucous membranes   EYES: EOM, conjunctivae, lids, pupils and irises normal   NECK: No adenopathy,masses or thyromegaly   RESP: respiratory effort and palpation of chest normal, Lung sounds decreased throughout O2 on 8LNC  CV: Palpation and auscultation of heart done , regular rate and rhythm, no murmur, rub, or gallop, peripheral edema   ABDOMEN: normal bowel sounds, soft, nontender, no hepatosplenomegaly or other masses   : palpation of bladder WNL   M/S: Gait and station normal   Digits and nails normal- w/c, PARRY  SKIN: Inspection of skin and subcutaneous tissue baseline, Palpation of skin and subcutaneous tissue baseline  NEURO: Cranial nerves 2-12 are normal tested and grossly at patient's baseline, Examination of sensation by touch normal   PSYCH: oriented X 3, affect and mood normal             Lab/Diagnostic data:  Recent labs in [facility] reviewed by me today.     ASSESSMENT/PLAN:  Ventricular fibrillation (H)  Coronary artery disease involving native coronary artery of native heart without angina pectoris  Hx of CABG  - underwent PCI to dLM/ostial LAD with CONCETTA and balloon angioplasty of ostial LCx. He has preserved LV EF with mildly reduced RV function. Received ICD " 9/10/2019 for secondary prevention.  VSS  -  Declines ACP- supportive approach and monitor coping/mood and behaivors  - keep incision clean and dry  - continue on aspirin, Plavix, statin  - continue on Toprol XL    Hypoxia  COPD exacerbation (H)  - is O2 dependent - 3-5 LNC, currently requiring 5-8 LNC  - IS, pulmonary toilet  - PT- endurance  - continue on mucomyst, albuterol, duonebs, asmanex, tiotropium-olodaterol, umeclidinium-vilanterol,  and prednisone taper.   - patient was not utilizing scheduled nebulizer treatments at home prior to hospitalization and will need nebulizer if is discharged on these  - will need to f/w pulmonology    Pulmonary edema  Bilateral Pleural effusion  S/p thorancetesis 9/5 left, f/u CXR revealed small bilateral Pleural effusions  - continues on Bumex  - follow daily weights, resp status closely    Rib fracture  - healing without issue  - continue pulmonary toilet and pain regimen  - follow clincially    PVD (peripheral vascular disease) (H  History of left-sided carotid endarterectomy  Right carotid artery occlusion  Subclavian vein stenosis,   -  Noted  - continue on Plavix, ASA and statin      Urinary retention  - chronic self cath at home. No straight cath since Mcclelland pulled. Discussed with nursing- bladder scan obtained [id number] and straight cathed successfully  - orders placed for q 8 hour and prn Straight cath, and to educate patient and require return demonstration on self performance. (discussed at length with patient and nursing)    Physical deconditioning  - 2/2 above  - lives with ex-wife, relatively independent prior to hospitalization. Ambulated without right ear  - PT/OT  - ongoing discharge planning, SW follow and care conferences per unit protocol      Orders written by provider at facility    Total unit floor time  72 minutes- Time consisted of examination of patient, review of patient medical records, labs, imaging and current admission orders/clarification of  orders and documentation.  45 minutes spent on counseling and care coordination with patient, familyt and nursing regarding clinical status, plan of care and interventions as outlined above.        Electronically signed by:  MARKUS Lewis CNP                           Sincerely,        MARKUS Lewis CNP

## 2019-09-23 ENCOUNTER — HOSPITAL LABORATORY (OUTPATIENT)
Dept: OTHER | Facility: CLINIC | Age: 84
End: 2019-09-23

## 2019-09-23 ENCOUNTER — DOCUMENTATION ONLY (OUTPATIENT)
Dept: OTHER | Facility: CLINIC | Age: 84
End: 2019-09-23

## 2019-09-23 LAB
ANION GAP SERPL CALCULATED.3IONS-SCNC: 7 MMOL/L (ref 3–14)
BUN SERPL-MCNC: 27 MG/DL (ref 7–30)
CALCIUM SERPL-MCNC: 8.6 MG/DL (ref 8.5–10.1)
CHLORIDE SERPL-SCNC: 102 MMOL/L (ref 94–109)
CO2 SERPL-SCNC: 28 MMOL/L (ref 20–32)
CREAT SERPL-MCNC: 1.17 MG/DL (ref 0.66–1.25)
ERYTHROCYTE [DISTWIDTH] IN BLOOD BY AUTOMATED COUNT: 17.6 % (ref 10–15)
GFR SERPL CREATININE-BSD FRML MDRD: 57 ML/MIN/{1.73_M2}
GLUCOSE SERPL-MCNC: 88 MG/DL (ref 70–99)
HCT VFR BLD AUTO: 33.4 % (ref 40–53)
HGB BLD-MCNC: 9.8 G/DL (ref 13.3–17.7)
MCH RBC QN AUTO: 29.4 PG (ref 26.5–33)
MCHC RBC AUTO-ENTMCNC: 29.3 G/DL (ref 31.5–36.5)
MCV RBC AUTO: 100 FL (ref 78–100)
PLATELET # BLD AUTO: 184 10E9/L (ref 150–450)
POTASSIUM SERPL-SCNC: 3.1 MMOL/L (ref 3.4–5.3)
RBC # BLD AUTO: 3.33 10E12/L (ref 4.4–5.9)
SODIUM SERPL-SCNC: 137 MMOL/L (ref 133–144)
WBC # BLD AUTO: 12.2 10E9/L (ref 4–11)

## 2019-09-24 ENCOUNTER — HOSPITAL LABORATORY (OUTPATIENT)
Dept: OTHER | Facility: CLINIC | Age: 84
End: 2019-09-24

## 2019-09-24 LAB
ALBUMIN UR-MCNC: NEGATIVE MG/DL
ANION GAP SERPL CALCULATED.3IONS-SCNC: 6 MMOL/L (ref 3–14)
APPEARANCE UR: CLEAR
BASOPHILS # BLD AUTO: 0 10E9/L (ref 0–0.2)
BASOPHILS NFR BLD AUTO: 0.1 %
BILIRUB UR QL STRIP: NEGATIVE
BUN SERPL-MCNC: 21 MG/DL (ref 7–30)
CALCIUM SERPL-MCNC: 8.7 MG/DL (ref 8.5–10.1)
CHLORIDE SERPL-SCNC: 104 MMOL/L (ref 94–109)
CO2 SERPL-SCNC: 27 MMOL/L (ref 20–32)
COLOR UR AUTO: YELLOW
CREAT SERPL-MCNC: 1.09 MG/DL (ref 0.66–1.25)
DIFFERENTIAL METHOD BLD: ABNORMAL
EOSINOPHIL # BLD AUTO: 0.1 10E9/L (ref 0–0.7)
EOSINOPHIL NFR BLD AUTO: 0.8 %
ERYTHROCYTE [DISTWIDTH] IN BLOOD BY AUTOMATED COUNT: 17.9 % (ref 10–15)
GFR SERPL CREATININE-BSD FRML MDRD: 62 ML/MIN/{1.73_M2}
GLUCOSE SERPL-MCNC: 143 MG/DL (ref 70–99)
GLUCOSE UR STRIP-MCNC: NEGATIVE MG/DL
HCT VFR BLD AUTO: 34.3 % (ref 40–53)
HGB BLD-MCNC: 10.1 G/DL (ref 13.3–17.7)
HGB UR QL STRIP: NEGATIVE
IMM GRANULOCYTES # BLD: 0.1 10E9/L (ref 0–0.4)
IMM GRANULOCYTES NFR BLD: 0.4 %
KETONES UR STRIP-MCNC: NEGATIVE MG/DL
LEUKOCYTE ESTERASE UR QL STRIP: NEGATIVE
LYMPHOCYTES # BLD AUTO: 0.7 10E9/L (ref 0.8–5.3)
LYMPHOCYTES NFR BLD AUTO: 4.7 %
MCH RBC QN AUTO: 29.8 PG (ref 26.5–33)
MCHC RBC AUTO-ENTMCNC: 29.4 G/DL (ref 31.5–36.5)
MCV RBC AUTO: 101 FL (ref 78–100)
MONOCYTES # BLD AUTO: 0.9 10E9/L (ref 0–1.3)
MONOCYTES NFR BLD AUTO: 6.1 %
NEUTROPHILS # BLD AUTO: 12.8 10E9/L (ref 1.6–8.3)
NEUTROPHILS NFR BLD AUTO: 87.9 %
NITRATE UR QL: NEGATIVE
NRBC # BLD AUTO: 0 10*3/UL
NRBC BLD AUTO-RTO: 0 /100
PH UR STRIP: 6 PH (ref 5–7)
PLATELET # BLD AUTO: 166 10E9/L (ref 150–450)
POTASSIUM SERPL-SCNC: 3.3 MMOL/L (ref 3.4–5.3)
RBC # BLD AUTO: 3.39 10E12/L (ref 4.4–5.9)
SODIUM SERPL-SCNC: 137 MMOL/L (ref 133–144)
SOURCE: NORMAL
SP GR UR STRIP: 1.01 (ref 1–1.03)
UROBILINOGEN UR STRIP-MCNC: NORMAL MG/DL (ref 0–2)
WBC # BLD AUTO: 14.5 10E9/L (ref 4–11)

## 2019-09-25 ENCOUNTER — NURSING HOME VISIT (OUTPATIENT)
Dept: GERIATRICS | Facility: CLINIC | Age: 84
End: 2019-09-25
Payer: MEDICARE

## 2019-09-25 ENCOUNTER — HOSPITAL LABORATORY (OUTPATIENT)
Dept: OTHER | Facility: CLINIC | Age: 84
End: 2019-09-25

## 2019-09-25 VITALS
BODY MASS INDEX: 26.18 KG/M2 | HEIGHT: 67 IN | OXYGEN SATURATION: 94 % | TEMPERATURE: 98.2 F | WEIGHT: 166.8 LBS | SYSTOLIC BLOOD PRESSURE: 92 MMHG | HEART RATE: 71 BPM | DIASTOLIC BLOOD PRESSURE: 62 MMHG | RESPIRATION RATE: 18 BRPM

## 2019-09-25 DIAGNOSIS — I50.32 CHRONIC DIASTOLIC CONGESTIVE HEART FAILURE (H): ICD-10-CM

## 2019-09-25 DIAGNOSIS — R09.02 HYPOXIA: ICD-10-CM

## 2019-09-25 DIAGNOSIS — Z95.1 HX OF CABG: ICD-10-CM

## 2019-09-25 DIAGNOSIS — J81.0 ACUTE PULMONARY EDEMA (H): ICD-10-CM

## 2019-09-25 DIAGNOSIS — D72.829 LEUKOCYTOSIS, UNSPECIFIED TYPE: ICD-10-CM

## 2019-09-25 DIAGNOSIS — R53.81 PHYSICAL DECONDITIONING: ICD-10-CM

## 2019-09-25 DIAGNOSIS — I49.01 VENTRICULAR FIBRILLATION (H): ICD-10-CM

## 2019-09-25 DIAGNOSIS — J44.1 COPD EXACERBATION (H): ICD-10-CM

## 2019-09-25 DIAGNOSIS — I25.10 CORONARY ARTERY DISEASE INVOLVING NATIVE CORONARY ARTERY OF NATIVE HEART WITHOUT ANGINA PECTORIS: ICD-10-CM

## 2019-09-25 DIAGNOSIS — R33.9 URINARY RETENTION: Primary | ICD-10-CM

## 2019-09-25 DIAGNOSIS — E11.8 TYPE 2 DIABETES MELLITUS WITH COMPLICATION, WITHOUT LONG-TERM CURRENT USE OF INSULIN (H): ICD-10-CM

## 2019-09-25 DIAGNOSIS — N18.30 CKD (CHRONIC KIDNEY DISEASE), STAGE III (H): ICD-10-CM

## 2019-09-25 LAB
BACTERIA SPEC CULT: NORMAL
ERYTHROCYTE [DISTWIDTH] IN BLOOD BY AUTOMATED COUNT: 17.9 % (ref 10–15)
HCT VFR BLD AUTO: 32.2 % (ref 40–53)
HGB BLD-MCNC: 9.6 G/DL (ref 13.3–17.7)
Lab: NORMAL
MCH RBC QN AUTO: 30.2 PG (ref 26.5–33)
MCHC RBC AUTO-ENTMCNC: 29.8 G/DL (ref 31.5–36.5)
MCV RBC AUTO: 101 FL (ref 78–100)
PLATELET # BLD AUTO: 159 10E9/L (ref 150–450)
RBC # BLD AUTO: 3.18 10E12/L (ref 4.4–5.9)
SPECIMEN SOURCE: NORMAL
WBC # BLD AUTO: 12.9 10E9/L (ref 4–11)

## 2019-09-25 PROCEDURE — 99309 SBSQ NF CARE MODERATE MDM 30: CPT | Performed by: NURSE PRACTITIONER

## 2019-09-25 ASSESSMENT — MIFFLIN-ST. JEOR: SCORE: 1410.23

## 2019-09-25 NOTE — LETTER
9/25/2019        RE: José Aguirre  [address]  [facility] [address]        [facility]  [facility] Record Number:  [id number]  Place of Service where encounter took place:  [facility]  ([facility]) [[id number]]  Chief Complaint   Patient presents with     RECHECK       HPI:    José Aguirre  is a 83 year old (1935), who is being seen today for an episodic care visit.  HPI information obtained from: facility chart records, facility staff, patient report and [facility] chart review.     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 Witnessed collapse and bystander CPR, EMS found patient in VT/VF arrest, defibrillated in field x 1 and admitted to ED with pulse and hypotensive. Coronary angiogram revealed severe multi-vessel CAD. Underwent PCI to dLM/ostial LAD with CONCETTA and balloon angioplasty to ostial LCx. Also received ICD 9/10/19 for secondary prevention. Patient found to have significant pulmonary edema and COPD exacerbation. Underwent thoracentesis 9/5 for left pleural effusion and required HFNC and BiPAP. 20 lb diuresed with IV lasix and then changed to oral Bumex. Treated with Z-pack, Zosyn and Rocephin and prednisone taper. Patient was performing intermittent straight cath chronically at home. Had traumatic Mcclelland placement inpatient, this was removed prior to discharge - str cath'ed late at TCU with 789 ml output. Due to increased WBC UA/UC ordered and repeat SBS on 9/25.      Today's concern is:  Patient seen today for episodic follow up. He has had no fevers, no chest pain, dyspnea, bowel or bladder symptoms. Note weight is stable. SBP range  recently -  ?orthostatic drop. BG range 102-274 and sats 92% on 4 lpm o2 per NC. He is up in therapies, walks 200 ft with CGA. UA is negative today and UC still pending. WBC improving from last check and does not feel ill.     Past Medical and Surgical History reviewed in Epic today.    MEDICATIONS:  Current Outpatient Medications   Medication Sig Dispense Refill     acetylcysteine (MUCOMYST) 20 % neb solution Take 2 mLs by nebulization 4 times daily       albuterol (PROAIR HFA/PROVENTIL HFA/VENTOLIN HFA) 108 (90 Base) MCG/ACT Inhaler Inhale 2 puffs into the lungs every 6 hours as needed for shortness of breath / dyspnea or wheezing       Ascorbic Acid 500 MG CHEW Take 500 mg by mouth daily       aspirin 81 MG EC tablet Take 81 mg by mouth daily       atorvastatin (LIPITOR) 40 MG tablet Take 1 tablet (40 mg) by mouth every evening       bumetanide (BUMEX) 1 MG tablet Take 1 tablet (1 mg) by mouth daily       clopidogrel (PLAVIX) 75 MG tablet Take 75 mg by mouth daily       ferrous sulfate (FEROSUL) 325 (65 Fe) MG tablet Take 325 mg by mouth daily (with breakfast)       ipratropium - albuterol 0.5 mg/2.5 mg/3 mL (DUONEB) 0.5-2.5 (3) MG/3ML neb solution Take 1 vial (3 mLs) by nebulization 4 times daily       metoprolol succinate ER (TOPROL-XL) 25 MG 24 hr tablet Take 0.5 tablets (12.5 mg) by mouth daily       mometasone (ASMANEX) 220 MCG/INH inhaler Inhale 1 puff into the lungs every morning AND 2 puffs every evening       multivitamin w/minerals (THERA-VIT-M) tablet Take 1 tablet by mouth daily       nitroGLYcerin (NITROSTAT) 0.4 MG sublingual tablet For chest pain place 1 tablet under the tongue every 5 minutes for 3 doses. If symptoms persist 5 minutes after 1st dose call 911.       pantoprazole (PROTONIX) 40 MG EC tablet Take 1 tablet (40 mg) by mouth 2 times daily (before meals)       potassium chloride (KLOR-CON) 20 MEQ packet Take 20 mEq by mouth 2 times daily        umeclidinium-vilanterol (ANORO ELLIPTA) 62.5-25 MCG/INH  "oral inhaler Inhale 1 puff into the lungs daily       vitamin A 47107 units capsule Take 2 capsules (20,000 Units) by mouth daily       vitamin D3 (CHOLECALCIFEROL) 2000 units (50 mcg) tablet Take 2,000 Units by mouth daily       zinc sulfate (ZINCATE) 220 (50 Zn) MG capsule Take 1 capsule (220 mg) by mouth daily         REVIEW OF SYSTEMS:  10 point ROS of systems including Constitutional, Eyes, Respiratory, Cardiovascular, Gastroenterology, Genitourinary, Integumentary, Musculoskeletal, Psychiatric were all negative except for pertinent positives noted in my HPI.    Objective:  BP 92/62   Pulse 71   Temp 98.2  F (36.8  C)   Resp 18   Ht 1.702 m (5' 7\")   Wt 75.7 kg (166 lb 12.8 oz)   SpO2 94%   BMI 26.12 kg/m     Exam:  GENERAL APPEARANCE:  Alert, in no distress, pleasant, cooperative, oriented x 4  EYES:  EOM, lids, pupils and irises normal, sclera clear and conjunctiva normal, no discharge or mattering on lids or lashes noted  ENT:  Mouth normal, moist mucous membranes, nose normal without drainage or crusting, external ears without lesions, hearing acuity intact  RESP:  respiratory effort normal, no chest wall tenderness, no respiratory distress, Lung sounds diminished at bases bilaterally, patient is on o2 at 4 lpm  CV:  Auscultation of heart done, rate and rhythm controlled and regular, no murmur, no rub or gallop. Edema trace ankle edema  ABDOMEN:  normal bowel sounds, soft, nontender, no palpable masses.  M/S:   Gait and station walks with assist , no tenderness or swelling of the joints; able to move all extremities, digits normal  NEURO: cranial nerves 2-12 grossly intact, no facial asymmetry, no speech deficits and able to follow directions, moves all extremities symmetrically  PSYCH:  insight and judgement and memory intact, affect and mood normal     Labs:     Most Recent 3 CBC's:  Recent Labs   Lab Test 09/25/19  1005 09/24/19  0915 09/23/19  0710   WBC 12.9* 14.5* 12.2*   HGB 9.6* 10.1* 9.8* "   * 101* 100    166 184     Most Recent 3 BMP's:  Recent Labs   Lab Test 09/24/19  0915 09/23/19  0710 09/19/19  0530    137 137   POTASSIUM 3.3* 3.1* 3.4   CHLORIDE 104 102 100   CO2 27 28 28   BUN 21 27 31*   CR 1.09 1.17 1.10   ANIONGAP 6 7 8   YOON 8.7 8.6 8.4*   * 88 84     Most Recent Urinalysis:  Recent Labs   Lab Test 09/24/19  1638 09/18/19  1315   COLOR Yellow Light Yellow   APPEARANCE Clear Clear   URINEGLC Negative Negative   URINEBILI Negative Negative   URINEKETONE Negative Negative   SG 1.009 1.009   UBLD Negative Negative   URINEPH 6.0 6.0   PROTEIN Negative Negative   NITRITE Negative Negative   LEUKEST Negative Negative   RBCU  --  <1   WBCU  --  <1       ASSESSMENT/PLAN:  Urinary retention  Leukocytosis, unspecified type  Acute on chronic issues. Continues to straight cath per home routine - no symptoms. UA negative - f/u with UC. WBC improved and no fevers - monitor, repeat CBC on 9/30.     Ventricular fibrillation (H)  Coronary artery disease involving native coronary artery of native heart without angina pectoris  Chronic diastolic congestive heart failure (H)  Hx of CABG  Acute on chronic issues, stable at this time. Meds, vs, wt as ordered. Wt down 2 lbs now up 2 lbs no symptoms. Monitor, f/u as needed. Feels BP drops when up but not dizzy - will ask to check orthostatic BP.    Hypoxia  COPD exacerbation (H)  Acute pulmonary edema (H)  Acute on chronic, stable with current o2 orders and meds as above. Monitor, f/u PRN.    CKD (chronic kidney disease), stage III (H)  Chronic, stable renal function last check. Avoid nephrotoxic meds. Continue KCL at increased dose. F/U BMP as needed.     Type 2 diabetes mellitus with complication, without long-term current use of insulin (H)  Chronic, fair control. Meds, checks as ordered. F/U next week.     Physical deconditioning  Acute on chronic - therapies and f/u with progress next week.     Orders written by provider at  facility  1. Check ortho BPs and update provider with results diagnosis hypotension  2. CBC on 9/30 diagnosis leukocytosis    Electronically signed by:  MARKUS Garcia CNP               Sincerely,        MARKUS Garcia CNP

## 2019-09-25 NOTE — PROGRESS NOTES
[facility]  [place] Medical Record Number:  [id number]  Place of Service where encounter took place:  [facility]  (S) [[id number]]  Chief Complaint   Patient presents with     RECHECK       HPI:    José Aguirre  is a 83 year old (1935), who is being seen today for an episodic care visit.  HPI information obtained from: facility chart records, facility staff, patient report and [facility] chart review.     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 Witnessed collapse and bystander CPR, EMS found patient in VT/VF arrest, defibrillated in field x 1 and admitted to ED with pulse and hypotensive. Coronary angiogram revealed severe multi-vessel CAD. Underwent PCI to dLM/ostial LAD with CONCETTA and balloon angioplasty to ostial LCx. Also received ICD 9/10/19 for secondary prevention. Patient found to have significant pulmonary edema and COPD exacerbation. Underwent thoracentesis 9/5 for left pleural effusion and required HFNC and BiPAP. 20 lb diuresed with IV lasix and then changed to oral Bumex. Treated with Z-pack, Zosyn and Rocephin and prednisone taper. Patient was performing intermittent straight cath chronically at home. Had traumatic Mcclelland placement inpatient, this was removed prior to discharge - str cath'ed late at TCU with 789 ml output. Due to increased WBC UA/UC ordered and repeat SBS on 9/25.      Today's concern is:  Patient seen today for episodic follow up. He has had no fevers, no chest pain, dyspnea, bowel or bladder symptoms. Note weight is stable. SBP range  recently - ?orthostatic drop. BG range 102-274 and sats 92% on 4 lpm o2 per NC. He is up in therapies, walks  200 ft with CGA. UA is negative today and UC still pending. WBC improving from last check and does not feel ill.     Past Medical and Surgical History reviewed in Epic today.    MEDICATIONS:  Current Outpatient Medications   Medication Sig Dispense Refill     acetylcysteine (MUCOMYST) 20 % neb solution Take 2 mLs by nebulization 4 times daily       albuterol (PROAIR HFA/PROVENTIL HFA/VENTOLIN HFA) 108 (90 Base) MCG/ACT Inhaler Inhale 2 puffs into the lungs every 6 hours as needed for shortness of breath / dyspnea or wheezing       Ascorbic Acid 500 MG CHEW Take 500 mg by mouth daily       aspirin 81 MG EC tablet Take 81 mg by mouth daily       atorvastatin (LIPITOR) 40 MG tablet Take 1 tablet (40 mg) by mouth every evening       bumetanide (BUMEX) 1 MG tablet Take 1 tablet (1 mg) by mouth daily       clopidogrel (PLAVIX) 75 MG tablet Take 75 mg by mouth daily       ferrous sulfate (FEROSUL) 325 (65 Fe) MG tablet Take 325 mg by mouth daily (with breakfast)       ipratropium - albuterol 0.5 mg/2.5 mg/3 mL (DUONEB) 0.5-2.5 (3) MG/3ML neb solution Take 1 vial (3 mLs) by nebulization 4 times daily       metoprolol succinate ER (TOPROL-XL) 25 MG 24 hr tablet Take 0.5 tablets (12.5 mg) by mouth daily       mometasone (ASMANEX) 220 MCG/INH inhaler Inhale 1 puff into the lungs every morning AND 2 puffs every evening       multivitamin w/minerals (THERA-VIT-M) tablet Take 1 tablet by mouth daily       nitroGLYcerin (NITROSTAT) 0.4 MG sublingual tablet For chest pain place 1 tablet under the tongue every 5 minutes for 3 doses. If symptoms persist 5 minutes after 1st dose call 911.       pantoprazole (PROTONIX) 40 MG EC tablet Take 1 tablet (40 mg) by mouth 2 times daily (before meals)       potassium chloride (KLOR-CON) 20 MEQ packet Take 20 mEq by mouth 2 times daily        umeclidinium-vilanterol (ANORO ELLIPTA) 62.5-25 MCG/INH oral inhaler Inhale 1 puff into the lungs daily       vitamin A 67278 units capsule Take 2  "capsules (20,000 Units) by mouth daily       vitamin D3 (CHOLECALCIFEROL) 2000 units (50 mcg) tablet Take 2,000 Units by mouth daily       zinc sulfate (ZINCATE) 220 (50 Zn) MG capsule Take 1 capsule (220 mg) by mouth daily         REVIEW OF SYSTEMS:  10 point ROS of systems including Constitutional, Eyes, Respiratory, Cardiovascular, Gastroenterology, Genitourinary, Integumentary, Musculoskeletal, Psychiatric were all negative except for pertinent positives noted in my HPI.    Objective:  BP 92/62   Pulse 71   Temp 98.2  F (36.8  C)   Resp 18   Ht 1.702 m (5' 7\")   Wt 75.7 kg (166 lb 12.8 oz)   SpO2 94%   BMI 26.12 kg/m    Exam:  GENERAL APPEARANCE:  Alert, in no distress, pleasant, cooperative, oriented x 4  EYES:  EOM, lids, pupils and irises normal, sclera clear and conjunctiva normal, no discharge or mattering on lids or lashes noted  ENT:  Mouth normal, moist mucous membranes, nose normal without drainage or crusting, external ears without lesions, hearing acuity intact  RESP:  respiratory effort normal, no chest wall tenderness, no respiratory distress, Lung sounds diminished at bases bilaterally, patient is on o2 at 4 lpm  CV:  Auscultation of heart done, rate and rhythm controlled and regular, no murmur, no rub or gallop. Edema trace ankle edema  ABDOMEN:  normal bowel sounds, soft, nontender, no palpable masses.  M/S:   Gait and station walks with assist , no tenderness or swelling of the joints; able to move all extremities, digits normal  NEURO: cranial nerves 2-12 grossly intact, no facial asymmetry, no speech deficits and able to follow directions, moves all extremities symmetrically  PSYCH:  insight and judgement and memory intact, affect and mood normal     Labs:     Most Recent 3 CBC's:  Recent Labs   Lab Test 09/25/19  1005 09/24/19  0915 09/23/19  0710   WBC 12.9* 14.5* 12.2*   HGB 9.6* 10.1* 9.8*   * 101* 100    166 184     Most Recent 3 BMP's:  Recent Labs   Lab Test " 09/24/19  0915 09/23/19  0710 09/19/19  0530    137 137   POTASSIUM 3.3* 3.1* 3.4   CHLORIDE 104 102 100   CO2 27 28 28   BUN 21 27 31*   CR 1.09 1.17 1.10   ANIONGAP 6 7 8   YOON 8.7 8.6 8.4*   * 88 84     Most Recent Urinalysis:  Recent Labs   Lab Test 09/24/19  1638 09/18/19  1315   COLOR Yellow Light Yellow   APPEARANCE Clear Clear   URINEGLC Negative Negative   URINEBILI Negative Negative   URINEKETONE Negative Negative   SG 1.009 1.009   UBLD Negative Negative   URINEPH 6.0 6.0   PROTEIN Negative Negative   NITRITE Negative Negative   LEUKEST Negative Negative   RBCU  --  <1   WBCU  --  <1       ASSESSMENT/PLAN:  Urinary retention  Leukocytosis, unspecified type  Acute on chronic issues. Continues to straight cath per home routine - no symptoms. UA negative - f/u with UC. WBC improved and no fevers - monitor, repeat CBC on 9/30.     Ventricular fibrillation (H)  Coronary artery disease involving native coronary artery of native heart without angina pectoris  Chronic diastolic congestive heart failure (H)  Hx of CABG  Acute on chronic issues, stable at this time. Meds, vs, wt as ordered. Wt down 2 lbs now up 2 lbs no symptoms. Monitor, f/u as needed. Feels BP drops when up but not dizzy - will ask to check orthostatic BP.    Hypoxia  COPD exacerbation (H)  Acute pulmonary edema (H)  Acute on chronic, stable with current o2 orders and meds as above. Monitor, f/u PRN.    CKD (chronic kidney disease), stage III (H)  Chronic, stable renal function last check. Avoid nephrotoxic meds. Continue KCL at increased dose. F/U BMP as needed.     Type 2 diabetes mellitus with complication, without long-term current use of insulin (H)  Chronic, fair control. Meds, checks as ordered. F/U next week.     Physical deconditioning  Acute on chronic - therapies and f/u with progress next week.     Orders written by provider at facility  1. Check ortho BPs and update provider with results diagnosis hypotension  2. CBC on  9/30 diagnosis leukocytosis    Electronically signed by:  MARKUS Garcia CNP

## 2019-09-27 ENCOUNTER — NURSING HOME VISIT (OUTPATIENT)
Dept: GERIATRICS | Facility: CLINIC | Age: 84
End: 2019-09-27
Payer: MEDICARE

## 2019-09-27 VITALS
HEART RATE: 82 BPM | DIASTOLIC BLOOD PRESSURE: 76 MMHG | RESPIRATION RATE: 18 BRPM | HEIGHT: 67 IN | WEIGHT: 168.6 LBS | SYSTOLIC BLOOD PRESSURE: 141 MMHG | OXYGEN SATURATION: 95 % | BODY MASS INDEX: 26.46 KG/M2 | TEMPERATURE: 98.2 F

## 2019-09-27 DIAGNOSIS — I10 ESSENTIAL HYPERTENSION: ICD-10-CM

## 2019-09-27 DIAGNOSIS — E78.5 HYPERLIPIDEMIA, UNSPECIFIED HYPERLIPIDEMIA TYPE: ICD-10-CM

## 2019-09-27 DIAGNOSIS — J96.21 ACUTE AND CHRONIC RESPIRATORY FAILURE WITH HYPOXIA (H): ICD-10-CM

## 2019-09-27 DIAGNOSIS — I73.9 PAD (PERIPHERAL ARTERY DISEASE) (H): ICD-10-CM

## 2019-09-27 DIAGNOSIS — E11.8 TYPE 2 DIABETES MELLITUS WITH COMPLICATION, WITHOUT LONG-TERM CURRENT USE OF INSULIN (H): ICD-10-CM

## 2019-09-27 DIAGNOSIS — I50.32 CHRONIC DIASTOLIC HEART FAILURE (H): ICD-10-CM

## 2019-09-27 DIAGNOSIS — D72.819 LEUKOPENIA, UNSPECIFIED TYPE: ICD-10-CM

## 2019-09-27 DIAGNOSIS — I25.810 CORONARY ARTERY DISEASE INVOLVING AUTOLOGOUS VEIN CORONARY BYPASS GRAFT WITHOUT ANGINA PECTORIS: ICD-10-CM

## 2019-09-27 DIAGNOSIS — K21.9 GASTROESOPHAGEAL REFLUX DISEASE WITHOUT ESOPHAGITIS: ICD-10-CM

## 2019-09-27 DIAGNOSIS — J44.9 CHRONIC OBSTRUCTIVE PULMONARY DISEASE, UNSPECIFIED COPD TYPE (H): ICD-10-CM

## 2019-09-27 DIAGNOSIS — I46.9 CARDIAC ARREST (H): Primary | ICD-10-CM

## 2019-09-27 DIAGNOSIS — R53.81 PHYSICAL DECONDITIONING: ICD-10-CM

## 2019-09-27 DIAGNOSIS — R33.9 URINARY RETENTION: ICD-10-CM

## 2019-09-27 PROCEDURE — 99305 1ST NF CARE MODERATE MDM 35: CPT | Performed by: INTERNAL MEDICINE

## 2019-09-27 PROCEDURE — 99207 ZZC CDG-MDM COMPONENT: MEETS LOW - DOWN CODED: CPT | Performed by: INTERNAL MEDICINE

## 2019-09-27 ASSESSMENT — MIFFLIN-ST. JEOR: SCORE: 1418.39

## 2019-09-27 NOTE — PROGRESS NOTES
"[facility]  INITIAL VISIT NOTE  September 27, 2019    PRIMARY CARE PROVIDER AND CLINIC:  [facility] [place] [address] / [place] [address]    Chief Complaint   Patient presents with     Hospital F/U       HPI:    José Aguirre is a 83 year old  (1935) male who was seen at [facility] on September 27, 2019 for an initial visit. Medical history is notable for CAD s/p CABG x4, PAD s/p CEA, diastolic CHF, COPD, chronic hypoxic respiratory failure, DM II and urinary retention with self catheterization. He was hospitalized at [facility] from 8/27/19 to 9/19/19 where he presented after a cardiac arrest at the [facility]. He had bystander CPR prior to EMS arrival. Coronary angiogram with severe 3v disease. He is s/p CONCETTA x2 to the distal LM/ostial LAD and balloon angioplasty to the LCx and s/p ICD (9/10/19).  Hospital course complicated by COPD exacerbation. He had a 120 ml L left thoracentesis (9/5/19) and was diuresed about 20 lbs. PTA furosemide was changed to bumetanide, PTA carvedilol was changed to metoprolol, PTA rosuvastatin  was changed to atorvastatin. He is also new on acetylcysteine nebs.  He was admitted to this facility for medical management and rehab.     Today, Mr. Aguirre is seen in his room. He is making slow progress, but notes his breathing remains not at baseline. Reports his strength has improved but his \"endurance is still down\". At baseline is on 2-3L at rest and 5L with exertion - currently on 3-4L at rest and 6L with exertion. No chest pain per se, but does have discomfort from trauma of CPR. No cough. No abdominal pain, diarrhea or constipation. No concerns per discussion with nursing. Working with therapies.     CODE STATUS:   CPR/Full code     ALLERGIES:   No Known Allergies    PAST MEDICAL HISTORY:   Past Medical History:   Diagnosis Date     Anemia      Atrophy of left kidney      CKD (chronic kidney disease), stage III (H)      " Claudication, intermittent (H)      Coronary artery disease 2003    CAB x 4     History of GI bleed 2012    Hemorrhagic gastritis     Hypercholesterolemia      Hypertension      Left carotid artery stenosis     S/P Carotid Endarterectomy left      PVD (peripheral vascular disease) (H)      Renal artery stenosis (H)     stent R       PAST SURGICAL HISTORY:   Past Surgical History:   Procedure Laterality Date     BYPASS GRAFT ARTERY CORONARY  06/2003    LIMA=LAD, SVG=Diag, SVG=OM2, SVG=PDA     CAROTID ENDARTERECTOMY Left      CV CORONARY ANGIOGRAM N/A 8/27/2019    Procedure: Coronary Angiogram;  Surgeon: Andrew Garcia MD;  Location:  HEART CARDIAC CATH LAB     CV PCI STENT DRUG ELUTING N/A 8/27/2019    Procedure: PCI Stent Drug Eluting;  Surgeon: Andrew Garcia MD;  [facility]     ENDARTERECTOMY CAROTID Left 7/11/2017    Procedure: ENDARTERECTOMY CAROTID;  REDO LEFT CAROTID ENDARTERECTOMY WITH RIGHT ANKLE GREATER SAPHENOUS VEIN  ;  Surgeon: Khurram Bishop MD;  Location: [facility] OR     EP ICD N/A 9/10/2019    Procedure: EP ICD;  Surgeon: Shorty Marion MD;  Location:  HEART CARDIAC CATH LAB     EYE SURGERY       HEART CATH LEFT HEART CATH  06/2003    Severe multivessel disease     IR THORACENTESIS  9/5/2019     RENAL ARTERY ANGIOGRAM Right 05/2013    with stenting     TONSILLECTOMY         FAMILY HISTORY:   Family History   Problem Relation Age of Onset     Cerebrovascular Disease Mother 90        unknown type     Cancer Brother 70        Brain cancer      Dementia Sister      Arthritis Brother        SOCIAL HISTORY:   Lives with spouse     MEDICATIONS:  Current Outpatient Medications   Medication Sig Dispense Refill     acetylcysteine (MUCOMYST) 20 % neb solution Take 2 mLs by nebulization 4 times daily       albuterol (PROAIR HFA/PROVENTIL HFA/VENTOLIN HFA) 108 (90 Base) MCG/ACT Inhaler Inhale 2 puffs into the lungs every 6 hours as needed for shortness of breath / dyspnea  "or wheezing       Ascorbic Acid 500 MG CHEW Take 500 mg by mouth daily       aspirin 81 MG EC tablet Take 81 mg by mouth daily       atorvastatin (LIPITOR) 40 MG tablet Take 1 tablet (40 mg) by mouth every evening       bumetanide (BUMEX) 1 MG tablet Take 1 tablet (1 mg) by mouth daily       clopidogrel (PLAVIX) 75 MG tablet Take 75 mg by mouth daily       ferrous sulfate (FEROSUL) 325 (65 Fe) MG tablet Take 325 mg by mouth daily (with breakfast)       ipratropium - albuterol 0.5 mg/2.5 mg/3 mL (DUONEB) 0.5-2.5 (3) MG/3ML neb solution Take 1 vial (3 mLs) by nebulization 4 times daily       metoprolol succinate ER (TOPROL-XL) 25 MG 24 hr tablet Take 0.5 tablets (12.5 mg) by mouth daily       mometasone (ASMANEX) 220 MCG/INH inhaler Inhale 1 puff into the lungs every morning AND 2 puffs every evening       multivitamin w/minerals (THERA-VIT-M) tablet Take 1 tablet by mouth daily       nitroGLYcerin (NITROSTAT) 0.4 MG sublingual tablet For chest pain place 1 tablet under the tongue every 5 minutes for 3 doses. If symptoms persist 5 minutes after 1st dose call 911.       pantoprazole (PROTONIX) 40 MG EC tablet Take 1 tablet (40 mg) by mouth 2 times daily (before meals)       potassium chloride (KLOR-CON) 20 MEQ packet Take 20 mEq by mouth 2 times daily        umeclidinium-vilanterol (ANORO ELLIPTA) 62.5-25 MCG/INH oral inhaler Inhale 1 puff into the lungs daily       vitamin A 03313 units capsule Take 2 capsules (20,000 Units) by mouth daily       vitamin D3 (CHOLECALCIFEROL) 2000 units (50 mcg) tablet Take 2,000 Units by mouth daily       zinc sulfate (ZINCATE) 220 (50 Zn) MG capsule Take 1 capsule (220 mg) by mouth daily           ROS:  10 point ROS neg other than the symptoms noted above in the HPI.    PHYSICAL EXAM:  BP (!) 141/76   Pulse 82   Temp 98.2  F (36.8  C)   Resp 18   Ht 1.702 m (5' 7\")   Wt 76.5 kg (168 lb 9.6 oz)   SpO2 95%   BMI 26.41 kg/m     Gen: sitting in wheelchair, alert, cooperative and in " no acute distress  HEENT: normocephalic; nasal canula in place on 4L  Card: RRR, S1, S2, no murmurs  Resp: lung diminished throughout, but clear to auscultation without crackles or wheezes  GI: abdomen soft, not-tender  MSK: decreased muscle tone, no LE edema  Neuro: CX II-XII grossly in tact; ROM in all four extremities grossly in tact  Psych: alert and oriented x3; normal affect    LABORATORY/IMAGING DATA:  Reviewed as per Epic    ASSESSMENT/PLAN:     VT/VF Cardiac Arrest s/p LIDIA (9/10/19)  CAD s/p CABG x4 (2003) / Diastolic CHF (70%) / HTN / HLD  Pleural Effusion s/p Thoracentesis (9/5/19)   Cor angio with diffuse 3v disease. He is s/p CONCETTA x2 to the distal LM/ostial LAD and balloon angioplasty to the LCx. Multiple medication changes: carvedilol to metoprolol, furosemide to bumetanide and rosuvastatin to atorvastatin. SBPs labile in 110s-140s. Weights stable at 169 lbs (he reports dry weight of 167 lbs).    -- ASA 81 mg daily, atorvastatin 40 mg daily, bumetanide 1 mg daily, clopidogrel 75 mg daily and metoprolol XL 12.5 mg daily   --  Follow up with cardiology clinic as scheduled on 10/10/19   -- ? If he would prefer to follow up with [facility] or through [facility]?  COPD  Pulmonary Fibrosis   Acute on Chronic Hypoxic Respiratory Failure  Baseline on 2-3L at rest/5L with activity. Currently on 3-4L at rest/6L with activity. Completed a prednisone burst on 9/23/19. New on acetylcysteine nebs. Discussed plan for weaning O2 with his nurse - she plans to turn down O2 after giving him a neb.   -- acetylcysteine nebs QID, DuoNebs QID, mometasone 220 mcg 1 puff in the morning and 2 puffs in the evenings, umeclidinium-vilanterol 62.5-25 mcg daily and albuterol PRN  -- needs to establish care in COPD clinic per discharge summary (and has appt on 10/9/19), but appears he follows with Dr. Ike Fountain with [facility] pulmonology     Leukocytosis  Afebrile. No focal signs of infection. Likely due to prednisone for COPD  "exacerbation (last dose was 9/23). Is trending down.   -- follow for clinical signs of infection    PAD s/p Left CEA  Renal Artery Stenosis s/p Stent  (2013)  --  ASA 81 mg, atorvastatin 40 mg daily and clopidogrel 75 mg daily     DM, Type II  Hgb A1c 5.9. Diet controlled.   -- no need to check regular blood sugars    Chronic Urinary Retention  He intermittent straight caths. Tells me \"no issues\" with that today.   -- continue intermittent straight caths    GERD  New on pantoprazole during hospitalization. He has completed prednisone taper.   -- pantoprazole 40 mg BID for now - unclear to me if/why he needs BID?    Physical Deconditioning  In setting of hospitalization and underlying medical conditions. Per nursing notes he is an, \"assist of 1 with transfers, dressing, grooming and other ADL's\"  -- ongoing PT/OT    Electronically signed by:  Ewa Lima MD                    "

## 2019-09-27 NOTE — LETTER
"    9/27/2019        RE: José Aguirre  [address]  [address]  [facility] [address]        [facility]  INITIAL VISIT NOTE  September 27, 2019    PRIMARY CARE PROVIDER AND CLINIC:  Javier Beltran [address] [facility] [place] / [place] [address]    Chief Complaint   Patient presents with     Hospital F/U       HPI:    José Aguirre is a 83 year old  (1935) male who was seen at [facility] on September 27, 2019 for an initial visit. Medical history is notable for CAD s/p CABG x4, PAD s/p CEA, diastolic CHF, COPD, chronic hypoxic respiratory failure, DM II and urinary retention with self catheterization. He was hospitalized at [facility] from 8/27/19 to 9/19/19 where he presented after a cardiac arrest at the [facility]. He had bystander CPR prior to EMS arrival. Coronary angiogram with severe 3v disease. He is s/p CONCETTA x2 to the distal LM/ostial LAD and balloon angioplasty to the LCx and s/p ICD (9/10/19).  Hospital course complicated by COPD exacerbation. He had a 120 ml L left thoracentesis (9/5/19) and was diuresed about 20 lbs. PTA furosemide was changed to bumetanide, PTA carvedilol was changed to metoprolol, PTA rosuvastatin  was changed to atorvastatin. He is also new on acetylcysteine nebs.  He was admitted to this facility for medical management and rehab.     Today, Mr. Aguirre is seen in his room. He is making slow progress, but notes his breathing remains not at baseline. Reports his strength has improved but his \"endurance is still down\". At baseline is on 2-3L at rest and 5L with exertion - currently on 3-4L at rest and 6L with exertion. No chest pain per se, but does have discomfort from trauma of CPR. No cough. No abdominal pain, diarrhea or constipation. No concerns per discussion with nursing. Working with therapies.     CODE STATUS:   CPR/Full code     ALLERGIES:   No Known Allergies    PAST MEDICAL HISTORY:   Past Medical History:   Diagnosis Date     " Anemia      Atrophy of left kidney      CKD (chronic kidney disease), stage III (H)      Claudication, intermittent (H)      Coronary artery disease 2003    CAB x 4     History of GI bleed 2012    Hemorrhagic gastritis     Hypercholesterolemia      Hypertension      Left carotid artery stenosis     S/P Carotid Endarterectomy left      PVD (peripheral vascular disease) (H)      Renal artery stenosis (H)     stent R       PAST SURGICAL HISTORY:   Past Surgical History:   Procedure Laterality Date     BYPASS GRAFT ARTERY CORONARY  06/2003    LIMA=LAD, SVG=Diag, SVG=OM2, SVG=PDA     CAROTID ENDARTERECTOMY Left      CV CORONARY ANGIOGRAM N/A 8/27/2019    Procedure: Coronary Angiogram;  Surgeon: Andrew Garcia MD;  Location:  HEART CARDIAC CATH LAB     CV PCI STENT DRUG ELUTING N/A 8/27/2019    Procedure: PCI Stent Drug Eluting;  Surgeon: Andrew Garcia MD;  [facility]     ENDARTERECTOMY CAROTID Left 7/11/2017    Procedure: ENDARTERECTOMY CAROTID;  REDO LEFT CAROTID ENDARTERECTOMY WITH RIGHT ANKLE GREATER SAPHENOUS VEIN  ;  Surgeon: Khurram Bishop MD;  Location: [facility] OR     EP ICD N/A 9/10/2019    Procedure: EP ICD;  Surgeon: Shorty Marion MD;  Location: [facility]     EYE SURGERY       HEART CATH LEFT HEART CATH  06/2003    Severe multivessel disease     IR THORACENTESIS  9/5/2019     RENAL ARTERY ANGIOGRAM Right 05/2013    with stenting     TONSILLECTOMY         FAMILY HISTORY:   Family History   Problem Relation Age of Onset     Cerebrovascular Disease Mother 90        unknown type     Cancer Brother 70        Brain cancer      Dementia Sister      Arthritis Brother        SOCIAL HISTORY:   Lives with spouse     MEDICATIONS:  Current Outpatient Medications   Medication Sig Dispense Refill     acetylcysteine (MUCOMYST) 20 % neb solution Take 2 mLs by nebulization 4 times daily       albuterol (PROAIR HFA/PROVENTIL HFA/VENTOLIN HFA) 108 (90 Base) MCG/ACT Inhaler  "Inhale 2 puffs into the lungs every 6 hours as needed for shortness of breath / dyspnea or wheezing       Ascorbic Acid 500 MG CHEW Take 500 mg by mouth daily       aspirin 81 MG EC tablet Take 81 mg by mouth daily       atorvastatin (LIPITOR) 40 MG tablet Take 1 tablet (40 mg) by mouth every evening       bumetanide (BUMEX) 1 MG tablet Take 1 tablet (1 mg) by mouth daily       clopidogrel (PLAVIX) 75 MG tablet Take 75 mg by mouth daily       ferrous sulfate (FEROSUL) 325 (65 Fe) MG tablet Take 325 mg by mouth daily (with breakfast)       ipratropium - albuterol 0.5 mg/2.5 mg/3 mL (DUONEB) 0.5-2.5 (3) MG/3ML neb solution Take 1 vial (3 mLs) by nebulization 4 times daily       metoprolol succinate ER (TOPROL-XL) 25 MG 24 hr tablet Take 0.5 tablets (12.5 mg) by mouth daily       mometasone (ASMANEX) 220 MCG/INH inhaler Inhale 1 puff into the lungs every morning AND 2 puffs every evening       multivitamin w/minerals (THERA-VIT-M) tablet Take 1 tablet by mouth daily       nitroGLYcerin (NITROSTAT) 0.4 MG sublingual tablet For chest pain place 1 tablet under the tongue every 5 minutes for 3 doses. If symptoms persist 5 minutes after 1st dose call 911.       pantoprazole (PROTONIX) 40 MG EC tablet Take 1 tablet (40 mg) by mouth 2 times daily (before meals)       potassium chloride (KLOR-CON) 20 MEQ packet Take 20 mEq by mouth 2 times daily        umeclidinium-vilanterol (ANORO ELLIPTA) 62.5-25 MCG/INH oral inhaler Inhale 1 puff into the lungs daily       vitamin A 16806 units capsule Take 2 capsules (20,000 Units) by mouth daily       vitamin D3 (CHOLECALCIFEROL) 2000 units (50 mcg) tablet Take 2,000 Units by mouth daily       zinc sulfate (ZINCATE) 220 (50 Zn) MG capsule Take 1 capsule (220 mg) by mouth daily           ROS:  10 point ROS neg other than the symptoms noted above in the HPI.    PHYSICAL EXAM:  BP (!) 141/76   Pulse 82   Temp 98.2  F (36.8  C)   Resp 18   Ht 1.702 m (5' 7\")   Wt 76.5 kg (168 lb 9.6 oz) "   SpO2 95%   BMI 26.41 kg/m      Gen: sitting in wheelchair, alert, cooperative and in no acute distress  HEENT: normocephalic; nasal canula in place on 4L  Card: RRR, S1, S2, no murmurs  Resp: lung diminished throughout, but clear to auscultation without crackles or wheezes  GI: abdomen soft, not-tender  MSK: decreased muscle tone, no LE edema  Neuro: CX II-XII grossly in tact; ROM in all four extremities grossly in tact  Psych: alert and oriented x3; normal affect    LABORATORY/IMAGING DATA:  Reviewed as per Epic    ASSESSMENT/PLAN:     VT/VF Cardiac Arrest s/p LIDIA (9/10/19)  CAD s/p CABG x4 (2003) / Diastolic CHF (70%) / HTN / HLD  Pleural Effusion s/p Thoracentesis (9/5/19)   Cor angio with diffuse 3v disease. He is s/p CONCETTA x2 to the distal LM/ostial LAD and balloon angioplasty to the LCx. Multiple medication changes: carvedilol to metoprolol, furosemide to bumetanide and rosuvastatin to atorvastatin. SBPs labile in 110s-140s. Weights stable at 169 lbs (he reports dry weight of 167 lbs).    -- ASA 81 mg daily, atorvastatin 40 mg daily, bumetanide 1 mg daily, clopidogrel 75 mg daily and metoprolol XL 12.5 mg daily   --  Follow up with cardiology clinic as scheduled on 10/10/19   -- ? If he would prefer to follow up with I or through [facility]?  COPD  Pulmonary Fibrosis   Acute on Chronic Hypoxic Respiratory Failure  Baseline on 2-3L at rest/5L with activity. Currently on 3-4L at rest/6L with activity. Completed a prednisone burst on 9/23/19. New on acetylcysteine nebs. Discussed plan for weaning O2 with his nurse - she plans to turn down O2 after giving him a neb.   -- acetylcysteine nebs QID, DuoNebs QID, mometasone 220 mcg 1 puff in the morning and 2 puffs in the evenings, umeclidinium-vilanterol 62.5-25 mcg daily and albuterol PRN  -- needs to establish care in COPD clinic per discharge summary (and has appt on 10/9/19), but appears he follows with Dr. Ike Fountain with [facility] pulmonology  "    Leukocytosis  Afebrile. No focal signs of infection. Likely due to prednisone for COPD exacerbation (last dose was 9/23). Is trending down.   -- follow for clinical signs of infection    PAD s/p Left CEA  Renal Artery Stenosis s/p Stent  (2013)  --  ASA 81 mg, atorvastatin 40 mg daily and clopidogrel 75 mg daily     DM, Type II  Hgb A1c 5.9. Diet controlled.   -- no need to check regular blood sugars    Chronic Urinary Retention  He intermittent straight caths. Tells me \"no issues\" with that today.   -- continue intermittent straight caths    GERD  New on pantoprazole during hospitalization. He has completed prednisone taper.   -- pantoprazole 40 mg BID for now - unclear to me if/why he needs BID?    Physical Deconditioning  In setting of hospitalization and underlying medical conditions. Per nursing notes he is an, \"assist of 1 with transfers, dressing, grooming and other ADL's\"  -- ongoing PT/OT    Electronically signed by:  Ewa Lima MD                        Sincerely,        Ewa Liam MD    "

## 2019-09-30 ENCOUNTER — HOSPITAL LABORATORY (OUTPATIENT)
Dept: OTHER | Facility: CLINIC | Age: 84
End: 2019-09-30

## 2019-09-30 LAB
BASOPHILS # BLD AUTO: 0 10E9/L (ref 0–0.2)
BASOPHILS NFR BLD AUTO: 0.2 %
DIFFERENTIAL METHOD BLD: ABNORMAL
EOSINOPHIL # BLD AUTO: 0.1 10E9/L (ref 0–0.7)
EOSINOPHIL NFR BLD AUTO: 1.3 %
ERYTHROCYTE [DISTWIDTH] IN BLOOD BY AUTOMATED COUNT: 17.1 % (ref 10–15)
HCT VFR BLD AUTO: 35.9 % (ref 40–53)
HGB BLD-MCNC: 10.5 G/DL (ref 13.3–17.7)
IMM GRANULOCYTES # BLD: 0 10E9/L (ref 0–0.4)
IMM GRANULOCYTES NFR BLD: 0.3 %
LYMPHOCYTES # BLD AUTO: 0.4 10E9/L (ref 0.8–5.3)
LYMPHOCYTES NFR BLD AUTO: 4 %
MCH RBC QN AUTO: 29.6 PG (ref 26.5–33)
MCHC RBC AUTO-ENTMCNC: 29.2 G/DL (ref 31.5–36.5)
MCV RBC AUTO: 101 FL (ref 78–100)
MONOCYTES # BLD AUTO: 0.7 10E9/L (ref 0–1.3)
MONOCYTES NFR BLD AUTO: 6.7 %
NEUTROPHILS # BLD AUTO: 9.6 10E9/L (ref 1.6–8.3)
NEUTROPHILS NFR BLD AUTO: 87.5 %
NRBC # BLD AUTO: 0 10*3/UL
NRBC BLD AUTO-RTO: 0 /100
PLATELET # BLD AUTO: 194 10E9/L (ref 150–450)
RBC # BLD AUTO: 3.55 10E12/L (ref 4.4–5.9)
WBC # BLD AUTO: 10.9 10E9/L (ref 4–11)

## 2019-10-02 ENCOUNTER — TELEPHONE (OUTPATIENT)
Dept: RESPIRATORY THERAPY | Facility: CLINIC | Age: 84
End: 2019-10-02

## 2019-10-02 NOTE — TELEPHONE ENCOUNTER
Called and spoke with José Aguirre for ongoing, post-hosp COPD education follow-up and outreach. Mr. Aguirre was in the hosp from 8/27/19 to 9/19/2019 for VT/VF arrest.  Was discharged to a nursing home, where he currently resides.     Patient stated his breathing has since improved significantly, although he still notes CRANE; using supplemental 02 24/7.  Encouraged patient to pace himself, to not over exert, and to use pursed-lip breathing to cope with SOB. Encouraged patient to increase his 02 with activity; patient stated he's already doing that.     Spoke with NH RN to confirm that patient is taking all of the following/discharge resp medications: Vilanterol/umeclidinium (Anoro Ellipta) once daily,  Mometasone (Asmanex) once daily, DuoNeb QID, Mucomyst QID, and Albuterol (Proair/Ventolin/Proventil) inhaler prn. Of note, there is an apparent duplication of medications in the Anoro inhaler (LAMA/LABA) and DuoNeb--both have anticholinergics in them. Also, Asmanex is only approved for asthma, not COPD. My colleague, Dina Gallo, who saw the patient in the hospital noted these discrepancies; however, patient was still discharged with these medications. Given the recent VT/VF arrest, this writer called and spoke with the on-call provider for [facility], the primary treatment team at the NH, to have them switch the current DuoNeb QID to Albuterol neb QID so as to eliminate the anticholinergic duplication. I was told the covering NP will be notified to have this changed. As for the Asmanex inhaler, this can be discussed during the outpatient pulmonary office visit on 10/9/19 at the [facility].     Patient agreed to our follow-up calls. Will check in with patient again in one week.     Aly Leach, RRT, CTTS  Chronic Pulmonary Disease Specialist  Cardiopulmonary Services   Office: [phone]  Pager: [phone]

## 2019-10-03 LAB
FUNGUS SPEC CULT: NORMAL
SPECIMEN SOURCE: NORMAL

## 2019-10-04 ENCOUNTER — NURSING HOME VISIT (OUTPATIENT)
Dept: GERIATRICS | Facility: CLINIC | Age: 84
End: 2019-10-04
Payer: MEDICARE

## 2019-10-04 VITALS
WEIGHT: 167.8 LBS | BODY MASS INDEX: 26.34 KG/M2 | HEIGHT: 67 IN | DIASTOLIC BLOOD PRESSURE: 56 MMHG | OXYGEN SATURATION: 95 % | SYSTOLIC BLOOD PRESSURE: 118 MMHG | TEMPERATURE: 97.8 F | RESPIRATION RATE: 18 BRPM | HEART RATE: 85 BPM

## 2019-10-04 DIAGNOSIS — I50.32 CHRONIC DIASTOLIC CONGESTIVE HEART FAILURE (H): ICD-10-CM

## 2019-10-04 DIAGNOSIS — J44.1 COPD EXACERBATION (H): ICD-10-CM

## 2019-10-04 DIAGNOSIS — R33.9 URINARY RETENTION: Primary | ICD-10-CM

## 2019-10-04 DIAGNOSIS — I25.10 CORONARY ARTERY DISEASE INVOLVING NATIVE CORONARY ARTERY OF NATIVE HEART WITHOUT ANGINA PECTORIS: ICD-10-CM

## 2019-10-04 DIAGNOSIS — J81.0 ACUTE PULMONARY EDEMA (H): ICD-10-CM

## 2019-10-04 DIAGNOSIS — Z95.1 HX OF CABG: ICD-10-CM

## 2019-10-04 DIAGNOSIS — D72.829 LEUKOCYTOSIS, UNSPECIFIED TYPE: ICD-10-CM

## 2019-10-04 DIAGNOSIS — N18.30 CKD (CHRONIC KIDNEY DISEASE), STAGE III (H): ICD-10-CM

## 2019-10-04 DIAGNOSIS — I49.01 VENTRICULAR FIBRILLATION (H): ICD-10-CM

## 2019-10-04 DIAGNOSIS — E11.8 TYPE 2 DIABETES MELLITUS WITH COMPLICATION, WITHOUT LONG-TERM CURRENT USE OF INSULIN (H): ICD-10-CM

## 2019-10-04 DIAGNOSIS — R53.81 PHYSICAL DECONDITIONING: ICD-10-CM

## 2019-10-04 DIAGNOSIS — R09.02 HYPOXIA: ICD-10-CM

## 2019-10-04 PROCEDURE — 99309 SBSQ NF CARE MODERATE MDM 30: CPT | Performed by: NURSE PRACTITIONER

## 2019-10-04 ASSESSMENT — MIFFLIN-ST. JEOR: SCORE: 1414.77

## 2019-10-04 NOTE — LETTER
10/4/2019        RE: José Aguirre  [address]  [facility] [facility] [address]        [facility]  [place] Medical Record Number:  [id number]  Place of Service where encounter took place:  [facility]  ([facility]) [[id number]]  Chief Complaint   Patient presents with     RECHECK       HPI:    José Aguirre  is a 83 year old (1935), who is being seen today for an episodic care visit.  HPI information obtained from: facility chart records, facility staff, patient report and [facility] chart review. Today's concern is:    Patient in TCU following hospitalization for cardiac arrest, multivessel CAD s/p PCI and ICD placement. COPD- O2 dependent 3-6 LNC. Admitted to TCU on 4-8 LNC and O2 requirements slowly improving/lessening. Participating in rehab and making gains. VSS    Past Medical and Surgical History reviewed in Epic today.    MEDICATIONS:  Current Outpatient Medications   Medication Sig Dispense Refill     acetylcysteine (MUCOMYST) 20 % neb solution Take 2 mLs by nebulization 4 times daily       albuterol (PROAIR HFA/PROVENTIL HFA/VENTOLIN HFA) 108 (90 Base) MCG/ACT Inhaler Inhale 2 puffs into the lungs every 6 hours as needed for shortness of breath / dyspnea or wheezing       Ascorbic Acid 500 MG CHEW Take 500 mg by mouth daily       aspirin 81 MG EC tablet Take 81 mg by mouth daily       atorvastatin (LIPITOR) 40 MG tablet Take 1 tablet (40 mg) by mouth every evening       bumetanide (BUMEX) 1 MG tablet Take 1 tablet (1 mg) by mouth daily       clopidogrel (PLAVIX) 75 MG tablet Take 75 mg by mouth daily       ferrous sulfate (FEROSUL) 325 (65 Fe) MG tablet Take 325 mg by mouth daily (with breakfast)       ipratropium - albuterol 0.5 mg/2.5 mg/3 mL (DUONEB) 0.5-2.5 (3) MG/3ML neb solution Take 1 vial (3 mLs) by nebulization 4 times daily       metoprolol succinate ER (TOPROL-XL) 25 MG 24 hr tablet Take 0.5 tablets (12.5 mg) by mouth daily       mometasone (ASMANEX)  "220 MCG/INH inhaler Inhale 1 puff into the lungs every morning AND 2 puffs every evening       multivitamin w/minerals (THERA-VIT-M) tablet Take 1 tablet by mouth daily       nitroGLYcerin (NITROSTAT) 0.4 MG sublingual tablet For chest pain place 1 tablet under the tongue every 5 minutes for 3 doses. If symptoms persist 5 minutes after 1st dose call 911.       pantoprazole (PROTONIX) 40 MG EC tablet Take 1 tablet (40 mg) by mouth 2 times daily (before meals)       potassium chloride (KLOR-CON) 20 MEQ packet Take 20 mEq by mouth 2 times daily        umeclidinium-vilanterol (ANORO ELLIPTA) 62.5-25 MCG/INH oral inhaler Inhale 1 puff into the lungs daily       vitamin A 23525 units capsule Take 2 capsules (20,000 Units) by mouth daily       vitamin D3 (CHOLECALCIFEROL) 2000 units (50 mcg) tablet Take 2,000 Units by mouth daily       zinc sulfate (ZINCATE) 220 (50 Zn) MG capsule Take 1 capsule (220 mg) by mouth daily           REVIEW OF SYSTEMS:  4 point ROS including Respiratory, CV, GI and , other than that noted in the HPI,  is negative    Objective:  /56   Pulse 85   Temp 97.8  F (36.6  C)   Resp 18   Ht 1.702 m (5' 7\")   Wt 76.1 kg (167 lb 12.8 oz)   SpO2 95%   BMI 26.28 kg/m     Exam:  Resp: Effort WNL, LS decreased in bases, O2 on 4LNC  CV: VS as above, no edema noted  Abd- soft, nontender, BS +  Musc- PARRY- w/c  Psych- alert, calm, pleasant      Labs:   Recent labs in [facility] reviewed by me today.     ASSESSMENT/PLAN:  Ventricular fibrillation (H)  Coronary artery disease involving native coronary artery of native heart without angina pectoris  Hx of CABG  - underwent PCI to dLM/ostial LAD with CONCETTA and balloon angioplasty of ostial LCx. He has preserved LV EF with mildly reduced RV function. Received ICD 9/10/2019 for secondary prevention.  VSS  -  Decline d ACP- supportive approach and monitor coping/mood and behaivors  - keep incision clean and dry  - continue on aspirin, Plavix, statin  - continue " on Toprol XL  - VS per unit protocol  - follow clinically   - f/w cards as directed    Hypoxia  COPD exacerbation (H)  - is O2 dependent - 3-5 LNC, was requiring 5-8 LNC upon admit now down to 4-6 LNC  - IS, pulmonary toilet  - PT- endurance  - continue on mucomyst, albuterol,, asmanex, tiotropium-olodaterol, umeclidinium-vilanterol,  and prednisone taper.   - duonebs changed to just albuterol d/t redundant therapy and cards concerns  - patient was not utilizing scheduled nebulizer treatments at home prior to hospitalization and will need nebulizer if is discharged on these  - will need to f/w pulmonology     Pulmonary edema  Bilateral Pleural effusion  S/p thorancetesis 9/5 left, f/u CXR revealed small bilateral Pleural effusions  - stable, continues on Bumex  - follow daily weights, resp status closely     Rib fracture  - healing without issue  - continue pulmonary toilet and pain regimen  - follow clincially     PVD (peripheral vascular disease) (H  History of left-sided carotid endarterectomy  Right carotid artery occlusion  Subclavian vein stenosis,   -  Noted  - continue on Plavix, ASA and statin     Type 2 diabetes mellitus with complication without long-term current use of insulin (H)        Lab Results   Component Value Date     A1C 5.9 08/28/2019     A1C 6.6 07/11/2017     A1C 5.9 01/11/2016      - diet controlled  - follow BGL        Urinary retention  - chronic self cath at home. Nsg worked with patient and is now performing self catheterization a  gain. Nsg continues to follow urine out.     Physical deconditioning  - 2/2 above  - lives with ex-wife, relatively independent prior to hospitalization.   - PT/OT  - ongoing discharge planning, SW follow and care conferences per unit protocol               Electronically signed by:  MARKUS Lewis CNP                 Sincerely,        MARKUS Lewis CNP

## 2019-10-04 NOTE — PROGRESS NOTES
[facility]  [place] Medical Record Number:  [id number]  Place of Service where encounter took place:  [facility]  ([facility]) [[id number]]  Chief Complaint   Patient presents with     RECHECK       HPI:    José Aguirre  is a 83 year old (1935), who is being seen today for an episodic care visit.  HPI information obtained from: facility chart records, facility staff, patient report and [facility] chart review. Today's concern is:    Patient in TCU following hospitalization for cardiac arrest, multivessel CAD s/p PCI and ICD placement. COPD- O2 dependent 3-6 LNC. Admitted to TCU on 4-8 LNC and O2 requirements slowly improving/lessening. Participating in rehab and making gains. VSS    Past Medical and Surgical History reviewed in Epic today.    MEDICATIONS:  Current Outpatient Medications   Medication Sig Dispense Refill     acetylcysteine (MUCOMYST) 20 % neb solution Take 2 mLs by nebulization 4 times daily       albuterol (PROAIR HFA/PROVENTIL HFA/VENTOLIN HFA) 108 (90 Base) MCG/ACT Inhaler Inhale 2 puffs into the lungs every 6 hours as needed for shortness of breath / dyspnea or wheezing       Ascorbic Acid 500 MG CHEW Take 500 mg by mouth daily       aspirin 81 MG EC tablet Take 81 mg by mouth daily       atorvastatin (LIPITOR) 40 MG tablet Take 1 tablet (40 mg) by mouth every evening       bumetanide (BUMEX) 1 MG tablet Take 1 tablet (1 mg) by mouth daily       clopidogrel (PLAVIX) 75 MG tablet Take 75 mg by mouth daily       ferrous sulfate (FEROSUL) 325 (65 Fe) MG tablet Take 325 mg by mouth daily (with breakfast)       ipratropium - albuterol 0.5 mg/2.5 mg/3 mL (DUONEB) 0.5-2.5 (3) MG/3ML neb solution Take 1 vial (3 mLs) by nebulization 4 times daily       metoprolol succinate ER (TOPROL-XL) 25 MG 24 hr tablet Take 0.5 tablets (12.5 mg) by mouth daily       mometasone (ASMANEX) 220 MCG/INH inhaler Inhale 1 puff into the lungs every morning AND 2 puffs every evening        "multivitamin w/minerals (THERA-VIT-M) tablet Take 1 tablet by mouth daily       nitroGLYcerin (NITROSTAT) 0.4 MG sublingual tablet For chest pain place 1 tablet under the tongue every 5 minutes for 3 doses. If symptoms persist 5 minutes after 1st dose call 911.       pantoprazole (PROTONIX) 40 MG EC tablet Take 1 tablet (40 mg) by mouth 2 times daily (before meals)       potassium chloride (KLOR-CON) 20 MEQ packet Take 20 mEq by mouth 2 times daily        umeclidinium-vilanterol (ANORO ELLIPTA) 62.5-25 MCG/INH oral inhaler Inhale 1 puff into the lungs daily       vitamin A 28701 units capsule Take 2 capsules (20,000 Units) by mouth daily       vitamin D3 (CHOLECALCIFEROL) 2000 units (50 mcg) tablet Take 2,000 Units by mouth daily       zinc sulfate (ZINCATE) 220 (50 Zn) MG capsule Take 1 capsule (220 mg) by mouth daily           REVIEW OF SYSTEMS:  4 point ROS including Respiratory, CV, GI and , other than that noted in the HPI,  is negative    Objective:  /56   Pulse 85   Temp 97.8  F (36.6  C)   Resp 18   Ht 1.702 m (5' 7\")   Wt 76.1 kg (167 lb 12.8 oz)   SpO2 95%   BMI 26.28 kg/m    Exam:  Resp: Effort WNL, LS decreased in bases, O2 on 4LNC  CV: VS as above, no edema noted  Abd- soft, nontender, BS +  Musc- PARRY- w/c  Psych- alert, calm, pleasant      Labs:   Recent labs in [facility] reviewed by me today.     ASSESSMENT/PLAN:  Ventricular fibrillation (H)  Coronary artery disease involving native coronary artery of native heart without angina pectoris  Hx of CABG  - underwent PCI to dLM/ostial LAD with CONCETTA and balloon angioplasty of ostial LCx. He has preserved LV EF with mildly reduced RV function. Received ICD 9/10/2019 for secondary prevention.  VSS  -  Declined ACP- supportive approach and monitor coping/mood and behaivors  - keep incision clean and dry  - continue on aspirin, Plavix, statin  - continue on Toprol XL  - VS per unit protocol  - follow clinically   - f/w cards as " directed    Hypoxia  COPD exacerbation (H)  - is O2 dependent - 3-5 LNC, was requiring 5-8 LNC upon admit now down to 4-6 LNC  - IS, pulmonary toilet  - PT- endurance  - continue on mucomyst, albuterol,, asmanex, tiotropium-olodaterol, umeclidinium-vilanterol,  and prednisone taper.   - duonebs changed to just albuterol d/t redundant therapy and cards concerns  - patient was not utilizing scheduled nebulizer treatments at home prior to hospitalization and will need nebulizer if is discharged on these  - will need to f/w pulmonology     Pulmonary edema  Bilateral Pleural effusion  S/p thorancetesis 9/5 left, f/u CXR revealed small bilateral Pleural effusions  - stable, continues on Bumex  - follow daily weights, resp status closely     Rib fracture  - healing without issue  - continue pulmonary toilet and pain regimen  - follow clincially     PVD (peripheral vascular disease) (H  History of left-sided carotid endarterectomy  Right carotid artery occlusion  Subclavian vein stenosis,   -  Noted  - continue on Plavix, ASA and statin     Type 2 diabetes mellitus with complication without long-term current use of insulin (H)        Lab Results   Component Value Date     A1C 5.9 08/28/2019     A1C 6.6 07/11/2017     A1C 5.9 01/11/2016      - diet controlled  - follow BGL        Urinary retention  - chronic self cath at home. Nsg worked with patient and is now performing self catheterization a  gain. Nsg continues to follow urine out.     Physical deconditioning  - 2/2 above  - lives with ex-wife, relatively independent prior to hospitalization.   - PT/OT  - ongoing discharge planning, SW follow and care conferences per unit protocol               Electronically signed by:  MARKUS Lewis CNP

## 2019-10-07 ENCOUNTER — NURSING HOME VISIT (OUTPATIENT)
Dept: GERIATRICS | Facility: CLINIC | Age: 84
End: 2019-10-07
Payer: MEDICARE

## 2019-10-07 VITALS
RESPIRATION RATE: 20 BRPM | WEIGHT: 167.8 LBS | BODY MASS INDEX: 26.34 KG/M2 | TEMPERATURE: 97.9 F | OXYGEN SATURATION: 96 % | HEART RATE: 90 BPM | SYSTOLIC BLOOD PRESSURE: 118 MMHG | HEIGHT: 67 IN | DIASTOLIC BLOOD PRESSURE: 72 MMHG

## 2019-10-07 DIAGNOSIS — J44.9 CHRONIC OBSTRUCTIVE PULMONARY DISEASE, UNSPECIFIED COPD TYPE (H): Primary | ICD-10-CM

## 2019-10-07 PROCEDURE — 99308 SBSQ NF CARE LOW MDM 20: CPT | Performed by: NURSE PRACTITIONER

## 2019-10-07 ASSESSMENT — MIFFLIN-ST. JEOR: SCORE: 1414.77

## 2019-10-07 NOTE — LETTER
"    10/7/2019        RE: José Aguirre  [address]  [facility] [address]          Face to Face and Medical Necessity Statement for DME Provider visit    Demographic Information on José Aguirre:  Gender: male  : 1935 [address][birth year]  [address]  [facility] [id number] [phone] (home)     Medical Record: [id number]  Social Security Number: [id number]  Primary Care Provider: Javier Beltran  Insurance: Payor: MEDICARE / Plan: MEDICARE / Product Type: Medicare /     HPI:   José Aguirre is a 83 year old  (1935), who is being seen today for a face to face provider visit at [facility]; medical necessity statement for DME included. This patient requires the following:  DME Ordered and Medical Necessity Statement   Nebulizer machine with 30 day supply of kits and tubing and mask  for BID Mucomyst and QID Albuterol  medication    Due to the inability to benefit from inhaler therapy alone, my patient's COPD would benefit from nebulizer therapy.    Pt needing above DME with expected length of need of 99 months due to medical necessity associated with following diagnosis:  Chronic obstructive pulmonary disease, unspecified COPD type (H)      PMH   has a past medical history of Anemia, Atrophy of left kidney, CKD (chronic kidney disease), stage III (H), Claudication, intermittent (H), Coronary artery disease (), History of GI bleed (), Hypercholesterolemia, Hypertension, Left carotid artery stenosis, PVD (peripheral vascular disease) (H), and Renal artery stenosis (H).    ROS:4 point ROS including Respiratory, CV, GI and , other than that noted in the HPI,  is negative    EXAM  Vitals: /72   Pulse 90   Temp 97.9  F (36.6  C)   Resp 20   Ht 1.702 m (5' 7\")   Wt 76.1 kg (167 lb 12.8 oz)   SpO2 96%   BMI 26.28 kg/m   ;BMI= Body mass index is 26.28 kg/m .    Resp: Effort WNL- wearing O2 4 LNC  CV: VS as above  [facility]- PARR[facility]- w/c  Psych- alert, calm, " pleasant       ASSESSMENT/PLAN:  1. Chronic obstructive pulmonary disease, unspecified COPD type (H)        Orders:  DME as above    ELECTRONICALLY SIGNED BY [place] CERTIFIED PROVIDER:  MARKUS Lewis CNP   NPI: [id number]  [facility]  [address] [place] [address], [address]  [place] [address]          Sincerely,        MARKUS Lewis CNP

## 2019-10-07 NOTE — PROGRESS NOTES
"  Face to Face and Medical Necessity Statement for DME Provider visit    Demographic Information on José Aguirre:  Gender: male  : 1935  [address]  [facility] [address] [phone] (home)     Medical Record: [id number]  Social Security Number: [id number]  Primary Care Provider: Javier Beltran  Insurance: Payor: MEDICARE / Plan: MEDICARE / Product Type: Medicare /     HPI:   José Aguirre is a 83 year old  (1935), who is being seen today for a face to face provider visit at [facility]; medical necessity statement for DME included. This patient requires the following:  DME Ordered and Medical Necessity Statement   Nebulizer machine with 30 day supply of kits and tubing and mask  for BID Mucomyst and QID Albuterol  medication    Due to the inability to benefit from inhaler therapy alone, my patient's COPD would benefit from nebulizer therapy.    Pt needing above DME with expected length of need of 99 months due to medical necessity associated with following diagnosis:  Chronic obstructive pulmonary disease, unspecified COPD type (H)      PMH   has a past medical history of Anemia, Atrophy of left kidney, CKD (chronic kidney disease), stage III (H), Claudication, intermittent (H), Coronary artery disease (), History of GI bleed (), Hypercholesterolemia, Hypertension, Left carotid artery stenosis, PVD (peripheral vascular disease) (H), and Renal artery stenosis (H).    ROS:4 point ROS including Respiratory, CV, GI and , other than that noted in the HPI,  is negative    EXAM  Vitals: /72   Pulse 90   Temp 97.9  F (36.6  C)   Resp 20   Ht 1.702 m (5' 7\")   Wt 76.1 kg (167 lb 12.8 oz)   SpO2 96%   BMI 26.28 kg/m  ;BMI= Body mass index is 26.28 kg/m .    Resp: Effort WNL- wearing O2 4 LNC  CV: VS as above  [facility]- [facility]- w/c  Psych- alert, calm, pleasant       ASSESSMENT/PLAN:  1. Chronic obstructive pulmonary disease, unspecified COPD type (H) "        Orders:  DME as above    ELECTRONICALLY SIGNED BY [place] CERTIFIED PROVIDER:  MARKUS Lewis CNP   NPI: [id number]  [facility]  [facility], [facility] [address]  [place] [address]

## 2019-10-08 ENCOUNTER — HOSPITAL LABORATORY (OUTPATIENT)
Dept: OTHER | Facility: CLINIC | Age: 84
End: 2019-10-08

## 2019-10-08 ENCOUNTER — ANCILLARY PROCEDURE (OUTPATIENT)
Dept: CT IMAGING | Facility: CLINIC | Age: 84
End: 2019-10-08
Payer: MEDICARE

## 2019-10-08 DIAGNOSIS — J96.91 RESPIRATORY FAILURE WITH HYPOXIA (H): ICD-10-CM

## 2019-10-08 DIAGNOSIS — R91.1 LUNG NODULE: ICD-10-CM

## 2019-10-08 LAB
ANION GAP SERPL CALCULATED.3IONS-SCNC: 9 MMOL/L (ref 3–14)
BUN SERPL-MCNC: 17 MG/DL (ref 7–30)
CALCIUM SERPL-MCNC: 8.9 MG/DL (ref 8.5–10.1)
CHLORIDE SERPL-SCNC: 107 MMOL/L (ref 94–109)
CO2 SERPL-SCNC: 23 MMOL/L (ref 20–32)
CREAT SERPL-MCNC: 1.21 MG/DL (ref 0.66–1.25)
DLCOCOR-%PRED-PRE: 30 %
DLCOCOR-PRE: 6.85 ML/MIN/MMHG
DLCOUNC-%PRED-PRE: 25 %
DLCOUNC-PRE: 5.67 ML/MIN/MMHG
DLCOUNC-PRED: 22.53 ML/MIN/MMHG
ERV-%PRED-PRE: 164 %
ERV-PRE: 1.41 L
ERV-PRED: 0.86 L
ERYTHROCYTE [DISTWIDTH] IN BLOOD BY AUTOMATED COUNT: 17.6 % (ref 10–15)
EXPTIME-PRE: 8.11 SEC
FEF2575-%PRED-PRE: 66 %
FEF2575-PRE: 1.22 L/SEC
FEF2575-PRED: 1.83 L/SEC
FEFMAX-%PRED-PRE: 93 %
FEFMAX-PRE: 5.98 L/SEC
FEFMAX-PRED: 6.4 L/SEC
FEV1-%PRED-PRE: 87 %
FEV1-PRE: 2.28 L
FEV1FEV6-PRE: 70 %
FEV1FEV6-PRED: 76 %
FEV1FVC-PRE: 68 %
FEV1FVC-PRED: 75 %
FEV1SVC-PRE: 62 %
FEV1SVC-PRED: 64 %
FIFMAX-PRE: 3.44 L/SEC
FRCPLETH-%PRED-PRE: 100 %
FRCPLETH-PRE: 3.71 L
FRCPLETH-PRED: 3.7 L
FVC-%PRED-PRE: 95 %
FVC-PRE: 3.38 L
FVC-PRED: 3.54 L
GFR SERPL CREATININE-BSD FRML MDRD: 55 ML/MIN/{1.73_M2}
GLUCOSE SERPL-MCNC: 103 MG/DL (ref 70–99)
HCT VFR BLD AUTO: 33.8 % (ref 40–53)
HGB BLD-MCNC: 9.7 G/DL (ref 13.3–17.7)
IC-%PRED-PRE: 70 %
IC-PRE: 2.28 L
IC-PRED: 3.26 L
MCH RBC QN AUTO: 29 PG (ref 26.5–33)
MCHC RBC AUTO-ENTMCNC: 28.7 G/DL (ref 31.5–36.5)
MCV RBC AUTO: 101 FL (ref 78–100)
PLATELET # BLD AUTO: 225 10E9/L (ref 150–450)
POTASSIUM SERPL-SCNC: 3.9 MMOL/L (ref 3.4–5.3)
RADIOLOGIST FLAGS: NORMAL
RBC # BLD AUTO: 3.35 10E12/L (ref 4.4–5.9)
RVPLETH-%PRED-PRE: 80 %
RVPLETH-PRE: 2.3 L
RVPLETH-PRED: 2.86 L
SODIUM SERPL-SCNC: 139 MMOL/L (ref 133–144)
TLCPLETH-%PRED-PRE: 89 %
TLCPLETH-PRE: 5.99 L
TLCPLETH-PRED: 6.72 L
VA-%PRED-PRE: 85 %
VA-PRE: 4.98 L
VC-%PRED-PRE: 89 %
VC-PRE: 3.69 L
VC-PRED: 4.11 L
WBC # BLD AUTO: 8.3 10E9/L (ref 4–11)

## 2019-10-09 ENCOUNTER — ANCILLARY PROCEDURE (OUTPATIENT)
Dept: CARDIOLOGY | Facility: CLINIC | Age: 84
End: 2019-10-09
Attending: NURSE PRACTITIONER
Payer: MEDICARE

## 2019-10-09 ENCOUNTER — DISCHARGE SUMMARY NURSING HOME (OUTPATIENT)
Dept: GERIATRICS | Facility: CLINIC | Age: 84
End: 2019-10-09
Payer: MEDICARE

## 2019-10-09 ENCOUNTER — OFFICE VISIT (OUTPATIENT)
Dept: PULMONOLOGY | Facility: CLINIC | Age: 84
End: 2019-10-09
Payer: MEDICARE

## 2019-10-09 VITALS
WEIGHT: 167 LBS | BODY MASS INDEX: 26.21 KG/M2 | OXYGEN SATURATION: 98 % | RESPIRATION RATE: 18 BRPM | HEART RATE: 60 BPM | HEIGHT: 67 IN | TEMPERATURE: 98 F | SYSTOLIC BLOOD PRESSURE: 101 MMHG | DIASTOLIC BLOOD PRESSURE: 56 MMHG

## 2019-10-09 VITALS
BODY MASS INDEX: 26.24 KG/M2 | WEIGHT: 167.2 LBS | DIASTOLIC BLOOD PRESSURE: 56 MMHG | RESPIRATION RATE: 18 BRPM | TEMPERATURE: 98 F | SYSTOLIC BLOOD PRESSURE: 101 MMHG | HEART RATE: 60 BPM | HEIGHT: 67 IN | OXYGEN SATURATION: 94 %

## 2019-10-09 DIAGNOSIS — R53.81 PHYSICAL DECONDITIONING: ICD-10-CM

## 2019-10-09 DIAGNOSIS — I25.10 CORONARY ARTERY DISEASE INVOLVING NATIVE CORONARY ARTERY OF NATIVE HEART WITHOUT ANGINA PECTORIS: ICD-10-CM

## 2019-10-09 DIAGNOSIS — R33.9 URINARY RETENTION: ICD-10-CM

## 2019-10-09 DIAGNOSIS — I49.01 VENTRICULAR FIBRILLATION (H): Primary | ICD-10-CM

## 2019-10-09 DIAGNOSIS — R09.02 HYPOXIA: ICD-10-CM

## 2019-10-09 DIAGNOSIS — Z98.890 S/P THORACENTESIS: ICD-10-CM

## 2019-10-09 DIAGNOSIS — Z95.1 HX OF CABG: ICD-10-CM

## 2019-10-09 DIAGNOSIS — N18.30 CKD (CHRONIC KIDNEY DISEASE), STAGE III (H): ICD-10-CM

## 2019-10-09 DIAGNOSIS — J84.10 PULMONARY FIBROSIS (H): ICD-10-CM

## 2019-10-09 DIAGNOSIS — J90 BILATERAL PLEURAL EFFUSION: ICD-10-CM

## 2019-10-09 DIAGNOSIS — R91.8 PULMONARY NODULES: ICD-10-CM

## 2019-10-09 DIAGNOSIS — J44.1 COPD EXACERBATION (H): ICD-10-CM

## 2019-10-09 DIAGNOSIS — J41.0 SIMPLE CHRONIC BRONCHITIS (H): Primary | ICD-10-CM

## 2019-10-09 DIAGNOSIS — I50.32 CHRONIC DIASTOLIC CONGESTIVE HEART FAILURE (H): ICD-10-CM

## 2019-10-09 DIAGNOSIS — E11.8 TYPE 2 DIABETES MELLITUS WITH COMPLICATION, WITHOUT LONG-TERM CURRENT USE OF INSULIN (H): ICD-10-CM

## 2019-10-09 DIAGNOSIS — J81.0 ACUTE PULMONARY EDEMA (H): ICD-10-CM

## 2019-10-09 DIAGNOSIS — Z86.74 HISTORY OF CARDIAC ARREST: ICD-10-CM

## 2019-10-09 PROCEDURE — 99316 NF DSCHRG MGMT 30 MIN+: CPT | Performed by: NURSE PRACTITIONER

## 2019-10-09 PROCEDURE — G0463 HOSPITAL OUTPT CLINIC VISIT: HCPCS | Mod: ZF

## 2019-10-09 RX ORDER — ACETYLCYSTEINE 200 MG/ML
2 SOLUTION ORAL; RESPIRATORY (INHALATION) 4 TIMES DAILY
Qty: 120 ML | Refills: 0 | Status: SHIPPED | OUTPATIENT
Start: 2019-10-09 | End: 2020-01-01

## 2019-10-09 RX ORDER — BUMETANIDE 1 MG/1
1 TABLET ORAL DAILY
Qty: 30 TABLET | Refills: 0 | Status: SHIPPED | OUTPATIENT
Start: 2019-10-09 | End: 2020-01-01

## 2019-10-09 RX ORDER — METOPROLOL SUCCINATE 25 MG/1
12.5 TABLET, EXTENDED RELEASE ORAL DAILY
Qty: 30 TABLET | Refills: 0 | Status: SHIPPED | OUTPATIENT
Start: 2019-10-09 | End: 2020-01-01

## 2019-10-09 RX ORDER — NITROGLYCERIN 0.4 MG/1
TABLET SUBLINGUAL
Qty: 15 TABLET | Refills: 0 | Status: SHIPPED | OUTPATIENT
Start: 2019-10-09

## 2019-10-09 RX ORDER — PANTOPRAZOLE SODIUM 40 MG/1
40 TABLET, DELAYED RELEASE ORAL
Qty: 60 TABLET | Refills: 0 | Status: SHIPPED | OUTPATIENT
Start: 2019-10-09

## 2019-10-09 RX ORDER — ATORVASTATIN CALCIUM 40 MG/1
40 TABLET, FILM COATED ORAL EVERY EVENING
Qty: 30 TABLET | Refills: 0 | Status: SHIPPED | OUTPATIENT
Start: 2019-10-09

## 2019-10-09 RX ORDER — ALBUTEROL SULFATE 0.83 MG/ML
2.5 SOLUTION RESPIRATORY (INHALATION) 4 TIMES DAILY
Start: 2019-10-09 | End: 2019-10-09

## 2019-10-09 RX ORDER — ALBUTEROL SULFATE 0.83 MG/ML
2.5 SOLUTION RESPIRATORY (INHALATION) 4 TIMES DAILY
Qty: 2 BOX | Refills: 0 | Status: SHIPPED | OUTPATIENT
Start: 2019-10-09 | End: 2020-01-01

## 2019-10-09 ASSESSMENT — MIFFLIN-ST. JEOR
SCORE: 1412.04
SCORE: 1411.26

## 2019-10-09 ASSESSMENT — PAIN SCALES - GENERAL: PAINLEVEL: NO PAIN (0)

## 2019-10-09 NOTE — PATIENT INSTRUCTIONS
You were found to have Pulmonary nodule, which we continue to monitor  Continue taking your inhalers for your COPD  We will see back in 6 months with repeat CT-CHEST

## 2019-10-09 NOTE — NURSING NOTE
Chief Complaint   Patient presents with     RECHECK     4 week hospital follow up      Penelope Guzman CMA

## 2019-10-09 NOTE — PATIENT INSTRUCTIONS
It was a pleasure to see you in clinic today.  Please do not hesitate to call with any questions or concerns.  Your home monitor has been ordered and will be shipped to your home.  We look forward to seeing you in clinic at your next device check in 3 months.    Antony Whatley, RN  Electrophysiology Nurse Clinician  [facility]    During Business Hours Please Call:  [phone]  After Hours Please Call:  [phone] - select option #[phone] and ask for job code [phone]

## 2019-10-09 NOTE — PROGRESS NOTES
[facility] DISCHARGE SUMMARY  PATIENT'S NAME: José Aguirre  YOB: 1935  MEDICAL RECORD NUMBER:  [id number]  Place of Service where encounter took place:  [facility]  ([facility]) [[id number]]    PRIMARY CARE PROVIDER AND CLINIC RESPONSIBLE AFTER TRANSFER:   [facility], [address] [facility] [place] [address]    Non-G Provider     Transferring providers: Candace Carranza NP, Ewa Lima MD  Recent Hospitalization/ED: [facility] stay 08/27/2019 through 09/19/2019  Date of SNF Admission: September / 19 / 2019  Date of SNF (anticipated) Discharge: October / 11 / 2019  Discharged to: with family to home  Cognitive Scores: SLUMS: 27/30 and CPT: 4.7/5.6  Physical Function: feeding: independently, groom/hyg, UB/LB dressing. toileting: SBA to monitor O2. bed mobility: CGA, Transfers SBA, ambulates ' with no AD  DME: Home Nebulizer and Home Oxygen, see f2f on 10/7/19    CODE STATUS/ADVANCE DIRECTIVES DISCUSSION:  Full Code   ALLERGIES: Patient has no known allergies.    DISCHARGE DIAGNOSIS/NURSING FACILITY COURSE:   Patient admitted to TCU for medical management and cares after being hospitalized after suffering a cardiac arrest aVT/VF arrest and defibrillated him in the field. Coronary angiogram revealed severe multi-vessel CAD. He Underwent PCI to dLM/ostial LAD with CONCETTA and balloon agnioplasty to ostial LCx. Also received ICD 9/10/19 for secondary prevention. Hospitalization was complicated by pulmonary edema, pleural effusions and COPD exacerbation. He had a thoracentesis on 9/5. Due to work of breathing was on HFNC and bipap, treated with IV lasix and he was down 20 pounds, he was transitioned to oral bumex on discharge. COPD exacerbation was treated with z-pack, zosyn, rocephin and prednisone taper. During TCU stay his WBC remained elevated likely due to prednisone vs infection, WBC are trending down. Patient has  a history of urinary retention and was self cath at home but had a silva while hospitalized, this has been removed in TCU and he is able to straight cath again. He was admitted to TCU on 8 L per NC and is down to 3-5L. 3 L at rest and 5L with exertion.     Ventricular fibrillation (H)  Coronary artery disease involving native coronary artery of native heart without angina pectoris  Hx of CABG  History of cardiac arrest  Continue with lipitor, metoprolol, PRN nitroglycerin, Patient has defibrillator in place and is to follow up with device clinic today. He has steristrips intact.     Chronic diastolic congestive heart failure (H)  Bilateral pleural effusion  S/P thoracentesis  He continues on bumex, continue with daily weights. Lab monitoring.    Hypoxia  COPD exacerbation (H)  Acute pulmonary edema (H)  Pulmonary Fibrosis  Patient is following with pulmonary, last had PFT on 10/8/19. He saw pulmonary for a follow up on 10/9/19 and is to continue with current regimen of nebs and inhalers. Follow up in 6 months with a repeat CT scan.    CKD (chronic kidney disease), stage III (H)  Type 2 diabetes mellitus with complication, without long-term current use of insulin (H)  Continues to be diet controlled at this time. Last hemoglobin a1c was 5.9 on 8/28/19    Urinary retention  Continue with straight cath    Physical deconditioning  [facility] pt/ot/rn/hha      Past Medical History:  has a past medical history of Anemia, Atrophy of left kidney, CKD (chronic kidney disease), stage III (H), Claudication, intermittent (H), Coronary artery disease (2003), History of GI bleed (2012), Hypercholesterolemia, Hypertension, Left carotid artery stenosis, PVD (peripheral vascular disease) (H), and Renal artery stenosis (H).    Discharge Medications:  Current Outpatient Medications   Medication Sig Dispense Refill     acetylcysteine (MUCOMYST) 20 % neb solution Take 2 mLs by nebulization 4 times daily       Ascorbic Acid 500 MG  CHEW Take 500 mg by mouth daily       aspirin 81 MG EC tablet Take 81 mg by mouth daily       atorvastatin (LIPITOR) 40 MG tablet Take 1 tablet (40 mg) by mouth every evening       Benzocaine (HURRICAINE/TOPEX) 20 % AERO spray Take 1-2 sprays by mouth 3 times daily as needed for moderate pain       bumetanide (BUMEX) 1 MG tablet Take 1 tablet (1 mg) by mouth daily       clopidogrel (PLAVIX) 75 MG tablet Take 75 mg by mouth daily       ferrous sulfate (FEROSUL) 325 (65 Fe) MG tablet Take 325 mg by mouth daily (with breakfast)       ipratropium - albuterol 0.5 mg/2.5 mg/3 mL (DUONEB) 0.5-2.5 (3) MG/3ML neb solution Take 1 vial (3 mLs) by nebulization 4 times daily       metoprolol succinate ER (TOPROL-XL) 25 MG 24 hr tablet Take 0.5 tablets (12.5 mg) by mouth daily       mometasone (ASMANEX) 220 MCG/INH inhaler Inhale 1 puff into the lungs every morning AND 2 puffs every evening       multivitamin w/minerals (THERA-VIT-M) tablet Take 1 tablet by mouth daily       nitroGLYcerin (NITROSTAT) 0.4 MG sublingual tablet For chest pain place 1 tablet under the tongue every 5 minutes for 3 doses. If symptoms persist 5 minutes after 1st dose call 911.       pantoprazole (PROTONIX) 40 MG EC tablet Take 1 tablet (40 mg) by mouth 2 times daily (before meals)       potassium chloride (KLOR-CON) 20 MEQ packet Take 20 mEq by mouth 2 times daily        umeclidinium-vilanterol (ANORO ELLIPTA) 62.5-25 MCG/INH oral inhaler Inhale 1 puff into the lungs daily       vitamin A 20288 units capsule Take 2 capsules (20,000 Units) by mouth daily       vitamin D3 (CHOLECALCIFEROL) 2000 units (50 mcg) tablet Take 2,000 Units by mouth daily       zinc sulfate (ZINCATE) 220 (50 Zn) MG capsule Take 1 capsule (220 mg) by mouth daily       albuterol (PROAIR HFA/PROVENTIL HFA/VENTOLIN HFA) 108 (90 Base) MCG/ACT Inhaler Inhale 2 puffs into the lungs every 6 hours as needed for shortness of breath / dyspnea or wheezing         Medication  "Changes/Rationale:     none    Controlled medications sent with patient:   not applicable/none  all other medication refills/new sent to [facility] in [place]     ROS:   10 point ROS of systems including Constitutional, Eyes, Respiratory, Cardiovascular, Gastroenterology, Genitourinary, Integumentary, Musculoskeletal, Psychiatric were all negative except for pertinent positives noted in my HPI.    Physical Exam:   Vitals: /56   Pulse 60   Temp 98  F (36.7  C)   Resp 18   Ht 1.702 m (5' 7\")   Wt 75.8 kg (167 lb 3.2 oz)   SpO2 94%   BMI 26.19 kg/m    BMI= Body mass index is 26.19 kg/m .  GENERAL APPEARANCE:  Alert, in no distress, cooperative  ENT:  Mouth and posterior oropharynx normal, moist mucous membranes, [place]  EYES:  EOM, conjunctivae, lids, pupils and irises normal, wears glasses  RESP:  respiratory effort and palpation of chest normal, no respiratory distress, diminished breath sounds bilateral lower lobes  CV:  Palpation and auscultation of heart done , regular rate and rhythm, no murmur, rub, or gallop, no edema  ABDOMEN:  normal bowel sounds, soft, nontender, no hepatosplenomegaly or other masses  SKIN:  Inspection of skin and subcutaneous tissue baseline, Palpation of skin and subcutaneous tissue baseline, left upper chest device site, cdi without drainage or hematoma  NEURO:   Cranial nerves 2-12 are normal tested and grossly at patient's baseline  PSYCH:  oriented X 3, affect and mood normal     SNF labs: Labs done in SNF are in [facility]. Please refer to them using [employer]Care Everywhere. and Recent labs in [facility] reviewed by me today.       DISCHARGE PLAN:    Follow up labs: No labs orders/due, PCP to follow up on cbc/bmp    Medical Follow Up:      Follow up with primary care provider in 7-10 days  Follow up with specialist cardiology and pulmonary     MTM referral needed and placed by this provider: No    Current [place] scheduled appointments: 10/10/19    Discharge Services: Home " Care:  Occupational Therapy, Physical Therapy, Registered Nurse, Home Health Aide and From:  [facility]    Discharge Instructions Verbalized to Patient at Discharge:     Continue to follow your diet:  cardiac prudent, low fat, low chol    no added salt.     Weigh yourself daily in the morning and keep a record. Call your primary clinic: a) if you are more short of breath, or b) if your weight changes more than 3 pounds in one day or more than 5 pounds in one week.     OK to shower but no bathing or soaking until approved by surgeon.     Oxygen at 3-5 liters/minute via nasal cannula.       TOTAL DISCHARGE TIME:   Greater than 30 minutes  Electronically signed by:  Candace Carranza NP

## 2019-10-09 NOTE — PROGRESS NOTES
"Pulmonary Clinic Initial Visit Note    CC: \"Follow-up after hospital discharge\"      HPI:     63M with PMHx of CAD s/p CABG, CKD III, renal artery stenosis, CPFE (combined pulmonary fibrosis and emphysema) who was recently admitted to the hospital after cardiac arrest 2/2 LM occlusion.  He is here for follow up after hospitalization.  Patient's hospital course was complicated with Pneumonia and COPD exacerbation. Patient was treated with Abx and Steroids taper x 2 weeks. He is now presenting to the pulmonary clinic after his recent hospital admission. Since discharge, he reports feeling better, states that he is back to his baseline.  He has finished the prednisone taper.  He denies any recent fevers, chills, worsening cough or chest pain. He has had his Influenzae vaccine this year and is up to date with Pneumococcal vaccine.     PMH:  Past Medical History:   Diagnosis Date     Anemia      Atrophy of left kidney      CKD (chronic kidney disease), stage III (H)      Claudication, intermittent (H)      Coronary artery disease 2003    CAB x 4     History of GI bleed 2012    Hemorrhagic gastritis     Hypercholesterolemia      Hypertension      Left carotid artery stenosis     S/P Carotid Endarterectomy left      PVD (peripheral vascular disease) (H)      Renal artery stenosis (H)     stent R       Allergies:  No Known Allergies    Social History:  Social History     Socioeconomic History     Marital status:      Spouse name: Not on file     Number of children: Not on file     Years of education: Not on file     Highest education level: Not on file   Occupational History     Not on file   Social Needs     Financial resource strain: Not on file     Food insecurity:     Worry: Not on file     Inability: Not on file     Transportation needs:     Medical: Not on file     Non-medical: Not on file   Tobacco Use     Smoking status: Former Smoker     Packs/day: 1.00     Years: 50.00     Pack years: 50.00     Types: " Date of Service: 11/19/2018    INFECTIOUS DISEASES CONSULTATION    REFERRING PHYSICIAN:  Dr. Juan Gomez.    REASON FOR CONSULTATION:  Evaluation of patient with urinary tract infection for appropriate antimicrobial therapy.    CHIEF COMPLAINT:  Generalized weakness.    HISTORY OF PRESENT ILLNESS:  The patient is a 61-year-old female with past medical history of nonischemic cardiomyopathy, chronic systolic heart failure, atrial fibrillation, chronic anticoagulation with Coumadin, ventricular tachycardia status post ICD placement, morbid obesity, obstructive sleep apnea, history of bilateral lower extremity cellulitis, who is known to me from her previous admissions.  The patient was admitted after a fall.  On 09/11/2018 she had a syncopal episode and was transferred to CICU.  She is currently being monitored and treated for congestive heart failure.  She did have abnormal urinalysis at the time of admission.  She does endorse history of urinary frequency but no burning micturition prior to admission.  She has past history of multidrug resistant organisms.  She was started on Cefepime and has been receiving since admission.  Today is day #4.  Currently, she denies any irritating voiding symptoms of urination but has indwelling Mena catheter, which was placed by the CICU team for accurate monitoring of urinary output.    PAST MEDICAL HISTORY:  1.  Severe sleep apnea.  2.  Obesity hypoventilation syndrome.  3.  Nonischemic cardiomyopathy with ejection fraction 29%.  4.  Chronic systolic heart failure.  5.  Status post AICD placement.  6.  Atrial ablation.  7.  History of right frontal mass.  8.  History of past pulmonary embolism.  9.  Chronic anticoagulation.  10.  Pulmonary hypertension.  11.  Type 2 diabetes mellitus.  12.  Recurrent UTIs.    PAST SURGICAL HISTORY:   Significant for AICD placement, left leg embolectomy, ankle replacement, cholecystectomy.      FAMILY HISTORY:  Significant for diabetes and  Cigarettes     Last attempt to quit: 2001     Years since quittin.7   Substance and Sexual Activity     Alcohol use: Yes     Comment: 4 drinks week     Drug use: No     Sexual activity: Not on file   Lifestyle     Physical activity:     Days per week: Not on file     Minutes per session: Not on file     Stress: Not on file   Relationships     Social connections:     Talks on phone: Not on file     Gets together: Not on file     Attends Hindu service: Not on file     Active member of club or organization: Not on file     Attends meetings of clubs or organizations: Not on file     Relationship status: Not on file     Intimate partner violence:     Fear of current or ex partner: Not on file     Emotionally abused: Not on file     Physically abused: Not on file     Forced sexual activity: Not on file   Other Topics Concern      Service Not Asked     Blood Transfusions Not Asked     Caffeine Concern Yes     Comment: 4 - 7 cups (trying to cut down)     Occupational Exposure Not Asked     Hobby Hazards Not Asked     Sleep Concern Not Asked     Stress Concern Not Asked     Weight Concern Not Asked     Special Diet Yes     Comment: eats healthy     Back Care Not Asked     Exercise Yes     Comment: walking     Bike Helmet Not Asked     Seat Belt Not Asked     Self-Exams Not Asked     Parent/sibling w/ CABG, MI or angioplasty before 65F 55M? Not Asked   Social History Narrative    ** Merged History Encounter **            Medications:  Current Outpatient Medications   Medication Sig Dispense Refill     acetylcysteine (MUCOMYST) 20 % neb solution Take 2 mLs by nebulization 4 times daily       albuterol (PROVENTIL) (2.5 MG/3ML) 0.083% neb solution Take 1 vial (2.5 mg) by nebulization 4 times daily       Ascorbic Acid 500 MG CHEW Take 500 mg by mouth daily       aspirin 81 MG EC tablet Take 81 mg by mouth daily       atorvastatin (LIPITOR) 40 MG tablet Take 1 tablet (40 mg) by mouth every evening        arthritis in mother.    SOCIAL HISTORY:  The patient is a former smoker, quit in 1994.  No history of illicit drug use, excessive alcohol use.    ALLERGIES:  ACE inhibitors.    CURRENT INPATIENT MEDICATIONS:  1.  Aspirin.  2.  Carvedilol.  3.  Cefepime.  4.  Fentanyl.  5.  Lovenox for DVT prophylaxis.  6.  Lexapro.  7.  Humalog Insulin.  8.  Tradjenta.  9.  Metoprolol.  10.  Protonix.  11.  MiraLax.  12.  Senna.  13.  Zocor.  14.  Lasix drip.    REVIEW OF SYSTEMS:  Comprehensive 14-point review of systems was obtained and is negative except for pertinent positives mentioned in HPI.    PHYSICAL EXAMINATION  VITAL SIGNS:  Blood pressure 107/68, pulse is 82, temperature is 98.8.  GENERAL:  The patient is awake, alert, oriented x3, in no acute distress.  HEENT:  Normocephalic, atraumatic.  Pupils are equal.  No scleral icterus.  Oral exam reveals no thrush.  NECK:  Supple, no JVD, no lymphadenopathy, no thyromegaly.  CARDIOVASCULAR:  Pulses regular.  S1, S2 normal, no murmurs appreciated.  GASTROINTESTINAL:  Abdomen is soft, nontender, no distention.  Bowel sounds are normal.  Liver and spleen not palpable.  PULMONARY:  Lungs are bilaterally clear.    EXTREMITIES:  With mild edema, wounds are bandaged.  GENITOURINARY:  Patient has indwelling Mena catheter, no CV angle tenderness, no suprapubic tenderness.     LABORATORY DATA:  Creatinine 0.8.  White count 5.9, hemoglobin 8, platelet count 125,000.    Chest x-ray from today is reviewed.  Ultrasound of the kidneys 11/18/2018 did not show any hydronephrosis.  CT scan of the chest 11/16/2018 shows no pulmonary embolism, right basilar atelectasis, dilated main pulmonary artery.    IMPRESSION AND PLAN:  Acute urinary tract infection present on admission.  Urine culture shows mixed reagan.  Acute systolic heart failure.  Chronic kidney disease stage III.  Lower extremity wounds.  Avascular necrosis of hips.    PLAN AND RECOMMENDATIONS:  Continue IV Cefepime.  I asked   Benzocaine (HURRICAINE/TOPEX) 20 % AERO spray Take 1-2 sprays by mouth 3 times daily as needed for moderate pain       bumetanide (BUMEX) 1 MG tablet Take 1 tablet (1 mg) by mouth daily       clopidogrel (PLAVIX) 75 MG tablet Take 75 mg by mouth daily       ferrous sulfate (FEROSUL) 325 (65 Fe) MG tablet Take 325 mg by mouth daily (with breakfast)       metoprolol succinate ER (TOPROL-XL) 25 MG 24 hr tablet Take 0.5 tablets (12.5 mg) by mouth daily       mometasone (ASMANEX) 220 MCG/INH inhaler Inhale 1 puff into the lungs every morning AND 2 puffs every evening       multivitamin w/minerals (THERA-VIT-M) tablet Take 1 tablet by mouth daily       nitroGLYcerin (NITROSTAT) 0.4 MG sublingual tablet For chest pain place 1 tablet under the tongue every 5 minutes for 3 doses. If symptoms persist 5 minutes after 1st dose call 911.       pantoprazole (PROTONIX) 40 MG EC tablet Take 1 tablet (40 mg) by mouth 2 times daily (before meals)       potassium chloride (KLOR-CON) 20 MEQ packet Take 20 mEq by mouth 2 times daily        umeclidinium-vilanterol (ANORO ELLIPTA) 62.5-25 MCG/INH oral inhaler Inhale 1 puff into the lungs daily       vitamin A 00084 units capsule Take 2 capsules (20,000 Units) by mouth daily       vitamin D3 (CHOLECALCIFEROL) 2000 units (50 mcg) tablet Take 2,000 Units by mouth daily       zinc sulfate (ZINCATE) 220 (50 Zn) MG capsule Take 1 capsule (220 mg) by mouth daily         Family History:  Family History   Problem Relation Age of Onset     Cerebrovascular Disease Mother 90        unknown type     Cancer Brother 70        Brain cancer      Dementia Sister      Arthritis Brother        ROS: Complete 10 point ROS negative unless mentioned in HPI    Physical Exam:  There were no vitals taken for this visit.    General: Sitting in the chair in NAD  HEENT: anicteric, moist mucosa  Neck: no palpable lymphadenopathy, no JVD noted  Chest: CTAB, decrease sounds at the bases   Cardiac: RRR no  microbiology lab to isolate gram negatives from mixed reagan.  Will change antibiotics accordingly.  The patient will need treatment for 7-10 days.      Dictated By: Brian David MD  Signing Provider: Brian David MD    MI/alia ([id number])  DD: 11/19/2018 14:56:19 TD: 11/19/2018 15:14:25    Copy Sent To:      murmurs  Abdomen: Soft, flat, non tender, active BS  Extremities: No LE Edema  Neuro: in wheel chair, no focal deficit   Psych: normal affect      Labs and Radiology:    IMPRESSION:   1. Improved acute airspace/interstitial opacities. Tree in bud  opacities likely represent indolent infection such as with  mycobacteria or recurrent chronic aspiration.  2. Stable reticular and cystic changes of the lung bases possibly  combined pulmonary fibrosis and emphysema and with plaques likely also  suggesting asbestosis.  4. Lung nodules, recommend follow up 3-6 months.    PFT's:  PFT Latest Ref Rng & Units 10/8/2019   FVC L 3.38   FEV1 L 2.28   FVC% % 95   FEV1% % 87       Assessment and Plan:  63M with PMHx of CAD s/p CABG, CKD III, renal artery stenosis, CPFE who was recently admitted to the hospital after MI. He course was complicated with COPD exacerbation which was treated with steroids burst for 2 weeks. Since his discharge he has been recovering well and feels close to his baseline. His CT-CHEST shows changes consistent with combined pulmonary fibrosis and emphysema. Scan also showed 5mm RML pulmonary nodule. His PFT's showed normal spirometery with severely decreased DLCO. Will continue him on his current LAMA/LABA and ICS inhalers.       # Chronic hypoxic respiratory failure 2/2 CPFE  # Pulmonary nodules (largest 5 mm RML)   # Pleural plaques 2/2 Asbestosis     Plan   - Continue BAM, LAMA/LABA and ICS inhalers   - Follow up with Pulmonary with CT-CHEST in 6 months       Seen and staffed with Leyla Chanel MD  Pulmonary and Critical Care Fellow        I saw and evaluated patient with Fellow.  Case discussed - agree with note.  I reivewed PFT: normal spirometry and lung volumes with severe diffusion defect; no prior PFT here from comparison.  He likely has CPFE (combined pulmonary fibrosis and emphysema); the pulm fibrosis component looks like asbestosis by chest CT, with calcified pleural plaques.  Needs  follow up chest CT for lung nodules.  He had the prevnar vaccine, but not the 23-valent pneumovax. Consider administering at next visit.    CASSANDRA STINSON M.D.

## 2019-10-09 NOTE — LETTER
10/9/2019        RE: José Aguirre  [address]  [facility] [address]        [facility] DISCHARGE SUMMARY  PATIENT'S NAME: José Aguirre  YOB: 1935  MEDICAL RECORD NUMBER:  [id number]  Place of Service where encounter took place:  [facility]  ([facility]) [[id number]]    PRIMARY CARE PROVIDER AND CLINIC RESPONSIBLE AFTER TRANSFER:   [facility] [place] [facility], [address] [facility] / [place] [address]    Non-[facility] Provider     Transferring providers: Candace Carranza NP, Ewa Lima MD  Recent Hospitalization/ED: [facility] Hospital stay 08/27/2019 through 09/19/2019  Date of SNF Admission: September / 19 / 2019  Date of SNF (anticipated) Discharge: October / 11 / 2019  Discharged to: with family to home  Cognitive Scores: SLUMS: 27/30 and CPT: 4.7/5.6  Physical Function: feeding: independently, groom/hyg, UB/LB dressing. toileting: SBA to monitor O2. bed mobility: CGA, Transfers SBA, ambulates ' with no AD  DME: Home Nebulizer and Home Oxygen, see f2f on 10/7/19    CODE STATUS/ADVANCE DIRECTIVES DISCUSSION:  Full Code   ALLERGIES: Patient has no known allergies.    DISCHARGE DIAGNOSIS/NURSING FACILITY COURSE:   Patient admitted to TCU for medical management and cares after being hospitalized after suffering a cardiac arrest aVT/VF arrest and defibrillated him in the field. Coronary angiogram revealed severe multi-vessel CAD. He Underwent PCI to dLM/ostial LAD with CONCETTA and balloon agnioplasty to ostial LCx. Also received ICD 9/10/19 for secondary prevention. Hospitalization was complicated by pulmonary edema, pleural effusions and COPD exacerbation. He had a thoracentesis on 9/5. Due to work of breathing was on HFNC and bipap, treated with IV lasix and he was down 20 pounds, he was transitioned to oral bumex on discharge. COPD exacerbation was treated with z-pack, zosyn, rocephin and prednisone taper. During TCU stay  his WBC remained elevated likely due to prednisone vs infection, WBC are trending down. Patient has a history of urinary retention and was self cath at home but had a silva while hospitalized, this has been removed in TCU and he is able to straight cath again. He was admitted to TCU on 8 L per NC and is down to 3-5L. 3 L at rest and 5L with exertion.     Ventricular fibrillation (H)  Coronary artery disease involving native coronary artery of native heart without angina pectoris  Hx of CABG  History of cardiac arrest  Continue with lipitor, metoprolol, PRN nitroglycerin, Patient has defibrillator in place and is to follow up with device clinic today. He has steristrips intact.     Chronic diastolic congestive heart failure (H)  Bilateral pleural effusion  S/P thoracentesis  He continues on bumex, continue with daily weights. Lab monitoring.    Hypoxia  COPD exacerbation (H)  Acute pulmonary edema (H)  Patient is following with pulmonary, last had PFT on 10/8/19. He saw pulmonary for a follow up on 10/9/19 and is to continue with current regimen of nebs and inhalers. Follow up in 6 months with a repeat CT scan.    CKD (chronic kidney disease), stage III (H)  Type 2 diabetes mellitus with complication, without long-term current use of insulin (H)  Continues to be diet controlled at this time. Last hemoglobin a1c was 5.9 on 8/28/19    Urinary retention  Continue with straight cath    Physical deconditioning  [facility] pt/ot/rn/hha      Past Medical History:  has a past medical history of Anemia, Atrophy of left kidney, CKD (chronic kidney disease), stage III (H), Claudication, intermittent (H), Coronary artery disease (2003), History of GI bleed (2012), Hypercholesterolemia, Hypertension, Left carotid artery stenosis, PVD (peripheral vascular disease) (H), and Renal artery stenosis (H).    Discharge Medications:  Current Outpatient Medications   Medication Sig Dispense Refill     acetylcysteine (MUCOMYST) 20 % neb  solution Take 2 mLs by nebulization 4 times daily       Ascorbic Acid 500 MG CHEW Take 500 mg by mouth daily       aspirin 81 MG EC tablet Take 81 mg by mouth daily       atorvastatin (LIPITOR) 40 MG tablet Take 1 tablet (40 mg) by mouth every evening       Benzocaine (HURRICAINE/TOPEX) 20 % AERO spray Take 1-2 sprays by mouth 3 times daily as needed for moderate pain       bumetanide (BUMEX) 1 MG tablet Take 1 tablet (1 mg) by mouth daily       clopidogrel (PLAVIX) 75 MG tablet Take 75 mg by mouth daily       ferrous sulfate (FEROSUL) 325 (65 Fe) MG tablet Take 325 mg by mouth daily (with breakfast)       ipratropium - albuterol 0.5 mg/2.5 mg/3 mL (DUONEB) 0.5-2.5 (3) MG/3ML neb solution Take 1 vial (3 mLs) by nebulization 4 times daily       metoprolol succinate ER (TOPROL-XL) 25 MG 24 hr tablet Take 0.5 tablets (12.5 mg) by mouth daily       mometasone (ASMANEX) 220 MCG/INH inhaler Inhale 1 puff into the lungs every morning AND 2 puffs every evening       multivitamin w/minerals (THERA-VIT-M) tablet Take 1 tablet by mouth daily       nitroGLYcerin (NITROSTAT) 0.4 MG sublingual tablet For chest pain place 1 tablet under the tongue every 5 minutes for 3 doses. If symptoms persist 5 minutes after 1st dose call 911.       pantoprazole (PROTONIX) 40 MG EC tablet Take 1 tablet (40 mg) by mouth 2 times daily (before meals)       potassium chloride (KLOR-CON) 20 MEQ packet Take 20 mEq by mouth 2 times daily        umeclidinium-vilanterol (ANORO ELLIPTA) 62.5-25 MCG/INH oral inhaler Inhale 1 puff into the lungs daily       vitamin A 73864 units capsule Take 2 capsules (20,000 Units) by mouth daily       vitamin D3 (CHOLECALCIFEROL) 2000 units (50 mcg) tablet Take 2,000 Units by mouth daily       zinc sulfate (ZINCATE) 220 (50 Zn) MG capsule Take 1 capsule (220 mg) by mouth daily       albuterol (PROAIR HFA/PROVENTIL HFA/VENTOLIN HFA) 108 (90 Base) MCG/ACT Inhaler Inhale 2 puffs into the lungs every 6 hours as needed for  "shortness of breath / dyspnea or wheezing         Medication Changes/Rationale:     none    Controlled medications sent with patient:   not applicable/none  all other medication refills/new sent to [facility] in [place]     ROS:   10 point ROS of systems including Constitutional, Eyes, Respiratory, Cardiovascular, Gastroenterology, Genitourinary, Integumentary, Musculoskeletal, Psychiatric were all negative except for pertinent positives noted in my HPI.    Physical Exam:   Vitals: /56   Pulse 60   Temp 98  F (36.7  C)   Resp 18   Ht 1.702 m (5' 7\")   Wt 75.8 kg (167 lb 3.2 oz)   SpO2 94%   BMI 26.19 kg/m     BMI= Body mass index is 26.19 kg/m .  GENERAL APPEARANCE:  Alert, in no distress, cooperative  ENT:  Mouth and posterior oropharynx normal, moist mucous membranes, [place]  EYES:  EOM, conjunctivae, lids, pupils and irises normal, wears glasses  RESP:  respiratory effort and palpation of chest normal, no respiratory distress, diminished breath sounds bilateral lower lobes  CV:  Palpation and auscultation of heart done , regular rate and rhythm, no murmur, rub, or gallop, no edema  ABDOMEN:  normal bowel sounds, soft, nontender, no hepatosplenomegaly or other masses  SKIN:  Inspection of skin and subcutaneous tissue baseline, Palpation of skin and subcutaneous tissue baseline, left upper chest device site, cdi without drainage or hematoma  NEURO:   Cranial nerves 2-12 are normal tested and grossly at patient's baseline  PSYCH:  oriented X 3, affect and mood normal     SNF labs: Labs done in SNF are in [place] EPIC. Please refer to them using [facility] Everywhere. and [facility] labs [facility] reviewed by me today.       DISCHARGE PLAN:    Follow up labs: No labs orders/due, PCP to follow up on cbc/bmp    Medical Follow Up:      Follow up with primary care provider in 7-10 days  Follow up with specialist cardiology and pulmonary     MTM referral needed and placed by this provider: No    Current [place] " scheduled appointments: 10/10/19    Discharge Services: Home Care:  Occupational Therapy, Physical Therapy, Registered Nurse, Home Health Aide and From:  [facility]    Discharge Instructions Verbalized to Patient at Discharge:     Continue to follow your diet:  cardiac prudent, low fat, low chol    no added salt.     Weigh yourself daily in the morning and keep a record. Call your primary clinic: a) if you are more short of breath, or b) if your weight changes more than 3 pounds in one day or more than 5 pounds in one week.     OK to shower but no bathing or soaking until approved by surgeon.     Oxygen at 3-5 liters/minute via nasal cannula.       TOTAL DISCHARGE TIME:   Greater than 30 minutes  Electronically signed by:  Candace Carranza NP                   Sincerely,        Candace Carranza NP

## 2019-10-10 ENCOUNTER — OFFICE VISIT (OUTPATIENT)
Dept: CARDIOLOGY | Facility: CLINIC | Age: 84
End: 2019-10-10
Attending: NURSE PRACTITIONER
Payer: MEDICARE

## 2019-10-10 VITALS
DIASTOLIC BLOOD PRESSURE: 58 MMHG | WEIGHT: 167 LBS | SYSTOLIC BLOOD PRESSURE: 128 MMHG | BODY MASS INDEX: 24.73 KG/M2 | HEIGHT: 69 IN | HEART RATE: 77 BPM | OXYGEN SATURATION: 90 %

## 2019-10-10 DIAGNOSIS — I50.32 CHRONIC DIASTOLIC CONGESTIVE HEART FAILURE (H): ICD-10-CM

## 2019-10-10 DIAGNOSIS — I46.9 CARDIAC ARREST (H): ICD-10-CM

## 2019-10-10 DIAGNOSIS — I25.10 CORONARY ARTERY DISEASE INVOLVING NATIVE CORONARY ARTERY OF NATIVE HEART WITHOUT ANGINA PECTORIS: Primary | ICD-10-CM

## 2019-10-10 DIAGNOSIS — J41.0 SIMPLE CHRONIC BRONCHITIS (H): ICD-10-CM

## 2019-10-10 PROCEDURE — 99214 OFFICE O/P EST MOD 30 MIN: CPT | Mod: ZP | Performed by: NURSE PRACTITIONER

## 2019-10-10 PROCEDURE — G0463 HOSPITAL OUTPT CLINIC VISIT: HCPCS | Mod: ZF

## 2019-10-10 ASSESSMENT — MIFFLIN-ST. JEOR: SCORE: 1442.89

## 2019-10-10 ASSESSMENT — PAIN SCALES - GENERAL: PAINLEVEL: NO PAIN (0)

## 2019-10-10 NOTE — LETTER
10/10/2019      RE: José Augirre  [id number]  [facility] [address]       Dear Colleague,    Thank you for the opportunity to participate in the care of your patient, José Aguirre, at the [facility] at [facility]. Please see a copy of my visit note below.    Chief Complaint:   Chief Complaint   Patient presents with     Follow Up     follow up after ED visit 8-27-19       HPI: Presents to clinic today for hospital follow-up.  He was admitted on August 27, 2019 after out-of-hospital cardiac arrest while at the [facility].  He did receive bystander CPR and on arrival of EMS was found to be in VT/VF.  He was defibrillated once and given 1 round of epi and had return of circulation.  On arrival to the hospital he was emergently taken to the cardiac Cath Lab and was found to have native three-vessel coronary artery disease with severe stenosis of the left main/LAD and  of proximal RCA with left-to-right collaterals.  His LIMA to LAD graft was minimally diseased but noted to be small with primarily retrograde filling via the native LAD.  SVG to the RCA, SVG to diagonal 1, and SVG to OM 2 were all closed.  He underwent PCI to the distal left main/ostial LAD with drug-eluting stent and also had balloon angioplasty the ostium of circumflex vessel.  It was felt that his burden of coronary artery disease was unlikely to be the source of his cardiac arrest therefore he underwent ICD placement on September 10, 2019.  His hospital course was also complicated by pulmonary edema, COPD exacerbation, rib fracture, and urinary retention.  He is presently at a rehabilitation facility with plans to be discharged in the next 2 to 3 days.  He tells me he is tolerating physical therapy and occupational therapy without any difficulties.  He denies any exertional breathlessness or angina.  He also denies any peripheral edema.      PAST MEDICAL HISTORY:  Past  Medical History:   Diagnosis Date     Anemia      Atrophy of left kidney      CKD (chronic kidney disease), stage III (H)      Claudication, intermittent (H)      Coronary artery disease 2003    CAB x 4     History of GI bleed 2012    Hemorrhagic gastritis     Hypercholesterolemia      Hypertension      Left carotid artery stenosis     S/P Carotid Endarterectomy left      PVD (peripheral vascular disease) (H)      Renal artery stenosis (H)     stent R       CURRENT MEDICATIONS:  Current Outpatient Medications   Medication Sig Dispense Refill     acetylcysteine (MUCOMYST) 20 % neb solution Take 2 mLs by nebulization 4 times daily 120 mL 0     albuterol (PROVENTIL) (2.5 MG/3ML) 0.083% neb solution Take 1 vial (2.5 mg) by nebulization 4 times daily 2 Box 0     Ascorbic Acid 500 MG CHEW Take 500 mg by mouth daily       aspirin 81 MG EC tablet Take 81 mg by mouth daily       atorvastatin (LIPITOR) 40 MG tablet Take 1 tablet (40 mg) by mouth every evening 30 tablet 0     Benzocaine (HURRICAINE/TOPEX) 20 % AERO spray Take 1-2 sprays by mouth 3 times daily as needed for moderate pain       bumetanide (BUMEX) 1 MG tablet Take 1 tablet (1 mg) by mouth daily 30 tablet 0     clopidogrel (PLAVIX) 75 MG tablet Take 75 mg by mouth daily       ferrous sulfate (FEROSUL) 325 (65 Fe) MG tablet Take 325 mg by mouth daily (with breakfast)       metoprolol succinate ER (TOPROL-XL) 25 MG 24 hr tablet Take 0.5 tablets (12.5 mg) by mouth daily 30 tablet 0     mometasone (ASMANEX) 220 MCG/INH inhaler Inhale 1 puff into the lungs every morning AND 2 puffs every evening 1 each 0     mometasone-formoterol (DULERA) 100-5 MCG/ACT inhaler Inhale 2 puffs into the lungs 2 times daily 1 Inhaler 11     multivitamin w/minerals (THERA-VIT-M) tablet Take 1 tablet by mouth daily       nitroGLYcerin (NITROSTAT) 0.4 MG sublingual tablet For chest pain place 1 tablet under the tongue every 5 minutes for 3 doses. If symptoms persist 5 minutes after 1st dose  call 911. 15 tablet 0     pantoprazole (PROTONIX) 40 MG EC tablet Take 1 tablet (40 mg) by mouth 2 times daily (before meals) 60 tablet 0     potassium chloride (KLOR-CON) 20 MEQ packet Take 20 mEq by mouth 2 times daily        umeclidinium-vilanterol (ANORO ELLIPTA) 62.5-25 MCG/INH oral inhaler Inhale 1 puff into the lungs daily 14 Inhaler 0     vitamin A 54426 units capsule Take 2 capsules (20,000 Units) by mouth daily       vitamin D3 (CHOLECALCIFEROL) 2000 units (50 mcg) tablet Take 2,000 Units by mouth daily       zinc sulfate (ZINCATE) 220 (50 Zn) MG capsule Take 1 capsule (220 mg) by mouth daily         PAST SURGICAL HISTORY:  Past Surgical History:   Procedure Laterality Date     BYPASS GRAFT ARTERY CORONARY  06/2003    LIMA=LAD, SVG=Diag, SVG=OM2, SVG=PDA     CAROTID ENDARTERECTOMY Left      CV CORONARY ANGIOGRAM N/A 8/27/2019    Procedure: Coronary Angiogram;  Surgeon: Andrew Garcia MD;  Location:  HEART CARDIAC CATH LAB     CV PCI STENT DRUG ELUTING N/A 8/27/2019    Procedure: PCI Stent Drug Eluting;  Surgeon: Andrew Garcia MD;  Location:  HEART CARDIAC CATH LAB     ENDARTERECTOMY CAROTID Left 7/11/2017    Procedure: ENDARTERECTOMY CAROTID;  REDO LEFT CAROTID ENDARTERECTOMY WITH RIGHT ANKLE GREATER SAPHENOUS VEIN  ;  Surgeon: Khurram Bishop MD;  Location: [facility] OR     EP ICD N/A 9/10/2019    Procedure: EP ICD;  Surgeon: Shorty Marion MD;  Location:  HEART CARDIAC CATH LAB     EYE SURGERY       HEART CATH LEFT HEART CATH  06/2003    Severe multivessel disease     IR THORACENTESIS  9/5/2019     RENAL ARTERY ANGIOGRAM Right 05/2013    with stenting     TONSILLECTOMY         ALLERGIES   No Known Allergies    FAMILY HISTORY:  Family History   Problem Relation Age of Onset     Cerebrovascular Disease Mother 90        unknown type     Cancer Brother 70        Brain cancer      Dementia Sister      Arthritis Brother        SOCIAL HISTORY:  Social History     Socioeconomic History      Marital status:      Spouse name: Not on file     Number of children: Not on file     Years of education: Not on file     Highest education level: Not on file   Occupational History     Not on file   Social Needs     Financial resource strain: Not on file     Food insecurity:     Worry: Not on file     Inability: Not on file     Transportation needs:     Medical: Not on file     Non-medical: Not on file   Tobacco Use     Smoking status: Former Smoker     Packs/day: 1.00     Years: 50.00     Pack years: 50.00     Types: Cigarettes     Last attempt to quit: 2001     Years since quittin.7   Substance and Sexual Activity     Alcohol use: Yes     Comment: 4 drinks week     Drug use: No     Sexual activity: Not on file   Lifestyle     Physical activity:     Days per week: Not on file     Minutes per session: Not on file     Stress: Not on file   Relationships     Social connections:     Talks on phone: Not on file     Gets together: Not on file     Attends [employer] service: Not on file     Active member of club or organization: Not on file     Attends meetings of clubs or organizations: Not on file     Relationship status: Not on file     Intimate partner violence:     Fear of current or ex partner: Not on file     Emotionally abused: Not on file     Physically abused: Not on file     Forced sexual activity: Not on file   Other Topics Concern      Service Not Asked     Blood Transfusions Not Asked     Caffeine Concern Yes     Comment: 4 - 7 cups (trying to cut down)     Occupational Exposure Not Asked     Hobby Hazards Not Asked     Sleep Concern Not Asked     Stress Concern Not Asked     Weight Concern Not Asked     Special Diet Yes     Comment: eats healthy     Back Care Not Asked     Exercise Yes     Comment: walking     Bike Helmet Not Asked     Seat Belt Not Asked     Self-Exams Not Asked     Parent/sibling w/ CABG, MI or angioplasty before 65F 55M? Not Asked   Social History Narrative    **  "Merged History Encounter **            ROS:    ROS:all other systems negative, other than the symptoms noted above in the HPI.     EXAM:  /58   Pulse 77   Ht 1.753 m (5' 9\")   Wt 75.8 kg (167 lb)   SpO2 90%   BMI 24.66 kg/m     GEN:patient is in no apparent distress.    HEENT: NC/AT.  Sclerae white, no incertus  Neck: No adenopathy.Carotids brisk bilaterally without bruits.  No jugular venous distension.   Heart:RRR. Normal S1, S2. No murmur, rub, click, or gallop.   Lungs: Expiratory wheezes bilaterally, normal excursion, no rales.  Abdomen: Soft, nontender, nondistended.  Extremities: No clubbing, cyanosis, or edema.  The pulses are 2+ at the bilateral radial, DP, and PT  Neurologic: Awake and alert, normal speech, gait and affect  Skin: No petechiae, purpura or rash noted    Labs:  LIPID RESULTS:  Lab Results   Component Value Date    CHOL 119 08/27/2019    HDL 35 (L) 08/27/2019    LDL 69 08/27/2019    TRIG 72 08/27/2019    CHOLHDLRATIO 3.5 11/02/2015       LIVER ENZYME RESULTS:  Lab Results   Component Value Date    AST 20 09/08/2019    ALT 29 09/08/2019       CBC RESULTS:  Lab Results   Component Value Date    WBC 8.3 10/08/2019    RBC 3.35 (L) 10/08/2019    HGB 9.7 (L) 10/08/2019    HCT 33.8 (L) 10/08/2019     (H) 10/08/2019    MCH 29.0 10/08/2019    MCHC 28.7 (L) 10/08/2019    RDW 17.6 (H) 10/08/2019     10/08/2019       BMP RESULTS:  Lab Results   Component Value Date     10/08/2019    POTASSIUM 3.9 10/08/2019    CHLORIDE 107 10/08/2019    CO2 23 10/08/2019    ANIONGAP 9 10/08/2019     (H) 10/08/2019    BUN 17 10/08/2019    CR 1.21 10/08/2019    GFRESTIMATED 55 (L) 10/08/2019    GFRESTBLACK 63 10/08/2019    YOON 8.9 10/08/2019        A1C RESULTS:  Lab Results   Component Value Date    A1C 5.9 (H) 08/28/2019       INR RESULTS:  Lab Results   Component Value Date    INR 1.40 (H) 08/31/2019    INR 1.46 (H) 08/28/2019       Procedures/Diagnostics:  Coronary angiography with " PCI August 27, 2019:  Conclusion       Severe 3-vessel coronary artery disease as described below with severe LM/LAD lesion and  of the proximal RCA with L->R collaterals    LIMA-LAD graft is minimally diseased but small, with primarily retrograde flow through native LAD    SVG-RCA graft is chronically closed at the aortic anastamosis. SVG-D1 could not be selectively injected but appears closed due to dye hangup during injection of the native coronaries. There may also be a closed graft to OM2 but this is not certain    Successful PCI of the distal LM/ostial LAD with 2 Synergy CONCETTA (4.0 x 12 and 3.5 x 8 from proximal to distal)    Successful balloon angioplasty of the ostial LCx    Successful Thermagard placement in the RFV          Plan       Follow bedrest per protocol    Continued medical management and lifestyle modifications for cardiovascular risk factor optimizations.  * CT Head/C/A/P  * Leave RFA sheath in place during hypothermia due to coagulopathy  * Aspirin 81 lifelong  * Dual anti-platelet therapy for at least 1 year, and potentially longer if tolerated  * Low-intensity heparin drip while RFA sheath remains in place  * High-intensity statin  * Cardiac rehab referral  * Further plans per primary team   Coronary Findings     Diagnostic   Dominance: Left   Left Main   Dist LM to Ost LAD lesion is 70% stenosed.   Left Anterior Descending   First Diagonal Branch   Ost 1st Diag lesion is 50% stenosed.   Left Circumflex   There appears to be a missing obtuse marginal somewhere after OM1   Ost Cx lesion is 40% stenosed.   Mid Cx lesion is 50% stenosed.   First Obtuse Marginal Branch   The vessel is small.   Second Left Posterolateral Branch   The vessel is small.   2nd LPL lesion is 60% stenosed.   Right Coronary Artery   The vessel is small.   Prox RCA lesion is 100% stenosed. The lesion is chronic total occlusion.   Right Posterior Descending Artery   Collaterals   RPDA filled by collaterals from 1st Sept.       OTOOLE Graft to Mid LAD   The graft is small. The graft is angiographically normal. Small LIMA without significant disease; predominantly retrograde filling   Vein Graft to Ost RPDA   Ostial stump   Origin lesion is 100% stenosed.   Graft to 1st Diag   The flow in the graft is reversed. Unable to locate aortic vein graft takeoff, but fills retrograde through diagonal with dye hangup consistent with stagnant flow and thrombus   Origin lesion is 100% stenosed.         Assessment and Plan:   1.  Cardiac arrest: Functional he is improving since he has been discharged to rehab facility.  He did receive a defibrillator for secondary prevention while hospitalized.  His incision site is well approximated there is no erythema and is healing well.  He did inquire about driving and I told him from my perspective he is not allowed to drive for at least 6 months after implantation of his defibrillator.  He is following up with electrophysiology in January and I told him he could reengage them regarding driving at that time.    2.  Coronary disease history of current bypass grafting and now status post PCI to the native left main and ostial left circumflex: Asymptomatic at this time.  I told him that he should maintain on dual antiplatelet therapy indefinitely.  He is also on metoprolol and high intensity statin therapy.    3.  COPD: He was on oxygen prior to admission in the hospital.  It does not sound as though his oxygen requirements have increased at all.    4.  History of heart failure with preserved ejection fraction: From a volume perspective he is stable today.    He will follow-up with electrophysiology in January 2020.  I asked him to schedule an appointment  or HÉCTOR Guerra in the next 6-8 weeks.    MARKUS Perry CNP  10/10/19  11:24 AM      CC  Patient Care Team:  Devin, Javier Pickard as PCP - General  Inderjit Kenny MD ([facility])  Kimmy Moseley APRN CNP as Assigned  PCP  Ana Gallo, RT as Chronic Pulmonary Disease Specialist (Pulmonary)  Care, [facility] ([facility] ([facility]), ([place]))  KAY MATTHEWS A

## 2019-10-10 NOTE — PROGRESS NOTES
Chief Complaint:   Chief Complaint   Patient presents with     Follow Up     follow up after ED visit 8-27-19       HPI: Presents to clinic today for hospital follow-up.  He was admitted on August 27, 2019 after out-of-hospital cardiac arrest while at the [facility].  He did receive bystander CPR and on arrival of EMS was found to be in VT/VF.  He was defibrillated once and given 1 round of epi and had return of circulation.  On arrival to the hospital he was emergently taken to the cardiac Cath Lab and was found to have native three-vessel coronary artery disease with severe stenosis of the left main/LAD and  of proximal RCA with left-to-right collaterals.  His LIMA to LAD graft was minimally diseased but noted to be small with primarily retrograde filling via the native LAD.  SVG to the RCA, SVG to diagonal 1, and SVG to OM 2 were all closed.  He underwent PCI to the distal left main/ostial LAD with drug-eluting stent and also had balloon angioplasty the ostium of circumflex vessel.  It was felt that his burden of coronary artery disease was unlikely to be the source of his cardiac arrest therefore he underwent ICD placement on September 10, 2019.  His hospital course was also complicated by pulmonary edema, COPD exacerbation, rib fracture, and urinary retention.  He is presently at a rehabilitation facility with plans to be discharged in the next 2 to 3 days.  He tells me he is tolerating physical therapy and occupational therapy without any difficulties.  He denies any exertional breathlessness or angina.  He also denies any peripheral edema.      PAST MEDICAL HISTORY:  Past Medical History:   Diagnosis Date     Anemia      Atrophy of left kidney      CKD (chronic kidney disease), stage III (H)      Claudication, intermittent (H)      Coronary artery disease 2003    CAB x 4     History of GI bleed 2012    Hemorrhagic gastritis     Hypercholesterolemia      Hypertension      Left carotid artery  stenosis     S/P Carotid Endarterectomy left      PVD (peripheral vascular disease) (H)      Renal artery stenosis (H)     stent R       CURRENT MEDICATIONS:  Current Outpatient Medications   Medication Sig Dispense Refill     acetylcysteine (MUCOMYST) 20 % neb solution Take 2 mLs by nebulization 4 times daily 120 mL 0     albuterol (PROVENTIL) (2.5 MG/3ML) 0.083% neb solution Take 1 vial (2.5 mg) by nebulization 4 times daily 2 Box 0     Ascorbic Acid 500 MG CHEW Take 500 mg by mouth daily       aspirin 81 MG EC tablet Take 81 mg by mouth daily       atorvastatin (LIPITOR) 40 MG tablet Take 1 tablet (40 mg) by mouth every evening 30 tablet 0     Benzocaine (HURRICAINE/TOPEX) 20 % AERO spray Take 1-2 sprays by mouth 3 times daily as needed for moderate pain       bumetanide (BUMEX) 1 MG tablet Take 1 tablet (1 mg) by mouth daily 30 tablet 0     clopidogrel (PLAVIX) 75 MG tablet Take 75 mg by mouth daily       ferrous sulfate (FEROSUL) 325 (65 Fe) MG tablet Take 325 mg by mouth daily (with breakfast)       metoprolol succinate ER (TOPROL-XL) 25 MG 24 hr tablet Take 0.5 tablets (12.5 mg) by mouth daily 30 tablet 0     mometasone (ASMANEX) 220 MCG/INH inhaler Inhale 1 puff into the lungs every morning AND 2 puffs every evening 1 each 0     mometasone-formoterol (DULERA) 100-5 MCG/ACT inhaler Inhale 2 puffs into the lungs 2 times daily 1 Inhaler 11     multivitamin w/minerals (THERA-VIT-M) tablet Take 1 tablet by mouth daily       nitroGLYcerin (NITROSTAT) 0.4 MG sublingual tablet For chest pain place 1 tablet under the tongue every 5 minutes for 3 doses. If symptoms persist 5 minutes after 1st dose call 911. 15 tablet 0     pantoprazole (PROTONIX) 40 MG EC tablet Take 1 tablet (40 mg) by mouth 2 times daily (before meals) 60 tablet 0     potassium chloride (KLOR-CON) 20 MEQ packet Take 20 mEq by mouth 2 times daily        umeclidinium-vilanterol (ANORO ELLIPTA) 62.5-25 MCG/INH oral inhaler Inhale 1 puff into the lungs  daily 14 Inhaler 0     vitamin A 11943 units capsule Take 2 capsules (20,000 Units) by mouth daily       vitamin D3 (CHOLECALCIFEROL) 2000 units (50 mcg) tablet Take 2,000 Units by mouth daily       zinc sulfate (ZINCATE) 220 (50 Zn) MG capsule Take 1 capsule (220 mg) by mouth daily         PAST SURGICAL HISTORY:  Past Surgical History:   Procedure Laterality Date     BYPASS GRAFT ARTERY CORONARY  06/2003    LIMA=LAD, SVG=Diag, SVG=OM2, SVG=PDA     CAROTID ENDARTERECTOMY Left      CV CORONARY ANGIOGRAM N/A 8/27/2019    Procedure: Coronary Angiogram;  Surgeon: Andrew Garcia MD;  Location:  HEART CARDIAC CATH LAB     CV PCI STENT DRUG ELUTING N/A 8/27/2019    Procedure: PCI Stent Drug Eluting;  Surgeon: Andrew Garcia MD;  Location:  HEART CARDIAC CATH LAB     ENDARTERECTOMY CAROTID Left 7/11/2017    Procedure: ENDARTERECTOMY CAROTID;  REDO LEFT CAROTID ENDARTERECTOMY WITH RIGHT ANKLE GREATER SAPHENOUS VEIN  ;  Surgeon: Khurram Bishop MD;  Location: [facility] OR     EP ICD N/A 9/10/2019    Procedure: EP ICD;  Surgeon: Shorty Marion MD;  Location:  HEART CARDIAC CATH LAB     EYE SURGERY       HEART CATH LEFT HEART CATH  06/2003    Severe multivessel disease     IR THORACENTESIS  9/5/2019     RENAL ARTERY ANGIOGRAM Right 05/2013    with stenting     TONSILLECTOMY         ALLERGIES   No Known Allergies    FAMILY HISTORY:  Family History   Problem Relation Age of Onset     Cerebrovascular Disease Mother 90        unknown type     Cancer Brother 70        Brain cancer      Dementia Sister      Arthritis Brother        SOCIAL HISTORY:  Social History     Socioeconomic History     Marital status:      Spouse name: Not on file     Number of children: Not on file     Years of education: Not on file     Highest education level: Not on file   Occupational History     Not on file   Social Needs     Financial resource strain: Not on file     Food insecurity:     Worry: Not on file     Inability: Not on  "file     Transportation needs:     Medical: Not on file     Non-medical: Not on file   Tobacco Use     Smoking status: Former Smoker     Packs/day: 1.00     Years: 50.00     Pack years: 50.00     Types: Cigarettes     Last attempt to quit: 2001     Years since quittin.7   Substance and Sexual Activity     Alcohol use: Yes     Comment: 4 drinks week     Drug use: No     Sexual activity: Not on file   Lifestyle     Physical activity:     Days per week: Not on file     Minutes per session: Not on file     Stress: Not on file   Relationships     Social connections:     Talks on phone: Not on file     Gets together: Not on file     Attends Orthodoxy service: Not on file     Active member of club or organization: Not on file     Attends meetings of clubs or organizations: Not on file     Relationship status: Not on file     Intimate partner violence:     Fear of current or ex partner: Not on file     Emotionally abused: Not on file     Physically abused: Not on file     Forced sexual activity: Not on file   Other Topics Concern      Service Not Asked     Blood Transfusions Not Asked     Caffeine Concern Yes     Comment: 4 - 7 cups (trying to cut down)     Occupational Exposure Not Asked     Hobby Hazards Not Asked     Sleep Concern Not Asked     Stress Concern Not Asked     Weight Concern Not Asked     Special Diet Yes     Comment: eats healthy     Back Care Not Asked     Exercise Yes     Comment: walking     Bike Helmet Not Asked     Seat Belt Not Asked     Self-Exams Not Asked     Parent/sibling w/ CABG, MI or angioplasty before 65F 55M? Not Asked   Social History Narrative    ** Merged History Encounter **            ROS:    ROS:all other systems negative, other than the symptoms noted above in the HPI.     EXAM:  /58   Pulse 77   Ht 1.753 m (5' 9\")   Wt 75.8 kg (167 lb)   SpO2 90%   BMI 24.66 kg/m    GEN:patient is in no apparent distress.    HEENT: NC/AT.  Sclerae white, no incertus  Neck: " No adenopathy.Carotids brisk bilaterally without bruits.  No jugular venous distension.   Heart:RRR. Normal S1, S2. No murmur, rub, click, or gallop.   Lungs: Expiratory wheezes bilaterally, normal excursion, no rales.  Abdomen: Soft, nontender, nondistended.  Extremities: No clubbing, cyanosis, or edema.  The pulses are 2+ at the bilateral radial, DP, and PT  Neurologic: Awake and alert, normal speech, gait and affect  Skin: No petechiae, purpura or rash noted    Labs:  LIPID RESULTS:  Lab Results   Component Value Date    CHOL 119 08/27/2019    HDL 35 (L) 08/27/2019    LDL 69 08/27/2019    TRIG 72 08/27/2019    CHOLHDLRATIO 3.5 11/02/2015       LIVER ENZYME RESULTS:  Lab Results   Component Value Date    AST 20 09/08/2019    ALT 29 09/08/2019       CBC RESULTS:  Lab Results   Component Value Date    WBC 8.3 10/08/2019    RBC 3.35 (L) 10/08/2019    HGB 9.7 (L) 10/08/2019    HCT 33.8 (L) 10/08/2019     (H) 10/08/2019    MCH 29.0 10/08/2019    MCHC 28.7 (L) 10/08/2019    RDW 17.6 (H) 10/08/2019     10/08/2019       BMP RESULTS:  Lab Results   Component Value Date     10/08/2019    POTASSIUM 3.9 10/08/2019    CHLORIDE 107 10/08/2019    CO2 23 10/08/2019    ANIONGAP 9 10/08/2019     (H) 10/08/2019    BUN 17 10/08/2019    CR 1.21 10/08/2019    GFRESTIMATED 55 (L) 10/08/2019    GFRESTBLACK 63 10/08/2019    YOON 8.9 10/08/2019        A1C RESULTS:  Lab Results   Component Value Date    A1C 5.9 (H) 08/28/2019       INR RESULTS:  Lab Results   Component Value Date    INR 1.40 (H) 08/31/2019    INR 1.46 (H) 08/28/2019       Procedures/Diagnostics:  Coronary angiography with PCI August 27, 2019:  Conclusion       Severe 3-vessel coronary artery disease as described below with severe LM/LAD lesion and  of the proximal RCA with L->R collaterals    LIMA-LAD graft is minimally diseased but small, with primarily retrograde flow through native LAD    SVG-RCA graft is chronically closed at the aortic  anastamosis. SVG-D1 could not be selectively injected but appears closed due to dye hangup during injection of the native coronaries. There may also be a closed graft to OM2 but this is not certain    Successful PCI of the distal LM/ostial LAD with 2 Synergy CONCETTA (4.0 x 12 and 3.5 x 8 from proximal to distal)    Successful balloon angioplasty of the ostial LCx    Successful Thermagard placement in the RFV          Plan       Follow bedrest per protocol    Continued medical management and lifestyle modifications for cardiovascular risk factor optimizations.  * CT Head/C/A/P  * Leave RFA sheath in place during hypothermia due to coagulopathy  * Aspirin 81 lifelong  * Dual anti-platelet therapy for at least 1 year, and potentially longer if tolerated  * Low-intensity heparin drip while RFA sheath remains in place  * High-intensity statin  * Cardiac rehab referral  * Further plans per primary team   Coronary Findings     Diagnostic   Dominance: Left   Left Main   Dist LM to Ost LAD lesion is 70% stenosed.   Left Anterior Descending   First Diagonal Branch   Ost 1st Diag lesion is 50% stenosed.   Left Circumflex   There appears to be a missing obtuse marginal somewhere after OM1   Ost Cx lesion is 40% stenosed.   Mid Cx lesion is 50% stenosed.   First Obtuse Marginal Branch   The vessel is small.   Second Left Posterolateral Branch   The vessel is small.   2nd LPL lesion is 60% stenosed.   Right Coronary Artery   The vessel is small.   Prox RCA lesion is 100% stenosed. The lesion is chronic total occlusion.   Right Posterior Descending Artery   Collaterals   RPDA filled by collaterals from 1st Sept.      LIMA Graft to Mid LAD   The graft is small. The graft is angiographically normal. Small LIMA without significant disease; predominantly retrograde filling   Vein Graft to Ost RPDA   Ostial stump   Origin lesion is 100% stenosed.   Graft to 1st Diag   The flow in the graft is reversed. Unable to locate aortic vein graft  takeoff, but fills retrograde through diagonal with dye hangup consistent with stagnant flow and thrombus   Origin lesion is 100% stenosed.         Assessment and Plan:   1.  Cardiac arrest: Functional he is improving since he has been discharged to rehab facility.  He did receive a defibrillator for secondary prevention while hospitalized.  His incision site is well approximated there is no erythema and is healing well.  He did inquire about driving and I told him from my perspective he is not allowed to drive for at least 6 months after implantation of his defibrillator.  He is following up with electrophysiology in January and I told him he could reengage them regarding driving at that time.    2.  Coronary disease history of current bypass grafting and now status post PCI to the native left main and ostial left circumflex: Asymptomatic at this time.  I told him that he should maintain on dual antiplatelet therapy indefinitely.  He is also on metoprolol and high intensity statin therapy.    3.  COPD: He was on oxygen prior to admission in the hospital.  It does not sound as though his oxygen requirements have increased at all.    4.  History of heart failure with preserved ejection fraction: From a volume perspective he is stable today.    He will follow-up with electrophysiology in January 2020.  I asked him to schedule an appointment  or HÉCTOR Guerra in the next 6-8 weeks.    MARKUS Perry CNP  10/10/19  11:24 AM      CC  Patient Care Team:  Clinic, [facility] Pickard as PCP - General  Inderjit Kenny MD ([facility])  Kimmy Moseley APRN CNP as Assigned PCP  Ana Gallo RT as Chronic Pulmonary Disease Specialist (Pulmonary)  Care, [facility] ([facility] ([facility]), ([place]))  KAY MATTHEWS

## 2019-10-10 NOTE — NURSING NOTE
Chief Complaint   Patient presents with     Follow Up     follow up after ED visit 8-27-19     Vitals were taken and medications were reconciled.   Savanah Jimenez  11:19 AM

## 2019-10-10 NOTE — PATIENT INSTRUCTIONS
Patient Instructions:    It was a pleasure to see you in the cardiology clinic today.    If you have any questions you can reach our nurse triage line at [phone].  Press Option #1 for the [facility], then press Option #4 for nursing or Option #1 for scheduling. We also encourage the use of [employer] to communicate with your HealthCare Provider    Note new medications: none  Stop the following medications: no changes    Please follow up with Cardiac Electrophysiology in January.  Schedule an appointment with Cardiology in [place] for 6-8 weeks.     Control your risk of coronary artery disease with these four lifestyle changes:  - Eating a heart healthy diet by following the American Heart Association Recommendations: Reduce saturated fat and trans fat to 5-6 percent of daily calories and minimizing the amount of trans fat you eat by limiting your intake of red meat and dairy products made with whole milk. It also means choosing skim milk, low-fat or fat-free dairy products, limiting fried food, and cooking with healthy oils such as vegetable oil. A healthy diet should include emphasis on fruits, vegetables, whole grains, poultry, fish and nuts, and limiting sugary foods and beverages. We recommend following the DASH (Dietary Approaches to Stop Hypertension) or Mediterranean Diet.  - Regular Exercise: Just 40 minutes of aerobic exercise of moderate to vigorous intensity done 3-4 times per week is enough to lower both cholesterol and high blood pressure. Brisk walking, swimming, bicycling or a dance class are examples.  - Avoiding Tobacco Smoking: Smoking compounds the risk from other risk factors for heart disease including high cholesterol, high blood pressure, and diabetes. Smokers can lower cholesterol, blood pressure, and protect their arteries by quitting. Ask to learn more about quitting smoking.  - Losing Weight: Being overweight or obese raises your risk of high cholesterol,  high blood pressure, and diabetes which are all risk factors for heart disease. Losing excess weight can improve cholesterol levels, blood pressure, and reduce incidence of diabetes and potentially reverse these disease processes.     Get help if you experience any of these heart attack warning signs: Although some heart attacks are sudden and intense, most start slowly, with mild pain or discomfort. Pay attention to your body -- and call 911 if you feel:  - Chest discomfort: Most heart attacks involve discomfort in the center of the chest that lasts more than a few minutes, or that goes away and comes back. It can feel like uncomfortable pressure, squeezing, fullness or pain.  - Discomfort in other areas of the upper body: Symptoms can include pain or discomfort in one or both arms, the back, neck, jaw or stomach.   - Shortness of breath with or without chest discomfort   - Other signs may include breaking out in a cold sweat, nausea or lightheadedness      Sincerely,    Primo Montoya, CNP

## 2019-10-11 PROBLEM — J84.10 POSTINFLAMMATORY PULMONARY FIBROSIS (H): Status: ACTIVE | Noted: 2019-01-01

## 2019-10-14 NOTE — TELEPHONE ENCOUNTER
[facility] Call Center    Phone Message    May a detailed message be left on voicemail: yes    Reason for Call: Order(s): Other:   Reason for requested: Ella called and is requesting orders for the following:    Toilet Safety Frame  Adjustable Bath Shower Chair with Back - can ONLY be 15 inches wide  Straight wall grab bar.   Date needed: ASAP  Please send to   [facility]   Phone: [phone]  Fax: [phone]  Provider name: Primo Montoya    Please call Ella when this is completed or with any questions      Action Taken: Message routed to:  [facility] Cardiology

## 2019-10-18 NOTE — TELEPHONE ENCOUNTER
Spoke with Dr. Mayer and reviewed all pulmonary medications. Sent pt [employer] message as requested by daughter. RN also called and spoke with daughter Oralia for 15 minutes on phone reviewing medications- daughter wrote them down and repeated back. Daughter very appreciative of time. She is concerned that pt is dropping in oxygen level during exercise- pt is scheduled with PCP on Friday and they will address it with them and reach out if further concerns. Pt is currently on 6 liters with activity and 4 liters at rest. [facility].   Klaudia Murphy, RN BSN

## 2019-10-18 NOTE — TELEPHONE ENCOUNTER
"Message sent to Dr. Mayer per daughter request:   Spoke with patient's daughter (Sharon) for a long time last night. There is a lot of confusion on what pt should be taking from a pulmonary standpoint. Between what you had said in clinic and what the NP at the facility discharged him. They also wanted clarification on why they were given a script for Dulera in clinic, then to be told not to take it.    Can you please email me exactly what he should be taking and dose. I will then mychart and the daughter will print it off for her parents. They daughters are \"anxious about making sure he is on exactly what he needs to not cause harm to him\".     Klaudia Murphy RN BSN   "

## 2019-10-21 NOTE — TELEPHONE ENCOUNTER
[facility] Call Center    Phone Message    May a detailed message be left on voicemail: yes    Reason for Call: Other: Ella called to follow up on her medical equipment request for a toilet safety frame, a 15 inch shower chair with back, and two straight wall grab bars. Please give her a call back with an update.     Action Taken: Message routed to:  [facility] Cardiology

## 2019-10-31 NOTE — TELEPHONE ENCOUNTER
"Called and spoke with patient for ongoing, post-hosp COPD education follow-up/outreach and support. Patient stated his breathing has improved significantly since leaving the hospital. He originally discharged to a TCU from the hosp on 19. Had since left the TCU and is at home now. Stated he's taking all of his prescribed resp medications--namely Asmanax (ICS) and Stiolto (LAMA/LABA). Appears he was also given an order for Anoro (LAMA/LABA) from the [facility]; stated he had already filled the prescription, costing him ~$400. Stated he's not taking it since [facility]. Encouraged patient to not take them together. Encouraged patient to take both medications to his next PCP appointment, which patient stated is on . His doctor could then straightened them out. The prescription for Anoro needs to be discontinued if not already done so. He could use the dose he has now after he finishes up the Stiolto so long as it's not .     Otherwise, patient stated he's using his supplemental 02. He is exercising \"7 days a week\" in his [employer] gym. Stated \"someone from [facility] comes to his condo to conduct 3 of the 7 exercise sessions.\"  Stated he \"ups\" his 02 when exercising to 7lpm from his 3-4lpm at rest. Stated he has enough supply of medications. Plans to have his flu shot on ; already had his PNA shot. Overall, patient sounded upbeat and well. Agreed to our follow-up calls. Will reach out to him in a month. Provided our contact information. Encouraged him to call us should he have a need to contact us prior to our next call.      Aly Leach, RRT  Chronic Pulmonary Disease Specialist  Cardiopulmonary Services   Office: [phone]  Pager: [phone]    "

## 2019-11-19 NOTE — TELEPHONE ENCOUNTER
Contacted by daughter to request information on Asmanex and Stiolto. Explained that Asmanex is a steroid and that is why mouth needs to be rinsed after use. Stiloto ordered as Anoro not covered. Stiolto is dosed 2 puffs once a day. Daughter confirmed understanding.

## 2019-11-20 NOTE — TELEPHONE ENCOUNTER
Spoke with José today for ongoing COPD education. He sounded well and stated that he is doing okay. He continues to use his oxygen and stated that he is exercising regularly. He had some medication questions and I referred him on to his physician. He shared that they have him using his Asmanex BID. This is a once a day medication and he is on the highest dose. I let him know that he should discuss with his MD as this is a very high dose. He stated that he will have a discussion with his MD. I invited him to our [employer] club meeting in Jan, 2020. He is interested. He had no further questions or concerns at this time. I reminded him of our call back number should he need to contact us prior to our next call.    BRENDON Haji, RRT, CTTS  Chronic Pulmonary Disease Specialist  Office: [phone]   Pager: [phone]

## 2019-11-22 NOTE — TELEPHONE ENCOUNTER
Spoke with José for ongoing COPD education/support. He sounded slightly short of breath. He stated that is baseline. He does feel better with his oxygen. He has been walking the halls for exercise and working with a PT on other strengthening exercises. I let him know about our [employer] Club that will be beginning on Monday, Jan. 6th from 1-3 pm. I let him know that we will be sending him a formal invite in the mail. He sounded interested.  He had no further questions or concerns. I reminded him of our call back number should he need to contact us prior to our next call.    Angela Go, BRENDON, RRT, CTTS  Chronic Pulmonary Disease Specialist  Office: [phone]   Pager: [phone]

## 2019-12-23 NOTE — TELEPHONE ENCOUNTER
Called both home and cell numbers and left a VM regarding:     Primo Montoya APRN CNP [place], Brian, THOM Bethea-- I would like to get him on anticoagulation but need updated labs.  Can you reach out to him and ask him to get labs drawn (orders are in) and then I can discuss options when the results return?         Left our phone number to call with questions, and stated that he does not need an appt and can show up at any [place] lab that is convenient for him or he can make an appointment if he prefers.  Stated that we will follow up again after we get the lab results.

## 2020-01-01 ENCOUNTER — DOCUMENTATION ONLY (OUTPATIENT)
Dept: CARE COORDINATION | Facility: CLINIC | Age: 85
End: 2020-01-01

## 2020-01-01 ENCOUNTER — TELEPHONE (OUTPATIENT)
Dept: CARDIOLOGY | Facility: CLINIC | Age: 85
End: 2020-01-01

## 2020-01-01 ENCOUNTER — PATIENT OUTREACH (OUTPATIENT)
Dept: CARDIOLOGY | Facility: CLINIC | Age: 85
End: 2020-01-01

## 2020-01-01 ENCOUNTER — ANCILLARY PROCEDURE (OUTPATIENT)
Dept: CARDIOLOGY | Facility: CLINIC | Age: 85
End: 2020-01-01
Attending: INTERNAL MEDICINE
Payer: MEDICARE

## 2020-01-01 ENCOUNTER — APPOINTMENT (OUTPATIENT)
Dept: CARDIOLOGY | Facility: CLINIC | Age: 85
DRG: 811 | End: 2020-01-01
Attending: INTERNAL MEDICINE
Payer: MEDICARE

## 2020-01-01 ENCOUNTER — TELEPHONE (OUTPATIENT)
Dept: PHARMACY | Facility: OTHER | Age: 85
End: 2020-01-01

## 2020-01-01 ENCOUNTER — ANCILLARY PROCEDURE (OUTPATIENT)
Dept: CT IMAGING | Facility: CLINIC | Age: 85
End: 2020-01-01
Attending: INTERNAL MEDICINE
Payer: MEDICARE

## 2020-01-01 ENCOUNTER — NURSE TRIAGE (OUTPATIENT)
Dept: NURSING | Facility: CLINIC | Age: 85
End: 2020-01-01

## 2020-01-01 ENCOUNTER — ANCILLARY PROCEDURE (OUTPATIENT)
Dept: CARDIOLOGY | Facility: CLINIC | Age: 85
End: 2020-01-01
Payer: MEDICARE

## 2020-01-01 ENCOUNTER — OFFICE VISIT (OUTPATIENT)
Dept: PULMONOLOGY | Facility: CLINIC | Age: 85
End: 2020-01-01
Attending: INTERNAL MEDICINE
Payer: MEDICARE

## 2020-01-01 ENCOUNTER — OFFICE VISIT (OUTPATIENT)
Dept: CARDIOLOGY | Facility: CLINIC | Age: 85
End: 2020-01-01
Attending: NURSE PRACTITIONER
Payer: MEDICARE

## 2020-01-01 ENCOUNTER — CARE COORDINATION (OUTPATIENT)
Dept: CARDIOLOGY | Facility: CLINIC | Age: 85
End: 2020-01-01

## 2020-01-01 ENCOUNTER — APPOINTMENT (OUTPATIENT)
Dept: GENERAL RADIOLOGY | Facility: CLINIC | Age: 85
DRG: 811 | End: 2020-01-01
Attending: EMERGENCY MEDICINE
Payer: MEDICARE

## 2020-01-01 ENCOUNTER — HOSPITAL ENCOUNTER (EMERGENCY)
Facility: CLINIC | Age: 85
Discharge: HOME | End: 2020-01-07
Attending: PHYSICIAN ASSISTANT | Admitting: PHYSICIAN ASSISTANT
Payer: MEDICARE

## 2020-01-01 ENCOUNTER — APPOINTMENT (OUTPATIENT)
Dept: NUCLEAR MEDICINE | Facility: CLINIC | Age: 85
DRG: 811 | End: 2020-01-01
Attending: INTERNAL MEDICINE
Payer: MEDICARE

## 2020-01-01 ENCOUNTER — HOSPITAL ENCOUNTER (OUTPATIENT)
Facility: CLINIC | Age: 85
End: 2020-01-01
Attending: INTERNAL MEDICINE | Admitting: INTERNAL MEDICINE
Payer: MEDICARE

## 2020-01-01 ENCOUNTER — HEALTH MAINTENANCE LETTER (OUTPATIENT)
Age: 85
End: 2020-01-01

## 2020-01-01 ENCOUNTER — HOSPITAL ENCOUNTER (INPATIENT)
Facility: CLINIC | Age: 85
LOS: 5 days | Discharge: HOME | DRG: 811 | End: 2020-07-25
Attending: EMERGENCY MEDICINE | Admitting: INTERNAL MEDICINE
Payer: MEDICARE

## 2020-01-01 VITALS
DIASTOLIC BLOOD PRESSURE: 62 MMHG | SYSTOLIC BLOOD PRESSURE: 125 MMHG | HEART RATE: 64 BPM | OXYGEN SATURATION: 99 % | WEIGHT: 160.05 LBS | HEIGHT: 69 IN | BODY MASS INDEX: 23.71 KG/M2 | RESPIRATION RATE: 20 BRPM | TEMPERATURE: 97.9 F

## 2020-01-01 VITALS
WEIGHT: 157 LBS | RESPIRATION RATE: 18 BRPM | BODY MASS INDEX: 23.18 KG/M2 | OXYGEN SATURATION: 95 % | SYSTOLIC BLOOD PRESSURE: 138 MMHG | HEART RATE: 62 BPM | DIASTOLIC BLOOD PRESSURE: 73 MMHG | TEMPERATURE: 97.9 F

## 2020-01-01 VITALS
HEART RATE: 64 BPM | OXYGEN SATURATION: 96 % | TEMPERATURE: 96.7 F | RESPIRATION RATE: 18 BRPM | BODY MASS INDEX: 22.23 KG/M2 | SYSTOLIC BLOOD PRESSURE: 97 MMHG | DIASTOLIC BLOOD PRESSURE: 63 MMHG | WEIGHT: 150.1 LBS | HEIGHT: 69 IN

## 2020-01-01 VITALS
BODY MASS INDEX: 22.42 KG/M2 | DIASTOLIC BLOOD PRESSURE: 54 MMHG | HEART RATE: 72 BPM | HEIGHT: 69 IN | SYSTOLIC BLOOD PRESSURE: 118 MMHG | WEIGHT: 151.4 LBS

## 2020-01-01 VITALS
DIASTOLIC BLOOD PRESSURE: 59 MMHG | BODY MASS INDEX: 24.44 KG/M2 | HEIGHT: 69 IN | HEART RATE: 73 BPM | SYSTOLIC BLOOD PRESSURE: 97 MMHG | WEIGHT: 165 LBS | OXYGEN SATURATION: 95 %

## 2020-01-01 DIAGNOSIS — I46.9 CARDIAC ARREST (H): ICD-10-CM

## 2020-01-01 DIAGNOSIS — Z95.810 S/P ICD (INTERNAL CARDIAC DEFIBRILLATOR) PROCEDURE: ICD-10-CM

## 2020-01-01 DIAGNOSIS — I47.21 TORSADES DE POINTES (H): ICD-10-CM

## 2020-01-01 DIAGNOSIS — I49.01 VENTRICULAR FIBRILLATION (H): Primary | ICD-10-CM

## 2020-01-01 DIAGNOSIS — J96.11 CHRONIC RESPIRATORY FAILURE WITH HYPOXIA (H): ICD-10-CM

## 2020-01-01 DIAGNOSIS — I21.02 ST ELEVATION MYOCARDIAL INFARCTION INVOLVING LEFT ANTERIOR DESCENDING (LAD) CORONARY ARTERY (H): ICD-10-CM

## 2020-01-01 DIAGNOSIS — Z45.02 ICD (IMPLANTABLE CARDIOVERTER-DEFIBRILLATOR) DISCHARGE: ICD-10-CM

## 2020-01-01 DIAGNOSIS — I10 ESSENTIAL HYPERTENSION: ICD-10-CM

## 2020-01-01 DIAGNOSIS — Z95.1 HX OF CABG: ICD-10-CM

## 2020-01-01 DIAGNOSIS — N18.30 CKD (CHRONIC KIDNEY DISEASE) STAGE 3, GFR 30-59 ML/MIN (H): ICD-10-CM

## 2020-01-01 DIAGNOSIS — I46.9 CARDIAC ARREST (H): Primary | ICD-10-CM

## 2020-01-01 DIAGNOSIS — D64.9 ANEMIA, UNSPECIFIED TYPE: ICD-10-CM

## 2020-01-01 DIAGNOSIS — I49.01 VENTRICULAR FIBRILLATION (H): ICD-10-CM

## 2020-01-01 DIAGNOSIS — I25.10 CORONARY ARTERY DISEASE INVOLVING NATIVE HEART WITHOUT ANGINA PECTORIS, UNSPECIFIED VESSEL OR LESION TYPE: ICD-10-CM

## 2020-01-01 DIAGNOSIS — I25.10 CORONARY ARTERY DISEASE INVOLVING NATIVE CORONARY ARTERY OF NATIVE HEART WITHOUT ANGINA PECTORIS: ICD-10-CM

## 2020-01-01 DIAGNOSIS — Z95.810 ICD (IMPLANTABLE CARDIOVERTER-DEFIBRILLATOR) IN PLACE: Primary | ICD-10-CM

## 2020-01-01 DIAGNOSIS — Z95.810 ICD (IMPLANTABLE CARDIOVERTER-DEFIBRILLATOR) IN PLACE: ICD-10-CM

## 2020-01-01 DIAGNOSIS — I95.1 ORTHOSTATIC HYPOTENSION: ICD-10-CM

## 2020-01-01 DIAGNOSIS — J42 CHRONIC BRONCHITIS, UNSPECIFIED CHRONIC BRONCHITIS TYPE (H): Chronic | ICD-10-CM

## 2020-01-01 DIAGNOSIS — I25.10 CORONARY ARTERY DISEASE INVOLVING NATIVE CORONARY ARTERY OF NATIVE HEART WITHOUT ANGINA PECTORIS: Primary | ICD-10-CM

## 2020-01-01 DIAGNOSIS — I48.0 PAF (PAROXYSMAL ATRIAL FIBRILLATION) (H): Primary | ICD-10-CM

## 2020-01-01 DIAGNOSIS — R91.8 PULMONARY NODULES: Primary | ICD-10-CM

## 2020-01-01 DIAGNOSIS — E78.5 HYPERLIPIDEMIA LDL GOAL <70: ICD-10-CM

## 2020-01-01 DIAGNOSIS — R91.8 PULMONARY NODULES: ICD-10-CM

## 2020-01-01 DIAGNOSIS — I48.0 PAF (PAROXYSMAL ATRIAL FIBRILLATION) (H): ICD-10-CM

## 2020-01-01 DIAGNOSIS — Z95.810 S/P ICD (INTERNAL CARDIAC DEFIBRILLATOR) PROCEDURE: Primary | ICD-10-CM

## 2020-01-01 DIAGNOSIS — Z45.02 ICD (IMPLANTABLE CARDIOVERTER-DEFIBRILLATOR) DISCHARGE: Primary | ICD-10-CM

## 2020-01-01 DIAGNOSIS — J96.11 CHRONIC RESPIRATORY FAILURE WITH HYPOXIA (H): Primary | ICD-10-CM

## 2020-01-01 DIAGNOSIS — I49.01 VF (VENTRICULAR FIBRILLATION) (H): ICD-10-CM

## 2020-01-01 LAB
ABO + RH BLD: NORMAL
ABO + RH BLD: NORMAL
ANION GAP SERPL CALCULATED.3IONS-SCNC: 6 MMOL/L (ref 3–14)
ANION GAP SERPL CALCULATED.3IONS-SCNC: 7 MMOL/L (ref 3–14)
BASOPHILS # BLD AUTO: 0 10E9/L (ref 0–0.2)
BASOPHILS NFR BLD AUTO: 0.4 %
BLD GP AB SCN SERPL QL: NORMAL
BLD PROD TYP BPU: NORMAL
BLD UNIT ID BPU: 0
BLD UNIT ID BPU: 0
BLOOD BANK CMNT PATIENT-IMP: NORMAL
BLOOD PRODUCT CODE: NORMAL
BLOOD PRODUCT CODE: NORMAL
BPU ID: NORMAL
BPU ID: NORMAL
BUN SERPL-MCNC: 22 MG/DL (ref 7–30)
BUN SERPL-MCNC: 22 MG/DL (ref 7–30)
CA-I BLD-MCNC: 4.6 MG/DL (ref 4.4–5.2)
CALCIUM SERPL-MCNC: 8.1 MG/DL (ref 8.5–10.1)
CALCIUM SERPL-MCNC: 8.7 MG/DL (ref 8.5–10.1)
CHLORIDE SERPL-SCNC: 108 MMOL/L (ref 94–109)
CHLORIDE SERPL-SCNC: 114 MMOL/L (ref 94–109)
CO2 SERPL-SCNC: 23 MMOL/L (ref 20–32)
CO2 SERPL-SCNC: 24 MMOL/L (ref 20–32)
CREAT SERPL-MCNC: 1.27 MG/DL (ref 0.66–1.25)
CREAT SERPL-MCNC: 1.29 MG/DL (ref 0.66–1.25)
CV STRESS MAX HR HE: 79
DIFFERENTIAL METHOD BLD: ABNORMAL
DLCOUNC-%PRED-PRE: 20 %
DLCOUNC-PRE: 4.7 ML/MIN/MMHG
DLCOUNC-PRED: 22.41 ML/MIN/MMHG
EOSINOPHIL # BLD AUTO: 0.1 10E9/L (ref 0–0.7)
EOSINOPHIL NFR BLD AUTO: 1 %
ERV-%PRED-PRE: 172 %
ERV-PRE: 1.61 L
ERV-PRED: 0.93 L
ERYTHROCYTE [DISTWIDTH] IN BLOOD BY AUTOMATED COUNT: 19.2 % (ref 10–15)
ERYTHROCYTE [DISTWIDTH] IN BLOOD BY AUTOMATED COUNT: 20.6 % (ref 10–15)
EXPTIME-PRE: 9.5 SEC
FEF2575-%PRED-PRE: 70 %
FEF2575-PRE: 1.26 L/SEC
FEF2575-PRED: 1.8 L/SEC
FEFMAX-%PRED-PRE: 87 %
FEFMAX-PRE: 5.5 L/SEC
FEFMAX-PRED: 6.27 L/SEC
FEV1-%PRED-PRE: 100 %
FEV1-PRE: 2.59 L
FEV1FEV6-PRE: 68 %
FEV1FEV6-PRED: 76 %
FEV1FVC-PRE: 63 %
FEV1FVC-PRED: 75 %
FEV1SVC-PRE: 66 %
FEV1SVC-PRED: 63 %
FIFMAX-PRE: 3.62 L/SEC
FVC-%PRED-PRE: 116 %
FVC-PRE: 4.09 L
FVC-PRED: 3.51 L
GFR SERPL CREATININE-BSD FRML MDRD: 50 ML/MIN/{1.73_M2}
GFR SERPL CREATININE-BSD FRML MDRD: 51 ML/MIN/{1.73_M2}
GLUCOSE BLDC GLUCOMTR-MCNC: 105 MG/DL (ref 70–99)
GLUCOSE BLDC GLUCOMTR-MCNC: 112 MG/DL (ref 70–99)
GLUCOSE BLDC GLUCOMTR-MCNC: 116 MG/DL (ref 70–99)
GLUCOSE BLDC GLUCOMTR-MCNC: 117 MG/DL (ref 70–99)
GLUCOSE BLDC GLUCOMTR-MCNC: 117 MG/DL (ref 70–99)
GLUCOSE BLDC GLUCOMTR-MCNC: 121 MG/DL (ref 70–99)
GLUCOSE BLDC GLUCOMTR-MCNC: 146 MG/DL (ref 70–99)
GLUCOSE BLDC GLUCOMTR-MCNC: 98 MG/DL (ref 70–99)
GLUCOSE SERPL-MCNC: 101 MG/DL (ref 70–99)
GLUCOSE SERPL-MCNC: 94 MG/DL (ref 70–99)
HCT VFR BLD AUTO: 22.7 % (ref 40–53)
HCT VFR BLD AUTO: 25.7 % (ref 40–53)
HGB BLD-MCNC: 6.2 G/DL (ref 13.3–17.7)
HGB BLD-MCNC: 7 G/DL (ref 13.3–17.7)
HGB BLD-MCNC: 7.9 G/DL (ref 13.3–17.7)
HGB BLD-MCNC: 8.2 G/DL (ref 13.3–17.7)
HGB BLD-MCNC: 8.6 G/DL (ref 13.3–17.7)
HGB BLD-MCNC: 9.1 G/DL (ref 13.3–17.7)
IC-%PRED-PRE: 72 %
IC-PRE: 2.3 L
IC-PRED: 3.15 L
IMM GRANULOCYTES # BLD: 0 10E9/L (ref 0–0.4)
IMM GRANULOCYTES NFR BLD: 0.4 %
INTERPRETATION ECG - MUSE: NORMAL
INTERPRETATION ECG - MUSE: NORMAL
LYMPHOCYTES # BLD AUTO: 0.8 10E9/L (ref 0.8–5.3)
LYMPHOCYTES NFR BLD AUTO: 9.1 %
MAGNESIUM SERPL-MCNC: 2.1 MG/DL (ref 1.6–2.3)
MAGNESIUM SERPL-MCNC: 2.4 MG/DL (ref 1.6–2.3)
MAGNESIUM SERPL-MCNC: 2.4 MG/DL (ref 1.6–2.3)
MCH RBC QN AUTO: 25.1 PG (ref 26.5–33)
MCH RBC QN AUTO: 25.3 PG (ref 26.5–33)
MCHC RBC AUTO-ENTMCNC: 27.2 G/DL (ref 31.5–36.5)
MCHC RBC AUTO-ENTMCNC: 27.3 G/DL (ref 31.5–36.5)
MCV RBC AUTO: 92 FL (ref 78–100)
MCV RBC AUTO: 93 FL (ref 78–100)
MDC_IDC_EPISODE_DTM: NORMAL
MDC_IDC_EPISODE_ID: 203
MDC_IDC_EPISODE_TYPE: NORMAL
MDC_IDC_LEAD_IMPLANT_DT: NORMAL
MDC_IDC_LEAD_LOCATION: NORMAL
MDC_IDC_LEAD_LOCATION_DETAIL_1: NORMAL
MDC_IDC_LEAD_MFG: NORMAL
MDC_IDC_LEAD_MODEL: NORMAL
MDC_IDC_LEAD_SERIAL: NORMAL
MDC_IDC_MSMT_BATTERY_DTM: NORMAL
MDC_IDC_MSMT_BATTERY_DTM: NORMAL
MDC_IDC_MSMT_BATTERY_REMAINING_PERCENTAGE: 100 %
MDC_IDC_MSMT_BATTERY_RRT_TRIGGER: NORMAL
MDC_IDC_MSMT_BATTERY_STATUS: NORMAL
MDC_IDC_MSMT_BATTERY_VOLTAGE: 3.07 V
MDC_IDC_MSMT_BATTERY_VOLTAGE: 3.08 V
MDC_IDC_MSMT_BATTERY_VOLTAGE: 3.1 V
MDC_IDC_MSMT_BATTERY_VOLTAGE: 3.11 V
MDC_IDC_MSMT_CAP_CHARGE_DTM: NORMAL
MDC_IDC_MSMT_CAP_CHARGE_ENERGY: 40 J
MDC_IDC_MSMT_CAP_CHARGE_TIME: 8.4 S
MDC_IDC_MSMT_CAP_CHARGE_TYPE: NORMAL
MDC_IDC_MSMT_LEADCHNL_RA_LEAD_CHANNEL_STATUS: NORMAL
MDC_IDC_MSMT_LEADCHNL_RA_SENSING_INTR_AMPL: 4.4 MV
MDC_IDC_MSMT_LEADCHNL_RA_SENSING_INTR_AMPL: 7.2 MV
MDC_IDC_MSMT_LEADCHNL_RV_IMPEDANCE_VALUE: 604 OHM
MDC_IDC_MSMT_LEADCHNL_RV_IMPEDANCE_VALUE: 626 OHM
MDC_IDC_MSMT_LEADCHNL_RV_IMPEDANCE_VALUE: 626 OHM
MDC_IDC_MSMT_LEADCHNL_RV_IMPEDANCE_VALUE: 629 OHM
MDC_IDC_MSMT_LEADCHNL_RV_IMPEDANCE_VALUE: 630 OHM
MDC_IDC_MSMT_LEADCHNL_RV_IMPEDANCE_VALUE: 632 OHM
MDC_IDC_MSMT_LEADCHNL_RV_IMPEDANCE_VALUE: 678 OHM
MDC_IDC_MSMT_LEADCHNL_RV_LEAD_CHANNEL_STATUS: NORMAL
MDC_IDC_MSMT_LEADCHNL_RV_PACING_THRESHOLD_AMPLITUDE: 0.5 V
MDC_IDC_MSMT_LEADCHNL_RV_PACING_THRESHOLD_AMPLITUDE: 0.6 V
MDC_IDC_MSMT_LEADCHNL_RV_PACING_THRESHOLD_AMPLITUDE: 0.6 V
MDC_IDC_MSMT_LEADCHNL_RV_PACING_THRESHOLD_PULSEWIDTH: 0.4 MS
MDC_IDC_MSMT_LEADCHNL_RV_SENSING_INTR_AMPL: 10.7 MV
MDC_IDC_MSMT_LEADCHNL_RV_SENSING_INTR_AMPL: 9 MV
MDC_IDC_PG_IMPLANT_DTM: NORMAL
MDC_IDC_PG_MFG: NORMAL
MDC_IDC_PG_MODEL: NORMAL
MDC_IDC_PG_SERIAL: NORMAL
MDC_IDC_PG_TYPE: NORMAL
MDC_IDC_SESS_CLINIC_NAME: NORMAL
MDC_IDC_SESS_DTM: NORMAL
MDC_IDC_SESS_REPROGRAMMED: NO
MDC_IDC_SESS_REPROGRAMMED: NORMAL
MDC_IDC_SESS_TYPE: NORMAL
MDC_IDC_SET_BRADY_AT_MODE_SWITCH_MODE: NORMAL
MDC_IDC_SET_BRADY_AT_MODE_SWITCH_RATE: 160 {BEATS}/MIN
MDC_IDC_SET_BRADY_HYSTRATE: 50 {BEATS}/MIN
MDC_IDC_SET_BRADY_LOWRATE: 50 {BEATS}/MIN
MDC_IDC_SET_BRADY_MAX_SENSOR_RATE: 120 {BEATS}/MIN
MDC_IDC_SET_BRADY_MAX_TRACKING_RATE: 130 {BEATS}/MIN
MDC_IDC_SET_BRADY_MODE: NORMAL
MDC_IDC_SET_BRADY_NIGHT_RATE: 50 {BEATS}/MIN
MDC_IDC_SET_BRADY_SAV_DELAY_HIGH: 230 MS
MDC_IDC_SET_BRADY_SAV_DELAY_LOW: 260 MS
MDC_IDC_SET_CRT_PACED_CHAMBERS: NORMAL
MDC_IDC_SET_LEADCHNL_LV_PACING_ANODE_ELECTRODE_1: NORMAL
MDC_IDC_SET_LEADCHNL_LV_PACING_ANODE_LOCATION_1: NORMAL
MDC_IDC_SET_LEADCHNL_LV_PACING_CATHODE_ELECTRODE_1: NORMAL
MDC_IDC_SET_LEADCHNL_LV_PACING_CATHODE_LOCATION_1: NORMAL
MDC_IDC_SET_LEADCHNL_LV_PACING_POLARITY: NORMAL
MDC_IDC_SET_LEADCHNL_LV_SENSING_ANODE_ELECTRODE_1: NORMAL
MDC_IDC_SET_LEADCHNL_LV_SENSING_ANODE_LOCATION_1: NORMAL
MDC_IDC_SET_LEADCHNL_LV_SENSING_CATHODE_ELECTRODE_1: NORMAL
MDC_IDC_SET_LEADCHNL_LV_SENSING_CATHODE_LOCATION_1: NORMAL
MDC_IDC_SET_LEADCHNL_LV_SENSING_POLARITY: NORMAL
MDC_IDC_SET_LEADCHNL_RA_PACING_ANODE_ELECTRODE_1: NORMAL
MDC_IDC_SET_LEADCHNL_RA_PACING_ANODE_LOCATION_1: NORMAL
MDC_IDC_SET_LEADCHNL_RA_PACING_CATHODE_ELECTRODE_1: NORMAL
MDC_IDC_SET_LEADCHNL_RA_PACING_CATHODE_LOCATION_1: NORMAL
MDC_IDC_SET_LEADCHNL_RA_PACING_POLARITY: NORMAL
MDC_IDC_SET_LEADCHNL_RA_SENSING_POLARITY: NORMAL
MDC_IDC_SET_LEADCHNL_RA_SENSING_SENSITIVITY: 0.4 MV
MDC_IDC_SET_LEADCHNL_RV_PACING_AMPLITUDE: 1.5 V
MDC_IDC_SET_LEADCHNL_RV_PACING_AMPLITUDE: 1.6 V
MDC_IDC_SET_LEADCHNL_RV_PACING_AMPLITUDE: 1.6 V
MDC_IDC_SET_LEADCHNL_RV_PACING_POLARITY: NORMAL
MDC_IDC_SET_LEADCHNL_RV_PACING_PULSEWIDTH: 0.4 MS
MDC_IDC_SET_LEADCHNL_RV_SENSING_ADAPTATION_MODE: NORMAL
MDC_IDC_SET_LEADCHNL_RV_SENSING_POLARITY: NORMAL
MDC_IDC_SET_LEADCHNL_RV_SENSING_SENSITIVITY: 0.8 MV
MDC_IDC_SET_ZONE_DETECTION_BEATS_DENOMINATOR: 40 {BEATS}
MDC_IDC_SET_ZONE_DETECTION_BEATS_NUMERATOR: 30 {BEATS}
MDC_IDC_SET_ZONE_DETECTION_INTERVAL: 250 MS
MDC_IDC_SET_ZONE_DETECTION_INTERVAL: 300 MS
MDC_IDC_SET_ZONE_DETECTION_INTERVAL: 350 MS
MDC_IDC_SET_ZONE_TYPE: NORMAL
MDC_IDC_STAT_AT_BURDEN_PERCENT: 0 %
MDC_IDC_STAT_AT_BURDEN_PERCENT: 39 %
MDC_IDC_STAT_AT_BURDEN_PERCENT: 4 %
MDC_IDC_STAT_AT_BURDEN_PERCENT: 99 %
MDC_IDC_STAT_AT_DTM_END: NORMAL
MDC_IDC_STAT_AT_DTM_START: NORMAL
MDC_IDC_STAT_AT_MODE_SW_COUNT: 5201
MDC_IDC_STAT_AT_MODE_SW_COUNT_PER_DAY: 1
MDC_IDC_STAT_AT_MODE_SW_COUNT_PER_DAY: 2
MDC_IDC_STAT_AT_MODE_SW_COUNT_PER_DAY: 266
MDC_IDC_STAT_AT_MODE_SW_COUNT_PER_DAY: 518
MDC_IDC_STAT_AT_MODE_SW_COUNT_PER_DAY: 560
MDC_IDC_STAT_AT_MODE_SW_COUNT_PER_DAY: 8
MDC_IDC_STAT_BRADY_AS_VP_PERCENT: 0 %
MDC_IDC_STAT_BRADY_AS_VP_PERCENT: 1 %
MDC_IDC_STAT_BRADY_AS_VP_PERCENT: 12 %
MDC_IDC_STAT_BRADY_AS_VP_PERCENT: 4 %
MDC_IDC_STAT_BRADY_AS_VP_PERCENT: 48 %
MDC_IDC_STAT_BRADY_AS_VP_PERCENT: 5 %
MDC_IDC_STAT_BRADY_AS_VS_PERCENT: 52 %
MDC_IDC_STAT_BRADY_AS_VS_PERCENT: 86 %
MDC_IDC_STAT_BRADY_AS_VS_PERCENT: 94 %
MDC_IDC_STAT_BRADY_AS_VS_PERCENT: 94 %
MDC_IDC_STAT_BRADY_AS_VS_PERCENT: 96 %
MDC_IDC_STAT_BRADY_AS_VS_PERCENT: 99 %
MDC_IDC_STAT_BRADY_DTM_END: NORMAL
MDC_IDC_STAT_BRADY_DTM_START: NORMAL
MDC_IDC_STAT_BRADY_RV_PERCENT_PACED: 0 %
MDC_IDC_STAT_BRADY_RV_PERCENT_PACED: 1 %
MDC_IDC_STAT_BRADY_RV_PERCENT_PACED: 12 %
MDC_IDC_STAT_BRADY_RV_PERCENT_PACED: 19 %
MDC_IDC_STAT_BRADY_RV_PERCENT_PACED: 39 %
MDC_IDC_STAT_BRADY_RV_PERCENT_PACED: 4 %
MDC_IDC_STAT_BRADY_RV_PERCENT_PACED: 5 %
MDC_IDC_STAT_CRT_DTM_END: NORMAL
MDC_IDC_STAT_CRT_DTM_START: NORMAL
MDC_IDC_STAT_EPISODE_RECENT_COUNT: 17
MDC_IDC_STAT_EPISODE_RECENT_COUNT: 5201
MDC_IDC_STAT_EPISODE_RECENT_COUNT_DTM_END: NORMAL
MDC_IDC_STAT_EPISODE_RECENT_COUNT_DTM_END: NORMAL
MDC_IDC_STAT_EPISODE_RECENT_COUNT_DTM_START: NORMAL
MDC_IDC_STAT_EPISODE_RECENT_COUNT_DTM_START: NORMAL
MDC_IDC_STAT_EPISODE_TOTAL_COUNT: 0
MDC_IDC_STAT_EPISODE_TOTAL_COUNT: 1
MDC_IDC_STAT_EPISODE_TOTAL_COUNT: 192
MDC_IDC_STAT_EPISODE_TOTAL_COUNT: 2
MDC_IDC_STAT_EPISODE_TOTAL_COUNT: 5
MDC_IDC_STAT_EPISODE_TOTAL_COUNT: 87
MDC_IDC_STAT_EPISODE_TOTAL_COUNT_DTM_END: NORMAL
MDC_IDC_STAT_EPISODE_TOTAL_COUNT_DTM_START: NORMAL
MDC_IDC_STAT_EPISODE_TYPE: NORMAL
MDC_IDC_STAT_TACHYTHERAPY_ATP_DELIVERED_RECENT: 0
MDC_IDC_STAT_TACHYTHERAPY_ATP_DELIVERED_TOTAL: 0
MDC_IDC_STAT_TACHYTHERAPY_ATP_DELIVERED_TOTAL: 1
MDC_IDC_STAT_TACHYTHERAPY_RECENT_DTM_END: NORMAL
MDC_IDC_STAT_TACHYTHERAPY_RECENT_DTM_START: NORMAL
MDC_IDC_STAT_TACHYTHERAPY_SHOCKS_ABORTED_RECENT: 0
MDC_IDC_STAT_TACHYTHERAPY_SHOCKS_ABORTED_TOTAL: 0
MDC_IDC_STAT_TACHYTHERAPY_SHOCKS_ABORTED_TOTAL: 1
MDC_IDC_STAT_TACHYTHERAPY_SHOCKS_ABORTED_TOTAL: 1
MDC_IDC_STAT_TACHYTHERAPY_SHOCKS_DELIVERED_RECENT: 0
MDC_IDC_STAT_TACHYTHERAPY_SHOCKS_DELIVERED_TOTAL: 0
MDC_IDC_STAT_TACHYTHERAPY_SHOCKS_DELIVERED_TOTAL: 0
MDC_IDC_STAT_TACHYTHERAPY_SHOCKS_DELIVERED_TOTAL: 1
MDC_IDC_STAT_TACHYTHERAPY_SHOCKS_DELIVERED_TOTAL: 4
MDC_IDC_STAT_TACHYTHERAPY_TOTAL_DTM_END: NORMAL
MDC_IDC_STAT_TACHYTHERAPY_TOTAL_DTM_START: NORMAL
MONOCYTES # BLD AUTO: 0.6 10E9/L (ref 0–1.3)
MONOCYTES NFR BLD AUTO: 7.1 %
NEUTROPHILS # BLD AUTO: 6.8 10E9/L (ref 1.6–8.3)
NEUTROPHILS NFR BLD AUTO: 82 %
NRBC # BLD AUTO: 0 10*3/UL
NRBC BLD AUTO-RTO: 0 /100
NUC STRESS EJECTION FRACTION: 69 %
NUM BPU REQUESTED: 2
PLATELET # BLD AUTO: 187 10E9/L (ref 150–450)
PLATELET # BLD AUTO: 201 10E9/L (ref 150–450)
POTASSIUM SERPL-SCNC: 3.8 MMOL/L (ref 3.4–5.3)
POTASSIUM SERPL-SCNC: 3.9 MMOL/L (ref 3.4–5.3)
POTASSIUM SERPL-SCNC: 4.3 MMOL/L (ref 3.4–5.3)
POTASSIUM SERPL-SCNC: 4.6 MMOL/L (ref 3.4–5.3)
RATE PRESSURE PRODUCT: 9006
RBC # BLD AUTO: 2.47 10E12/L (ref 4.4–5.9)
RBC # BLD AUTO: 2.77 10E12/L (ref 4.4–5.9)
SARS-COV-2 RNA SPEC QL NAA+PROBE: NOT DETECTED
SODIUM SERPL-SCNC: 139 MMOL/L (ref 133–144)
SODIUM SERPL-SCNC: 143 MMOL/L (ref 133–144)
SPECIMEN EXP DATE BLD: NORMAL
SPECIMEN SOURCE: NORMAL
STRESS ECHO BASELINE DIASTOLIC HE: 60
STRESS ECHO BASELINE HR: 65
STRESS ECHO BASELINE SYSTOLIC BP: 148
STRESS ECHO CALCULATED PERCENT HR: 58 %
STRESS ECHO LAST STRESS DIASTOLIC BP: 49
STRESS ECHO LAST STRESS SYSTOLIC BP: 114
STRESS ECHO TARGET HR: 136
TRANSFUSION STATUS PATIENT QL: NORMAL
TROPONIN I SERPL-MCNC: 0.05 UG/L (ref 0–0.04)
TROPONIN I SERPL-MCNC: 0.06 UG/L (ref 0–0.04)
TROPONIN I SERPL-MCNC: 0.06 UG/L (ref 0–0.04)
TSH SERPL DL<=0.005 MIU/L-ACNC: 1.83 MU/L (ref 0.4–4)
VA-%PRED-PRE: 94 %
VA-PRE: 5.49 L
VC-%PRED-PRE: 95 %
VC-PRE: 3.91 L
VC-PRED: 4.09 L
WBC # BLD AUTO: 8.2 10E9/L (ref 4–11)
WBC # BLD AUTO: 8.2 10E9/L (ref 4–11)

## 2020-01-01 PROCEDURE — 85018 HEMOGLOBIN: CPT | Performed by: INTERNAL MEDICINE

## 2020-01-01 PROCEDURE — 25000132 ZZH RX MED GY IP 250 OP 250 PS 637: Mod: GY | Performed by: INTERNAL MEDICINE

## 2020-01-01 PROCEDURE — 25800030 ZZH RX IP 258 OP 636: Performed by: PHYSICIAN ASSISTANT

## 2020-01-01 PROCEDURE — 94640 AIRWAY INHALATION TREATMENT: CPT

## 2020-01-01 PROCEDURE — 40000275 ZZH STATISTIC RCP TIME EA 10 MIN

## 2020-01-01 PROCEDURE — 93296 REM INTERROG EVL PM/IDS: CPT | Mod: ZF

## 2020-01-01 PROCEDURE — 99233 SBSQ HOSP IP/OBS HIGH 50: CPT | Performed by: INTERNAL MEDICINE

## 2020-01-01 PROCEDURE — U0003 INFECTIOUS AGENT DETECTION BY NUCLEIC ACID (DNA OR RNA); SEVERE ACUTE RESPIRATORY SYNDROME CORONAVIRUS 2 (SARS-COV-2) (CORONAVIRUS DISEASE [COVID-19]), AMPLIFIED PROBE TECHNIQUE, MAKING USE OF HIGH THROUGHPUT TECHNOLOGIES AS DESCRIBED BY CMS-2020-01-R: HCPCS | Performed by: INTERNAL MEDICINE

## 2020-01-01 PROCEDURE — 83735 ASSAY OF MAGNESIUM: CPT | Performed by: INTERNAL MEDICINE

## 2020-01-01 PROCEDURE — 12000000 ZZH R&B MED SURG/OB

## 2020-01-01 PROCEDURE — 94640 AIRWAY INHALATION TREATMENT: CPT | Mod: 76

## 2020-01-01 PROCEDURE — 82330 ASSAY OF CALCIUM: CPT | Performed by: INTERNAL MEDICINE

## 2020-01-01 PROCEDURE — 86850 RBC ANTIBODY SCREEN: CPT | Performed by: EMERGENCY MEDICINE

## 2020-01-01 PROCEDURE — 20000003 ZZH R&B ICU

## 2020-01-01 PROCEDURE — 99291 CRITICAL CARE FIRST HOUR: CPT | Mod: 25

## 2020-01-01 PROCEDURE — 93018 CV STRESS TEST I&R ONLY: CPT | Performed by: INTERNAL MEDICINE

## 2020-01-01 PROCEDURE — 78452 HT MUSCLE IMAGE SPECT MULT: CPT

## 2020-01-01 PROCEDURE — 80048 BASIC METABOLIC PNL TOTAL CA: CPT | Performed by: INTERNAL MEDICINE

## 2020-01-01 PROCEDURE — 99232 SBSQ HOSP IP/OBS MODERATE 35: CPT | Mod: 25 | Performed by: INTERNAL MEDICINE

## 2020-01-01 PROCEDURE — 93005 ELECTROCARDIOGRAM TRACING: CPT

## 2020-01-01 PROCEDURE — 93017 CV STRESS TEST TRACING ONLY: CPT

## 2020-01-01 PROCEDURE — G0463 HOSPITAL OUTPT CLINIC VISIT: HCPCS | Mod: 25,ZF

## 2020-01-01 PROCEDURE — 00000146 ZZHCL STATISTIC GLUCOSE BY METER IP

## 2020-01-01 PROCEDURE — 99204 OFFICE O/P NEW MOD 45 MIN: CPT | Mod: 25 | Performed by: INTERNAL MEDICINE

## 2020-01-01 PROCEDURE — 86901 BLOOD TYPING SEROLOGIC RH(D): CPT | Performed by: EMERGENCY MEDICINE

## 2020-01-01 PROCEDURE — 36415 COLL VENOUS BLD VENIPUNCTURE: CPT | Performed by: INTERNAL MEDICINE

## 2020-01-01 PROCEDURE — 78452 HT MUSCLE IMAGE SPECT MULT: CPT | Mod: 26 | Performed by: INTERNAL MEDICINE

## 2020-01-01 PROCEDURE — 96360 HYDRATION IV INFUSION INIT: CPT

## 2020-01-01 PROCEDURE — 86922 COMPATIBILITY TEST ANTIGLOB: CPT | Performed by: EMERGENCY MEDICINE

## 2020-01-01 PROCEDURE — 99223 1ST HOSP IP/OBS HIGH 75: CPT | Mod: 25 | Performed by: INTERNAL MEDICINE

## 2020-01-01 PROCEDURE — 85027 COMPLETE CBC AUTOMATED: CPT | Performed by: INTERNAL MEDICINE

## 2020-01-01 PROCEDURE — 99233 SBSQ HOSP IP/OBS HIGH 50: CPT | Performed by: HOSPITALIST

## 2020-01-01 PROCEDURE — 93295 DEV INTERROG REMOTE 1/2/MLT: CPT | Performed by: INTERNAL MEDICINE

## 2020-01-01 PROCEDURE — 99232 SBSQ HOSP IP/OBS MODERATE 35: CPT | Performed by: INTERNAL MEDICINE

## 2020-01-01 PROCEDURE — 25800030 ZZH RX IP 258 OP 636: Performed by: INTERNAL MEDICINE

## 2020-01-01 PROCEDURE — 25000128 H RX IP 250 OP 636: Performed by: EMERGENCY MEDICINE

## 2020-01-01 PROCEDURE — 25000128 H RX IP 250 OP 636

## 2020-01-01 PROCEDURE — 84484 ASSAY OF TROPONIN QUANT: CPT | Performed by: INTERNAL MEDICINE

## 2020-01-01 PROCEDURE — G0463 HOSPITAL OUTPT CLINIC VISIT: HCPCS | Mod: ZF

## 2020-01-01 PROCEDURE — 93000 ELECTROCARDIOGRAM COMPLETE: CPT | Performed by: INTERNAL MEDICINE

## 2020-01-01 PROCEDURE — 99284 EMERGENCY DEPT VISIT MOD MDM: CPT | Mod: 25

## 2020-01-01 PROCEDURE — 99207 CARDIAC DEVICE CHECK - REMOTE: CPT | Performed by: INTERNAL MEDICINE

## 2020-01-01 PROCEDURE — 93296 REM INTERROG EVL PM/IDS: CPT | Performed by: INTERNAL MEDICINE

## 2020-01-01 PROCEDURE — 86902 BLOOD TYPE ANTIGEN DONOR EA: CPT | Performed by: EMERGENCY MEDICINE

## 2020-01-01 PROCEDURE — 84443 ASSAY THYROID STIM HORMONE: CPT | Performed by: EMERGENCY MEDICINE

## 2020-01-01 PROCEDURE — 99214 OFFICE O/P EST MOD 30 MIN: CPT | Mod: ZP | Performed by: NURSE PRACTITIONER

## 2020-01-01 PROCEDURE — 86900 BLOOD TYPING SEROLOGIC ABO: CPT | Performed by: EMERGENCY MEDICINE

## 2020-01-01 PROCEDURE — 99442 ZZC PHYSICIAN TELEPHONE EVALUATION 11-20 MIN: CPT | Mod: ZP | Performed by: INTERNAL MEDICINE

## 2020-01-01 PROCEDURE — 84132 ASSAY OF SERUM POTASSIUM: CPT | Performed by: INTERNAL MEDICINE

## 2020-01-01 PROCEDURE — P9016 RBC LEUKOCYTES REDUCED: HCPCS | Performed by: EMERGENCY MEDICINE

## 2020-01-01 PROCEDURE — 96365 THER/PROPH/DIAG IV INF INIT: CPT

## 2020-01-01 PROCEDURE — 93306 TTE W/DOPPLER COMPLETE: CPT | Mod: 26 | Performed by: INTERNAL MEDICINE

## 2020-01-01 PROCEDURE — 34300033 ZZH RX 343: Performed by: INTERNAL MEDICINE

## 2020-01-01 PROCEDURE — 85025 COMPLETE CBC W/AUTO DIFF WBC: CPT | Performed by: EMERGENCY MEDICINE

## 2020-01-01 PROCEDURE — 99223 1ST HOSP IP/OBS HIGH 75: CPT | Mod: AI | Performed by: INTERNAL MEDICINE

## 2020-01-01 PROCEDURE — A9502 TC99M TETROFOSMIN: HCPCS | Performed by: INTERNAL MEDICINE

## 2020-01-01 PROCEDURE — 83735 ASSAY OF MAGNESIUM: CPT | Performed by: EMERGENCY MEDICINE

## 2020-01-01 PROCEDURE — 93016 CV STRESS TEST SUPVJ ONLY: CPT | Performed by: INTERNAL MEDICINE

## 2020-01-01 PROCEDURE — 71045 X-RAY EXAM CHEST 1 VIEW: CPT

## 2020-01-01 PROCEDURE — 84484 ASSAY OF TROPONIN QUANT: CPT | Performed by: EMERGENCY MEDICINE

## 2020-01-01 PROCEDURE — 80048 BASIC METABOLIC PNL TOTAL CA: CPT | Performed by: EMERGENCY MEDICINE

## 2020-01-01 PROCEDURE — 99239 HOSP IP/OBS DSCHRG MGMT >30: CPT | Performed by: HOSPITALIST

## 2020-01-01 PROCEDURE — 25000125 ZZHC RX 250: Performed by: INTERNAL MEDICINE

## 2020-01-01 PROCEDURE — 25500064 ZZH RX 255 OP 636: Performed by: INTERNAL MEDICINE

## 2020-01-01 RX ORDER — CLOPIDOGREL BISULFATE 75 MG/1
75 TABLET ORAL DAILY
Status: DISCONTINUED | OUTPATIENT
Start: 2020-01-01 | End: 2020-01-01 | Stop reason: HOSPADM

## 2020-01-01 RX ORDER — NALOXONE HYDROCHLORIDE 0.4 MG/ML
.1-.4 INJECTION, SOLUTION INTRAMUSCULAR; INTRAVENOUS; SUBCUTANEOUS
Status: DISCONTINUED | OUTPATIENT
Start: 2020-01-01 | End: 2020-01-01 | Stop reason: HOSPADM

## 2020-01-01 RX ORDER — ALBUTEROL SULFATE 90 UG/1
AEROSOL, METERED RESPIRATORY (INHALATION)
COMMUNITY
Start: 2018-06-04

## 2020-01-01 RX ORDER — BUMETANIDE 0.5 MG/1
0.5 TABLET ORAL DAILY
Status: DISCONTINUED | OUTPATIENT
Start: 2020-01-01 | End: 2020-01-01

## 2020-01-01 RX ORDER — AMIODARONE HYDROCHLORIDE 200 MG/1
200 TABLET ORAL DAILY
Qty: 90 TABLET | Refills: 3 | Status: SHIPPED | OUTPATIENT
Start: 2020-01-01 | End: 2020-01-01

## 2020-01-01 RX ORDER — AMINOPHYLLINE 25 MG/ML
50-100 INJECTION, SOLUTION INTRAVENOUS
Status: DISCONTINUED | OUTPATIENT
Start: 2020-01-01 | End: 2020-01-01

## 2020-01-01 RX ORDER — AMIODARONE HYDROCHLORIDE 200 MG/1
200 TABLET ORAL DAILY
Qty: 30 TABLET | Refills: 1 | Status: SHIPPED | OUTPATIENT
Start: 2020-01-01 | End: 2020-01-01

## 2020-01-01 RX ORDER — MAGNESIUM SULFATE HEPTAHYDRATE 40 MG/ML
2 INJECTION, SOLUTION INTRAVENOUS ONCE
Status: COMPLETED | OUTPATIENT
Start: 2020-01-01 | End: 2020-01-01

## 2020-01-01 RX ORDER — AMIODARONE HYDROCHLORIDE 200 MG/1
200 TABLET ORAL 2 TIMES DAILY
Status: DISCONTINUED | OUTPATIENT
Start: 2020-01-01 | End: 2020-01-01

## 2020-01-01 RX ORDER — POTASSIUM CHLORIDE 1500 MG/1
40 TABLET, EXTENDED RELEASE ORAL
Status: CANCELLED | OUTPATIENT
Start: 2020-01-01

## 2020-01-01 RX ORDER — PANTOPRAZOLE SODIUM 40 MG/1
40 TABLET, DELAYED RELEASE ORAL
Status: DISCONTINUED | OUTPATIENT
Start: 2020-01-01 | End: 2020-01-01 | Stop reason: HOSPADM

## 2020-01-01 RX ORDER — LIDOCAINE 40 MG/G
CREAM TOPICAL
Status: DISCONTINUED | OUTPATIENT
Start: 2020-01-01 | End: 2020-01-01 | Stop reason: HOSPADM

## 2020-01-01 RX ORDER — ZINC SULFATE 50(220)MG
220 CAPSULE ORAL DAILY
Status: DISCONTINUED | OUTPATIENT
Start: 2020-01-01 | End: 2020-01-01 | Stop reason: HOSPADM

## 2020-01-01 RX ORDER — MOMETASONE FUROATE 200 UG/1
2 AEROSOL RESPIRATORY (INHALATION) 2 TIMES DAILY
Status: ON HOLD | COMMUNITY
End: 2020-01-01

## 2020-01-01 RX ORDER — AMIODARONE HYDROCHLORIDE 200 MG/1
200 TABLET ORAL DAILY
Qty: 90 TABLET | Refills: 3 | Status: SHIPPED | OUTPATIENT
Start: 2020-01-01

## 2020-01-01 RX ORDER — ACYCLOVIR 200 MG/1
0-1 CAPSULE ORAL
Status: DISCONTINUED | OUTPATIENT
Start: 2020-01-01 | End: 2020-01-01 | Stop reason: HOSPADM

## 2020-01-01 RX ORDER — LIDOCAINE 40 MG/G
CREAM TOPICAL
Status: CANCELLED | OUTPATIENT
Start: 2020-01-01

## 2020-01-01 RX ORDER — AMIODARONE HYDROCHLORIDE 200 MG/1
200 TABLET ORAL DAILY
Status: DISCONTINUED | OUTPATIENT
Start: 2020-01-01 | End: 2020-01-01 | Stop reason: HOSPADM

## 2020-01-01 RX ORDER — SODIUM CHLORIDE 9 MG/ML
INJECTION, SOLUTION INTRAVENOUS ONCE
Status: COMPLETED | OUTPATIENT
Start: 2020-01-01 | End: 2020-01-01

## 2020-01-01 RX ORDER — POTASSIUM CHLORIDE 1500 MG/1
20 TABLET, EXTENDED RELEASE ORAL
Status: CANCELLED | OUTPATIENT
Start: 2020-01-01

## 2020-01-01 RX ORDER — VITAMIN B COMPLEX
50 TABLET ORAL DAILY
Status: DISCONTINUED | OUTPATIENT
Start: 2020-01-01 | End: 2020-01-01 | Stop reason: HOSPADM

## 2020-01-01 RX ORDER — POTASSIUM CHLORIDE 1.5 G/1.58G
20 POWDER, FOR SOLUTION ORAL 2 TIMES DAILY
Status: DISCONTINUED | OUTPATIENT
Start: 2020-01-01 | End: 2020-01-01 | Stop reason: HOSPADM

## 2020-01-01 RX ORDER — METOPROLOL SUCCINATE 25 MG/1
12.5 TABLET, EXTENDED RELEASE ORAL DAILY
COMMUNITY
End: 2020-01-01

## 2020-01-01 RX ORDER — ALBUTEROL SULFATE 90 UG/1
2 AEROSOL, METERED RESPIRATORY (INHALATION) EVERY 5 MIN PRN
Status: DISCONTINUED | OUTPATIENT
Start: 2020-01-01 | End: 2020-01-01 | Stop reason: HOSPADM

## 2020-01-01 RX ORDER — SODIUM CHLORIDE 9 MG/ML
INJECTION, SOLUTION INTRAVENOUS CONTINUOUS
Status: CANCELLED | OUTPATIENT
Start: 2020-01-01

## 2020-01-01 RX ORDER — TRIAMCINOLONE ACETONIDE 1 MG/G
CREAM TOPICAL 2 TIMES DAILY
COMMUNITY

## 2020-01-01 RX ORDER — BUMETANIDE 0.5 MG/1
1 TABLET ORAL DAILY
Status: DISCONTINUED | OUTPATIENT
Start: 2020-01-01 | End: 2020-01-01 | Stop reason: HOSPADM

## 2020-01-01 RX ORDER — REGADENOSON 0.08 MG/ML
INJECTION, SOLUTION INTRAVENOUS
Status: COMPLETED
Start: 2020-01-01 | End: 2020-01-01

## 2020-01-01 RX ORDER — REGADENOSON 0.08 MG/ML
0.4 INJECTION, SOLUTION INTRAVENOUS ONCE
Status: COMPLETED | OUTPATIENT
Start: 2020-01-01 | End: 2020-01-01

## 2020-01-01 RX ORDER — FERROUS SULFATE 325(65) MG
325 TABLET ORAL
Status: DISCONTINUED | OUTPATIENT
Start: 2020-01-01 | End: 2020-01-01 | Stop reason: HOSPADM

## 2020-01-01 RX ORDER — BUMETANIDE 0.5 MG/1
0.5 TABLET ORAL DAILY
COMMUNITY

## 2020-01-01 RX ORDER — ATORVASTATIN CALCIUM 40 MG/1
40 TABLET, FILM COATED ORAL EVERY EVENING
Status: DISCONTINUED | OUTPATIENT
Start: 2020-01-01 | End: 2020-01-01 | Stop reason: HOSPADM

## 2020-01-01 RX ADMIN — SODIUM CHLORIDE 250 ML: 9 INJECTION, SOLUTION INTRAVENOUS at 01:20

## 2020-01-01 RX ADMIN — MOMETASONE FUROATE 2 PUFF: 220 INHALANT RESPIRATORY (INHALATION) at 19:28

## 2020-01-01 RX ADMIN — FERROUS SULFATE TAB 325 MG (65 MG ELEMENTAL FE) 325 MG: 325 (65 FE) TAB at 08:43

## 2020-01-01 RX ADMIN — AMIODARONE HYDROCHLORIDE 200 MG: 200 TABLET ORAL at 12:57

## 2020-01-01 RX ADMIN — MOMETASONE FUROATE 2 PUFF: 220 INHALANT RESPIRATORY (INHALATION) at 07:51

## 2020-01-01 RX ADMIN — PANTOPRAZOLE SODIUM 40 MG: 40 TABLET, DELAYED RELEASE ORAL at 06:47

## 2020-01-01 RX ADMIN — BUMETANIDE 1 MG: 0.5 TABLET ORAL at 09:45

## 2020-01-01 RX ADMIN — BUMETANIDE 1 MG: 0.5 TABLET ORAL at 08:45

## 2020-01-01 RX ADMIN — MOMETASONE FUROATE 2 PUFF: 220 INHALANT RESPIRATORY (INHALATION) at 08:09

## 2020-01-01 RX ADMIN — AMIODARONE HYDROCHLORIDE 200 MG: 200 TABLET ORAL at 20:18

## 2020-01-01 RX ADMIN — MOMETASONE FUROATE 2 PUFF: 220 INHALANT RESPIRATORY (INHALATION) at 09:45

## 2020-01-01 RX ADMIN — SODIUM CHLORIDE 500 ML: 9 INJECTION, SOLUTION INTRAVENOUS at 03:03

## 2020-01-01 RX ADMIN — ATORVASTATIN CALCIUM 40 MG: 40 TABLET, FILM COATED ORAL at 02:38

## 2020-01-01 RX ADMIN — MELATONIN 50 MCG: at 09:59

## 2020-01-01 RX ADMIN — PANTOPRAZOLE SODIUM 40 MG: 40 TABLET, DELAYED RELEASE ORAL at 06:56

## 2020-01-01 RX ADMIN — PANTOPRAZOLE SODIUM 40 MG: 40 TABLET, DELAYED RELEASE ORAL at 18:22

## 2020-01-01 RX ADMIN — CLOPIDOGREL BISULFATE 75 MG: 75 TABLET ORAL at 09:55

## 2020-01-01 RX ADMIN — POTASSIUM CHLORIDE 20 MEQ: 1.5 POWDER, FOR SOLUTION ORAL at 21:25

## 2020-01-01 RX ADMIN — MELATONIN 50 MCG: at 07:51

## 2020-01-01 RX ADMIN — AMIODARONE HYDROCHLORIDE 200 MG: 200 TABLET ORAL at 07:51

## 2020-01-01 RX ADMIN — PANTOPRAZOLE SODIUM 40 MG: 40 TABLET, DELAYED RELEASE ORAL at 16:19

## 2020-01-01 RX ADMIN — FERROUS SULFATE TAB 325 MG (65 MG ELEMENTAL FE) 325 MG: 325 (65 FE) TAB at 07:51

## 2020-01-01 RX ADMIN — CLOPIDOGREL BISULFATE 75 MG: 75 TABLET ORAL at 08:29

## 2020-01-01 RX ADMIN — AMIODARONE HYDROCHLORIDE 200 MG: 200 TABLET ORAL at 12:04

## 2020-01-01 RX ADMIN — REGADENOSON 0.4 MG: 0.08 INJECTION, SOLUTION INTRAVENOUS at 11:56

## 2020-01-01 RX ADMIN — TETROFOSMIN 10.5 MCI.: 1.38 INJECTION, POWDER, LYOPHILIZED, FOR SOLUTION INTRAVENOUS at 09:55

## 2020-01-01 RX ADMIN — MOMETASONE FUROATE 2 PUFF: 220 INHALANT RESPIRATORY (INHALATION) at 07:27

## 2020-01-01 RX ADMIN — Medication 12.5 MG: at 12:04

## 2020-01-01 RX ADMIN — POTASSIUM CHLORIDE 20 MEQ: 1.5 POWDER, FOR SOLUTION ORAL at 20:06

## 2020-01-01 RX ADMIN — Medication 12.5 MG: at 07:50

## 2020-01-01 RX ADMIN — ZINC SULFATE 220 MG (50 MG) CAPSULE 220 MG: CAPSULE at 07:51

## 2020-01-01 RX ADMIN — Medication 12.5 MG: at 09:41

## 2020-01-01 RX ADMIN — ZINC SULFATE 220 MG (50 MG) CAPSULE 220 MG: CAPSULE at 08:43

## 2020-01-01 RX ADMIN — Medication 12.5 MG: at 08:44

## 2020-01-01 RX ADMIN — ATORVASTATIN CALCIUM 40 MG: 40 TABLET, FILM COATED ORAL at 20:06

## 2020-01-01 RX ADMIN — POTASSIUM CHLORIDE 20 MEQ: 1.5 POWDER, FOR SOLUTION ORAL at 08:28

## 2020-01-01 RX ADMIN — ATORVASTATIN CALCIUM 40 MG: 40 TABLET, FILM COATED ORAL at 21:25

## 2020-01-01 RX ADMIN — POTASSIUM CHLORIDE 20 MEQ: 1.5 POWDER, FOR SOLUTION ORAL at 07:52

## 2020-01-01 RX ADMIN — FERROUS SULFATE TAB 325 MG (65 MG ELEMENTAL FE) 325 MG: 325 (65 FE) TAB at 08:30

## 2020-01-01 RX ADMIN — AMIODARONE HYDROCHLORIDE 200 MG: 200 TABLET ORAL at 21:25

## 2020-01-01 RX ADMIN — PANTOPRAZOLE SODIUM 40 MG: 40 TABLET, DELAYED RELEASE ORAL at 08:43

## 2020-01-01 RX ADMIN — HUMAN ALBUMIN MICROSPHERES AND PERFLUTREN 3 ML: 10; .22 INJECTION, SOLUTION INTRAVENOUS at 11:45

## 2020-01-01 RX ADMIN — CLOPIDOGREL BISULFATE 75 MG: 75 TABLET ORAL at 07:52

## 2020-01-01 RX ADMIN — PANTOPRAZOLE SODIUM 40 MG: 40 TABLET, DELAYED RELEASE ORAL at 09:41

## 2020-01-01 RX ADMIN — UMECLIDINIUM BROMIDE AND VILANTEROL TRIFENATATE 1 PUFF: 62.5; 25 POWDER RESPIRATORY (INHALATION) at 10:03

## 2020-01-01 RX ADMIN — POTASSIUM CHLORIDE 20 MEQ: 1.5 POWDER, FOR SOLUTION ORAL at 08:43

## 2020-01-01 RX ADMIN — MAGNESIUM SULFATE IN WATER 2 G: 40 INJECTION, SOLUTION INTRAVENOUS at 22:25

## 2020-01-01 RX ADMIN — BUMETANIDE 0.5 MG: 0.5 TABLET ORAL at 08:28

## 2020-01-01 RX ADMIN — MOMETASONE FUROATE 2 PUFF: 220 INHALANT RESPIRATORY (INHALATION) at 22:00

## 2020-01-01 RX ADMIN — PANTOPRAZOLE SODIUM 40 MG: 40 TABLET, DELAYED RELEASE ORAL at 16:48

## 2020-01-01 RX ADMIN — MOMETASONE FUROATE 2 PUFF: 220 INHALANT RESPIRATORY (INHALATION) at 22:06

## 2020-01-01 RX ADMIN — AMIODARONE HYDROCHLORIDE 200 MG: 200 TABLET ORAL at 20:06

## 2020-01-01 RX ADMIN — BUMETANIDE 1 MG: 0.5 TABLET ORAL at 08:43

## 2020-01-01 RX ADMIN — POTASSIUM CHLORIDE 20 MEQ: 1.5 POWDER, FOR SOLUTION ORAL at 02:38

## 2020-01-01 RX ADMIN — ATORVASTATIN CALCIUM 40 MG: 40 TABLET, FILM COATED ORAL at 20:18

## 2020-01-01 RX ADMIN — ATORVASTATIN CALCIUM 40 MG: 40 TABLET, FILM COATED ORAL at 20:26

## 2020-01-01 RX ADMIN — PANTOPRAZOLE SODIUM 40 MG: 40 TABLET, DELAYED RELEASE ORAL at 17:07

## 2020-01-01 RX ADMIN — SODIUM CHLORIDE: 9 INJECTION, SOLUTION INTRAVENOUS at 15:13

## 2020-01-01 RX ADMIN — POTASSIUM CHLORIDE 20 MEQ: 1.5 POWDER, FOR SOLUTION ORAL at 20:26

## 2020-01-01 RX ADMIN — Medication 12.5 MG: at 13:26

## 2020-01-01 RX ADMIN — POTASSIUM CHLORIDE 20 MEQ: 1.5 POWDER, FOR SOLUTION ORAL at 20:18

## 2020-01-01 RX ADMIN — MELATONIN 50 MCG: at 08:43

## 2020-01-01 RX ADMIN — POTASSIUM CHLORIDE 20 MEQ: 1.5 POWDER, FOR SOLUTION ORAL at 09:42

## 2020-01-01 RX ADMIN — PANTOPRAZOLE SODIUM 40 MG: 40 TABLET, DELAYED RELEASE ORAL at 08:30

## 2020-01-01 RX ADMIN — ZINC SULFATE 220 MG (50 MG) CAPSULE 220 MG: CAPSULE at 08:38

## 2020-01-01 RX ADMIN — FERROUS SULFATE TAB 325 MG (65 MG ELEMENTAL FE) 325 MG: 325 (65 FE) TAB at 09:41

## 2020-01-01 RX ADMIN — MOMETASONE FUROATE 2 PUFF: 220 INHALANT RESPIRATORY (INHALATION) at 19:11

## 2020-01-01 RX ADMIN — CLOPIDOGREL BISULFATE 75 MG: 75 TABLET ORAL at 08:43

## 2020-01-01 RX ADMIN — MELATONIN 50 MCG: at 08:37

## 2020-01-01 RX ADMIN — ZINC SULFATE 220 MG (50 MG) CAPSULE 220 MG: CAPSULE at 09:56

## 2020-01-01 RX ADMIN — TETROFOSMIN 30.5 MCI.: 1.38 INJECTION, POWDER, LYOPHILIZED, FOR SOLUTION INTRAVENOUS at 11:57

## 2020-01-01 RX ADMIN — UMECLIDINIUM BROMIDE AND VILANTEROL TRIFENATATE 1 PUFF: 62.5; 25 POWDER RESPIRATORY (INHALATION) at 08:58

## 2020-01-01 RX ADMIN — BUMETANIDE 0.5 MG: 0.5 TABLET ORAL at 09:40

## 2020-01-01 ASSESSMENT — ENCOUNTER SYMPTOMS
NAUSEA: 0
SHORTNESS OF BREATH: 0
SHORTNESS OF BREATH: 0
COUGH: 0
WEAKNESS: 0
BLOOD IN STOOL: 0
VOMITING: 0
HEMATURIA: 0
DIZZINESS: 0
LIGHT-HEADEDNESS: 0
FEVER: 0

## 2020-01-01 ASSESSMENT — ACTIVITIES OF DAILY LIVING (ADL)
ADLS_ACUITY_SCORE: 15
ADLS_ACUITY_SCORE: 17
ADLS_ACUITY_SCORE: 15
ADLS_ACUITY_SCORE: 17
ADLS_ACUITY_SCORE: 15
ADLS_ACUITY_SCORE: 17
ADLS_ACUITY_SCORE: 17
ADLS_ACUITY_SCORE: 15
ADLS_ACUITY_SCORE: 17
ADLS_ACUITY_SCORE: 15
ADLS_ACUITY_SCORE: 17
ADLS_ACUITY_SCORE: 17
ADLS_ACUITY_SCORE: 15
ADLS_ACUITY_SCORE: 17
ADLS_ACUITY_SCORE: 15
ADLS_ACUITY_SCORE: 15

## 2020-01-01 ASSESSMENT — PAIN SCALES - GENERAL
PAINLEVEL: NO PAIN (0)

## 2020-01-01 ASSESSMENT — MIFFLIN-ST. JEOR
SCORE: 1428.82
SCORE: 1367.13
SCORE: 1345.38
SCORE: 1406.38
SCORE: 1362.59
SCORE: 1356.24
SCORE: 1371.66
SCORE: 1361.23

## 2020-01-07 NOTE — ED TRIAGE NOTES
Pt has low BP readings at home via home machine.  BP 82/50  With pulse 72  Followup reading was /62  Pulse 82  Pt denies any sxs.

## 2020-01-07 NOTE — ED AVS SNAPSHOT
[facility] Emergency Department  [address]  [facility] [address]  Phone:  [phone]  Fax:  [phone]                                    José Aguirre   MRN: [id number]    Department:  [facility] Emergency Department   Date of Visit:  1/7/2020           After Visit Summary Signature Page    I have received my discharge instructions, and my questions have been answered. I have discussed any challenges I see with this plan with the nurse or doctor.    ..........................................................................................................................................  Patient/Patient Representative Signature      ..........................................................................................................................................  Patient Representative Print Name and Relationship to Patient    ..................................................               ................................................  Date                                   Time    ..........................................................................................................................................  Reviewed by Signature/Title    ...................................................              ..............................................  Date                                               Time          22EPIC Rev 08/18

## 2020-01-07 NOTE — ED PROVIDER NOTES
History     Chief Complaint:  Hypotension    HPI   José Aguirre is a 84 year old male, with a history of COPD, hypertension, hyperlipidemia, and CAD, who presents with concerns for hypotension. The patient states that he was checking his blood pressure this morning on his at home blood pressure machine and became concerned for low readings. He had called his PCP with the numbers and he was referred to the ED. Here, the patient states that he had read the wrong numbers to his PCP and his blood pressure was not as low as he thought. He has no symptoms including chest pain, dizziness, lightheadedness, nausea, vision changes, weakness, cough, cold, or fever. The patient did not take his Metoprolol this morning and notes that he has not taken it for 2 weeks because of his blood pressure. Of note, the patient is chronically on oxygen from COPD.     Allergies:  NKDA     Medications:    Albuterol  Ascorbic acid  Aspirin  Atorvastatin  Benzocaine  Bumetanide  Plavix  Ferrous sulfate  Metoprolol  Mometasone  Dulera  Nitroglycerin  Protonix  Anoro Ellipta    Past Medical History:    Anemia  Atrophy of left kidney  CKD stage 3  Claudication, intermittent  CAD  GI bleed  Hyperlipidemia  Hypertension  Left carotid artery stenosis  PVD  Renal artery stenosis  CABG  COPD  Postinflammatory pulmonary fibrosis  Cardiac arrest    Past Surgical History:    Bypass graft artery coronary  Carotid endarterectomy  Coronary angiogram  CV PCI coronary angiogram  Endarterectomy carotid  EP ICD  Eye surgery  Heart cath left heart cath  Thoracentesis  Renal artery angiogram  Tonsillectomy    Family History:    Cerebrovascular disease  Brain cancer  Dementia   Arthritis     Social History:  Presents with his ex-wife  Former Smoker - quit 2001  Positive for alcohol use.    Marital Status:   [4]     Review of Systems   Constitutional: Negative for fever.   Eyes: Negative for visual disturbance.   Respiratory: Negative for cough and  "shortness of breath.    Cardiovascular: Negative for chest pain.   Gastrointestinal: Negative for nausea and vomiting.   Neurological: Negative for dizziness, weakness and light-headedness.   All other systems reviewed and are negative.    Physical Exam   First Vitals:  Patient Vitals for the past 24 hrs:   BP Temp Temp src Pulse Heart Rate Resp SpO2 Height Weight   01/07/20 1615 125/62 -- -- 64 -- -- 99 % -- --   01/07/20 1600 123/52 -- -- 67 -- -- 94 % -- --   01/07/20 1545 110/54 -- -- 62 -- -- 92 % -- --   01/07/20 1530 103/48 -- -- 67 -- -- 94 % -- --   01/07/20 1515 93/60 -- -- 67 -- -- -- -- --   01/07/20 1500 119/71 -- -- 79 -- -- 98 % -- --   01/07/20 1445 128/56 -- -- 60 -- -- 94 % -- --   01/07/20 1415 -- -- -- -- -- -- 94 % -- --   01/07/20 1400 133/70 -- -- 66 -- -- 99 % -- --   01/07/20 1243 100/60 97.9  F (36.6  C) Oral -- 52 20 98 % 1.753 m (5' 9\") 72.6 kg (160 lb 0.9 oz)       Physical Exam  General: Alert and interactive. Appears well. On 3L oxygen via nasal canula, chronic.  Eyes: The pupils are equal and round. EOMs intact. No scleral icterus.  ENT: No abnormalities to the external nose or ears. Mucous membranes moist. Posterior oropharynx is non-erythematous.      Neck: Trachea is in the midline. No nuchal rigidity.     CV: Regular rate and rhythm. S1 and S2 normal without murmur, click, gallop or rub.   Resp: Breath sounds are clear bilaterally, without rhonchi, wheezes, rales. Non-labored, no retractions or accessory muscle use.     GI: Abdomen is soft without distension. No tenderness to palpation. No peritoneal signs.    MS: Moving all extremities well. Good muscle tone.   Skin: Warm and dry. No rash or lesions noted.  Neuro: Alert and oriented x 3. No focal neurologic deficits. Good strength and sensation in upper and lower extremities.    Psych: Awake. Alert.  Normal affect. Appropriate interactions.  Lymph: No anterior or posterior cervical lymphadenopathy noted.    Emergency Department " Course   ECG:  Indication: hypotension  Time: 1253  Vent. Rate 72 bpm. CT interval 160. QRS duration 140. QT/QTc 442/483. P-R-T axis 62 -77 10. right  bundle branch block. Left anterior fascicular block. Inferior infarct. Abnormal ECG. Read time: 1255     Interventions:   1513 0.9% Sodium Chloride Bolus, 500 mL, IV     Emergency Department Course:  Nursing notes and vitals reviewed. EKG was obtained, findings above.      1500  I performed an exam of the patient as documented above.     IV inserted.     1610 I rechecked the patient and discussed the results of his workup thus far.     Findings and plan explained to the Patient. Patient discharged home with instructions regarding supportive care, medications, and reasons to return. The importance of close follow-up was reviewed.     Impression & Plan      Medical Decision Making:  José Aguirre is a 84 year old male with history of anemia, CKD, hyperlipidemia, hypertension, COPD on chronic oxygen, who presents for evaluation of hypotension at home. He got a new blood pressure monitor and has attempting to use this to determine if he should be taking his Metoprolol. He thought had read his blood pressure monitor wrong this morning when he took his blood pressures and they were as low 82/50. Normal BPs here with slight orthopnea. I gave him 500 mL bolus. He has no dizziness, no nystagmus, no lightheadedness, no chest pain, shortness of breath. No fevers/chills to suggest infectious etiology. My suspicion for sinister etiologies is quite low. Patient at this point is stable for discharge. He feels normal. I told him to hold his metoprolol until he follows up with primary care and return here for fevers/chills, new chest pain or SOB, syncope.     Diagnosis:    ICD-10-CM   1. Orthostatic hypotension I95.1       Disposition:  discharged to home    IAlisha, am serving as a scribe on 1/7/2020 at 4:15 PM to personally document services performed by Joi Bolaños  AZALEA Canela based on my observations and the provider's statements to me.      1/7/2020   [facility] EMERGENCY DEPARTMENT       Joi Bolaños PA-C  01/07/20 1928

## 2020-01-07 NOTE — DISCHARGE INSTRUCTIONS
Hold Metoprolol until cleared by primary care to take it.   Your BP and pulse have been low enough.   Do not get up too fast.   Take your blood pressure after up for a while in the morning - NOT FIRST THING, as it might be falsely low.

## 2020-01-15 NOTE — NURSING NOTE
Chief Complaint   Patient presents with     Follow Up     4 Month ICD Follow-Up     Vitals were taken and medications were reconciled.   Savanah Jimenez  11:27 AM

## 2020-01-15 NOTE — PATIENT INSTRUCTIONS
It was a pleasure to see you in clinic today.  Please do not hesitate to call with any questions or concerns.  You are scheduled for a remote transmission on 4/15/20.  We look forward to seeing you in clinic at your next device check in 6 months.    Antony Whatley, RN  Electrophysiology Nurse Clinician  [facility]    During Business Hours Please Call:  [phone]  After Hours Please Call:  [phone] - select option #4 and ask for job code [phone]

## 2020-01-15 NOTE — PROGRESS NOTES
Electrophysiology Clinic Note  HPI:   Mr. Aguirre is an 84 year old male who has a past medical history significant for COPD (uses 2L home oxygen with activities), CABG (LIMA-LAD, SVG-D1, SVG-OM2, SVG-rPDA) 2003, PVD with bilateral lower extremity claudication (left > right), carotid artery disease with occluded right carotid and s/p left CEA, renal artery stenosis s/p stenting 2013, HLD, HTN, left subclavian artery stenosis, CKD III, and out of hospital cardiac arrest s/p secondary prevention ICD 9/10/10.   He was admitted on 8/27/19 after suffering an out of hospital cardiac arrest. He was at the [facility] and had a witnessed collapse. Bystander CPR was started and EMS found him in VT/VF. He was defibrillated X1 and given 1 of epi. ROSC was obtained in field. On arrival here, he had a pulse and was hypotensive. He was intubated. He was taken for emergent coronary angiogram which showed severe 3V  CAD with LM/LAD lesion and CTOp of proximal RCA with L to right collaterals. LIMA-LAD graft minimally diseased but small and primarily retrograde flow through native LAD. SVG-RCA chronically closed at anastomosis, SVG-D1 could not be selectively injected but appears closed due to dye hangup during injection of the native coronaries. There may also be a closed graft to OM2 but this is not certain. He underwent PCI to dLM/ostial LAD with CONCETTA and balloon angioplasty of ostial LCx. He was also placed on therapeutic hypothermia protocol. He has made good recovery thus far. Per his family he has had another episode of a cardiac arrest in 2018 no angiogram was done he had ROSC and good recovery and per report had no further work up, and a reported syncopal event both attributed to dehydration. Echo shows LVEF 60-70%, mildly reduced RV function, and no significant valvular issues. ECG shows SR with RBBB. He elected to undergo ICD and had secondary prevention ICD placed on 9/10/19.     He presents today for follow up. He reports  feeling ***. He/She*** denies any chest pain/pressures, dizziness, lightheadedness, worsening shortness of breath, leg/ankle swelling, PND, orthopnea, palpitations, or syncopal symptoms.   Device site well healed. Device interrogation shows ***.  He is noted to have episodes of AF on device lasting >24 hours. He is not on anticoagulation currently. Current cardiac medications include: Lipitor, Bumex, Toprol XL, ASA, and Plavix.     PAST MEDICAL HISTORY:  Past Medical History:   Diagnosis Date     Anemia      Atrophy of left kidney      CKD (chronic kidney disease), stage III (H)      Claudication, intermittent (H)      Coronary artery disease 2003    CAB x 4     History of GI bleed 2012    Hemorrhagic gastritis     Hypercholesterolemia      Hypertension      Left carotid artery stenosis     S/P Carotid Endarterectomy left      PVD (peripheral vascular disease) (H)      Renal artery stenosis (H)     stent R       CURRENT MEDICATIONS:  Current Outpatient Medications   Medication Sig Dispense Refill     acetylcysteine (MUCOMYST) 20 % neb solution Take 2 mLs by nebulization 4 times daily 120 mL 0     albuterol (PROVENTIL) (2.5 MG/3ML) 0.083% neb solution Take 1 vial (2.5 mg) by nebulization 4 times daily 2 Box 0     Ascorbic Acid 500 MG CHEW Take 500 mg by mouth daily       aspirin 81 MG EC tablet Take 81 mg by mouth daily       atorvastatin (LIPITOR) 40 MG tablet Take 1 tablet (40 mg) by mouth every evening 30 tablet 0     Benzocaine (HURRICAINE/TOPEX) 20 % AERO spray Take 1-2 sprays by mouth 3 times daily as needed for moderate pain       bumetanide (BUMEX) 1 MG tablet Take 1 tablet (1 mg) by mouth daily 30 tablet 0     clopidogrel (PLAVIX) 75 MG tablet Take 75 mg by mouth daily       ferrous sulfate (FEROSUL) 325 (65 Fe) MG tablet Take 325 mg by mouth daily (with breakfast)       metoprolol succinate ER (TOPROL-XL) 25 MG 24 hr tablet Take 0.5 tablets (12.5 mg) by mouth daily 30 tablet 0     mometasone (ASMANEX) 220  "MCG/INH inhaler Inhale 2 puffs into the lungs 2 times daily 1 each 3     mometasone-formoterol (DULERA) 100-5 MCG/ACT inhaler Inhale 2 puffs into the lungs 2 times daily 1 Inhaler 0     multivitamin w/minerals (THERA-VIT-M) tablet Take 1 tablet by mouth daily       nitroGLYcerin (NITROSTAT) 0.4 MG sublingual tablet For chest pain place 1 tablet under the tongue every 5 minutes for 3 doses. If symptoms persist 5 minutes after 1st dose call 911. 15 tablet 0     order for DME Wheelchair   Toilet Safety Frame  Straight Wall Grab Bar X 2 each  Adjustable Bath Shower Chair with Back--can only be 15 inches wide 1 each 0     order for DME Toilet Safety Frame  Wheelchair  Adjustable Bath Shower Chair with Back--can only be 15 inches wide  Straight wall grab bar X 2 each 1 each 0     order for DME Toilet Safety Frame  Adjustable Bath Shower Chair with back--can only be 15\" wide  Wheelchair  Straight wall grab bar X 2 each 1 each 0     order for DME Toilet Safety Frame  Adjustable Bath Shower Chair with Back - can ONLY be 15 inches wide  Straight wall grab bar 1 each 0     pantoprazole (PROTONIX) 40 MG EC tablet Take 1 tablet (40 mg) by mouth 2 times daily (before meals) 60 tablet 0     potassium chloride (KLOR-CON) 20 MEQ packet Take 20 mEq by mouth 2 times daily        umeclidinium-vilanterol (ANORO ELLIPTA) 62.5-25 MCG/INH oral inhaler Inhale 1 puff into the lungs daily 14 Inhaler 0     vitamin A 48386 units capsule Take 2 capsules (20,000 Units) by mouth daily       vitamin D3 (CHOLECALCIFEROL) 2000 units (50 mcg) tablet Take 2,000 Units by mouth daily       zinc sulfate (ZINCATE) 220 (50 Zn) MG capsule Take 1 capsule (220 mg) by mouth daily         PAST SURGICAL HISTORY:  Past Surgical History:   Procedure Laterality Date     BYPASS GRAFT ARTERY CORONARY  06/2003    LIMA=LAD, SVG=Diag, SVG=OM2, SVG=PDA     CAROTID ENDARTERECTOMY Left      CV CORONARY ANGIOGRAM N/A 8/27/2019    Procedure: Coronary Angiogram;  Surgeon: " Andrew Garcia MD;  Location:  HEART CARDIAC CATH LAB     CV PCI STENT DRUG ELUTING N/A 2019    Procedure: PCI Stent Drug Eluting;  Surgeon: Andrew Garcia MD;  Location:  HEART CARDIAC CATH LAB     ENDARTERECTOMY CAROTID Left 2017    Procedure: ENDARTERECTOMY CAROTID;  REDO LEFT CAROTID ENDARTERECTOMY WITH RIGHT ANKLE GREATER SAPHENOUS VEIN  ;  Surgeon: Khurram Bishop MD;  Location: [facility] OR     EP ICD N/A 9/10/2019    Procedure: EP ICD;  Surgeon: Shorty Marion MD;  Location:  HEART CARDIAC CATH LAB     EYE SURGERY       HEART CATH LEFT HEART CATH  2003    Severe multivessel disease     IR THORACENTESIS  2019     RENAL ARTERY ANGIOGRAM Right 2013    with stenting     TONSILLECTOMY         ALLERGIES:   No Known Allergies    FAMILY HISTORY:  Family History   Problem Relation Age of Onset     Cerebrovascular Disease Mother 90        unknown type     Cancer Brother 70        Brain cancer      Dementia Sister      Arthritis Brother        SOCIAL HISTORY:  Social History     Tobacco Use     Smoking status: Former Smoker     Packs/day: 1.00     Years: 50.00     Pack years: 50.00     Types: Cigarettes     Last attempt to quit: 2001     Years since quittin.0   Substance Use Topics     Alcohol use: Yes     Comment: 4 drinks week     Drug use: No       ROS:   A comprehensive 10 point review of systems negative other than as mentioned in HPI.  Exam:  There were no vitals taken for this visit.  GENERAL APPEARANCE: alert and no distress  HEENT: no icterus, no xanthelasmas, normal pupil size and reaction, normal palate, mucosa moist, no central cyanosis  NECK: no adenopathy, no asymmetry, masses, or scars, thyroid normal to palpation and no bruits, JVP not elevated  RESPIRATORY: lungs clear to auscultation - no rales, rhonchi or wheezes, no use of accessory muscles, no retractions, respirations are unlabored, normal respiratory rate  CARDIOVASCULAR: regular rhythm, normal S1  with physiologic split S2, no S3 or S4 and no murmur, click or rub, precordium quiet with normal PMI.  ABDOMEN: soft, non tender, bowel sounds normal, non-distended  EXTREMITIES: peripheral pulses normal, no edema  NEURO: alert and oriented to person/place/time, normal speech, gait and affect  SKIN: no ecchymoses, no rashes  PSYCH: normal affect, cooperative    Labs:  Reviewed.     Testing/Procedures:  PULMONARY FUNCTION TESTS:   PFT Latest Ref Rng & Units 10/8/2019   FVC L 3.38   FEV1 L 2.28   FVC% % 95   FEV1% % 87         8/2019 ECHOCARDIOGRAM:      Interpretation Summary  Technically difficult study.  Left ventricular function is normal or even hyperdynamic in some views.The EF  is 60-70%. No regional wall motion abnormalities aer seen.  RV appears dilated with probably mildly reduced function on limited views.  No significant valve disease.     There is no prior study for direct comparison.      Assessment and Plan:   ***      The patient states understanding and is agreeable with plan.   MARKUS Urrutia CNP  Pager: [phone]  SAEID MARKHAM

## 2020-01-15 NOTE — LETTER
1/15/2020      RE: José Aguirre  [id number]  [place] [id number]       Dear Colleague,    Thank you for the opportunity to participate in the care of your patient, José Aguirre, at the [facility] at [facility]. Please see a copy of my visit note below.    Electrophysiology Clinic Note  HPI:   Mr. Aguirre is an 84 year old male who has a past medical history significant for COPD (uses 2L home oxygen with activities), CABG (LIMA-LAD, SVG-D1, SVG-OM2, SVG-rPDA) 2003, PVD with bilateral lower extremity claudication (left > right), carotid artery disease with occluded right carotid and s/p left CEA, renal artery stenosis s/p stenting 2013, HLD, HTN, left subclavian artery stenosis, CKD III, and out of hospital cardiac arrest s/p secondary prevention ICD 9/10/19.   He was admitted on 8/27/19 after suffering an out of hospital cardiac arrest. He was at the [facility] and had a witnessed collapse. Bystander CPR was started and EMS found him in VT/VF. He was defibrillated X1 and given 1 of epi. ROSC was obtained in field. On arrival here, he had a pulse and was hypotensive. He was intubated. He was taken for emergent coronary angiogram which showed severe 3V  CAD with LM/LAD lesion and CTOp of proximal RCA with L to right collaterals. LIMA-LAD graft minimally diseased but small and primarily retrograde flow through native LAD. SVG-RCA chronically closed at anastomosis, SVG-D1 could not be selectively injected but appears closed due to dye hangup during injection of the native coronaries. There may also be a closed graft to OM2 but this is not certain. He underwent PCI to dLM/ostial LAD with CONCETTA and balloon angioplasty of ostial LCx. He was also placed on therapeutic hypothermia protocol. He has made good recovery thus far. Per his family he has had another episode of a cardiac arrest in 2018 no angiogram was done he had ROSC and good recovery and per report  had no further work up, and a reported syncopal event both attributed to dehydration. Echo shows LVEF 60-70%, mildly reduced RV function, and no significant valvular issues. ECG shows SR with RBBB. He elected to undergo ICD and had secondary prevention ICD placed on 9/10/19.     He presents today for follow up. He reports feeling well. ICD was placed on 9/10/19. ICD site is well healed. He denies any chest pain/pressures, dizziness, lightheadedness, worsening shortness of breath, leg/ankle swelling, PND, orthopnea, palpitations, or syncopal symptoms.  Device interrogation shows   normal ICD function. 17 atrial episodes recorded.  Atrial burden 4%.  Review of available EGMs shows atrial flutter with VS response.  Device programmed VDD with mode switch to VDIR.  Patient paces at switch back to VDD at end of episode.   = 5% and Aflutter burden is 4%.  So patient  about 1% when not in mode switch.  No ventricular arrhythmias recorded. Intrinsic rhythm = SR @ 78 bpm.   = 5%.  Estimated battery longevity to EDWIN = 100%.  Battery voltage = 3.11V.  No short v-v intervals recorded. RV lead impedance trends appear stable..  He is noted to have episodes of AF on device lasting >24 hours. He is not on anticoagulation currently. Current cardiac medications include: Lipitor, Bumex, Toprol XL, ASA, and Plavix.     PAST MEDICAL HISTORY:  Past Medical History:   Diagnosis Date     Anemia      Atrophy of left kidney      CKD (chronic kidney disease), stage III (H)      Claudication, intermittent (H)      Coronary artery disease 2003    CAB x 4     History of GI bleed 2012    Hemorrhagic gastritis     Hypercholesterolemia      Hypertension      Left carotid artery stenosis     S/P Carotid Endarterectomy left      PVD (peripheral vascular disease) (H)      Renal artery stenosis (H)     stent R       CURRENT MEDICATIONS:  Current Outpatient Medications   Medication Sig Dispense Refill     acetylcysteine (MUCOMYST) 20 % neb solution  "Take 2 mLs by nebulization 4 times daily 120 mL 0     albuterol (PROVENTIL) (2.5 MG/3ML) 0.083% neb solution Take 1 vial (2.5 mg) by nebulization 4 times daily 2 Box 0     Ascorbic Acid 500 MG CHEW Take 500 mg by mouth daily       aspirin 81 MG EC tablet Take 81 mg by mouth daily       atorvastatin (LIPITOR) 40 MG tablet Take 1 tablet (40 mg) by mouth every evening 30 tablet 0     Benzocaine (HURRICAINE/TOPEX) 20 % AERO spray Take 1-2 sprays by mouth 3 times daily as needed for moderate pain       bumetanide (BUMEX) 1 MG tablet Take 1 tablet (1 mg) by mouth daily 30 tablet 0     clopidogrel (PLAVIX) 75 MG tablet Take 75 mg by mouth daily       ferrous sulfate (FEROSUL) 325 (65 Fe) MG tablet Take 325 mg by mouth daily (with breakfast)       metoprolol succinate ER (TOPROL-XL) 25 MG 24 hr tablet Take 0.5 tablets (12.5 mg) by mouth daily 30 tablet 0     mometasone (ASMANEX) 220 MCG/INH inhaler Inhale 2 puffs into the lungs 2 times daily 1 each 3     mometasone-formoterol (DULERA) 100-5 MCG/ACT inhaler Inhale 2 puffs into the lungs 2 times daily 1 Inhaler 0     multivitamin w/minerals (THERA-VIT-M) tablet Take 1 tablet by mouth daily       nitroGLYcerin (NITROSTAT) 0.4 MG sublingual tablet For chest pain place 1 tablet under the tongue every 5 minutes for 3 doses. If symptoms persist 5 minutes after 1st dose call 911. 15 tablet 0     order for [facility] Wheelchair   Toilet Safety Frame  Straight Wall Grab Bar X 2 each  Adjustable Bath Shower Chair with Back--can only be 15 inches wide 1 each 0     order for DME Toilet Safety Frame  Wheelchair  Adjustable Bath Shower Chair with Back--can only be 15 inches wide  Straight wall grab bar X 2 each 1 each 0     order for [facility] Toilet Safety Frame  Adjustable Bath Shower Chair with back--can only be 15\" wide  Wheelchair  Straight wall grab bar X 2 each 1 each 0     order for [facility] Toilet Safety Frame  Adjustable Bath Shower Chair with Back - can ONLY be 15 inches wide  Straight wall grab " bar 1 each 0     pantoprazole (PROTONIX) 40 MG EC tablet Take 1 tablet (40 mg) by mouth 2 times daily (before meals) 60 tablet 0     potassium chloride (KLOR-CON) 20 MEQ packet Take 20 mEq by mouth 2 times daily        umeclidinium-vilanterol (ANORO ELLIPTA) 62.5-25 MCG/INH oral inhaler Inhale 1 puff into the lungs daily 14 Inhaler 0     vitamin A 79049 units capsule Take 2 capsules (20,000 Units) by mouth daily       vitamin D3 (CHOLECALCIFEROL) 2000 units (50 mcg) tablet Take 2,000 Units by mouth daily       zinc sulfate (ZINCATE) 220 (50 Zn) MG capsule Take 1 capsule (220 mg) by mouth daily         PAST SURGICAL HISTORY:  Past Surgical History:   Procedure Laterality Date     BYPASS GRAFT ARTERY CORONARY  06/2003    LIMA=LAD, SVG=Diag, SVG=OM2, SVG=PDA     CAROTID ENDARTERECTOMY Left      CV CORONARY ANGIOGRAM N/A 8/27/2019    Procedure: Coronary Angiogram;  Surgeon: Andrew Garcia MD;  Location:  [facility] CARDI[facility] CATH LAB     CV PCI STENT DRUG ELUTING N/A 8/27/2019    Procedure: PCI Stent Drug Eluting;  Surgeon: Andrew Garcia MD;  Location:  HE[facility] CARDI[facility] CATH LAB     ENDARTERECTOMY CAROTID Left 7/11/2017    Procedure: ENDARTERECTOMY CAROTID;  REDO LEFT CAROTID ENDARTERECTOMY WITH RIGHT ANKLE GREATER SAPHENOUS VEIN  ;  Surgeon: Khurram Bishop MD;  Location: [facility] OR     EP ICD N/A 9/10/2019    Procedure: EP ICD;  Surgeon: Shorty Marion MD;  Location:  HEART CARDIAC CATH LAB     EYE SURGERY       HEART CATH LEFT HEART CATH  06/2003    Severe multivessel disease     IR THORACENTESIS  9/5/2019     RENAL ARTERY ANGIOGRAM Right 05/2013    with stenting     TONSILLECTOMY         ALLERGIES:   No Known Allergies    FAMILY HISTORY:  Family History   Problem Relation Age of Onset     Cerebrovascular Disease Mother 90        unknown type     Cancer Brother 70        Brain cancer      Dementia Sister      Arthritis Brother        SOCIAL HISTORY:  Social History     Tobacco Use     Smoking status: Former  "Smoker     Packs/day: 1.00     Years: 50.00     Pack years: 50.00     Types: Cigarettes     Last attempt to quit: 2001     Years since quittin.0   Substance Use Topics     Alcohol use: Yes     Comment: 4 drinks week     Drug use: No       ROS:   A comprehensive 10 point review of systems negative other than as mentioned in HPI.  Exam:  BP 97/59 (BP Location: Left arm, Patient Position: Chair, Cuff Size: Adult Regular)   Pulse 73   Ht 1.753 m (5' 9\")   Wt 74.8 kg (165 lb)   SpO2 95%   BMI 24.37 kg/m     GENERAL APPEARANCE: alert and no distress  HEENT: no icterus, no xanthelasmas, normal pupil size and reaction, normal palate, mucosa moist, no central cyanosis  NECK: no adenopathy, no asymmetry, masses, or scars, thyroid normal to palpation and no bruits, JVP not elevated  RESPIRATORY: lungs clear to auscultation - no rales, rhonchi or wheezes, no use of accessory muscles, no retractions, respirations are unlabored, normal respiratory rate  CARDIOVASCULAR: regular rhythm, normal S1 with physiologic split S2, no S3 or S4 and no murmur, click or rub, precordium quiet with normal PMI.  ABDOMEN: soft, non tender, bowel sounds normal, non-distended  EXTREMITIES: peripheral pulses normal, no edema  NEURO: alert and oriented to person/place/time, normal speech, gait and affect  SKIN: no ecchymoses, no rashes  PSYCH: normal affect, cooperative    Labs:  Reviewed.     Testing/Procedures:  PULMONARY FUNCTION TESTS:   PFT Latest Ref Rng & Units 10/8/2019   FVC L 3.38   FEV1 L 2.28   FVC% % 95   FEV1% % 87         2019 ECHOCARDIOGRAM:      Interpretation Summary  Technically difficult study.  Left ventricular function is normal or even hyperdynamic in some views.The EF  is 60-70%. No regional wall motion abnormalities aer seen.  RV appears dilated with probably mildly reduced function on limited views.  No significant valve disease.     There is no prior study for direct comparison.      Assessment and Plan: "   Mr. Aguirre is an 84 year old male who has a past medical history significant for COPD (uses 2L home oxygen with activities), CABG (LIMA-LAD, SVG-D1, SVG-OM2, SVG-rPDA) 2003, PVD with bilateral lower extremity claudication (left > right), carotid artery disease with occluded right carotid and s/p left CEA, renal artery stenosis s/p stenting 2013, HLD, HTN, left subclavian artery stenosis, CKD III, and out of hospital cardiac arrest s/p secondary prevention ICD 9/10/19.   He was admitted on 8/27/19 after suffering an out of hospital cardiac arrest. He was at the [facility] and had a witnessed collapse. Bystander CPR was started and EMS found him in VT/VF. He was defibrillated X1 and given 1 of epi. ROSC was obtained in field. On arrival here, he had a pulse and was hypotensive. He was intubated. He was taken for emergent coronary angiogram which showed severe 3V  CAD with LM/LAD lesion and CTOp of proximal RCA with L to right collaterals. LIMA-LAD graft minimally diseased but small and primarily retrograde flow through native LAD. SVG-RCA chronically closed at anastomosis, SVG-D1 could not be selectively injected but appears closed due to dye hangup during injection of the native coronaries. There may also be a closed graft to OM2 but this is not certain. He underwent PCI to dLM/ostial LAD with CONCETTA and balloon angioplasty of ostial LCx. He was also placed on therapeutic hypothermia protocol. He has made good recovery thus far. Per his family he has had another episode of a cardiac arrest in 2018 no angiogram was done he had ROSC and good recovery and per report had no further work up, and a reported syncopal event both attributed to dehydration. Echo shows LVEF 60-70%, mildly reduced RV function, and no significant valvular issues. ECG shows SR with RBBB. He elected to undergo ICD and had secondary prevention ICD placed on 9/10/19. He presents today for follow up. He reports feeling well. ICD was placed on 9/10/19.  ICD site is well healed. He is noted to have episodes of AF on device lasting >24 hours. He is not on anticoagulation currently. Current cardiac medications include: Lipitor, Bumex, Toprol XL, ASA, and Plavix.     Coronary Artery Disease:  1. Antiplatlet: Continue Plavix  2. Statin: Continue Lipitor   3. BB:  Continue Toprol XL   4. Nitroglycerin 0.4 mg PRN for chest pain. Place 1 tablet (0.4 mg) under the tongue every 5 minutes as needed for chest pain. Maximum of 3 doses in 15 minutes.  5. Lifestyle: Avoid tobacco, maintain a healthy weight, limit alcohol, cardiac diet, and exercise.    Paroxsymal Atrial Fibrillation:   We discussed in detail with the patient management/treatment options for A.fib includin. Stroke Prophylaxis:  CHADSVASC=++age, +CAD, +HTN  4, corresponding to a 4.0% annual stroke / systemic emolism event rate. indicating need for long term oral anticoagulation.    Anticoagulants include warfarin (Coumadin) and the noval oral anticoagulant agents: dabigatran (Pradaxa), rivaroxaban (Xarelto), and apixaban (Eliquis). The benefits of warfarin include: low cost, established track record in reduction of stroke and systemic embolism, the ability to reverse the agents, the ability to titrate the agent, and the ability to provide additional protection in subsets of patients who require anticoagulation for an independent process (i.e. prosthetic valve). The disadvantages of warfarin include: frequent phlebotomy for INRs, difficulty in regulation of INR [typically  60-65% of INRs within therapeutic range], and dietary constraints secondary to bioavailability. The benefits of the novel oral anticoagulants, as a class, include:  no phlebotomy, similar or significantly lower risk of stroke or systemic embolism depending on the agent, and similar or significantly lower risk of bleeding, particularly intracranial bleeding. The disadvantages of the novel agents, as a class, include: higher cost, the inability  to reverse the agents (except Pradaxa), and  the need to be cautious in patients with CKD.   After detailed discussion, we will start Eliquis 5 mg BID.   2. Rate Control: Continue Toprol XL.   3. Rhythm Control: He is having self limiting AF episodes that are asymptomatic. Low burden. Normal LVEF. No compelling indication for rhythm control.   4. Risk Factor Management: Continue statin, maintain tight BP control, and ANNI evaluation as indicated.      Follow up in 6 months.    The patient states understanding and is agreeable with plan.   MARKUS Urrutia CNP  Pager: [phone]    SAEID MARKHAM

## 2020-01-19 NOTE — PROGRESS NOTES
Electrophysiology Clinic Note  HPI:   Mr. Aguirre is an 84 year old male who has a past medical history significant for COPD (uses 2L home oxygen with activities), CABG (LIMA-LAD, SVG-D1, SVG-OM2, SVG-rPDA) 2003, PVD with bilateral lower extremity claudication (left > right), carotid artery disease with occluded right carotid and s/p left CEA, renal artery stenosis s/p stenting 2013, HLD, HTN, left subclavian artery stenosis, CKD III, and out of hospital cardiac arrest s/p secondary prevention ICD 9/10/19.   He was admitted on 8/27/19 after suffering an out of hospital cardiac arrest. He was at the [facility] and had a witnessed collapse. Bystander CPR was started and EMS found him in VT/VF. He was defibrillated X1 and given 1 of epi. ROSC was obtained in field. On arrival here, he had a pulse and was hypotensive. He was intubated. He was taken for emergent coronary angiogram which showed severe 3V  CAD with LM/LAD lesion and CTOp of proximal RCA with L to right collaterals. LIMA-LAD graft minimally diseased but small and primarily retrograde flow through native LAD. SVG-RCA chronically closed at anastomosis, SVG-D1 could not be selectively injected but appears closed due to dye hangup during injection of the native coronaries. There may also be a closed graft to OM2 but this is not certain. He underwent PCI to dLM/ostial LAD with CONCETTA and balloon angioplasty of ostial LCx. He was also placed on therapeutic hypothermia protocol. He has made good recovery thus far. Per his family he has had another episode of a cardiac arrest in 2018 no angiogram was done he had ROSC and good recovery and per report had no further work up, and a reported syncopal event both attributed to dehydration. Echo shows LVEF 60-70%, mildly reduced RV function, and no significant valvular issues. ECG shows SR with RBBB. He elected to undergo ICD and had secondary prevention ICD placed on 9/10/19.     He presents today for follow up. He reports  feeling well. ICD was placed on 9/10/19. ICD site is well healed. He denies any chest pain/pressures, dizziness, lightheadedness, worsening shortness of breath, leg/ankle swelling, PND, orthopnea, palpitations, or syncopal symptoms.  Device interrogation shows  normal ICD function. 17 atrial episodes recorded.  Atrial burden 4%.  Review of available EGMs shows atrial flutter with VS response.  Device programmed VDD with mode switch to VDIR.  Patient paces at switch back to VDD at end of episode.   = 5% and Aflutter burden is 4%.  So patient  about 1% when not in mode switch.  No ventricular arrhythmias recorded. Intrinsic rhythm = SR @ 78 bpm.   = 5%.  Estimated battery longevity to EDWIN = 100%.  Battery voltage = 3.11V.  No short v-v intervals recorded. RV lead impedance trends appear stable..  He is noted to have episodes of AF on device lasting >24 hours. He is not on anticoagulation currently. Current cardiac medications include: Lipitor, Bumex, Toprol XL, ASA, and Plavix.     PAST MEDICAL HISTORY:  Past Medical History:   Diagnosis Date     Anemia      Atrophy of left kidney      CKD (chronic kidney disease), stage III (H)      Claudication, intermittent (H)      Coronary artery disease 2003    CAB x 4     History of GI bleed 2012    Hemorrhagic gastritis     Hypercholesterolemia      Hypertension      Left carotid artery stenosis     S/P Carotid Endarterectomy left      PVD (peripheral vascular disease) (H)      Renal artery stenosis (H)     stent R       CURRENT MEDICATIONS:  Current Outpatient Medications   Medication Sig Dispense Refill     acetylcysteine (MUCOMYST) 20 % neb solution Take 2 mLs by nebulization 4 times daily 120 mL 0     albuterol (PROVENTIL) (2.5 MG/3ML) 0.083% neb solution Take 1 vial (2.5 mg) by nebulization 4 times daily 2 Box 0     Ascorbic Acid 500 MG CHEW Take 500 mg by mouth daily       aspirin 81 MG EC tablet Take 81 mg by mouth daily       atorvastatin (LIPITOR) 40 MG tablet  "Take 1 tablet (40 mg) by mouth every evening 30 tablet 0     Benzocaine (HURRICAINE/TOPEX) 20 % AERO spray Take 1-2 sprays by mouth 3 times daily as needed for moderate pain       bumetanide (BUMEX) 1 MG tablet Take 1 tablet (1 mg) by mouth daily 30 tablet 0     clopidogrel (PLAVIX) 75 MG tablet Take 75 mg by mouth daily       ferrous sulfate (FEROSUL) 325 (65 Fe) MG tablet Take 325 mg by mouth daily (with breakfast)       metoprolol succinate ER (TOPROL-XL) 25 MG 24 hr tablet Take 0.5 tablets (12.5 mg) by mouth daily 30 tablet 0     mometasone (ASMANEX) 220 MCG/INH inhaler Inhale 2 puffs into the lungs 2 times daily 1 each 3     mometasone-formoterol (DULERA) 100-5 MCG/ACT inhaler Inhale 2 puffs into the lungs 2 times daily 1 Inhaler 0     multivitamin w/minerals (THERA-VIT-M) tablet Take 1 tablet by mouth daily       nitroGLYcerin (NITROSTAT) 0.4 MG sublingual tablet For chest pain place 1 tablet under the tongue every 5 minutes for 3 doses. If symptoms persist 5 minutes after 1st dose call 911. 15 tablet 0     [place] [facility] [place]   Toilet Safety Frame  Straight Wall Grab Bar X 2 each  Adjustable Bath Shower Chair with Back--can only be 15 inches wide 1 each 0     order for DME Toilet Safety Frame  Wheelchair  Adjustable Bath Shower Chair with Back--can only be 15 inches wide  Straight wall grab bar X 2 each 1 each 0     order for DME Toilet Safety Frame  Adjustable Bath Shower Chair with back--can only be 15\" wide  Wheelchair  Straight wall grab bar X 2 each 1 each 0     order for DME Toilet Safety Frame  Adjustable Bath Shower Chair with Back - can ONLY be 15 inches wide  Straight wall grab bar 1 each 0     pantoprazole (PROTONIX) 40 MG EC tablet Take 1 tablet (40 mg) by mouth 2 times daily (before meals) 60 tablet 0     potassium chloride (KLOR-CON) 20 MEQ packet Take 20 mEq by mouth 2 times daily        umeclidinium-vilanterol (ANORO ELLIPTA) 62.5-25 MCG/INH oral inhaler Inhale 1 puff into the lungs daily " 14 Inhaler 0     vitamin A 80934 units capsule Take 2 capsules (20,000 Units) by mouth daily       vitamin D3 (CHOLECALCIFEROL) 2000 units (50 mcg) tablet Take 2,000 Units by mouth daily       zinc sulfate (ZINCATE) 220 (50 Zn) MG capsule Take 1 capsule (220 mg) by mouth daily         PAST SURGICAL HISTORY:  Past Surgical History:   Procedure Laterality Date     BYPASS GRAFT ARTERY CORONARY  2003    LIMA=LAD, SVG=Diag, SVG=OM2, SVG=PDA     CAROTID ENDARTERECTOMY Left      CV CORONARY ANGIOGRAM N/A 2019    Procedure: Coronary Angiogram;  Surgeon: Andrew Garcia MD;  Location:  HEART CARDIAC CATH LAB     CV PCI STENT DRUG ELUTING N/A 2019    Procedure: PCI Stent Drug Eluting;  Surgeon: Andrew Garcia MD;  Location: [facility] CARDIAC C[facility] LAB     ENDARTERECTOMY CAROTID Left 2017    Procedure: ENDARTERECTOMY CAROTID;  REDO LEFT CAROTID ENDARTERECTOMY WITH RIGHT ANKLE GREATER SAPHENOUS VEIN  ;  Surgeon: Khurram Bishop MD;  Location: [facility] OR     EP ICD N/A 9/10/2019    Procedure: EP ICD;  Surgeon: Shorty Marion MD;  Location:  HEART CARDIAC CATH LAB     EYE SURGERY       HEART CATH LEFT HEART CATH  2003    Severe multivessel disease     IR THORACENTESIS  2019     RENAL ARTERY ANGIOGRAM Right 2013    with stenting     TONSILLECTOMY         ALLERGIES:   No Known Allergies    FAMILY HISTORY:  Family History   Problem Relation Age of Onset     Cerebrovascular Disease Mother 90        unknown type     Cancer Brother 70        Brain cancer      Dementia Sister      Arthritis Brother        SOCIAL HISTORY:  Social History     Tobacco Use     Smoking status: Former Smoker     Packs/day: 1.00     Years: 50.00     Pack years: 50.00     Types: Cigarettes     Last attempt to quit: 2001     Years since quittin.0   Substance Use Topics     Alcohol use: Yes     Comment: 4 drinks week     Drug use: No       ROS:   A comprehensive 10 point review of systems negative other than as  "mentioned in HPI.  Exam:  BP 97/59 (BP Location: Left arm, Patient Position: Chair, Cuff Size: Adult Regular)   Pulse 73   Ht 1.753 m (5' 9\")   Wt 74.8 kg (165 lb)   SpO2 95%   BMI 24.37 kg/m    GENERAL APPEARANCE: alert and no distress  HEENT: no icterus, no xanthelasmas, normal pupil size and reaction, normal palate, mucosa moist, no central cyanosis  NECK: no adenopathy, no asymmetry, masses, or scars, thyroid normal to palpation and no bruits, JVP not elevated  RESPIRATORY: lungs clear to auscultation - no rales, rhonchi or wheezes, no use of accessory muscles, no retractions, respirations are unlabored, normal respiratory rate  CARDIOVASCULAR: regular rhythm, normal S1 with physiologic split S2, no S3 or S4 and no murmur, click or rub, precordium quiet with normal PMI.  ABDOMEN: soft, non tender, bowel sounds normal, non-distended  EXTREMITIES: peripheral pulses normal, no edema  NEURO: alert and oriented to person/place/time, normal speech, gait and affect  SKIN: no ecchymoses, no rashes  PSYCH: normal affect, cooperative    Labs:  Reviewed.     Testing/Procedures:  PULMONARY FUNCTION TESTS:   PFT Latest Ref Rng & Units 10/8/2019   FVC L 3.38   FEV1 L 2.28   FVC% % 95   FEV1% % 87         8/2019 ECHOCARDIOGRAM:      Interpretation Summary  Technically difficult study.  Left ventricular function is normal or even hyperdynamic in some views.The EF  is 60-70%. No regional wall motion abnormalities aer seen.  RV appears dilated with probably mildly reduced function on limited views.  No significant valve disease.     There is no prior study for direct comparison.      Assessment and Plan:   Mr. Aguirre is an 84 year old male who has a past medical history significant for COPD (uses 2L home oxygen with activities), CABG (LIMA-LAD, SVG-D1, SVG-OM2, SVG-rPDA) 2003, PVD with bilateral lower extremity claudication (left > right), carotid artery disease with occluded right carotid and s/p left CEA, renal artery " stenosis s/p stenting 2013, HLD, HTN, left subclavian artery stenosis, CKD III, and out of hospital cardiac arrest s/p secondary prevention ICD 9/10/19.   He was admitted on 8/27/19 after suffering an out of hospital cardiac arrest. He was at the [facility] and had a witnessed collapse. Bystander CPR was started and EMS found him in VT/VF. He was defibrillated X1 and given 1 of epi. ROSC was obtained in field. On arrival here, he had a pulse and was hypotensive. He was intubated. He was taken for emergent coronary angiogram which showed severe 3V  CAD with LM/LAD lesion and CTOp of proximal RCA with L to right collaterals. LIMA-LAD graft minimally diseased but small and primarily retrograde flow through native LAD. SVG-RCA chronically closed at anastomosis, SVG-D1 could not be selectively injected but appears closed due to dye hangup during injection of the native coronaries. There may also be a closed graft to OM2 but this is not certain. He underwent PCI to dLM/ostial LAD with CONCETTA and balloon angioplasty of ostial LCx. He was also placed on therapeutic hypothermia protocol. He has made good recovery thus far. Per his family he has had another episode of a cardiac arrest in 2018 no angiogram was done he had ROSC and good recovery and per report had no further work up, and a reported syncopal event both attributed to dehydration. Echo shows LVEF 60-70%, mildly reduced RV function, and no significant valvular issues. ECG shows SR with RBBB. He elected to undergo ICD and had secondary prevention ICD placed on 9/10/19. He presents today for follow up. He reports feeling well. ICD was placed on 9/10/19. ICD site is well healed. He is noted to have episodes of AF on device lasting >24 hours. He is not on anticoagulation currently. Current cardiac medications include: Lipitor, Bumex, Toprol XL, ASA, and Plavix.     Coronary Artery Disease:  1. Antiplatlet: Continue Plavix  2. Statin: Continue Lipitor   3. BB:  Continue  Toprol XL   4. Nitroglycerin 0.4 mg PRN for chest pain. Place 1 tablet (0.4 mg) under the tongue every 5 minutes as needed for chest pain. Maximum of 3 doses in 15 minutes.  5. Lifestyle: Avoid tobacco, maintain a healthy weight, limit alcohol, cardiac diet, and exercise.    Paroxsymal Atrial Fibrillation:   We discussed in detail with the patient management/treatment options for A.fib includin. Stroke Prophylaxis:  CHADSVASC=++age, +CAD, +HTN  4, corresponding to a 4.0% annual stroke / systemic emolism event rate. indicating need for long term oral anticoagulation.    Anticoagulants include warfarin (Coumadin) and the noval oral anticoagulant agents: dabigatran (Pradaxa), rivaroxaban (Xarelto), and apixaban (Eliquis). The benefits of warfarin include: low cost, established track record in reduction of stroke and systemic embolism, the ability to reverse the agents, the ability to titrate the agent, and the ability to provide additional protection in subsets of patients who require anticoagulation for an independent process (i.e. prosthetic valve). The disadvantages of warfarin include: frequent phlebotomy for INRs, difficulty in regulation of INR [typically  60-65% of INRs within therapeutic range], and dietary constraints secondary to bioavailability. The benefits of the novel oral anticoagulants, as a class, include:  no phlebotomy, similar or significantly lower risk of stroke or systemic embolism depending on the agent, and similar or significantly lower risk of bleeding, particularly intracranial bleeding. The disadvantages of the novel agents, as a class, include: higher cost, the inability to reverse the agents (except Pradaxa), and  the need to be cautious in patients with CKD.   After detailed discussion, we will start Eliquis 5 mg BID.   2. Rate Control: Continue Toprol XL.   3. Rhythm Control: He is having self limiting AF episodes that are asymptomatic. Low burden. Normal LVEF. No compelling  indication for rhythm control.   4. Risk Factor Management: Continue statin, maintain tight BP control, and ANNI evaluation as indicated.      Follow up in 6 months.    The patient states understanding and is agreeable with plan.   MARKUS Urrutia CNP  Pager: [phone]  SAEID MARKHAM

## 2020-01-24 NOTE — TELEPHONE ENCOUNTER
Health Call Center    Phone Message    May a detailed message be left on voicemail: yes    Reason for Call: Other: Jossie calling from the [facility] to request that the appointemnt notes from José's appointment on 1/15/20 are faxed over along with a current list of medication. Jossie says these can be faxed to [phone] ATT: Moni Meade. Jossie dee they needed some documentation about the apixaban ANTICOAGULANT (ELIQUIS ANTICOAGULANT) 5 MG tablet medication. If questions, please call Jossie at [phone] EXT: [phone].      Action Taken: Message routed to:  [facility]

## 2020-01-29 NOTE — TELEPHONE ENCOUNTER
[facility] Call Center    Phone Message    May a detailed message be left on voicemail: yes    Reason for Call: Other: PtJosé would like a call about the Baby Aspirin, should he continue taking that with the Eliquis?      Comments:  Call José at: [phone]    Action Taken: Message routed to:  [facility] cardiology

## 2020-01-29 NOTE — TELEPHONE ENCOUNTER
[facility] Call Center    Phone Message    May a detailed message be left on voicemail: yes    Reason for Call: Other: Call PtJosé about Baby Aspirin and if he should continue taking that with the Eliquis.       Action Taken: Message routed to:  [facility] Cardiology

## 2020-01-30 NOTE — TELEPHONE ENCOUNTER
Left message to call back at direct line [phone].    BRENDON RochaN, RN, PHN  Electrophysiology Nurse Coordinator

## 2020-01-30 NOTE — TELEPHONE ENCOUNTER
Called and spoke to patient. Per Ameena Mijares, should stop aspirin. Continue Eliquis and Plavix. Discussed this with patient who verbalized understanding.    Medication list updated.    Arabella Price, BSN, RN, PHN  Electrophysiology Nurse Coordinator

## 2020-01-31 NOTE — TELEPHONE ENCOUNTER
I called [employer] store and they said that the patient's insurance wouldn't cover the supplies.    -Candace TORRES MA

## 2020-03-11 PROBLEM — I49.01 VENTRICULAR FIBRILLATION (H): Status: ACTIVE | Noted: 2020-01-01

## 2020-03-11 NOTE — PROGRESS NOTES
Called and spoke to patient. Verbally instructed on coronary angiogram. EP  notified to schedule.         Message   Received: Yesterday   Message Contents   Ameena Butterfield APRN CNP Kelling, Maria, RN               Pt got a shock for VF that occurred when he was doing a 6 min. Walk test. Given his significant CAD and exertional nature of this VF, he should be considered for repeat angiogram and possibly AAT. Would you be able to call patient and see how he is doing and if he would be agreeable to angiogram or if he would want to be seen in clinic first?   Thanks,  LVW                  You are scheduled for a Coronary Angiogram at the [facility], [address] [place] [address].   Please arrive to the [facility] Waiting Room on __________  at ___________.     When you arrive you will be escorted back to the pre procedure area called 2A. Here they will insert an IV, draw labs, and obtain a short medical history. Please bring an updated list of your current medications.    A provider will come and talk with you about the procedure and obtain consent.    A nurse from the Cardiac Catheterization Lab will then escort you to the procedure area. You will be receiving sedation during the procedure so you will need someone to drive you to and from the hospital.    After the procedure you will recover for a short period (2 - 6 hours). You will be discharged with instructions for post procedure care. However, depending on what the angiogram shows you may have to have stents placed and this might require an overnight stay. We ask that you bring a small bag of necessities for your comfort if you would need to stay overnight. DO NOT BRING ANY VALUABLES!      Pre procedure instructions:  1. Make arrangements to have a  as you will not be allowed to drive following the procedure. Someone should stay with you the night after your procedure.  2.  Do not eat any solid food  or milk products for 8 hours prior to the exam. You may drink clear liquids until 2 hours prior to the exam. Clear liquids include the following: water, Jell-O, clear broth, apple juice or any non-carbonated drink that you can see through (no pop!).   3. Do not drink alcohol or smoke 24 hours prior to test.  4. Hold these meds:  Hold your Eliquis 1 day prior.   5. You may take your other morning medications as prescribed with a sip of water. If you currently take an aspirin, continue taking your same dose. If not, take a 325 mg aspirin the day before and the day of your procedure.   If you take Plavix, continue your Plavix.       If you have further questions, please utilize [facility] to contact us.   If your question concerns the above instructions, contact:  Ani Zavaleta RN   Electrophysiology Nurse Coordinator.  [phone]    If your question concerns the schedule/appointment times, contact:  [facility] Procedure   [phone]

## 2020-03-16 NOTE — TELEPHONE ENCOUNTER
[facility] Call Center    Phone Message    May a detailed message be left on voicemail: yes     Reason for Call: Other: Ella calling on behalf of her father José to request a call back from the care team. Ella would like to know how to proceed for José's angiogram on 3/20/20. Ella would like to know if she should wait on this appointment, or bring him in as planned. Please give Ella a call back at [phone] at your earliest convenience.     Action Taken: Message routed to:  [facility]  Cardio    Travel Screening: Not Applicable

## 2020-03-17 NOTE — TELEPHONE ENCOUNTER
Per Dr Marion: if no exertional symptoms, can cancel angiogram r/t COVID-19 protocols.     Called and spoke to patient to assess if he is having any exertional symptoms. He states he feels very well as long as he is wearing his supplemental oxygen and denies any exertional symptoms. Given this, will cancel angiogram and reschedule when allowed.

## 2020-04-22 NOTE — PROGRESS NOTES
"José Aguirre is a 84 year old male who is being evaluated via a billable telephone visit.      The patient has been notified of following:     \"This telephone visit will be conducted via a call between you and your physician/provider. We have found that certain health care needs can be provided without the need for a physical exam.  This service lets us provide the care you need with a short phone conversation.  If a prescription is necessary we can send it directly to your pharmacy.  If lab work is needed we can place an order for that and you can then stop by our lab to have the test done at a later time.    Telephone visits are billed at different rates depending on your insurance coverage. During this emergency period, for some insurers they may be billed the same as an in-person visit.  Please reach out to your insurance provider with any questions.    If during the course of the call the physician/provider feels a telephone visit is not appropriate, you will not be charged for this service.\"    Patient has given verbal consent for Telephone visit?  YES    How would you like to obtain your AVS? My Chart    Assessment and Plan:  63M with PMHx of CAD s/p CABG, CKD III, renal artery stenosis and has been following Pulmonary for CPFE. Patient reports that he has been doing well, and has no active complains. He continue to be on O2 4 LPM with no additional dyspnea. He was noted to have pulmonary nodule largest 5mm RML and pleural plaques. Plan was to repeat CT-chest in 6 months to monitor the nodule and his fibrosis. However, due to the COVID-19 pandemic we will move the scan to September.      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 Stinson     I spoke with patient on the phone with Fellow.  Case discussed - agree with note.  His prior PFT is consistent with CPFE (normal trevon with severely reduced DLCO).  Plan as above.    CASSANDRA STINSON M.D.

## 2020-06-09 NOTE — TELEPHONE ENCOUNTER
Call from José and daughter, Daija.  José had been started on Apixaban 1/2020 per cardiology.  José has been having nose bleeds every 3-4 days, last one 3 days ago, lasted an hour, which he thought was good, as previous ones had been lasting an entire day, couldn't get out of bed due to nose bleed.  No nose bleed at current time.  History:  Due to his frequent and severity of nose bleeds his primary provider decreased apixaban from 5 mg twice daily to 2.5 mg twice daily 2/2020.  81 mg ASA has been discontinued.  Now has both 2.5 mg apixaban and 5 mg at home.  Daughter, did find he had in his med box, both a 2.5 and 5 mg tablet of apixaban in the same time slot.  Uncertain if it was 1 time in which he took more than prescribed.  Per his kidney doctor, he had low iron levels, has had 2 iron injections, received first one 2 weeks ago and 2nd one a week ago, will have follow up labs in about 1.5 weeks.  Since iron injections nose bleeds are less frequent and not lasting as long.  Also notices he is not as short of breath.  His primary provider recommended he call cardiology at the  to find out if Apixaban is a good option for him or if there is something else to be considered. Primary would not make the change as medication was started per cardiology.  Best number to reach José is [phone]  José and Daija have been informed of upcoming appointment with cardiology on 7/1/20  Additional Information    Negative: Fainted (passed out), or too weak to stand following large blood loss    Negative: Sounds like a life-threatening emergency to the triager    Negative: Nosebleed followed nose injury    Negative: Bleeding present > 30 minutes and using correct method of direct pressure    Negative: Bleeding now and second call after being instructed in correct technique of direct pressure    Negative: Lightheadedness or dizziness    Negative: Pale skin (pallor) of new onset or worsening    Negative: Has nasal packing  "(inserted by health care provider to control bleeding) and now has new rash    Negative: Has nasal packing and now has bleeding around the packing (Exception: few drops or ooze)    Negative: Patient sounds very sick or weak to the triager    Negative: Large amount of blood has been lost (e.g., one cup)    Negative: Bleeding recurs 3 or more times in 24 hours despite direct pressure    Negative: Taking Coumadin (warfarin) or other strong blood thinner, or known bleeding disorder (e.g., thrombocytopenia)    Negative: Has skin bruises or bleeding gums that are not caused by an injury    Negative: Has nasal packing and now has fever > 100.5 F (38.1 C)    Negative: Patient wants to be seen    Negative: Has nasal packing (inserted by health care provider to control bleeding)    Hard-to-stop nosebleeds are a chronic symptom (recurrent or ongoing AND lasting > 4 weeks)    Answer Assessment - Initial Assessment Questions  1. AMOUNT OF BLEEDING: \"How bad is the bleeding?\" \"How much blood was lost?\" \"Has the bleeding stopped?\"    - MILD: needed a couple tissues    - MODERATE: needed many tissues    - SEVERE: large blood clots, soaked many tissues, lasted more than 30 minutes       Last nose bleed occurred about 3 days ago  2. ONSET: \"When did the nosebleed start?\"       Lasted an hour  3. FREQUENCY: \"How many nosebleeds have you had in the last 24 hours?\"       Has been having them every 3 days or so  4. RECURRENT SYMPTOMS: \"Have there been other recent nosebleeds?\" If so, ask: \"How long did it take you to stop the bleeding?\" \"What worked best?\"       Yes, some lasted an entire day  5. CAUSE: \"What do you think caused this nosebleed?\"      Apixiban  6. LOCAL FACTORS: \"Do you have any cold symptoms?\", \"Have you been rubbing or picking at your nose?\"      No  7. SYSTEMIC FACTORS: \"Do you have high blood pressure or any bleeding problems?\"      No  8. BLOOD THINNERS: \"Do you take any blood thinners?\" (e.g., coumadin, heparin, " "aspirin, Plavix)      Apixiban  9. OTHER SYMPTOMS: \"Do you have any other symptoms?\" (e.g., lightheadedness)      Denies lightheadedness/dizziness, breathing has improved since iron injections  10. PREGNANCY: \"Is there any chance you are pregnant?\" \"When was your last menstrual period?\"        N/A    Protocols used: NOSEBLEED-A-OH      "

## 2020-06-09 NOTE — TELEPHONE ENCOUNTER
Called and spoke to Daija, patient's daughter. The most recent Eliquis dose prescribed to patient was 2.5mg BID from his [facility] PCP per patient. (Was initiated on Eliquis 5mg BID by Ameena Mijares NP in January 2020). Supposedly, this decrease in dose was related to his reported nosebleeds. He is close to meeting the guidelines for reduced Eliquis dosing at 2.5mg BID: 1. Scr? 1.5 mg/dL, 2. age? 80 years old, or 3. body weight ? 60kg. His most recent labs on 5/27/20 show Cr of 1.48. This is an increase from recent labs done here, but consistent value with labs done at [facility] 1 year ago. He has hx of single kidney. He's been getting iron infusions for low iron, which has helped the nosebleeds.     However, a 2.5mg tab + 5mg tab was found together in his pill box slots. Daughter said he was possibly taking 7.5mg BID for an unknown amount of time. He is also on Plavix. The pill box has now been corrected to 2.5mg tabs BID.    Recommended patient take 2.5mg BID for the next 1 week so we know for sure what dose he has been taking. Will contact patient in 1 week to see if he's had any improvement r/t nosebleeds. Will review with Ameena Mijares NP for any change to this plan ?5mg BID?    Patient verbalized understanding and is in agreement to the plan.

## 2020-06-17 NOTE — TELEPHONE ENCOUNTER
Called and spoke to patient as a follow-up to last week's conversation on 6/9/20. He states he has not had any nosebleeds since last week and confirmed taking Eliquis 2.5mg BID.     Ameena Butterfield APRN CNP [place], [place], [place]               He can be on 2.5 mg BID either way given nosebleed history and on the edge of the criteria for dose reduction.   Thanks,  LVW

## 2020-07-01 NOTE — LETTER
7/1/2020      RE: José Aguirre  [id number]  [place] [address]       Dear Colleague,    Thank you for the opportunity to participate in the care of your patient, José Aguirre, at the [facility]. Please see a copy of my visit note below.    Electrophysiology Clinic Telephone Visit    José Aguirre is a 84 year old male who is being evaluated via a billable telephone visit.      HPI:   Mr. Aguirre is an 84 year old male who has a past medical history significant for COPD (uses 2L home oxygen with activities), CABG (LIMA-LAD, SVG-D1, SVG-OM2, SVG-rPDA) 2003, PVD with bilateral lower extremity claudication (left > right), carotid artery disease with occluded right carotid and s/p left CEA, renal artery stenosis s/p stenting 2013, HLD, HTN, left subclavian artery stenosis, CKD III, and out of hospital cardiac arrest s/p secondary prevention ICD 9/10/19.     He was admitted on 8/27/19 after suffering an out of hospital cardiac arrest. He was at the [facility] and had a witnessed collapse. Bystander CPR was started and EMS found him in VT/VF. He was defibrillated X1 and given 1 of epi. ROSC was obtained in field. On arrival here, he had a pulse and was hypotensive. He was intubated. He was taken for emergent coronary angiogram which showed severe 3V  CAD with LM/LAD lesion and CTOp of proximal RCA with L to right collaterals. LIMA-LAD graft minimally diseased but small and primarily retrograde flow through native LAD. SVG-RCA chronically closed at anastomosis, SVG-D1 could not be selectively injected but appears closed due to dye hangup during injection of the native coronaries. There may also be a closed graft to OM2 but this is not certain. He underwent PCI to dLM/ostial LAD with CONCETTA and balloon angioplasty of ostial LCx. He was also placed on therapeutic hypothermia protocol. He has made good recovery thus far. Per his family he has had  another episode of a cardiac arrest in 2018 no angiogram was done he had ROSC and good recovery and per report had no further work up, and a reported syncopal event both attributed to dehydration. Echo shows LVEF 60-70%, mildly reduced RV function, and no significant valvular issues. ECG shows SR with RBBB. He elected to undergo ICD and had secondary prevention ICD placed on 9/10/19.     EP Visit 1/15/20: He presents today for follow up. He reports feeling well. ICD was placed on 9/10/19. ICD site is well healed. He denies any chest pain/pressures, dizziness, lightheadedness, worsening shortness of breath, leg/ankle swelling, PND, orthopnea, palpitations, or syncopal symptoms.  Device interrogation shows  normal ICD function. 17 atrial episodes recorded.  Atrial burden 4%.  Review of available EGMs shows atrial flutter with VS response.  Device programmed VDD with mode switch to VDIR.  Patient paces at switch back to VDD at end of episode.   = 5% and Aflutter burden is 4%.  So patient  about 1% when not in mode switch.  No ventricular arrhythmias recorded. Intrinsic rhythm = SR @ 78 bpm.   = 5%.  Estimated battery longevity to EDWIN = 100%.  Battery voltage = 3.11V.  No short v-v intervals recorded. RV lead impedance trends appear stable..  He is noted to have episodes of AF on device lasting >24 hours. He is not on anticoagulation currently. Current cardiac medications include: Lipitor, Bumex, Toprol XL, ASA, and Plavix. Started on Eliquis at this visit.     He is following up today. In 3/2020, he got a shock for VF that occurred when he was doing a 6 min. Walk test. Given his significant CAD and exertional nature of this VF, we considered repeat angiogram and possibly AAT. He was going to pursue coronary angiogram, but then due to COVID19 this was deferred. He later elected to not pursue angiogram given no further exertional symptoms.  He also had called recently with some nosebleeds and his Eliquis was  decreased to 2.5 mg BID. He's been getting iron infusions for low iron, which has helped the nosebleeds. However, a 2.5mg tab + 5mg tab was found together in his pill box slots. Daughter said he was possibly taking 7.5mg BID for an unknown amount of time. He is also on Plavix. The pill box has now been corrected to 2.5mg tabs BID. He is close to meeting the guidelines for reduced Eliquis dosing at 2.5mg BID: 1. Scr? 1.5 mg/dL, 2. age? 80 years old, or 3. body weight ? 60kg. His most recent labs on 5/27/20 show Cr of 1.48. This is an increase from recent labs done here, but consistent value with labs done at [facility] 1 year ago. He has hx of single kidney. Therefore, he was kept on 2.5 mg BID. He reports feeling well. He denies any chest pain/pressures, dizziness, lightheadedness, worsening shortness of breath, leg/ankle swelling, PND, orthopnea, palpitations, or syncopal symptoms. Device transmission shows normal ICD function. No episodes recorded since last remote. No arrhythmias recorded since last remote. Presenting EGM = SR @ 68 bpm.  = 13%. Estimated battery longevity to EDWIN = SALVADOR. Battery voltage = 3.10V. Lead impedance trends appear stable. Current cardiac medication include: Eliquis, Bumex, Lipitor, Toprol XL, and Plavix.           PAST MEDICAL HISTORY:  Past Medical History:   Diagnosis Date     Anemia      Atrophy of left kidney      CKD (chronic kidney disease), stage III (H)      Claudication, intermittent (H)      Coronary artery disease 2003    CAB x 4     History of GI bleed 2012    Hemorrhagic gastritis     Hypercholesterolemia      Hypertension      Left carotid artery stenosis     S/P Carotid Endarterectomy left      PVD (peripheral vascular disease) (H)      Renal artery stenosis (H)     stent R       CURRENT MEDICATIONS:  Current Outpatient Medications   Medication Sig Dispense Refill     acetylcysteine (MUCOMYST) 20 % neb solution Take 2 mLs by nebulization 4 times daily 120 mL 0     albuterol  (PROAIR HFA/PROVENTIL HFA/VENTOLIN HFA) 108 (90 Base) MCG/ACT inhaler Inhale 2 puffs into the lungs every 4-6 hours as needed for wheezing       albuterol (PROVENTIL) (2.5 MG/3ML) 0.083% neb solution Take 1 vial (2.5 mg) by nebulization 4 times daily 2 Box 0     apixaban ANTICOAGULANT (ELIQUIS) 2.5 MG tablet Take 1 tablet (2.5 mg) by mouth 2 times daily       Ascorbic Acid 500 MG CHEW Take 500 mg by mouth daily (Patient not taking: Reported on 4/22/2020)       atorvastatin (LIPITOR) 40 MG tablet Take 1 tablet (40 mg) by mouth every evening 30 tablet 0     Benzocaine (HURRICAINE/TOPEX) 20 % AERO spray Take 1-2 sprays by mouth 3 times daily as needed for moderate pain       bumetanide (BUMEX) 1 MG tablet Take 1 tablet (1 mg) by mouth daily 30 tablet 0     clopidogrel (PLAVIX) 75 MG tablet Take 75 mg by mouth daily       ferrous sulfate (FEROSUL) 325 (65 Fe) MG tablet Take 325 mg by mouth daily (with breakfast)       metoprolol succinate ER (TOPROL-XL) 25 MG 24 hr tablet Take 0.5 tablets (12.5 mg) by mouth daily 30 tablet 0     mometasone (ASMANEX) 220 MCG/INH inhaler Inhale 2 puffs into the lungs 2 times daily 1 each 3     mometasone-formoterol (DULERA) 100-5 MCG/ACT inhaler Inhale 2 puffs into the lungs 2 times daily 1 Inhaler 0     multivitamin w/minerals (THERA-VIT-M) tablet Take 1 tablet by mouth daily (Patient not taking: Reported on 4/22/2020)       nitroGLYcerin (NITROSTAT) 0.4 MG sublingual tablet For chest pain place 1 tablet under the tongue every 5 minutes for 3 doses. If symptoms persist 5 minutes after 1st dose call 911. 15 tablet 0     Olodaterol HCl 2.5 MCG/ACT AERS Inhale 2 puffs into the lungs       order for [facility] Wheelchair   Toilet Safety Frame  Straight Wall Grab Bar X 2 each  Adjustable Bath Shower Chair with Back--can only be 15 inches wide 1 each 0     order for [facility] Toilet Safety Frame  Wheelchair  Adjustable Bath Shower Chair with Back--can only be 15 inches wide  Straight wall grab bar X 2 each  "1 each 0     order for DME Toilet Safety Frame  Adjustable Bath Shower Chair with back--can only be 15\" wide  Wheelchair  Straight wall grab bar X 2 each 1 each 0     order for DME Toilet Safety Frame  Adjustable Bath Shower Chair with Back - can ONLY be 15 inches wide  Straight wall grab bar 1 each 0     pantoprazole (PROTONIX) 40 MG EC tablet Take 1 tablet (40 mg) by mouth 2 times daily (before meals) 60 tablet 0     potassium chloride (KLOR-CON) 20 MEQ packet Take 20 mEq by mouth 2 times daily        tiotropium-olodaterol 2.5-2.5 MCG/ACT AERS Inhale 2 puffs into the lungs       umeclidinium-vilanterol (ANORO ELLIPTA) 62.5-25 MCG/INH oral inhaler Inhale 1 puff into the lungs daily (Patient not taking: Reported on 4/22/2020) 14 Inhaler 0     vitamin A 25794 units capsule Take 2 capsules (20,000 Units) by mouth daily (Patient not taking: Reported on 4/22/2020)       vitamin D3 (CHOLECALCIFEROL) 2000 units (50 mcg) tablet Take 2,000 Units by mouth daily       zinc sulfate (ZINCATE) 220 (50 Zn) MG capsule Take 1 capsule (220 mg) by mouth daily (Patient not taking: Reported on 4/22/2020)         PAST SURGICAL HISTORY:  Past Surgical History:   Procedure Laterality Date     BYPASS GRAFT ARTERY CORONARY  06/2003    LIMA=LAD, SVG=Diag, SVG=OM2, SVG=PDA     CAROTID ENDARTERECTOMY Left      CV CORONARY ANGIOGRAM N/A 8/27/2019    Procedure: Coronary Angiogram;  Surgeon: Andrew Garcia MD;  Location: [facility] CARDIAC CATH LAB     CV PCI STENT DRUG ELUTING N/A 8/27/2019    Procedure: PCI Stent Drug Eluting;  Surgeon: Andrew Garcia MD;  Location: [facility] CARDIAC CATH LAB     ENDARTERECTOMY CAROTID Left 7/11/2017    Procedure: ENDARTERECTOMY CAROTID;  REDO LEFT CAROTID ENDARTERECTOMY WITH RIGHT ANKLE GREATER SAPHENOUS VEIN  ;  Surgeon: Khurram Bishop MD;  Location: [facility] OR     EP ICD N/A 9/10/2019    Procedure: EP ICD;  Surgeon: Shorty Marion MD;  Location: [facility] CARDIAC CATH LAB     EYE SURGERY       " HEART CATH LEFT HEART CATH  2003    Severe multivessel disease     IR THORACENTESIS  2019     RENAL ARTERY ANGIOGRAM Right 2013    with stenting     TONSILLECTOMY         ALLERGIES:   No Known Allergies    FAMILY HISTORY:  Family History   Problem Relation Age of Onset     Cerebrovascular Disease Mother 90        unknown type     Cancer Brother 70        Brain cancer      Dementia Sister      Arthritis Brother        SOCIAL HISTORY:  Social History     Tobacco Use     Smoking status: Former Smoker     Packs/day: 1.00     Years: 50.00     Pack years: 50.00     Types: Cigarettes     Last attempt to quit: 2001     Years since quittin.5   Substance Use Topics     Alcohol use: Yes     Comment: 4 drinks week     Drug use: No       ROS:  10 point ROS neg other than the symptoms noted above in the HPI.    Labs:  Reviewed.     Testing/Procedures:  PULMONARY FUNCTION TESTS:   PFT Latest Ref Rng & Units 10/8/2019   FVC L 3.38   FEV1 L 2.28   FVC% % 95   FEV1% % 87         2019 ECHOCARDIOGRAM:   Interpretation Summary  Technically difficult study.  Left ventricular function is normal or even hyperdynamic in some views.The EF  is 60-70%. No regional wall motion abnormalities aer seen.  RV appears dilated with probably mildly reduced function on limited views.  No significant valve disease.     There is no prior study for direct comparison.      Assessment and Plan:   Mr. Aguirre is an 84 year old male who has a past medical history significant for COPD (uses 2L home oxygen with activities), CABG (LIMA-LAD, SVG-D1, SVG-OM2, SVG-rPDA) , PVD with bilateral lower extremity claudication (left > right), carotid artery disease with occluded right carotid and s/p left CEA, renal artery stenosis s/p stenting , HLD, HTN, left subclavian artery stenosis, CKD III, and out of hospital cardiac arrest s/p secondary prevention ICD 9/10/19. He is following up today. In 3/2020, he got a shock for VF that occurred when he  was doing a 6 min. Walk test. Given his significant CAD and exertional nature of this VF, we considered repeat angiogram and possibly AAT. He was going to pursue coronary angiogram, but then due to COVID19 this was deferred. He later elected to not pursue angiogram given no further exertional symptoms.  He also had called recently with some nosebleeds and his Eliquis was decreased to 2.5 mg BID. He's been getting iron infusions for low iron, which has helped the nosebleeds. However, a 2.5mg tab + 5mg tab was found together in his pill box slots. Daughter said he was possibly taking 7.5mg BID for an unknown amount of time. He is also on Plavix. The pill box has now been corrected to 2.5mg tabs BID. He is close to meeting the guidelines for reduced Eliquis dosing at 2.5mg BID: 1. Scr? 1.5 mg/dL, 2. age? 80 years old, or 3. body weight ? 60kg. His most recent labs on 5/27/20 show Cr of 1.48. This is an increase from recent labs done here, but consistent value with labs done at [facility] 1 year ago. He has hx of single kidney. Therefore, he was kept on 2.5 mg BID. He reports feeling well. He denies any chest pain/pressures, dizziness, lightheadedness, worsening shortness of breath, leg/ankle swelling, PND, orthopnea, palpitations, or syncopal symptoms. Device transmission shows normal ICD function. No episodes recorded since last remote. No arrhythmias recorded since last remote. Presenting EGM = SR @ 68 bpm.  = 13%. Estimated battery longevity to EDWIN = SALVADOR. Battery voltage = 3.10V. Lead impedance trends appear stable. Current cardiac medication include: Eliquis, Bumex, Lipitor, Toprol XL, and Plavix.    Coronary Artery Disease:  1. Antiplatlet: Continue Plavix  2. Statin: Continue Lipitor   3. BB:  Continue Toprol XL   4. Nitroglycerin 0.4 mg PRN for chest pain. Place 1 tablet (0.4 mg) under the tongue every 5 minutes as needed for chest pain. Maximum of 3 doses in 15 minutes.  5. Lifestyle: Avoid tobacco, maintain a  healthy weight, limit alcohol, cardiac diet, and exercise.  6. He had shock for VF during 6 minute walk test, given exertional nature, we were going to pursue repeat coronary angiogram. Ultimately, patient elected to defer this. He is not having any active symptoms or recurrent shocks.      Paroxsymal Atrial Fibrillation:   We discussed in detail with the patient management/treatment options for A.fib includin. Stroke Prophylaxis:  CHADSVASC=++age, +CAD, +HTN  4, corresponding to a 4.0% annual [facility]. indicating need for long term oral anticoagulation.  He is on Eliquis, dose reduced for renal function and nose bleeds.   2. Rate Control: Continue Toprol XL.   3. Rhythm Control: He has self limiting AF episodes that are asymptomatic. Low burden. Normal LVEF. No compelling indication for rhythm control.   4. Risk Factor Management: Continue statin, maintain tight BP control, and ANNI evaluation as indicated.    We will refer him to establish care with Dr. Nava at [facility] as this is closest for his home. We will have him get updated CBC at that time.   Follow up in 1 year with EP NP.   I have reviewed the note as documented above.  This accurately captures the substance of my conversation with the patient.  The patient states understanding and is agreeable with the plan.       Telephone Visit Duration: 15 minutes.     Shorty Marion MD [facility]  Cardiology - Electrophysiology        Please do not hesitate to contact me if you have any questions/concerns.     Sincerely,     Shorty Marion MD

## 2020-07-01 NOTE — PATIENT INSTRUCTIONS
Plan:    We will help you establish with Dr. Nava for general cardiology at [facility] or [facility] with lab draw prior     Follow up in 1 year with heart rhythm team for device care    Cardiovascular Clinic:   [address] [place] [address]. [place] [address]  Your Care Team:  EP Cardiology   Telephone Number     Ameena Marion [phone]   Ani Zavaleta RN  Arabella Price RN [phone]     For scheduling appts or procedures:    Sachi Deleon   [phone]   For the Device Clinic (Pacemakers and ICD's)   RN's :   Mariela Hardy During business hours: [phone]     After business hours:   [phone]- select option 4 and ask for job code [phone].       As always, Thank you for trusting us with your health care needs!

## 2020-07-01 NOTE — PROGRESS NOTES
"Electrophysiology Clinic Telephone Visit    José Aguirre is a 84 year old male who is being evaluated via a billable telephone visit.      The patient has been notified of following:     \"This telephone visit will be conducted via a call between you and your physician/provider. We have found that certain health care needs can be provided without the need for a physical exam.  This service lets us provide the care you need with a short phone conversation.  If a prescription is necessary we can send it directly to your pharmacy.  If lab work is needed we can place an order for that and you can then stop by our lab to have the test done at a later time.    If during the course of the call the physician/provider feels a telephone visit is not appropriate, you will not be charged for this service.\"   Patient has given verbal consent for Telephone visit?  Yes    HPI:   Mr. Aguirre is an 84 year old male who has a past medical history significant for COPD (uses 2L home oxygen with activities), CABG (LIMA-LAD, SVG-D1, SVG-OM2, SVG-rPDA) 2003, PVD with bilateral lower extremity claudication (left > right), carotid artery disease with occluded right carotid and s/p left CEA, renal artery stenosis s/p stenting 2013, HLD, HTN, left subclavian artery stenosis, CKD III, and out of hospital cardiac arrest s/p secondary prevention ICD 9/10/19.     He was admitted on 8/27/19 after suffering an out of hospital cardiac arrest. He was at the [facility] and had a witnessed collapse. Bystander CPR was started and EMS found him in VT/VF. He was defibrillated X1 and given 1 of epi. ROSC was obtained in field. On arrival here, he had a pulse and was hypotensive. He was intubated. He was taken for emergent coronary angiogram which showed severe 3V  CAD with LM/LAD lesion and CTOp of proximal RCA with L to right collaterals. LIMA-LAD graft minimally diseased but small and primarily retrograde flow through native LAD. SVG-RCA chronically " closed at anastomosis, SVG-D1 could not be selectively injected but appears closed due to dye hangup during injection of the native coronaries. There may also be a closed graft to OM2 but this is not certain. He underwent PCI to dLM/ostial LAD with CONCETTA and balloon angioplasty of ostial LCx. He was also placed on therapeutic hypothermia protocol. He has made good recovery thus far. Per his family he has had another episode of a cardiac arrest in 2018 no angiogram was done he had ROSC and good recovery and per report had no further work up, and a reported syncopal event both attributed to dehydration. Echo shows LVEF 60-70%, mildly reduced RV function, and no significant valvular issues. ECG shows SR with RBBB. He elected to undergo ICD and had secondary prevention ICD placed on 9/10/19.     EP Visit 1/15/20: He presents today for follow up. He reports feeling well. ICD was placed on 9/10/19. ICD site is well healed. He denies any chest pain/pressures, dizziness, lightheadedness, worsening shortness of breath, leg/ankle swelling, PND, orthopnea, palpitations, or syncopal symptoms.  Device interrogation shows  normal ICD function. 17 atrial episodes recorded.  Atrial burden 4%.  Review of available EGMs shows atrial flutter with VS response.  Device programmed VDD with mode switch to VDIR.  Patient paces at switch back to VDD at end of episode.   = 5% and Aflutter burden is 4%.  So patient  about 1% when not in mode switch.  No ventricular arrhythmias recorded. Intrinsic rhythm = SR @ 78 bpm.   = 5%.  Estimated battery longevity to EDWIN = 100%.  Battery voltage = 3.11V.  No short v-v intervals recorded. RV lead impedance trends appear stable..  He is noted to have episodes of AF on device lasting >24 hours. He is not on anticoagulation currently. Current cardiac medications include: Lipitor, Bumex, Toprol XL, ASA, and Plavix. Started on Eliquis at this visit.     He is following up today. In 3/2020, he got a shock  for VF that occurred when he was doing a 6 min. Walk test. Given his significant CAD and exertional nature of this VF, we considered repeat angiogram and possibly AAT. He was going to pursue coronary angiogram, but then due to COVID19 this was deferred. He later elected to not pursue angiogram given no further exertional symptoms.  He also had called recently with some nosebleeds and his Eliquis was decreased to 2.5 mg BID. He's been getting iron infusions for low iron, which has helped the nosebleeds. However, a 2.5mg tab + 5mg tab was found together in his pill box slots. Daughter said he was possibly taking 7.5mg BID for an unknown amount of time. He is also on Plavix. The pill box has now been corrected to 2.5mg tabs BID. He is close to meeting the guidelines for reduced Eliquis dosing at 2.5mg BID: 1. Scr? 1.5 mg/dL, 2. age? 80 years old, or 3. body weight ? 60kg. His most recent labs on 5/27/20 show Cr of 1.48. This is an increase from recent labs done here, but consistent value with labs done at [facility] 1 year ago. He has hx of single kidney. Therefore, he was kept on 2.5 mg BID. He reports feeling well. He denies any chest pain/pressures, dizziness, lightheadedness, worsening shortness of breath, leg/ankle swelling, PND, orthopnea, palpitations, or syncopal symptoms. Device transmission shows normal ICD function. No episodes recorded since last remote. No arrhythmias recorded since last remote. Presenting EGM = SR @ 68 bpm.  = 13%. Estimated battery longevity to EDWIN = SALVADOR. Battery voltage = 3.10V. Lead impedance trends appear stable. Current cardiac medication include: Eliquis, Bumex, Lipitor, Toprol XL, and Plavix.           PAST MEDICAL HISTORY:  Past Medical History:   Diagnosis Date     Anemia      Atrophy of left kidney      CKD (chronic kidney disease), stage III (H)      Claudication, intermittent (H)      Coronary artery disease 2003    CAB x 4     History of GI bleed 2012    Hemorrhagic gastritis      Hypercholesterolemia      Hypertension      Left carotid artery stenosis     S/P Carotid Endarterectomy left      PVD (peripheral vascular disease) (H)      Renal artery stenosis (H)     stent R       CURRENT MEDICATIONS:  Current Outpatient Medications   Medication Sig Dispense Refill     acetylcysteine (MUCOMYST) 20 % neb solution Take 2 mLs by nebulization 4 times daily 120 mL 0     albuterol (PROAIR HFA/PROVENTIL HFA/VENTOLIN HFA) 108 (90 Base) MCG/ACT inhaler Inhale 2 puffs into the lungs every 4-6 hours as needed for wheezing       albuterol (PROVENTIL) (2.5 MG/3ML) 0.083% neb solution Take 1 vial (2.5 mg) by nebulization 4 times daily 2 Box 0     apixaban ANTICOAGULANT (ELIQUIS) 2.5 MG tablet Take 1 tablet (2.5 mg) by mouth 2 times daily       Ascorbic Acid 500 MG CHEW Take 500 mg by mouth daily (Patient not taking: Reported on 4/22/2020)       atorvastatin (LIPITOR) 40 MG tablet Take 1 tablet (40 mg) by mouth every evening 30 tablet 0     Benzocaine (HURRICAINE/TOPEX) 20 % AERO spray Take 1-2 sprays by mouth 3 times daily as needed for moderate pain       bumetanide (BUMEX) 1 MG tablet Take 1 tablet (1 mg) by mouth daily 30 tablet 0     clopidogrel (PLAVIX) 75 MG tablet Take 75 mg by mouth daily       ferrous sulfate (FEROSUL) 325 (65 Fe) MG tablet Take 325 mg by mouth daily (with breakfast)       metoprolol succinate ER (TOPROL-XL) 25 MG 24 hr tablet Take 0.5 tablets (12.5 mg) by mouth daily 30 tablet 0     mometasone (ASMANEX) 220 MCG/INH inhaler Inhale 2 puffs into the lungs 2 times daily 1 each 3     mometasone-formoterol (DULERA) 100-5 MCG/ACT inhaler Inhale 2 puffs into the lungs 2 times daily 1 Inhaler 0     multivitamin w/minerals (THERA-VIT-M) tablet Take 1 tablet by mouth daily (Patient not taking: Reported on 4/22/2020)       nitroGLYcerin (NITROSTAT) 0.4 MG sublingual tablet For chest pain place 1 tablet under the tongue every 5 minutes for 3 doses. If symptoms persist 5 minutes after 1st  "dose call 911. 15 tablet 0     Olodaterol HCl 2.5 MCG/ACT AERS Inhale 2 puffs into the lungs       order for DME Wheelchair   Toilet Safety Frame  Straight Wall Grab Bar X 2 each  Adjustable Bath Shower Chair with Back--can only be 15 inches wide 1 each 0     order for DME Toilet Safety Frame  Wheelchair  Adjustable Bath Shower Chair with Back--can only be 15 inches wide  Straight wall grab bar X 2 each 1 each 0     order for DME Toilet Safety Frame  Adjustable Bath Shower Chair with back--can only be 15\" wide  Wheelchair  Straight wall grab bar X 2 each 1 each 0     order for DME Toilet Safety Frame  Adjustable Bath Shower Chair with Back - can ONLY be 15 inches wide  Straight wall grab bar 1 each 0     pantoprazole (PROTONIX) 40 MG EC tablet Take 1 tablet (40 mg) by mouth 2 times daily (before meals) 60 tablet 0     potassium chloride (KLOR-CON) 20 MEQ packet Take 20 mEq by mouth 2 times daily        tiotropium-olodaterol 2.5-2.5 MCG/ACT AERS Inhale 2 puffs into the lungs       umeclidinium-vilanterol (ANORO ELLIPTA) 62.5-25 MCG/INH oral inhaler Inhale 1 puff into the lungs daily (Patient not taking: Reported on 4/22/2020) 14 Inhaler 0     vitamin A 13091 units capsule Take 2 capsules (20,000 Units) by mouth daily (Patient not taking: Reported on 4/22/2020)       vitamin D3 (CHOLECALCIFEROL) 2000 units (50 mcg) tablet Take 2,000 Units by mouth daily       zinc sulfate (ZINCATE) 220 (50 Zn) MG capsule Take 1 capsule (220 mg) by mouth daily (Patient not taking: Reported on 4/22/2020)         PAST SURGICAL HISTORY:  Past Surgical History:   Procedure Laterality Date     BYPASS GRAFT ARTERY CORONARY  06/2003    LIMA=LAD, SVG=Diag, SVG=OM2, SVG=PDA     CAROTID ENDARTERECTOMY Left      CV CORONARY ANGIOGRAM N/A 8/27/2019    Procedure: Coronary Angiogram;  Surgeon: Andrew Garcia MD;  Location:  [facility]     CV PCI STENT DRUG ELUTING N/A 8/27/2019    Procedure: PCI Stent Drug Eluting;  Surgeon: " Andrew Garcia MD;  Location:  [facility]     ENDARTERECTOMY CAROTID Left 2017    Procedure: ENDARTERECTOMY CAROTID;  REDO LEFT CAROTID ENDARTERECTOMY WITH RIGHT ANKLE GREATER SAPHENOUS VEIN  ;  Surgeon: Khurram Bishop MD;  Location: [facility] OR     EP ICD N/A 9/10/2019    Procedure: EP ICD;  Surgeon: Shorty Marion MD;  Location:  HE[facility] CARD[facility] CATH LAB     EYE SURGERY       HEART CATH LEFT HEART CATH  2003    Severe multivessel disease     IR THORACENTESIS  2019     RENAL ARTERY ANGIOGRAM Right 2013    with stenting     TONSILLECTOMY         ALLERGIES:   No Known Allergies    FAMILY HISTORY:  Family History   Problem Relation Age of Onset     Cerebrovascular Disease Mother 90        unknown type     Cancer Brother 70        Brain cancer      Dementia Sister      Arthritis Brother        SOCIAL HISTORY:  Social History     Tobacco Use     Smoking status: Former Smoker     Packs/day: 1.00     Years: 50.00     Pack years: 50.00     Types: Cigarettes     Last attempt to quit: 2001     Years since quittin.5   Substance Use Topics     Alcohol use: Yes     Comment: 4 drinks week     Drug use: No       ROS:  10 point ROS neg other than the symptoms noted above in the HPI.    Labs:  Reviewed.     Testing/Procedures:  PULMONARY FUNCTION TESTS:   PFT Latest Ref Rng & Units 10/8/2019   FVC L 3.38   FEV1 L 2.28   FVC% % 95   FEV1% % 87         2019 ECHOCARDIOGRAM:   Interpretation Summary  Technically difficult study.  Left ventricular function is normal or even hyperdynamic in some views.The EF  is 60-70%. No regional wall motion abnormalities aer seen.  RV appears dilated with probably mildly reduced function on limited views.  No significant valve disease.     There is no prior study for direct comparison.      Assessment and Plan:   Mr. Aguirre is an 84 year old male who has a past medical history significant for COPD (uses 2L home oxygen with activities), CABG (LIMA-LAD,  SVG-D1, SVG-OM2, SVG-rPDA) 2003, PVD with bilateral lower extremity claudication (left > right), carotid artery disease with occluded right carotid and s/p left CEA, renal artery stenosis s/p stenting 2013, HLD, HTN, left subclavian artery stenosis, CKD III, and out of hospital cardiac arrest s/p secondary prevention ICD 9/10/19. He is following up today. In 3/2020, he got a shock for VF that occurred when he was doing a 6 min. Walk test. Given his significant CAD and exertional nature of this VF, we considered repeat angiogram and possibly AAT. He was going to pursue coronary angiogram, but then due to COVID19 this was deferred. He later elected to not pursue angiogram given no further exertional symptoms.  He also had called recently with some nosebleeds and his Eliquis was decreased to 2.5 mg BID. He's been getting iron infusions for low iron, which has helped the nosebleeds. However, a 2.5mg tab + 5mg tab was found together in his pill box slots. Daughter said he was possibly taking 7.5mg BID for an unknown amount of time. He is also on Plavix. The pill box has now been corrected to 2.5mg tabs BID. He is close to meeting the guidelines for reduced Eliquis dosing at 2.5mg BID: 1. Scr? 1.5 mg/dL, 2. age? 80 years old, or 3. body weight ? 60kg. His most recent labs on 5/27/20 show Cr of 1.48. This is an increase from recent labs done here, but consistent value with labs done at [facility] 1 year ago. He has hx of single kidney. Therefore, he was kept on 2.5 mg BID. He reports feeling well. He denies any chest pain/pressures, dizziness, lightheadedness, worsening shortness of breath, leg/ankle swelling, PND, orthopnea, palpitations, or syncopal symptoms. Device transmission shows normal ICD function. No episodes recorded since last remote. No arrhythmias recorded since last remote. Presenting EGM = SR @ 68 bpm.  = 13%. Estimated battery longevity to EDWIN = SALVADOR. Battery voltage = 3.10V. Lead impedance trends appear  stable. Current cardiac medication include: Eliquis, Bumex, Lipitor, Toprol XL, and Plavix.    Coronary Artery Disease:  1. Antiplatlet: Continue Plavix  2. Statin: Continue Lipitor   3. BB:  Continue Toprol XL   4. Nitroglycerin 0.4 mg PRN for chest pain. Place 1 tablet (0.4 mg) under the tongue every 5 minutes as needed for chest pain. Maximum of 3 doses in 15 minutes.  5. Lifestyle: Avoid tobacco, maintain a healthy weight, limit alcohol, cardiac diet, and exercise.  6. He had shock for VF during 6 minute walk test, given exertional nature, we were going to pursue repeat coronary angiogram. Ultimately, patient elected to defer this. He is not having any active symptoms or recurrent shocks.      Paroxsymal Atrial Fibrillation:   We discussed in detail with the patient management/treatment options for A.fib includin. Stroke Prophylaxis:  CHADSVASC=++age, +CAD, +HTN  4, corresponding to a 4.0% annual stroke / systemic emolism event rate. indicating need for long term oral anticoagulation.  He is on Eliquis, dose reduced for renal function and nose bleeds.   2. Rate Control: Continue Toprol XL.   3. Rhythm Control: He has self limiting AF episodes that are asymptomatic. Low burden. Normal LVEF. No compelling indication for rhythm control.   4. Risk Factor Management: Continue statin, maintain tight BP control, and ANNI evaluation as indicated.    We will refer him to establish care with Dr. Nava at [facility] as this is closest for his home. We will have him get updated CBC at that time.   Follow up in 1 year with EP NP.   I have reviewed the note as documented above.  This accurately captures the substance of my conversation with the patient.  The patient states understanding and is agreeable with the plan.       Telephone Visit Duration: 15 minutes.     Shorty Marion MD [facility]  Cardiology - Electrophysiology

## 2020-07-20 PROBLEM — I47.21 TORSADES DE POINTES (H): Status: ACTIVE | Noted: 2020-01-01

## 2020-07-20 NOTE — ED PROVIDER NOTES
History     Chief Complaint:  Abnormal Labs    The history is provided by the patient and a relative.      José Aguirre is a 84 year old male on Eliquis and Plavix with a history of CHF, CAD, COPD, and cardiac arrest who presents with abnormal iron levels. Today he went to see his kidney specialist Charles Donovan NP from [facility] [place], for a repeat renal lab check. They called him and told him to be evaluated in the ED due to low iron. Daughter states that he gets infusions for low levels of iron. The patient states that he has no symptoms currently. However, he had a blood nose 2 weeks ago. He denies hematuria, bloody stool, chest pain, or new shortness of breath.    Of note, patient is on 4 liters of oxygen at baseline for COPD and usually 5 L at night.     Allergies:  No Known Allergies     Medications:    acetylcysteine   albuterol  Eliquis  atorvastatin  bumetanide  clopidogrel  ferrous sulfate  metoprolol succinate  mometasone  mometasone-formoterol  nitroGLYcerin  Olodaterol HCl  pantoprazole  potassium chloride  tiotropium-olodaterol  Nitrostat    Past Medical History:    Ventricular fibrillation  Postinflammatory pulmonary fibrosis  CAD  COPD  PVD  S/P Carotid endarterectomy  Acute on chronic respiratory failure with hypoxemia  Cardiac arrest  Chronic diastolic congestive heart failure  Carotid artery stenosis  Claudicatiion of both lower extremities  Renal artery stenosis  Peripheral vascular disease  Hyperlipidemia  Hypertension  Left carotid artery stenosis  Anemia  Left kidney atrophy  CKD, stage III  GI bleeds  Prediabetes  Thrombocytopenia  Pulmonary fibrosis    Past Surgical History:    Bypass Graft Artery Coronary  Carotid Endarterectomy  CV Coronary Angiogram  CV PCI Stent Drug Eluting  Endarterectomy Carotid  EP ICD  [facility] Cath left hearth cath  IR Thoracentesis  Renal artery angiogram  Tonsillectomy    Family History:    Mother: cerebrovascular disease  Brother: brain  cancer  Sister: dementia    Social History:  Present to ED with daughter  Smoking status: former smoker  Alcohol use: 4 drinks a week  Negative for drug use.  Marital Status:   [4]     Review of Systems   Respiratory: Negative for shortness of breath.    Cardiovascular: Negative for chest pain.   Gastrointestinal: Negative for blood in stool.   Genitourinary: Negative for hematuria.   All other systems reviewed and are negative.      Physical Exam     Patient Vitals for the past 24 hrs:   BP Temp Temp src Pulse Heart Rate Resp SpO2   07/20/20 2115 123/56 -- -- -- 76 -- 99 %   07/20/20 2045 114/55 -- -- 84 88 14 --   07/20/20 2030 124/65 -- -- -- 95 29 90 %   07/20/20 2015 -- -- -- -- 86 23 93 %   07/20/20 1945 -- -- -- -- 70 21 91 %   07/20/20 1930 -- -- -- -- 68 14 95 %   07/20/20 1900 -- -- -- -- 67 16 93 %   07/20/20 1836 131/61 -- -- 93 -- -- 97 %   07/20/20 1519 120/53 98.2  F (36.8  C) Oral 94 -- 20 99 %       Physical Exam    Nursing note and vitals reviewed.    Constitutional: Pleasant and well groomed. Mildly pale appearing.  Nasal cannula in place.          HENT:    Mouth/Throat: . Oral mucosa moist.    Eyes: Conjunctivae are normal. No scleral icterus.    Neck: Neck supple.   Cardiovascular: Normal rate, regular rhythm and intact distal pulses.    Pulmonary/Chest: Effort normal and breath sounds normal.   Abdominal: Soft.  No distension. There is no tenderness.   Musculoskeletal:  No edema, No calf tenderness  Neurological:Alert and oriented. Coordination normal.   Skin: Skin is warm and dry.   Psychiatric: Normal mood and affect.     Emergency Department Course     ECG:  Indication: Abnormal Labs  Time: 2043  Vent. Rate 96 bpm. KS interval *. QRS duration 126. QT/QTc 404/510. P-R-T axis * 221 -63. Atrial fibrillation. Indeterminate axis. Right bundle branch block. T wave abnormality, consider inferolateral ischemia. Abnormal ECG. Read time: 2056.     Imaging:  Radiology findings were communicated  with the patient, family and Admitting MD who voiced understanding of the findings.    XR Chest, Portable, G/E 1 view:   Prior sternotomy. Stable left-sided pacer/defibrillator. Mild CHF suggested. No pneumothorax. No definite pleural effusion. Upper normal heart size. As per radiology.    Laboratory:  Laboratory findings were communicated with the patient, family and Admitting MD who voiced understanding of the findings.    CBC: WBC: 8.2, HGB: 6.2 (L), PLT: 201    BMP: Glucose 101 (H), Creatinine: 1.29 (H), GFR: 50 (L), o/w WNL     1933 Troponin: 0.050 (H)    Magnesium: 2.4 (H)    ABO/Rh type: A, RH: Pos, Antibody: Negative    Emergency Department Course:  Past medical records, nursing notes, and vitals reviewed.    1915 I performed an exam of the patient as documented above.         IV was inserted and blood was drawn for laboratory testing, results above.    2040 I was called emergently to the bedside as the patient had experienced ventricular fib x 2 with deployment of implantable defibrillator. He cardioverted. The patient reports that he is now feeling better. Felt weak/ light headed just before defibrillator fired.. Has been taking meds as prescribed.     EKG obtained in the ED, see results above.   A portable  Chest X-ray while in the emergency department, results above.   Defibrillator pads placed. Moved to critical care room.     2048 I consulted with Dr. Garcia, Cardiology, regarding the patient's history and presentation here in the emergency department.    2140 I rechecked the patient and discussed the results of his workup thus far. I updated on delay in blood due to need to have blood sent from the [facility]. Well appearing. No symptoms.     2225 Magnesium 2 gm IV     2237 I consulted with Dr. Taylor, Hospitalist, regarding the patient's history and presentation here in the emergency department.    Findings and plan explained to the Patient and daughter who consents to admission. Discussed the patient  with Dr. Taylor, who will admit the patient to a Adult ICU bed for further monitoring, evaluation, and treatment.    I personally reviewed the laboratory and imaging results with the Patient and daughter and answered all related questions prior to admission.     Impression & Plan     Medical Decision Making:  José Aguirre is a 84 year old male who was sent in from clinic after he had routine blood draw today and was noted to be anemic. He had no symptoms of anemia, history of chronic anemia. Hemoglobin was repeated and here was 6.2 with his underlining medical problems is appropriate to have him closer to 8. Plan to transfuse him however while in the emergency department he developed ventricular fibrillation. His automated implantable defibrillator shocked him, this happened x2. This was captured on a heart monitor and appears to have Torsades type morphology to it. Magnesium was ordered in consultation with Brandon Araiza. EKG did not reveal findings concerning for STEMI.  I consulted with cardiology who recommended PRBC transfusion but not other anti-arrhythmics at this time.  They will evaluate the patient in the morning.   Patient has no ongoing symptoms. I consulted with Dr. Taylor who has accepted the patient for admission to the ICU for PRBC transfusion, ongoing evaluation, monitoring and management.     Total Critical Care Time: 30 min.      Diagnosis:    ICD-10-CM    1. Ventricular fibrillation (H)  I49.01 ABO/Rh type and screen     Troponin I     Magnesium     Magnesium     CANCELED: Magnesium    implantable defibrillator cardioverted   2. Anemia, unspecified type  D64.9    3. CKD (chronic kidney disease) stage 3, GFR 30-59 ml/min (H)  N18.3        Disposition:  Admitted to Dr. Taylor.    Scribe Disclosure:  I, Leonid Chan, am serving as a scribe at 6:34 PM on 7/20/2020 to document services personally performed by No att. providers found based on my observations and the provider's statements to me.         Moni Everett MD  07/20/20 1292

## 2020-07-20 NOTE — TELEPHONE ENCOUNTER
[facility] Call Center    Phone Message    May a detailed message be left on voicemail: yes     Reason for Call: Symptoms or Concerns     If patient has red-flag symptoms, warm transfer to triage line    Current symptom or concern: Pt called in stating he had a defibrillator placed about 2-3 months ago.  Pt stated Dr. Marion called him Saturday stating to the pt it went off, so he was calling to check on him.  Pt states he feels fine now but wants to know did something register that alerted Dr. Marion.  Pt wants to know did he have a heart attack or stroke?  Pt is requesting a call back today    Symptoms have been present for:  3 day(s)    Has patient previously been seen for this? Yes    By Dr. Marion    Date:     Are there any new or worsening symptoms? No      Action Taken: Message routed to:  [facility] ([facility] Cardiology    Travel Screening: Not Applicable

## 2020-07-21 NOTE — PLAN OF CARE
ICU End of Shift Summary.  For vital signs and complete assessments, please see documentation flowsheets.     Pertinent assessments: pt A&Ox4, up assist 1, tele afib/SR, LS dim- on home O2, BS+, pt self caths, skin intact, VSS  Major Shift Events: admit this shift, sating mid to high 90's on 6 lpm, CRANE reported, hgb 6.2: 1 unit PRBC given- recheck ordered for 0730, pm alison negron, pt reports no pain, n/v, chg bath complete  Plan (Upcoming Events): monitor labs, cards consult  Discharge/Transfer Needs: TBD    Bedside Shift Report Completed : Y  Bedside Safety Check Completed: Y

## 2020-07-21 NOTE — ED NOTES
RN rechecked pt and updated him and daughter on the plan. RN explained that we are waiting for the blood to arrive. Pt stable in room and reports feeling well.

## 2020-07-21 NOTE — TELEPHONE ENCOUNTER
Patient received an ICD shock on 7/20/20. Per Dr. Moreau's request, [email or web address] rep (Bill) will interrogate the patient's ICD at [facility] today.

## 2020-07-21 NOTE — TELEPHONE ENCOUNTER
Currently admitted at [facility] for further work-up.    Arabella Price, BSN, RN, PHN  Electrophysiology Nurse Coordinator

## 2020-07-21 NOTE — H&P
[facility]    History and Physical  Hospitalist       Date of Admission:  7/20/2020    Assessment & Plan   José Aguirre is a 84 year old male who presents with anemia.    This is a 84-year-old gentleman with a past medical history significant for V. fib cardiac arrest in 2019 followed by CABG and AICD placement, coronary artery disease, chronic anemia, chronic kidney disease, iron deficiency, COPD.    He was seen in nephrology clinic earlier today.  Found to have a low hemoglobin so he was sent to the emergency room.    Overall, patient had an uneventful day.  He woke up and had his breakfast.  Later on he went for his nephrology appointment.  He denies experiencing any unusual shortness of breath/palpitations/chest pain.  He was feeling his normal self.  He had a small episode of nosebleed about 2 days ago.  Before that he had a fairly significant episode of nosebleed about 2 weeks ago.  But denies any hematochezia/melena/hematemesis.  Denies any abdominal pain.    In the ER, he was evaluated for his anemia.    He was on telemetry monitoring in the emergency room.  He went to the bathroom and soon after he came back to his room in the ER, he had an eventful cardiac arrhythmia.    At 8:28 PM, patient went into torsades.  Patient felt that everything was upside down and felt a cold rush.  Appeared very pale.  He was poorly responsive for about 10 seconds.  His defibrillator fired and he went back in sinus rhythm.  Per ER staff, it happened again after a couple of minutes and the defibrillator fired again.    Patient was immediately seen by the ER physician again and crash cart was brought and.  Per ER physician, he was otherwise stable.  Reevaluation showed no concerning signs.  The ER physician discussed the case with on-call cardiologist.  Admission and cardiac monitoring was recommended.    According to the patient, his defibrillator has not fired in the recent past.  He saw his electrophysiologist  on 7/1/2020.  Per electrophysiologist's note, the last time his defibrillator fired was about 6 months ago during a 6-minute walk test.    It appears that he has not been taking his metoprolol XL.  According to the daughter he stopped taking it almost 6 months ago.  I do not think that his electrophysiologist is aware of this.  Based on the note by his cardiologist, he should still be on the metoprolol.  According to the daughter, his blood pressure usually runs on the lower side so he decided to stop his metoprolol.    He takes apixaban and Plavix on a regular basis.    Problem List:    V. Fib- torsades, status post 2 episodes of AICD discharges.  - The likely trigger is his anemia.  -He was given 2 g of IV mag.  But his magnesium level was satisfactory.  Normal potassium level.  No significant acidosis.  - Resume metoprolol XL.  -Cardiology consultation.  - Monitor troponin.  -He was transfused 1 unit of packed RBC in the ER.  -Based on device interrogation report from earlier today, he did have V. fib episodes in the recent past: On July 16 and July 18.    Chronic anemia.  -Per patient and patient's daughter, he has been dealing with recurrent anemia.  Usually being managed by his nephrologist.  He has received IV iron in the past because iron deficiency anemia is suspected.  - Likely worsened by recent nosebleeding.  - Transfuse 1 unit of PRBC and recheck in the morning.    Coronary artery disease.  History of CABG.  -Continue apixaban and Plavix as before.  - Resume rest of the medications as well.  - Started on metoprolol XL 12.5 mg daily.  - I strongly advised him to talk to his cardiologist if he decides to stop his metoprolol again.  -Troponin was 0.05, mildly elevated.  But likely related to his anemia.  He has no ischemic symptoms.    COPD  -Stable.  Continue inhalers.  On 4 L of oxygen at baseline.    DVT Prophylaxis: Apixaban.  Code Status: Full Code    Usama Taylor MD    Primary Care Physician   [facility]    Chief Complaint   Anemia, AICD discharge.      History of Present Illness   José Aguirre is a 84 year old male presented to the ER for anemia.      Past Medical History    I have reviewed this patient's medical history and updated it with pertinent information if needed.   Past Medical History:   Diagnosis Date     Anemia      Atrophy of left kidney      CKD (chronic kidney disease), stage III (H)      Claudication, intermittent (H)      Coronary artery disease 2003    CAB x 4     History of GI bleed 2012    Hemorrhagic gastritis     Hypercholesterolemia      Hypertension      Left carotid artery stenosis     S/P Carotid Endarterectomy left      PVD (peripheral vascular disease) (H)      Renal artery stenosis (H)     stent R       Past Surgical History   I have reviewed this patient's surgical history and updated it with pertinent information if needed.  Past Surgical History:   Procedure Laterality Date     BYPASS GRAFT ARTERY CORONARY  06/2003    LIMA=LAD, SVG=Diag, SVG=OM2, SVG=PDA     CAROTID ENDARTERECTOMY Left      CV CORONARY ANGIOGRAM N/A 8/27/2019    Procedure: Coronary Angiogram;  Surgeon: Andrew Garcia MD;  Location:  [facility]     CV PCI STENT DRUG ELUTING N/A 8/27/2019    Procedure: PCI Stent Drug Eluting;  Surgeon: Andrew Garcia MD;  Location:  [facility]     ENDARTERECTOMY CAROTID Left 7/11/2017    Procedure: ENDARTERECTOMY CAROTID;  REDO LEFT CAROTID ENDARTERECTOMY WITH RIGHT ANKLE GREATER SAPHENOUS VEIN  ;  Surgeon: Khurram Bishop MD;  Location: [facility] OR     EP ICD N/A 9/10/2019    Procedure: EP ICD;  Surgeon: Shorty Marion MD;  Location:  [facility]ARD[facility]     EYE SURGERY       HEART CATH LEFT HEART CATH  06/2003    Severe multivessel disease     IR THORACENTESIS  9/5/2019     RENAL ARTERY ANGIOGRAM Right 05/2013    with stenting     TONSILLECTOMY         Prior to Admission Medications   Prior to Admission Medications  "  Prescriptions Last Dose Informant Patient Reported? Taking?   Ascorbic Acid 500 MG CHEW 7/20/2020 at AM  No Yes   Sig: Take 500 mg by mouth daily   albuterol (PROAIR HFA/PROVENTIL HFA/VENTOLIN HFA) 108 (90 Base) MCG/ACT inhaler 7/19/2020 at Unknown time  Yes Yes   Sig: Inhale 2 puffs into the lungs every 4-6 hours as needed for wheezing   apixaban ANTICOAGULANT (ELIQUIS) 2.5 MG tablet 7/20/2020 at x1  No Yes   Sig: Take 1 tablet (2.5 mg) by mouth 2 times daily   atorvastatin (LIPITOR) 40 MG tablet 7/19/2020 at PM  No Yes   Sig: Take 1 tablet (40 mg) by mouth every evening   bumetanide (BUMEX) 0.5 MG tablet 7/20/2020 at AM  Yes Yes   Sig: Take 0.5 mg by mouth daily   clopidogrel (PLAVIX) 75 MG tablet 7/20/2020 at AM  Yes Yes   Sig: Take 75 mg by mouth daily   ferrous sulfate (FEROSUL) 325 (65 Fe) MG tablet 7/20/2020 at AM  Yes Yes   Sig: Take 325 mg by mouth daily (with breakfast)   mometasone furoate (ASMANEX HFA) 200 MCG/ACT inhaler 7/19/2020 at AM  Yes Yes   Sig: Inhale 2 puffs into the lungs 2 times daily   multivitamin w/minerals (THERA-VIT-M) tablet   No Yes   Sig: Take 1 tablet by mouth daily   nitroGLYcerin (NITROSTAT) 0.4 MG sublingual tablet   No No   Sig: For chest pain place 1 tablet under the tongue every 5 minutes for 3 doses. If symptoms persist 5 minutes after 1st dose call 911.   order for DME   No No   Sig: Toilet Safety Frame  Adjustable Bath Shower Chair with Back - can ONLY be 15 inches wide  Straight wall grab bar   order for DME   No No   Sig: Wheelchair   Toilet Safety Frame  Straight Wall Grab Bar X 2 each  Adjustable Bath Shower Chair with Back--can only be 15 inches wide   order for DME   No No   Sig: Toilet Safety Frame  Wheelchair  Adjustable Bath Shower Chair with Back--can only be 15 inches wide  Straight wall grab bar X 2 each   order for DME   No No   Sig: Toilet Safety Frame  Adjustable Bath Shower Chair with back--can only be 15\" wide  Wheelchair  Straight wall grab bar X 2 each "   pantoprazole (PROTONIX) 40 MG EC tablet 7/20/2020 at x1  No Yes   Sig: Take 1 tablet (40 mg) by mouth 2 times daily (before meals)   potassium chloride (KLOR-CON) 20 MEQ packet 7/20/2020 at x1  Yes Yes   Sig: Take 20 mEq by mouth 2 times daily    tiotropium-olodaterol 2.5-2.5 MCG/ACT AERS 7/19/2020 at Unknown time  Yes Yes   Sig: Inhale 2 puffs into the lungs daily   vitamin D3 (CHOLECALCIFEROL) 2000 units (50 mcg) tablet 7/20/2020 at AM  Yes Yes   Sig: Take 2,000 Units by mouth daily   zinc sulfate (ZINCATE) 220 (50 Zn) MG capsule 7/20/2020 at AM  No Yes   Sig: Take 1 capsule (220 mg) by mouth daily      Facility-Administered Medications: None     Allergies   No Known Allergies    Social History   I have reviewed this patient's social history and updated it with pertinent information if needed. José Aguirre  reports that he quit smoking about 19 years ago. His smoking use included cigarettes. He has a 50.00 pack-year smoking history. He has never used smokeless tobacco. He reports current alcohol use. He reports that he does not use drugs.    Family History   I have reviewed this patient's family history and updated it with pertinent information if needed.   Family History   Problem Relation Age of Onset     Cerebrovascular Disease Mother 90        unknown type     Cancer Brother 70        Brain cancer      Dementia Sister      Arthritis Brother        Review of Systems   The 10 point Review of Systems is negative other than noted in the HPI or here.     Physical Exam   Temp: 98.1  F (36.7  C) Temp src: Oral BP: 105/45 Pulse: 65 Heart Rate: 76 Resp: 24 SpO2: 99 % O2 Device: Nasal cannula Oxygen Delivery: 6 LPM  Vital Signs with Ranges  Temp:  [98  F (36.7  C)-98.2  F (36.8  C)] 98.1  F (36.7  C)  Pulse:  [65-94] 65  Heart Rate:  [66-95] 76  Resp:  [14-29] 24  BP: ()/(39-65) 105/45  SpO2:  [90 %-100 %] 99 %  0 lbs 0 oz    Constitutional: Awake, alert, cooperative, no apparent distress.  Eyes: Conjunctiva and  pupils examined and normal.  HEENT: Moist mucous membranes, normal dentition.  Respiratory: Clear to auscultation bilaterally, no crackles or wheezing.  Cardiovascular: Regular rate and rhythm, normal S1 and S2, and no murmur noted.  GI: Soft, non-distended, non-tender, normal bowel sounds.  Lymph/Hematologic: No anterior cervical or supraclavicular adenopathy.  Skin: No rashes, no cyanosis, no edema.  Musculoskeletal: No joint swelling, erythema or tenderness.  Neurologic: Cranial nerves 2-12 intact, normal strength and sensation.  Psychiatric: Alert, oriented to person, place and time, no obvious anxiety or depression.    Data     Recent Labs   Lab 07/20/20  1933   WBC 8.2   HGB 6.2*   MCV 92         POTASSIUM 3.9   CHLORIDE 108   CO2 24   BUN 22   CR 1.29*   ANIONGAP 7   YOON 8.7   *   TROPI 0.050*       Recent Results (from the past 24 hour(s))   Cardiac Device Check - Remote   Result Value    Date Time Interrogation Session [id number]    Implantable Pulse Generator  [employer]    Implantable Pulse Generator Model 198744 Acticor 7 VR-T DX    Implantable Pulse Generator Serial Number [id number]    Type Interrogation Session Remote     Re-programmed During Session NO    [facility]    Implantable Pulse Generator Type Defibrillator    Implantable Pulse Generator Implant Date 20190910    Implantable Lead  [employer]ik    Implantable Lead Model 914084 Plexa ProMRI S DX 65/15    Implantable Lead Serial Number [id number]    Implantable Lead Implant Date 20190910    Implantable Lead Location Detail 1 UNKNOWN    Implantable Lead Location Right Ventricle    Joseph Setting Mode (NBG Code) VDD    Joseph Setting Lower Rate Limit 50    Joesph Setting Maximum Tracking Rate 130    Joseph Setting Maximum Sensor Rate 120    Joseph Setting FARIDA Delay Low 260    Joseph Setting FARIDA Delay High 230    Joseph Setting AT Mode Switch Rate 160    Joseph Setting AT Mode  Switch Mode VDIR    Lead Channel Setting Sensing Polarity Bipolar    Lead Channel Setting Sensing Sensitivity 0.4    Lead Channel Setting Sensing Polarity Bipolar    Lead Channel Setting Sensing Sensitivity 0.8    Lead Channel Setting Sensing Adaptation Mode Adaptive    Lead Channel Setting Pacing Polarity Bipolar    Lead Channel Setting Pacing Pulse Width 0.40    Lead Channel Setting Pacing Amplitude 1.5    Zone Setting Type Category VF    Zone Setting Detection Interval 250    Zone Setting Detection Beats Numerator 30    Zone Setting Detection Beats Denominator 40    Zone Setting Type Category VT    Zone Setting Detection Interval 300    Lead Channel Status Null    Lead Channel Status Null    Lead Channel Impedance Value 630    Lead Channel Pacing Threshold Amplitude 0.5    Lead Channel Pacing Threshold Pulse Width 0.4    Battery Status Beginning of Service    Battery RRT Trigger Battery voltage < 2.85 V and other factors    Battery Remaining Percentage 100.0    Battery Voltage 3.07    Capacitor Charge Type Shock    Capacitor Last Charge Date Time 20200718085032    Capacitor Charge Energy 40.0    Joseph Statistic Date Time Start 20200719014450    Joseph Statistic Date Time End 20200720014302    Joseph Statistic RV Percent Paced 4    Joseph Statistic AS  Percent 4    Joseph Statistic AS VS Percent 94    CRT Statistic Date Time Start 20200719014450    CRT Statistic Date Time End 20200720014302    Atrial Tachy Statistic Date Time Start 20200719014450    Atrial Tachy Statistic Date Time End 20200720014302    Atrial Tachy Statistic AT/AF [place] Percent 0    Atrial Tachy Statistic Number Of Mode Switches Per Day 8    Therapy Statistic Recent Shocks Delivered 0    Therapy Statistic Recent Shocks Aborted 0    Therapy Statistic Recent ATP Delivered 0    Therapy Statistic Recent Date Time Start 20200719014450    Therapy Statistic Recent Date Time End 20200720014302    Therapy Statistic Total Shocks Delivered 1    Therapy  Statistic Total Shocks Aborted 1    Therapy Statistic Total ATP Delivered 0    Therapy Statistic Total  Date Time Start 20190910000000    Therapy Statistic Total  Date Time End 20200720014302    Episode Statistic Total Count 192    Episode Statistic Type Category Monitor    Episode Statistic Total Count 0    Episode Statistic Type Category SVT    Episode Statistic Total Count 2    Episode Statistic Type Category VF    Episode Statistic Total Count 0    Episode Statistic Type Category VT    Episode Statistic Total Count 0    Episode Statistic Type Category VT    Episode Statistic Total Count 0    Episode Statistic Type Category VT1_MONITOR    Episode Statistic Total Date Time Start 20190910000000    Episode Statistic Total Date Time End 20200720014302    Episode Statistic Total Date Time Start 20190910000000    Episode Statistic Total Date Time End 20200720014302    Episode Statistic Total Date Time Start 20190910000000    Episode Statistic Total Date Time End 20200720014302    Episode Statistic Total Date Time Start 20190910000000    Episode Statistic Total Date Time End 20200720014302    Episode Statistic Total Date Time Start 20190910000000    Episode Statistic Total Date Time End 20200720014302    Episode Statistic Total Date Time Start 20190910000000    Episode Statistic Total Date Time End 20200720014302    Narrative    Yellow Alert received -     VF detected  1 VF detected between Jul 16, 2020 1:43:02 AM and Jul 20, 2020 1:43:02 AM    Details for arrhythmia episodes received (only ventricular episodes)  Details were received for 2 spontaneous episodes, which were detected between Jul 16, 2020 12:00:40 PM and Jul 18, 2020 8:50:28 AM    Remote ICD transmission received and reviewed. Device transmission sent per MD orders for ventricular arrhythmia episode. Patient has a [employer] single lead ICD. Normal ICD function. 1 VF episode recorded on 7/18/20 at 0850 am.  Review of EGM shows non sustained VF @ 300 bpm  "lasting 8 seconds.  Rhythm identified with no therapy delivered prior to spontaneous termination.  = 12%. Estimated battery longevity to EDWIN = SALVADOR. Battery voltage = 3.07V. Lead trends appear stable. Patient notified of interrogation results. Patient reports that he was at the cabin at the time of the episode.  He states he had an \"episode\" and felt dizzy.  He sat down and it passed.  he did not lose consciousness.  He states he is feeling well now and denies complaints. Plan for patient to send a remote transmission in 3 months as scheduled.    Remote ICD transmission    I have reviewed and interpreted the device interrogation, settings, programming and nurse's summary. The device is functioning within normal device parameters. I agree with the current findings, assessment and plan.   XR Chest Port 1 View    Narrative    EXAM: XR CHEST PORT 1 VW  LOCATION: [facility]  DATE/TIME: 7/20/2020 8:49 PM    INDICATION: ; defibrillation; abnormal EKG  COMPARISON: None.      Impression    IMPRESSION: Prior sternotomy. Stable left-sided pacer/defibrillator. Mild CHF suggested. No pneumothorax. No definite pleural effusion. Upper normal heart size.       "

## 2020-07-21 NOTE — PHARMACY-ADMISSION MEDICATION HISTORY
Admission medication history interview status for this patient is complete. See [facility] admission navigator for allergy information, prior to admission medications and immunization status.     Medication history interview done via telephone during Covid-19 pandemic, indicate source(s): Patient  Medication history resources (including written lists, pill bottles, clinic record):epic list    Changes made to PTA medication list:  Added: metoprolol-ER, triamcinolone cream  Deleted: None  Changed: None    Actions taken by pharmacist (provider contacted, etc):None     Additional medication history information:None    Medication reconciliation/reorder completed by provider prior to medication history?  Y   (Y/N)         Prior to Admission medications    Medication Sig Last Dose Taking? Auth Provider   albuterol (PROAIR HFA/PROVENTIL HFA/VENTOLIN HFA) 108 (90 Base) MCG/ACT inhaler Inhale 2 puffs into the lungs every 4-6 hours as needed for wheezing 7/19/2020 at Unknown time Yes Reported, Patient   apixaban ANTICOAGULANT (ELIQUIS) 2.5 MG tablet Take 1 tablet (2.5 mg) by mouth 2 times daily 7/20/2020 at x1 Yes Ameena Butterfield APRN CNP   Ascorbic Acid 500 MG CHEW Take 500 mg by mouth daily 7/20/2020 at AM Yes Santo aDly PA-C   atorvastatin (LIPITOR) 40 MG tablet Take 1 tablet (40 mg) by mouth every evening 7/19/2020 at PM Yes Candace Carranza, NP   bumetanide (BUMEX) 0.5 MG tablet Take 0.5 mg by mouth daily 7/20/2020 at AM Yes Unknown, Entered By History   clopidogrel (PLAVIX) 75 MG tablet Take 75 mg by mouth daily 7/20/2020 at AM Yes Unknown, Entered By History   ferrous sulfate (FEROSUL) 325 (65 Fe) MG tablet Take 325 mg by mouth daily (with breakfast) 7/20/2020 at AM Yes Unknown, Entered By History   metoprolol succinate ER (TOPROL-XL) 25 MG 24 hr tablet Take 12.5 mg by mouth daily  Yes Unknown, Entered By History   mometasone (ASMANEX twisthaler) 220 MCG/INH inhaler Inhale 2 puffs into the lungs 2 times daily  "7/19/2020 at AM Yes Unknown, Entered By History   multivitamin w/minerals (THERA-VIT-M) tablet Take 1 tablet by mouth daily  Yes Santo Daly PA-C   nitroGLYcerin (NITROSTAT) 0.4 MG sublingual tablet For chest pain place 1 tablet under the tongue every 5 minutes for 3 doses. If symptoms persist 5 minutes after 1st dose call 911.  Yes Candace Carranza NP   pantoprazole (PROTONIX) 40 MG EC tablet Take 1 tablet (40 mg) by mouth 2 times daily (before meals) 7/20/2020 at x1 Yes Candace Carranza NP   potassium chloride (KLOR-CON) 20 MEQ packet Take 20 mEq by mouth 2 times daily  7/20/2020 at x1 Yes Unknown, Entered By History   tiotropium-olodaterol 2.5-2.5 MCG/ACT AERS Inhale 2 puffs into the lungs daily 7/19/2020 at Unknown time Yes Unknown, Entered By History   triamcinolone (KENALOG) 0.1 % external cream Apply topically 2 times daily  Yes Unknown, Entered By History   vitamin D3 (CHOLECALCIFEROL) 2000 units (50 mcg) tablet Take 2,000 Units by mouth daily 7/20/2020 at AM Yes Unknown, Entered By History   zinc sulfate (ZINCATE) 220 (50 Zn) MG capsule Take 1 capsule (220 mg) by mouth daily 7/20/2020 at AM Yes Santo Daly PA-C   order for DME Wheelchair   Toilet Safety Frame  Straight Wall Grab Bar X 2 each  Adjustable Bath Shower Chair with Back--can only be 15 inches wide   Primo Montoya APRN CNP   order for DME Toilet Safety Frame  Wheelchair  Adjustable Bath Shower Chair with Back--can only be 15 inches wide  Straight wall grab bar X 2 each   Primo Montoya APRN CNP   order for DME Toilet Safety Frame  Adjustable Bath Shower Chair with back--can only be 15\" wide  Wheelchair  Straight wall grab bar X 2 each   Primo Montoya APRN CNP   order for DME Toilet Safety Frame  Adjustable Bath Shower Chair with Back - can ONLY be 15 inches wide  Straight wall grab bar   Primo Montoya APRN CNP         "

## 2020-07-21 NOTE — TELEPHONE ENCOUNTER
Patient was shocked yesterday evening while in the ED at [facility]. His device was interrogated today by Kadeem ([employer] rep). Normal ICD function.     1 VF episode recorded on 7/18/20 at 0850 am. EGM shows non sustained VF @ 300 bpm lasting 8 seconds.  Rhythm identified with no therapy delivered prior to spontaneous termination.     He then had 2 VF episodes logged on 7/20/20.  First episode occurred at 19:43. EGM shows VF lasting 15 seconds. This episode was successfully terminated with 1 ICD shock.     Second episode occurred at 19:47. EGM again shows VF lasting 15 seconds. This episode was also successfully terminated with 1 ICD shock. No episodes have been logged since this shock.    He remains inpatient at this time. He follows at the [place] for his ICD. Next remote check is scheduled for 10/01/20.    The EGM below is from episode occurring 7/20/20 at 19:47.

## 2020-07-21 NOTE — PLAN OF CARE
ICU End of Shift Summary.  For vital signs and complete assessments, please see documentation flowsheets.     Pertinent assessments: Tele A fib briefly, but pt has mostly stayed in SR. Murmur detected.  Major Shift Events: Pt self cath twice today, independently with good output. Echo and ICD device interrogation complete today. Cards consult added amiodarone, okay to give plavix, but hold elequis.  Pt Hgb 7.0 this AM, received one unit of blood.  Hgb now 8.2.  Plan (Upcoming Events): Continue to follow labs, tele.  Discharge/Transfer Needs: TBD    Bedside Shift Report Completed : Yes  Bedside Safety Check Completed: Yes

## 2020-07-21 NOTE — CONSULTS
[facility]    Cardiology Consultation     Date of Admission:  7/20/2020    Primary Care Physician   [facility]     Consult Date:  07/21/2020     REASON FOR CONSULTATION:  [place].      REFERRING PHYSICIAN:  Hospitalist Service.      IMPRESSION:   1.  Ventricular fibrillation arrest.  2.  Chronic coronary artery disease with remote history of coronary artery bypass surgery and more recently stenting to the left main, LAD and angioplasty to the circumflex arteries in 2019.   3.  Long QTc, no cause apparent at this time.   4.  History of paroxysmal atrial flutter or fibrillation, previously on Eliquis.   5.  Anemia, worsening recently with hemoglobin of 6.2 on admission.  Hemoglobin last year was 9.6.   6.  Epistaxis, which may be the cause of 5.  7.  Chronic renal failure, stage II.   8.  Extensive peripheral vascular disease with lower limb and carotid artery disease.   9.  COPD.   10.  Diastolic heart failure history, currently appears euvolemic.      This is a gentleman who was in his usual state of health when he experienced device therapy yesterday.  His calcium, magnesium and potassium levels are normal.  He does have what appears to be an acute drop in his hemoglobin, likely from epistaxis, and this is probably the most likely precipitant for his episodes of ventricular fibrillation which he does have a history of.      He does have a prolonged QTc at baseline on EKGs going back to last year.  No obvious cause is apparent at this time.  Medication list reviewed and no obvious culprits there.      He will be transfused 1 unit, which will definitely help.  We should continue with Plavix, as his stenting was performed within the past year.  Agree with holding Eliquis for now in view of his severe anemia.     It was previously recommended that he should have a repeat coronary angiography due to his episodes of ventricular fibrillation.  We should do that, but should wait for his  hemoglobin to improve and perhaps his renal function to improve slightly.  He has no heart failure, no history of any anginal symptoms and stents were placed within the past year with no history of medication noncompliance.      In the meantime, I would like to get an echocardiogram to check on his left ventricular systolic function.  I will start him on a low dose of amiodarone.  I have discussed his presentation with my colleague, Dr. Lucien Briggs.     HISTORY OF PRESENT ILLNESS:  This is a very pleasant 84-year-old gentleman with a complex past cardiac history which is very nicely summarized by Dr. Shorty Marion as follows:      This gentleman was in his usual state of health yesterday.  He does perform self-catheterization and has been doing so for the past 5 years or so.  He did that yesterday and when he got up, he felt transiently dizzy and he may have lost consciousness for about 10 seconds.  Interestingly, he did not feel his defibrillator discharging.  He was sent to the emergency room and when he was there, the defibrillator fired again.  Device interrogation does show nonsustained ventricular therapy, though this is detected on 07/16.  No device therapy was delivered. Mr. Aguirre is an 84 year old male who has a past medical history significant for COPD (uses 2L home oxygen with activities), CABG (LIMA-LAD, SVG-D1, SVG-OM2, SVG-rPDA) 2003, PVD with bilateral lower extremity claudication (left > right), carotid artery disease with occluded right carotid and s/p left CEA, renal artery stenosis s/p stenting 2013, HLD, HTN, left subclavian artery stenosis, CKD III, and out of hospital cardiac arrest s/p secondary prevention ICD 9/10/19.      He was admitted on 8/27/19 after suffering an out of hospital cardiac arrest. He was at the [facility] and had a witnessed collapse. Bystander CPR was started and EMS found him in VT/VF. He was defibrillated X1 and given 1 of epi. ROSC was obtained in field. On arrival here,  he had a pulse and was hypotensive. He was intubated. He was taken for emergent coronary angiogram which showed severe 3V  CAD with LM/LAD lesion and CTOp of proximal RCA with L to right collaterals. LIMA-LAD graft minimally diseased but small and primarily retrograde flow through native LAD. SVG-RCA chronically closed at anastomosis, SVG-D1 could not be selectively injected but appears closed due to dye hangup during injection of the native coronaries. There may also be a closed graft to OM2 but this is not certain. He underwent PCI to dLM/ostial LAD with CONCETTA and balloon angioplasty of ostial LCx. He was also placed on therapeutic hypothermia protocol. He has made good recovery thus far. Per his family he has had another episode of a cardiac arrest in 2018 no angiogram was done he had ROSC and good recovery and per report had no further work up, and a reported syncopal event both attributed to dehydration. Echo shows LVEF 60-70%, mildly reduced RV function, and no significant valvular issues. ECG shows SR with RBBB. He elected to undergo ICD and had secondary prevention ICD placed on 9/10/19.      EP Visit 1/15/20: He presents today for follow up. He reports feeling well. ICD was placed on 9/10/19. ICD site is well healed. He denies any chest pain/pressures, dizziness, lightheadedness, worsening shortness of breath, leg/ankle swelling, PND, orthopnea, palpitations, or syncopal symptoms.  Device interrogation shows  normal ICD function. 17 atrial episodes recorded.  Atrial burden 4%.  Review of available EGMs shows atrial flutter with VS response.  Device programmed VDD with mode switch to VDIR.  Patient paces at switch back to VDD at end of episode.   = 5% and Aflutter burden is 4%.  So patient  about 1% when not in mode switch.  No ventricular arrhythmias recorded. Intrinsic rhythm = SR @ 78 bpm.   = 5%.  Estimated battery longevity to EDWIN = 100%.  Battery voltage = 3.11V.  No short v-v intervals recorded.  RV lead impedance trends appear stable..  He is noted to have episodes of AF on device lasting >24 hours. He is not on anticoagulation currently. Current cardiac medications include: Lipitor, Bumex, Toprol XL, ASA, and Plavix. Started on Eliquis at this visit.      He is following up today. In 3/2020, he got a shock for VF that occurred when he was doing a 6 min. Walk test. Given his significant CAD and exertional nature of this VF, we considered repeat angiogram and possibly AAT. He was going to pursue coronary angiogram, but then due to COVID19 this was deferred. He later elected to not pursue angiogram given no further exertional symptoms.  He also had called recently with some nosebleeds and his Eliquis was decreased to 2.5 mg BID. He's been getting iron infusions for low iron, which has helped the nosebleeds. However, a 2.5mg tab + 5mg tab was found together in his pill box slots. Daughter said he was possibly taking 7.5mg BID for an unknown amount of time. He is also on Plavix. The pill box has now been corrected to 2.5mg tabs BID. He is close to meeting the guidelines for reduced Eliquis dosing at 2.5mg BID: 1. Scr? 1.5 mg/dL, 2. age? 80 years old, or 3. body weight ? 60kg. His most recent labs on 5/27/20 show Cr of 1.48. This is an increase from recent labs done here, but consistent value with labs done at [facility] 1 year ago. He has hx of single kidney. Therefore, he was kept on 2.5 mg BID. He reports feeling well. He denies any chest pain/pressures, dizziness, lightheadedness, worsening shortness of breath, leg/ankle swelling, PND, orthopnea, palpitations, or syncopal symptoms. Device transmission shows normal ICD function. No episodes recorded since last remote. No arrhythmias recorded since last remote. Presenting EGM = SR @ 68 bpm.  = 13%. Estimated battery longevity to EDWIN = SALVADOR. Battery voltage = 3.10V. Lead impedance trends appear stable. Current cardiac medication include: Eliquis, Bumex, Lipitor,  Toprol XL, and Plavix.     He was in his usual state of health yesterday.  He has not had any recent coughs colds chills fevers urinary or bowel disturbances.  He has been self catheterizing for the past 5 years.  He had just finished that yesterday when felt funny and collapsed for a few seconds.  He had did not feel any shocks.  He presented to the emergency room and whilst there apparently some episodes of ventricular fibrillation were observed and device therapy was delivered.  Currently he is feeling well.  He denies cardiac symptoms.  He has not had any nosebleeds over the past 2 weeks or so.              Past Medical History   I have reviewed this patient's medical history and updated it with pertinent information if needed.   Past Medical History:   Diagnosis Date     Anemia      Atrophy of left kidney      CKD (chronic kidney disease), stage III (H)      Claudication, intermittent (H)      Coronary artery disease 2003    CAB x 4     History of GI bleed 2012    Hemorrhagic gastritis     Hypercholesterolemia      Hypertension      Left carotid artery stenosis     S/P Carotid Endarterectomy left      PVD (peripheral vascular disease) (H)      Renal artery stenosis (H)     stent R       Past Surgical History   I have reviewed this patient's surgical history and updated it with pertinent information if needed.  Past Surgical History:   Procedure Laterality Date     BYPASS GRAFT ARTERY CORONARY  06/2003    LIMA=LAD, SVG=Diag, SVG=OM2, SVG=PDA     CAROTID ENDARTERECTOMY Left      CV CORONARY ANGIOGRAM N/A 8/27/2019    Procedure: Coronary Angiogram;  Surgeon: Andrew Garcia MD;  Location: [facility] CARDIAC CATH LAB     CV PCI STENT DRUG ELUTING N/A 8/27/2019    Procedure: PCI Stent Drug Eluting;  Surgeon: Andrew Garcia MD;  Location: [facility] CARDIAC CATH LAB     ENDARTERECTOMY CAROTID Left 7/11/2017    Procedure: ENDARTERECTOMY CAROTID;  REDO LEFT CAROTID ENDARTERECTOMY WITH RIGHT ANKLE GREATER SAPHENOUS  VEIN  ;  Surgeon: Khurram Bishop MD;  Location: [facility] OR     EP ICD N/A 9/10/2019    Procedure: EP ICD;  Surgeon: Shorty Marion MD;  Location:  [facility] CARD[facility] CATH LAB     EYE SURGERY       HEART CATH LEFT HEART CATH  06/2003    Severe multivessel disease     IR THORACENTESIS  9/5/2019     RENAL ARTERY ANGIOGRAM Right 05/2013    with stenting     TONSILLECTOMY         Prior to Admission Medications   Prior to Admission Medications   Prescriptions Last Dose Informant Patient Reported? Taking?   Ascorbic Acid 500 MG CHEW 7/20/2020 at AM  No Yes   Sig: Take 500 mg by mouth daily   albuterol (PROAIR HFA/PROVENTIL HFA/VENTOLIN HFA) 108 (90 Base) MCG/ACT inhaler 7/19/2020 at Unknown time  Yes Yes   Sig: Inhale 2 puffs into the lungs every 4-6 hours as needed for wheezing   apixaban ANTICOAGULANT (ELIQUIS) 2.5 MG tablet 7/20/2020 at x1  No Yes   Sig: Take 1 tablet (2.5 mg) by mouth 2 times daily   atorvastatin (LIPITOR) 40 MG tablet 7/19/2020 at PM  No Yes   Sig: Take 1 tablet (40 mg) by mouth every evening   bumetanide (BUMEX) 0.5 MG tablet 7/20/2020 at AM  Yes Yes   Sig: Take 0.5 mg by mouth daily   clopidogrel (PLAVIX) 75 MG tablet 7/20/2020 at AM  Yes Yes   Sig: Take 75 mg by mouth daily   ferrous sulfate (FEROSUL) 325 (65 Fe) MG tablet 7/20/2020 at AM  Yes Yes   Sig: Take 325 mg by mouth daily (with breakfast)   metoprolol succinate ER (TOPROL-XL) 25 MG 24 hr tablet   Yes Yes   Sig: Take 12.5 mg by mouth daily   mometasone (ASMANEX TWISTHALER) 220 MCG/INH inhaler   Yes Yes   Sig: Inhale 2 puffs into the lungs 2 times daily   multivitamin w/minerals (THERA-VIT-M) tablet   No Yes   Sig: Take 1 tablet by mouth daily   nitroGLYcerin (NITROSTAT) 0.4 MG sublingual tablet   No Yes   Sig: For chest pain place 1 tablet under the tongue every 5 minutes for 3 doses. If symptoms persist 5 minutes after 1st dose call 911.   order for DME   No No   Sig: Toilet Safety Frame  Adjustable Bath Shower Chair with Back - can ONLY  "be 15 inches wide  Straight wall grab bar   order for DME   No No   Sig: Wheelchair   Toilet Safety Frame  Straight Wall Grab Bar X 2 each  Adjustable Bath Shower Chair with Back--can only be 15 inches wide   order for DME   No No   Sig: Toilet Safety Frame  Wheelchair  Adjustable Bath Shower Chair with Back--can only be 15 inches wide  Straight wall grab bar X 2 each   order for DME   No No   Sig: Toilet Safety Frame  Adjustable Bath Shower Chair with back--can only be 15\" wide  Wheelchair  Straight wall grab bar X 2 each   pantoprazole (PROTONIX) 40 MG EC tablet 7/20/2020 at x1  No Yes   Sig: Take 1 tablet (40 mg) by mouth 2 times daily (before meals)   potassium chloride (KLOR-CON) 20 MEQ packet 7/20/2020 at x1  Yes Yes   Sig: Take 20 mEq by mouth 2 times daily    tiotropium-olodaterol 2.5-2.5 MCG/ACT AERS 7/19/2020 at Unknown time  Yes Yes   Sig: Inhale 2 puffs into the lungs daily   triamcinolone (KENALOG) 0.1 % external cream   Yes Yes   Sig: Apply topically 2 times daily   vitamin D3 (CHOLECALCIFEROL) 2000 units (50 mcg) tablet 7/20/2020 at AM  Yes Yes   Sig: Take 2,000 Units by mouth daily   zinc sulfate (ZINCATE) 220 (50 Zn) MG capsule 7/20/2020 at AM  No Yes   Sig: Take 1 capsule (220 mg) by mouth daily      Facility-Administered Medications: None     Allergies   No Known Allergies    Social History   I have reviewed this patient's social history and updated it with pertinent information if needed. José COLON Carla  reports that he quit smoking about 19 years ago. His smoking use included cigarettes. He has a 50.00 pack-year smoking history. He has never used smokeless tobacco. He reports current alcohol use. He reports that he does not use drugs.    Family History   I have reviewed this patient's family history and updated it with pertinent information if needed.   Family History   Problem Relation Age of Onset     Cerebrovascular Disease Mother 90        unknown type     Cancer Brother 70        Brain cancer "      Dementia Sister      Arthritis Brother        Review of Systems   The 10 point Review of Systems is negative other than noted in the HPI or here.     Physical Exam   Temp: 97.9  F (36.6  C) Temp src: Axillary BP: (!) 118/37 Pulse: 63 Heart Rate: 58 Resp: 20 SpO2: 99 % O2 Device: Nasal cannula Oxygen Delivery: 5 LPM  Vital Signs with Ranges  Temp:  [97.9  F (36.6  C)-98.2  F (36.8  C)] 97.9  F (36.6  C)  Pulse:  [56-94] 63  Heart Rate:  [58-95] 58  Resp:  [14-29] 20  BP: ()/(37-68) 118/37  SpO2:  [87 %-100 %] 99 %  146 lbs 9.69 oz     PHYSICAL EXAMINATION:   GENERAL:  A pleasant gentleman in no acute distress at all.   VITAL SIGNS:  Blood pressure 130/58.  Heart rate is in the 60s, sinus rhythm with occasional PACs.  He is afebrile.   HEENT:  Examination is unremarkable.  Mucous membranes appear normal.  Dentition is good.  He has no clubbing, no peripheral or central cyanosis.   NECK:  No raised JVP, no thyromegaly.   CARDIAC:  Median sternotomy scar is well healed.  Heart sounds are unremarkable.     CHEST:  Reveals symmetrical expansion without the use of accessory muscles.  Breath sounds are normal.   ABDOMEN:  Soft and nontender.  No hepatosplenomegaly.  The abdominal aorta is not palpable.   EXTREMITIES:  He has diminished foot pulses.  Soft bilateral carotid bruits.   CENTRAL NERVOUS SYSTEM:  Grossly intact.   PSYCHIATRIC:  Mood appears normal.      LABORATORY INVESTIGATIONS:  His EKG from last night shows atrial fibrillation and right bundle branch block.  There is 1-1.5 mm of downsloping ST-segment depression seen in the inferior and anterior leads.  These changes are not new.  QTc from yesterday is 510 milliseconds.  Calcium was 8.7 yesterday, with magnesium 2.4.  Creatinine 1.29, potassium 3.9.  TSH 1.83, hemoglobin was 6.2 yesterday, hemoglobin 7.0 today.  White cell count 8.2, platelet count 201.        Data   Results for orders placed or performed during the hospital encounter of 07/20/20  (from the past 24 hour(s))   CBC with platelets differential   Result Value Ref Range    WBC 8.2 4.0 - 11.0 10e9/L    RBC Count 2.47 (L) 4.4 - 5.9 10e12/L    Hemoglobin 6.2 (LL) 13.3 - 17.7 g/dL    Hematocrit 22.7 (L) 40.0 - 53.0 %    MCV 92 78 - 100 fl    MCH 25.1 (L) 26.5 - 33.0 pg    MCHC 27.3 (L) 31.5 - 36.5 g/dL    RDW 20.6 (H) 10.0 - 15.0 %    Platelet Count 201 150 - 450 10e9/L    Diff Method Automated Method     % Neutrophils 82.0 %    % Lymphocytes 9.1 %    % Monocytes 7.1 %    % Eosinophils 1.0 %    % Basophils 0.4 %    % Immature Granulocytes 0.4 %    Nucleated RBCs 0 0 /100    Absolute Neutrophil 6.8 1.6 - 8.3 10e9/L    Absolute Lymphocytes 0.8 0.8 - 5.3 10e9/L    Absolute Monocytes 0.6 0.0 - 1.3 10e9/L    Absolute Eosinophils 0.1 0.0 - 0.7 10e9/L    Absolute Basophils 0.0 0.0 - 0.2 10e9/L    Abs Immature Granulocytes 0.0 0 - 0.4 10e9/L    Absolute Nucleated RBC 0.0    Basic metabolic panel   Result Value Ref Range    Sodium 139 133 - 144 mmol/L    Potassium 3.9 3.4 - 5.3 mmol/L    Chloride 108 94 - 109 mmol/L    Carbon Dioxide 24 20 - 32 mmol/L    Anion Gap 7 3 - 14 mmol/L    Glucose 101 (H) 70 - 99 mg/dL    Urea Nitrogen 22 7 - 30 mg/dL    Creatinine 1.29 (H) 0.66 - 1.25 mg/dL    GFR Estimate 50 (L) >60 mL/min/[1.73_m2]    GFR Estimate If Black 58 (L) >60 mL/min/[1.73_m2]    Calcium 8.7 8.5 - 10.1 mg/dL   ABO/Rh type and screen   Result Value Ref Range    Units Ordered 2     ABO A     RH(D) Pos     Antibody Screen Neg     Test Valid Only At [facility]        Specimen Expires 07/23/2020     Crossmatch Red Blood Cells    Troponin I   Result Value Ref Range    Troponin I ES 0.050 (H) 0.000 - 0.045 ug/L   Magnesium   Result Value Ref Range    Magnesium 2.4 (H) 1.6 - 2.3 mg/dL   Blood component   Result Value Ref Range    Unit Number [id number]     Blood Component Type Red Blood Cells Leukocyte Reduced     Division Number 00     Status of Unit Released to care unit 07/21/2020 0017     Blood  Product Code I2337B29     Unit Status ISS    TSH   Result Value Ref Range    TSH 1.83 0.40 - 4.00 mU/L   Blood component   Result Value Ref Range    Unit Number [id number]     Blood Component Type Red Blood Cells Leukocyte Reduced     Division Number 00     Status of Unit Released to care unit 07/21/2020 1058     Blood Product Code F2258M46     Unit Status ISS    EKG 12 lead   Result Value Ref Range    Interpretation ECG Click View Image link to view waveform and result    XR Chest Port 1 View    Narrative    EXAM: XR CHEST PORT 1 VW  LOCATION: [facility]  DATE/TIME: 7/20/2020 8:49 PM    INDICATION: ; defibrillation; abnormal EKG  COMPARISON: None.      Impression    IMPRESSION: Prior sternotomy. Stable left-sided pacer/defibrillator. Mild CHF suggested. No pneumothorax. No definite pleural effusion. Upper normal heart size.   Troponin I   Result Value Ref Range    Troponin I ES 0.064 (H) 0.000 - 0.045 ug/L   Troponin I   Result Value Ref Range    Troponin I ES 0.064 (H) 0.000 - 0.045 ug/L   CBC with platelets   Result Value Ref Range    WBC 8.2 4.0 - 11.0 10e9/L    RBC Count 2.77 (L) 4.4 - 5.9 10e12/L    Hemoglobin 7.0 (L) 13.3 - 17.7 g/dL    Hematocrit 25.7 (L) 40.0 - 53.0 %    MCV 93 78 - 100 fl    MCH 25.3 (L) 26.5 - 33.0 pg    MCHC 27.2 (L) 31.5 - 36.5 g/dL    RDW 19.2 (H) 10.0 - 15.0 %    Platelet Count 187 150 - 450 10e9/L   Basic metabolic panel   Result Value Ref Range    Sodium 143 133 - 144 mmol/L    Potassium 4.3 3.4 - 5.3 mmol/L    Chloride 114 (H) 94 - 109 mmol/L    Carbon Dioxide 23 20 - 32 mmol/L    Anion Gap 6 3 - 14 mmol/L    Glucose 94 70 - 99 mg/dL    Urea Nitrogen 22 7 - 30 mg/dL    Creatinine 1.27 (H) 0.66 - 1.25 mg/dL    GFR Estimate 51 (L) >60 mL/min/[1.73_m2]    GFR Estimate If Black 59 (L) >60 mL/min/[1.73_m2]    Calcium 8.1 (L) 8.5 - 10.1 mg/dL   Glucose by meter   Result Value Ref Range    Glucose 117 (H) 70 - 99 mg/dL   Glucose by meter   Result Value Ref Range     Glucose 112 (H) 70 - 99 mg/dL                      WAYLON PEREZ MD, [facility]             D: 2020   T: 2020   MT: DANIEL      Name:     ROC TAYLOR   MRN:      [id number]        Account:       [id number]   :      1935           Consult Date:  2020      Document: [id number]       cc: [facility]

## 2020-07-21 NOTE — PROGRESS NOTES
[facility]  Hospitalist Progress Note  Rachid Braxton MD 07/21/20    Reason for Stay (Diagnosis): torsades s/p ICD firing x 2, anemia         Assessment and Plan:      Summary of Stay: José Aguirre is a 84 year old male w/ hx including V. 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  He was actually seen in nephrology clinic earlier in the day and found to be anemic and sent to the ER.  He had been having some intermittent nosebleeds over the previous couple of weeks.    Here in the ER he was being evaluated and while on monitor went into torsades.  The patient had presyncopal symptoms and was poorly responsive and his ICD fired.  He returned to a sinus rhythm and then he had a similar episode again about 2 minutes later.    He apparently has had his ICD fire once about 6 months ago during a 6-minute walk test.  All of this is happening in the setting of not taking his metoprolol XL.  He chronically is on apixaban and Plavix.    He was transfused 1 unit red blood cells and admitted to the ICU.  Recheck hemoglobin improved from 6.2-7.0.  He is being transfused a second unit of red cells and cardiology was consulted.    Problem List/Assessment and Plan:   1. Torsades s/p ICD firing x 2: Urine culture has a history of V. fib arrest in 2019, it appears he had one episode of v-fib episode which passed without LOC earlier in the week.  Aside from his anemia there is no other obvious triggers such as electrolyte abnormality, infection or obvious ischemic event though he likely will need further ischemic work-up.  --continue to transfuse as needed, in this case we will plan to keep hemoglobin at 8 or greater  --will keep him here in the ICU I did advise him next week.believing he has for close monitoring  --on amiodarone as per cardiology  --plavix resumed, holding  "eliquis  --keep mag >2.0, K > 4.0  --tele monitoring  --Echo shows intact EF, dilated RV though does not seem that is necessarily new.    2.  Subacute on Chronic anemia: follows w/ nephrology.  He has received IV iron in the past because iron deficiency anemia is suspected.  - Likely worsened by recent nosebleeding.  - Transfused 2 U red cells so far  --keep hgb >8.0.     3.  Coronary artery disease.  History of CABG.  -Continue apixaban and Plavix as before.  - Started on metoprolol XL 12.5 mg daily.  - strongly advised him to talk to his cardiologist if he decides to stop his metoprolol again.  -Troponin was 0.05, mildly elevated.  But likely related to his anemia.  He has no ischemic symptoms.  --cardiology consult, ?further ischemic workup.     4.  COPD  -Stable.  Continue inhalers.  On 4 L of oxygen at baseline.    5.  Hypocalcemia: check ionized calcium.  Replace if low.    6.  Evidence of pulmonary hypertension based on echocardiogram: Appears to have had a dilated RV and reduced RV function in the past based on prior echoes.     DVT Prophylaxis: hold apixaban for now given severe anemia.  Code Status: Full Code   dispo: ?pending cardiac workup and stability.  2-4 days.            Interval History (Subjective):      Feels okay today, no chest pain or shortness of breath  No outward evidence of bleeding  Recheck hemoglobin up to 7.0 this morning, transfusing a second unit of red cells and rechecking in the afternoon  Now on amiodarone                  Physical Exam:      Last Vital Signs:  /56   Pulse 72   Temp 98  F (36.7  C) (Oral)   Resp (!) 7   Ht 1.753 m (5' 9\")   Wt 66.5 kg (146 lb 9.7 oz)   SpO2 90%   BMI 21.65 kg/m      No intake/output data recorded.    General: Alert, awake, no acute distress.  HEENT: NC/AT, eyes anicteric, external occular movements intact, face symmetric.   Cardiac: RRR, S1, S2.   Pulmonary: Normal chest rise, normal work of breathing.  Lungs CTA BL  Abdomen: soft, " non-tender, non-distended.  Bowel Sounds Present.  No guarding.  Extremities: no deformities.  Warm, well perfused.  Skin: no rashes or lesions noted.  Warm and Dry.  Neuro: No focal deficits noted.  Speech clear.  Coordination and strength grossly normal.  Psych: Appropriate affect.         Medications:      All current medications were reviewed with changes reflected in problem list.         Data:      All new lab and imaging data was reviewed.   Labs:  Recent Labs   Lab 07/21/20  0535      POTASSIUM 4.3   CHLORIDE 114*   CO2 23   ANIONGAP 6   GLC 94   BUN 22   CR 1.27*   GFRESTIMATED 51*   GFRESTBLACK 59*   YOON 8.1*     Recent Labs   Lab 07/21/20  0535   WBC 8.2   HGB 7.0*   HCT 25.7*   MCV 93        No results for input(s): INR in the last 168 hours.  Recent Labs   Lab 07/21/20  0535 07/21/20  0152 07/20/20  1933   TROPI 0.064* 0.064* 0.050*      Imaging:   Recent Results (from the past 48 hour(s))   Cardiac Device Check - Remote   Result Value    Date Time Interrogation Session [id number]    Implantable Pulse Generator  Biotronik    Implantable Pulse Generator Model 439371 Acticor 7 VR-T DX    Implantable Pulse Generator Serial Number [id number]    Type Interrogation Session Remote     Re-programmed During Session NO    Clinic Name [facility]    Implantable Pulse Generator Type Defibrillator    Implantable Pulse Generator Implant Date 20190910    Implantable Lead  Biotronik    Implantable Lead Model 149345 Plexa ProMRI S DX 65/15    Implantable Lead Serial Number [id number]    Implantable Lead Implant Date 20190910    Implantable Lead Location Detail 1 UNKNOWN    Implantable Lead Location Right Ventricle    Joseph Setting Mode (NBG Code) VDD    Joseph Setting Lower Rate Limit 50    Joseph Setting Maximum Tracking Rate 130    Joseph Setting Maximum Sensor Rate 120    Joseph Setting FARIDA Delay Low 260    Joseph Setting FARIDA Delay High 230    Joseph Setting AT  Mode Switch Rate 160    Joseph Setting AT Mode Switch Mode VDIR    Lead Channel Setting Sensing Polarity Bipolar    Lead Channel Setting Sensing Sensitivity 0.4    Lead Channel Setting Sensing Polarity Bipolar    Lead Channel Setting Sensing Sensitivity 0.8    Lead Channel Setting Sensing Adaptation Mode Adaptive    Lead Channel Setting Pacing Polarity Bipolar    Lead Channel Setting Pacing Pulse Width 0.40    Lead Channel Setting Pacing Amplitude 1.5    Zone Setting Type Category VF    Zone Setting Detection Interval 250    Zone Setting Detection Beats Numerator 30    Zone Setting Detection Beats Denominator 40    Zone Setting Type Category VT    Zone Setting Detection Interval 300    Lead Channel Status Null    Lead Channel Status Null    Lead Channel Impedance Value 630    Lead Channel Pacing Threshold Amplitude 0.5    Lead Channel Pacing Threshold Pulse Width 0.4    Battery Status Beginning of Service    Battery RRT Trigger Battery voltage < 2.85 V and other factors    Battery Remaining Percentage 100.0    Battery Voltage 3.07    Capacitor Charge Type Shock    Capacitor Last Charge Date Time 20200718085032    Capacitor Charge Energy 40.0    Joseph Statistic Date Time Start 20200719014450    Joseph Statistic Date Time End 20200720014302    Joseph Statistic RV Percent Paced 4    Joseph Statistic AS  Percent 4    Joseph Statistic AS VS Percent 94    CRT Statistic Date Time Start 20200719014450    CRT Statistic Date Time End 20200720014302    Atrial Tachy Statistic Date Time Start 20200719014450    Atrial Tachy Statistic Date Time End 20200720014302    Atrial Tachy Statistic AT/AF [place] Percent 0    Atrial Tachy Statistic Number Of Mode Switches Per Day 8    Therapy Statistic Recent Shocks Delivered 0    Therapy Statistic Recent Shocks Aborted 0    Therapy Statistic Recent ATP Delivered 0    Therapy Statistic Recent Date Time Start 20200719014450    Therapy Statistic Recent Date Time End 20200720014302    Therapy  Statistic Total Shocks Delivered 1    Therapy Statistic Total Shocks Aborted 1    Therapy Statistic Total ATP Delivered 0    Therapy Statistic Total  Date Time Start 20190910000000    Therapy Statistic Total  Date Time End 20200720014302    Episode Statistic Total Count 192    Episode Statistic Type Category Monitor    Episode Statistic Total Count 0    Episode Statistic Type Category SVT    Episode Statistic Total Count 2    Episode Statistic Type Category VF    Episode Statistic Total Count 0    Episode Statistic Type Category VT    Episode Statistic Total Count 0    Episode Statistic Type Category VT    Episode Statistic Total Count 0    Episode Statistic Type Category VT1_MONITOR    Episode Statistic Total Date Time Start 20190910000000    Episode Statistic Total Date Time End 20200720014302    Episode Statistic Total Date Time Start 20190910000000    Episode Statistic Total Date Time End 20200720014302    Episode Statistic Total Date Time Start 20190910000000    Episode Statistic Total Date Time End 20200720014302    Episode Statistic Total Date Time Start 20190910000000    Episode Statistic Total Date Time End 20200720014302    Episode Statistic Total Date Time Start 20190910000000    Episode Statistic Total Date Time End 20200720014302    Episode Statistic Total Date Time Start 20190910000000    Episode Statistic Total Date Time End 20200720014302    Narrative    Yellow Alert received -     VF detected  1 VF detected between Jul 16, 2020 1:43:02 AM and Jul 20, 2020 1:43:02 AM    Details for arrhythmia episodes received (only ventricular episodes)  Details were received for 2 spontaneous episodes, which were detected between Jul 16, 2020 12:00:40 PM and Jul 18, 2020 8:50:28 AM    Remote ICD transmission received and reviewed. Device transmission sent per MD orders for ventricular arrhythmia episode. Patient has a Biotronik single lead ICD. Normal ICD function. 1 VF episode recorded on 7/18/20 at 0850 am.   "Review of EGM shows non sustained VF @ 300 bpm lasting 8 seconds.  Rhythm identified with no therapy delivered prior to spontaneous termination.  = 12%. Estimated battery longevity to EDWIN = SALVADOR. Battery voltage = 3.07V. Lead trends appear stable. Patient notified of interrogation results. Patient reports that he was at the cabin at the time of the episode.  He states he had an \"episode\" and felt dizzy.  He sat down and it passed.  he did not lose consciousness.  He states he is feeling well now and denies complaints. Plan for patient to send a remote transmission in 3 months as scheduled.    Remote ICD transmission    I have reviewed and interpreted the device interrogation, settings, programming and nurse's summary. The device is functioning within normal device parameters. I agree with the current findings, assessment and plan.   XR Chest Port 1 View    Narrative    EXAM: XR CHEST PORT 1 VW  LOCATION: [facility]  DATE/TIME: 7/20/2020 8:49 PM    INDICATION: ; defibrillation; abnormal EKG  COMPARISON: None.      Impression    IMPRESSION: Prior sternotomy. Stable left-sided pacer/defibrillator. Mild CHF suggested. No pneumothorax. No definite pleural effusion. Upper normal heart size.         Rachid Braxton MD , MD.      "

## 2020-07-22 NOTE — PLAN OF CARE
ICU End of Shift Summary.  For vital signs and complete assessments, please see documentation flowsheets.     Pertinent assessments: pt rested well over night oxygen titrations frequent with exertion. Pt a&o x4 able to make need known. Pt continues to straight cath self when needed with good amounts. Black formed stool reported from nst no other bleeding signs noted, pt does receive iron infusions. All other vss.   Major Shift Events: no major events  Plan (Upcoming Events): await results from COVID test, consider need for iron infusion  Discharge/Transfer Needs: cardiology follow up    Bedside Shift Report Completed :   Bedside Safety Check Completed:

## 2020-07-22 NOTE — PROGRESS NOTES
[facility] Cardiology Progress Note       Assessment and Plan:   IMPRESSION:   1.  Ventricular fibrillation arrest, may be precipitated by worsening anemia due to epistaxis.  No clear evidence of myocardial ischemia or worsening of congestive heart failure.  Have started him empirically on amiodarone.  2.  Chronic coronary artery disease with remote history of coronary artery bypass surgery and more recently stenting to the left main, LAD and angioplasty to the circumflex arteries in 2019.  On Plavix only.  Aspirin held due to epistaxis.  Low suspicion of hemodynamically significant CAD leading to V. fib arrest.  Angiography performed within the past year with percutaneous revascularization.  Will not repeat coronary angiography at this time due to renal failure and anemia.  However will request a stress nuclear study for ischemic evaluation.  3.  Long QTc, no cause apparent at this time.   4.  History of paroxysmal atrial flutter or fibrillation, previously on Eliquis.  On hold due to epistaxis.  5.  Anemia, worsening recently with hemoglobin of 6.2 on admission.  Hemoglobin last year was 9.6.  Transfused, hemoglobin still 7.9.  6.  Epistaxis, which may be the cause of 5.  7.  Chronic renal failure, stage II.   8.  Extensive peripheral vascular disease with lower limb and carotid artery disease.   9.  COPD, likely contributed to LV systolic dysfunction and secondary pulmonary hypertension.   10.  Diastolic heart failure history, currently appears euvolemic.                 Interval History:   Feeling well today.  No further dizzy spells.  Denies chest pains or shortness of breath.                  Medications:     Current Facility-Administered Medications   Medication     amiodarone (PACERONE) tablet 200 mg     atorvastatin (LIPITOR) tablet 40 mg     bumetanide (BUMEX) tablet 0.5 mg     clopidogrel (PLAVIX) tablet 75 mg     ferrous sulfate (FEROSUL) tablet 325 mg     lidocaine (LMX4) cream     lidocaine 1 %  "0.1-1 mL     metoprolol succinate (TOPROL-XL) half-tab 12.5 mg     mometasone (ASMANEX TWISTHALER) 220 MCG/INH inhaler 2 puff     naloxone (NARCAN) injection 0.1-0.4 mg     pantoprazole (PROTONIX) EC tablet 40 mg     potassium chloride (KLOR-CON) Packet 20 mEq     sodium chloride (PF) 0.9% PF flush 10 mL     sodium chloride (PF) 0.9% PF flush 3 mL     sodium chloride (PF) 0.9% PF flush 3 mL     umeclidinium-vilanterol (ANORO ELLIPTA) 62.5-25 MCG/INH oral inhaler 1 puff     Vitamin D3 (CHOLECALCIFEROL) tablet 50 mcg     zinc sulfate (ZINCATE) capsule 220 mg             Physical Exam:   Blood pressure 133/58, pulse 69, temperature 97.6  F (36.4  C), temperature source Oral, resp. rate 18, height 1.753 m (5' 9\"), weight 68.2 kg (150 lb 6.4 oz), SpO2 (!) 85 %.  Wt Readings from Last 4 Encounters:   20 68.2 kg (150 lb 6.4 oz)   01/15/20 74.8 kg (165 lb)   20 72.6 kg (160 lb 0.9 oz)   10/10/19 75.8 kg (167 lb)         Vital Sign Ranges  Temperature Temp  Av.7  F (36.5  C)  Min: 97.4  F (36.3  C)  Max: 98.3  F (36.8  C)   Blood pressure Systolic (24hrs), Av , Min:86 , Max:165        Diastolic (24hrs), Av, Min:46, Max:75      Pulse Pulse  Av  Min: 53  Max: 96   Respirations Resp  Av.5  Min: 6  Max: 30   Pulse oximetry SpO2  Av.1 %  Min: 72 %  Max: 100 %         Intake/Output Summary (Last 24 hours) at 2020 1253  Last data filed at 2020 1000  Gross per 24 hour   Intake 1000 ml   Output 1100 ml   Net -100 ml          Cardiovascular:   Normal apical impulse, regular rate and rhythm, normal S1 and S2, no S3 or S4, and no murmur noted   Chest clear.  Healed median sternotomy scar  No peripheral oedema         Data:     Labs:  Lab Results   Component Value Date     2020    Lab Results   Component Value Date    CHLORIDE 114 2020    Lab Results   Component Value Date    BUN 22 2020      Lab Results   Component Value Date    POTASSIUM 4.6 2020    Lab " Results   Component Value Date    CO2 23 07/21/2020    Lab Results   Component Value Date    CR 1.27 07/21/2020        Lab Results   Component Value Date    WBC 8.2 07/21/2020    HGB 7.9 (L) 07/22/2020    HCT 25.7 (L) 07/21/2020    MCV 93 07/21/2020     07/21/2020     Lab Results   Component Value Date    TROPI 0.064 (H) 07/21/2020           Attestation:  I have reviewed today's vital signs, notes, medications, labs and imaging.         DR WAYLON PEREZ MD 7/22/2020  12:53 PM

## 2020-07-22 NOTE — PLAN OF CARE
"/59   Pulse 65   Temp 98  F (36.7  C) (Axillary)   Resp 22   Ht 1.753 m (5' 9\")   Wt 68.2 kg (150 lb 6.4 oz)   SpO2 91%   BMI 22.21 kg/m      0230-1468: Pt tx from ICU approx 1800. A/Ox4, VSS on 5L oxymask, does require increased O2 up to 10L when in bathroom. Up with Ax1. PIV SL. Tele placed, oriented to room and setting. Plan for possible angio tomorrow. Pt updated on plan. Will continue to monitor per POC.   "

## 2020-07-22 NOTE — PROGRESS NOTES
[facility]  Hospitalist Progress Note  Rachid Braxton MD 07/22/2020      Reason for Stay (Diagnosis): torsades s/p ICD firing x 2, anemia         Assessment and Plan:      Summary of Stay: José Aguirre is a 84 year old male w/ hx including V. 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  He was actually seen in nephrology clinic earlier in the day and found to be anemic and sent to the ER.  He had been having some intermittent nosebleeds over the previous couple of weeks.    Here in the ER he was being evaluated and while on monitor went into torsades.  The patient had presyncopal symptoms and was poorly responsive and his ICD fired.  He returned to a sinus rhythm and then he had a similar episode again about 2 minutes later.    He apparently has had his ICD fire once about 6 months ago during a 6-minute walk test.  All of this is happening in the setting of not taking his metoprolol XL.  He chronically is on apixaban and Plavix.    He was transfused 1 unit red blood cells and admitted to the ICU.  Recheck hemoglobin improved from 6.2-7.0.  He was transfused a second unit of red cells and cardiology was consulted.    Tentative plan is for angiogram 7/23.  Overall doing well.  Update: as per cardiology plan on stress testing.    Problem List/Assessment and Plan:   1. Torsades s/p ICD firing x 2: Urine culture has a history of V. fib arrest in 2019, it appears he had one episode of v-fib episode which passed without LOC earlier in the week.  Aside from his anemia there is no other obvious triggers such as electrolyte abnormality, infection or obvious ischemic event though he likely will need further ischemic work-up.  --continue to transfuse as needed, in this case we will plan to keep hemoglobin at ~8 or greater  --on amiodarone as per cardiology  --plavix resumed,  "holding eliquis  --keep mag >2.0, K > 4.0  --tele monitoring  --Echo shows intact EF, dilated RV though does not seem that is necessarily new.    2.  Subacute on Chronic anemia: follows w/ nephrology.  He has received IV iron in the past because iron deficiency anemia is suspected.  - Likely worsened by recent nosebleeding.  - Transfused 2 U red cells so far  --keep hgb >8.0.  --remains on PO iron  --family uncertain if he has had a colonoscopy or EGD.  Discussed revisiting this in the outpatient setting.     3.  Coronary artery disease.  History of CABG.  -Continue apixaban and Plavix as before.  - Started on metoprolol XL 12.5 mg daily.  - strongly advised him to talk to his cardiologist if he decides to stop his metoprolol again.  -Troponin was 0.05, mildly elevated.  But likely related to his anemia.  He has no ischemic symptoms.  --cardiology consult, ?further ischemic workup.     4.  COPD, chronic hypoxemic respiratory failure.  -Stable.  Continue inhalers.  On 5 L of oxygen at baseline per his report.  He tells me he has to increase this to 8L w/ activities.    5.  Hypocalcemia: check ionized calcium.  Replace if low.    6.  Evidence of pulmonary hypertension based on echocardiogram: Appears to have had a dilated RV and reduced RV function in the past based on prior echoes.     DVT Prophylaxis: hold apixaban for now given severe anemia.  Code Status: Full Code   dispo: ?pending cardiac workup and stability.  2 days?          Interval History (Subjective):      Feels okay today, no chest pain or shortness of breath  hgb 7.9, rechecking this afternoon  No further apparent episodes of torsades  Called his daughterSharon                    Physical Exam:      Last Vital Signs:  /49   Pulse 53   Temp 97.4  F (36.3  C) (Oral)   Resp 18   Ht 1.753 m (5' 9\")   Wt 68.2 kg (150 lb 6.4 oz)   SpO2 96%   BMI 22.21 kg/m      I/O last 3 completed shifts:  In: 700 [P.O.:400]  Out: 1450 [Urine:1450]    General: " Alert, awake, no acute distress.  HEENT: NC/AT, eyes anicteric, external occular movements intact, face symmetric.   Cardiac: RRR, S1, S2.   Pulmonary: Normal chest rise, normal work of breathing.  Lungs CTA BL  Abdomen: soft, non-tender, non-distended.  Bowel Sounds Present.  No guarding.  Extremities: no deformities.  Warm, well perfused.  Skin: no rashes or lesions noted.  Warm and Dry.  Neuro: No focal deficits noted.  Speech clear.  Coordination and strength grossly normal.  Psych: Appropriate affect.         Medications:      All current medications were reviewed with changes reflected in problem list.         Data:      All new lab and imaging data was reviewed.   Labs:  Recent Labs   Lab 07/22/20  0506 07/21/20  0535   NA  --  143   POTASSIUM 4.6 4.3   CHLORIDE  --  114*   CO2  --  23   ANIONGAP  --  6   GLC  --  94   BUN  --  22   CR  --  1.27*   GFRESTIMATED  --  51*   GFRESTBLACK  --  59*   YOON  --  8.1*     Recent Labs   Lab 07/22/20  0506  07/21/20  0535   WBC  --   --  8.2   HGB 7.9*   < > 7.0*   HCT  --   --  25.7*   MCV  --   --  93   PLT  --   --  187    < > = values in this interval not displayed.     No results for input(s): INR in the last 168 hours.  Recent Labs   Lab 07/21/20  0535 07/21/20  0152 07/20/20  1933   TROPI 0.064* 0.064* 0.050*      Imaging:   Recent Results (from the past 48 hour(s))   Cardiac Device Check - Remote   Result Value    Date Time Interrogation Session [id number]    Implantable Pulse Generator  Biotronik    Implantable Pulse Generator Model [id number] Acticor 7 VR-T DX    Implantable Pulse Generator Serial Number [id number]    Type Interrogation Session Remote     Re-programmed During Session NO    Clinic Name [facility]    Implantable Pulse Generator Type Defibrillator    Implantable Pulse Generator Implant Date 20190910    Implantable Lead  Biotronik    Implantable Lead Model [id number] Plexa ProMRI S DX 65/15    Implantable  Lead Serial Number [id number]    Implantable Lead Implant Date 20190910    Implantable Lead Location Detail 1 UNKNOWN    Implantable Lead Location Right Ventricle    Joseph Setting Mode (NBG Code) VDD    Joseph Setting Lower Rate Limit 50    Joseph Setting Maximum Tracking Rate 130    Joseph Setting Maximum Sensor Rate 120    Joseph Setting FARIDA Delay Low 260    Joseph Setting FARIDA Delay High 230    Joseph Setting AT Mode Switch Rate 160    Joseph Setting AT Mode Switch Mode VDIR    Lead Channel Setting Sensing Polarity Bipolar    Lead Channel Setting Sensing Sensitivity 0.4    Lead Channel Setting Sensing Polarity Bipolar    Lead Channel Setting Sensing Sensitivity 0.8    Lead Channel Setting Sensing Adaptation Mode Adaptive    Lead Channel Setting Pacing Polarity Bipolar    Lead Channel Setting Pacing Pulse Width 0.40    Lead Channel Setting Pacing Amplitude 1.5    Zone Setting Type Category VF    Zone Setting Detection Interval 250    Zone Setting Detection Beats Numerator 30    Zone Setting Detection Beats Denominator 40    Zone Setting Type Category VT    Zone Setting Detection Interval 300    Lead Channel Status Null    Lead Channel Status Null    Lead Channel Impedance Value 630    Lead Channel Pacing Threshold Amplitude 0.5    Lead Channel Pacing Threshold Pulse Width 0.4    Battery Status Beginning of Service    Battery RRT Trigger Battery voltage < 2.85 V and other factors    Battery Remaining Percentage 100.0    Battery Voltage 3.07    Capacitor Charge Type Shock    Capacitor Last Charge Date Time [id number]    Capacitor Charge Energy 40.0    Joseph Statistic Date Time Start 20200719014450    Joseph Statistic Date Time End 20200720014302    Joseph Statistic RV Percent Paced 4    Joseph Statistic AS  Percent 4    Joseph Statistic AS VS Percent 94    CRT Statistic Date Time Start 20200719014450    CRT Statistic Date Time End 20200720014302    Atrial Tachy Statistic Date Time Start 20200719014450    Atrial Tachy  Statistic Date Time End 20200720014302    Atrial Tachy Statistic AT/AF Wayne Percent 0    Atrial Tachy Statistic Number Of Mode Switches Per Day 8    Therapy Statistic Recent Shocks Delivered 0    Therapy Statistic Recent Shocks Aborted 0    Therapy Statistic Recent ATP Delivered 0    Therapy Statistic Recent Date Time Start 49406078926756    Therapy Statistic Recent Date Time End 20200720014302    Therapy Statistic Total Shocks Delivered 1    Therapy Statistic Total Shocks Aborted 1    Therapy Statistic Total ATP Delivered 0    Therapy Statistic Total  Date Time Start 20190910000000    Therapy Statistic Total  Date Time End 20200720014302    Episode Statistic Total Count 192    Episode Statistic Type Category Monitor    Episode Statistic Total Count 0    Episode Statistic Type Category SVT    Episode Statistic Total Count 2    Episode Statistic Type Category VF    Episode Statistic Total Count 0    Episode Statistic Type Category VT    Episode Statistic Total Count 0    Episode Statistic Type Category VT    Episode Statistic Total Count 0    Episode Statistic Type Category VT1_MONITOR    Episode Statistic Total Date Time Start 20190910000000    Episode Statistic Total Date Time End 20200720014302    Episode Statistic Total Date Time Start 20190910000000    Episode Statistic Total Date Time End 20200720014302    Episode Statistic Total Date Time Start 20190910000000    Episode Statistic Total Date Time End 20200720014302    Episode Statistic Total Date Time Start 20190910000000    Episode Statistic Total Date Time End 20200720014302    Episode Statistic Total Date Time Start 20190910000000    Episode Statistic Total Date Time End 20200720014302    Episode Statistic Total Date Time Start 20190910000000    Episode Statistic Total Date Time End 20200720014302    Narrative    Yellow Alert received -     VF detected  1 VF detected between Jul 16, 2020 1:43:02 AM and Jul 20, 2020 1:43:02 AM    Details for arrhythmia  "episodes received (only ventricular episodes)  Details were received for 2 spontaneous episodes, which were detected between Jul 16, 2020 12:00:40 PM and Jul 18, 2020 8:50:28 AM    Remote ICD transmission received and reviewed. Device transmission sent per MD orders for ventricular arrhythmia episode. Patient has a Biotronik single lead ICD. Normal ICD function. 1 VF episode recorded on 7/18/20 at 0850 am.  Review of EGM shows non sustained VF @ 300 bpm lasting 8 seconds.  Rhythm identified with no therapy delivered prior to spontaneous termination.  = 12%. Estimated battery longevity to EDWIN = SALVADOR. Battery voltage = 3.07V. Lead trends appear stable. Patient notified of interrogation results. Patient reports that he was at the cabin at the time of the episode.  He states he had an \"episode\" and felt dizzy.  He sat down and it passed.  he did not lose consciousness.  He states he is feeling well now and denies complaints. Plan for patient to send a remote transmission in 3 months as scheduled.    Remote ICD transmission    I have reviewed and interpreted the device interrogation, settings, programming and nurse's summary. The device is functioning within normal device parameters. I agree with the current findings, assessment and plan.   XR Chest Port 1 View    Narrative    EXAM: XR CHEST PORT 1 VW  LOCATION: [facility]  DATE/TIME: 7/20/2020 8:49 PM    INDICATION: ; defibrillation; abnormal EKG  COMPARISON: None.      Impression    IMPRESSION: Prior sternotomy. Stable left-sided pacer/defibrillator. Mild CHF suggested. No pneumothorax. No definite pleural effusion. Upper normal heart size.         Rachid Braxton MD , MD.      "

## 2020-07-23 NOTE — PLAN OF CARE
Presentation/Diagnosis:Torsades De Pointes   History:Anemia, COPD, ICD, CABG, Afib arrest, CKD   Labs/Protocols:hgb 8.6, K 4.6, Mag 2.4  Vitals:VSS, no complaints of pain this shift   Telemetry:SR PACs  Respiratory:on 5L oxymask at rest, when up and moving pt goes up to 8-10L oxymask.   Neuro:A&O  GI/:Self straight caths, Had a Med. BM this shift   Skin:intact   LDAs:PIV SL  Diet:NPO since midnight   Activity:SBA  Teaching:pt educated on plan of care   Plan:pt has been NPO since midnight for david .. pt went down this afternoon for david test. Cardiology is following for plan of care. Cont. To monitor HGB. Cont PO bumex to help improve breathing, get pt back more to baseline. Baseline pt wears 5L NC. Pt was having nose bleeds, Eliquis Is [place]. Pt is on plavix, echo was done yesterday.  Will cont. With POC>

## 2020-07-23 NOTE — PLAN OF CARE
End of Shift Summary  For vital signs and complete assessments, please see documentation flowsheets.     Pertinent assessments: A&O. Denies pain. Up SBA. On 5-10L oxymask depending on movement. Denies SOB or CP. Is still dyspneic. LS diminished. Tele SR with PACs. Self straight cathed x2 this shift. Good output. NPO at 0400 for possible cardiac procedures today.     Treatment Plan: Angio? Nuclear stress test? Cards following. Wean O2. Continue on rate control medications.     Expected Discharge Date/Disposition: 2 days to home

## 2020-07-23 NOTE — PROGRESS NOTES
[facility]  Hospitalist Progress Note  Rachid Braxton MD 07/23/2020      Reason for Stay (Diagnosis): torsades s/p ICD firing x 2, anemia         Assessment and Plan:      Summary of Stay: José Aguirre is a 84 year old male w/ hx including V. 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  He was actually seen in nephrology clinic earlier in the day and found to be anemic and sent to the ER.  He had been having some intermittent nosebleeds over the previous couple of weeks.    Here in the ER he was being evaluated and while on monitor went into torsades.  The patient had presyncopal symptoms and was poorly responsive and his ICD fired.  He returned to a sinus rhythm and then he had a similar episode again about 2 minutes later.    He apparently has had his ICD fire once about 6 months ago during a 6-minute walk test.  All of this is happening in the setting of not taking his metoprolol XL.  He chronically is on apixaban and Plavix.    He was transfused 1 unit red blood cells and admitted to the ICU.  Recheck hemoglobin improved from 6.2-7.0.  He was transfused a second unit of red cells and cardiology was consulted.    Tentative plan is for angiogram 7/23.  Overall doing well.  Update: as per cardiology plan on stress testing.    Problem List/Assessment and Plan:   1. Torsades s/p ICD firing x 2: Urine culture has a history of V. fib arrest in 2019, it appears he had one episode of v-fib episode which passed without LOC earlier in the week.  Aside from his anemia there is no other obvious triggers such as electrolyte abnormality, infection or obvious ischemic event though he likely will need further ischemic work-up.  --continue to transfuse as needed, in this case we will plan to keep hemoglobin at ~8 or greater  --on amiodarone as per cardiology  --plavix resumed,  holding eliquis  --keep mag >2.0, K > 4.0  --tele monitoring  --Echo shows intact EF, dilated RV though does not seem that is necessarily new.    2.  Subacute on Chronic anemia: follows w/ nephrology.  He has received IV iron in the past because iron deficiency anemia is suspected.  - Likely worsened by recent nosebleeding.  - Transfused 2 U red cells so far  --keep hgb >8.0.  --remains on PO iron  --family uncertain if he has had a colonoscopy or EGD.  Discussed revisiting this in the outpatient setting.     3.  Coronary artery disease.  History of CABG.  -Continue apixaban and Plavix as before.  - Started on metoprolol XL 12.5 mg daily.  - strongly advised him to talk to his cardiologist if he decides to stop his metoprolol again.  -Troponin was 0.05, mildly elevated.  But likely related to his anemia.  He has no ischemic symptoms.  --cardiology consult, ?further ischemic workup.     4.  COPD, chronic hypoxemic respiratory failure.  -Stable.  Continue inhalers.  On 5 L of oxygen at baseline per his report.  He tells me he has to increase this to 8L w/ activities.    5.  Hypocalcemia: check ionized calcium.  Replace if low.    6.  Evidence of pulmonary hypertension based on echocardiogram: Appears to have had a dilated RV and reduced RV function in the past based on prior echoes.    7.  Possible mild acute chf exacerbation w/ preserved EF: increasing PO bumex to 1 mg from 0.5 mg daily, monitor daily weights and ins/outs.       DVT Prophylaxis: hold apixaban for now given severe anemia.  Code Status: Full Code   dispo: ?pending cardiac workup and stability.  ?1-2 days.  Increasing diuretics as below, may need IV diuresis.  Hold off on that until after stress testing.          Interval History (Subjective):      Increasing bumex to 1 mg, was up to 10L O2 w/ activity.  At home often uses 8L w/ activity per his report.  Plan for stress testing today  hgb stable.                    Physical Exam:      Last Vital  "Signs:  /43 (BP Location: Right arm)   Pulse 65   Temp 97.9  F (36.6  C) (Oral)   Resp 18   Ht 1.753 m (5' 9\")   Wt 67.6 kg (149 lb)   SpO2 95%   BMI 22.00 kg/m      I/O last 3 completed shifts:  In: 840 [P.O.:840]  Out: 1150 [Urine:1150]    General: Alert, awake, no acute distress.  HEENT: NC/AT, eyes anicteric, external occular movements intact, face symmetric.   Cardiac: RRR, S1, S2.   Pulmonary: Normal chest rise, normal work of breathing.  Lungs CTA BL  Abdomen: soft, non-tender, non-distended.  Bowel Sounds Present.  No guarding.  Extremities: no deformities.  Warm, well perfused.  Skin: no rashes or lesions noted.  Warm and Dry.  Neuro: No focal deficits noted.  Speech clear.  Coordination and strength grossly normal.  Psych: Appropriate affect.         Medications:      All current medications were reviewed with changes reflected in problem list.         Data:      All new lab and imaging data was reviewed.   Labs:  Recent Labs   Lab 07/22/20  0506 07/21/20  0535   NA  --  143   POTASSIUM 4.6 4.3   CHLORIDE  --  114*   CO2  --  23   ANIONGAP  --  6   GLC  --  94   BUN  --  22   CR  --  1.27*   GFRESTIMATED  --  51*   GFRESTBLACK  --  59*   YOON  --  8.1*     Recent Labs   Lab 07/22/20  1424  07/21/20  0535   WBC  --   --  8.2   HGB 8.6*   < > 7.0*   HCT  --   --  25.7*   MCV  --   --  93   PLT  --   --  187    < > = values in this interval not displayed.     No results for input(s): INR in the last 168 hours.  Recent Labs   Lab 07/21/20  0535 07/21/20  0152 07/20/20  1933   TROPI 0.064* 0.064* 0.050*      Imaging:   Recent Results (from the past 48 hour(s))   Cardiac Device Check - Remote   Result Value    Date Time Interrogation Session [id number]    Implantable Pulse Generator  [employer]    Implantable Pulse Generator Model [id number] Acticor 7 VR-T DX    Implantable Pulse Generator Serial Number [id number]    Type Interrogation Session Remote     Re-programmed During Session NO    " Clinic Name [facility]    Implantable Pulse Generator Type Defibrillator    Implantable Pulse Generator Implant Date 20190910    Implantable Lead  [employer]    Implantable Lead Model [id number] Plexa ProMRI S DX 65/15    Implantable Lead Serial Number [id number]    Implantable Lead Implant Date 20190910    Implantable Lead Location Detail 1 UNKNOWN    Implantable Lead Location Right Ventricle    Joseph Setting Mode (NBG Code) VDD    Joseph Setting Lower Rate Limit 50    Joseph Setting Maximum Tracking Rate 130    Joseph Setting Maximum Sensor Rate 120    Joseph Setting FARIDA Delay Low 260    Joseph Setting FARIDA Delay High 230    Joseph Setting AT Mode Switch Rate 160    Joseph Setting AT Mode Switch Mode VDIR    Lead Channel Setting Sensing Polarity Bipolar    Lead Channel Setting Sensing Sensitivity 0.4    Lead Channel Setting Sensing Polarity Bipolar    Lead Channel Setting Sensing Sensitivity 0.8    Lead Channel Setting Sensing Adaptation Mode Adaptive    Lead Channel Setting Pacing Polarity Bipolar    Lead Channel Setting Pacing Pulse Width 0.40    Lead Channel Setting Pacing Amplitude 1.5    Zone Setting Type Category VF    Zone Setting Detection Interval 250    Zone Setting Detection Beats Numerator 30    Zone Setting Detection Beats Denominator 40    Zone Setting Type Category VT    Zone Setting Detection Interval 300    Lead Channel Status Null    Lead Channel Status Null    Lead Channel Impedance Value 630    Lead Channel Pacing Threshold Amplitude 0.5    Lead Channel Pacing Threshold Pulse Width 0.4    Battery Status Beginning of Service    Battery RRT Trigger Battery voltage < 2.85 V and other factors    Battery Remaining Percentage 100.0    Battery Voltage 3.07    Capacitor Charge Type Shock    Capacitor Last Charge Date Time 69796616090301    Capacitor Charge Energy 40.0    Joseph Statistic Date Time Start 61422370663114    Joseph Statistic Date Time End 01557643251064    Joseph  Statistic RV Percent Paced 4    Joseph Statistic AS  Percent 4    Joseph Statistic AS VS Percent 94    CRT Statistic Date Time Start 20200719014450    CRT Statistic Date Time End 20200720014302    Atrial Tachy Statistic Date Time Start 20200719014450    Atrial Tachy Statistic Date Time End 20200720014302    Atrial Tachy Statistic AT/AF Opp Percent 0    Atrial Tachy Statistic Number Of Mode Switches Per Day 8    Therapy Statistic Recent Shocks Delivered 0    Therapy Statistic Recent Shocks Aborted 0    Therapy Statistic Recent ATP Delivered 0    Therapy Statistic Recent Date Time Start 20200719014450    Therapy Statistic Recent Date Time End 20200720014302    Therapy Statistic Total Shocks Delivered 1    Therapy Statistic Total Shocks Aborted 1    Therapy Statistic Total ATP Delivered 0    Therapy Statistic Total  Date Time Start 20190910000000    Therapy Statistic Total  Date Time End 20200720014302    Episode Statistic Total Count 192    Episode Statistic Type Category Monitor    Episode Statistic Total Count 0    Episode Statistic Type Category SVT    Episode Statistic Total Count 2    Episode Statistic Type Category VF    Episode Statistic Total Count 0    Episode Statistic Type Category VT    Episode Statistic Total Count 0    Episode Statistic Type Category VT    Episode Statistic Total Count 0    Episode Statistic Type Category VT1_MONITOR    Episode Statistic Total Date Time Start 20190910000000    Episode Statistic Total Date Time End 20200720014302    Episode Statistic Total Date Time Start 20190910000000    Episode Statistic Total Date Time End 20200720014302    Episode Statistic Total Date Time Start 20190910000000    Episode Statistic Total Date Time End 20200720014302    Episode Statistic Total Date Time Start 20190910000000    Episode Statistic Total Date Time End 20200720014302    Episode Statistic Total Date Time Start 20190910000000    Episode Statistic Total Date Time End 20200720014302     "Episode Statistic Total Date Time Start 52186330116638    Episode Statistic Total Date Time End 84853585750405    Narrative    Yellow Alert received -     VF detected  1 VF detected between Jul 16, 2020 1:43:02 AM and Jul 20, 2020 1:43:02 AM    Details for arrhythmia episodes received (only ventricular episodes)  Details were received for 2 spontaneous episodes, which were detected between Jul 16, 2020 12:00:40 PM and Jul 18, 2020 8:50:28 AM    Remote ICD transmission received and reviewed. Device transmission sent per MD orders for ventricular arrhythmia episode. Patient has a [employer] single lead ICD. Normal ICD function. 1 VF episode recorded on 7/18/20 at 0850 am.  Review of EGM shows non sustained VF @ 300 bpm lasting 8 seconds.  Rhythm identified with no therapy delivered prior to spontaneous termination.  = 12%. Estimated battery longevity to EDWIN = SALVADOR. Battery voltage = 3.07V. Lead trends appear stable. Patient notified of interrogation results. Patient reports that he was at the cabin at the time of the episode.  He states he had an \"episode\" and felt dizzy.  He sat down and it passed.  he did not lose consciousness.  He states he is feeling well now and denies complaints. Plan for patient to send a remote transmission in 3 months as scheduled.    Remote ICD transmission    I have reviewed and interpreted the device interrogation, settings, programming and nurse's summary. The device is functioning within normal device parameters. I agree with the current findings, assessment and plan.   XR Chest Port 1 View    Narrative    EXAM: XR CHEST PORT 1 VW  LOCATION: [facility]  DATE/TIME: 7/20/2020 8:49 PM    INDICATION: ; defibrillation; abnormal EKG  COMPARISON: None.      Impression    IMPRESSION: Prior sternotomy. Stable left-sided pacer/defibrillator. Mild CHF suggested. No pneumothorax. No definite pleural effusion. Upper normal heart size.         Rachid Braxton MD , MD.      "

## 2020-07-23 NOTE — PROGRESS NOTES
[facility] Cardiology Progress Note       Assessment and Plan:   IMPRESSION:   1.  Ventricular fibrillation arrest, may be precipitated by worsening anemia due to epistaxis.  No clear evidence of myocardial ischemia or worsening of congestive heart failure.  Have started him empirically on amiodarone.  2.  Chronic coronary artery disease with remote history of coronary artery bypass surgery and more recently stenting to the left main, LAD and angioplasty to the circumflex arteries in 2019.  On Plavix only.  Aspirin held due to epistaxis.  Lexiscan nuclear study done today shows only a small basal inferior infarct with mild xavier-infarct ischemia.  This amount of ischemic myocardium would not justify repeat coronary angiography.  Any case patient does not have any symptoms of angina.  3.  Long QTc, no cause apparent at this time.   4.  History of paroxysmal atrial flutter or fibrillation, previously on Eliquis.  Medication resumed   5.  Anemia, worsening recently with hemoglobin of 6.2 on admission.  Hemoglobin last year was 9.6.  Transfused, hemoglobin now 9.1  6.  Epistaxis, which may be the cause of 5.  7.  Chronic renal failure, stage II.   8.  Extensive peripheral vascular disease with lower limb and carotid artery disease.   9.  COPD, likely contributed to LV systolic dysfunction and secondary pulmonary hypertension.   10.  Diastolic heart failure history, currently appears euvolemic.     Can probably go home tomorrow.  Continue on amiodarone.  Will continue with low-dose Toprol-XL as this appears well-tolerated during this hospitalization     discussed with patient's daughter Marie.  Advised her that Toprol-XL should be continued indefinitely                Interval History:   Feels well, no cardiac symptoms.  Patient may not have been taking metoprolol at home.                  Medications:     Current Facility-Administered Medications   Medication     albuterol (PROAIR HFA/PROVENTIL HFA/VENTOLIN HFA)  "108 (90 Base) MCG/ACT inhaler 2 puff     aminophylline  mg     amiodarone (PACERONE) tablet 200 mg     atorvastatin (LIPITOR) tablet 40 mg     bumetanide (BUMEX) tablet 1 mg     clopidogrel (PLAVIX) tablet 75 mg     ferrous sulfate (FEROSUL) tablet 325 mg     lidocaine (LMX4) cream     lidocaine 1 % 0.1-1 mL     metoprolol succinate (TOPROL-XL) half-tab 12.5 mg     mometasone (ASMANEX TWISTHALER) 220 MCG/INH inhaler 2 puff     naloxone (NARCAN) injection 0.1-0.4 mg     pantoprazole (PROTONIX) EC tablet 40 mg     potassium chloride (KLOR-CON) Packet 20 mEq     sodium chloride (PF) 0.9% PF flush 10 mL     sodium chloride (PF) 0.9% PF flush 10 mL     sodium chloride (PF) 0.9% PF flush 3 mL     sodium chloride (PF) 0.9% PF flush 3 mL     sodium chloride (PF) 0.9% PF flush 5-10 mL     sodium chloride bacteriostatic 0.9 % flush 0-1 mL     umeclidinium-vilanterol (ANORO ELLIPTA) 62.5-25 MCG/INH oral inhaler 1 puff     Vitamin D3 (CHOLECALCIFEROL) tablet 50 mcg     zinc sulfate (ZINCATE) capsule 220 mg             Physical Exam:   Blood pressure (!) 111/34, pulse 65, temperature 97.6  F (36.4  C), temperature source Oral, resp. rate 20, height 1.753 m (5' 9\"), weight 67.6 kg (149 lb), SpO2 93 %.  Wt Readings from Last 4 Encounters:   20 67.6 kg (149 lb)   01/15/20 74.8 kg (165 lb)   20 72.6 kg (160 lb 0.9 oz)   10/10/19 75.8 kg (167 lb)         Vital Sign Ranges  Temperature Temp  Av.7  F (36.5  C)  Min: 97.4  F (36.3  C)  Max: 98.3  F (36.8  C)   Blood pressure Systolic (24hrs), Av , Min:86 , Max:165        Diastolic (24hrs), Av, Min:46, Max:75      Pulse Pulse  Av  Min: 53  Max: 96   Respirations Resp  Av.5  Min: 6  Max: 30   Pulse oximetry SpO2  Av.1 %  Min: 72 %  Max: 100 %         Intake/Output Summary (Last 24 hours) at 2020 1253  Last data filed at 2020 1000  Gross per 24 hour   Intake 1000 ml   Output 1100 ml   Net -100 ml          Cardiovascular:   Normal " apical impulse, regular rate and rhythm, normal S1 and S2, no S3 or S4, and no murmur noted   Chest clear.  Healed median sternotomy scar  No peripheral oedema         Data:     Labs:  Lab Results   Component Value Date     07/21/2020    Lab Results   Component Value Date    CHLORIDE 114 07/21/2020    Lab Results   Component Value Date    BUN 22 07/21/2020      Lab Results   Component Value Date    POTASSIUM 4.6 07/22/2020    Lab Results   Component Value Date    CO2 23 07/21/2020    Lab Results   Component Value Date    CR 1.27 07/21/2020        Lab Results   Component Value Date    WBC 8.2 07/21/2020    HGB 9.1 (L) 07/23/2020    HCT 25.7 (L) 07/21/2020    MCV 93 07/21/2020     07/21/2020     Lab Results   Component Value Date    TROPI 0.064 (H) 07/21/2020           Attestation:  I have reviewed today's vital signs, notes, medications, labs and imaging.         DR WAYLON PEREZ MD 7/22/2020  12:53 PM

## 2020-07-24 NOTE — PROGRESS NOTES
[facility]    Cardiology Progress Note    Date of Admission: 07/20/2020  Service date: 07/24/2020    Summary:  Mr. José Aguirre is a very pleasant 84 year old male with a history of including V. 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  He was seen in the Nephrology clinic earlier that day and found to be anemic and sent to the ER. He had been having some intermittent epistaxis over the previous couple of weeks.     Here in the ER he was being evaluated and while on monitor went into torsades de pointes. The patient had presyncopal symptoms and was poorly responsive and his ICD fired. He returned to a sinus rhythm and then he had a similar episode again about 2 minutes later.    Assessment & Plan   1.  Ventricular fibrillation arrest, may be precipitated by worsening anemia due to epistaxis. No clear evidence of myocardial ischemia or worsening of congestive heart failure. He has been started empirically on amiodarone.  2. Coronary artery disease with a remote history of CABG and more recently stenting to the left main, LAD and angioplasty to the circumflex arteries in 2019.  On Plavix only.  Aspirin held due to recurrent epistaxis. Lexiscan nuclear study done yesterday shows only a small basal inferior infarct with mild xavier-infarct ischemia. This amount of ischemic myocardium is not felt to warrant repeat coronary angiography and he has been free of any anginal symptoms.  3.  Long QTc, no cause apparent at this time.   4.  History of paroxysmal atrial flutter or fibrillation, previously on Eliquis.  Medication resumed   5.  Anemia, worsening recently with hemoglobin of 6.2 on admission.  Hemoglobin last year was 9.6.  Transfused, hemoglobin now 9.1  6.  Epistaxis, which may be the cause of 5.  7.  Chronic renal failure, stage II.   8.  Extensive peripheral  vascular disease with lower limb and carotid artery disease.   9.  COPD, likely contributed to LV systolic dysfunction and secondary pulmonary hypertension.   10.  Diastolic heart failure history, currently appears euvolemic.     Plan:   1. Recommend decreasing amiodarone from 200 mg BID to 200 mg once daily post discharge.  2. Okay from a cardiac stand point for discharge today.  3. Will arrange for close follow up with electrophysiology in 1-2 weeks post discharge.    Disposition Plan   Expected discharge today to prior living arrangement.     Entered: Jonathan Cruz 07/24/2020, 10:35 AM     Interval History   Blood pressures were somewhat soft overnight but had improved this morning. Patient is currently resting comfortably and denies any recurrent symptoms of dizziness, lightheadedness, or palpitations.    Thank you for the opportunity to participate in this pleasant patient's care.     Toby Cruz, MARKUS, CNP   Text Page  (8am - 5pm, M-F)    Patient Active Problem List   Diagnosis     Anemia, iron deficiency     Coronary artery disease involving native coronary artery of native heart without angina pectoris     Hyperlipidemia LDL goal <70     Essential hypertension     CKD (chronic kidney disease), stage III (H)     Hx of CABG     Left carotid artery stenosis     Peripheral vascular disease (H)     Carotid stenosis, bilateral     Claudication of both lower extremities (H)     Renal artery stenosis, native (H)     Carotid artery stenosis     Chronic diastolic congestive heart failure (H)     Cardiac arrest (H)     CAD (coronary artery disease)     Hx of 4v CABG     COPD (chronic obstructive pulmonary disease) (H)     PVD (peripheral vascular disease) (H)     S/P carotid endarterectomy     Acute on chronic respiratory failure with hypoxemia (H)     Postinflammatory pulmonary fibrosis (H)     Ventricular fibrillation (H)     Torsades de pointes (H)       Physical Exam   Temp: 97.5  F (36.4  C) Temp src: Oral BP:  99/58 Pulse: 64 Heart Rate: 54 Resp: 18 SpO2: 97 % O2 Device: Nasal cannula Oxygen Delivery: 6 LPM  Vitals:    07/22/20 0300 07/23/20 0617 07/24/20 0526   Weight: 68.2 kg (150 lb 6.4 oz) 67.6 kg (149 lb) 69.1 kg (152 lb 6.4 oz)     Vital Signs with Ranges  Temp:  [97.5  F (36.4  C)-98.2  F (36.8  C)] 97.5  F (36.4  C)  Pulse:  [64-66] 64  Heart Rate:  [54-68] 54  Resp:  [18-20] 18  BP: ()/(34-58) 99/58  SpO2:  [89 %-97 %] 97 %  I/O last 3 completed shifts:  In: 720 [P.O.:720]  Out: -     Constitutional: Pleasant, elderly gentleman, well nourished, and in no acute distress.  Eyes: Pupils equal, round. Sclerae anicteric.   HEENT: Normocephalic, atraumatic.   Neck: Supple.  No JVD appreciated.  Respiratory: Breathing non-labored. Lungs clear to auscultation bilaterally. No crackles or wheezes.  Cardiovascular: Regular rate and rhythm, normal S1 and S2. No murmur, rub, or gallop.  GI: Soft, non-distended, non-tender.  Skin: Warm, dry. No apparent rashes.  Musculoskeletal/Extremities: Moves all extremities well and symmetrically. No lower extremity edema bilaterally.  Neurologic: No gross focal deficits.   Psychiatric: Affect appropriate. Somewhat forgetful, but responds to questions appropriately.    Medications       amiodarone  200 mg Oral BID     atorvastatin  40 mg Oral QPM     bumetanide  1 mg Oral Daily     clopidogrel  75 mg Oral Daily     ferrous sulfate  325 mg Oral Daily with breakfast     metoprolol succinate ER  12.5 mg Oral Daily     mometasone  2 puff Inhalation 2 times daily     pantoprazole  40 mg Oral BID AC     potassium chloride  20 mEq Oral BID     sodium chloride (PF)  10 mL Intravenous Once     sodium chloride (PF)  3 mL Intracatheter Q8H     umeclidinium-vilanterol  1 puff Inhalation Daily     vitamin D3  50 mcg Oral Daily     zinc sulfate  220 mg Oral Daily     Data   Recent Labs   Lab 07/23/20  1520 07/22/20  1424 07/22/20  0506  07/21/20  0535 07/21/20  0152 07/20/20  1933   WBC  --   --    --   --  8.2  --  8.2   HGB 9.1* 8.6* 7.9*   < > 7.0*  --  6.2*   MCV  --   --   --   --  93  --  92   PLT  --   --   --   --  187  --  201   NA  --   --   --   --  143  --  139   POTASSIUM 3.8  --  4.6  --  4.3  --  3.9   CHLORIDE  --   --   --   --  114*  --  108   CO2  --   --   --   --  23  --  24   BUN  --   --   --   --  22  --  22   CR  --   --   --   --  1.27*  --  1.29*   ANIONGAP  --   --   --   --  6  --  7   YOON  --   --   --   --  8.1*  --  8.7   GLC  --   --   --   --  94  --  101*   TROPI  --   --   --   --  0.064* 0.064* 0.050*    < > = values in this interval not displayed.     This note was completed in part using Dragon voice recognition software. Although reviewed after completion, some word and grammatical errors may occur.

## 2020-07-24 NOTE — PROVIDER NOTIFICATION
Web based page at 0100 to hospitalist: Pt with hypotension, BP 75/49; Do you want to order IVF bolus?    MD placed order for 250 ml IVF bolus.    Web based page at 0230 to hospitalist: Pt with improved BP but remains soft, 81/42 following bolus.    MD Waddell called, placed order for a 500 ml IVF bolus.

## 2020-07-24 NOTE — PLAN OF CARE
VSS. Tele- SR w/ BBB. Pt sating 97% on 8L NC. No reports of pain. A/Ox4. LS-clear/diminished. Low saturated fat/ low NA w/ good appetite. Pt self caths. Left PIV saline locked. Up SBA w/ transferring. Bed alarms on. Possible discharge tomorrow.

## 2020-07-24 NOTE — PROGRESS NOTES
SPIRITUAL HEALTH SERVICES: Tele-Encounter    Patient Location:  [facility]  Spoke with: Patient    Referral Source: Contact pt per length of stay.    DATA:  Pt was admitted to hospital after having two heart attacks.  They have done tests to figure out why he's having them.  His ex-wife is his support system.  He will live with her when he is released.  I offered to pray with him, and he accepted.      PLAN:  Pt can contact [facility] per his request.      David Dumont Intern    ______________________________    Type of service:  Telephone Visit     has received verbal consent for a TelephoneVisit from the patient? Yes    Distance Provider Location: designated [place] office or home office (secure setting)    Mode of Communication: telephone (via [employer] phone or [employer] tele-call-number ([phone]))

## 2020-07-24 NOTE — PLAN OF CARE
No c/o pain.  Alert and oriented.  H.O.H  Ambulates with stand by assist.  Diminished, clear lungs.  CRANE.  97% 8 L O2 Tele is SB.  Has an ICD.  Low fat 2 gm NA diet.  Has urinary retention. Pt self caths self as needed. PIV saline locked. Cardiology following. Low BP this morning.  Amiodarone and metoprolol held.  (Metoprolol given later in shift)  Possible discharge tomorrow.

## 2020-07-24 NOTE — PROGRESS NOTES
[facility]    Hospitalist Progress Note  Name: José Aguirre    MRN: [id number]  Provider:  Alfonso Jimenez DO MPH  Date of Service: 07/24/2020    Summary of Stay: José Aguirre is a 84 year old male w/ hx including V. 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  He was actually seen in nephrology clinic earlier in the day and found to be anemic and sent to the ER.  He had been having some intermittent nosebleeds over the previous couple of weeks.     Here in the ER he was being evaluated and while on monitor went into torsades.  The patient had presyncopal symptoms and was poorly responsive and his ICD fired.  He returned to a sinus rhythm and then he had a similar episode again about 2 minutes later.     He apparently has had his ICD fire once about 6 months ago during a 6-minute walk test.  All of this is happening in the setting of not taking his metoprolol XL.  He chronically is on apixaban and Plavix.     He was transfused 1 unit red blood cells and admitted to the ICU.  Recheck hemoglobin improved from 6.2-7.0.  He was transfused a second unit of red cells and cardiology was consulted.     Lexiscan stress testing only small area of ischemia, no angiogram planned.  Hypotensive overnight but now better.     Problem List/Assessment and Plan:   1.  Torsades s/p ICD firing x 2: Urine culture has a history of V. fib arrest in 2019, it appears he had one episode of v-fib episode which passed without LOC earlier in the week.  Aside from his anemia there is no other obvious triggers such as electrolyte abnormality, infection or obvious ischemic event.  --Continue to transfuse as needed, in this case we will plan to keep hemoglobin at ~8 or greater.  --On amiodarone as per cardiology.  --Plavix resumed, holding Eliquis.  --Keep Mg>2.0, K >4.0.  --Tele monitoring.  --Echo shows intact  EF, dilated RV though does not seem that is necessarily new.     2.  Subacute on Chronic anemia: Follows with nephrology.  He has received IV iron in the past because iron deficiency anemia is suspected.  Had some epistaxis in the context of Eliquis.  -Hb lkely worsened by recent nosebleeding.  -Transfused 2 U PRBC.  --Keep hgb >8.0.  --Remains on PO iron.  --Family uncertain if he has had a colonoscopy or EGD.  Discussed revisiting this in the outpatient setting.     3.  Coronary artery disease: History of CABG.  --Continue Plavix as before.  --Holding Eliquis with anemia issues.  --Started on metoprolol XL 12.5 mg daily.  --Strongly advised him to talk to his cardiologist if he decides to stop his metoprolol again.  --Troponin was 0.05, mildly elevated.  But likely related to his anemia.  He has no ischemic symptoms.  --Cardiology consulted.  --Lexiscan stress testing only small area of ischemia, no angiogram planned.     4.  COPD, chronic hypoxemic respiratory failure.  --Stable.  Continue inhalers.  On 5 L of oxygen at baseline per his report.  He tells me he has to increase this to 8L w/ activities.     5.  Hypocalcemia: Check ionized calcium.  Replace if low.     6.  Evidence of pulmonary hypertension based on echocardiogram: Appears to have had a dilated RV and reduced RV function in the past based on prior echoes.     7.  Possible mild acute chf exacerbation w/ preserved EF: Increasing PO bumex to 1 mg from 0.5 mg daily, monitor daily weights and ins/outs.    --May need to decrease diuretics if further hypotension.    8.  Hypotension: SBP 80s overnight, received 2 small IVF boluses.  SBP 90s this morning.  Feels well, no associated symptoms.  --Continue to monitor closely.  --Will continue Toprol and Bumex as is unless cardiology feels a change is necessary.  --Discussed with patient, he should be monitored overnight again with recent significant hypotension requiring fluid boluses.    DVT Prophylaxis:  Pneumatic Compression Devices  Code Status: Full Code  Diet: Low Saturated Fat Na <2400 mg    Mcclelland Catheter: not present  Disposition: Expected discharge in 1 days to home. Goals prior to discharge include monitor BP, hypotensive overnight.   Incidental Findings: None.  Family updated today: No.     Interval History   Assumed care from previous hospitalist. The history was fully reviewed.  The patient reports doing well. No chest pain or shortness of breath. No nausea, vomiting, diarrhea, constipation. No fevers. No other specific complaints identified.     SBP 80s overnight, 90s this morning, required 2 small IVF boluses.    -Data reviewed today: I personally reviewed all new labs and imaging results over the last 24 hours.     Physical Exam   Temp: 97.5  F (36.4  C) Temp src: Oral BP: 99/58 Pulse: 64 Heart Rate: 54 Resp: 18 SpO2: 97 % O2 Device: Nasal cannula Oxygen Delivery: 6 LPM  Vitals:    07/22/20 0300 07/23/20 0617 07/24/20 0526   Weight: 68.2 kg (150 lb 6.4 oz) 67.6 kg (149 lb) 69.1 kg (152 lb 6.4 oz)     Vital Signs with Ranges  Temp:  [97.5  F (36.4  C)-98.2  F (36.8  C)] 97.5  F (36.4  C)  Pulse:  [64-66] 64  Heart Rate:  [54-68] 54  Resp:  [18-20] 18  BP: ()/(34-58) 99/58  SpO2:  [89 %-97 %] 97 %  I/O last 3 completed shifts:  In: 720 [P.O.:720]  Out: -     GENERAL: No apparent distress. Awake, alert, and fully oriented.  HEENT: Normocephalic, atraumatic. Extraocular movements intact.  CARDIOVASCULAR: Regular rate and rhythm without murmurs or rubs. No S3.  PULMONARY: Clear bilaterally.  GASTROINTESTINAL: Soft, non-tender, non-distended. Bowel sounds normoactive.   EXTREMITIES: No cyanosis or clubbing. No edema.  NEUROLOGICAL: CN 2-12 grossly intact, no focal neurological deficits.  DERMATOLOGICAL: No rash, ulcer, bruising, nor jaundice.     Medications       amiodarone  200 mg Oral BID     atorvastatin  40 mg Oral QPM     bumetanide  1 mg Oral Daily     clopidogrel  75 mg Oral Daily     ferrous  sulfate  325 mg Oral Daily with breakfast     metoprolol succinate ER  12.5 mg Oral Daily     mometasone  2 puff Inhalation 2 times daily     pantoprazole  40 mg Oral BID AC     potassium chloride  20 mEq Oral BID     sodium chloride (PF)  10 mL Intravenous Once     sodium chloride (PF)  3 mL Intracatheter Q8H     umeclidinium-vilanterol  1 puff Inhalation Daily     vitamin D3  50 mcg Oral Daily     zinc sulfate  220 mg Oral Daily     Data     Laboratory:  Recent Labs   Lab 07/23/20  1520 07/22/20  1424 07/22/20  0506  07/21/20  0535 07/20/20  1933   WBC  --   --   --   --  8.2 8.2   HGB 9.1* 8.6* 7.9*   < > 7.0* 6.2*   HCT  --   --   --   --  25.7* 22.7*   MCV  --   --   --   --  93 92   PLT  --   --   --   --  187 201    < > = values in this interval not displayed.     Recent Labs   Lab 07/23/20  1520 07/22/20  0506 07/21/20  0535 07/20/20  1933   NA  --   --  143 139   POTASSIUM 3.8 4.6 4.3 3.9   CHLORIDE  --   --  114* 108   CO2  --   --  23 24   ANIONGAP  --   --  6 7   GLC  --   --  94 101*   BUN  --   --  22 22   CR  --   --  1.27* 1.29*   GFRESTIMATED  --   --  51* 50*   GFRESTBLACK  --   --  59* 58*   YOON  --   --  8.1* 8.7     No results for input(s): CULT in the last 168 hours.    Imaging:  Recent Results (from the past 24 hour(s))   NM MPI w Lexiscan   Result Value    Target     Baseline Systolic     Baseline Diastolic BP 60    Last Stress Systolic     Last Stress Diastolic BP 49    Baseline HR 65    Max HR 79    Calculated Percent HR 58    Rate Pressure Product 9,006.0    Left Ventricular EF 69    Narrative       The nuclear stress test is abnormal.     There is a small area of nontransmural infarction in the basal inferior   segment(s) of the left ventricle associated with a mild degree of   xavier-infarct ischemia.     Left ventricular function is normal.     The left ventricular ejection fraction at stress is 69%.     There is no prior study for comparison.           Alfonso Jimenez DO  MPH  [facility] Hospitalist  [address] [place] [address]  Pager: [phone]  07/24/2020

## 2020-07-24 NOTE — PLAN OF CARE
Please see flowsheets for detailed vital signs and assessments.   Neuro: A&O  Vital Signs: stable ex hypotension, gave 750 ml over 3 hours and BP improved to 90s/40s  Pain: denies  O2: mid 90s 6L NC, 8L NC when ambulating in room  Tele: SB BBB and occasional V paced  Respiratory: LS diminished, CRANE, intermittent cough  GI: WDL  : pt does intermittent self catheterization  Skin: WDL  Activity: SBA  IVF: saline locked  Notable labs: k 3.8, hemoglobin 9.1, Mag 2.1  Protocols: N/A  Consults: Cardiology  Plan: diuresis, PTA blood pressure/heart medications. Continue with plan of care.   Discharge: 1-2 days

## 2020-07-24 NOTE — PLAN OF CARE
Pt A/Ox4, up with SBA. Pt denies pain, N/V, lightheadedness, and dizziness. Pt is CRANE. Requiring 6 lpm nc - at base line to wear 5 lpm. With ambulation pt require 8-10 lpm. IV SL. Mabel-scan completed today. Cardiology review results with pt at bedside. Hgb 9.1, K 3.8, Mag 2.1. VSS ex soft bp. Tele SR/SA with BBB - occ Vpaced. Cardiolgoy following. Plan to discharge possibly tomorrow. Continue with POC.    Major shift event/Provider notification:  2124: Pt BP 86/38, HR 65. no parameters from amiodarone PO. pt denies dizziness, lightheadedness, etc.   2145: Added  Parameters if SBP >100 give, if not hold until tomorrow.

## 2020-07-25 NOTE — DISCHARGE SUMMARY
Hospitalist Discharge Summary  [facility]    José Aguirre MRN# [id number]   YOB: 1935 Age: 84 year old     Date of Admission:  7/20/2020  Date of Discharge:  7/25/2020  Admitting Physician:  Usama Taylor MD  Discharge Physician:  Alfonso Jimenez DO  Discharging Service:  Hospitalist     Primary Provider: Devin, Javier Pickard          Discharge Diagnosis:   Torsades status post ICD firing  Subacute on chronic anemia  Coronary artery disease  COPD  Chronic hypoxic respiratory failure  Hypotension, resolved  Possible mild acute CHF exacerbation with preserved ejection fraction  Hypocalcemia  Pulmonary hypertension             Discharge Disposition:   Discharged to home           Allergies:   No Known Allergies           Discharge Medications:   Current Discharge Medication List      START taking these medications    Details   amiodarone (PACERONE) 200 MG tablet Take 1 tablet (200 mg) by mouth daily  Qty: 30 tablet, Refills: 1    Associated Diagnoses: Torsades de pointes (H)         CONTINUE these medications which have NOT CHANGED    Details   albuterol (PROAIR HFA/PROVENTIL HFA/VENTOLIN HFA) 108 (90 Base) MCG/ACT inhaler Inhale 2 puffs into the lungs every 4-6 hours as needed for wheezing    Comments: Pharmacy may dispense brand covered by insurance (Proair, or proventil or ventolin or generic albuterol inhaler)      Ascorbic Acid 500 MG CHEW Take 500 mg by mouth daily    Associated Diagnoses: Simple chronic bronchitis (H)      atorvastatin (LIPITOR) 40 MG tablet Take 1 tablet (40 mg) by mouth every evening  Qty: 30 tablet, Refills: 0    Associated Diagnoses: History of cardiac arrest      bumetanide (BUMEX) 0.5 MG tablet Take 0.5 mg by mouth daily      clopidogrel (PLAVIX) 75 MG tablet Take 75 mg by mouth daily      ferrous sulfate (FEROSUL) 325 (65 Fe) MG tablet Take 325 mg by mouth daily (with breakfast)      metoprolol succinate ER (TOPROL-XL) 25 MG 24 hr tablet Take 12.5 mg by  "mouth daily      mometasone (ASMANEX TWISTHALER) 220 MCG/INH inhaler Inhale 2 puffs into the lungs 2 times daily      multivitamin w/minerals (THERA-VIT-M) tablet Take 1 tablet by mouth daily    Associated Diagnoses: Simple chronic bronchitis (H)      nitroGLYcerin (NITROSTAT) 0.4 MG sublingual tablet For chest pain place 1 tablet under the tongue every 5 minutes for 3 doses. If symptoms persist 5 minutes after 1st dose call 911.  Qty: 15 tablet, Refills: 0    Associated Diagnoses: History of cardiac arrest      pantoprazole (PROTONIX) 40 MG EC tablet Take 1 tablet (40 mg) by mouth 2 times daily (before meals)  Qty: 60 tablet, Refills: 0    Associated Diagnoses: History of cardiac arrest      potassium chloride (KLOR-CON) 20 MEQ packet Take 20 mEq by mouth 2 times daily       tiotropium-olodaterol 2.5-2.5 MCG/ACT AERS Inhale 2 puffs into the lungs daily      triamcinolone (KENALOG) 0.1 % external cream Apply topically 2 times daily      vitamin D3 (CHOLECALCIFEROL) 2000 units (50 mcg) tablet Take 2,000 Units by mouth daily      zinc sulfate (ZINCATE) 220 (50 Zn) MG capsule Take 1 capsule (220 mg) by mouth daily    Associated Diagnoses: Simple chronic bronchitis (H)      !! order for DME Wheelchair   Toilet Safety Frame  Straight Wall Grab Bar X 2 each  Adjustable Bath Shower Chair with Back--can only be 15 inches wide  Qty: 1 each, Refills: 0    Associated Diagnoses: Chronic diastolic congestive heart failure (H)      !! order for DME Toilet Safety Frame  Wheelchair  Adjustable Bath Shower Chair with Back--can only be 15 inches wide  Straight wall grab bar X 2 each  Qty: 1 each, Refills: 0    Associated Diagnoses: Chronic diastolic congestive heart failure (H)      !! order for DME Toilet Safety Frame  Adjustable Bath Shower Chair with back--can only be 15\" wide  Wheelchair  Straight wall grab bar X 2 each  Qty: 1 each, Refills: 0    Associated Diagnoses: Chronic diastolic congestive heart failure (H)      !! order " "for [facility] Toilet Safety Frame  Adjustable Bath Shower Chair with Back - can ONLY be 15 inches wide  Straight wall grab bar  Qty: 1 each, Refills: 0    Associated Diagnoses: Chronic diastolic congestive heart failure (H)       !! - Potential duplicate medications found. Please discuss with provider.      STOP taking these medications       apixaban ANTICOAGULANT (ELIQUIS) 2.5 MG tablet Comments:   Reason for Stopping:                      Condition on Discharge:   Discharge condition: Stable   Discharge vitals: Blood pressure 97/63, pulse 64, temperature 96.7  F (35.9  C), temperature source Axillary, resp. rate 18, height 1.753 m (5' 9\"), weight 68.1 kg (150 lb 1.6 oz), SpO2 96 %.   Code status on discharge: Full Code      BASIC PHYSICAL EXAMINATION:  GENERAL: No apparent distress.  CARDIOVASCULAR: Regular rate and rhythm without murmurs.  PULMONARY: Clear to auscultation bilaterally.   GASTROINTESTINAL: Abdomen soft, non-tender.  EXTREMITIES: No edema, pulses intact.  NEUROLOGIC: No focal deficits.            History of Illness:   See detailed admission note for full details.               Procedures excluding imaging which is summarized below:   Please see details in the electronic medical record.           Consultations:   CARDIOLOGY IP CONSULT          Significant Results:   Results for orders placed or performed during the hospital encounter of 07/20/20   XR Chest Port 1 View    Narrative    EXAM: XR CHEST PORT 1 VW  LOCATION: [facility]  DATE/TIME: 7/20/2020 8:49 PM    INDICATION: ; defibrillation; abnormal EKG  COMPARISON: None.      Impression    IMPRESSION: Prior sternotomy. Stable left-sided pacer/defibrillator. Mild CHF suggested. No pneumothorax. No definite pleural effusion. Upper normal heart size.   Echocardiogram Complete    Narrative    [id number]  [id number]  [id number]  [id number]^IP^WAYLON^ERMA CHIN           [facility]  Echocardiography Laboratory  [address]  [place] [address]        Name: ROC TAYLOR  MRN: [id number]  : 1935  Study Date: 2020 11:20 AM  Age: 84 yrs  Gender: Male  Patient Location: [facility]  Reason For Study: Vfib  Ordering Physician: WAYLON PEREZ  Referring Physician: Javier Pickard  Performed By: Bogdan Spencer RDCS     BSA: 1.8 m2  Height: 69 in  Weight: 146 lb  HR: 67  BP: 129/76 mmHg  _____________________________________________________________________________  __        Procedure  Complete Portable Echo Adult. Optison (NDC #[id number]) given intravenously.  _____________________________________________________________________________  __        Interpretation Summary     The visual ejection fraction is estimated at 55-60%.  Left ventricular systolic function is normal.  The right ventricle is moderately dilated.  The right ventricular systolic function is mild to moderately reduced.  Right ventricular systolic pressure is elevated, consistent with moderate to  severe pulmonary hypertension.  The study was technically difficult.  _____________________________________________________________________________  __        Left Ventricle  The left ventricle is normal in size. There is mild concentric left  ventricular hypertrophy. Diastolic Doppler findings (E/E' ratio and/or other  parameters) suggest left ventricular filling pressures are indeterminate. The  visual ejection fraction is estimated at 55-60%. Left ventricular systolic  function is normal. Septal motion is consistent with conduction abnormality.     Right Ventricle  There is a catheter/pacemaker lead seen in the right ventricle. The right  ventricle is moderately dilated. The right ventricular systolic function is  mild to moderately reduced.     Atria  The left atrium is mildly dilated. The right atrium is moderately dilated.  There is no color Doppler evidence of an atrial shunt.     Mitral Valve  The mitral valve leaflets are mildly thickened. There is mild (1+)  mitral  regurgitation.        Tricuspid Valve  There is mild (1+) tricuspid regurgitation. The right ventricular systolic  pressure is approximated at 50.2 mmHg plus the right atrial pressure. IVC  diameter >2.1 cm collapsing <50% with sniff suggests a high RA pressure  estimated at 15 mmHg or greater. Right ventricular systolic pressure is  elevated, consistent with moderate to severe pulmonary hypertension.     Aortic Valve  The aortic valve is trileaflet. There is trace aortic regurgitation. No  hemodynamically significant valvular aortic stenosis.     Pulmonic Valve  There is trace pulmonic valvular regurgitation. Normal pulmonic valve  velocity.     Vessels  Borderline aortic root dilatation. Normal size ascending aorta. IVC diameter  >2.1 cm collapsing <50% with sniff suggests a high RA pressure estimated at 15  mmHg or greater.     Pericardium  There is no pericardial effusion.        Rhythm  Sinus rhythm was noted.  _____________________________________________________________________________  __  MMode/2D Measurements & Calculations  IVSd: 1.3 cm     LVIDd: 4.3 cm  LVIDs: 2.8 cm  LVPWd: 1.2 cm  FS: 34.7 %  LV mass(C)d: 200.7 grams  LV mass(C)dI: 111.1 grams/m2  Ao root diam: 3.7 cm  LA dimension: 4.7 cm  asc Aorta Diam: 2.8 cm  LA/Ao: 1.3  LVOT diam: 2.4 cm  LVOT area: 4.7 cm2  LA Volume (BP): 67.0 ml  LA Volume Index (BP): 37.0 ml/m2  RWT: 0.55           Doppler Measurements & Calculations  MV E max anthony: 78.0 cm/sec  MV A max anthony: 43.9 cm/sec  MV E/A: 1.8  MV dec time: 0.25 sec  LV V1 max P.2 mmHg  LV V1 max: 102.7 cm/sec  LV V1 VTI: 24.6 cm  CO(LVOT): 7.4 l/min  CI(LVOT): 4.1 l/min/m2  SV(LVOT): 115.4 ml  SI(LVOT): 63.8 ml/m2  PA acc time: 0.09 sec  PI end-d anthony: 91.1 cm/sec  TR max anthony: 354.3 cm/sec  TR max P.2 mmHg  E/E' av.6  Lateral E/e': 11.8  Medial E/e': 13.3              _____________________________________________________________________________  __        Report approved by: Ken  Marga Singh 07/21/2020 02:23 PM      NM MPI w Lexiscan     Value    Target     Baseline Systolic     Baseline Diastolic BP 60    Last Stress Systolic     Last Stress Diastolic BP 49    Baseline HR 65    Max HR 79    Calculated Percent HR 58    Rate Pressure Product 9,006.0    Left Ventricular EF 69    Narrative       The nuclear stress test is abnormal.     There is a small area of nontransmural infarction in the basal inferior   segment(s) of the left ventricle associated with a mild degree of   xavier-infarct ischemia.     Left ventricular function is normal.     The left ventricular ejection fraction at stress is 69%.     There is no prior study for comparison.           Transthoracic Echocardiogram Results:  No results found for this or any previous visit (from the past 4320 hour(s)).             Pending Results:   Unresulted Labs Ordered in the Past 30 Days of this Admission     No orders found from 6/20/2020 to 7/21/2020.                      Discharge Instructions and Follow-Up:   Discharge instructions and follow-up:   Discharge Procedure Orders   MED THERAPY MANAGE REFERRAL   Referral Priority: Routine Referral Type: Med Therapy Management   Requested Specialty: Pharmacist   Number of Visits Requested: 1     Follow-Up with Electrophysiologist   Standing Status: Future Standing Exp. Date: 07/24/21   Referral Priority: Routine   Number of Visits Requested: 1     Activity   Order Comments: Your activity upon discharge: activity as tolerated     Order Specific Question Answer Comments   Is discharge order? Yes      Follow-up and recommended labs and tests    Order Comments: Follow up with primary care provider, Javier Beltran, within 7 days to evaluate medication change and for hospital follow- up.  No follow up labs or test are needed.  I would recommend holding your Eliquis for the next 1 week (given recent nose bleeds and anemia requiring transfusion) and discuss with your PCP  regarding the potential to restart this in the coming days/weeks.  Follow-up with cardiology as instructed.     Full Code     Order Specific Question Answer Comments   Code status determined by: Discussion with patient/ legal decision maker      Diet   Order Comments: Follow this diet upon discharge: Orders Placed This Encounter      Low Saturated Fat Na <2400 mg     Order Specific Question Answer Comments   Is discharge order? Yes              Hospital Course:   José Aguirre is a 84 year old male w/ hx including V. 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  He was actually seen in nephrology clinic earlier in the day and found to be anemic and sent to the ER.  He had been having some intermittent nosebleeds over the previous couple of weeks.     Here in the ER he was being evaluated and while on monitor went into torsades.  The patient had presyncopal symptoms and was poorly responsive and his ICD fired.  He returned to a sinus rhythm and then he had a similar episode again about 2 minutes later.     He apparently has had his ICD fire once about 6 months ago during a 6-minute walk test.  All of this is happening in the setting of not taking his metoprolol XL.  He chronically is on apixaban and Plavix.     He was transfused 1 unit red blood cells and admitted to the ICU.  Recheck hemoglobin improved from 6.2-7.0.  He was transfused a second unit of red cells and cardiology was consulted.     Lexiscan stress testing only small area of ischemia, no angiogram planned.  Hypotensive 2 nights ago requiring 2 small boluses but now better.  Plan to discharge with low-dose metoprolol and daily amiodarone with EP follow-up.     Problem List/Assessment and Plan:   1.  Torsades s/p ICD firing x 2: He has a history of V. fib arrest in 2019, it appears he had one episode of v-fib episode which passed  without LOC earlier in the week.  Aside from his anemia there is no other obvious triggers such as electrolyte abnormality, infection or obvious ischemic event.  --Continue to transfuse as needed, in this case we will plan to keep hemoglobin at ~8 or greater.  --On daily amiodarone as per cardiology.  --Plavix resumed, holding Eliquis with recent epistaxis and severe anemia.  --Keep Mg>2.0, K >4.0.  --Tele monitoring.  --Echo shows intact EF, dilated RV though does not seem that is necessarily new.  --Outpatient follow-up with EP for definitive rhythm management.     2.  Subacute on Chronic anemia: Follows with nephrology.  He has received IV iron in the past because iron deficiency anemia is suspected.  Had some epistaxis in the context of Eliquis.  Hemoglobin lkely worsened by recent nosebleeding.  With his severe anemia requiring multiple transfusions during the hospitalization complicated by life-threatening arrhythmia I have advised him to hold his Eliquis for at least 1 week until further discussion with his PCP.  I would recheck a CBC prior to restarting this and consider endoscopic evaluation with colonoscopy or EGD.  -Transfused 2 U PRBC.  --Keep hgb >8.0, now stable.  --Remains on PO iron.  --Family uncertain if he has had a colonoscopy or EGD.  Discussed revisiting this in the outpatient setting.     3.  Coronary artery disease: History of CABG.  --Continue Plavix as before.  --Holding Eliquis with anemia issues.  --Started on metoprolol XL 12.5 mg daily.  --Strongly advised him to talk to his cardiologist if he decides to stop his metoprolol again.  --Troponin was 0.05, mildly elevated.  But likely related to his anemia.  He has no ischemic symptoms.  --Cardiology consulted.  --Lexiscan stress testing only small area of ischemia, no angiogram planned.     4.  COPD, chronic hypoxemic respiratory failure.  --Stable.  Continue inhalers.  On 5 L of oxygen at baseline per his report.  He tells me he has to  increase this to 8L w/ activities.     5.  Hypocalcemia: Check ionized calcium.  Replace if low.     6.  Evidence of pulmonary hypertension based on echocardiogram: Appears to have had a dilated RV and reduced RV function in the past based on prior echoes.     7.  Possible mild acute CHF exacerbation w/ preserved EF:  Appears euvolemic and recent problems with hypotension so we will continue prior to admission 0.5 mg Bumex daily.  He was only briefly on 1 mg Bumex daily during the hospitalization.     8.  Hypotension: SBP 80s 2 nights ago, received 2 small IVF boluses.  SBP now 90s to 100s.  Feels well, no associated symptoms.  I suspect he will be able to tolerate daily amiodarone, low-dose metoprolol, and low-dose Bumex daily.    The patient was seen, examined, and counseled on this day. The hospitalization and plan of care was patient was seen and examined on this day.  All questions and concerns were answered and reviewed with the patient extensively. The patient agreed to follow-up as noted above.      Total time spent in face to face contact with the patient and coordinating discharge was:  35 Minutes    Alfonso Jimenez DO MPH  [facility]  [address] [place] [address]  Pager: [phone]  07/25/2020

## 2020-07-25 NOTE — PLAN OF CARE
"/63 (BP Location: Right arm)   Pulse 64   Temp 98.1  F (36.7  C) (Oral)   Resp 18   Ht 1.753 m (5' 9\")   Wt 69.1 kg (152 lb 6.4 oz)   SpO2 100%   BMI 22.51 kg/m      VSS. Continues on 6-7 L 02 at [facility], sats 95-97%. TELE SR prolonged QT with BBB. 2 gram Na diet. Cardiology following, Possibly discharge today.    "

## 2020-07-25 NOTE — PLAN OF CARE
No c/o pain.  Alert and oriented.  H.O.H  Ambulates with stand by assist.  Diminished, clear lungs.  CRANE.  96% 8 L O2. Tele is SB.  Has an ICD.  Low fat 2 gm NA diet.  Has urinary retention. Pt self caths self as needed. PIV saline locked. Cardiology following. BP 97/63 this morning. Discharge today.    11:55  Pt discharged home at this time accompanied by daughter.  Pt and dtr verbalizes understanding of discharge instructions and medications. Pt acknowledges receipt of all belongings

## 2020-07-27 NOTE — TELEPHONE ENCOUNTER
MTM referral from: Transitions of Care (recent hospital discharge or ED visit)    MTM referral outreach attempt #1 on July 27, 2020 at 8:51 AM      Outcome: Patient is not interested at this time because they are an [facility] patient, will route to MTM Pharmacist/Provider as an FYI. Thank you for the referral.     Summer Zambrano, MTM Coordinator

## 2020-07-28 NOTE — TELEPHONE ENCOUNTER
Patient was evaluated by cardiology while inpatient being sent to ED by Nephrology Clinic secondary to anemia with Hgb of 6.2 and recent hx of nosebleeds. PMH: VF arrest in 2019, followed with CABG, with most recent CONCETTA and ICD implantation, COPD, PAF, CKD, PVD, diastolic HF. While in the ED, pt became syncopal with witnessed episodes of Torsades de Pointes and prolonged QTC (unknown etiology). ICD discharged x 2 and ROSC achieved. Started on Amiodarone. 7/23/20: Lexiscan showed only a small basal inferior infarct with mild xavier-infarct ischemia. This amount of ischemic myocardium is not felt to warrant repeat coronary angiography and he has been free of any anginal symptoms. Received PRBC transfusion, with improvement of Hgb above 9. Continued PTA Plavix. PTA Eliquis held at time of discharge secondary to nosebleeds. Amiodarone continued at time of discharge. Called patient to discuss any post hospital d/c questions he may have, review medication changes, and confirm f/u appts. Patient denied any questions regarding new medications or changes to PTA medications. Patient denied any SOB, chest pain, or light headedness. RN confirmed with patient that he needs to schedule for a f/u EP visit. Recently has seen Dr. Marion at [facility].  Pt is scheduled for cardiology appt with Dr. Lea on 8/17/20 at 0915 at our [facility] and will determine at that time whether he will f/u with EP as well. Instructed pt to call Dr. Marion office for follow up appt. Patient advised to call clinic with any cardiac related questions or concerns prior to this zee't. Patient verbalized understanding and agreed with plan. TERRELL Bolden RN.

## 2020-07-30 NOTE — TELEPHONE ENCOUNTER
Writer returned pt's phone messages, and he has decided that he would like to change his EP care from [facility] to our [facility] in [facility] to close proximity to his residence. Pt will call scheduling to schedule EP appt to establish care. Writer called our [facility], and spoke with Gerry, whom will arrange to transfer his device care from [facility] to our system. Pt verbalized appreciation. TERRELL Bolden RN.

## 2020-08-18 NOTE — TELEPHONE ENCOUNTER

## 2020-08-19 NOTE — LETTER
8/19/2020    [facility]  [address] [facility] [address]    RE: José Aguirre       Dear Colleague,    I had the pleasure of seeing José Aguirre in the [facility].    HPI and Plan:   See dictation    Orders Placed This Encounter   Procedures     XR Chest 2 Views     Hepatic panel     TSH with free T4 reflex     Basic metabolic panel     Follow-Up with Electrophysiologist     EKG 12-lead complete w/read - Clinics (performed today)     Pulmonary function test       Orders Placed This Encounter   Medications     POTASSIUM PO     Sig: Take 500 mg by mouth daily       Medications Discontinued During This Encounter   Medication Reason     potassium chloride (KLOR-CON) 20 MEQ packet Dose adjustment     metoprolol succinate ER (TOPROL-XL) 25 MG 24 hr tablet      zinc sulfate (ZINCATE) 220 (50 Zn) MG capsule          Encounter Diagnoses   Name Primary?     Ventricular fibrillation (H)      ICD (implantable cardioverter-defibrillator) discharge      ICD (implantable cardioverter-defibrillator) in place      Torsades de pointes (H)      Coronary artery disease involving native coronary artery of native heart without angina pectoris      Hyperlipidemia LDL goal <70      Essential hypertension      Hx of CABG        CURRENT MEDICATIONS:  Current Outpatient Medications   Medication Sig Dispense Refill     amiodarone (PACERONE) 200 MG tablet Take 1 tablet (200 mg) by mouth daily 30 tablet 1     Ascorbic Acid 500 MG CHEW Take 500 mg by mouth daily       atorvastatin (LIPITOR) 40 MG tablet Take 1 tablet (40 mg) by mouth every evening 30 tablet 0     bumetanide (BUMEX) 0.5 MG tablet Take 0.5 mg by mouth daily       clopidogrel (PLAVIX) 75 MG tablet Take 75 mg by mouth daily       ferrous sulfate (FEROSUL) 325 (65 Fe) MG tablet Take 325 mg by mouth daily (with breakfast)       mometasone (ASMANEX TWISTHALER) 220 MCG/INH inhaler Inhale 2 puffs into the lungs 2 times daily  "      multivitamin w/minerals (THERA-VIT-M) tablet Take 1 tablet by mouth daily       nitroGLYcerin (NITROSTAT) 0.4 MG sublingual tablet For chest pain place 1 tablet under the tongue every 5 minutes for 3 doses. If symptoms persist 5 minutes after 1st dose call 911. 15 tablet 0     order for DME Wheelchair   Toilet Safety Frame  Straight Wall Grab Bar X 2 each  Adjustable Bath Shower Chair with Back--can only be 15 inches wide 1 each 0     order for DME Toilet Safety Frame  Wheelchair  Adjustable Bath Shower Chair with Back--can only be 15 inches wide  Straight wall grab bar X 2 each 1 each 0     order for DME Toilet Safety Frame  Adjustable Bath Shower Chair with back--can only be 15\" wide  Wheelchair  Straight wall grab bar X 2 each 1 each 0     order for [facility] Toilet Safety Frame  Adjustable Bath Shower Chair with Back - can ONLY be 15 inches wide  Straight wall grab bar 1 each 0     pantoprazole (PROTONIX) 40 MG EC tablet Take 1 tablet (40 mg) by mouth 2 times daily (before meals) 60 tablet 0     POTASSIUM PO Take 500 mg by mouth daily       tiotropium-olodaterol 2.5-2.5 MCG/ACT AERS Inhale 2 puffs into the lungs daily       triamcinolone (KENALOG) 0.1 % external cream Apply topically 2 times daily       vitamin D3 (CHOLECALCIFEROL) 2000 units (50 mcg) tablet Take 2,000 Units by mouth daily       albuterol (PROAIR HFA/PROVENTIL HFA/VENTOLIN HFA) 108 (90 Base) MCG/ACT inhaler Inhale 2 puffs into the lungs every 4-6 hours as needed for wheezing         ALLERGIES   No Known Allergies    PAST MEDICAL HISTORY:  Past Medical History:   Diagnosis Date     Anemia      CKD (chronic kidney disease), stage III (H)      Claudication, intermittent (H)      Coronary artery disease 2003    CAB x 4     History of GI bleed 2012    Hemorrhagic gastritis     Hypercholesterolemia      Hypertension      Left carotid artery stenosis     S/P Carotid Endarterectomy left      Renal artery stenosis (H)     stent R     VF (ventricular " fibrillation) (H)        PAST SURGICAL HISTORY:  Past Surgical History:   Procedure Laterality Date     BYPASS GRAFT ARTERY CORONARY  2003    LIMA=LAD, SVG=Diag, SVG=OM2, SVG=PDA     CAROTID ENDARTERECTOMY Left      CV CORONARY ANGIOGRAM N/A 2019    Procedure: Coronary Angiogram;  Surgeon: Andrew Garcia MD;  [facility]     CV PCI STENT DRUG ELUTING N/A 2019    Procedure: PCI Stent Drug Eluting;  Surgeon: Andrew Garcia MD;  [facility]     ENDARTERECTOMY CAROTID Left 2017    Procedure: ENDARTERECTOMY CAROTID;  REDO LEFT CAROTID ENDARTERECTOMY WITH RIGHT ANKLE GREATER SAPHENOUS VEIN  ;  Surgeon: Khurram Bishop MD;  Location: [facility]     EP ICD N/A 9/10/2019    Procedure: EP ICD;  Surgeon: Shorty Marion MD;  Location:  [facility]     EYE SURGERY       HEART CATH LEFT HEART CATH  2003    Severe multivessel disease     IR THORACENTESIS  2019     RENAL ARTERY ANGIOGRAM Right 2013    with stenting     TONSILLECTOMY         FAMILY HISTORY:  Family History   Problem Relation Age of Onset     Cerebrovascular Disease Mother 90        unknown type     Cancer Brother 70        Brain cancer      Dementia Sister      Arthritis Brother        SOCIAL HISTORY:  Social History     Socioeconomic History     Marital status:      Spouse name: None     Number of children: None     Years of education: None     Highest education level: None   Occupational History     None   Social Needs     Financial resource strain: None     Food insecurity     Worry: None     Inability: None     Transportation needs     Medical: None     Non-medical: None   Tobacco Use     Smoking status: Former Smoker     Packs/day: 1.00     Years: 50.00     Pack years: 50.00     Types: Cigarettes     Last attempt to quit: 2001     Years since quittin.6     Smokeless tobacco: Never Used   Substance and Sexual Activity     Alcohol use: Yes      "Comment: very rare     Drug use: No     Sexual activity: None   Lifestyle     Physical activity     Days per week: None     Minutes per session: None     Stress: None   Relationships     Social connections     Talks on phone: None     Gets together: None     Attends Hinduism service: None     Active member of club or organization: None     Attends meetings of clubs or organizations: None     Relationship status: None     Intimate partner violence     Fear of current or ex partner: None     Emotionally abused: None     Physically abused: None     Forced sexual activity: None   Other Topics Concern      Service Not Asked     Blood Transfusions Not Asked     Caffeine Concern Yes     Comment: 4 - 7 cups (trying to cut down)     Occupational Exposure Not Asked     Hobby Hazards Not Asked     Sleep Concern Not Asked     Stress Concern Not Asked     Weight Concern Not Asked     Special Diet Yes     Comment: eats healthy     Back Care Not Asked     Exercise Yes     Comment: walking     Bike Helmet Not Asked     Seat Belt Not Asked     Self-Exams Not Asked     Parent/sibling w/ CABG, MI or angioplasty before 65F 55M? Not Asked   Social History Narrative    ** Merged History Encounter **            Review of Systems:  Skin:  Negative       Eyes:  Positive for glasses    ENT:  Positive for hearing loss  pt has hearing aids bilaterally  Respiratory:  Positive for   COPD; oxygen 24/7   Cardiovascular:  Negative      Gastroenterology: Negative      Genitourinary:  Negative      Musculoskeletal:  Negative      Neurologic:  Negative      Psychiatric:  Negative      Heme/Lymph/Imm:  Negative      Endocrine:  Negative        Physical Exam:  Vitals: /54 (BP Location: Right arm, Patient Position: Sitting, Cuff Size: Adult Regular)   Pulse 72   Ht 1.753 m (5' 9\")   Wt 68.7 kg (151 lb 6.4 oz)   BMI 22.36 kg/m      Constitutional:  cooperative, alert and oriented, well developed, well nourished, in no acute distress    "     Skin:  warm and dry to the touch, no apparent skin lesions or masses noted          Head:  normocephalic, no masses or lesions        Eyes:  pupils equal and round;sclera white;EOMS intact        Lymph:No Cervical lymphadenopathy present     ENT:  no pallor or cyanosis hearing aide(s) present      Neck:  JVP normal(no HJR. No Kussmauls) transmitted murmur(Left bruit >right, Left CEA scar, diminished right carotid pulse) diminished right carotid pulse    Respiratory:  healed median sternotomy scar;normal respiratory excursion(no wheeze/crackles) diminished breath sounds bilaterally(Left subclavian prominant bruit)  bibasilar fine crackles    Cardiac: regular rhythm;normal S1 and S2;no S3 or S4   distant heart sounds     systolic ejection murmur;RUSB;grade 1(vs transmitted subclavian bruit)          1+ 2+       0;right dorsalis pedis artery (trace) 1+ 1+;left radial artery(delayed relative to right radial)       0 1+(trace) right femoral bruit (+) left femoral bruit (-) (left radial pulse delayed compared to right, 1+ left, 2+ right radial pulse)    GI:  abdomen soft;BS normoactive;non-tender obese      Extremities and Muscular Skeletal:  no edema;no deformities, clubbing, cyanosis, erythema observed              Neurological:  no gross motor deficits;affect appropriate        Psych:  affect appropriate, oriented to time, person and place        KAELA Cruz NP  [address] [address]                Service Date: 08/19/2020      HISTORY OF PRESENT ILLNESS:  I saw Mr. Aguirre for establishment of cardiac care.  He is an 84-year-old white male with a history of coronary artery disease.  The patient had a cardiac arrest and received ICD implantation at [facility] in 2019.  He recently got 2 more ICD shocks for ventricular fibrillation.  Repeat coronary angiography did not result in stenting or other intervention.  Afterwards he has been on amiodarone 200 mg once a day.  So far, there have been no  recurrent ICD shocks.  So far, he seems to tolerate amiodarone well.      PAST MEDICAL HISTORY:  Remarkable for chronic renal insufficiency, renal artery stenosis, carotid artery stenosis with left-sided endarterectomy, COPD and he has been oxygen dependent since about a year ago.  He quit smoking about 11 years ago.      PHYSICAL EXAMINATION:   VITAL SIGNS:  Blood pressure was 118/54, heart rate 72 beats per minute, body weight 151 pounds.   GENERAL:  He came to the clinic in a wheelchair and oxygen tank.  He is accompanied by his daughter.  He has apparent hearing difficulty.  There was no apparent leg edema.      ASSESSMENT AND RECOMMENDATIONS:  Mr. Aguirre is relatively stable at the present time.  The patient has not been taking metoprolol recently due to low blood pressure.  That drug has been deleted from his record.  He may not be a good candidate for sotalol because of kidney dysfunction, although his LV ejection fraction is normal.  Being aware of his COPD, amiodarone is still probably the only good option to control ventricular fibrillation/tachycardia.  We will monitor his pulmonary function status closely in the future.  If there is apparent deterioration of the pulmonary function, we may have to reconsider sotalol.  Tentatively, he is scheduled to return to see me in 6 months.      cc:   MARKUS Reynoso, CNP   [facility]   [address] [place] [address], [place] [address]         MUKESH ORTIZ MD             D: 2020   T: 2020   MT: AGATA      Name:     ROC AGUIRRE   MRN:      [id number]        Account:      [id number]   :      1935           Service Date: 2020      Document: [id number]        Thank you for allowing me to participate in the care of your patient.      Sincerely,     Mukesh Ortiz MD     [facility]    cc:   Jonathan Cruz NP  [address] [facility] [place] [address]

## 2020-08-19 NOTE — LETTER
8/19/2020      [facility]  [address] [facility] [address]      RE: José Aguirre       Dear Colleague,    I had the pleasure of seeing José Aguirre in the [facility].    Service Date: 08/19/2020      HISTORY OF PRESENT ILLNESS:  I saw Mr. Aguirre for establishment of cardiac care.  He is an 84-year-old white male with a history of coronary artery disease.  The patient had a cardiac arrest and received ICD implantation at [facility] in 2019.  He recently got 2 more ICD shocks for ventricular fibrillation.  Repeat coronary angiography did not result in stenting or other intervention.  Afterwards he has been on amiodarone 200 mg once a day.  So far, there have been no recurrent ICD shocks.  So far, he seems to tolerate amiodarone well.      PAST MEDICAL HISTORY:  Remarkable for chronic renal insufficiency, renal artery stenosis, carotid artery stenosis with left-sided endarterectomy, COPD and he has been oxygen dependent since about a year ago.  He quit smoking about 11 years ago.      PHYSICAL EXAMINATION:   VITAL SIGNS:  Blood pressure was 118/54, heart rate 72 beats per minute, body weight 151 pounds.   GENERAL:  He came to the clinic in a wheelchair and oxygen tank.  He is accompanied by his daughter.  He has apparent hearing difficulty.  There was no apparent leg edema.      ASSESSMENT AND RECOMMENDATIONS:  Mr. Aguirre is relatively stable at the present time.  The patient has not been taking metoprolol recently due to low blood pressure.  That drug has been deleted from his record.  He may not be a good candidate for sotalol because of kidney dysfunction, although his LV ejection fraction is normal.  Being aware of his COPD, amiodarone is still probably the only good option to control ventricular fibrillation/tachycardia.  We will monitor his pulmonary function status closely in the future.  If there is apparent deterioration of the pulmonary function, we may  "have to reconsider sotalol.  Tentatively, he is scheduled to return to see me in 6 months.      cc:   MARKUS Reynoso, CNP   [facility]   [address] [place]  [id number] [place] [address]   [address] [place] [address], [address]   [place]  [address]         MUKESH GALO MD         D: 2020   T: 2020   MT: AGATA      Name:     ROC TAYLOR   MRN:      [id number]        Account:      [id number]   :      1935           Service Date: 2020      Document: [id number]        Outpatient Encounter Medications as of 2020   Medication Sig Dispense Refill     Ascorbic Acid 500 MG CHEW Take 500 mg by mouth daily       atorvastatin (LIPITOR) 40 MG tablet Take 1 tablet (40 mg) by mouth every evening 30 tablet 0     bumetanide (BUMEX) 0.5 MG tablet Take 0.5 mg by mouth daily       clopidogrel (PLAVIX) 75 MG tablet Take 75 mg by mouth daily       ferrous sulfate (FEROSUL) 325 (65 Fe) MG tablet Take 325 mg by mouth daily (with breakfast)       mometasone (ASMANEX TWISTHALER) 220 MCG/INH inhaler Inhale 2 puffs into the lungs 2 times daily       multivitamin w/minerals (THERA-VIT-M) tablet Take 1 tablet by mouth daily       nitroGLYcerin (NITROSTAT) 0.4 MG sublingual tablet For chest pain place 1 tablet under the tongue every 5 minutes for 3 doses. If symptoms persist 5 minutes after 1st dose call 911. 15 tablet 0     order for DME Wheelchair   Toilet Safety Frame  Straight Wall Grab Bar X 2 each  Adjustable Bath Shower Chair with Back--can only be 15 inches wide 1 each 0     order for DME Toilet Safety Frame  Wheelchair  Adjustable Bath Shower Chair with Back--can only be 15 inches wide  Straight wall grab bar X 2 each 1 each 0     order for DME Toilet Safety Frame  Adjustable Bath Shower Chair with back--can only be 15\" wide  Wheelchair  Straight wall grab bar X 2 each 1 each 0     order for DME Toilet Safety Frame  Adjustable Bath Shower Chair with Back - can ONLY be 15 inches " wide  Straight wall grab bar 1 each 0     pantoprazole (PROTONIX) 40 MG EC tablet Take 1 tablet (40 mg) by mouth 2 times daily (before meals) 60 tablet 0     POTASSIUM PO Take 500 mg by mouth daily       tiotropium-olodaterol 2.5-2.5 MCG/ACT AERS Inhale 2 puffs into the lungs daily       triamcinolone (KENALOG) 0.1 % external cream Apply topically 2 times daily       vitamin D3 (CHOLECALCIFEROL) 2000 units (50 mcg) tablet Take 2,000 Units by mouth daily       [DISCONTINUED] amiodarone (PACERONE) 200 MG tablet Take 1 tablet (200 mg) by mouth daily 30 tablet 1     albuterol (PROAIR HFA/PROVENTIL HFA/VENTOLIN HFA) 108 (90 Base) MCG/ACT inhaler Inhale 2 puffs into the lungs every 4-6 hours as needed for wheezing       [DISCONTINUED] metoprolol succinate ER (TOPROL-XL) 25 MG 24 hr tablet Take 12.5 mg by mouth daily       [DISCONTINUED] potassium chloride (KLOR-CON) 20 MEQ packet Take 20 mEq by mouth 2 times daily Possibly taking 500 mg potassium - they are unsure       [DISCONTINUED] zinc sulfate (ZINCATE) 220 (50 Zn) MG capsule Take 1 capsule (220 mg) by mouth daily (Patient not taking: Reported on 8/19/2020)       No facility-administered encounter medications on file as of 8/19/2020.        Again, thank you for allowing me to participate in the care of your patient.      Sincerely,    Gonzalo Ortiz MD     [facility]

## 2020-08-19 NOTE — PROGRESS NOTES
HPI and Plan:   See dictation    Orders Placed This Encounter   Procedures     XR Chest 2 Views     Hepatic panel     TSH with free T4 reflex     Basic metabolic panel     Follow-Up with Electrophysiologist     EKG 12-lead complete w/read - Clinics (performed today)     Pulmonary function test       Orders Placed This Encounter   Medications     POTASSIUM PO     Sig: Take 500 mg by mouth daily       Medications Discontinued During This Encounter   Medication Reason     potassium chloride (KLOR-CON) 20 MEQ packet Dose adjustment     metoprolol succinate ER (TOPROL-XL) 25 MG 24 hr tablet      zinc sulfate (ZINCATE) 220 (50 Zn) MG capsule          Encounter Diagnoses   Name Primary?     Ventricular fibrillation (H)      ICD (implantable cardioverter-defibrillator) discharge      ICD (implantable cardioverter-defibrillator) in place      Torsades de pointes (H)      Coronary artery disease involving native coronary artery of native heart without angina pectoris      Hyperlipidemia LDL goal <70      Essential hypertension      Hx of CABG        CURRENT MEDICATIONS:  Current Outpatient Medications   Medication Sig Dispense Refill     amiodarone (PACERONE) 200 MG tablet Take 1 tablet (200 mg) by mouth daily 30 tablet 1     Ascorbic Acid 500 MG CHEW Take 500 mg by mouth daily       atorvastatin (LIPITOR) 40 MG tablet Take 1 tablet (40 mg) by mouth every evening 30 tablet 0     bumetanide (BUMEX) 0.5 MG tablet Take 0.5 mg by mouth daily       clopidogrel (PLAVIX) 75 MG tablet Take 75 mg by mouth daily       ferrous sulfate (FEROSUL) 325 (65 Fe) MG tablet Take 325 mg by mouth daily (with breakfast)       mometasone (ASMANEX TWISTHALER) 220 MCG/INH inhaler Inhale 2 puffs into the lungs 2 times daily       multivitamin w/minerals (THERA-VIT-M) tablet Take 1 tablet by mouth daily       nitroGLYcerin (NITROSTAT) 0.4 MG sublingual tablet For chest pain place 1 tablet under the tongue every 5 minutes for 3 doses. If symptoms  "persist 5 minutes after 1st dose call 911. 15 tablet 0     order for DME Wheelchair   Toilet Safety Frame  Straight Wall Grab Bar X 2 each  Adjustable Bath Shower Chair with Back--can only be 15 inches wide 1 each 0     order for DME Toilet Safety Frame  Wheelchair  Adjustable Bath Shower Chair with Back--can only be 15 inches wide  Straight wall grab bar X 2 each 1 each 0     order for DME Toilet Safety Frame  Adjustable Bath Shower Chair with back--can only be 15\" wide  Wheelchair  Straight wall grab bar X 2 each 1 each 0     order for DME Toilet Safety Frame  Adjustable Bath Shower Chair with Back - can ONLY be 15 inches wide  Straight wall grab bar 1 each 0     pantoprazole (PROTONIX) 40 MG EC tablet Take 1 tablet (40 mg) by mouth 2 times daily (before meals) 60 tablet 0     POTASSIUM PO Take 500 mg by mouth daily       tiotropium-olodaterol 2.5-2.5 MCG/ACT AERS Inhale 2 puffs into the lungs daily       triamcinolone (KENALOG) 0.1 % external cream Apply topically 2 times daily       vitamin D3 (CHOLECALCIFEROL) 2000 units (50 mcg) tablet Take 2,000 Units by mouth daily       albuterol (PROAIR HFA/PROVENTIL HFA/VENTOLIN HFA) 108 (90 Base) MCG/ACT inhaler Inhale 2 puffs into the lungs every 4-6 hours as needed for wheezing         ALLERGIES   No Known Allergies    PAST MEDICAL HISTORY:  Past Medical History:   Diagnosis Date     Anemia      CKD (chronic kidney disease), stage III (H)      Claudication, intermittent (H)      Coronary artery disease 2003    CAB x 4     History of GI bleed 2012    Hemorrhagic gastritis     Hypercholesterolemia      Hypertension      Left carotid artery stenosis     S/P Carotid Endarterectomy left      Renal artery stenosis (H)     stent R     VF (ventricular fibrillation) (H)        PAST SURGICAL HISTORY:  Past Surgical History:   Procedure Laterality Date     BYPASS GRAFT ARTERY CORONARY  06/2003    LIMA=LAD, SVG=Diag, SVG=OM2, SVG=PDA     CAROTID ENDARTERECTOMY Left      CV " CORONARY ANGIOGRAM N/A 2019    Procedure: Coronary Angiogram;  Surgeon: Andrew Garcia MD;  Location:  HEART CARDIAC CATH LAB     CV PCI STENT DRUG ELUTING N/A 2019    Procedure: PCI Stent Drug Eluting;  Surgeon: Andrew Garcia MD;  Location: [facility] CARDIAC CATH LAB     ENDARTERECTOMY CAROTID Left 2017    Procedure: ENDARTERECTOMY CAROTID;  REDO LEFT CAROTID ENDARTERECTOMY WITH RIGHT ANKLE GREATER SAPHENOUS VEIN  ;  Surgeon: Khurram Bishop MD;  Location: [facility] OR     EP ICD N/A 9/10/2019    Procedure: EP ICD;  Surgeon: Shorty Marion MD;  Location:  HEART CARDIAC CATH LAB     EYE SURGERY       HEART CATH LEFT HEART CATH  2003    Severe multivessel disease     IR THORACENTESIS  2019     RENAL ARTERY ANGIOGRAM Right 2013    with stenting     TONSILLECTOMY         FAMILY HISTORY:  Family History   Problem Relation Age of Onset     Cerebrovascular Disease Mother 90        unknown type     Cancer Brother 70        Brain cancer      Dementia Sister      Arthritis Brother        SOCIAL HISTORY:  Social History     Socioeconomic History     Marital status:      Spouse name: None     Number of children: None     Years of education: None     Highest education level: None   Occupational History     None   Social Needs     Financial resource strain: None     Food insecurity     Worry: None     Inability: None     Transportation needs     Medical: None     Non-medical: None   Tobacco Use     Smoking status: Former Smoker     Packs/day: 1.00     Years: 50.00     Pack years: 50.00     Types: Cigarettes     Last attempt to quit: 2001     Years since quittin.6     Smokeless tobacco: Never Used   Substance and Sexual Activity     Alcohol use: Yes     Comment: very rare     Drug use: No     Sexual activity: None   Lifestyle     Physical activity     Days per week: None     Minutes per session: None     Stress: None   Relationships     Social connections     Talks on  "phone: None     Gets together: None     Attends Tenriism service: None     Active member of club or organization: None     Attends meetings of clubs or organizations: None     Relationship status: None     Intimate partner violence     Fear of current or ex partner: None     Emotionally abused: None     Physically abused: None     Forced sexual activity: None   Other Topics Concern      Service Not Asked     Blood Transfusions Not Asked     Caffeine Concern Yes     Comment: 4 - 7 cups (trying to cut down)     Occupational Exposure Not Asked     Hobby Hazards Not Asked     Sleep Concern Not Asked     Stress Concern Not Asked     Weight Concern Not Asked     Special Diet Yes     Comment: eats healthy     Back Care Not Asked     Exercise Yes     Comment: walking     Bike Helmet Not Asked     Seat Belt Not Asked     Self-Exams Not Asked     Parent/sibling w/ CABG, MI or angioplasty before 65F 55M? Not Asked   Social History Narrative    ** Merged History Encounter **            Review of Systems:  Skin:  Negative       Eyes:  Positive for glasses    ENT:  Positive for hearing loss  pt has hearing aids bilaterally  Respiratory:  Positive for   COPD; oxygen 24/7   Cardiovascular:  Negative      Gastroenterology: Negative      Genitourinary:  Negative      Musculoskeletal:  Negative      Neurologic:  Negative      Psychiatric:  Negative      Heme/Lymph/Imm:  Negative      Endocrine:  Negative        Physical Exam:  Vitals: /54 (BP Location: Right arm, Patient Position: Sitting, Cuff Size: Adult Regular)   Pulse 72   Ht 1.753 m (5' 9\")   Wt 68.7 kg (151 lb 6.4 oz)   BMI 22.36 kg/m      Constitutional:  cooperative, alert and oriented, well developed, well nourished, in no acute distress        Skin:  warm and dry to the touch, no apparent skin lesions or masses noted          Head:  normocephalic, no masses or lesions        Eyes:  pupils equal and round;sclera white;EOMS intact        Lymph:No Cervical " lymphadenopathy present     ENT:  no pallor or cyanosis hearing aide(s) present      Neck:  JVP normal(no HJR. No Kussmauls) transmitted murmur(Left bruit >right, Left CEA scar, diminished right carotid pulse) diminished right carotid pulse    Respiratory:  healed median sternotomy scar;normal respiratory excursion(no wheeze/crackles) diminished breath sounds bilaterally(Left subclavian prominant bruit)  bibasilar fine crackles    Cardiac: regular rhythm;normal S1 and S2;no S3 or S4   distant heart sounds     systolic ejection murmur;RUSB;grade 1(vs transmitted subclavian bruit)          1+ 2+       0;right dorsalis pedis artery (trace) 1+ 1+;left radial artery(delayed relative to right radial)       0 1+(trace) right femoral bruit (+) left femoral bruit (-) (left radial pulse delayed compared to right, 1+ left, 2+ right radial pulse)    GI:  abdomen soft;BS normoactive;non-tender obese      Extremities and Muscular Skeletal:  no edema;no deformities, clubbing, cyanosis, erythema observed              Neurological:  no gross motor deficits;affect appropriate        Psych:  affect appropriate, oriented to time, person and place        KAELA Cruz, EDITH  [address] [place] [address]

## 2020-08-19 NOTE — PROGRESS NOTES
Service Date: 08/19/2020      HISTORY OF PRESENT ILLNESS:  I saw Mr. Aguirre for establishment of cardiac care.  He is an 84-year-old white male with a history of coronary artery disease.  The patient had a cardiac arrest and received ICD implantation at [facility] in 2019.  He recently got 2 more ICD shocks for ventricular fibrillation.  Repeat coronary angiography did not result in stenting or other intervention.  Afterwards he has been on amiodarone 200 mg once a day.  So far, there have been no recurrent ICD shocks.  So far, he seems to tolerate amiodarone well.      PAST MEDICAL HISTORY:  Remarkable for chronic renal insufficiency, renal artery stenosis, carotid artery stenosis with left-sided endarterectomy, COPD and he has been oxygen dependent since about a year ago.  He quit smoking about 11 years ago.      PHYSICAL EXAMINATION:   VITAL SIGNS:  Blood pressure was 118/54, heart rate 72 beats per minute, body weight 151 pounds.   GENERAL:  He came to the clinic in a wheelchair and oxygen tank.  He is accompanied by his daughter.  He has apparent hearing difficulty.  There was no apparent leg edema.      ASSESSMENT AND RECOMMENDATIONS:  Mr. Aguirre is relatively stable at the present time.  The patient has not been taking metoprolol recently due to low blood pressure.  That drug has been deleted from his record.  He may not be a good candidate for sotalol because of kidney dysfunction, although his LV ejection fraction is normal.  Being aware of his COPD, amiodarone is still probably the only good option to control ventricular fibrillation/tachycardia.  We will monitor his pulmonary function status closely in the future.  If there is apparent deterioration of the pulmonary function, we may have to reconsider sotalol.  Tentatively, he is scheduled to return to see me in 6 months.      cc:   MARKUS Reynoso, CNP   [facility]   [address] [place], [place]  [id number] [place] [address]   [address] [place] [address], [address]  [address]         MUKESH GALO MD             D: 2020   T: 2020   MT: AGATA      Name:     ROC TAYLOR   MRN:      [id number]        Account:      [id number]   :      1935           Service Date: 2020      Document: [id number]

## 2020-08-20 NOTE — TELEPHONE ENCOUNTER
Spoke to pt who needs a script sent to [facility] for his Amiodarone 200 mg daily.  Fax sent with note from Dr Ortiz, labs and prescription. Cheli

## 2020-08-31 NOTE — TELEPHONE ENCOUNTER
8/31/20 Pt called to report fax was not received. Re sent fax to [facility] [phone]. Marlyn 210 pm

## 2020-09-01 NOTE — TELEPHONE ENCOUNTER
Received call from patient checking on the status of his Amiodarone to the [facility].  Notified him that it looked like it had been re-faxed yesterday.    Patient stated he had received a bill from the [facility] for it and he was wondering about that.  Recommended he call the [facility] at the billing number on the statement to see if they would be able to answer his questions regarding this.    Patient verbalized understanding and agreement with plan. He will call with any further questions.    BERLIN Thompson

## 2020-09-08 NOTE — TELEPHONE ENCOUNTER
[facility].  Patient provided different fax number to send script and OV note to ([phone]).  Sent script

## 2020-09-09 NOTE — NURSING NOTE
Chief Complaint   Patient presents with     RECHECK     follow up      Medications reviewed and updated.  Vitals taken  Penelope Guzman CMA

## 2020-09-09 NOTE — PROGRESS NOTES
Pulmonary Clinic  Visit Note    CC: Follow up CPFE       HPI:     84M with PMHx of CAD s/p CABG, CKD III, renal artery stenosis, pHTN and has been following Pulmonary for CPFE, His diease was previously stable, requiring 5LPM O2. He was found to have 5mm RML nodule and pleural plaques, follow up CT-CHEST was delayed due to OCVID-19. He was recently admitted to the hospital for Torsades and acute anemia. He underwent transfusion and had a Mabel scan which showed only small area of ischemia. He was not taking his metoprolol, which was thought to be the reason for his arrhythmia. He was last seen in the pulmonary clinic in 4/20 as a virtual visit and was doing well.   ECHO RV dilation, mildly reduced function and elevated RVSP consistent with moderate pHTN.     Today on presentation patient reports feeling slightly better in terms of his breathing. He denies any recent fever, chills, cough, chest pain or palpitations. He has requested to see a pulmonary doctor closer to his home.       Medications:  Current Outpatient Medications   Medication Sig Dispense Refill     albuterol (PROAIR HFA/PROVENTIL HFA/VENTOLIN HFA) 108 (90 Base) MCG/ACT inhaler Inhale 2 puffs into the lungs every 4-6 hours as needed for wheezing       amiodarone (PACERONE) 200 MG tablet Take 1 tablet (200 mg) by mouth daily 90 tablet 3     Ascorbic Acid 500 MG CHEW Take 500 mg by mouth daily       atorvastatin (LIPITOR) 40 MG tablet Take 1 tablet (40 mg) by mouth every evening 30 tablet 0     bumetanide (BUMEX) 0.5 MG tablet Take 0.5 mg by mouth daily       clopidogrel (PLAVIX) 75 MG tablet Take 75 mg by mouth daily       ferrous sulfate (FEROSUL) 325 (65 Fe) MG tablet Take 325 mg by mouth daily (with breakfast)       mometasone (ASMANEX TWISTHALER) 220 MCG/INH inhaler Inhale 2 puffs into the lungs 2 times daily       multivitamin w/minerals (THERA-VIT-M) tablet Take 1 tablet by mouth daily       nitroGLYcerin (NITROSTAT) 0.4 MG sublingual tablet For  "chest pain place 1 tablet under the tongue every 5 minutes for 3 doses. If symptoms persist 5 minutes after 1st dose call 911. 15 tablet 0     order for DME Wheelchair   Toilet Safety Frame  Straight Wall Grab Bar X 2 each  Adjustable Bath Shower Chair with Back--can only be 15 inches wide 1 each 0     order for DME Toilet Safety Frame  Wheelchair  Adjustable Bath Shower Chair with Back--can only be 15 inches wide  Straight wall grab bar X 2 each 1 each 0     order for DME Toilet Safety Frame  Adjustable Bath Shower Chair with back--can only be 15\" wide  Wheelchair  Straight wall grab bar X 2 each 1 each 0     order for DME Toilet Safety Frame  Adjustable Bath Shower Chair with Back - can ONLY be 15 inches wide  Straight wall grab bar 1 each 0     pantoprazole (PROTONIX) 40 MG EC tablet Take 1 tablet (40 mg) by mouth 2 times daily (before meals) 60 tablet 0     POTASSIUM PO Take 500 mg by mouth daily       tiotropium-olodaterol 2.5-2.5 MCG/ACT AERS Inhale 2 puffs into the lungs daily       triamcinolone (KENALOG) 0.1 % external cream Apply topically 2 times daily       vitamin D3 (CHOLECALCIFEROL) 2000 units (50 mcg) tablet Take 2,000 Units by mouth daily         ROS: Complete 10 point ROS negative unless mentioned in HPI    Physical Exam:  There were no vitals taken for this visit.    General: Sitting in the chair in NAD  HEENT: anicteric, moist mucosa  Neck: no palpable lymphadenopathy, no JVD noted  Chest: CTAB, no wheezing  Cardiac: RRR no murmurs  Abdomen: Soft, flat, non tender, active BS  Extremities: No LE Edema  Neuro: A&Ox3, no focal defecits  Skin: no rash noted        Labs and Radiology:  CT-CHEST 9/20   My read       PFT's:  PFT Latest Ref Rng & Units 9/9/2020   FVC L 4.09   FEV1 L 2.59   FVC% % 116   FEV1% % 100         Assessment and Plan:  84M with PMHx of CAD s/p CABG, CKD III, renal artery stenosis and has been following Pulmonary for CPFE. Patient had recent admission to the hospital for Torsades " 7/20 and has been started on metoprolol. His pulmonary disease has been stable with no increase in O2 requirements. His CT-CHEST today showed stable CPFE disease and nodules. RML nodule has not changed. Of note he had ECHO 7/20 which showed evidence of pHTN. Likely is Group III in setting of his CPFE. Will continue him on his current inhalers and follow up in 6 months with PFT's. He wants to see a pulmonologist at [place] which is closer to his home.         # Chronic hypoxic respiratory failure 2/2 CPFE  # Moderate Pulmonary HTN   # Pulmonary nodules (largest 5 mm RML)   # Pleural plaques 2/2 Asbestosis      Plan   - Continue BAM, LAMA/LABA and ICS inhalers   - Follow up with Pulmonary in 6 months with PFT's and CT-CHEST        FOREST TUCKER   [facility] FELLOW   Case discussed with Dr. Araya     Attending statement:    The patient was seen and examined by me with the pulmonary fellow, Dr Mayer.  The case was discussed at length.  Vitals, lab results and imaging from our visit were reviewed.  The note written by Dr Mayer above reflects our joint assessment and plan.    Richardson Araya MD

## 2020-09-09 NOTE — LETTER
9/9/2020         RE: José Aguirre  [address]  [place] [address]        Dear Colleague,    Thank you for referring your patient, José Aguirre, to the [facility]. Please see a copy of my visit note below.    Pulmonary Clinic  Visit Note    CC: Follow up CPFE       HPI:     84M with PMHx of CAD s/p CABG, CKD III, renal artery stenosis, pHTN and has been following Pulmonary for CPFE, His diease was previously stable, requiring 5LPM O2. He was found to have 5mm RML nodule and pleural plaques, follow up CT-CHEST was delayed due to OCVID-19. He was recently admitted to the hospital for Torsades and acute anemia. He underwent transfusion and had a Mabel scan which showed only small area of ischemia. He was not taking his metoprolol, which was thought to be the reason for his arrhythmia. He was last seen in the pulmonary clinic in 4/20 as a virtual visit and was doing well.   ECHO RV dilation, mildly reduced function and elevated RVSP consistent with moderate pHTN.     Today on presentation patient reports feeling slightly better in terms of his breathing. He denies any recent fever, chills, cough, chest pain or palpitations. He has requested to see a pulmonary doctor closer to his home.       Medications:  Current Outpatient Medications   Medication Sig Dispense Refill     albuterol (PROAIR HFA/PROVENTIL HFA/VENTOLIN HFA) 108 (90 Base) MCG/ACT inhaler Inhale 2 puffs into the lungs every 4-6 hours as needed for wheezing       amiodarone (PACERONE) 200 MG tablet Take 1 tablet (200 mg) by mouth daily 90 tablet 3     Ascorbic Acid 500 MG CHEW Take 500 mg by mouth daily       atorvastatin (LIPITOR) 40 MG tablet Take 1 tablet (40 mg) by mouth every evening 30 tablet 0     bumetanide (BUMEX) 0.5 MG tablet Take 0.5 mg by mouth daily       clopidogrel (PLAVIX) 75 MG tablet Take 75 mg by mouth daily       ferrous sulfate (FEROSUL) 325 (65 Fe) MG tablet Take 325 mg by mouth  "daily (with breakfast)       mometasone (ASMANEX TWISTHALER) 220 MCG/INH inhaler Inhale 2 puffs into the lungs 2 times daily       multivitamin w/minerals (THERA-VIT-M) tablet Take 1 tablet by mouth daily       nitroGLYcerin (NITROSTAT) 0.4 MG sublingual tablet For chest pain place 1 tablet under the tongue every 5 minutes for 3 doses. If symptoms persist 5 minutes after 1st dose call 911. 15 tablet 0     order for DME Wheelchair   Toilet Safety Frame  Straight Wall Grab Bar X 2 each  Adjustable Bath Shower Chair with Back--can only be 15 inches wide 1 each 0     order for DME Toilet Safety Frame  Wheelchair  Adjustable Bath Shower Chair with Back--can only be 15 inches wide  Straight wall grab bar X 2 each 1 each 0     order for DME Toilet Safety Frame  Adjustable Bath Shower Chair with back--can only be 15\" wide  Wheelchair  Straight wall grab bar X 2 each 1 each 0     order for DME Toilet Safety Frame  Adjustable Bath Shower Chair with Back - can ONLY be 15 inches wide  Straight wall grab bar 1 each 0     pantoprazole (PROTONIX) 40 MG EC tablet Take 1 tablet (40 mg) by mouth 2 times daily (before meals) 60 tablet 0     POTASSIUM PO Take 500 mg by mouth daily       tiotropium-olodaterol 2.5-2.5 MCG/ACT AERS Inhale 2 puffs into the lungs daily       triamcinolone (KENALOG) 0.1 % external cream Apply topically 2 times daily       vitamin D3 (CHOLECALCIFEROL) 2000 units (50 mcg) tablet Take 2,000 Units by mouth daily         ROS: Complete 10 point ROS negative unless mentioned in HPI    Physical Exam:  There were no vitals taken for this visit.    General: Sitting in the chair in NAD  HEENT: anicteric, moist mucosa  Neck: no palpable lymphadenopathy, no JVD noted  Chest: CTAB, no wheezing  Cardiac: RRR no murmurs  Abdomen: Soft, flat, non tender, active BS  Extremities: No LE Edema  Neuro: A&Ox3, no focal defecits  Skin: no rash noted        Labs and Radiology:  CT-CHEST 9/20   My read       PFT's:  PFT Latest Ref " Rng & Units 9/9/2020   FVC L 4.09   FEV1 L 2.59   FVC% % 116   FEV1% % 100         Assessment and Plan:  84M with PMHx of CAD s/p CABG, CKD III, renal artery stenosis and has been following Pulmonary for CPFE. Patient had recent admission to the hospital for Torsades 7/20 and has been started on metoprolol. His pulmonary disease has been stable with no increase in O2 requirements. His CT-CHEST today showed stable CPFE disease and nodules. RML nodule has not changed. Of note he had ECHO 7/20 which showed evidence of pHTN. Likely is Group III in setting of his CPFE. Will continue him on his current inhalers and follow up in 6 months with PFT's. He wants to see a pulmonologist at [place] which is closer to his home.         # Chronic hypoxic respiratory failure 2/2 CPFE  # Moderate Pulmonary HTN   # Pulmonary nodules (largest 5 mm RML)   # Pleural plaques 2/2 Asbestosis      Plan   - Continue BAM, LAMA/LABA and ICS inhalers   - Follow up with Pulmonary in 6 months with PFT's and CT-CHEST        FOREST TUCKER   [facility] FELLOW   Case discussed with Dr. Araya     Attending statement:    The patient was seen and examined by me with the pulmonary fellow, Dr Mayer.  The case was discussed at length.  Vitals, lab results and imaging from our visit were reviewed.  The note written by Dr Mayer above reflects our joint assessment and plan.    Richardson Araya MD          Again, thank you for allowing me to participate in the care of your patient.        Sincerely,        Brody Mayer MD

## 2020-09-09 NOTE — PATIENT INSTRUCTIONS
Your lung disease is stable. Your pulmonary nodules are also stable.   You do have pulmonary hypertension, which is likely contributing to your shortness of breath.   Follow up with Pulmonary in 6 months.

## 2020-09-14 NOTE — TELEPHONE ENCOUNTER
[facility] and pt wanted sent to [facility].  Escript has been sent. Will make pt aware. JNelsonRHERNANDO

## 2020-09-14 NOTE — TELEPHONE ENCOUNTER
Pt called and stated that the [facility] told [facility] that he needed to have a f/u with the provider who ordered the Amiodarone.  Explained that pt is to see Dr Ortiz in February an this was ordered, but is not scheduled at this time. Explained that this is in the note that was included with the prescription. [facility] will [facility] to verify what is really needed and if this is enough. Cheli

## 2020-10-05 NOTE — TELEPHONE ENCOUNTER
Received phone call from patient's daughter, Marie, who had multiple appropriate questions regarding her dads medications and reasoning behind taking them. Reviewed Dr. Ortiz's note from August explaining why patient was prescribed Amiodarone and what monitoring came with that to make sure patient's lung function was tolerating it alright.     Marie was appreciative of the explanation for all medications and just wanted to make sure Dr. Ortiz understood that patient ended up with all of these heart problems after he had accidentally been taking too much Eliquis causing his hemoglobin to drop substantially. Informed Marie that this conversation would be documented and that family is welcome to remind him and review additional questions at patients follow-up visit in 02/2021.    Last question Marie wanted reviewed with Dr. Ortiz, was that it was appropriate for patient to still be off the Eliquis at this time. Informed Marie this questions will get forwarded to Dr. Ortiz and once he responds someone would be reaching out to update her.

## 2020-10-06 NOTE — TELEPHONE ENCOUNTER
I do not see apparent indications for Eliquis. Yes, off Eliquis unless there is a new indication for that drug.

## 2020-10-26 LAB
MDC_IDC_LEAD_IMPLANT_DT: NORMAL
MDC_IDC_LEAD_LOCATION: NORMAL
MDC_IDC_LEAD_LOCATION_DETAIL_1: NORMAL
MDC_IDC_LEAD_MFG: NORMAL
MDC_IDC_LEAD_MODEL: NORMAL
MDC_IDC_LEAD_SERIAL: NORMAL
MDC_IDC_MSMT_BATTERY_REMAINING_PERCENTAGE: 100 %
MDC_IDC_MSMT_BATTERY_RRT_TRIGGER: NORMAL
MDC_IDC_MSMT_BATTERY_STATUS: NORMAL
MDC_IDC_MSMT_BATTERY_VOLTAGE: 3.1 V
MDC_IDC_MSMT_CAP_CHARGE_DTM: NORMAL
MDC_IDC_MSMT_CAP_CHARGE_ENERGY: 40 J
MDC_IDC_MSMT_CAP_CHARGE_TIME: 8.4 S
MDC_IDC_MSMT_CAP_CHARGE_TYPE: NORMAL
MDC_IDC_MSMT_LEADCHNL_RA_LEAD_CHANNEL_STATUS: NORMAL
MDC_IDC_MSMT_LEADCHNL_RV_IMPEDANCE_VALUE: 598 OHM
MDC_IDC_MSMT_LEADCHNL_RV_LEAD_CHANNEL_STATUS: NORMAL
MDC_IDC_MSMT_LEADCHNL_RV_PACING_THRESHOLD_AMPLITUDE: 0.5 V
MDC_IDC_MSMT_LEADCHNL_RV_PACING_THRESHOLD_PULSEWIDTH: 0.4 MS
MDC_IDC_PG_IMPLANT_DTM: NORMAL
MDC_IDC_PG_MFG: NORMAL
MDC_IDC_PG_MODEL: NORMAL
MDC_IDC_PG_SERIAL: NORMAL
MDC_IDC_PG_TYPE: NORMAL
MDC_IDC_SESS_CLINIC_NAME: NORMAL
MDC_IDC_SESS_DTM: NORMAL
MDC_IDC_SESS_REPROGRAMMED: NO
MDC_IDC_SESS_TYPE: NORMAL
MDC_IDC_SET_BRADY_AT_MODE_SWITCH_MODE: NORMAL
MDC_IDC_SET_BRADY_AT_MODE_SWITCH_RATE: 160 {BEATS}/MIN
MDC_IDC_SET_BRADY_LOWRATE: 50 {BEATS}/MIN
MDC_IDC_SET_BRADY_MAX_SENSOR_RATE: 120 {BEATS}/MIN
MDC_IDC_SET_BRADY_MAX_TRACKING_RATE: 130 {BEATS}/MIN
MDC_IDC_SET_BRADY_MODE: NORMAL
MDC_IDC_SET_BRADY_SAV_DELAY_HIGH: 230 MS
MDC_IDC_SET_BRADY_SAV_DELAY_LOW: 260 MS
MDC_IDC_SET_LEADCHNL_RA_SENSING_POLARITY: NORMAL
MDC_IDC_SET_LEADCHNL_RA_SENSING_SENSITIVITY: 0.4 MV
MDC_IDC_SET_LEADCHNL_RV_PACING_AMPLITUDE: 1.5 V
MDC_IDC_SET_LEADCHNL_RV_PACING_POLARITY: NORMAL
MDC_IDC_SET_LEADCHNL_RV_PACING_PULSEWIDTH: 0.4 MS
MDC_IDC_SET_LEADCHNL_RV_SENSING_ADAPTATION_MODE: NORMAL
MDC_IDC_SET_LEADCHNL_RV_SENSING_POLARITY: NORMAL
MDC_IDC_SET_LEADCHNL_RV_SENSING_SENSITIVITY: 0.8 MV
MDC_IDC_SET_ZONE_DETECTION_BEATS_DENOMINATOR: 40 {BEATS}
MDC_IDC_SET_ZONE_DETECTION_BEATS_NUMERATOR: 30 {BEATS}
MDC_IDC_SET_ZONE_DETECTION_INTERVAL: 250 MS
MDC_IDC_SET_ZONE_DETECTION_INTERVAL: 300 MS
MDC_IDC_SET_ZONE_TYPE: NORMAL
MDC_IDC_SET_ZONE_TYPE: NORMAL
MDC_IDC_STAT_AT_BURDEN_PERCENT: 0 %
MDC_IDC_STAT_AT_DTM_END: NORMAL
MDC_IDC_STAT_AT_DTM_START: NORMAL
MDC_IDC_STAT_AT_MODE_SW_COUNT_PER_DAY: 1
MDC_IDC_STAT_BRADY_AS_VP_PERCENT: 0 %
MDC_IDC_STAT_BRADY_AS_VS_PERCENT: 100 %
MDC_IDC_STAT_BRADY_DTM_END: NORMAL
MDC_IDC_STAT_BRADY_DTM_START: NORMAL
MDC_IDC_STAT_BRADY_RV_PERCENT_PACED: 0 %
MDC_IDC_STAT_CRT_DTM_END: NORMAL
MDC_IDC_STAT_CRT_DTM_START: NORMAL
MDC_IDC_STAT_EPISODE_TOTAL_COUNT: 0
MDC_IDC_STAT_EPISODE_TOTAL_COUNT: 192
MDC_IDC_STAT_EPISODE_TOTAL_COUNT: 5
MDC_IDC_STAT_EPISODE_TOTAL_COUNT_DTM_END: NORMAL
MDC_IDC_STAT_EPISODE_TOTAL_COUNT_DTM_START: NORMAL
MDC_IDC_STAT_EPISODE_TYPE: NORMAL
MDC_IDC_STAT_TACHYTHERAPY_ATP_DELIVERED_RECENT: 0
MDC_IDC_STAT_TACHYTHERAPY_ATP_DELIVERED_TOTAL: 1
MDC_IDC_STAT_TACHYTHERAPY_RECENT_DTM_END: NORMAL
MDC_IDC_STAT_TACHYTHERAPY_RECENT_DTM_START: NORMAL
MDC_IDC_STAT_TACHYTHERAPY_SHOCKS_ABORTED_RECENT: 0
MDC_IDC_STAT_TACHYTHERAPY_SHOCKS_ABORTED_TOTAL: 1
MDC_IDC_STAT_TACHYTHERAPY_SHOCKS_DELIVERED_RECENT: 0
MDC_IDC_STAT_TACHYTHERAPY_SHOCKS_DELIVERED_TOTAL: 4
MDC_IDC_STAT_TACHYTHERAPY_TOTAL_DTM_END: NORMAL
MDC_IDC_STAT_TACHYTHERAPY_TOTAL_DTM_START: NORMAL

## 2023-04-28 NOTE — PLAN OF CARE
Discharge Planner SLP   Patient plan for discharge: none stated   Current status: SLP: Pt seen for dysphagia tx while upright in bed (30L with 60% FiO2 via HFNC). Pt with improved PO tolerance today. Recommend pt advance to dysphagia diet 3 and thin liquids. Pt should be fully upright and alert for all PO, take small single sips/bites, and slow pacing. Please hold PO should pt require increased O2 needs. ST to continue to follow.   Barriers to return to prior living situation: dysphagia, respiratory status, weakness   Recommendations for discharge: TCU  Rationale for recommendations: pt would benefit from continued ST to safely return to baseline diet level        Entered by: Amy Cole 09/10/2019 1:46 PM       bilateral UE Active ROM was WNL (within normal limits)

## (undated) DEVICE — WIRE GUIDE 0.035"X260CM AMPTLAZ XSTIFF CVD THSCF-35-260-3-A

## (undated) DEVICE — CATH ANGIO INFINITI 3DRC 6FRX100CM 534676T

## (undated) DEVICE — GUIDEWIRE ASAHI SION BLUE 0.014"X180CM J-TIP AHW14R004J

## (undated) DEVICE — Device

## (undated) DEVICE — SU PROLENE 6-0 C-1DA 30" 8706H

## (undated) DEVICE — PREP DURAPREP 06ML APL 8635

## (undated) DEVICE — 0.035IN X 260CM, EMERALD DIAGNOSTIC GUIDEWIRE, FIXED-CORE PTFE COATED, STANDARD, 3MM EXCHANGE J-TIP (EA/1)

## (undated) DEVICE — CATH ANGIO INFINITI PIGTAIL 6FRX110CM 6 SH 534650S

## (undated) DEVICE — SURGICEL HEMOSTAT 2X3" 1953

## (undated) DEVICE — CATH GUIDING BLUE YELLOW PTFE XB3.5 6FRX100CM 67005400

## (undated) DEVICE — KIT HAND CONTROL ACIST 014644 AR-P54

## (undated) DEVICE — CATH BALLOON EMERGE 2.5X12MM H7493918912250

## (undated) DEVICE — CATH BALLOON EMERGE 3.5X8MM H7493918908350

## (undated) DEVICE — CATH ANGIO 6FR MPA2 2 SH 100CM

## (undated) DEVICE — GLOVE PROTEXIS W/NEU-THERA 7.5  2D73TE75

## (undated) DEVICE — VALVE HEMOSTASIS .096" COPILOT MECH 1003331

## (undated) DEVICE — SYR 10ML FINGER CONTROL W/O NDL 309695

## (undated) DEVICE — SUCTION CANISTER MEDIVAC LINER 3000ML W/LID 65651-530

## (undated) DEVICE — LINEN TOWEL PACK X5 5464

## (undated) DEVICE — INTRODUCER SHEATH BRITE TIP 8FRX55CM 401855M

## (undated) DEVICE — WIRE GUIDE 0.035"X150CM EMERALD J TIP 502521

## (undated) DEVICE — SU MONOCRYL 4-0 PS-2 18" UND Y496G

## (undated) DEVICE — SU VICRYL 3-0 SH 27" J316H

## (undated) DEVICE — NDL BLUNT 19GA 1.5"

## (undated) DEVICE — NDL 22GA 1.5"

## (undated) DEVICE — KIT START UP COOLGARD STD TUBING 6FT

## (undated) DEVICE — ESU GROUND PAD UNIVERSAL W/O CORD

## (undated) DEVICE — DRSG STERI STRIP 1/2X4" R1547

## (undated) DEVICE — BLADE KNIFE BEAVER MINI BEAVER6400

## (undated) DEVICE — SOL NACL 0.9% IRRIG 1000ML BOTTLE 07138-09

## (undated) DEVICE — CATH ANGIO SUPERTORQUE AL1 6FRX100CM 532-645

## (undated) DEVICE — SU PROLENE 7-0 BV-1DA 4X30" M8703

## (undated) DEVICE — DECANTER VIAL 2006S

## (undated) DEVICE — DECANTER BAG 2002S

## (undated) DEVICE — KIT ACCESSORY INTRO INFLATION SYS 20/30 PRIORITY 1000186-115

## (undated) DEVICE — NDL ANGIOCATH 20GA 1.25" 4056

## (undated) DEVICE — CATH QUATTRO 9.3FR  FEM INSTRN

## (undated) DEVICE — SU SILK 3-0 TIE 24" SA74H

## (undated) DEVICE — WIRE GUIDE 0.035"X145CM AMPLATZ XSTIFF J THSCF-35-145-3-AES

## (undated) DEVICE — GUIDEWIRE BENTSON NON-HEP TSFB35260

## (undated) DEVICE — CATH ANGIO SUPERTORQUE PLUS JL4 6FRX100CM 533620

## (undated) DEVICE — SU SILK 4-0 TIE 12X30" A303H

## (undated) DEVICE — SHUNT SUNDT 3X4X10CM NL850-5060

## (undated) DEVICE — SU SILK 2-0 TIE 24" SA75H

## (undated) DEVICE — INTRO SHEATH 6FRX25CM PINNACLE RSS606

## (undated) DEVICE — PAD DEFIBRILLATOR ELECTRODE 4.25INX6IN ADULT 2001M-C

## (undated) DEVICE — MANIFOLD KIT ANGIO AUTOMATED 014613

## (undated) DEVICE — INTRO SHEATH MICRO PLATINUM TIP 4FRX40CM 7274

## (undated) DEVICE — CATH GUIDING BLUE YELLOW PTFE JR4 6FRX100CM 67008200

## (undated) DEVICE — PACK VASCULAR SCV15VAFSB

## (undated) DEVICE — PREP CHLORAPREP 26ML TINTED ORANGE  260815

## (undated) DEVICE — SOL NACL 0.9% INJ 250ML BAG 2B1322Q

## (undated) DEVICE — INTRODUCER SHEATH 8FRX24CM ARROW FLEX CL-07824

## (undated) DEVICE — SYR 03ML LL W/O NDL 309657

## (undated) DEVICE — NDL 19GA 1.5"

## (undated) DEVICE — DRAPE IOBAN INCISE 23X17" 6650EZ

## (undated) DEVICE — PACK HEART LEFT CUSTOM

## (undated) RX ORDER — BUPIVACAINE HYDROCHLORIDE 5 MG/ML
INJECTION, SOLUTION EPIDURAL; INTRACAUDAL
Status: DISPENSED
Start: 2017-07-11

## (undated) RX ORDER — CEFAZOLIN SODIUM 2 G/100ML
INJECTION, SOLUTION INTRAVENOUS
Status: DISPENSED
Start: 2017-07-11

## (undated) RX ORDER — ONDANSETRON 2 MG/ML
INJECTION INTRAMUSCULAR; INTRAVENOUS
Status: DISPENSED
Start: 2019-09-10

## (undated) RX ORDER — FENTANYL CITRATE 50 UG/ML
INJECTION, SOLUTION INTRAMUSCULAR; INTRAVENOUS
Status: DISPENSED
Start: 2019-08-27

## (undated) RX ORDER — LABETALOL HYDROCHLORIDE 5 MG/ML
INJECTION, SOLUTION INTRAVENOUS
Status: DISPENSED
Start: 2017-07-11

## (undated) RX ORDER — CLOPIDOGREL BISULFATE 75 MG/1
TABLET ORAL
Status: DISPENSED
Start: 2019-08-27

## (undated) RX ORDER — HEPARIN SODIUM 1000 [USP'U]/ML
INJECTION, SOLUTION INTRAVENOUS; SUBCUTANEOUS
Status: DISPENSED
Start: 2017-07-11

## (undated) RX ORDER — FENTANYL CITRATE 50 UG/ML
INJECTION, SOLUTION INTRAMUSCULAR; INTRAVENOUS
Status: DISPENSED
Start: 2019-09-10

## (undated) RX ORDER — CEFAZOLIN SODIUM 2 G/100ML
INJECTION, SOLUTION INTRAVENOUS
Status: DISPENSED
Start: 2019-09-10

## (undated) RX ORDER — ASPIRIN 325 MG
TABLET ORAL
Status: DISPENSED
Start: 2019-08-27

## (undated) RX ORDER — ASPIRIN 600 MG/1
SUPPOSITORY RECTAL
Status: DISPENSED
Start: 2017-07-11

## (undated) RX ORDER — CEFAZOLIN SODIUM 1 G/3ML
INJECTION, POWDER, FOR SOLUTION INTRAMUSCULAR; INTRAVENOUS
Status: DISPENSED
Start: 2017-07-11

## (undated) RX ORDER — FENTANYL CITRATE 50 UG/ML
INJECTION, SOLUTION INTRAMUSCULAR; INTRAVENOUS
Status: DISPENSED
Start: 2017-07-11

## (undated) RX ORDER — CEFAZOLIN SODIUM 1 G/3ML
INJECTION, POWDER, FOR SOLUTION INTRAMUSCULAR; INTRAVENOUS
Status: DISPENSED
Start: 2019-09-10

## (undated) RX ORDER — DEXAMETHASONE SODIUM PHOSPHATE 4 MG/ML
INJECTION, SOLUTION INTRA-ARTICULAR; INTRALESIONAL; INTRAMUSCULAR; INTRAVENOUS; SOFT TISSUE
Status: DISPENSED
Start: 2017-07-11